# Patient Record
Sex: MALE | Race: ASIAN | NOT HISPANIC OR LATINO | URBAN - METROPOLITAN AREA
[De-identification: names, ages, dates, MRNs, and addresses within clinical notes are randomized per-mention and may not be internally consistent; named-entity substitution may affect disease eponyms.]

---

## 2019-04-01 PROBLEM — Z00.00 ENCOUNTER FOR PREVENTIVE HEALTH EXAMINATION: Status: ACTIVE | Noted: 2019-04-01

## 2019-04-11 ENCOUNTER — OUTPATIENT (OUTPATIENT)
Dept: OUTPATIENT SERVICES | Facility: HOSPITAL | Age: 64
LOS: 1 days | End: 2019-04-11
Payer: COMMERCIAL

## 2019-04-11 ENCOUNTER — APPOINTMENT (OUTPATIENT)
Dept: MRI IMAGING | Facility: CLINIC | Age: 64
End: 2019-04-11
Payer: COMMERCIAL

## 2019-04-11 DIAGNOSIS — Z00.8 ENCOUNTER FOR OTHER GENERAL EXAMINATION: ICD-10-CM

## 2019-04-11 PROCEDURE — A9585: CPT

## 2019-04-11 PROCEDURE — 74183 MRI ABD W/O CNTR FLWD CNTR: CPT | Mod: 26

## 2019-04-11 PROCEDURE — 74183 MRI ABD W/O CNTR FLWD CNTR: CPT

## 2019-04-19 ENCOUNTER — RESULT REVIEW (OUTPATIENT)
Age: 64
End: 2019-04-19

## 2019-04-19 ENCOUNTER — INPATIENT (INPATIENT)
Facility: HOSPITAL | Age: 64
LOS: 0 days | Discharge: HOME | DRG: 445 | End: 2019-04-20
Attending: INTERNAL MEDICINE | Admitting: INTERNAL MEDICINE
Payer: COMMERCIAL

## 2019-04-19 VITALS
HEART RATE: 63 BPM | WEIGHT: 200.4 LBS | SYSTOLIC BLOOD PRESSURE: 131 MMHG | RESPIRATION RATE: 16 BRPM | DIASTOLIC BLOOD PRESSURE: 74 MMHG | TEMPERATURE: 99 F | OXYGEN SATURATION: 98 %

## 2019-04-19 DIAGNOSIS — E80.6 OTHER DISORDERS OF BILIRUBIN METABOLISM: ICD-10-CM

## 2019-04-19 DIAGNOSIS — N17.9 ACUTE KIDNEY FAILURE, UNSPECIFIED: ICD-10-CM

## 2019-04-19 DIAGNOSIS — I25.10 ATHEROSCLEROTIC HEART DISEASE OF NATIVE CORONARY ARTERY WITHOUT ANGINA PECTORIS: ICD-10-CM

## 2019-04-19 DIAGNOSIS — R63.8 OTHER SYMPTOMS AND SIGNS CONCERNING FOOD AND FLUID INTAKE: ICD-10-CM

## 2019-04-19 DIAGNOSIS — E11.9 TYPE 2 DIABETES MELLITUS WITHOUT COMPLICATIONS: ICD-10-CM

## 2019-04-19 DIAGNOSIS — I10 ESSENTIAL (PRIMARY) HYPERTENSION: ICD-10-CM

## 2019-04-19 DIAGNOSIS — Z91.89 OTHER SPECIFIED PERSONAL RISK FACTORS, NOT ELSEWHERE CLASSIFIED: ICD-10-CM

## 2019-04-19 DIAGNOSIS — K21.9 GASTRO-ESOPHAGEAL REFLUX DISEASE WITHOUT ESOPHAGITIS: ICD-10-CM

## 2019-04-19 DIAGNOSIS — Z90.49 ACQUIRED ABSENCE OF OTHER SPECIFIED PARTS OF DIGESTIVE TRACT: Chronic | ICD-10-CM

## 2019-04-19 LAB
ALBUMIN SERPL ELPH-MCNC: 3.1 G/DL — LOW (ref 3.3–5)
ALBUMIN SERPL ELPH-MCNC: 3.2 G/DL — LOW (ref 3.3–5)
ALBUMIN SERPL ELPH-MCNC: 3.6 G/DL — SIGNIFICANT CHANGE UP (ref 3.3–5)
ALP SERPL-CCNC: 690 U/L — HIGH (ref 40–120)
ALP SERPL-CCNC: 713 U/L — HIGH (ref 40–120)
ALP SERPL-CCNC: 841 U/L — HIGH (ref 40–120)
ALT FLD-CCNC: 247 U/L — HIGH (ref 10–45)
ALT FLD-CCNC: 251 U/L — HIGH (ref 10–45)
ALT FLD-CCNC: 301 U/L — HIGH (ref 10–45)
ANION GAP SERPL CALC-SCNC: 12 MMOL/L — SIGNIFICANT CHANGE UP (ref 5–17)
ANION GAP SERPL CALC-SCNC: 14 MMOL/L — SIGNIFICANT CHANGE UP (ref 5–17)
ANION GAP SERPL CALC-SCNC: 15 MMOL/L — SIGNIFICANT CHANGE UP (ref 5–17)
APPEARANCE UR: CLEAR — SIGNIFICANT CHANGE UP
AST SERPL-CCNC: 215 U/L — HIGH (ref 10–40)
AST SERPL-CCNC: 222 U/L — HIGH (ref 10–40)
AST SERPL-CCNC: 272 U/L — HIGH (ref 10–40)
BACTERIA # UR AUTO: ABNORMAL /HPF
BASOPHILS # BLD AUTO: 0.03 K/UL — SIGNIFICANT CHANGE UP (ref 0–0.2)
BASOPHILS NFR BLD AUTO: 0.4 % — SIGNIFICANT CHANGE UP (ref 0–2)
BILIRUB DIRECT SERPL-MCNC: 6.4 MG/DL — HIGH (ref 0–0.2)
BILIRUB INDIRECT FLD-MCNC: 2.3 MG/DL — HIGH (ref 0.2–1)
BILIRUB SERPL-MCNC: 7.6 MG/DL — HIGH (ref 0.2–1.2)
BILIRUB SERPL-MCNC: 7.8 MG/DL — HIGH (ref 0.2–1.2)
BILIRUB SERPL-MCNC: 8.7 MG/DL — HIGH (ref 0.2–1.2)
BILIRUB SERPL-MCNC: 8.7 MG/DL — HIGH (ref 0.2–1.2)
BILIRUB UR-MCNC: ABNORMAL
BUN SERPL-MCNC: 43 MG/DL — HIGH (ref 7–23)
BUN SERPL-MCNC: 44 MG/DL — HIGH (ref 7–23)
BUN SERPL-MCNC: 51 MG/DL — HIGH (ref 7–23)
CALCIUM SERPL-MCNC: 10 MG/DL — SIGNIFICANT CHANGE UP (ref 8.4–10.5)
CALCIUM SERPL-MCNC: 8.9 MG/DL — SIGNIFICANT CHANGE UP (ref 8.4–10.5)
CALCIUM SERPL-MCNC: 8.9 MG/DL — SIGNIFICANT CHANGE UP (ref 8.4–10.5)
CHLORIDE SERPL-SCNC: 102 MMOL/L — SIGNIFICANT CHANGE UP (ref 96–108)
CHLORIDE SERPL-SCNC: 107 MMOL/L — SIGNIFICANT CHANGE UP (ref 96–108)
CHLORIDE SERPL-SCNC: 108 MMOL/L — SIGNIFICANT CHANGE UP (ref 96–108)
CK SERPL-CCNC: 85 U/L — SIGNIFICANT CHANGE UP (ref 30–200)
CO2 SERPL-SCNC: 15 MMOL/L — LOW (ref 22–31)
CO2 SERPL-SCNC: 17 MMOL/L — LOW (ref 22–31)
CO2 SERPL-SCNC: 19 MMOL/L — LOW (ref 22–31)
COLOR SPEC: YELLOW — SIGNIFICANT CHANGE UP
CREAT SERPL-MCNC: 3.78 MG/DL — HIGH (ref 0.5–1.3)
CREAT SERPL-MCNC: 3.85 MG/DL — HIGH (ref 0.5–1.3)
CREAT SERPL-MCNC: 4.2 MG/DL — HIGH (ref 0.5–1.3)
DIFF PNL FLD: ABNORMAL
EOSINOPHIL # BLD AUTO: 0.13 K/UL — SIGNIFICANT CHANGE UP (ref 0–0.5)
EOSINOPHIL NFR BLD AUTO: 1.5 % — SIGNIFICANT CHANGE UP (ref 0–6)
EPI CELLS # UR: ABNORMAL /HPF (ref 0–5)
EXTRA BLUE TOP TUBE: SIGNIFICANT CHANGE UP
EXTRA SST TUBE: SIGNIFICANT CHANGE UP
GLUCOSE BLDC GLUCOMTR-MCNC: 117 MG/DL — HIGH (ref 70–99)
GLUCOSE BLDC GLUCOMTR-MCNC: 66 MG/DL — LOW (ref 70–99)
GLUCOSE BLDC GLUCOMTR-MCNC: 92 MG/DL — SIGNIFICANT CHANGE UP (ref 70–99)
GLUCOSE SERPL-MCNC: 118 MG/DL — HIGH (ref 70–99)
GLUCOSE SERPL-MCNC: 133 MG/DL — HIGH (ref 70–99)
GLUCOSE SERPL-MCNC: 135 MG/DL — HIGH (ref 70–99)
GLUCOSE UR QL: NEGATIVE — SIGNIFICANT CHANGE UP
GRAN CASTS # UR COMP ASSIST: ABNORMAL /LPF
HCT VFR BLD CALC: 34.2 % — LOW (ref 39–50)
HCT VFR BLD CALC: 39.7 % — SIGNIFICANT CHANGE UP (ref 39–50)
HGB BLD-MCNC: 11.1 G/DL — LOW (ref 13–17)
HGB BLD-MCNC: 13 G/DL — SIGNIFICANT CHANGE UP (ref 13–17)
HYALINE CASTS # UR AUTO: ABNORMAL /LPF (ref 0–2)
IMM GRANULOCYTES NFR BLD AUTO: 0.6 % — SIGNIFICANT CHANGE UP (ref 0–1.5)
KETONES UR-MCNC: NEGATIVE — SIGNIFICANT CHANGE UP
LEUKOCYTE ESTERASE UR-ACNC: ABNORMAL
LIDOCAIN IGE QN: 197 U/L — HIGH (ref 7–60)
LYMPHOCYTES # BLD AUTO: 0.87 K/UL — LOW (ref 1–3.3)
LYMPHOCYTES # BLD AUTO: 10.2 % — LOW (ref 13–44)
MCHC RBC-ENTMCNC: 29.4 PG — SIGNIFICANT CHANGE UP (ref 27–34)
MCHC RBC-ENTMCNC: 30.2 PG — SIGNIFICANT CHANGE UP (ref 27–34)
MCHC RBC-ENTMCNC: 32.5 GM/DL — SIGNIFICANT CHANGE UP (ref 32–36)
MCHC RBC-ENTMCNC: 32.7 GM/DL — SIGNIFICANT CHANGE UP (ref 32–36)
MCV RBC AUTO: 90.7 FL — SIGNIFICANT CHANGE UP (ref 80–100)
MCV RBC AUTO: 92.1 FL — SIGNIFICANT CHANGE UP (ref 80–100)
MONOCYTES # BLD AUTO: 1.13 K/UL — HIGH (ref 0–0.9)
MONOCYTES NFR BLD AUTO: 13.2 % — SIGNIFICANT CHANGE UP (ref 2–14)
NEUTROPHILS # BLD AUTO: 6.35 K/UL — SIGNIFICANT CHANGE UP (ref 1.8–7.4)
NEUTROPHILS NFR BLD AUTO: 74.1 % — SIGNIFICANT CHANGE UP (ref 43–77)
NITRITE UR-MCNC: NEGATIVE — SIGNIFICANT CHANGE UP
NRBC # BLD: 0 /100 WBCS — SIGNIFICANT CHANGE UP (ref 0–0)
NRBC # BLD: 0 /100 WBCS — SIGNIFICANT CHANGE UP (ref 0–0)
OSMOLALITY UR: 260 MOSMOL/KG — SIGNIFICANT CHANGE UP (ref 100–650)
PH UR: 6 — SIGNIFICANT CHANGE UP (ref 5–8)
PLATELET # BLD AUTO: 206 K/UL — SIGNIFICANT CHANGE UP (ref 150–400)
PLATELET # BLD AUTO: 232 K/UL — SIGNIFICANT CHANGE UP (ref 150–400)
POTASSIUM SERPL-MCNC: 4.8 MMOL/L — SIGNIFICANT CHANGE UP (ref 3.5–5.3)
POTASSIUM SERPL-MCNC: 5 MMOL/L — SIGNIFICANT CHANGE UP (ref 3.5–5.3)
POTASSIUM SERPL-MCNC: 5 MMOL/L — SIGNIFICANT CHANGE UP (ref 3.5–5.3)
POTASSIUM SERPL-SCNC: 4.8 MMOL/L — SIGNIFICANT CHANGE UP (ref 3.5–5.3)
POTASSIUM SERPL-SCNC: 5 MMOL/L — SIGNIFICANT CHANGE UP (ref 3.5–5.3)
POTASSIUM SERPL-SCNC: 5 MMOL/L — SIGNIFICANT CHANGE UP (ref 3.5–5.3)
PROT SERPL-MCNC: 6.9 G/DL — SIGNIFICANT CHANGE UP (ref 6–8.3)
PROT SERPL-MCNC: 7.1 G/DL — SIGNIFICANT CHANGE UP (ref 6–8.3)
PROT SERPL-MCNC: 8.4 G/DL — HIGH (ref 6–8.3)
PROT UR-MCNC: 100 MG/DL
RBC # BLD: 3.77 M/UL — LOW (ref 4.2–5.8)
RBC # BLD: 4.31 M/UL — SIGNIFICANT CHANGE UP (ref 4.2–5.8)
RBC # FLD: 14.9 % — HIGH (ref 10.3–14.5)
RBC # FLD: 15.8 % — HIGH (ref 10.3–14.5)
RBC CASTS # UR COMP ASSIST: < 5 /HPF — SIGNIFICANT CHANGE UP
SODIUM SERPL-SCNC: 136 MMOL/L — SIGNIFICANT CHANGE UP (ref 135–145)
SODIUM SERPL-SCNC: 136 MMOL/L — SIGNIFICANT CHANGE UP (ref 135–145)
SODIUM SERPL-SCNC: 137 MMOL/L — SIGNIFICANT CHANGE UP (ref 135–145)
SODIUM UR-SCNC: 45 MMOL/L — SIGNIFICANT CHANGE UP
SP GR SPEC: 1.02 — SIGNIFICANT CHANGE UP (ref 1–1.03)
UROBILINOGEN FLD QL: 1 E.U./DL — SIGNIFICANT CHANGE UP
UUN UR-MCNC: 380 MG/DL — SIGNIFICANT CHANGE UP
WBC # BLD: 7.32 K/UL — SIGNIFICANT CHANGE UP (ref 3.8–10.5)
WBC # BLD: 8.56 K/UL — SIGNIFICANT CHANGE UP (ref 3.8–10.5)
WBC # FLD AUTO: 7.32 K/UL — SIGNIFICANT CHANGE UP (ref 3.8–10.5)
WBC # FLD AUTO: 8.56 K/UL — SIGNIFICANT CHANGE UP (ref 3.8–10.5)
WBC UR QL: > 10 /HPF

## 2019-04-19 PROCEDURE — 99285 EMERGENCY DEPT VISIT HI MDM: CPT | Mod: 25

## 2019-04-19 PROCEDURE — 99223 1ST HOSP IP/OBS HIGH 75: CPT

## 2019-04-19 PROCEDURE — 93010 ELECTROCARDIOGRAM REPORT: CPT | Mod: NC

## 2019-04-19 PROCEDURE — 43259 EGD US EXAM DUODENUM/JEJUNUM: CPT

## 2019-04-19 PROCEDURE — 71046 X-RAY EXAM CHEST 2 VIEWS: CPT | Mod: 26

## 2019-04-19 RX ORDER — INSULIN GLARGINE 100 [IU]/ML
22 INJECTION, SOLUTION SUBCUTANEOUS AT BEDTIME
Qty: 0 | Refills: 0 | Status: DISCONTINUED | OUTPATIENT
Start: 2019-04-19 | End: 2019-04-19

## 2019-04-19 RX ORDER — CARVEDILOL PHOSPHATE 80 MG/1
25 CAPSULE, EXTENDED RELEASE ORAL EVERY 12 HOURS
Qty: 0 | Refills: 0 | Status: DISCONTINUED | OUTPATIENT
Start: 2019-04-19 | End: 2019-04-20

## 2019-04-19 RX ORDER — (INSULIN DEGLUDEC AND LIRAGLUTIDE) 100; 3.6 [IU]/ML; MG/ML
0 INJECTION, SOLUTION SUBCUTANEOUS
Qty: 0 | Refills: 0 | COMMUNITY

## 2019-04-19 RX ORDER — ONDANSETRON 8 MG/1
4 TABLET, FILM COATED ORAL ONCE
Qty: 0 | Refills: 0 | Status: DISCONTINUED | OUTPATIENT
Start: 2019-04-19 | End: 2019-04-20

## 2019-04-19 RX ORDER — HEPARIN SODIUM 5000 [USP'U]/ML
5000 INJECTION INTRAVENOUS; SUBCUTANEOUS EVERY 8 HOURS
Qty: 0 | Refills: 0 | Status: DISCONTINUED | OUTPATIENT
Start: 2019-04-19 | End: 2019-04-20

## 2019-04-19 RX ORDER — CALAMINE AND ZINC OXIDE AND PHENOL 160; 10 MG/ML; MG/ML
1 LOTION TOPICAL
Qty: 0 | Refills: 0 | Status: DISCONTINUED | OUTPATIENT
Start: 2019-04-19 | End: 2019-04-20

## 2019-04-19 RX ORDER — AMLODIPINE BESYLATE 2.5 MG/1
10 TABLET ORAL DAILY
Qty: 0 | Refills: 0 | Status: DISCONTINUED | OUTPATIENT
Start: 2019-04-19 | End: 2019-04-20

## 2019-04-19 RX ORDER — ATORVASTATIN CALCIUM 80 MG/1
40 TABLET, FILM COATED ORAL AT BEDTIME
Qty: 0 | Refills: 0 | Status: DISCONTINUED | OUTPATIENT
Start: 2019-04-19 | End: 2019-04-19

## 2019-04-19 RX ORDER — DEXTROSE 50 % IN WATER 50 %
15 SYRINGE (ML) INTRAVENOUS ONCE
Qty: 0 | Refills: 0 | Status: DISCONTINUED | OUTPATIENT
Start: 2019-04-19 | End: 2019-04-20

## 2019-04-19 RX ORDER — SODIUM CHLORIDE 9 MG/ML
1000 INJECTION, SOLUTION INTRAVENOUS
Qty: 0 | Refills: 0 | Status: DISCONTINUED | OUTPATIENT
Start: 2019-04-19 | End: 2019-04-20

## 2019-04-19 RX ORDER — DEXTROSE 50 % IN WATER 50 %
12.5 SYRINGE (ML) INTRAVENOUS ONCE
Qty: 0 | Refills: 0 | Status: DISCONTINUED | OUTPATIENT
Start: 2019-04-19 | End: 2019-04-20

## 2019-04-19 RX ORDER — INSULIN LISPRO 100/ML
VIAL (ML) SUBCUTANEOUS
Qty: 0 | Refills: 0 | Status: DISCONTINUED | OUTPATIENT
Start: 2019-04-19 | End: 2019-04-20

## 2019-04-19 RX ORDER — PANTOPRAZOLE SODIUM 20 MG/1
40 TABLET, DELAYED RELEASE ORAL
Qty: 0 | Refills: 0 | Status: DISCONTINUED | OUTPATIENT
Start: 2019-04-19 | End: 2019-04-20

## 2019-04-19 RX ORDER — HYDRALAZINE HCL 50 MG
75 TABLET ORAL THREE TIMES A DAY
Qty: 0 | Refills: 0 | Status: DISCONTINUED | OUTPATIENT
Start: 2019-04-19 | End: 2019-04-20

## 2019-04-19 RX ORDER — DEXTROSE 50 % IN WATER 50 %
25 SYRINGE (ML) INTRAVENOUS ONCE
Qty: 0 | Refills: 0 | Status: DISCONTINUED | OUTPATIENT
Start: 2019-04-19 | End: 2019-04-20

## 2019-04-19 RX ORDER — ASPIRIN/CALCIUM CARB/MAGNESIUM 324 MG
81 TABLET ORAL DAILY
Qty: 0 | Refills: 0 | Status: DISCONTINUED | OUTPATIENT
Start: 2019-04-19 | End: 2019-04-20

## 2019-04-19 RX ORDER — SODIUM CHLORIDE 9 MG/ML
1000 INJECTION INTRAMUSCULAR; INTRAVENOUS; SUBCUTANEOUS
Qty: 0 | Refills: 0 | Status: DISCONTINUED | OUTPATIENT
Start: 2019-04-19 | End: 2019-04-20

## 2019-04-19 RX ORDER — DEXTROSE 50 % IN WATER 50 %
25 SYRINGE (ML) INTRAVENOUS ONCE
Qty: 0 | Refills: 0 | Status: COMPLETED | OUTPATIENT
Start: 2019-04-19 | End: 2019-04-19

## 2019-04-19 RX ORDER — VALSARTAN 80 MG/1
1 TABLET ORAL
Qty: 0 | Refills: 0 | COMMUNITY

## 2019-04-19 RX ORDER — AMLODIPINE BESYLATE 2.5 MG/1
1 TABLET ORAL
Qty: 0 | Refills: 0 | COMMUNITY

## 2019-04-19 RX ORDER — GLUCAGON INJECTION, SOLUTION 0.5 MG/.1ML
1 INJECTION, SOLUTION SUBCUTANEOUS ONCE
Qty: 0 | Refills: 0 | Status: DISCONTINUED | OUTPATIENT
Start: 2019-04-19 | End: 2019-04-20

## 2019-04-19 RX ORDER — SODIUM CHLORIDE 9 MG/ML
1000 INJECTION INTRAMUSCULAR; INTRAVENOUS; SUBCUTANEOUS ONCE
Qty: 0 | Refills: 0 | Status: COMPLETED | OUTPATIENT
Start: 2019-04-19 | End: 2019-04-19

## 2019-04-19 RX ORDER — DIPHENHYDRAMINE HCL 50 MG
25 CAPSULE ORAL ONCE
Qty: 0 | Refills: 0 | Status: COMPLETED | OUTPATIENT
Start: 2019-04-19 | End: 2019-04-19

## 2019-04-19 RX ORDER — LIRAGLUTIDE 6 MG/ML
1.8 INJECTION SUBCUTANEOUS
Qty: 0 | Refills: 0 | COMMUNITY

## 2019-04-19 RX ADMIN — HEPARIN SODIUM 5000 UNIT(S): 5000 INJECTION INTRAVENOUS; SUBCUTANEOUS at 21:37

## 2019-04-19 RX ADMIN — CARVEDILOL PHOSPHATE 25 MILLIGRAM(S): 80 CAPSULE, EXTENDED RELEASE ORAL at 21:37

## 2019-04-19 RX ADMIN — Medication 25 MILLIGRAM(S): at 15:40

## 2019-04-19 RX ADMIN — Medication 25 MILLILITER(S): at 14:29

## 2019-04-19 RX ADMIN — HEPARIN SODIUM 5000 UNIT(S): 5000 INJECTION INTRAVENOUS; SUBCUTANEOUS at 14:12

## 2019-04-19 RX ADMIN — SODIUM CHLORIDE 2000 MILLILITER(S): 9 INJECTION INTRAMUSCULAR; INTRAVENOUS; SUBCUTANEOUS at 14:30

## 2019-04-19 NOTE — H&P ADULT - ASSESSMENT
THe patient is a 63 year old man with a PMH of CAD (9 stents), D2m on insulin, HTN, GERD, prior cholecystectomy who present with a month of increasing bilirubin, abdominal discomfort and jaundice.  Given MRCP likely DDX is surgical stricture vs ampullary carcinoma THe patient is a 63 year old man with a PMH of CAD (9 stents), D2m on insulin, HTN, GERD, prior cholecystectomy who present with a month of increasing bilirubin, abdominal discomfort and jaundice.  Given MRCP likely DDX is surgical stricture vs ampullary carcinoma.

## 2019-04-19 NOTE — PATIENT PROFILE ADULT - HARM RISK FACTORS
Anesthetic History No history of anesthetic complications Review of Systems / Medical History Pertinent labs reviewed Pulmonary Within defined limits Neuro/Psych Within defined limits Cardiovascular Within defined limits Exercise tolerance: >4 METS 
  
GI/Hepatic/Renal 
  
 
 
 
 
 
Comments: PEG Endo/Other Hypothyroidism Cancer (throat) Other Findings Physical Exam 
 
Airway Mallampati: III 
TM Distance: < 4 cm Neck ROM: normal range of motion Mouth opening: Diminished (comment) Cardiovascular Regular rate and rhythm,  S1 and S2 normal,  no murmur, click, rub, or gallop Dental 
No notable dental hx Pulmonary Breath sounds clear to auscultation Abdominal 
GI exam deferred Other Findings Anesthetic Plan ASA: 2 Anesthesia type: total IV anesthesia Induction: Intravenous Anesthetic plan and risks discussed with: Patient
no

## 2019-04-19 NOTE — H&P ADULT - NSHPPHYSICALEXAM_GEN_ALL_CORE
.  VITAL SIGNS:  T(C): 36.8 (04-19-19 @ 12:18), Max: 37 (04-19-19 @ 11:26)  T(F): 98.2 (04-19-19 @ 12:18), Max: 98.6 (04-19-19 @ 11:26)  HR: 61 (04-19-19 @ 12:18) (61 - 63)  BP: 146/73 (04-19-19 @ 12:18) (131/74 - 146/73)  BP(mean): --  RR: 18 (04-19-19 @ 12:18) (16 - 18)  SpO2: 97% (04-19-19 @ 12:18) (97% - 98%)  Wt(kg): --    PHYSICAL EXAM:    Constitutional: WDWN resting comfortably in bed; NAD Jaundiced   Head: NC/AT  Eyes: PERRL, EOMI, +ictuerus  ENT: no nasal discharge; uvula midline, no oropharyngeal erythema or exudates; MMM  Neck: supple; no JVD or thyromegaly  Respiratory: CTA B/L; no W/R/R, no retractions  Cardiac: +S1/S2; RRR; no M/R/G; PMI non-displaced  Gastrointestinal: abdomen soft, NT/ND; no rebound or guarding; +BSx4  Back: spine midline, no bony tenderness or step-offs; no CVAT B/L  Extremities: WWP, no clubbing or cyanosis; no peripheral edema  Musculoskeletal: NROM x4; no joint swelling, tenderness or erythema  Vascular: 2+ radial, femoral, DP/PT pulses B/L  Lymphatic: no submandibular or cervical LAD  Neurologic: AAOx3; CNII-XII grossly intact; no focal deficits

## 2019-04-19 NOTE — ED ADULT NURSE NOTE - OBJECTIVE STATEMENT
Patient is a 64yo M, arrived to ED via walk-in, AAOx3, in NAD, S, stating, "My bilirubin is high".  Patient also complaining of nausea.  Patient denies any CP, SOB, dizziness, vomiting, diarrhea or any other complaints.  PIV placed, labs drawn and sent, EKG done.

## 2019-04-19 NOTE — H&P ADULT - NSHPSOCIALHISTORY_GEN_ALL_CORE
Patient has a 30 pack year smoking history stopped 10 years ago Patient has a 30 pack year smoking history stopped 10 years ago. Patient has a 30 pack year smoking history stopped 10 years ago.  Denies etoh and recreational drug use. .  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

## 2019-04-19 NOTE — H&P ADULT - PROBLEM SELECTOR PLAN 5
MSSI  and premeal MSSI, lantus and premal    26 MSSI, lantus and premal  .6 x 90 = 27 Lantus  and 9 of premeal.    -Lantus 22 and no premeal as patient is NPO MSSI, lantus and premeal  0.6 x 90 = 27 Lantus  and 9 of premeal.    - start Lantus 22 qHS and no premeal as patient is NPO  - increase Lantus and add pre-meal insulin once pt no longer NPO

## 2019-04-19 NOTE — H&P ADULT - PROBLEM SELECTOR PLAN 4
-c/w home aspirn -c/w home asprin -c/w home aspirin with history of 9 stents placed.  -c/w home aspirin

## 2019-04-19 NOTE — ED ADULT NURSE NOTE - NSIMPLEMENTINTERV_GEN_ALL_ED
Implemented All Universal Safety Interventions:  [place] to call system. Call bell, personal items and telephone within reach. Instruct patient to call for assistance. Room bathroom lighting operational. Non-slip footwear when patient is off stretcher. Physically safe environment: no spills, clutter or unnecessary equipment. Stretcher in lowest position, wheels locked, appropriate side rails in place.

## 2019-04-19 NOTE — ED PROVIDER NOTE - ATTENDING CONTRIBUTION TO CARE
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 I discussed the plan of care of the patient directly with the PA while the patient was in the Emergency Department. Pt is well appearing but jaundiced, actively nauseous. . I have reviewed the ACP note and agree with the history, exam and plan of care. Admitted for likely ERCP today.

## 2019-04-19 NOTE — ED PROVIDER NOTE - OBJECTIVE STATEMENT
64 y/o male with hx of of CAD, DM, cirrhosis c/o jaundice x 1 day. pt states decrease appetite and itching for past 2 wks. Pt notes nausea past 1 wk. pt seen as outpt and bili elevated yesterday and pt noted to be jaundiced. pt reports dark urine and ifeanyi colored stools. pt reports wt loss of 15 lbs over past 2 wks. no fever or chills. no vomiting or diarrhea. no abd pain. Also pt notes worsening renal function on labs from yesterday. no further complaints.

## 2019-04-19 NOTE — ED PROVIDER NOTE - CHIEF COMPLAINT
The patient is a 63y Male complaining of abdominal pain. The patient is a 63y Male complaining of jaundice

## 2019-04-19 NOTE — H&P ADULT - NSICDXPASTMEDICALHX_GEN_ALL_CORE_FT
PAST MEDICAL HISTORY:  CAD (coronary artery disease)     HTN (hypertension)     Type II diabetes mellitus

## 2019-04-19 NOTE — ED PROVIDER NOTE - CLINICAL SUMMARY MEDICAL DECISION MAKING FREE TEXT BOX
jaundice with wt loss and nausea. abd non tender. concern for billary obstructive pathology and worsening renal function on outpt labs.  VSS. pt sent in for ERCP by outpt md. labs, ecg , cxr and plan for admission.

## 2019-04-19 NOTE — H&P ADULT - HISTORY OF PRESENT ILLNESS
THe patient is a 63 year old man with a PMH of CAD (9 stents), D2m on insulin, HTN, GERD, prior cholecystectomy,  diverticulosis and former 30  pack year smoker  who presents with painless jaundice and transaminitis (Mid 100s). Over two months ago the patient was noted to have elevated  transaminitis on routine labs. The patient on follow up RUQ ultrasound he was found to have dilated intrahepatic and extrahepatic questions.  Subsequent MRCP confirmed diffuse intra and extra hepatic ductal dilation  with a CBD of 2.3.  The patient reports decreased PO intake/appetite  starting in mid march, and reports that he has a 15-20 lb weight loss over the past two months with vague abdominal pain and nausea. He also reports  increased pruritis. On 3/29 the patient underwent EGD which showed irregular GE junction margins which was biopsied. On 4/1, The patient noted  icterus on 4/18 with increasing jaundice THe patient was scheduled for ERCP / EUS on 4/19 but concern of worsening symptoms (patient's family   noticed that he was becoming progressively jaundiced and icteric), patient arrived to [facility] today with plan (discussed with GI team prior to arrival) to undergo ERCP and EUS earlier than previously planned after his bilirubin on yesterdays lab work showed and increase 7.2 from .6 on intital evaluation. Patient denies sick contacts, vomiting fever, night sweats, CP and SOB. The patient is a 63 year old man with a PMH of CAD (9 stents), D2m on insulin, HTN, GERD, prior cholecystectomy,  diverticulosis and former 30  pack year smoker  who presents with painless jaundice and transaminitis (Mid 100s). Over two months ago the patient was noted to have elevated  transaminitis on routine labs. The patient on follow up RUQ ultrasound he was found to have dilated intrahepatic and extrahepatic questions.  Subsequent MRCP confirmed diffuse intra and extra hepatic ductal dilation  with a CBD of 2.3.  The patient reports decreased PO intake/appetite  starting in mid march, and reports that he has a 15-20 lb weight loss over the past two months with vague abdominal pain and nausea. He also reports  increased pruritis. On 3/29 the patient underwent EGD which showed irregular GE junction margins which was biopsied. On 4/1, The patient noted  icterus on 4/18 with increasing jaundice THe patient was scheduled for ERCP / EUS on 4/19 but concern of worsening symptoms (patient's family   noticed that he was becoming progressively jaundiced and icteric), patient arrived to [facility] today with plan (discussed with GI team prior to arrival) to undergo ERCP and EUS earlier than previously planned after his bilirubin on yesterdays lab work showed and increase 7.2 from .6 on intital evaluation. Patient denies sick contacts, vomiting fever, night sweats, CP and SOB.

## 2019-04-19 NOTE — H&P ADULT - PROBLEM SELECTOR PLAN 1
Patient with increasing abdominal fullness, jaundice and bilirubinemia. MRCP showed pancreatic mass however confirmed intra and extra hepatic bilary duct dilation.  Top two DDX are  a billary stricture VS an ampullary mass  -ERCP with EUS  -Trend Liver enzymes and bilirubrin Patient with increasing abdominal fullness, jaundice and bilirubinemia. MRCP showed pancreatic mass however confirmed intra and extra hepatic bilary duct dilation.  Top two DDX are  a billary stricture VS an ampullary mass  -ERCP with EUS  -Trend Liver enzymes and bilirubin Patient with increasing abdominal fullness, jaundice and bilirubinemia. MRCP showed no pancreatic mass however confirmed intra and extra hepatic bilary duct dilation.  Top two DDX are  a billary stricture VS an ampullary mass  -ERCP with EUS today  -Trend Liver enzymes and bilirubin  -GI recs appreciated

## 2019-04-19 NOTE — H&P ADULT - NSHPLABSRESULTS_GEN_ALL_CORE
136  |  102  |  51<H>  ----------------------------<  135<H>  5.0   |  19<L>  |  4.20<H>    Ca    10.0      2019 11:48    TPro  8.4<H>  /  Alb  3.6  /  TBili  8.7<H>  /  DBili  6.4<H>  /  AST  272<H>  /  ALT  301<H>  /  AlkPhos  841<H>                Urinalysis Basic - ( 2019 11:58 )    Color: Yellow / Appearance: Clear / S.020 / pH: x  Gluc: x / Ketone: NEGATIVE  / Bili: Moderate / Urobili: 1.0 E.U./dL   Blood: x / Protein: 100 mg/dL / Nitrite: NEGATIVE   Leuk Esterase: Trace / RBC: < 5 /HPF / WBC > 10 /HPF   Sq Epi: x / Non Sq Epi: 5-10 /HPF / Bacteria: Many /HPF            Lactate Trend            CAPILLARY BLOOD GLUCOSE Labs: reviewed.    CAPILLARY BLOOD GLUCOSE      POCT Blood Glucose.: 92 mg/dL (2019 15:27)  POCT Blood Glucose.: 66 mg/dL (2019 14:20)                          13.0   8.56  )-----------( 232      ( 2019 14:15 )             39.7         136  |  102  |  51<H>  ----------------------------<  135<H>  5.0   |  19<L>  |  4.20<H>    Ca    10.0      2019 11:48    TPro  8.4<H>  /  Alb  3.6  /  TBili  8.7<H>  /  DBili  6.4<H>  /  AST  272<H>  /  ALT  301<H>  /  AlkPhos  841<H>        LIVER FUNCTIONS - ( 2019 11:48 )  Alb: 3.6 g/dL / Pro: 8.4 g/dL / ALK PHOS: 841 U/L / ALT: 301 U/L / AST: 272 U/L / GGT: x             Urinalysis Basic - ( 2019 11:58 )    Color: Yellow / Appearance: Clear / S.020 / pH: x  Gluc: x / Ketone: NEGATIVE  / Bili: Moderate / Urobili: 1.0 E.U./dL   Blood: x / Protein: 100 mg/dL / Nitrite: NEGATIVE   Leuk Esterase: Trace / RBC: < 5 /HPF / WBC > 10 /HPF   Sq Epi: x / Non Sq Epi: 5-10 /HPF / Bacteria: Many /HPF    Radiology and additional tests: reviewed.    MR MRCP w/wo IV Cont (19 @ 09:37) >  Extensive intravenous as well as extra hepatic bile duct dilatation.  No marshall choledocholithiasis, pancreatic mass nor marshall mass of bile duct   origin.  The findings may be due to severe stricturing however endoscopic   evaluation is suggested to exclude a subtle mass of bile duct or   ampullary origin.

## 2019-04-19 NOTE — H&P ADULT - PROBLEM SELECTOR PLAN 2
Patient reports last know baseline around 2.3-2.5. Patient now with Cr of 4.6 Increased form his CR yesterday 4.05. Unclear accuracy of result given hyperbilurbinemia but patient with likely SLOAN. Could be prenal in the setting of poor PO intatke  -Holding valsartan, and torsemide  -Urine Lytes Patient reports last know baseline around 2.3-2.5. Patient now with Cr of 4.6 Increased form his CR yesterday 4.05. Unclear accuracy of result given hyperbilurbinemia but patient with likely SLOAN. Could be prenal in the setting of poor PO intatke. Patient on with Blood but no RBC on UA.    -follow up CK to rule out rhabdo SLOAN  -Holding valsartan, and torsemide  -Urine Lytes  to calculate FeUrea as patient is on toresemide  -1 L fluid bolus  -NS 100cc/hr Patient reports last known baseline around 2.3-2.5. Patient now with Cr of 4.6 Increased form his CR yesterday 4.05. Unclear accuracy of result given hyperbilirubinemia but patient with likely SLOAN. Could be pre-renal in the setting of poor PO intake. Also possible that hepatic injury is causing a hepatorenal syndrome. Patient on with Blood but no RBC on UA.    -follow up CK to rule out rhabdo SLOAN  -Holding valsartan, and torsemide  -Urine Lytes  to calculate FeUrea as patient is on toresemide  -1 L fluid bolus  -NS 100cc/hr  -anticipate that SLOAN should improve after ERCP with EUS

## 2019-04-19 NOTE — H&P ADULT - ATTENDING COMMENTS
Patient was seen and examined by me at bedside. I agree with resident's note, subjective, objective physical exam, assessment and plan with following modifications/additions.     1) Painless jaundice  2) Intrahepatic and extrahepatic duct dilation.   3) SLOAN on CKD  4) Hyperbilirubinemia  5) Transaminitis.     A/P: HD stable, euvolemic. Painless jaundice. Suggestive of biliary obstruction.   GI planning for ERCP today. NPO  Etiology for SLOAN is unclear. Likely pre-renal, since hyaline casts. Will try bolus NS IVF and repeat BMP. Also in differential ATN/AIN. Hold ACE/ARB. Hold diuretics for now.

## 2019-04-19 NOTE — H&P ADULT - PROBLEM SELECTOR PLAN 3
On home valsartan-amlodipine combination, coreg and torsemide ( confirmed with patient for BP)  -Holding Valsartan, and toresimde On home valsartan-amlodipine combination, coreg and torsemide ( confirmed with patient for BP)  -Holding Valsartan, and torsemide

## 2019-04-20 ENCOUNTER — TRANSCRIPTION ENCOUNTER (OUTPATIENT)
Age: 64
End: 2019-04-20

## 2019-04-20 VITALS — DIASTOLIC BLOOD PRESSURE: 64 MMHG | HEART RATE: 70 BPM | SYSTOLIC BLOOD PRESSURE: 106 MMHG

## 2019-04-20 LAB
ALBUMIN SERPL ELPH-MCNC: 3 G/DL — LOW (ref 3.3–5)
ALBUMIN SERPL ELPH-MCNC: 3.3 G/DL — SIGNIFICANT CHANGE UP (ref 3.3–5)
ALP SERPL-CCNC: 585 U/L — HIGH (ref 40–120)
ALP SERPL-CCNC: 687 U/L — HIGH (ref 40–120)
ALT FLD-CCNC: 192 U/L — HIGH (ref 10–45)
ALT FLD-CCNC: 243 U/L — HIGH (ref 10–45)
ANION GAP SERPL CALC-SCNC: 10 MMOL/L — SIGNIFICANT CHANGE UP (ref 5–17)
ANION GAP SERPL CALC-SCNC: 13 MMOL/L — SIGNIFICANT CHANGE UP (ref 5–17)
AST SERPL-CCNC: 116 U/L — HIGH (ref 10–40)
AST SERPL-CCNC: 179 U/L — HIGH (ref 10–40)
BILIRUB SERPL-MCNC: 3.6 MG/DL — HIGH (ref 0.2–1.2)
BILIRUB SERPL-MCNC: 5.5 MG/DL — HIGH (ref 0.2–1.2)
BUN SERPL-MCNC: 36 MG/DL — HIGH (ref 7–23)
BUN SERPL-MCNC: 37 MG/DL — HIGH (ref 7–23)
CALCIUM SERPL-MCNC: 8.6 MG/DL — SIGNIFICANT CHANGE UP (ref 8.4–10.5)
CALCIUM SERPL-MCNC: 9.2 MG/DL — SIGNIFICANT CHANGE UP (ref 8.4–10.5)
CHLORIDE SERPL-SCNC: 108 MMOL/L — SIGNIFICANT CHANGE UP (ref 96–108)
CHLORIDE SERPL-SCNC: 110 MMOL/L — HIGH (ref 96–108)
CO2 SERPL-SCNC: 15 MMOL/L — LOW (ref 22–31)
CO2 SERPL-SCNC: 16 MMOL/L — LOW (ref 22–31)
CREAT SERPL-MCNC: 3.56 MG/DL — HIGH (ref 0.5–1.3)
CREAT SERPL-MCNC: 3.71 MG/DL — HIGH (ref 0.5–1.3)
GLUCOSE BLDC GLUCOMTR-MCNC: 136 MG/DL — HIGH (ref 70–99)
GLUCOSE BLDC GLUCOMTR-MCNC: 276 MG/DL — HIGH (ref 70–99)
GLUCOSE BLDC GLUCOMTR-MCNC: 276 MG/DL — HIGH (ref 70–99)
GLUCOSE SERPL-MCNC: 144 MG/DL — HIGH (ref 70–99)
GLUCOSE SERPL-MCNC: 286 MG/DL — HIGH (ref 70–99)
HBA1C BLD-MCNC: 6.5 % — HIGH (ref 4–5.6)
HCV AB S/CO SERPL IA: 0.12 S/CO — SIGNIFICANT CHANGE UP
HCV AB SERPL-IMP: SIGNIFICANT CHANGE UP
MAGNESIUM SERPL-MCNC: 2.2 MG/DL — SIGNIFICANT CHANGE UP (ref 1.6–2.6)
PHOSPHATE SERPL-MCNC: 3.8 MG/DL — SIGNIFICANT CHANGE UP (ref 2.5–4.5)
POTASSIUM SERPL-MCNC: 4.8 MMOL/L — SIGNIFICANT CHANGE UP (ref 3.5–5.3)
POTASSIUM SERPL-MCNC: 5.4 MMOL/L — HIGH (ref 3.5–5.3)
POTASSIUM SERPL-SCNC: 4.8 MMOL/L — SIGNIFICANT CHANGE UP (ref 3.5–5.3)
POTASSIUM SERPL-SCNC: 5.4 MMOL/L — HIGH (ref 3.5–5.3)
PROT SERPL-MCNC: 6.5 G/DL — SIGNIFICANT CHANGE UP (ref 6–8.3)
PROT SERPL-MCNC: 7.4 G/DL — SIGNIFICANT CHANGE UP (ref 6–8.3)
SODIUM SERPL-SCNC: 134 MMOL/L — LOW (ref 135–145)
SODIUM SERPL-SCNC: 138 MMOL/L — SIGNIFICANT CHANGE UP (ref 135–145)

## 2019-04-20 PROCEDURE — 86301 IMMUNOASSAY TUMOR CA 19-9: CPT

## 2019-04-20 PROCEDURE — 99285 EMERGENCY DEPT VISIT HI MDM: CPT | Mod: 25

## 2019-04-20 PROCEDURE — 82248 BILIRUBIN DIRECT: CPT

## 2019-04-20 PROCEDURE — 82378 CARCINOEMBRYONIC ANTIGEN: CPT

## 2019-04-20 PROCEDURE — 82247 BILIRUBIN TOTAL: CPT

## 2019-04-20 PROCEDURE — 93005 ELECTROCARDIOGRAM TRACING: CPT

## 2019-04-20 PROCEDURE — 86803 HEPATITIS C AB TEST: CPT

## 2019-04-20 PROCEDURE — 81001 URINALYSIS AUTO W/SCOPE: CPT

## 2019-04-20 PROCEDURE — 74330 X-RAY BILE/PANC ENDOSCOPY: CPT

## 2019-04-20 PROCEDURE — 83735 ASSAY OF MAGNESIUM: CPT

## 2019-04-20 PROCEDURE — 83036 HEMOGLOBIN GLYCOSYLATED A1C: CPT

## 2019-04-20 PROCEDURE — 84100 ASSAY OF PHOSPHORUS: CPT

## 2019-04-20 PROCEDURE — 85027 COMPLETE CBC AUTOMATED: CPT

## 2019-04-20 PROCEDURE — 84540 ASSAY OF URINE/UREA-N: CPT

## 2019-04-20 PROCEDURE — 83690 ASSAY OF LIPASE: CPT

## 2019-04-20 PROCEDURE — C1887: CPT

## 2019-04-20 PROCEDURE — 88305 TISSUE EXAM BY PATHOLOGIST: CPT

## 2019-04-20 PROCEDURE — 88104 CYTOPATH FL NONGYN SMEARS: CPT

## 2019-04-20 PROCEDURE — 36415 COLL VENOUS BLD VENIPUNCTURE: CPT

## 2019-04-20 PROCEDURE — C1726: CPT

## 2019-04-20 PROCEDURE — C1769: CPT

## 2019-04-20 PROCEDURE — 82550 ASSAY OF CK (CPK): CPT

## 2019-04-20 PROCEDURE — 83935 ASSAY OF URINE OSMOLALITY: CPT

## 2019-04-20 PROCEDURE — 82962 GLUCOSE BLOOD TEST: CPT

## 2019-04-20 PROCEDURE — 71046 X-RAY EXAM CHEST 2 VIEWS: CPT

## 2019-04-20 PROCEDURE — 84300 ASSAY OF URINE SODIUM: CPT

## 2019-04-20 PROCEDURE — 93010 ELECTROCARDIOGRAM REPORT: CPT

## 2019-04-20 PROCEDURE — C2617: CPT

## 2019-04-20 PROCEDURE — 99239 HOSP IP/OBS DSCHRG MGMT >30: CPT

## 2019-04-20 PROCEDURE — 85025 COMPLETE CBC W/AUTO DIFF WBC: CPT

## 2019-04-20 PROCEDURE — 80053 COMPREHEN METABOLIC PANEL: CPT

## 2019-04-20 RX ORDER — INSULIN LISPRO 100/ML
20 VIAL (ML) SUBCUTANEOUS
Qty: 0 | Refills: 0 | Status: DISCONTINUED | OUTPATIENT
Start: 2019-04-20 | End: 2019-04-20

## 2019-04-20 RX ORDER — INSULIN GLARGINE 100 [IU]/ML
30 INJECTION, SOLUTION SUBCUTANEOUS EVERY MORNING
Qty: 0 | Refills: 0 | Status: DISCONTINUED | OUTPATIENT
Start: 2019-04-20 | End: 2019-04-20

## 2019-04-20 RX ADMIN — Medication 75 MILLIGRAM(S): at 16:48

## 2019-04-20 RX ADMIN — CARVEDILOL PHOSPHATE 25 MILLIGRAM(S): 80 CAPSULE, EXTENDED RELEASE ORAL at 17:27

## 2019-04-20 RX ADMIN — CALAMINE AND ZINC OXIDE AND PHENOL 1 APPLICATION(S): 160; 10 LOTION TOPICAL at 17:27

## 2019-04-20 RX ADMIN — CARVEDILOL PHOSPHATE 25 MILLIGRAM(S): 80 CAPSULE, EXTENDED RELEASE ORAL at 05:32

## 2019-04-20 RX ADMIN — Medication 20 UNIT(S): at 13:00

## 2019-04-20 RX ADMIN — CALAMINE AND ZINC OXIDE AND PHENOL 1 APPLICATION(S): 160; 10 LOTION TOPICAL at 12:15

## 2019-04-20 RX ADMIN — INSULIN GLARGINE 30 UNIT(S): 100 INJECTION, SOLUTION SUBCUTANEOUS at 11:12

## 2019-04-20 RX ADMIN — AMLODIPINE BESYLATE 10 MILLIGRAM(S): 2.5 TABLET ORAL at 05:32

## 2019-04-20 RX ADMIN — PANTOPRAZOLE SODIUM 40 MILLIGRAM(S): 20 TABLET, DELAYED RELEASE ORAL at 06:28

## 2019-04-20 RX ADMIN — CALAMINE AND ZINC OXIDE AND PHENOL 1 APPLICATION(S): 160; 10 LOTION TOPICAL at 05:33

## 2019-04-20 RX ADMIN — Medication 81 MILLIGRAM(S): at 12:15

## 2019-04-20 RX ADMIN — HEPARIN SODIUM 5000 UNIT(S): 5000 INJECTION INTRAVENOUS; SUBCUTANEOUS at 05:33

## 2019-04-20 RX ADMIN — HEPARIN SODIUM 5000 UNIT(S): 5000 INJECTION INTRAVENOUS; SUBCUTANEOUS at 00:00

## 2019-04-20 RX ADMIN — Medication 20 UNIT(S): at 17:30

## 2019-04-20 RX ADMIN — Medication 75 MILLIGRAM(S): at 05:32

## 2019-04-20 NOTE — PROGRESS NOTE ADULT - PROBLEM SELECTOR PLAN 3
On home valsartan-amlodipine combination, coreg and torsemide ( confirmed with patient for BP)  -Holding Valsartan, and torsemide On home valsartan-amlodipine combination, coreg and torsemide ( confirmed with patient for BP)  -c/w coreg BID and amlodipine 10mgqd  -c/w hydralazine 75mg TID

## 2019-04-20 NOTE — CONSULT NOTE ADULT - ASSESSMENT
64 yo M with DM on insulin, CAD s/p 9 stents, HTN who presents with biliary obstruction  1. Biliary obstruction  -MRCP shows intrahepatic and extrahepatic ductal dilation  -LFTs elevated, including bilirubin and Alk Phos, suggesting biliary obstruction  -DDx: stricture vs stone vs mass  -trend LFTs  -plan for EUS and ERCP later this afternoon if schedule permits  -Keep NPO
63 M T2IDDM, CKD, HTN, GERD, diverticulosis, PSHx Cholecystectomy (2009) former 30pk/yr smoker presents with pruritis and painless jaundice. S/P ERCP with stenting of distal CBD    -F/U pathology  -Please send CEA and Ca19-9  -Seen with Chief Resident and discussed with Chief Resident and Dr Montero

## 2019-04-20 NOTE — PROGRESS NOTE ADULT - PROBLEM SELECTOR PLAN 2
Patient reports last known baseline around 2.3-2.5. Patient now with Cr of 4.6 Increased form his CR yesterday 4.05. Unclear accuracy of result given hyperbilirubinemia but patient with likely SLOAN. Could be pre-renal in the setting of poor PO intake. Also possible that hepatic injury is causing a hepatorenal syndrome. Patient on with Blood but no RBC on UA.    -CK WNL   -Holding valsartan, and torsemide  -Urine Lytes  to calculate FeUrea as patient is on toresemide  -need urine creatinine to calculate Feurea  -NS 100cc/hr

## 2019-04-20 NOTE — CONSULT NOTE ADULT - SUBJECTIVE AND OBJECTIVE BOX
HPI:  63 M T2IDDM, CKD, HTN, GERD, diverticulosis, PSHx Cholecystectomy () former 30pk/yr smoker presents with pruritis and painless jaundice.        PAST MEDICAL & SURGICAL HISTORY:  Type II diabetes mellitus  HTN (hypertension)  CAD (coronary artery disease)  S/P cholecystectomy      General: NAD, resting comfortably in bed  HEENT:  C/V: NSR, normocardic  Pulm: Nonlabored breathing, no respiratory distress.  Abd: soft, NT/ND  :  Rectal:  Extrem: WWP. No calf edema, no calf tenderness, no discoloration.  MSK:    MEDICATIONS  (STANDING):  amLODIPine   Tablet 10 milliGRAM(s) Oral daily  aspirin enteric coated 81 milliGRAM(s) Oral daily  calamine Lotion 1 Application(s) Topical four times a day  carvedilol 25 milliGRAM(s) Oral every 12 hours  dextrose 5%. 1000 milliLiter(s) (50 mL/Hr) IV Continuous <Continuous>  dextrose 50% Injectable 12.5 Gram(s) IV Push once  dextrose 50% Injectable 25 Gram(s) IV Push once  dextrose 50% Injectable 25 Gram(s) IV Push once  heparin  Injectable 5000 Unit(s) SubCutaneous every 8 hours  hydrALAZINE 75 milliGRAM(s) Oral three times a day  insulin glargine Injectable (LANTUS) 30 Unit(s) SubCutaneous every morning  insulin lispro (HumaLOG) corrective regimen sliding scale   SubCutaneous Before meals and at bedtime  pantoprazole    Tablet 40 milliGRAM(s) Oral before breakfast    MEDICATIONS  (PRN):  dextrose 40% Gel 15 Gram(s) Oral once PRN Blood Glucose LESS THAN 70 milliGRAM(s)/deciliter  glucagon  Injectable 1 milliGRAM(s) IntraMuscular once PRN Glucose LESS THAN 70 milligrams/deciliter  ondansetron Injectable 4 milliGRAM(s) IV Push once PRN Nausea and/or Vomiting      Allergies    Cipro (Rash)  Plavix (Rash)    Intolerances        SOCIAL HISTORY:    FAMILY HISTORY:      Vital Signs Last 24 Hrs  T(C): 36.8 (2019 05:31), Max: 37 (2019 11:26)  T(F): 98.2 (2019 05:31), Max: 98.6 (2019 11:26)  HR: 71 (2019 05:31) (61 - 71)  BP: 162/75 (2019 05:31) (121/72 - 162/75)  BP(mean): --  RR: 18 (2019 05:31) (16 - 18)  SpO2: 96% (2019 05:31) (95% - 98%)        LABS:                        11.1   7.32  )-----------( 206      ( 2019 20:48 )             34.2     04-20    138  |  110<H>  |  36<H>  ----------------------------<  144<H>  5.4<H>   |  15<L>  |  3.71<H>    Ca    9.2      2019 06:47  Phos  3.8       Mg     2.2         TPro  7.4  /  Alb  3.3  /  TBili  5.5<H>  /  DBili  x   /  AST  179<H>  /  ALT  243<H>  /  AlkPhos  687<H>        Urinalysis Basic - ( 2019 11:58 )    Color: Yellow / Appearance: Clear / S.020 / pH: x  Gluc: x / Ketone: NEGATIVE  / Bili: Moderate / Urobili: 1.0 E.U./dL   Blood: x / Protein: 100 mg/dL / Nitrite: NEGATIVE   Leuk Esterase: Trace / RBC: < 5 /HPF / WBC > 10 /HPF   Sq Epi: x / Non Sq Epi: 5-10 /HPF / Bacteria: Many /HPF        RADIOLOGY & ADDITIONAL STUDIES: HPI:  63 M T2IDDM, CKD, HTN, GERD, diverticulosis, PSHx Cholecystectomy () former 30pk/yr smoker presents with pruritis and painless jaundice.  Reports decreased Po intake since mid march with 15-20lbs wt loss. EGD on 3/29 showed abnormal GE junction with biopsy sent. Was planned for ERCP/EUS on  but admitted for new pruritis and increased tBili. US show intrahepatic ductile dilation confirmed on MRCP. Currently denies N/V, Pain,, CP, SOB, Dysuria       PAST MEDICAL & SURGICAL HISTORY:  Type II diabetes mellitus  HTN (hypertension)  CAD (coronary artery disease)  S/P cholecystectomy          MEDICATIONS  (STANDING):  amLODIPine   Tablet 10 milliGRAM(s) Oral daily  aspirin enteric coated 81 milliGRAM(s) Oral daily  calamine Lotion 1 Application(s) Topical four times a day  carvedilol 25 milliGRAM(s) Oral every 12 hours  dextrose 5%. 1000 milliLiter(s) (50 mL/Hr) IV Continuous <Continuous>  dextrose 50% Injectable 12.5 Gram(s) IV Push once  dextrose 50% Injectable 25 Gram(s) IV Push once  dextrose 50% Injectable 25 Gram(s) IV Push once  heparin  Injectable 5000 Unit(s) SubCutaneous every 8 hours  hydrALAZINE 75 milliGRAM(s) Oral three times a day  insulin glargine Injectable (LANTUS) 30 Unit(s) SubCutaneous every morning  insulin lispro (HumaLOG) corrective regimen sliding scale   SubCutaneous Before meals and at bedtime  pantoprazole    Tablet 40 milliGRAM(s) Oral before breakfast    MEDICATIONS  (PRN):  dextrose 40% Gel 15 Gram(s) Oral once PRN Blood Glucose LESS THAN 70 milliGRAM(s)/deciliter  glucagon  Injectable 1 milliGRAM(s) IntraMuscular once PRN Glucose LESS THAN 70 milligrams/deciliter  ondansetron Injectable 4 milliGRAM(s) IV Push once PRN Nausea and/or Vomiting      Allergies    Cipro (Rash)  Plavix (Rash)    Intolerances        SOCIAL HISTORY:    FAMILY HISTORY:      Vital Signs Last 24 Hrs  T(C): 36.8 (2019 05:31), Max: 37 (2019 11:26)  T(F): 98.2 (2019 05:31), Max: 98.6 (2019 11:26)  HR: 71 (2019 05:31) (61 - 71)  BP: 162/75 (2019 05:31) (121/72 - 162/75)  BP(mean): --  RR: 18 (2019 05:31) (16 - 18)  SpO2: 96% (2019 05:31) (95% - 98%)    General: NAD, resting comfortably in bed  Abd: soft, NT/ND        LABS:                        11.1   7.32  )-----------( 206      ( 2019 20:48 )             34.2     04-20    138  |  110<H>  |  36<H>  ----------------------------<  144<H>  5.4<H>   |  15<L>  |  3.71<H>    Ca    9.2      2019 06:47  Phos  3.8     04-20  Mg     2.2     -20    TPro  7.4  /  Alb  3.3  /  TBili  5.5<H>  /  DBili  x   /  AST  179<H>  /  ALT  243<H>  /  AlkPhos  687<H>  04-20      Urinalysis Basic - ( 2019 11:58 )    Color: Yellow / Appearance: Clear / S.020 / pH: x  Gluc: x / Ketone: NEGATIVE  / Bili: Moderate / Urobili: 1.0 E.U./dL   Blood: x / Protein: 100 mg/dL / Nitrite: NEGATIVE   Leuk Esterase: Trace / RBC: < 5 /HPF / WBC > 10 /HPF   Sq Epi: x / Non Sq Epi: 5-10 /HPF / Bacteria: Many /HPF        RADIOLOGY & ADDITIONAL STUDIES:

## 2019-04-20 NOTE — PROGRESS NOTE ADULT - SUBJECTIVE AND OBJECTIVE BOX
incomplete  INTERVAL HPI/OVERNIGHT EVENTS:  Patient was seen and examined at bedside. s/p ERCP yesterday      VITAL SIGNS:  T(F): 98.4 (19 @ 20:57)  HR: 62 (19 @ 20:57)  BP: 121/72 (19 @ 20:57)  RR: 16 (19 @ 20:57)  SpO2: 95% (19 @ 20:57)  Wt(kg): --    PHYSICAL EXAM:    Constitutional: WDWN resting comfortably in bed; NAD Jaundiced   HEENT:  NC/AT PERRL, EOMI, +sclera ictuerus, MMM  Respiratory: CTA B/L; no W/R/R,  Cardiac: +S1/S2; RRR; no M/R/G; PMI non-displaced  Gastrointestinal: abdomen soft, NT/ND; no rebound or guarding; +BSx4  Extremities: WWP, no clubbing or cyanosis; no peripheral edema  Musculoskeletal: NROM x4; no joint swelling, tenderness or erythema  Vascular: 2+ radial, femoral, DP/PT pulses B/L  Lymphatic: no submandibular or cervical LAD  Neurologic: AAOx3; CNII-XII grossly intact; no focal deficits    MEDICATIONS  (STANDING):  amLODIPine   Tablet 10 milliGRAM(s) Oral daily  aspirin enteric coated 81 milliGRAM(s) Oral daily  calamine Lotion 1 Application(s) Topical four times a day  carvedilol 25 milliGRAM(s) Oral every 12 hours  dextrose 5%. 1000 milliLiter(s) (50 mL/Hr) IV Continuous <Continuous>  dextrose 50% Injectable 12.5 Gram(s) IV Push once  dextrose 50% Injectable 25 Gram(s) IV Push once  dextrose 50% Injectable 25 Gram(s) IV Push once  heparin  Injectable 5000 Unit(s) SubCutaneous every 8 hours  hydrALAZINE 75 milliGRAM(s) Oral three times a day  insulin lispro (HumaLOG) corrective regimen sliding scale   SubCutaneous Before meals and at bedtime  pantoprazole    Tablet 40 milliGRAM(s) Oral before breakfast  sodium chloride 0.9%. 1000 milliLiter(s) (100 mL/Hr) IV Continuous <Continuous>    MEDICATIONS  (PRN):  dextrose 40% Gel 15 Gram(s) Oral once PRN Blood Glucose LESS THAN 70 milliGRAM(s)/deciliter  glucagon  Injectable 1 milliGRAM(s) IntraMuscular once PRN Glucose LESS THAN 70 milligrams/deciliter  ondansetron Injectable 4 milliGRAM(s) IV Push once PRN Nausea and/or Vomiting      Allergies    Cipro (Rash)  Plavix (Rash)    Intolerances        LABS:                        11.1   7.32  )-----------( 206      ( 2019 20:48 )             34.2         136  |  107  |  43<H>  ----------------------------<  133<H>  5.0   |  15<L>  |  3.78<H>    Ca    8.9      2019 22:28    TPro  7.1  /  Alb  3.1<L>  /  TBili  7.6<H>  /  DBili  x   /  AST  215<H>  /  ALT  251<H>  /  AlkPhos  713<H>        Urinalysis Basic - ( 2019 11:58 )    Color: Yellow / Appearance: Clear / S.020 / pH: x  Gluc: x / Ketone: NEGATIVE  / Bili: Moderate / Urobili: 1.0 E.U./dL   Blood: x / Protein: 100 mg/dL / Nitrite: NEGATIVE   Leuk Esterase: Trace / RBC: < 5 /HPF / WBC > 10 /HPF   Sq Epi: x / Non Sq Epi: 5-10 /HPF / Bacteria: Many /HPF      CAPILLARY BLOOD GLUCOSE      POCT Blood Glucose.: 117 mg/dL (2019 21:26)  POCT Blood Glucose.: 92 mg/dL (2019 15:27)  POCT Blood Glucose.: 66 mg/dL (2019 14:20) INTERVAL HPI/OVERNIGHT EVENTS:  Patient was seen and examined at bedside. s/p ERCP yesterday. Pt has a slight appetite. Denies any abdominal pain, no diarrhea, no vomiting, no fevers or chills.     VITAL SIGNS:  T(F): 98.4 (19 @ 20:57)  HR: 62 (19 @ 20:57)  BP: 121/72 (19 @ 20:57)  RR: 16 (19 @ 20:57)  SpO2: 95% (19 @ 20:57)  Wt(kg): --    PHYSICAL EXAM:    Constitutional: WDWN resting comfortably in bed; NAD slightly Jaundiced   HEENT:  NC/AT PERRL, EOMI, +sclera icterus MMM  Respiratory: CTA B/L; no W/R/R,  Cardiac: +S1/S2; RRR; no M/R/G; PMI non-displaced  Gastrointestinal: abdomen soft, NT/ND; no rebound or guarding; normoactive bowel sounds  Extremities: WWP, no clubbing or cyanosis; no peripheral edema  Vascular: 2+ radial, femoral, DP/PT pulses B/L  Lymphatic: no submandibular or cervical LAD  Neurologic: AAOx3    MEDICATIONS  (STANDING):  amLODIPine   Tablet 10 milliGRAM(s) Oral daily  aspirin enteric coated 81 milliGRAM(s) Oral daily  calamine Lotion 1 Application(s) Topical four times a day  carvedilol 25 milliGRAM(s) Oral every 12 hours  dextrose 5%. 1000 milliLiter(s) (50 mL/Hr) IV Continuous <Continuous>  dextrose 50% Injectable 12.5 Gram(s) IV Push once  dextrose 50% Injectable 25 Gram(s) IV Push once  dextrose 50% Injectable 25 Gram(s) IV Push once  heparin  Injectable 5000 Unit(s) SubCutaneous every 8 hours  hydrALAZINE 75 milliGRAM(s) Oral three times a day  insulin lispro (HumaLOG) corrective regimen sliding scale   SubCutaneous Before meals and at bedtime  pantoprazole    Tablet 40 milliGRAM(s) Oral before breakfast  sodium chloride 0.9%. 1000 milliLiter(s) (100 mL/Hr) IV Continuous <Continuous>    MEDICATIONS  (PRN):  dextrose 40% Gel 15 Gram(s) Oral once PRN Blood Glucose LESS THAN 70 milliGRAM(s)/deciliter  glucagon  Injectable 1 milliGRAM(s) IntraMuscular once PRN Glucose LESS THAN 70 milligrams/deciliter  ondansetron Injectable 4 milliGRAM(s) IV Push once PRN Nausea and/or Vomiting      Allergies    Cipro (Rash)  Plavix (Rash)    Intolerances        LABS:                        11.1   7.32  )-----------( 206      ( 2019 20:48 )             34.2         136  |  107  |  43<H>  ----------------------------<  133<H>  5.0   |  15<L>  |  3.78<H>    Ca    8.9      2019 22:28    TPro  7.1  /  Alb  3.1<L>  /  TBili  7.6<H>  /  DBili  x   /  AST  215<H>  /  ALT  251<H>  /  AlkPhos  713<H>        Urinalysis Basic - ( 2019 11:58 )    Color: Yellow / Appearance: Clear / S.020 / pH: x  Gluc: x / Ketone: NEGATIVE  / Bili: Moderate / Urobili: 1.0 E.U./dL   Blood: x / Protein: 100 mg/dL / Nitrite: NEGATIVE   Leuk Esterase: Trace / RBC: < 5 /HPF / WBC > 10 /HPF   Sq Epi: x / Non Sq Epi: 5-10 /HPF / Bacteria: Many /HPF      CAPILLARY BLOOD GLUCOSE      POCT Blood Glucose.: 117 mg/dL (2019 21:26)  POCT Blood Glucose.: 92 mg/dL (2019 15:27)  POCT Blood Glucose.: 66 mg/dL (2019 14:20)

## 2019-04-20 NOTE — DISCHARGE NOTE PROVIDER - HOSPITAL COURSE
63 year old man with a PMH of CAD (9 stents), D2m on insulin, HTN, GERD, prior cholecystectomy who present with a month of increasing bilirubin, abdominal discomfort and painless jaundice, admitted for evaluation of surgical stricture and biliary duct malignancy s/p ERCP. ERCP showed dilated CBD stricture s/p sphincterotomy and biliary stent placement suspicious for cholangiocarcinoma. 63 year old man with a PMH of CAD (9 stents), D2m on insulin, HTN, GERD, prior cholecystectomy who present with a month of increasing bilirubin, abdominal discomfort and painless jaundice, admitted for evaluation of surgical stricture and biliary duct malignancy s/p ERCP. ERCP showed dilated CBD stricture s/p sphincterotomy and biliary stent placement suspicious for cholangiocarcinoma. Pt seen by general surgery and will await pathology results. Patient was medically optimized, stable and ready for discharge. Plan of care and return precautions were discussed with the patient who verbally stated understanding.

## 2019-04-20 NOTE — DISCHARGE NOTE PROVIDER - NSDCCPCAREPLAN_GEN_ALL_CORE_FT
PRINCIPAL DISCHARGE DIAGNOSIS  Diagnosis: Jaundice  Assessment and Plan of Treatment: You were admitted into the hospital for workup your painless jaundice. An ERCP showed dilated CBD stricture s/o sphincterotomy and biliary stent placement suspcious for cholangiocarcinoma with CBD bushings and biopsies taken. Please follow up with Dr. Martinez (gastroenterology) in 4-6 weeks.

## 2019-04-20 NOTE — PROGRESS NOTE ADULT - ASSESSMENT
63 year old man with a PMH of CAD (9 stents), D2m on insulin, HTN, GERD, prior cholecystectomy who present with a month of increasing bilirubin, abdominal discomfort and jaundice, admitted for evaluation of surgical stricture and biliary duct malignancy s/p ERCP

## 2019-04-20 NOTE — PROGRESS NOTE ADULT - SUBJECTIVE AND OBJECTIVE BOX
Pt seen and examined at bedside.    PERTINENT REVIEW OF SYSTEMS:  CONSTITUTIONAL: No weakness, fevers or chills  HEENT: No visual changes; No vertigo or throat pain   GASTROINTESTINAL: No abdominal or epigastric pain. No nausea, vomiting, or hematemesis; No diarrhea or constipation. No melena or hematochezia.  NEUROLOGICAL: No numbness or weakness  SKIN: No itching, burning, rashes, or lesions     Allergies    Cipro (Rash)  Plavix (Rash)    Intolerances      MEDICATIONS:  MEDICATIONS  (STANDING):  amLODIPine   Tablet 10 milliGRAM(s) Oral daily  aspirin enteric coated 81 milliGRAM(s) Oral daily  calamine Lotion 1 Application(s) Topical four times a day  carvedilol 25 milliGRAM(s) Oral every 12 hours  dextrose 5%. 1000 milliLiter(s) (50 mL/Hr) IV Continuous <Continuous>  dextrose 50% Injectable 12.5 Gram(s) IV Push once  dextrose 50% Injectable 25 Gram(s) IV Push once  dextrose 50% Injectable 25 Gram(s) IV Push once  heparin  Injectable 5000 Unit(s) SubCutaneous every 8 hours  hydrALAZINE 75 milliGRAM(s) Oral three times a day  insulin glargine Injectable (LANTUS) 30 Unit(s) SubCutaneous every morning  insulin lispro (HumaLOG) corrective regimen sliding scale   SubCutaneous Before meals and at bedtime  pantoprazole    Tablet 40 milliGRAM(s) Oral before breakfast  sodium chloride 0.9%. 1000 milliLiter(s) (100 mL/Hr) IV Continuous <Continuous>    MEDICATIONS  (PRN):  dextrose 40% Gel 15 Gram(s) Oral once PRN Blood Glucose LESS THAN 70 milliGRAM(s)/deciliter  glucagon  Injectable 1 milliGRAM(s) IntraMuscular once PRN Glucose LESS THAN 70 milligrams/deciliter  ondansetron Injectable 4 milliGRAM(s) IV Push once PRN Nausea and/or Vomiting    Vital Signs Last 24 Hrs  T(C): 36.8 (2019 05:31), Max: 37 (2019 11:26)  T(F): 98.2 (2019 05:31), Max: 98.6 (2019 11:26)  HR: 71 (2019 05:31) (61 - 71)  BP: 162/75 (2019 05:31) (121/72 - 162/75)  BP(mean): --  RR: 18 (2019 05:31) (16 - 18)  SpO2: 96% (2019 05:31) (95% - 98%)    PHYSICAL EXAM:    General: Well developed; well nourished; in no acute distress  HEENT: scleral icterus  Gastrointestinal: Soft non-tender non-distended; Normal bowel sounds; No hepatosplenomegaly. No rebound or guarding  Skin: Warm and dry. No obvious rash    LABS:                        11.1   7.32  )-----------( 206      ( 2019 20:48 )             34.2     04-20    138  |  110<H>  |  36<H>  ----------------------------<  144<H>  5.4<H>   |  15<L>  |  3.71<H>    Ca    9.2      2019 06:47  Phos  3.8     -20  Mg     2.2     -20    TPro  7.4  /  Alb  3.3  /  TBili  5.5<H>  /  DBili  x   /  AST  179<H>  /  ALT  243<H>  /  AlkPhos  687<H>  04-20          Urinalysis Basic - ( 2019 11:58 )    Color: Yellow / Appearance: Clear / S.020 / pH: x  Gluc: x / Ketone: NEGATIVE  / Bili: Moderate / Urobili: 1.0 E.U./dL   Blood: x / Protein: 100 mg/dL / Nitrite: NEGATIVE   Leuk Esterase: Trace / RBC: < 5 /HPF / WBC > 10 /HPF   Sq Epi: x / Non Sq Epi: 5-10 /HPF / Bacteria: Many /HPF                RADIOLOGY & ADDITIONAL STUDIES:

## 2019-04-20 NOTE — DISCHARGE NOTE PROVIDER - NSDCFUSCHEDAPPT_GEN_ALL_CORE_FT
KAMRAN FIGUEROA ; 04/24/2019 ; [facility] PreAdmits KAMRAN FIGUEROA ; 04/24/2019 ; [facility] PreAdmits KAMRAN FIGUEROA ; 04/24/2019 ; [facility] PreAdmits

## 2019-04-20 NOTE — DISCHARGE NOTE PROVIDER - CARE PROVIDER_API CALL
Evertno Martinez)  Gastroenterology; Internal Medicine  [address] [place] [address]  Phone: [phone]  Fax: [phone]  Follow Up Time:

## 2019-04-20 NOTE — PROGRESS NOTE ADULT - ASSESSMENT
64 yo M with DM on insulin, CAD s/p 9 stents, HTN who presents with biliary obstruction  1. Biliary obstruction  -MRCP shows intrahepatic and extrahepatic ductal dilation  -s/p EUS 4/19- dilated CBD, high grade distal CBD stricture, no mass seen  -s/p ERCP 4/19- CBD dilated to 2 cm, a distal 2 cm CBD stricture seen, s/p sphincterotomy, stent placement, brushings and biopsies taken, concern for cholangiocarcinoma  -monitor LFTS - today they are downtrending  -recommend surgical evaluation  -follow up pathology  -will need outpatient follow up with Dr. Martinez in 4 to 6 weeks

## 2019-04-20 NOTE — DISCHARGE NOTE NURSING/CASE MANAGEMENT/SOCIAL WORK - NSDCDPATPORTLINK_GEN_ALL_CORE
You can access the [facility] Patient Portal, offered by [facility], by registering with the following website: [email or web address]

## 2019-04-20 NOTE — PROGRESS NOTE ADULT - PROBLEM SELECTOR PLAN 1
Patient with increasing abdominal fullness, jaundice and bilirubinemia. MRCP showed no pancreatic mass however confirmed intra and extra hepatic bilary duct dilation.  DDX include billary stricture, an ampullary mass, cholangicarcinoma  -f/u ERCP results  -Trend Liver enzymes and bilirubin  -GI recs appreciated Patient with increasing abdominal fullness, jaundice and bilirubinemia. MRCP showed no pancreatic mass however confirmed intra and extra hepatic bilary duct dilation.  DDX include billary stricture, an ampullary mass, cholangicarcinoma  -s/p ERCP 4/19: dilated CBD s/p sphincterotomy and biliary stent suspicious for cholangiocarcinoma   -f/u CEA and   -general surgery recs  -Trend Liver enzymes and bilirubin  -GI recs appreciated

## 2019-04-21 LAB
CANCER AG19-9 SERPL-ACNC: 2488 U/ML — HIGH
CEA SERPL-MCNC: 2.3 NG/ML — SIGNIFICANT CHANGE UP (ref 0–3.8)

## 2019-04-22 LAB — SURGICAL PATHOLOGY STUDY: SIGNIFICANT CHANGE UP

## 2019-04-23 ENCOUNTER — MOBILE ON CALL (OUTPATIENT)
Age: 64
End: 2019-04-23

## 2019-04-23 LAB — NON-GYNECOLOGICAL CYTOLOGY STUDY: SIGNIFICANT CHANGE UP

## 2019-04-24 ENCOUNTER — APPOINTMENT (OUTPATIENT)
Dept: GASTROENTEROLOGY | Facility: HOSPITAL | Age: 64
End: 2019-04-24

## 2019-04-29 DIAGNOSIS — Z88.1 ALLERGY STATUS TO OTHER ANTIBIOTIC AGENTS STATUS: ICD-10-CM

## 2019-04-29 DIAGNOSIS — R10.9 UNSPECIFIED ABDOMINAL PAIN: ICD-10-CM

## 2019-04-29 DIAGNOSIS — Z79.82 LONG TERM (CURRENT) USE OF ASPIRIN: ICD-10-CM

## 2019-04-29 DIAGNOSIS — I12.9 HYPERTENSIVE CHRONIC KIDNEY DISEASE WITH STAGE 1 THROUGH STAGE 4 CHRONIC KIDNEY DISEASE, OR UNSPECIFIED CHRONIC KIDNEY DISEASE: ICD-10-CM

## 2019-04-29 DIAGNOSIS — N17.9 ACUTE KIDNEY FAILURE, UNSPECIFIED: ICD-10-CM

## 2019-04-29 DIAGNOSIS — Z88.8 ALLERGY STATUS TO OTHER DRUGS, MEDICAMENTS AND BIOLOGICAL SUBSTANCES: ICD-10-CM

## 2019-04-29 DIAGNOSIS — Z87.891 PERSONAL HISTORY OF NICOTINE DEPENDENCE: ICD-10-CM

## 2019-04-29 DIAGNOSIS — Z79.4 LONG TERM (CURRENT) USE OF INSULIN: ICD-10-CM

## 2019-04-29 DIAGNOSIS — K83.1 OBSTRUCTION OF BILE DUCT: ICD-10-CM

## 2019-04-29 DIAGNOSIS — I25.10 ATHEROSCLEROTIC HEART DISEASE OF NATIVE CORONARY ARTERY WITHOUT ANGINA PECTORIS: ICD-10-CM

## 2019-04-29 DIAGNOSIS — N18.9 CHRONIC KIDNEY DISEASE, UNSPECIFIED: ICD-10-CM

## 2019-04-29 DIAGNOSIS — K74.60 UNSPECIFIED CIRRHOSIS OF LIVER: ICD-10-CM

## 2019-04-29 DIAGNOSIS — K21.9 GASTRO-ESOPHAGEAL REFLUX DISEASE WITHOUT ESOPHAGITIS: ICD-10-CM

## 2019-04-29 DIAGNOSIS — K57.90 DIVERTICULOSIS OF INTESTINE, PART UNSPECIFIED, WITHOUT PERFORATION OR ABSCESS WITHOUT BLEEDING: ICD-10-CM

## 2019-04-29 DIAGNOSIS — R74.0 NONSPECIFIC ELEVATION OF LEVELS OF TRANSAMINASE AND LACTIC ACID DEHYDROGENASE [LDH]: ICD-10-CM

## 2019-04-29 DIAGNOSIS — L29.9 PRURITUS, UNSPECIFIED: ICD-10-CM

## 2019-04-29 DIAGNOSIS — E11.22 TYPE 2 DIABETES MELLITUS WITH DIABETIC CHRONIC KIDNEY DISEASE: ICD-10-CM

## 2019-04-30 PROBLEM — I10 ESSENTIAL (PRIMARY) HYPERTENSION: Chronic | Status: ACTIVE | Noted: 2019-04-19

## 2019-04-30 PROBLEM — I25.10 ATHEROSCLEROTIC HEART DISEASE OF NATIVE CORONARY ARTERY WITHOUT ANGINA PECTORIS: Chronic | Status: ACTIVE | Noted: 2019-04-19

## 2019-04-30 PROBLEM — E11.9 TYPE 2 DIABETES MELLITUS WITHOUT COMPLICATIONS: Chronic | Status: ACTIVE | Noted: 2019-04-19

## 2019-05-17 ENCOUNTER — INPATIENT (INPATIENT)
Facility: HOSPITAL | Age: 64
LOS: 0 days | Discharge: ANOTHER HOSPITAL | End: 2019-05-18
Attending: INTERNAL MEDICINE | Admitting: INTERNAL MEDICINE
Payer: COMMERCIAL

## 2019-05-17 ENCOUNTER — APPOINTMENT (OUTPATIENT)
Dept: GASTROENTEROLOGY | Facility: HOSPITAL | Age: 64
End: 2019-05-17

## 2019-05-17 VITALS
WEIGHT: 201.94 LBS | DIASTOLIC BLOOD PRESSURE: 59 MMHG | TEMPERATURE: 103 F | HEIGHT: 67 IN | SYSTOLIC BLOOD PRESSURE: 114 MMHG | RESPIRATION RATE: 19 BRPM | HEART RATE: 68 BPM | OXYGEN SATURATION: 100 %

## 2019-05-17 DIAGNOSIS — Z90.49 ACQUIRED ABSENCE OF OTHER SPECIFIED PARTS OF DIGESTIVE TRACT: Chronic | ICD-10-CM

## 2019-05-17 LAB
ALBUMIN SERPL ELPH-MCNC: 2.3 G/DL — LOW (ref 3.3–5)
ALBUMIN SERPL ELPH-MCNC: 2.8 G/DL — LOW (ref 3.3–5)
ALP SERPL-CCNC: 356 U/L — HIGH (ref 40–120)
ALP SERPL-CCNC: 357 U/L — HIGH (ref 40–120)
ALT FLD-CCNC: 178 U/L — HIGH (ref 12–78)
ALT FLD-CCNC: 233 U/L — HIGH (ref 12–78)
ANION GAP SERPL CALC-SCNC: 11 MMOL/L — SIGNIFICANT CHANGE UP (ref 5–17)
ANION GAP SERPL CALC-SCNC: 12 MMOL/L — SIGNIFICANT CHANGE UP (ref 5–17)
ANISOCYTOSIS BLD QL: SLIGHT — SIGNIFICANT CHANGE UP
AST SERPL-CCNC: 188 U/L — HIGH (ref 15–37)
AST SERPL-CCNC: 250 U/L — HIGH (ref 15–37)
BASE EXCESS BLDA CALC-SCNC: -16.8 MMOL/L — LOW (ref -2–2)
BASOPHILS # BLD AUTO: 0 K/UL — SIGNIFICANT CHANGE UP (ref 0–0.2)
BASOPHILS NFR BLD AUTO: 0 % — SIGNIFICANT CHANGE UP (ref 0–2)
BILIRUB DIRECT SERPL-MCNC: 2.6 MG/DL — HIGH (ref 0.05–0.2)
BILIRUB INDIRECT FLD-MCNC: 0.9 MG/DL — SIGNIFICANT CHANGE UP (ref 0.2–1)
BILIRUB SERPL-MCNC: 3.1 MG/DL — HIGH (ref 0.2–1.2)
BILIRUB SERPL-MCNC: 3.5 MG/DL — HIGH (ref 0.2–1.2)
BLOOD GAS COMMENTS: SIGNIFICANT CHANGE UP
BLOOD GAS SOURCE: SIGNIFICANT CHANGE UP
BUN SERPL-MCNC: 30 MG/DL — HIGH (ref 7–23)
BUN SERPL-MCNC: 30 MG/DL — HIGH (ref 7–23)
CALCIUM SERPL-MCNC: 7.2 MG/DL — LOW (ref 8.5–10.1)
CALCIUM SERPL-MCNC: 8.7 MG/DL — SIGNIFICANT CHANGE UP (ref 8.5–10.1)
CHLORIDE SERPL-SCNC: 111 MMOL/L — HIGH (ref 96–108)
CHLORIDE SERPL-SCNC: 120 MMOL/L — HIGH (ref 96–108)
CK SERPL-CCNC: 147 U/L — SIGNIFICANT CHANGE UP (ref 26–308)
CO2 SERPL-SCNC: 13 MMOL/L — LOW (ref 22–31)
CO2 SERPL-SCNC: 20 MMOL/L — LOW (ref 22–31)
CREAT SERPL-MCNC: 2.29 MG/DL — HIGH (ref 0.5–1.3)
CREAT SERPL-MCNC: 2.52 MG/DL — HIGH (ref 0.5–1.3)
EOSINOPHIL # BLD AUTO: 0 K/UL — SIGNIFICANT CHANGE UP (ref 0–0.5)
EOSINOPHIL NFR BLD AUTO: 0 % — SIGNIFICANT CHANGE UP (ref 0–6)
GLUCOSE BLDC GLUCOMTR-MCNC: 139 MG/DL — HIGH (ref 70–99)
GLUCOSE BLDC GLUCOMTR-MCNC: 147 MG/DL — HIGH (ref 70–99)
GLUCOSE BLDC GLUCOMTR-MCNC: 73 MG/DL — SIGNIFICANT CHANGE UP (ref 70–99)
GLUCOSE SERPL-MCNC: 147 MG/DL — HIGH (ref 70–99)
GLUCOSE SERPL-MCNC: 52 MG/DL — LOW (ref 70–99)
HCO3 BLDA-SCNC: 11 MMOL/L — LOW (ref 21–29)
HCT VFR BLD CALC: 33.5 % — LOW (ref 39–50)
HCT VFR BLD CALC: 34.6 % — LOW (ref 39–50)
HGB BLD-MCNC: 10.6 G/DL — LOW (ref 13–17)
HGB BLD-MCNC: 11.1 G/DL — LOW (ref 13–17)
HOROWITZ INDEX BLDA+IHG-RTO: 100 — SIGNIFICANT CHANGE UP
LACTATE SERPL-SCNC: 5.1 MMOL/L — CRITICAL HIGH (ref 0.7–2)
LACTATE SERPL-SCNC: 6 MMOL/L — CRITICAL HIGH (ref 0.7–2)
LIDOCAIN IGE QN: 540 U/L — HIGH (ref 73–393)
LYMPHOCYTES # BLD AUTO: 0.33 K/UL — LOW (ref 1–3.3)
LYMPHOCYTES # BLD AUTO: 12 % — LOW (ref 13–44)
MACROCYTES BLD QL: SLIGHT — SIGNIFICANT CHANGE UP
MAGNESIUM SERPL-MCNC: 2.1 MG/DL — SIGNIFICANT CHANGE UP (ref 1.6–2.6)
MANUAL SMEAR VERIFICATION: SIGNIFICANT CHANGE UP
MCHC RBC-ENTMCNC: 29.7 PG — SIGNIFICANT CHANGE UP (ref 27–34)
MCHC RBC-ENTMCNC: 30.1 PG — SIGNIFICANT CHANGE UP (ref 27–34)
MCHC RBC-ENTMCNC: 30.6 GM/DL — LOW (ref 32–36)
MCHC RBC-ENTMCNC: 33.1 GM/DL — SIGNIFICANT CHANGE UP (ref 32–36)
MCV RBC AUTO: 90.8 FL — SIGNIFICANT CHANGE UP (ref 80–100)
MCV RBC AUTO: 96.9 FL — SIGNIFICANT CHANGE UP (ref 80–100)
MICROCYTES BLD QL: SLIGHT — SIGNIFICANT CHANGE UP
MONOCYTES # BLD AUTO: 0.03 K/UL — SIGNIFICANT CHANGE UP (ref 0–0.9)
MONOCYTES NFR BLD AUTO: 1 % — LOW (ref 2–14)
NEUTROPHILS # BLD AUTO: 2.36 K/UL — SIGNIFICANT CHANGE UP (ref 1.8–7.4)
NEUTROPHILS NFR BLD AUTO: 81 % — HIGH (ref 43–77)
NEUTS BAND # BLD: 6 % — SIGNIFICANT CHANGE UP (ref 0–8)
NRBC # BLD: 0 /100 — SIGNIFICANT CHANGE UP (ref 0–0)
NRBC # BLD: SIGNIFICANT CHANGE UP /100 WBCS (ref 0–0)
PCO2 BLDA: 33 MMHG — SIGNIFICANT CHANGE UP (ref 32–46)
PH BLD: 7.14 — CRITICAL LOW (ref 7.35–7.45)
PHOSPHATE SERPL-MCNC: 2.8 MG/DL — SIGNIFICANT CHANGE UP (ref 2.5–4.5)
PLAT MORPH BLD: NORMAL — SIGNIFICANT CHANGE UP
PLATELET # BLD AUTO: 152 K/UL — SIGNIFICANT CHANGE UP (ref 150–400)
PLATELET # BLD AUTO: 163 K/UL — SIGNIFICANT CHANGE UP (ref 150–400)
PO2 BLDA: 72 MMHG — LOW (ref 74–108)
POTASSIUM SERPL-MCNC: 3.7 MMOL/L — SIGNIFICANT CHANGE UP (ref 3.5–5.3)
POTASSIUM SERPL-MCNC: 4.3 MMOL/L — SIGNIFICANT CHANGE UP (ref 3.5–5.3)
POTASSIUM SERPL-SCNC: 3.7 MMOL/L — SIGNIFICANT CHANGE UP (ref 3.5–5.3)
POTASSIUM SERPL-SCNC: 4.3 MMOL/L — SIGNIFICANT CHANGE UP (ref 3.5–5.3)
PROT SERPL-MCNC: 5.9 GM/DL — LOW (ref 6–8.3)
PROT SERPL-MCNC: 7.2 GM/DL — SIGNIFICANT CHANGE UP (ref 6–8.3)
RBC # BLD: 3.57 M/UL — LOW (ref 4.2–5.8)
RBC # BLD: 3.69 M/UL — LOW (ref 4.2–5.8)
RBC # FLD: 14.7 % — HIGH (ref 10.3–14.5)
RBC # FLD: 15.1 % — HIGH (ref 10.3–14.5)
RBC BLD AUTO: SIGNIFICANT CHANGE UP
SAO2 % BLDA: 89 % — LOW (ref 92–96)
SODIUM SERPL-SCNC: 142 MMOL/L — SIGNIFICANT CHANGE UP (ref 135–145)
SODIUM SERPL-SCNC: 145 MMOL/L — SIGNIFICANT CHANGE UP (ref 135–145)
TROPONIN I SERPL-MCNC: 2.33 NG/ML — HIGH (ref 0.01–0.04)
WBC # BLD: 2.71 K/UL — LOW (ref 3.8–10.5)
WBC # BLD: 9.31 K/UL — SIGNIFICANT CHANGE UP (ref 3.8–10.5)
WBC # FLD AUTO: 2.71 K/UL — LOW (ref 3.8–10.5)
WBC # FLD AUTO: 9.31 K/UL — SIGNIFICANT CHANGE UP (ref 3.8–10.5)

## 2019-05-17 PROCEDURE — 36620 INSERTION CATHETER ARTERY: CPT

## 2019-05-17 PROCEDURE — 31500 INSERT EMERGENCY AIRWAY: CPT

## 2019-05-17 PROCEDURE — 71045 X-RAY EXAM CHEST 1 VIEW: CPT | Mod: 26,77

## 2019-05-17 PROCEDURE — 36569 INSJ PICC 5 YR+ W/O IMAGING: CPT

## 2019-05-17 PROCEDURE — 99291 CRITICAL CARE FIRST HOUR: CPT | Mod: 25

## 2019-05-17 PROCEDURE — 99285 EMERGENCY DEPT VISIT HI MDM: CPT | Mod: 25

## 2019-05-17 PROCEDURE — 71045 X-RAY EXAM CHEST 1 VIEW: CPT | Mod: 26

## 2019-05-17 RX ORDER — VANCOMYCIN HCL 1 G
1000 VIAL (EA) INTRAVENOUS ONCE
Refills: 0 | Status: COMPLETED | OUTPATIENT
Start: 2019-05-17 | End: 2019-05-17

## 2019-05-17 RX ORDER — ETOMIDATE 2 MG/ML
20 INJECTION INTRAVENOUS ONCE
Refills: 0 | Status: COMPLETED | OUTPATIENT
Start: 2019-05-17 | End: 2019-05-17

## 2019-05-17 RX ORDER — NOREPINEPHRINE BITARTRATE/D5W 8 MG/250ML
0.05 PLASTIC BAG, INJECTION (ML) INTRAVENOUS
Qty: 8 | Refills: 0 | Status: DISCONTINUED | OUTPATIENT
Start: 2019-05-17 | End: 2019-05-17

## 2019-05-17 RX ORDER — SODIUM CHLORIDE 9 MG/ML
10 INJECTION INTRAMUSCULAR; INTRAVENOUS; SUBCUTANEOUS
Refills: 0 | Status: DISCONTINUED | OUTPATIENT
Start: 2019-05-17 | End: 2019-05-18

## 2019-05-17 RX ORDER — SODIUM CHLORIDE 9 MG/ML
2000 INJECTION INTRAMUSCULAR; INTRAVENOUS; SUBCUTANEOUS ONCE
Refills: 0 | Status: COMPLETED | OUTPATIENT
Start: 2019-05-17 | End: 2019-05-17

## 2019-05-17 RX ORDER — HEPARIN SODIUM 5000 [USP'U]/ML
5000 INJECTION INTRAVENOUS; SUBCUTANEOUS EVERY 8 HOURS
Refills: 0 | Status: DISCONTINUED | OUTPATIENT
Start: 2019-05-17 | End: 2019-05-18

## 2019-05-17 RX ORDER — SODIUM CHLORIDE 9 MG/ML
1000 INJECTION INTRAMUSCULAR; INTRAVENOUS; SUBCUTANEOUS ONCE
Refills: 0 | Status: COMPLETED | OUTPATIENT
Start: 2019-05-17 | End: 2019-05-17

## 2019-05-17 RX ORDER — FENTANYL CITRATE 50 UG/ML
0.5 INJECTION INTRAVENOUS
Qty: 2500 | Refills: 0 | Status: DISCONTINUED | OUTPATIENT
Start: 2019-05-17 | End: 2019-05-18

## 2019-05-17 RX ORDER — PIPERACILLIN AND TAZOBACTAM 4; .5 G/20ML; G/20ML
3.38 INJECTION, POWDER, LYOPHILIZED, FOR SOLUTION INTRAVENOUS ONCE
Refills: 0 | Status: COMPLETED | OUTPATIENT
Start: 2019-05-17 | End: 2019-05-17

## 2019-05-17 RX ORDER — SODIUM BICARBONATE 1 MEQ/ML
50 SYRINGE (ML) INTRAVENOUS ONCE
Refills: 0 | Status: COMPLETED | OUTPATIENT
Start: 2019-05-17 | End: 2019-05-17

## 2019-05-17 RX ORDER — CHLORHEXIDINE GLUCONATE 213 G/1000ML
1 SOLUTION TOPICAL DAILY
Refills: 0 | Status: DISCONTINUED | OUTPATIENT
Start: 2019-05-17 | End: 2019-05-17

## 2019-05-17 RX ORDER — METOCLOPRAMIDE HCL 10 MG
10 TABLET ORAL ONCE
Refills: 0 | Status: COMPLETED | OUTPATIENT
Start: 2019-05-17 | End: 2019-05-17

## 2019-05-17 RX ORDER — PIPERACILLIN AND TAZOBACTAM 4; .5 G/20ML; G/20ML
3.38 INJECTION, POWDER, LYOPHILIZED, FOR SOLUTION INTRAVENOUS EVERY 12 HOURS
Refills: 0 | Status: DISCONTINUED | OUTPATIENT
Start: 2019-05-17 | End: 2019-05-17

## 2019-05-17 RX ORDER — NOREPINEPHRINE BITARTRATE/D5W 8 MG/250ML
0.05 PLASTIC BAG, INJECTION (ML) INTRAVENOUS
Qty: 32 | Refills: 0 | Status: DISCONTINUED | OUTPATIENT
Start: 2019-05-17 | End: 2019-05-18

## 2019-05-17 RX ORDER — CHLORHEXIDINE GLUCONATE 213 G/1000ML
1 SOLUTION TOPICAL
Refills: 0 | Status: DISCONTINUED | OUTPATIENT
Start: 2019-05-17 | End: 2019-05-18

## 2019-05-17 RX ORDER — MEROPENEM 1 G/30ML
1000 INJECTION INTRAVENOUS EVERY 12 HOURS
Refills: 0 | Status: DISCONTINUED | OUTPATIENT
Start: 2019-05-17 | End: 2019-05-18

## 2019-05-17 RX ORDER — CHLORHEXIDINE GLUCONATE 213 G/1000ML
15 SOLUTION TOPICAL
Refills: 0 | Status: DISCONTINUED | OUTPATIENT
Start: 2019-05-17 | End: 2019-05-18

## 2019-05-17 RX ORDER — MORPHINE SULFATE 50 MG/1
4 CAPSULE, EXTENDED RELEASE ORAL ONCE
Refills: 0 | Status: DISCONTINUED | OUTPATIENT
Start: 2019-05-17 | End: 2019-05-17

## 2019-05-17 RX ORDER — DOBUTAMINE HCL 250MG/20ML
5 VIAL (ML) INTRAVENOUS
Qty: 500 | Refills: 0 | Status: DISCONTINUED | OUTPATIENT
Start: 2019-05-17 | End: 2019-05-18

## 2019-05-17 RX ORDER — PANTOPRAZOLE SODIUM 20 MG/1
40 TABLET, DELAYED RELEASE ORAL DAILY
Refills: 0 | Status: DISCONTINUED | OUTPATIENT
Start: 2019-05-17 | End: 2019-05-18

## 2019-05-17 RX ORDER — MICAFUNGIN SODIUM 100 MG/1
100 INJECTION, POWDER, LYOPHILIZED, FOR SOLUTION INTRAVENOUS EVERY 24 HOURS
Refills: 0 | Status: DISCONTINUED | OUTPATIENT
Start: 2019-05-17 | End: 2019-05-18

## 2019-05-17 RX ORDER — ACETAMINOPHEN 500 MG
1000 TABLET ORAL ONCE
Refills: 0 | Status: COMPLETED | OUTPATIENT
Start: 2019-05-17 | End: 2019-05-17

## 2019-05-17 RX ORDER — ONDANSETRON 8 MG/1
4 TABLET, FILM COATED ORAL ONCE
Refills: 0 | Status: COMPLETED | OUTPATIENT
Start: 2019-05-17 | End: 2019-05-17

## 2019-05-17 RX ORDER — DEXTROSE 50 % IN WATER 50 %
50 SYRINGE (ML) INTRAVENOUS ONCE
Refills: 0 | Status: COMPLETED | OUTPATIENT
Start: 2019-05-17 | End: 2019-05-17

## 2019-05-17 RX ORDER — VASOPRESSIN 20 [USP'U]/ML
0.04 INJECTION INTRAVENOUS
Qty: 50 | Refills: 0 | Status: DISCONTINUED | OUTPATIENT
Start: 2019-05-17 | End: 2019-05-18

## 2019-05-17 RX ADMIN — SODIUM CHLORIDE 2000 MILLILITER(S): 9 INJECTION INTRAMUSCULAR; INTRAVENOUS; SUBCUTANEOUS at 19:33

## 2019-05-17 RX ADMIN — Medication 2 MILLIGRAM(S): at 20:04

## 2019-05-17 RX ADMIN — Medication 8.59 MICROGRAM(S)/KG/MIN: at 23:15

## 2019-05-17 RX ADMIN — Medication 10 MILLIGRAM(S): at 17:30

## 2019-05-17 RX ADMIN — MORPHINE SULFATE 4 MILLIGRAM(S): 50 CAPSULE, EXTENDED RELEASE ORAL at 19:25

## 2019-05-17 RX ADMIN — Medication 250 MILLIGRAM(S): at 21:00

## 2019-05-17 RX ADMIN — Medication 8.59 MICROGRAM(S)/KG/MIN: at 21:12

## 2019-05-17 RX ADMIN — SODIUM CHLORIDE 1000 MILLILITER(S): 9 INJECTION INTRAMUSCULAR; INTRAVENOUS; SUBCUTANEOUS at 20:30

## 2019-05-17 RX ADMIN — Medication 400 MILLIGRAM(S): at 17:43

## 2019-05-17 RX ADMIN — SODIUM CHLORIDE 2000 MILLILITER(S): 9 INJECTION INTRAMUSCULAR; INTRAVENOUS; SUBCUTANEOUS at 20:33

## 2019-05-17 RX ADMIN — PIPERACILLIN AND TAZOBACTAM 200 GRAM(S): 4; .5 INJECTION, POWDER, LYOPHILIZED, FOR SOLUTION INTRAVENOUS at 19:00

## 2019-05-17 RX ADMIN — Medication 50 MILLILITER(S): at 23:30

## 2019-05-17 RX ADMIN — Medication 13.74 MICROGRAM(S)/KG/MIN: at 22:45

## 2019-05-17 RX ADMIN — Medication 1000 MILLIGRAM(S): at 18:20

## 2019-05-17 RX ADMIN — SODIUM CHLORIDE 4000 MILLILITER(S): 9 INJECTION INTRAMUSCULAR; INTRAVENOUS; SUBCUTANEOUS at 20:15

## 2019-05-17 RX ADMIN — ONDANSETRON 4 MILLIGRAM(S): 8 TABLET, FILM COATED ORAL at 17:15

## 2019-05-17 RX ADMIN — VASOPRESSIN 2.4 UNIT(S)/MIN: 20 INJECTION INTRAVENOUS at 21:13

## 2019-05-17 RX ADMIN — ETOMIDATE 20 MILLIGRAM(S): 2 INJECTION INTRAVENOUS at 19:54

## 2019-05-17 RX ADMIN — MEROPENEM 100 MILLIGRAM(S): 1 INJECTION INTRAVENOUS at 23:15

## 2019-05-17 RX ADMIN — Medication 50 MILLIEQUIVALENT(S): at 23:15

## 2019-05-17 RX ADMIN — FENTANYL CITRATE 4.58 MICROGRAM(S)/KG/HR: 50 INJECTION INTRAVENOUS at 21:10

## 2019-05-17 RX ADMIN — Medication 1000 MILLIGRAM(S): at 18:21

## 2019-05-17 RX ADMIN — SODIUM CHLORIDE 2000 MILLILITER(S): 9 INJECTION INTRAMUSCULAR; INTRAVENOUS; SUBCUTANEOUS at 17:17

## 2019-05-17 NOTE — ED ADULT TRIAGE NOTE - CHIEF COMPLAINT QUOTE
patient BIBA c/o of nausea /vomiting started last night , patient denied chest pain denied back pain , denied diarrhea or constipation , denied blood in urine or stool , denied abdominal pain

## 2019-05-17 NOTE — ED PROVIDER NOTE - CLINICAL SUMMARY MEDICAL DECISION MAKING FREE TEXT BOX
Patient with obstructive juandice, will transfer to [facility] for further evaluation; possible ERCP, IR

## 2019-05-17 NOTE — PROCEDURE NOTE - ADDITIONAL PROCEDURE DETAILS
Patient was noted to be gurgling while on the ventilator. Glidescope was used to visualize ET tube to examine for balloon placement. Original ET tube was noted to be in the esophagus. ET tube was removed, bag mask ventilation was initiated and new ET tube was placed using glidescope.

## 2019-05-17 NOTE — ED PROVIDER NOTE - CARE PLAN
Principal Discharge DX:	Nausea  Secondary Diagnosis:	Jaundice Principal Discharge DX:	Nausea  Secondary Diagnosis:	Jaundice  Secondary Diagnosis:	Cholangitis

## 2019-05-17 NOTE — ED PROVIDER NOTE - OBJECTIVE STATEMENT
64 yo male presents with nausea, vomiting and diarrhea since earlier today. Patient with cbd stricture, with ERCP last month at [facility]. Patient denies chills, abdominal pain, cough.

## 2019-05-17 NOTE — PROCEDURE NOTE - NSICDXPROCEDURE_GEN_ALL_CORE_FT
PROCEDURES:  Tracheal intubation 17-May-2019 23:01:25  Fredy Parnell
PROCEDURES:  Percutaneous insertion of arterial line 17-May-2019 23:29:31  Fredy Parnell
PROCEDURES:  Tracheal intubation 17-May-2019 23:01:25  Fredy Parnell

## 2019-05-17 NOTE — ED PROVIDER NOTE - CONSTITUTIONAL, MLM
Denys Mckenzie is a 8 year old male presenting with 8 year physical    CONCERNS: None  Child accompanied by mother  APPETITE: fair  STOOLS: normal  CHOLESTEROL SCREENING:       Parent with cholesterol >240mg/dl - NO       Parent / grandparent with CAD or PVD - NO  SCHOOL HISTORY:       School - [employer]       Grade - 2       Attendance - normal       Academic performance - good       Extracurricular Activities - YES, Jiu[employer]Jijoss[employer]  VARICELLA STATUS: confirmed by vaccine administration  VISION SCREENING:       Right eye: without glasses, without contacts and 20/25       Left eye: without glasses, without contacts and 20/25       Both eye: without glasses, without contacts and 20/25       Color: normal    Patient's motherYesenia would like communication of their results via:    Home Phone: [phone] (home)  Okay to leave a message containing results? Yes       - - -

## 2019-05-17 NOTE — PROCEDURE NOTE - NSPOSTCAREGUIDE_GEN_A_CORE
Instructed patient/caregiver to follow-up with primary care physician/Instructed patient/caregiver regarding signs and symptoms of infection/Care for catheter as per unit/ICU protocols/Verbal/written post procedure instructions were given to patient/caregiver/Keep the cast/splint/dressing clean and dry
Verbal/written post procedure instructions were given to patient/caregiver/Instructed patient/caregiver regarding signs and symptoms of infection/Instructed patient/caregiver to follow-up with primary care physician/Care for catheter as per unit/ICU protocols/Keep the cast/splint/dressing clean and dry

## 2019-05-17 NOTE — ED PROVIDER NOTE - NSREASONFORTRANSFER_ED_A_ED
Higher Level of Care or Service Not Available/Pre-existing Relationship Higher Level of Care or Service Not Available

## 2019-05-17 NOTE — ED PROVIDER NOTE - NSBENEFITOFTRANSFER_ED_A_ED
Obtain Level of Care/Service Not Available at this Facility/Other:/Worsening of Condition, Death, or Disability if Patient Does Not Transfer/Continuity of Care at Other Facility

## 2019-05-17 NOTE — H&P ADULT - ATTENDING COMMENTS
I have seen and examined the patient. I agree with the above history, physical exam, and plan of care except for as detailed below.    62 y/o M w/CAD, DM, biliary stricture s/p recent stenting p/w nausea emesis and diarrhea. Upon arrival patient noted to be hypotensive. Patient was given 5-6 L of fluid then developed acute hypoxemic respiratory failure requiring intubation. Hypotension likely from combination of severe sepsis with septic shock likely secondary to gram negative GI source as well as component of cardiogenic shock (bedside echo with dilated LV w/poor contractility). Troponins elevated likely secondary to demand ischemia vs NSTEMI. Patient with significant oozing of blood currently so not a candidate for therapeutic heparin at this time. Lactic acidosis likely secondary to sepsis.     Neuro:  - Sedation as needed, goal RASS -1 to 0    Resp:  - Continue mechanical ventilation  - Daily SAT/SBT once stable    CV:  - Titrate pressors and dobutamine as tolerated, goal MAP >= 65  - Stress dose steroids  - Cardiology consult  - Statin  - TTE    GI:  - US abdomen to evaluate for possible gallbladder source for sepsis    ID:  - Vanc + Meropenem + Micafungin    Renal:  - SLOAN, likely secondary to sepsis/ATN  - Trend Cr, avoid nephrotoxins    FEN:  - Hold feeds  - Replete lytes PRN    PPx:  - PPI, Heparin SC     Guarded prognosis    Attending critical care time excluding procedures = 60 minutes

## 2019-05-17 NOTE — PROCEDURE NOTE - NSTRACHPOSTINTU_RESP_A_CORE
Positive end tidal Co2 noted/Breath sounds equal/Chest X-Ray/Breath sounds bilateral/Chest excursion noted

## 2019-05-17 NOTE — H&P ADULT - NSHPPHYSICALEXAM_GEN_ALL_CORE
PHYSICAL EXAM:  GENERAL: vented  HEAD:  NC/AC  EYES: EOMI, PERRLA, conjunctiva and sclera clear  NECK: Supple, No JVD  CHEST/LUNG: CTAB. No cough, wheeze, rales.   HEART: Regular rate and rhythm. No murmurs, rubs, or gallops.   ABDOMEN: Soft, Nontender, Nondistended. Bowel sounds present  EXTREMITIES:  2+ Peripheral Pulses, No clubbing, cyanosis, or edema  NEUROLOGY: non-focal
1) PCP Contacted on Admission: (Y/N) --> Dr. Flores, [facility], [phone]  2) Date of Contact with PCP: will contact in AM  3) PCP Contacted at Discharge: (Y/N)  4) Summary of Handoff Given to PCP:   5) Post-Discharge Appointment Date and Location:

## 2019-05-17 NOTE — H&P ADULT - HISTORY OF PRESENT ILLNESS
Patient is a 62 yo male MH of CAD (9 stents), D2m on insulin, HTN, GERD, prior cholecystectomy,  diverticulosis and former 30  pack year smoke  S/P ERCP with stenting of distal CBD in 04/2019 at [facility]. brought in to ED for nausea, vomiting and diarrhea since earlier today, patient intubated down in ED, and went into shock after 5-6L of fluids, started on levophed drip and vasopressin.  Patient is accepted for transfer to [facility] MICU, however due to hemodynamic instability, patient will be admitted to icu here first for further resuscitation prior transfer.

## 2019-05-17 NOTE — H&P ADULT - NSHPLABSRESULTS_GEN_ALL_CORE
.  LABS:                         10.6   9.31  )-----------( 152      ( 17 May 2019 22:04 )             34.6     05-17    145  |  120<H>  |  30<H>  ----------------------------<  52<L>  4.3   |  13<L>  |  2.52<H>    Ca    7.2<L>      17 May 2019 22:04  Phos  2.8     05-17  Mg     2.1     05-17    TPro  5.9<L>  /  Alb  2.3<L>  /  TBili  3.1<H>  /  DBili  x   /  AST  188<H>  /  ALT  178<H>  /  AlkPhos  357<H>  05-17          Lactate, Blood: 6.0 mmol/L (05-17 @ 19:48)  Lactate, Blood: 5.1 mmol/L (05-17 @ 17:01)      RADIOLOGY, EKG & ADDITIONAL TESTS: Reviewed.

## 2019-05-17 NOTE — ED ADULT NURSE NOTE - ED STAT RN HANDOFF DETAILS
Patient was awake alert and speaking, then became hypotensive. Patient was moved to bed 4 with Dr Negrete at bedside.

## 2019-05-17 NOTE — PROCEDURE NOTE - NSPROCDETAILS_GEN_ALL_CORE
sutured in place/hemostasis with direct pressure, dressing applied/Seldinger technique/all materials/supplies accounted for at end of procedure/positive blood return obtained via catheter/location identified, draped/prepped, sterile technique used, needle inserted/introduced/connected to a pressurized flush line
guidewire recovered/lumen(s) aspirated and flushed/sterile dressing applied/sterile technique, catheter placed/ultrasound guidance

## 2019-05-17 NOTE — ED ADULT NURSE NOTE - NS ED NURSE TRANSPORT WITH
Cardiac Monitor/Defib/ACLS/Rescue Kit/O2/BVM/IV pump/pulse ox/ventilator/oxygen ACLS Rescue Kit/Cardiac Monitor/Defib/ACLS/Rescue Kit/O2/BVM/ventilator

## 2019-05-17 NOTE — ED ADULT NURSE NOTE - ED STAT RN HANDOFF DETAILS 2
Pt was received lethargic, hypotensive, slightly pale and desating. MD made aware. Non breather applied, fluids given on pressure bag. Upon interventions pt was more awake, expressed feeling nauseous still appeared unstable in terms of his breathings. MD decided to intubate.

## 2019-05-17 NOTE — H&P ADULT - ASSESSMENT
Patient is a 62 yo male MH of CAD (9 stents), D2m on insulin, HTN, GERD, prior cholecystectomy,  diverticulosis and former 30  pack year smoke  S/P ERCP with stenting of distal CBD in 04/2019 at [facility], presents with nausea vomiting, now intubated for acute hypoxic respiratory failure, in shock, septic and ?cardiogenic shock, with ?Cholangitis (tot bili 3.5) in  SLOAN, admits to critical care for further care.

## 2019-05-18 ENCOUNTER — INPATIENT (INPATIENT)
Facility: HOSPITAL | Age: 64
LOS: 20 days | Discharge: HOME HEALTH | End: 2019-06-08
Attending: HOSPITALIST | Admitting: HOSPITALIST
Payer: COMMERCIAL

## 2019-05-18 VITALS
SYSTOLIC BLOOD PRESSURE: 110 MMHG | DIASTOLIC BLOOD PRESSURE: 65 MMHG | TEMPERATURE: 100 F | RESPIRATION RATE: 24 BRPM | HEART RATE: 104 BPM | OXYGEN SATURATION: 100 %

## 2019-05-18 VITALS — OXYGEN SATURATION: 99 % | RESPIRATION RATE: 21 BRPM | HEART RATE: 108 BPM

## 2019-05-18 DIAGNOSIS — Z90.49 ACQUIRED ABSENCE OF OTHER SPECIFIED PARTS OF DIGESTIVE TRACT: Chronic | ICD-10-CM

## 2019-05-18 DIAGNOSIS — A41.89 OTHER SPECIFIED SEPSIS: ICD-10-CM

## 2019-05-18 DIAGNOSIS — N17.9 ACUTE KIDNEY FAILURE, UNSPECIFIED: ICD-10-CM

## 2019-05-18 LAB
ALBUMIN SERPL ELPH-MCNC: 2.2 G/DL — LOW (ref 3.3–5)
ALBUMIN SERPL ELPH-MCNC: 2.3 G/DL — LOW (ref 3.3–5)
ALBUMIN SERPL ELPH-MCNC: 2.4 G/DL — LOW (ref 3.3–5)
ALP SERPL-CCNC: 258 U/L — HIGH (ref 40–120)
ALP SERPL-CCNC: 258 U/L — HIGH (ref 40–120)
ALP SERPL-CCNC: 312 U/L — HIGH (ref 40–120)
ALT FLD-CCNC: 243 U/L — HIGH (ref 4–41)
ALT FLD-CCNC: 273 U/L — HIGH (ref 12–78)
ALT FLD-CCNC: 281 U/L — HIGH (ref 12–78)
ANION GAP SERPL CALC-SCNC: 14 MMO/L — SIGNIFICANT CHANGE UP (ref 7–14)
ANION GAP SERPL CALC-SCNC: 14 MMOL/L — SIGNIFICANT CHANGE UP (ref 5–17)
ANION GAP SERPL CALC-SCNC: 15 MMOL/L — SIGNIFICANT CHANGE UP (ref 5–17)
ANISOCYTOSIS BLD QL: SLIGHT — SIGNIFICANT CHANGE UP
APPEARANCE UR: ABNORMAL
APTT BLD: 147.3 SEC — CRITICAL HIGH (ref 27.5–36.3)
APTT BLD: 83.6 SEC — HIGH (ref 27.5–36.3)
APTT BLD: 92.6 SEC — HIGH (ref 28.5–37)
AST SERPL-CCNC: 222 U/L — HIGH (ref 4–40)
AST SERPL-CCNC: 315 U/L — HIGH (ref 15–37)
AST SERPL-CCNC: 333 U/L — HIGH (ref 15–37)
BACTERIA # UR AUTO: ABNORMAL
BASE EXCESS BLDA CALC-SCNC: -11.3 MMOL/L — SIGNIFICANT CHANGE UP
BASE EXCESS BLDA CALC-SCNC: -12.5 MMOL/L — LOW (ref -2–2)
BASE EXCESS BLDA CALC-SCNC: -12.7 MMOL/L — SIGNIFICANT CHANGE UP
BASE EXCESS BLDA CALC-SCNC: -13 MMOL/L — SIGNIFICANT CHANGE UP
BASE EXCESS BLDA CALC-SCNC: -15.6 MMOL/L — LOW (ref -2–2)
BASOPHILS # BLD AUTO: 0.01 K/UL — SIGNIFICANT CHANGE UP (ref 0–0.2)
BASOPHILS # BLD AUTO: 0.04 K/UL — SIGNIFICANT CHANGE UP (ref 0–0.2)
BASOPHILS # BLD AUTO: 0.04 K/UL — SIGNIFICANT CHANGE UP (ref 0–0.2)
BASOPHILS NFR BLD AUTO: 0.1 % — SIGNIFICANT CHANGE UP (ref 0–2)
BASOPHILS NFR BLD AUTO: 0.2 % — SIGNIFICANT CHANGE UP (ref 0–2)
BASOPHILS NFR BLD AUTO: 0.2 % — SIGNIFICANT CHANGE UP (ref 0–2)
BASOPHILS NFR SPEC: 0 % — SIGNIFICANT CHANGE UP (ref 0–2)
BILIRUB SERPL-MCNC: 3.7 MG/DL — HIGH (ref 0.2–1.2)
BILIRUB SERPL-MCNC: 3.8 MG/DL — HIGH (ref 0.2–1.2)
BILIRUB SERPL-MCNC: 3.9 MG/DL — HIGH (ref 0.2–1.2)
BILIRUB UR-MCNC: ABNORMAL
BLASTS # FLD: 0 % — SIGNIFICANT CHANGE UP (ref 0–0)
BLD GP AB SCN SERPL QL: NEGATIVE — SIGNIFICANT CHANGE UP
BLOOD GAS COMMENTS: SIGNIFICANT CHANGE UP
BLOOD GAS COMMENTS: SIGNIFICANT CHANGE UP
BLOOD GAS SOURCE: SIGNIFICANT CHANGE UP
BLOOD GAS SOURCE: SIGNIFICANT CHANGE UP
BUN SERPL-MCNC: 30 MG/DL — HIGH (ref 7–23)
BUN SERPL-MCNC: 33 MG/DL — HIGH (ref 7–23)
BUN SERPL-MCNC: 35 MG/DL — HIGH (ref 7–23)
CA-I BLDA-SCNC: 0.96 MMOL/L — LOW (ref 1.15–1.29)
CALCIUM SERPL-MCNC: 6.7 MG/DL — LOW (ref 8.4–10.5)
CALCIUM SERPL-MCNC: 6.7 MG/DL — LOW (ref 8.5–10.1)
CALCIUM SERPL-MCNC: 6.9 MG/DL — LOW (ref 8.5–10.1)
CHLORIDE BLDA-SCNC: 118 MMOL/L — HIGH (ref 96–108)
CHLORIDE BLDA-SCNC: 120 MMOL/L — HIGH (ref 96–108)
CHLORIDE SERPL-SCNC: 116 MMOL/L — HIGH (ref 98–107)
CHLORIDE SERPL-SCNC: 117 MMOL/L — HIGH (ref 96–108)
CHLORIDE SERPL-SCNC: 119 MMOL/L — HIGH (ref 96–108)
CK SERPL-CCNC: 1020 U/L — HIGH (ref 26–308)
CK SERPL-CCNC: 1149 U/L — HIGH (ref 30–200)
CK SERPL-CCNC: 552 U/L — HIGH (ref 26–308)
CO2 SERPL-SCNC: 13 MMOL/L — LOW (ref 22–31)
CO2 SERPL-SCNC: 14 MMOL/L — LOW (ref 22–31)
CO2 SERPL-SCNC: 14 MMOL/L — LOW (ref 22–31)
COLOR SPEC: YELLOW — SIGNIFICANT CHANGE UP
CREAT SERPL-MCNC: 2.62 MG/DL — HIGH (ref 0.5–1.3)
CREAT SERPL-MCNC: 2.9 MG/DL — HIGH (ref 0.5–1.3)
CREAT SERPL-MCNC: 3.15 MG/DL — HIGH (ref 0.5–1.3)
DIFF PNL FLD: ABNORMAL
E COLI DNA BLD POS QL NAA+NON-PROBE: SIGNIFICANT CHANGE UP
EOSINOPHIL # BLD AUTO: 0.01 K/UL — SIGNIFICANT CHANGE UP (ref 0–0.5)
EOSINOPHIL # BLD AUTO: 0.01 K/UL — SIGNIFICANT CHANGE UP (ref 0–0.5)
EOSINOPHIL # BLD AUTO: 0.02 K/UL — SIGNIFICANT CHANGE UP (ref 0–0.5)
EOSINOPHIL NFR BLD AUTO: 0 % — SIGNIFICANT CHANGE UP (ref 0–6)
EOSINOPHIL NFR BLD AUTO: 0.1 % — SIGNIFICANT CHANGE UP (ref 0–6)
EOSINOPHIL NFR BLD AUTO: 0.1 % — SIGNIFICANT CHANGE UP (ref 0–6)
EOSINOPHIL NFR FLD: 0 % — SIGNIFICANT CHANGE UP (ref 0–6)
EPI CELLS # UR: ABNORMAL
GIANT PLATELETS BLD QL SMEAR: PRESENT — SIGNIFICANT CHANGE UP
GLUCOSE BLDA-MCNC: 120 MG/DL — HIGH (ref 70–99)
GLUCOSE BLDA-MCNC: 147 MG/DL — HIGH (ref 70–99)
GLUCOSE BLDA-MCNC: 148 MG/DL — HIGH (ref 70–99)
GLUCOSE BLDC GLUCOMTR-MCNC: 100 MG/DL — HIGH (ref 70–99)
GLUCOSE BLDC GLUCOMTR-MCNC: 114 MG/DL — HIGH (ref 70–99)
GLUCOSE BLDC GLUCOMTR-MCNC: 134 MG/DL — HIGH (ref 70–99)
GLUCOSE SERPL-MCNC: 104 MG/DL — HIGH (ref 70–99)
GLUCOSE SERPL-MCNC: 124 MG/DL — HIGH (ref 70–99)
GLUCOSE SERPL-MCNC: 138 MG/DL — HIGH (ref 70–99)
GLUCOSE UR QL: NEGATIVE MG/DL — SIGNIFICANT CHANGE UP
GRAM STN FLD: SIGNIFICANT CHANGE UP
GRAN CASTS # UR COMP ASSIST: ABNORMAL /LPF
HCO3 BLDA-SCNC: 12 MMOL/L — LOW (ref 21–29)
HCO3 BLDA-SCNC: 14 MMOL/L — LOW (ref 21–29)
HCO3 BLDA-SCNC: 14 MMOL/L — LOW (ref 22–26)
HCO3 BLDA-SCNC: 15 MMOL/L — LOW (ref 22–26)
HCO3 BLDA-SCNC: 15 MMOL/L — LOW (ref 22–26)
HCT VFR BLD CALC: 33 % — LOW (ref 39–50)
HCT VFR BLD CALC: 34.9 % — LOW (ref 39–50)
HCT VFR BLD CALC: 34.9 % — LOW (ref 39–50)
HCT VFR BLD CALC: 35.1 % — LOW (ref 39–50)
HCT VFR BLDA CALC: 33.5 % — LOW (ref 39–51)
HCT VFR BLDA CALC: 34 % — LOW (ref 39–51)
HCT VFR BLDA CALC: 34.4 % — LOW (ref 39–51)
HGB BLD-MCNC: 10.5 G/DL — LOW (ref 13–17)
HGB BLD-MCNC: 10.9 G/DL — LOW (ref 13–17)
HGB BLD-MCNC: 11 G/DL — LOW (ref 13–17)
HGB BLD-MCNC: 11 G/DL — LOW (ref 13–17)
HGB BLDA-MCNC: 10.8 G/DL — LOW (ref 13–17)
HGB BLDA-MCNC: 11 G/DL — LOW (ref 13–17)
HGB BLDA-MCNC: 11.1 G/DL — LOW (ref 13–17)
HOROWITZ INDEX BLDA+IHG-RTO: 100 — SIGNIFICANT CHANGE UP
HOROWITZ INDEX BLDA+IHG-RTO: 80 — SIGNIFICANT CHANGE UP
HYALINE CASTS # UR AUTO: ABNORMAL /LPF
IMM GRANULOCYTES NFR BLD AUTO: 1 % — SIGNIFICANT CHANGE UP (ref 0–1.5)
IMM GRANULOCYTES NFR BLD AUTO: 18.4 % — HIGH (ref 0–1.5)
IMM GRANULOCYTES NFR BLD AUTO: 6 % — HIGH (ref 0–1.5)
INR BLD: 1.93 RATIO — HIGH (ref 0.88–1.16)
INR BLD: 2.11 — HIGH (ref 0.88–1.17)
INR BLD: 2.11 — HIGH (ref 0.88–1.17)
KETONES UR-MCNC: ABNORMAL
LACTATE BLDA-SCNC: 3.7 MMOL/L — HIGH (ref 0.5–2)
LACTATE BLDA-SCNC: 4.2 MMOL/L — CRITICAL HIGH (ref 0.5–2)
LACTATE BLDA-SCNC: 4.5 MMOL/L — CRITICAL HIGH (ref 0.5–2)
LACTATE SERPL-SCNC: 4.8 MMOL/L — CRITICAL HIGH (ref 0.7–2)
LEUKOCYTE ESTERASE UR-ACNC: ABNORMAL
LYMPHOCYTES # BLD AUTO: 0.41 K/UL — LOW (ref 1–3.3)
LYMPHOCYTES # BLD AUTO: 0.89 K/UL — LOW (ref 1–3.3)
LYMPHOCYTES # BLD AUTO: 1.25 K/UL — SIGNIFICANT CHANGE UP (ref 1–3.3)
LYMPHOCYTES # BLD AUTO: 1.6 % — LOW (ref 13–44)
LYMPHOCYTES # BLD AUTO: 5.6 % — LOW (ref 13–44)
LYMPHOCYTES # BLD AUTO: 9.6 % — LOW (ref 13–44)
LYMPHOCYTES NFR SPEC AUTO: 4.4 % — LOW (ref 13–44)
MAGNESIUM SERPL-MCNC: 1.6 MG/DL — SIGNIFICANT CHANGE UP (ref 1.6–2.6)
MAGNESIUM SERPL-MCNC: 1.9 MG/DL — SIGNIFICANT CHANGE UP (ref 1.6–2.6)
MCHC RBC-ENTMCNC: 29.8 PG — SIGNIFICANT CHANGE UP (ref 27–34)
MCHC RBC-ENTMCNC: 29.8 PG — SIGNIFICANT CHANGE UP (ref 27–34)
MCHC RBC-ENTMCNC: 29.9 PG — SIGNIFICANT CHANGE UP (ref 27–34)
MCHC RBC-ENTMCNC: 30 PG — SIGNIFICANT CHANGE UP (ref 27–34)
MCHC RBC-ENTMCNC: 31.2 GM/DL — LOW (ref 32–36)
MCHC RBC-ENTMCNC: 31.3 % — LOW (ref 32–36)
MCHC RBC-ENTMCNC: 31.5 GM/DL — LOW (ref 32–36)
MCHC RBC-ENTMCNC: 31.8 % — LOW (ref 32–36)
MCV RBC AUTO: 93.8 FL — SIGNIFICANT CHANGE UP (ref 80–100)
MCV RBC AUTO: 94.6 FL — SIGNIFICANT CHANGE UP (ref 80–100)
MCV RBC AUTO: 95.6 FL — SIGNIFICANT CHANGE UP (ref 80–100)
MCV RBC AUTO: 95.6 FL — SIGNIFICANT CHANGE UP (ref 80–100)
METAMYELOCYTES # FLD: 8.8 % — HIGH (ref 0–1)
METHOD TYPE: SIGNIFICANT CHANGE UP
MICROCYTES BLD QL: SLIGHT — SIGNIFICANT CHANGE UP
MONOCYTES # BLD AUTO: 0.1 K/UL — SIGNIFICANT CHANGE UP (ref 0–0.9)
MONOCYTES # BLD AUTO: 0.88 K/UL — SIGNIFICANT CHANGE UP (ref 0–0.9)
MONOCYTES # BLD AUTO: 1.54 K/UL — HIGH (ref 0–0.9)
MONOCYTES NFR BLD AUTO: 1.1 % — LOW (ref 2–14)
MONOCYTES NFR BLD AUTO: 3.5 % — SIGNIFICANT CHANGE UP (ref 2–14)
MONOCYTES NFR BLD AUTO: 6.9 % — SIGNIFICANT CHANGE UP (ref 2–14)
MONOCYTES NFR BLD: 6.1 % — SIGNIFICANT CHANGE UP (ref 2–9)
MYELOCYTES NFR BLD: 2.6 % — HIGH (ref 0–0)
NEUTROPHIL AB SER-ACNC: 55.3 % — SIGNIFICANT CHANGE UP (ref 43–77)
NEUTROPHILS # BLD AUTO: 15.43 K/UL — HIGH (ref 1.8–7.4)
NEUTROPHILS # BLD AUTO: 22.22 K/UL — HIGH (ref 1.8–7.4)
NEUTROPHILS # BLD AUTO: 8.21 K/UL — HIGH (ref 1.8–7.4)
NEUTROPHILS NFR BLD AUTO: 68.8 % — SIGNIFICANT CHANGE UP (ref 43–77)
NEUTROPHILS NFR BLD AUTO: 88.1 % — HIGH (ref 43–77)
NEUTROPHILS NFR BLD AUTO: 88.7 % — HIGH (ref 43–77)
NEUTS BAND # BLD: 22.8 % — HIGH (ref 0–6)
NITRITE UR-MCNC: NEGATIVE — SIGNIFICANT CHANGE UP
NRBC # BLD: 0 /100 WBCS — SIGNIFICANT CHANGE UP (ref 0–0)
NRBC # FLD: 0.02 K/UL — SIGNIFICANT CHANGE UP (ref 0–0)
NRBC # FLD: 0.04 K/UL — SIGNIFICANT CHANGE UP (ref 0–0)
OTHER - HEMATOLOGY %: 0 — SIGNIFICANT CHANGE UP
OVALOCYTES BLD QL SMEAR: SLIGHT — SIGNIFICANT CHANGE UP
PCO2 BLDA: 31 MMHG — LOW (ref 35–48)
PCO2 BLDA: 34 MMHG — LOW (ref 35–48)
PCO2 BLDA: 36 MMHG — SIGNIFICANT CHANGE UP (ref 32–46)
PCO2 BLDA: 38 MMHG — SIGNIFICANT CHANGE UP (ref 32–46)
PCO2 BLDA: 40 MMHG — SIGNIFICANT CHANGE UP (ref 35–48)
PH BLD: 7.14 — CRITICAL LOW (ref 7.35–7.45)
PH BLD: 7.22 — LOW (ref 7.35–7.45)
PH BLDA: 7.21 PH — LOW (ref 7.35–7.45)
PH BLDA: 7.22 PH — LOW (ref 7.35–7.45)
PH BLDA: 7.25 PH — LOW (ref 7.35–7.45)
PH UR: 5 — SIGNIFICANT CHANGE UP (ref 5–8)
PHOSPHATE SERPL-MCNC: 3 MG/DL — SIGNIFICANT CHANGE UP (ref 2.5–4.5)
PHOSPHATE SERPL-MCNC: 4.2 MG/DL — SIGNIFICANT CHANGE UP (ref 2.5–4.5)
PLATELET # BLD AUTO: 108 K/UL — LOW (ref 150–400)
PLATELET # BLD AUTO: 110 K/UL — LOW (ref 150–400)
PLATELET # BLD AUTO: 119 K/UL — LOW (ref 150–400)
PLATELET # BLD AUTO: 141 K/UL — LOW (ref 150–400)
PLATELET COUNT - ESTIMATE: SIGNIFICANT CHANGE UP
PMV BLD: 10.8 FL — SIGNIFICANT CHANGE UP (ref 7–13)
PMV BLD: 11.3 FL — SIGNIFICANT CHANGE UP (ref 7–13)
PO2 BLDA: 152 MMHG — HIGH (ref 74–108)
PO2 BLDA: 201 MMHG — HIGH (ref 74–108)
PO2 BLDA: 79 MMHG — LOW (ref 83–108)
PO2 BLDA: 87 MMHG — SIGNIFICANT CHANGE UP (ref 83–108)
PO2 BLDA: 91 MMHG — SIGNIFICANT CHANGE UP (ref 83–108)
POIKILOCYTOSIS BLD QL AUTO: SLIGHT — SIGNIFICANT CHANGE UP
POTASSIUM BLDA-SCNC: 3.9 MMOL/L — SIGNIFICANT CHANGE UP (ref 3.4–4.5)
POTASSIUM BLDA-SCNC: 4 MMOL/L — SIGNIFICANT CHANGE UP (ref 3.4–4.5)
POTASSIUM BLDA-SCNC: 4.1 MMOL/L — SIGNIFICANT CHANGE UP (ref 3.4–4.5)
POTASSIUM SERPL-MCNC: 3.7 MMOL/L — SIGNIFICANT CHANGE UP (ref 3.5–5.3)
POTASSIUM SERPL-MCNC: 4.1 MMOL/L — SIGNIFICANT CHANGE UP (ref 3.5–5.3)
POTASSIUM SERPL-MCNC: 4.5 MMOL/L — SIGNIFICANT CHANGE UP (ref 3.5–5.3)
POTASSIUM SERPL-SCNC: 3.7 MMOL/L — SIGNIFICANT CHANGE UP (ref 3.5–5.3)
POTASSIUM SERPL-SCNC: 4.1 MMOL/L — SIGNIFICANT CHANGE UP (ref 3.5–5.3)
POTASSIUM SERPL-SCNC: 4.5 MMOL/L — SIGNIFICANT CHANGE UP (ref 3.5–5.3)
PROMYELOCYTES # FLD: 0 % — SIGNIFICANT CHANGE UP (ref 0–0)
PROT SERPL-MCNC: 5.6 G/DL — LOW (ref 6–8.3)
PROT SERPL-MCNC: 5.7 GM/DL — LOW (ref 6–8.3)
PROT SERPL-MCNC: 6.1 GM/DL — SIGNIFICANT CHANGE UP (ref 6–8.3)
PROT UR-MCNC: 100 MG/DL
PROTHROM AB SERPL-ACNC: 22.1 SEC — HIGH (ref 10–12.9)
PROTHROM AB SERPL-ACNC: 24 SEC — HIGH (ref 9.8–13.1)
PROTHROM AB SERPL-ACNC: 24.6 SEC — HIGH (ref 9.8–13.1)
RBC # BLD: 3.52 M/UL — LOW (ref 4.2–5.8)
RBC # BLD: 3.65 M/UL — LOW (ref 4.2–5.8)
RBC # BLD: 3.67 M/UL — LOW (ref 4.2–5.8)
RBC # BLD: 3.69 M/UL — LOW (ref 4.2–5.8)
RBC # FLD: 15.5 % — HIGH (ref 10.3–14.5)
RBC # FLD: 15.8 % — HIGH (ref 10.3–14.5)
RBC # FLD: 15.9 % — HIGH (ref 10.3–14.5)
RBC # FLD: 15.9 % — HIGH (ref 10.3–14.5)
RBC CASTS # UR COMP ASSIST: ABNORMAL /HPF (ref 0–4)
RH IG SCN BLD-IMP: POSITIVE — SIGNIFICANT CHANGE UP
SAO2 % BLDA: 93 % — LOW (ref 95–99)
SAO2 % BLDA: 94.2 % — LOW (ref 95–99)
SAO2 % BLDA: 94.9 % — LOW (ref 95–99)
SAO2 % BLDA: 99 % — HIGH (ref 92–96)
SAO2 % BLDA: 99 % — HIGH (ref 92–96)
SODIUM BLDA-SCNC: 135 MMOL/L — LOW (ref 136–146)
SODIUM BLDA-SCNC: 136 MMOL/L — SIGNIFICANT CHANGE UP (ref 136–146)
SODIUM BLDA-SCNC: 136 MMOL/L — SIGNIFICANT CHANGE UP (ref 136–146)
SODIUM SERPL-SCNC: 144 MMOL/L — SIGNIFICANT CHANGE UP (ref 135–145)
SODIUM SERPL-SCNC: 145 MMOL/L — SIGNIFICANT CHANGE UP (ref 135–145)
SODIUM SERPL-SCNC: 147 MMOL/L — HIGH (ref 135–145)
SP GR SPEC: 1.01 — SIGNIFICANT CHANGE UP (ref 1.01–1.02)
SPECIMEN SOURCE: SIGNIFICANT CHANGE UP
TROPONIN I SERPL-MCNC: 12.7 NG/ML — HIGH (ref 0.01–0.04)
TROPONIN I SERPL-MCNC: 7.25 NG/ML — HIGH (ref 0.01–0.04)
TROPONIN T, HIGH SENSITIVITY: 1623 NG/L — CRITICAL HIGH (ref ?–14)
UROBILINOGEN FLD QL: 4 MG/DL
VANCOMYCIN FLD-MCNC: 11.9 UG/ML — SIGNIFICANT CHANGE UP
VARIANT LYMPHS # BLD: 0 % — SIGNIFICANT CHANGE UP
WBC # BLD: 22.27 K/UL — HIGH (ref 3.8–10.5)
WBC # BLD: 22.39 K/UL — HIGH (ref 3.8–10.5)
WBC # BLD: 23.01 K/UL — HIGH (ref 3.8–10.5)
WBC # BLD: 25.06 K/UL — HIGH (ref 3.8–10.5)
WBC # FLD AUTO: 22.27 K/UL — HIGH (ref 3.8–10.5)
WBC # FLD AUTO: 22.39 K/UL — HIGH (ref 3.8–10.5)
WBC # FLD AUTO: 23.01 K/UL — HIGH (ref 3.8–10.5)
WBC # FLD AUTO: 25.06 K/UL — HIGH (ref 3.8–10.5)
WBC UR QL: ABNORMAL

## 2019-05-18 PROCEDURE — 76700 US EXAM ABDOM COMPLETE: CPT | Mod: 26

## 2019-05-18 PROCEDURE — 71045 X-RAY EXAM CHEST 1 VIEW: CPT | Mod: 26,77

## 2019-05-18 PROCEDURE — 93010 ELECTROCARDIOGRAM REPORT: CPT

## 2019-05-18 PROCEDURE — 99291 CRITICAL CARE FIRST HOUR: CPT

## 2019-05-18 PROCEDURE — 99223 1ST HOSP IP/OBS HIGH 75: CPT | Mod: GC

## 2019-05-18 PROCEDURE — 36800 INSERTION OF CANNULA: CPT | Mod: GC

## 2019-05-18 PROCEDURE — 93306 TTE W/DOPPLER COMPLETE: CPT | Mod: 26

## 2019-05-18 PROCEDURE — 71045 X-RAY EXAM CHEST 1 VIEW: CPT | Mod: 26

## 2019-05-18 RX ORDER — PHENYLEPHRINE HYDROCHLORIDE 10 MG/ML
2.6 INJECTION INTRAVENOUS
Qty: 40 | Refills: 0 | Status: DISCONTINUED | OUTPATIENT
Start: 2019-05-18 | End: 2019-05-18

## 2019-05-18 RX ORDER — ASPIRIN/CALCIUM CARB/MAGNESIUM 324 MG
300 TABLET ORAL DAILY
Refills: 0 | Status: DISCONTINUED | OUTPATIENT
Start: 2019-05-18 | End: 2019-05-18

## 2019-05-18 RX ORDER — HEPARIN SODIUM 5000 [USP'U]/ML
4000 INJECTION INTRAVENOUS; SUBCUTANEOUS EVERY 6 HOURS
Refills: 0 | Status: DISCONTINUED | OUTPATIENT
Start: 2019-05-18 | End: 2019-05-18

## 2019-05-18 RX ORDER — SODIUM BICARBONATE 1 MEQ/ML
50 SYRINGE (ML) INTRAVENOUS ONCE
Refills: 0 | Status: COMPLETED | OUTPATIENT
Start: 2019-05-18 | End: 2019-05-18

## 2019-05-18 RX ORDER — PANTOPRAZOLE SODIUM 20 MG/1
40 TABLET, DELAYED RELEASE ORAL
Refills: 0 | Status: DISCONTINUED | OUTPATIENT
Start: 2019-05-18 | End: 2019-05-19

## 2019-05-18 RX ORDER — HYDROCORTISONE 20 MG
50 TABLET ORAL EVERY 6 HOURS
Refills: 0 | Status: DISCONTINUED | OUTPATIENT
Start: 2019-05-18 | End: 2019-05-18

## 2019-05-18 RX ORDER — DOBUTAMINE HCL 250MG/20ML
7.5 VIAL (ML) INTRAVENOUS
Qty: 1000 | Refills: 0 | Status: DISCONTINUED | OUTPATIENT
Start: 2019-05-18 | End: 2019-05-18

## 2019-05-18 RX ORDER — ASCORBIC ACID 60 MG
1500 TABLET,CHEWABLE ORAL EVERY 6 HOURS
Refills: 0 | Status: DISCONTINUED | OUTPATIENT
Start: 2019-05-18 | End: 2019-05-18

## 2019-05-18 RX ORDER — HEPARIN SODIUM 5000 [USP'U]/ML
8000 INJECTION INTRAVENOUS; SUBCUTANEOUS ONCE
Refills: 0 | Status: DISCONTINUED | OUTPATIENT
Start: 2019-05-18 | End: 2019-05-18

## 2019-05-18 RX ORDER — FENTANYL CITRATE 50 UG/ML
4 INJECTION INTRAVENOUS
Qty: 2500 | Refills: 0 | Status: DISCONTINUED | OUTPATIENT
Start: 2019-05-18 | End: 2019-05-19

## 2019-05-18 RX ORDER — NOREPINEPHRINE BITARTRATE/D5W 8 MG/250ML
2.9 PLASTIC BAG, INJECTION (ML) INTRAVENOUS
Qty: 32 | Refills: 0 | Status: DISCONTINUED | OUTPATIENT
Start: 2019-05-18 | End: 2019-05-18

## 2019-05-18 RX ORDER — HEPARIN SODIUM 5000 [USP'U]/ML
600 INJECTION INTRAVENOUS; SUBCUTANEOUS
Qty: 25000 | Refills: 0 | Status: DISCONTINUED | OUTPATIENT
Start: 2019-05-18 | End: 2019-05-20

## 2019-05-18 RX ORDER — SODIUM BICARBONATE 1 MEQ/ML
100 SYRINGE (ML) INTRAVENOUS ONCE
Refills: 0 | Status: COMPLETED | OUTPATIENT
Start: 2019-05-18 | End: 2019-05-18

## 2019-05-18 RX ORDER — HEPARIN SODIUM 5000 [USP'U]/ML
6000 INJECTION INTRAVENOUS; SUBCUTANEOUS EVERY 6 HOURS
Refills: 0 | Status: DISCONTINUED | OUTPATIENT
Start: 2019-05-18 | End: 2019-05-18

## 2019-05-18 RX ORDER — ASPIRIN/CALCIUM CARB/MAGNESIUM 324 MG
81 TABLET ORAL DAILY
Refills: 0 | Status: DISCONTINUED | OUTPATIENT
Start: 2019-05-19 | End: 2019-05-20

## 2019-05-18 RX ORDER — FENTANYL CITRATE 50 UG/ML
50 INJECTION INTRAVENOUS ONCE
Refills: 0 | Status: DISCONTINUED | OUTPATIENT
Start: 2019-05-18 | End: 2019-05-18

## 2019-05-18 RX ORDER — CISATRACURIUM BESYLATE 2 MG/ML
10 INJECTION INTRAVENOUS ONCE
Refills: 0 | Status: COMPLETED | OUTPATIENT
Start: 2019-05-18 | End: 2019-05-18

## 2019-05-18 RX ORDER — SIMVASTATIN 20 MG/1
40 TABLET, FILM COATED ORAL AT BEDTIME
Refills: 0 | Status: DISCONTINUED | OUTPATIENT
Start: 2019-05-18 | End: 2019-05-18

## 2019-05-18 RX ORDER — CHLORHEXIDINE GLUCONATE 213 G/1000ML
1 SOLUTION TOPICAL
Refills: 0 | Status: DISCONTINUED | OUTPATIENT
Start: 2019-05-18 | End: 2019-05-27

## 2019-05-18 RX ORDER — DOBUTAMINE HCL 250MG/20ML
2.5 VIAL (ML) INTRAVENOUS
Qty: 500 | Refills: 0 | Status: DISCONTINUED | OUTPATIENT
Start: 2019-05-18 | End: 2019-05-19

## 2019-05-18 RX ORDER — HEPARIN SODIUM 5000 [USP'U]/ML
INJECTION INTRAVENOUS; SUBCUTANEOUS
Qty: 25000 | Refills: 0 | Status: DISCONTINUED | OUTPATIENT
Start: 2019-05-18 | End: 2019-05-18

## 2019-05-18 RX ORDER — MIDAZOLAM HYDROCHLORIDE 1 MG/ML
0.02 INJECTION, SOLUTION INTRAMUSCULAR; INTRAVENOUS
Qty: 100 | Refills: 0 | Status: DISCONTINUED | OUTPATIENT
Start: 2019-05-18 | End: 2019-05-19

## 2019-05-18 RX ORDER — THIAMINE MONONITRATE (VIT B1) 100 MG
200 TABLET ORAL ONCE
Refills: 0 | Status: COMPLETED | OUTPATIENT
Start: 2019-05-18 | End: 2019-05-18

## 2019-05-18 RX ORDER — INSULIN LISPRO 100/ML
VIAL (ML) SUBCUTANEOUS EVERY 6 HOURS
Refills: 0 | Status: DISCONTINUED | OUTPATIENT
Start: 2019-05-18 | End: 2019-05-29

## 2019-05-18 RX ORDER — VASOPRESSIN 20 [USP'U]/ML
0.04 INJECTION INTRAVENOUS
Qty: 50 | Refills: 0 | Status: DISCONTINUED | OUTPATIENT
Start: 2019-05-18 | End: 2019-05-22

## 2019-05-18 RX ORDER — MEROPENEM 1 G/30ML
500 INJECTION INTRAVENOUS EVERY 12 HOURS
Refills: 0 | Status: DISCONTINUED | OUTPATIENT
Start: 2019-05-18 | End: 2019-05-18

## 2019-05-18 RX ORDER — THIAMINE MONONITRATE (VIT B1) 100 MG
200 TABLET ORAL
Refills: 0 | Status: DISCONTINUED | OUTPATIENT
Start: 2019-05-18 | End: 2019-05-18

## 2019-05-18 RX ORDER — PHENYLEPHRINE HYDROCHLORIDE 10 MG/ML
1 INJECTION INTRAVENOUS
Qty: 160 | Refills: 0 | Status: DISCONTINUED | OUTPATIENT
Start: 2019-05-18 | End: 2019-05-18

## 2019-05-18 RX ORDER — HEPARIN SODIUM 5000 [USP'U]/ML
8000 INJECTION INTRAVENOUS; SUBCUTANEOUS EVERY 6 HOURS
Refills: 0 | Status: DISCONTINUED | OUTPATIENT
Start: 2019-05-18 | End: 2019-05-18

## 2019-05-18 RX ORDER — VANCOMYCIN HCL 1 G
1500 VIAL (EA) INTRAVENOUS ONCE
Refills: 0 | Status: COMPLETED | OUTPATIENT
Start: 2019-05-18 | End: 2019-05-19

## 2019-05-18 RX ORDER — MIDAZOLAM HYDROCHLORIDE 1 MG/ML
2 INJECTION, SOLUTION INTRAMUSCULAR; INTRAVENOUS ONCE
Refills: 0 | Status: DISCONTINUED | OUTPATIENT
Start: 2019-05-18 | End: 2019-05-18

## 2019-05-18 RX ORDER — PHENYLEPHRINE HYDROCHLORIDE 10 MG/ML
2.6 INJECTION INTRAVENOUS
Qty: 160 | Refills: 0 | Status: DISCONTINUED | OUTPATIENT
Start: 2019-05-18 | End: 2019-05-19

## 2019-05-18 RX ORDER — CHLORHEXIDINE GLUCONATE 213 G/1000ML
1 SOLUTION TOPICAL DAILY
Refills: 0 | Status: DISCONTINUED | OUTPATIENT
Start: 2019-05-18 | End: 2019-05-18

## 2019-05-18 RX ORDER — ASPIRIN/CALCIUM CARB/MAGNESIUM 324 MG
81 TABLET ORAL DAILY
Refills: 0 | Status: DISCONTINUED | OUTPATIENT
Start: 2019-05-18 | End: 2019-05-18

## 2019-05-18 RX ORDER — MEROPENEM 1 G/30ML
1000 INJECTION INTRAVENOUS EVERY 12 HOURS
Refills: 0 | Status: DISCONTINUED | OUTPATIENT
Start: 2019-05-18 | End: 2019-05-18

## 2019-05-18 RX ORDER — MEROPENEM 1 G/30ML
1000 INJECTION INTRAVENOUS EVERY 12 HOURS
Refills: 0 | Status: DISCONTINUED | OUTPATIENT
Start: 2019-05-18 | End: 2019-05-20

## 2019-05-18 RX ORDER — CALCIUM GLUCONATE 100 MG/ML
1 VIAL (ML) INTRAVENOUS ONCE
Refills: 0 | Status: COMPLETED | OUTPATIENT
Start: 2019-05-18 | End: 2019-05-18

## 2019-05-18 RX ORDER — GENTAMICIN SULFATE 40 MG/ML
490 VIAL (ML) INJECTION ONCE
Refills: 0 | Status: COMPLETED | OUTPATIENT
Start: 2019-05-18 | End: 2019-05-18

## 2019-05-18 RX ORDER — HEPARIN SODIUM 5000 [USP'U]/ML
800 INJECTION INTRAVENOUS; SUBCUTANEOUS
Qty: 25000 | Refills: 0 | Status: DISCONTINUED | OUTPATIENT
Start: 2019-05-18 | End: 2019-05-18

## 2019-05-18 RX ADMIN — HEPARIN SODIUM 0 UNIT(S)/HR: 5000 INJECTION INTRAVENOUS; SUBCUTANEOUS at 12:35

## 2019-05-18 RX ADMIN — VASOPRESSIN 2.4 UNIT(S)/MIN: 20 INJECTION INTRAVENOUS at 20:16

## 2019-05-18 RX ADMIN — CHLORHEXIDINE GLUCONATE 15 MILLILITER(S): 213 SOLUTION TOPICAL at 05:22

## 2019-05-18 RX ADMIN — MICAFUNGIN SODIUM 105 MILLIGRAM(S): 100 INJECTION, POWDER, LYOPHILIZED, FOR SOLUTION INTRAVENOUS at 00:19

## 2019-05-18 RX ADMIN — VASOPRESSIN 2.4 UNIT(S)/MIN: 20 INJECTION INTRAVENOUS at 07:00

## 2019-05-18 RX ADMIN — Medication 250 MILLIGRAM(S): at 11:30

## 2019-05-18 RX ADMIN — Medication 50 MILLIEQUIVALENT(S): at 22:50

## 2019-05-18 RX ADMIN — MIDAZOLAM HYDROCHLORIDE 2 MILLIGRAM(S): 1 INJECTION, SOLUTION INTRAMUSCULAR; INTRAVENOUS at 15:00

## 2019-05-18 RX ADMIN — PANTOPRAZOLE SODIUM 40 MILLIGRAM(S): 20 TABLET, DELAYED RELEASE ORAL at 11:00

## 2019-05-18 RX ADMIN — Medication 2 MILLIGRAM(S): at 13:32

## 2019-05-18 RX ADMIN — Medication 4.29 MICROGRAM(S)/KG/MIN: at 01:53

## 2019-05-18 RX ADMIN — Medication 4.29 MICROGRAM(S)/KG/MIN: at 09:39

## 2019-05-18 RX ADMIN — MEROPENEM 100 MILLIGRAM(S): 1 INJECTION INTRAVENOUS at 20:15

## 2019-05-18 RX ADMIN — Medication 4.29 MICROGRAM(S)/KG/MIN: at 00:07

## 2019-05-18 RX ADMIN — MIDAZOLAM HYDROCHLORIDE 1.96 MG/KG/HR: 1 INJECTION, SOLUTION INTRAMUSCULAR; INTRAVENOUS at 20:15

## 2019-05-18 RX ADMIN — Medication 4.29 MICROGRAM(S)/KG/MIN: at 04:31

## 2019-05-18 RX ADMIN — Medication 14.73 MICROGRAM(S)/KG/MIN: at 20:15

## 2019-05-18 RX ADMIN — Medication 13.74 MICROGRAM(S)/KG/MIN: at 07:00

## 2019-05-18 RX ADMIN — Medication 4.29 MICROGRAM(S)/KG/MIN: at 07:15

## 2019-05-18 RX ADMIN — CISATRACURIUM BESYLATE 10 MILLIGRAM(S): 2 INJECTION INTRAVENOUS at 13:32

## 2019-05-18 RX ADMIN — PHENYLEPHRINE HYDROCHLORIDE 18.41 MICROGRAM(S)/KG/MIN: 10 INJECTION INTRAVENOUS at 12:15

## 2019-05-18 RX ADMIN — HEPARIN SODIUM 5000 UNIT(S): 5000 INJECTION INTRAVENOUS; SUBCUTANEOUS at 05:22

## 2019-05-18 RX ADMIN — Medication 100 MILLIEQUIVALENT(S): at 12:27

## 2019-05-18 RX ADMIN — Medication 2 MILLIGRAM(S): at 11:17

## 2019-05-18 RX ADMIN — HEPARIN SODIUM 800 UNIT(S)/HR: 5000 INJECTION INTRAVENOUS; SUBCUTANEOUS at 13:04

## 2019-05-18 RX ADMIN — Medication 200 GRAM(S): at 06:51

## 2019-05-18 RX ADMIN — Medication 4.29 MICROGRAM(S)/KG/MIN: at 05:54

## 2019-05-18 RX ADMIN — CHLORHEXIDINE GLUCONATE 1 APPLICATION(S): 213 SOLUTION TOPICAL at 06:51

## 2019-05-18 RX ADMIN — Medication 50 MILLIGRAM(S): at 12:24

## 2019-05-18 RX ADMIN — Medication 102 MILLIGRAM(S): at 12:22

## 2019-05-18 RX ADMIN — Medication 103 MILLIGRAM(S): at 13:03

## 2019-05-18 RX ADMIN — FENTANYL CITRATE 4.58 MICROGRAM(S)/KG/HR: 50 INJECTION INTRAVENOUS at 13:12

## 2019-05-18 RX ADMIN — HEPARIN SODIUM 1000 UNIT(S)/HR: 5000 INJECTION INTRAVENOUS; SUBCUTANEOUS at 06:40

## 2019-05-18 RX ADMIN — Medication 4.29 MICROGRAM(S)/KG/MIN: at 13:03

## 2019-05-18 RX ADMIN — MEROPENEM 100 MILLIGRAM(S): 1 INJECTION INTRAVENOUS at 05:22

## 2019-05-18 RX ADMIN — Medication 300 MILLIGRAM(S): at 11:01

## 2019-05-18 RX ADMIN — FENTANYL CITRATE 50 MICROGRAM(S): 50 INJECTION INTRAVENOUS at 23:45

## 2019-05-18 NOTE — H&P ADULT - HISTORY OF PRESENT ILLNESS
62 yo M hx of CAD s/p 9 stents, DMT2 on insulin, HTN, GERD, prior cholecystectomy,  diverticulosis, former smoker(30PY),biliary stricture s/p ERCP with sphincteromy and stent 4/2019 who presents as transfer from [facility] for further evaluation, possible IR intervention or Advanced GI evaluation. 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  He presented to [facility] ED found to be in septic shock due to E Coli Bacteremia, with SLOAN, transaminitis and also elevated troponins. In the ED at OSH, he developed lethargy, altered mental status, and hypoxia to 82% and was intubated for acute hypoxic resp failure. He was started on levophed and vasopression and was admitted to [facility] MICU for further evaluation. He received initially vanc, zosyn, micafungin, merepenem and gentamicin. He was started on heparin gtt for suspected NSTEMI. Initial EKG with Sinus Tach.     Initial labs with WBC 2.7/hg 11.1/plt 163/6% bands. CMP notable for Cl 111/bicarb 20/ bun 30/cr 2.29/tBili 3.5, Dbili 2.6/ alk phos 356/ ast/alt 250/233, lactate 5, lipase 540.  Imaging notable for CXR bilateral infiltrates. US abd Heterogeneous liver with mild biliary ductal dilatation within the left hepatic lobe. Dilatation of the visualized proximal common bile duct.     Overnight, he developed leukocytosis, worsening metabolic acidosis, increased troponins. He was transferred to [facility] MICU for further evaluation.     HOME MEDS  aspirin 81 mg oral tablet: 1 tab(s) orally once a day, Last Dose Taken:    · 	HumaLOG 100 units/mL subcutaneous solution: Last Dose Taken:    · 	hydrALAZINE: 75 milligram(s) orally 3 times a day, Last Dose Taken:    · 	torsemide 10 mg oral tablet: 1 tab(s) orally once a day, Last Dose Taken:    · 	Coreg 25 mg oral tablet: 1 tab(s) orally 2 times a day, Last Dose Taken:    · 	lansoprazole 30 mg oral delayed release capsule: 1 cap(s) orally once a day, Last Dose Taken:    · 	Uloric 40 mg oral tablet: 1 tab(s) orally once a day, Last Dose Taken:    · 	amlodipine-valsartan 10 mg-320 mg oral tablet: 1 tab(s) orally once a day, Last Dose Taken:    · 	Xultophy 100 units-3.6 mg/mL subcutaneous solution: 50 unit(s) subcutaneous once a day, Last Dose Taken:    · 	Crestor 10 mg oral tablet: 1 tab(s) orally once a day (at bedtime), Last Dose Taken:    · 	Lantus 100 units/mL subcutaneous solution: 30 unit(s) subcutaneous once a day (in the morning), Last Dose Taken: 64 yo M hx of CAD s/p 9 stents, DMT2 on insulin, HTN, GERD, prior cholecystectomy,  diverticulosis, former smoker(30PY), biliary stricture s/p ERCP with sphincteromy and stent 4/2019 who presents as transfer from [facility] in septic shock for further evaluation of acute cholangitis, for possible IR intervention or Advanced GI intervention. 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  Found to be in septic shock due to E Coli Bacteremia, with SLOAN, transaminitis and elevated troponins. In the ED at OSH, he developed lethargy, altered mental status, and hypoxia to 82% and was intubated for acute hypoxic resp failure. He was started on levophed and vasopressin and was admitted to their MICU for further workup. He received vanc, zosyn, micafungin, meropenem and gentamicin. He was started on heparin gtt for suspected NSTEMI due to troponin elevated and increasing CK. Blood cultures +E.coli 2/2 bottles, pending sensitivies.   Labs notable for WBC 2.7-->23/hg 11.1/plt 163-->110/6% bands. CMP notable for Cl 111-->119/bicarb 20-->13/ bun 30/cr 2.29-->2.9/tBili 3.5, Dbili 2.6/ alk phos 356/ ast/alt 250/233, lactate 5, lipase 540. TropI 2.3-->12,  -->1020.  Overnight, he developed leukocytosis, thrombocytopenia, worsening metabolic acidosis, and troponin continued to increase. He was transferred to [facility] MICU for further evaluation and possible intervention.    EKG with Sinus Tachycardia. Imaging notable for CXR bilateral infiltrates. US abd Heterogeneous liver with mild biliary ductal dilatation within the left hepatic lobe. Dilatation of the visualized proximal common bile duct.

## 2019-05-18 NOTE — ACUTE INTERFACILITY TRANSFER NOTE - PLAN OF CARE
maintain stable blood pressure continue pressors pain free continue meropenem, vancomycin,  micafungin,  follow up cultures chest pain free continue heparin gtt

## 2019-05-18 NOTE — CONSULT NOTE ADULT - PROBLEM SELECTOR RECOMMENDATION 9
Pt. with oligo-anuric SLOAN on CKD in the setting of sepsis, hypotension, NSTEMI. On lab review of [facility] SCr elevated at 4.05 on 4/18/19 and decrease at 3.71 on 4/20/19. SCr elevated at 1.97 on 5/17/19. SCr elevated at 2.29 on 5/17/19 and further increase at 2.90 today (5/18/19). Pt. with likely ATN. Recommend start CRRT as per discussion with ICU team. Would target even balance. Monitor UOP and daily weights. Avoid volume depletion, NSAIDs, ARB/ACE-I. Dose medications as per eGFR.    Case discussed with nephrologist on call (Dr Yan) Pt. with oliguric SLOAN on CKD in the setting of sepsis, hypotension and NSTEMI. On lab review of [facility], SCr elevated at 4.05 on 4/18/19 and decreased at 3.71 on 4/20/19. SCr elevated at 1.97 on 5/17/19. SCr elevated at 2.29 on 5/17/19 and further increased to 2.90 today (5/18/19). Pt. with likely ATN. Plan is to start pt. on CRRT as per discussion with ICU team. Monitor UOP and daily weights. Avoid any potential nephrotoxins. Dose medications as per eGFR    Case discussed with nephrologist-on-call (Dr Yan)

## 2019-05-18 NOTE — H&P ADULT - NSHPPHYSICALEXAM_GEN_ALL_CORE
T(C): 37.8 (05-18-19 @ 16:00), Max: 39.2 (05-17-19 @ 22:20)  HR: 103 (05-18-19 @ 19:22) (11 - 113)  BP: 110/66 (05-18-19 @ 19:00) (54/31 - 145/69)  RR: 26 (05-18-19 @ 19:00) (11 - 26)  SpO2: 99% (05-18-19 @ 19:22) (88% - 100%)    PHYSICAL EXAM:  GENERAL: intubated and sedated  HEAD:  Atraumatic, Normocephalic  EYES: pupils unreactive, scleral icterus   ENMT: intubated  NECK: Supple, No JVD  CHEST/LUNG: decreased bs b/l; No rales, rhonchi, wheezing  HEART: tachycardic; No murmurs, rubs, or gallops  ABDOMEN: Soft, Nontender, Nondistended; hypoactive BS  EXTREMITIES:  2+ Peripheral Pulses, No clubbing, cyanosis, or edema  LYMPH: No lymphadenopathy noted  SKIN: No rashes or lesions  NERVOUS SYSTEM: sedated

## 2019-05-18 NOTE — CONSULT NOTE ADULT - SUBJECTIVE AND OBJECTIVE BOX
Date of Admission: 5/18/2019    CHIEF COMPLAINT: nausea/vomiting/ fever    HISTORY OF PRESENT ILLNESS:  64 yo M hx of CAD s/p 9 stents, reported recent normal ECHO,DMT2 on insulin, HTN, GERD, prior cholecystectomy,  diverticulosis, former smoker(30PY),biliary stricture s/p ERCP with sphincteromy and stent 4/2019 who presents as transfer from [facility] for further evaluation, possible IR intervention or Advanced GI evaluation. 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  He presented to [facility] ED found to be in septic shock due to E Coli Bacteremia, with SLOAN, transaminitis and also elevated troponins. In the ED at OSH, he developed lethargy, altered mental status, and hypoxia to 82% and was intubated for acute hypoxic resp failure. He was started on levophed, vaso, and adilia for hypotension and admitted to [facility] MICU for further evaluation. He received initially vanc, zosyn, micafungin, merepenem and gentamicin. Troponins were elevated with concern for demand ischemia given h/o CAD and he was started on heparin gtt. Dobutamine was added at 10 mcg/kg/min due to concern for new LV dysfunction.    Initial labs with WBC 2.7/hg 11.1/plt 163/6% bands. CMP notable for Cl 111/bicarb 20/ bun 30/cr 2.29/tBili 3.5, Dbili 2.6/ alk phos 356/ ast/alt 250/233, lactate 5, lipase 540.  Imaging notable for CXR bilateral infiltrates. US abd Heterogeneous liver with mild biliary ductal dilatation within the left hepatic lobe. Dilatation of the visualized proximal common bile duct.     Overnight at OSH he developed leukocytosis, worsening metabolic acidosis with  increased troponins. He was transferred to [facility] MICU for further evaluation and management.    Allergies    Cipro (Rash)  Plavix (Rash)    Intolerances      MEDICATIONS:  DOBUTamine Infusion 7.5 MICROgram(s)/kG/Min IV Continuous <Continuous>  heparin  Infusion. 800 Unit(s)/Hr IV Continuous <Continuous>  heparin  Injectable 8000 Unit(s) IV Push once  heparin  Injectable 8000 Unit(s) IV Push every 6 hours PRN  heparin  Injectable 4000 Unit(s) IV Push every 6 hours PRN  norepinephrine Infusion 2.9 MICROgram(s)/kG/Min IV Continuous <Continuous>  phenylephrine    Infusion 2.6 MICROgram(s)/kG/Min IV Continuous <Continuous>        midazolam Infusion 0.02 mG/kG/Hr IV Continuous <Continuous>      vasopressin Infusion 0.04 Unit(s)/Min IV Continuous <Continuous>    chlorhexidine 4% Liquid 1 Application(s) Topical <User Schedule>      PAST MEDICAL & SURGICAL HISTORY:  Type II diabetes mellitus  HTN (hypertension)  CAD (coronary artery disease)  S/P cholecystectomy      FAMILY HISTORY:      SOCIAL HISTORY:    Smoker  former  Alcohol unknown  Drugs unknown      REVIEW OF SYSTEMS:  See HPI. Otherwise, 10 point ROS done and otherwise negative.    PHYSICAL EXAM:  T(C): 37.7 (05-18-19 @ 14:30), Max: 39.2 (05-17-19 @ 22:20)  HR: 103 (05-18-19 @ 16:07) (11 - 113)  BP: 123/74 (05-18-19 @ 16:00) (54/31 - 145/69)  RR: 24 (05-18-19 @ 16:07) (11 - 25)  SpO2: 98% (05-18-19 @ 16:07) (88% - 100%)  Wt(kg): --  I&O's Summary      Appearance: NAD, skin W & D. Intubated and sedated  HEENT:   Normal oral mucosa, PERRL, EOMI	  Cardiovascular: nl S1S2  Respiratory: CTA ant  Psychiatry: sedated  Gastrointestinal:  Soft, Non-tender, + BS	  Skin: No rashes, No ecchymoses, No cyanosis	  Neurologic: Non-focal  Extremities: Normal range of motion, No clubbing, cyanosis or edema          LABS:	 	                        10.9   22.27 )-----------( 119      ( 18 May 2019 11:24 )             34.9       05-18    147<H>  |  119<H>  |  33<H>  ----------------------------<  138<H>  4.1   |  13<L>  |  2.90<H>    05-18    145  |  117<H>  |  30<H>  ----------------------------<  104<H>  3.7   |  14<L>  |  2.62<H>    Ca    6.7<L>      18 May 2019 11:24  Ca    6.9<L>      18 May 2019 03:25  Phos  3.0     05-18  Phos  2.8     05-17  Mg     1.9     05-18  Mg     2.1     05-17    TPro  5.7<L>  /  Alb  2.2<L>  /  TBili  3.9<H>  /  DBili  x   /  AST  333<H>  /  ALT  273<H>  /  AlkPhos  258<H>  05-18  TPro  6.1  /  Alb  2.4<L>  /  TBili  3.8<H>  /  DBili  x   /  AST  315<H>  /  ALT  281<H>  /  AlkPhos  312<H>  05-18      proBNP:   Lipid Profile:   HgA1c:   TSH:     CARDIAC MARKERS:    Creatine Kinase, Serum: 1020 U/L (05-18 @ 11:24)  Creatine Kinase, Serum: 552 U/L (05-18 @ 03:25)  Creatine Kinase, Serum: 147 U/L (05-17 @ 22:04)    TELEMETRY: 	sinus tachycardia

## 2019-05-18 NOTE — CONSULT NOTE ADULT - ASSESSMENT
Impression:    1. Concern for cholangitis: Meets criteria for severe acute cholangitis per [place] Criteria. S/P EUS/ERCP on 4/19/19 with 2 cm CBD stricture and with sphincterotomy and stent placement.   2. Septic shock with E. coli bacteremia on vasopressors: Presumed biliary source. On meropenem.  3. Hypoxic respiratory failure: Currently intubated and on mechanical ventilation  4. CAD s/p PCI x 9 with hypertroponinemia: Concern for NSTEMI in setting of CAD. Cardiology evaluation pending.  5. DM  6. GERD    Recommendations:  - Case discussed with on-call advanced GI attending, Dr. Cisco Garcia - will consider ERCP pending IR evaluation  - IR consult to evaluate for possible percutaneous drain  - Check abdominal x-ray  - Management of septic shock / hypoxic respiratory failure per MICU team  - Continue IV antibiotics  - Rest of care per primary team    Neal Diaz MD  Gastroenterology Fellow  Pager number: [phone] / [phone] Impression:    1. Concern for cholangitis: Meets criteria for severe acute cholangitis per [place] Criteria. S/P EUS/ERCP on 4/19/19 with 2 cm CBD stricture and with sphincterotomy and stent placement.   2. Septic shock with E. coli bacteremia on 3 vasopressors: Presumed biliary source. On meropenem.  3. Hypoxic respiratory failure: Currently intubated and on mechanical ventilation  4. CAD s/p PCI x 9 with hypertroponinemia: Concern for NSTEMI in setting of CAD. Cardiology evaluation pending.  Currently on dobutamine.  5.  CKD with anuria.  6. DM  7. GERD    Recommendations:  - Case discussed with on-call advanced GI attending, Dr. Cisco Garcia - will consider ERCP pending IR evaluation when medically optimized  - Currently on 3 pressors and dobutamine, with possible CVVHD pending  - IR consult to evaluate for possible percutaneous drain  - Check abdominal x-ray  - Management of septic shock / hypoxic respiratory failure per MICU team  - Continue IV antibiotics  - Rest of care per primary team    Neal Diaz MD  Gastroenterology Fellow  Pager number: [phone] / [phone]

## 2019-05-18 NOTE — ACUTE INTERFACILITY TRANSFER NOTE - HOSPITAL COURSE
Pt is a 62 yo FM ex pk yr smoker with h/o CAD (9 stents), HTN, GERD, diverticulosis and s/p cholecystectomy. In 4/19 pt admitted in  2 to increasing bilirubin, abdominal discomfort and painless jaundice.s/p ERCP. ERCP showed dilated CBD stricture s/p sphincterotomy and biliary stent placement. 5/17  brought  to [facility] 2 to nausea, vomiting and diarrhea Pt intubated in the ER and remained hypotensive despite 5-6L of fluids, started on Levophed and Vasopressin drips. Admitting dx septic shock +/- cardiogenic component (Dobutamine added). Today BldCx (+) GNR. Dx: 1) GNR spetic shock probably from biliary source 2) NSTEMI 3) MOF 2 to SS + NSTEMI (SLOAN, resp failure). Patient  started on marik protocol,  and  Meropenem + Micafungin and give 1 dose of Gentamicin (pt with GNR) and re-dose Vanco trough 11.9.  (if Cx remain neg for GPC then dc Vanco)  Currently pt on Levophed + Vasopressin  + Dobutamine and add Phenylephrine to decrease the dose of Levophed/ Repeat Lactate now 4.8. .  For NSTEMI cont Asa + Heparin but no BB since pt is in profound shock, Echo done. Continued on insulin sliding scale. US abdomin done.

## 2019-05-18 NOTE — CONSULT NOTE ADULT - ASSESSMENT
63M with hx of CAD s/p 9 stents, reported recent normal ECHO, DMT2 on insulin, HTN, GERD, prior cholecystectomy,  diverticulosis, former smoker(30PY),biliary stricture s/p ERCP with sphincteromy and stent 4/2019 presents to OSH with N/V and fever-- + sepsis/?distributive shock requiring mulitple pressors, Dobutamine, intubated and sedated. Also with elevated troponins and concern for NSTEMI in setting of likely demand ischemia.    D/W Dr. Rousseau, cardiology fellow and MICU fellow- continue Heparin gtt at present for ?NSTEMI. Need to source/ control sepsis. No indication for acute cardiac intervention at this time. Would check echo. Cardiology will follow with you.

## 2019-05-18 NOTE — PROGRESS NOTE ADULT - ASSESSMENT
Pt is a 62 yo FM ex pk yr smoker with h/o CAD (9 stents), HTN, GERD, diverticulosis and s/p cholecystectomy. In 4/19 pt admitted in  2 to increasing bilirubin, abdominal discomfort and painless jaundice.s/p ERCP. ERCP showed dilated CBD stricture s/p sphincterotomy and biliary stent placement. 5/17  brought  to [facility] 2 to nausea, vomiting and diarrhea Pt intubated in the ER and remained hypotensive despite 5-6L of fluids, started on Levophed and Vasopressin drips. Admitting dx septic shock +/- cardiogenic component (Dobutamine added). Today BldCx (+) GNR. Dx: 1) GNR spetic shock probably from biliary source 2) NSTEMI 3) MOF 2 to SS + NSTEMI (SLOAN, resp failure)    Resp: Re-check ABG  ID: Cont Meropenem + Micafungin and give 1 dose of Gentamicin and re-dose Vanco (if Cx remain neg for GPC then dc Vanco)  CVS: Currently pt on Levophed + Vasopressin  + Dobutamine and add Phenylephri Pt is a 64 yo FM ex pk yr smoker with h/o CAD (9 stents), HTN, GERD, diverticulosis and s/p cholecystectomy. In 4/19 pt admitted in H 2 to increasing bilirubin, abdominal discomfort and painless jaundice. s/p ERCP. ERCP showed dilated CBD stricture s/p sphincterotomy and biliary stent placement. Bx neg for Ca. 5/17  [facility] to [facility], vomiting and diarrhea Pt intubated in the ER and remained hypotensive despite 5-6L of fluids, started on Levophed and Vasopressin drips. Admitting dx septic shock +/- cardiogenic component (Dobutamine added). Today BldCx (+) GNR. Dx: 1) GNR septic shock probably from biliary source 2) NSTEMI 3) MOF 2 to SS + NSTEMI (SLOAN, resp failure)    Resp: Re-check ABG and adjust MV accordingly  ID: Cont Meropenem + Micafungin and give 1 dose of Gentamicin (pt with GNR) and re-dose Vanco (if Cx remain neg for GPC then dc Vanco)  CVS: Currently pt on Levophed + Vasopressin  + Dobutamine and add Phenylephrine to decrease the dose of Levophed/ Repeat Lactate/ For NSTEMI cont Asa + Heparin but no BB since pt is in profound shock/ Cardio evaluation/ Repeat trop/ F/u formal Echo  Heme: Adjust Heparin drip to PTT  FEN: NPO  Endo: Follow FS  GI: GI consult  Renal: SLOAN 2 to ATN; follow BUN/Cr and UO  Neuro: Cont sedation with Fentanyl drip  Pt is to be transferred to [facility] MICU as requested by the family and they understand pt is critically ill on MV in septic shock with 3 pressors. If pt appears unstable prior to the transfer, will cancel it    CCT: 60 min

## 2019-05-18 NOTE — H&P ADULT - NSHPLABSRESULTS_GEN_ALL_CORE
The labs were reviewed by me. Imaging was reviewed by me. EKG was reviewed by me.                        11.0   22.39 )-----------( 110      ( 18 May 2019 16:51 )             35.1       05-    147<H>  |  119<H>  |  33<H>  ----------------------------<  138<H>  4.1   |  13<L>  |  2.90<H>    Ca    6.7<L>      18 May 2019 11:24  Phos  3.0       Mg     1.9         TPro  5.7<L>  /  Alb  2.2<L>  /  TBili  3.9<H>  /  DBili  x   /  AST  333<H>  /  ALT  273<H>  /  AlkPhos  258<H>          PT/INR - ( 18 May 2019 16:51 )   PT: 24.6 SEC;   INR: 2.11          PTT - ( 18 May 2019 16:51 )  PTT:147.3 SEC  CAPILLARY BLOOD GLUCOSE      POCT Blood Glucose.: 130 mg/dL (18 May 2019 17:43)          Urinalysis Basic - ( 18 May 2019 00:33 )    Color: Yellow / Appearance: Slightly Turbid / S.015 / pH: x  Gluc: x / Ketone: Trace  / Bili: Moderate / Urobili: 4 mg/dL   Blood: x / Protein: 100 mg/dL / Nitrite: Negative   Leuk Esterase: Trace / RBC: 3-5 /HPF / WBC 11-25   Sq Epi: x / Non Sq Epi: Moderate / Bacteria: Many          EKG:    RADIOLOGY:

## 2019-05-18 NOTE — H&P ADULT - NSHPSOCIALHISTORY_GEN_ALL_CORE
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 No ETOH or drug use.

## 2019-05-18 NOTE — CONSULT NOTE ADULT - SUBJECTIVE AND OBJECTIVE BOX
Chief Complaint:  Patient is a 63y old  Male who presents with a chief complaint of     HPI:    Allergies:  Cipro (Rash)  Plavix (Rash)    Home Medications:    · 	aspirin 81 mg oral tablet: 1 tab(s) orally once a day, Last Dose Taken:    · 	HumaLOG 100 units/mL subcutaneous solution: Last Dose Taken:    · 	hydrALAZINE: 75 milligram(s) orally 3 times a day, Last Dose Taken:    · 	torsemide 10 mg oral tablet: 1 tab(s) orally once a day, Last Dose Taken:    · 	Coreg 25 mg oral tablet: 1 tab(s) orally 2 times a day, Last Dose Taken:    · 	lansoprazole 30 mg oral delayed release capsule: 1 cap(s) orally once a day, Last Dose Taken:    · 	Uloric 40 mg oral tablet: 1 tab(s) orally once a day, Last Dose Taken:      Hospital Medications:  chlorhexidine 2% Cloths 1 Application(s) Topical daily  chlorhexidine 4% Liquid 1 Application(s) Topical <User Schedule>  midazolam Infusion 0.02 mG/kG/Hr IV Continuous <Continuous>  midazolam Injectable 2 milliGRAM(s) IV Push once    PMHX/PSHX:  Gout  Type II diabetes mellitus  HTN (hypertension)  CAD (coronary artery disease)  Cirrhosis  S/P cholecystectomy    Family history:      Denies family history of colon cancer/polyps, stomach cancer/polyps, pancreatic cancer/masses, liver cancer/disease, ovarian cancer and endometrial cancer.    Social History:     Tob: Denies  EtOH: Denies  Illicit Drugs: Denies    ROS: Unable to obtain as patient is intubated and sedated    PHYSICAL EXAM:     GENERAL:  No acute distress  HEENT:  Normocephalic/atraumatic, no scleral icterus  CHEST:  Clear to auscultation bilaterally, no wheezes/rales/ronchi, no accessory muscle use  HEART:  Regular rate and rhythm, no murmurs/rubs/gallops  ABDOMEN:  Soft, non-tender, non-distended, normoactive bowel sounds,  no masses, no hepato-splenomegaly, no signs of chronic liver disease  EXTREMITIES: No cyanosis, clubbing, or edema  SKIN:  No rash/erythema/ecchymoses/petechiae/wounds/abscess/warm/dry  NEURO:  Alert and oriented x 3, no asterixis    Vital Signs:  Vital Signs Last 24 Hrs  T(C): 36.4 (18 May 2019 12:31), Max: 39.4 (17 May 2019 16:14)  T(F): 97.6 (18 May 2019 12:31), Max: 102.9 (17 May 2019 16:14)  HR: 108 (18 May 2019 14:40) (66 - 113)  BP: 112/84 (18 May 2019 13:00) (54/31 - 116/55)  BP(mean): 90 (18 May 2019 13:00) (36 - 90)  RR: 21 (18 May 2019 13:30) (11 - 25)  SpO2: 100% (18 May 2019 14:40) (88% - 100%)    LABS:                        10.9   22.27 )-----------( 119      ( 18 May 2019 11:24 )             34.9     Mean Cell Volume: 95.6 fl (-19 @ 11:24)        147<H>  |  119<H>  |  33<H>  ----------------------------<  138<H>  4.1   |  13<L>  |  2.90<H>    Ca    6.7<L>      18 May 2019 11:24  Phos  3.0       Mg     1.9         TPro  5.7<L>  /  Alb  2.2<L>  /  TBili  3.9<H>  /  DBili  x   /  AST  333<H>  /  ALT  273<H>  /  AlkPhos  258<H>  18    LIVER FUNCTIONS - ( 18 May 2019 11:24 )  Alb: 2.2 g/dL / Pro: 5.7 gm/dL / ALK PHOS: 258 U/L / ALT: 273 U/L / AST: 333 U/L / GGT: x           PT/INR - ( 18 May 2019 11:24 )   PT: 22.1 sec;   INR: 1.93 ratio      PTT - ( 18 May 2019 11:24 )  PTT:92.6 sec  Urinalysis Basic - ( 18 May 2019 00:33 )    Color: Yellow / Appearance: Slightly Turbid / S.015 / pH: x  Gluc: x / Ketone: Trace  / Bili: Moderate / Urobili: 4 mg/dL   Blood: x / Protein: 100 mg/dL / Nitrite: Negative   Leuk Esterase: Trace / RBC: 3-5 /HPF / WBC 11-25   Sq Epi: x / Non Sq Epi: Moderate / Bacteria: Many    Amylase Serum--      Lipase [id number]       Ammonia--               10.9   22.27 )-----------( 119      ( 18 May 2019 11:24 )             34.9                         11.0   25.06 )-----------( 141      ( 18 May 2019 03:25 )             34.9                         10.6   9.31  )-----------( 152      ( 17 May 2019 22:04 )             34.6                         11.1   2.71  )-----------( 163      ( 17 May 2019 17:01 )             33.5     Imaging:    < from: US Abdomen Complete (19 @ 12:29) >    EXAM:  US ABDOMINAL COMPLETE                          PROCEDURE DATE:  2019      INTERPRETATION:  CLINICAL INFORMATION: Increased LFTs    COMPARISON: None available.    TECHNIQUE: Sonography of the abdomen.     FINDINGS:    Liver: Slightly heterogeneous in echotexture.    Bile ducts: There is intrahepatic biliary ductal dilatation within the   left hepatic lobe. Proximal, bile duct measures 13 mm. Distal CBD is not   well-visualized    Gallbladder: Not visualized.        Pancreas: Not visualized due to overlying bowel gas.    Spleen: 8.2 cm. Within normal limits.    Right kidney: 9.9 cm. No hydronephrosis. 2.5 cm parapelvic cyst.    Left kidney: 10.2 cm.  No hydronephrosis.    Ascites: None.    Aorta and IVC: Visualized portionsare within normal limits.    Right pleural effusion partially visualized.    IMPRESSION:     Heterogeneous liver with mild biliary ductal dilatation within the left   hepatic lobe. Dilatation of the visualized proximal common bile duct.   Distal CBD is not well-visualized. MRI/MRCP may be obtained for further   evaluation.    XUAN WALKER M.D., ATTENDING RADIOLOGIST  This document has been electronically signed. 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 coli bacteremia, and with elevated troponins with concern for NSTEMI, transferred to [facility] for further management; GI consulted for ERCP.    Per chart review, the patient had been having worsening jaundice over the past week, for which he presented to [facility]. He also endorsed nausea/vomiting and abdominal pain. He was found to be febrile to 102.9 while in the [facility]-ED.     Allergies:  Cipro (Rash)  Plavix (Rash)    Home Medications:    · 	aspirin 81 mg oral tablet: 1 tab(s) orally once a day, Last Dose Taken:    · 	HumaLOG 100 units/mL subcutaneous solution: Last Dose Taken:    · 	hydrALAZINE: 75 milligram(s) orally 3 times a day, Last Dose Taken:    · 	torsemide 10 mg oral tablet: 1 tab(s) orally once a day, Last Dose Taken:    · 	Coreg 25 mg oral tablet: 1 tab(s) orally 2 times a day, Last Dose Taken:    · 	lansoprazole 30 mg oral delayed release capsule: 1 cap(s) orally once a day, Last Dose Taken:    · 	Uloric 40 mg oral tablet: 1 tab(s) orally once a day, Last Dose Taken:      Hospital Medications:  chlorhexidine 2% Cloths 1 Application(s) Topical daily  chlorhexidine 4% Liquid 1 Application(s) Topical <User Schedule>  midazolam Infusion 0.02 mG/kG/Hr IV Continuous <Continuous>  midazolam Injectable 2 milliGRAM(s) IV Push once    PMHX/PSHX:  Gout  Type II diabetes mellitus  HTN (hypertension)  CAD (coronary artery disease)  Cirrhosis  S/P cholecystectomy    Family history:      Denies family history of colon cancer/polyps, stomach cancer/polyps, pancreatic cancer/masses, liver cancer/disease, ovarian cancer and endometrial cancer.    Social History:     Tob: Denies  EtOH: Denies  Illicit Drugs: Denies    ROS: Unable to obtain as patient is intubated and sedated    PHYSICAL EXAM:     GENERAL: Intubated, sedated  HEENT:  ETT in place  CHEST:  Ventilator associated breath sounds  HEART:  Regular rate and rhythm  ABDOMEN:  Soft, non-tender, non-distended, normoactive bowel sounds  EXTREMITIES: No cyanosis, clubbing, or edema  SKIN:  No rash/erythema  NEURO: Itubated, sedated    Vital Signs:  Vital Signs Last 24 Hrs  T(C): 36.4 (18 May 2019 12:31), Max: 39.4 (17 May 2019 16:14)  T(F): 97.6 (18 May 2019 12:31), Max: 102.9 (17 May 2019 16:14)  HR: 108 (18 May 2019 14:40) (66 - 113)  BP: 112/84 (18 May 2019 13:00) (54/31 - 116/55)  BP(mean): 90 (18 May 2019 13:00) (36 - 90)  RR: 21 (18 May 2019 13:30) (11 - 25)  SpO2: 100% (18 May 2019 14:40) (88% - 100%)    LABS:                        10.9   22.27 )-----------( 119      ( 18 May 2019 11:24 )             34.9     Mean Cell Volume: 95.6 fl (- @ 11:24)        147<H>  |  119<H>  |  33<H>  ----------------------------<  138<H>  4.1   |  13<L>  |  2.90<H>    Ca    6.7<L>      18 May 2019 11:24  Phos  3.0       Mg     1.9         TPro  5.7<L>  /  Alb  2.2<L>  /  TBili  3.9<H>  /  DBili  x   /  AST  333<H>  /  ALT  273<H>  /  AlkPhos  258<H>      LIVER FUNCTIONS - ( 18 May 2019 11:24 )  Alb: 2.2 g/dL / Pro: 5.7 gm/dL / ALK PHOS: 258 U/L / ALT: 273 U/L / AST: 333 U/L / GGT: x           PT/INR - ( 18 May 2019 11:24 )   PT: 22.1 sec;   INR: 1.93 ratio      PTT - ( 18 May 2019 11:24 )  PTT:92.6 sec  Urinalysis Basic - ( 18 May 2019 00:33 )    Color: Yellow / Appearance: Slightly Turbid / S.015 / pH: x  Gluc: x / Ketone: Trace  / Bili: Moderate / Urobili: 4 mg/dL   Blood: x / Protein: 100 mg/dL / Nitrite: Negative   Leuk Esterase: Trace / RBC: 3-5 /HPF / WBC 11-25   Sq Epi: x / Non Sq Epi: Moderate / Bacteria: Many    Amylase Serum--      Lipase [id number]       Ammonia--               10.9   22.27 )-----------( 119      ( 18 May 2019 11:24 )             34.9                         11.0   25.06 )-----------( 141      ( 18 May 2019 03:25 )             34.9                         10.6   9.31  )-----------( 152      ( 17 May 2019 22:04 )             34.6                         11.1   2.71  )-----------( 163      ( 17 May 2019 17:01 )             33.5     Imaging:    < from: US Abdomen Complete (19 @ 12:29) >    EXAM:  US ABDOMINAL COMPLETE                          PROCEDURE DATE:  2019      INTERPRETATION:  CLINICAL INFORMATION: Increased LFTs    COMPARISON: None available.    TECHNIQUE: Sonography of the abdomen.     FINDINGS:    Liver: Slightly heterogeneous in echotexture.    Bile ducts: There is intrahepatic biliary ductal dilatation within the   left hepatic lobe. Proximal, bile duct measures 13 mm. Distal CBD is not   well-visualized    Gallbladder: Not visualized.        Pancreas: Not visualized due to overlying bowel gas.    Spleen: 8.2 cm. Within normal limits.    Right kidney: 9.9 cm. No hydronephrosis. 2.5 cm parapelvic cyst.    Left kidney: 10.2 cm.  No hydronephrosis.    Ascites: None.    Aorta and IVC: Visualized portionsare within normal limits.    Right pleural effusion partially visualized.    IMPRESSION:     Heterogeneous liver with mild biliary ductal dilatation within the left   hepatic lobe. Dilatation of the visualized proximal common bile duct.   Distal CBD is not well-visualized. MRI/MRCP may be obtained for further   evaluation.    UXAN WALKER M.D., ATTENDING RADIOLOGIST  This document has been electronically signed. 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 coli bacteremia, and with elevated troponins with concern for NSTEMI, transferred to [facility] for further management; GI consulted for ERCP.    Per chart review, the patient had been having worsening jaundice over the past week, for which he presented to [facility]. He also endorsed nausea/vomiting and abdominal pain. He was found to be febrile to 102.9 while in the [facility]-ED.     Allergies:  Cipro (Rash)  Plavix (Rash)    Home Medications:    · 	aspirin 81 mg oral tablet: 1 tab(s) orally once a day, Last Dose Taken:    · 	HumaLOG 100 units/mL subcutaneous solution: Last Dose Taken:    · 	hydrALAZINE: 75 milligram(s) orally 3 times a day, Last Dose Taken:    · 	torsemide 10 mg oral tablet: 1 tab(s) orally once a day, Last Dose Taken:    · 	Coreg 25 mg oral tablet: 1 tab(s) orally 2 times a day, Last Dose Taken:    · 	lansoprazole 30 mg oral delayed release capsule: 1 cap(s) orally once a day, Last Dose Taken:    · 	Uloric 40 mg oral tablet: 1 tab(s) orally once a day, Last Dose Taken:      Hospital Medications:  chlorhexidine 2% Cloths 1 Application(s) Topical daily  chlorhexidine 4% Liquid 1 Application(s) Topical <User Schedule>  midazolam Infusion 0.02 mG/kG/Hr IV Continuous <Continuous>  midazolam Injectable 2 milliGRAM(s) IV Push once    PMHX/PSHX:  Gout  Type II diabetes mellitus  HTN (hypertension)  CAD (coronary artery disease)  Cirrhosis  S/P cholecystectomy    Family history:      Denies family history of colon cancer/polyps, stomach cancer/polyps, pancreatic cancer/masses, liver cancer/disease, ovarian cancer and endometrial cancer.    Social History:     Tob: Denies  EtOH: Denies  Illicit Drugs: Denies    ROS: Unable to obtain as patient is intubated and sedated    PHYSICAL EXAM:     GENERAL: Intubated, sedated  HEENT:  ETT in place, + scleral icterus  CHEST:  Ventilator associated breath sounds  HEART:  Regular rate and rhythm  ABDOMEN:  Soft, non-tender, non-distended, normoactive bowel sounds  EXTREMITIES: No cyanosis, clubbing, or edema  SKIN:  No rash/erythema  NEURO: Intubated, sedated    Vital Signs:  Vital Signs Last 24 Hrs  T(C): 36.4 (18 May 2019 12:31), Max: 39.4 (17 May 2019 16:14)  T(F): 97.6 (18 May 2019 12:31), Max: 102.9 (17 May 2019 16:14)  HR: 108 (18 May 2019 14:40) (66 - 113)  BP: 112/84 (18 May 2019 13:00) (54/31 - 116/55)  BP(mean): 90 (18 May 2019 13:00) (36 - 90)  RR: 21 (18 May 2019 13:30) (11 - 25)  SpO2: 100% (18 May 2019 14:40) (88% - 100%)    LABS:                        10.9   22.27 )-----------( 119      ( 18 May 2019 11:24 )             34.9     Mean Cell Volume: 95.6 fl (19 @ 11:24)        147<H>  |  119<H>  |  33<H>  ----------------------------<  138<H>  4.1   |  13<L>  |  2.90<H>    Ca    6.7<L>      18 May 2019 11:24  Phos  3.0       Mg     1.9         TPro  5.7<L>  /  Alb  2.2<L>  /  TBili  3.9<H>  /  DBili  x   /  AST  333<H>  /  ALT  273<H>  /  AlkPhos  258<H>  18    LIVER FUNCTIONS - ( 18 May 2019 11:24 )  Alb: 2.2 g/dL / Pro: 5.7 gm/dL / ALK PHOS: 258 U/L / ALT: 273 U/L / AST: 333 U/L / GGT: x           PT/INR - ( 18 May 2019 11:24 )   PT: 22.1 sec;   INR: 1.93 ratio      PTT - ( 18 May 2019 11:24 )  PTT:92.6 sec  Urinalysis Basic - ( 18 May 2019 00:33 )    Color: Yellow / Appearance: Slightly Turbid / S.015 / pH: x  Gluc: x / Ketone: Trace  / Bili: Moderate / Urobili: 4 mg/dL   Blood: x / Protein: 100 mg/dL / Nitrite: Negative   Leuk Esterase: Trace / RBC: 3-5 /HPF / WBC 11-25   Sq Epi: x / Non Sq Epi: Moderate / Bacteria: Many    Amylase Serum--      Lipase [id number]       Ammonia--               10.9   22.27 )-----------( 119      ( 18 May 2019 11:24 )             34.9                         11.0   25.06 )-----------( 141      ( 18 May 2019 03:25 )             34.9                         10.6   9.31  )-----------( 152      ( 17 May 2019 22:04 )             34.6                         11.1   2.71  )-----------( 163      ( 17 May 2019 17:01 )             33.5     Imaging:    < from: US Abdomen Complete (19 @ 12:29) >    EXAM:  US ABDOMINAL COMPLETE                          PROCEDURE DATE:  2019      INTERPRETATION:  CLINICAL INFORMATION: Increased LFTs    COMPARISON: None available.    TECHNIQUE: Sonography of the abdomen.     FINDINGS:    Liver: Slightly heterogeneous in echotexture.    Bile ducts: There is intrahepatic biliary ductal dilatation within the   left hepatic lobe. Proximal, bile duct measures 13 mm. Distal CBD is not   well-visualized    Gallbladder: Not visualized.        Pancreas: Not visualized due to overlying bowel gas.    Spleen: 8.2 cm. Within normal limits.    Right kidney: 9.9 cm. No hydronephrosis. 2.5 cm parapelvic cyst.    Left kidney: 10.2 cm.  No hydronephrosis.    Ascites: None.    Aorta and IVC: Visualized portionsare within normal limits.    Right pleural effusion partially visualized.    IMPRESSION:     Heterogeneous liver with mild biliary ductal dilatation within the left   hepatic lobe. Dilatation of the visualized proximal common bile duct.   Distal CBD is not well-visualized. MRI/MRCP may be obtained for further   evaluation.    XUAN WALKER M.D., ATTENDING RADIOLOGIST  This document has been electronically signed. May 18 2019 12:48PM      < end of copied text > Chief Complaint: Yellowing of the skin, nausea, malaise    HPI:    62 y/o M w/ hx of DM, GERD, CAD s/p PCI, s/p cholecystectomy, with recent admission to [facility] for evaluation of abdominal pain with abdominal liver enzymes, s/p ERCP with sphincterotomy and stent placement on 19 with identification of CBD stricture with concern for cholangiocarcinoma, however, with negative brushings/biopsies, who initially presented to [facility] on 19 with complaint of worsening jaundice, noted to be febrile, worsening mental status, and development of hypoxic respiratory failure requiring intubation, septic shock on vasopressors with E. coli bacteremia, and with elevated troponins with concern for NSTEMI, transferred to [facility] for further management; GI consulted for ERCP.    Per chart review, the patient had been having worsening jaundice over the past week, for which he presented to [facility]-. He also endorsed nausea/vomiting and abdominal pain. He was found to be febrile to 102.9 while in the [facility]-ED.  Of note, he was scheduled to undergo a [facility] soon at [email or web address]    Allergies:  Cipro (Rash)  Plavix (Rash)    Home Medications:    · 	aspirin 81 mg oral tablet: 1 tab(s) orally once a day, Last Dose Taken:    · 	HumaLOG 100 units/mL subcutaneous solution: Last Dose Taken:    · 	hydrALAZINE: 75 milligram(s) orally 3 times a day, Last Dose Taken:    · 	torsemide 10 mg oral tablet: 1 tab(s) orally once a day, Last Dose Taken:    · 	Coreg 25 mg oral tablet: 1 tab(s) orally 2 times a day, Last Dose Taken:    · 	lansoprazole 30 mg oral delayed release capsule: 1 cap(s) orally once a day, Last Dose Taken:    · 	Uloric 40 mg oral tablet: 1 tab(s) orally once a day, Last Dose Taken:      Hospital Medications:  chlorhexidine 2% Cloths 1 Application(s) Topical daily  chlorhexidine 4% Liquid 1 Application(s) Topical <User Schedule>  midazolam Infusion 0.02 mG/kG/Hr IV Continuous <Continuous>  midazolam Injectable 2 milliGRAM(s) IV Push once    PMHX/PSHX:  Gout  Type II diabetes mellitus  HTN (hypertension)  CAD (coronary artery disease)  Cirrhosis  S/P cholecystectomy    Family history:    Denies family history of colon cancer/polyps, stomach cancer/polyps, pancreatic cancer/masses, liver cancer/disease, ovarian cancer and endometrial cancer.    Social History:   Tob: Denies  EtOH: Denies  Illicit Drugs: Denies    ROS: Unable to obtain as patient is intubated and sedated    PHYSICAL EXAM:     GENERAL: Intubated, sedated  HEENT:  ETT in place, + scleral icterus  NECK: No thyroid lesions  LYMPH: Normal cervical and supraclavicular nodes  CHEST:  Ventilator associated breath sounds  HEART:  Regular rate and rhythm  ABDOMEN:  Soft, non-tender, non-distended, normoactive bowel sounds  EXTREMITIES: No cyanosis, clubbing, trace edema  SKIN:  No rash/erythema  NEURO: Intubated, sedated  PSYCH: unable to assess    Vital Signs:  Vital Signs Last 24 Hrs  T(C): 36.4 (18 May 2019 12:31), Max: 39.4 (17 May 2019 16:14)  T(F): 97.6 (18 May 2019 12:31), Max: 102.9 (17 May 2019 16:14)  HR: 108 (18 May 2019 14:40) (66 - 113)  BP: 112/84 (18 May 2019 13:00) (54/31 - 116/55)  BP(mean): 90 (18 May 2019 13:00) (36 - 90)  RR: 21 (18 May 2019 13:30) (11 - 25)  SpO2: 100% (18 May 2019 14:40) (88% - 100%)    LABS:                        10.9   22.27 )-----------( 119      ( 18 May 2019 11:24 )             34.9     Mean Cell Volume: 95.6 fl (-19 @ 11:24)        147<H>  |  119<H>  |  33<H>  ----------------------------<  138<H>  4.1   |  13<L>  |  2.90<H>    Ca    6.7<L>      18 May 2019 11:24  Phos  3.0       Mg     1.9         TPro  5.7<L>  /  Alb  2.2<L>  /  TBili  3.9<H>  /  DBili  x   /  AST  333<H>  /  ALT  273<H>  /  AlkPhos  258<H>  18    LIVER FUNCTIONS - ( 18 May 2019 11:24 )  Alb: 2.2 g/dL / Pro: 5.7 gm/dL / ALK PHOS: 258 U/L / ALT: 273 U/L / AST: 333 U/L / GGT: x           PT/INR - ( 18 May 2019 11:24 )   PT: 22.1 sec;   INR: 1.93 ratio      PTT - ( 18 May 2019 11:24 )  PTT:92.6 sec  Urinalysis Basic - ( 18 May 2019 00:33 )    Color: Yellow / Appearance: Slightly Turbid / S.015 / pH: x  Gluc: x / Ketone: Trace  / Bili: Moderate / Urobili: 4 mg/dL   Blood: x / Protein: 100 mg/dL / Nitrite: Negative   Leuk Esterase: Trace / RBC: 3-5 /HPF / WBC 11-25   Sq Epi: x / Non Sq Epi: Moderate / Bacteria: Many    Amylase Serum--      Lipase [id number]       Ammonia--               10.9   22.27 )-----------( 119      ( 18 May 2019 11:24 )             34.9                         11.0   25.06 )-----------( 141      ( 18 May 2019 03:25 )             34.9                         10.6   9.31  )-----------( 152      ( 17 May 2019 22:04 )             34.6                         11.1   2.71  )-----------( 163      ( 17 May 2019 17:01 )             33.5     Imaging:    < from: US Abdomen Complete (19 @ 12:29) >    EXAM:  US ABDOMINAL COMPLETE                          PROCEDURE DATE:  2019      INTERPRETATION:  CLINICAL INFORMATION: Increased LFTs    COMPARISON: None available.    TECHNIQUE: Sonography of the abdomen.     FINDINGS:    Liver: Slightly heterogeneous in echotexture.    Bile ducts: There is intrahepatic biliary ductal dilatation within the   left hepatic lobe. Proximal, bile duct measures 13 mm. Distal CBD is not   well-visualized    Gallbladder: Not visualized.        Pancreas: Not visualized due to overlying bowel gas.    Spleen: 8.2 cm. Within normal limits.    Right kidney: 9.9 cm. No hydronephrosis. 2.5 cm parapelvic cyst.    Left kidney: 10.2 cm.  No hydronephrosis.    Ascites: None.    Aorta and IVC: Visualized portionsare within normal limits.    Right pleural effusion partially visualized.    IMPRESSION:     Heterogeneous liver with mild biliary ductal dilatation within the left   hepatic lobe. Dilatation of the visualized proximal common bile duct.   Distal CBD is not well-visualized. MRI/MRCP may be obtained for further   evaluation.    XUAN WALKER M.D., ATTENDING RADIOLOGIST  This document has been electronically signed. May 18 2019 12:48PM      < end of copied text >

## 2019-05-18 NOTE — CONSULT NOTE ADULT - SUBJECTIVE AND OBJECTIVE BOX
[facility] DIVISION OF KIDNEY DISEASES AND HYPERTENSION -- [phone]  -- INITIAL CONSULT NOTE  --------------------------------------------------------------------------------  HPI: 63-year-old male with medical history of CAD s/p 9 stents, DM, HTN, GERD, prior cholecystectomy,  diverticulosis, biliary stricture s/p ERCP with sphincteromy and stent 4/2019 who presents as transfer from [facility] where he was admitted with septic shock. Nephrology consulted for SLOAN on CKD and anuria. Pt. was seen with son at bedside. Son provided history. Pt initially presented with nausea, vomiting, diarrhea 1 day prior to admission. Found to be in septic shock due to E Coli Bacteremia was intubated for acute hypoxic respiratory failure and started on pressor support. He received initially vancomycin, zosyn, micafungin, merepenem and gentamicin. Also pt. developed NSTEMI and was started on heparin gtt with dobutamine due to concern for new LV dysfunction. Pt. with know history of CKD 3 secondary to DM and HTN, follows as outpatient with Dr Lawler. On lab review of [facility] SCr elevated at 4.05 on 4/18/19.           PAST HISTORY  --------------------------------------------------------------------------------  PAST MEDICAL & SURGICAL HISTORY:  Type II diabetes mellitus  HTN (hypertension)  CAD (coronary artery disease)  S/P cholecystectomy    FAMILY HISTORY:    PAST SOCIAL HISTORY:    ALLERGIES & MEDICATIONS  --------------------------------------------------------------------------------  Allergies    Cipro (Rash)  Plavix (Rash)    Intolerances      Standing Inpatient Medications  chlorhexidine 4% Liquid 1 Application(s) Topical <User Schedule>  CRRT Treatment    <Continuous>  DOBUTamine Infusion 5 MICROgram(s)/kG/Min IV Continuous <Continuous>  fentaNYL   Infusion 4 MICROgram(s)/kG/Hr IV Continuous <Continuous>  heparin  Infusion. 800 Unit(s)/Hr IV Continuous <Continuous>  meropenem  IVPB 1000 milliGRAM(s) IV Intermittent every 12 hours  midazolam Infusion 0.02 mG/kG/Hr IV Continuous <Continuous>  Norepinephrine 64 milliGRAM(s),Sodium Chloride 0.9% 500 milliLiter(s) 64 milliGRAM(s) IV Continuous <Continuous>  pantoprazole    Tablet 40 milliGRAM(s) Oral before breakfast  phenylephrine    Infusion 2.6 MICROgram(s)/kG/Min IV Continuous <Continuous>  PrismaSATE Dialysate BGK 4 / 2.5 5000 milliLiter(s) CRRT <Continuous>  PrismaSOL Filtration BGK 0 / 2.5 5000 milliLiter(s) CRRT <Continuous>  PrismaSOL Filtration BGK 0 / 2.5 5000 milliLiter(s) CRRT <Continuous>  vasopressin Infusion 0.04 Unit(s)/Min IV Continuous <Continuous>    PRN Inpatient Medications  heparin  Injectable 8000 Unit(s) IV Push every 6 hours PRN  heparin  Injectable 4000 Unit(s) IV Push every 6 hours PRN      REVIEW OF SYSTEMS  --------------------------------------------------------------------------------  Gen: No fevers/chills  Skin: No rashes  Head/Eyes/Ears: Normal hearing,   Respiratory: No dyspnea, cough  CV: No chest pain  GI: No abdominal pain, diarrhea  : No dysuria, hematuria  MSK: No  edema  Heme: No easy bruising or bleeding  Psych: No significant depression    All other systems were reviewed and are negative, except as noted.    VITALS/PHYSICAL EXAM  --------------------------------------------------------------------------------  T(C): 37.8 (05-18-19 @ 16:00), Max: 39.2 (05-17-19 @ 22:20)  HR: 103 (05-18-19 @ 19:22) (11 - 113)  BP: 110/66 (05-18-19 @ 19:00) (54/31 - 145/69)  RR: 26 (05-18-19 @ 19:00) (11 - 26)  SpO2: 99% (05-18-19 @ 19:22) (88% - 100%)  Wt(kg): --  Height (cm): 170.18 (05-17-19 @ 16:14)  Weight (kg): 98.2 (05-18-19 @ 15:27)  BMI (kg/m2): 33.9 (05-18-19 @ 15:27)  BSA (m2): 2.09 (05-18-19 @ 15:27)      05-18-19 @ 07:01  -  05-18-19 @ 20:03  --------------------------------------------------------  IN: 541.1 mL / OUT: 50 mL / NET: 491.1 mL      Physical Exam:  	Gen: NAD  	HEENT: MMM  	Pulm: CTA B/L  	CV: S1S2  	Abd: Soft, +BS   	Ext: No LE edema B/L  	Neuro: Awake  	Skin: Warm and dry  	Vascular access:    LABS/STUDIES  --------------------------------------------------------------------------------              11.0   22.39 >-----------<  110      [05-18-19 @ 16:51]              35.1     147  |  119  |  33  ----------------------------<  138      [05-18-19 @ 11:24]  4.1   |  13  |  2.90        Ca     6.7     [05-18-19 @ 11:24]      Mg     1.9     [05-18-19 @ 03:25]      Phos  3.0     [05-18-19 @ 03:25]    TPro  5.7  /  Alb  2.2  /  TBili  3.9  /  DBili  x   /  AST  333  /  ALT  273  /  AlkPhos  258  [05-18-19 @ 11:24]    PT/INR: PT 24.6 , INR 2.11       [05-18-19 @ 16:51]  PTT: 147.3      [05-18-19 @ 16:51]    Troponin 12.700      [05-18-19 @ 11:24]  CK 1020      [05-18-19 @ 11:24]    Creatinine Trend:  SCr 2.90 [05-18 @ 11:24]  SCr 2.62 [05-18 @ 03:25]  SCr 2.52 [05-17 @ 22:04]  SCr 2.29 [05-17 @ 17:01]  SCr 3.56 [04-20 @ 15:01]    Urinalysis - [05-18-19 @ 00:33]      Color Yellow / Appearance Slightly Turbid / SG 1.015 / pH 5.0      Gluc Negative / Ketone Trace  / Bili Moderate / Urobili 4       Blood Large / Protein 100 / Leuk Est Trace / Nitrite Negative      RBC 3-5 / WBC 11-25 / Hyaline 0-2 / Gran 0-2 / Sq Epi  / Non Sq Epi Moderate / Bacteria Many      HbA1c 6.5      [04-20-19 @ 06:47]    HCV 0.12, Nonreact      [04-20-19 @ 06:47] [facility] DIVISION OF KIDNEY DISEASES AND HYPERTENSION -- [phone]  -- INITIAL CONSULT NOTE  --------------------------------------------------------------------------------  HPI: 63-year-old male with medical history of CAD s/p 9 stents, DM, HTN, GERD, prior cholecystectomy,  diverticulosis, biliary stricture s/p ERCP with sphincteromy and stent 4/2019 who presents as transfer from [facility] where he was admitted with septic shock. Nephrology consulted for SLOAN on CKD and anuria. Pt. was seen with son at bedside. Son provided history. Pt initially presented with nausea, vomiting, diarrhea 1 day prior to admission. Found to be in septic shock due to E Coli Bacteremia was intubated for acute hypoxic respiratory failure and started on pressor support. He received initially vancomycin, zosyn, micafungin, merepenem and gentamicin. Also pt. developed NSTEMI and was started on heparin gtt with dobutamine due to concern for new LV dysfunction. Pt. with known history of CKD 3 secondary to DM and HTN, follows as outpatient with Dr Lawler. As per pt's son SCr baseline 2.5-2.6 on March/2019 however state decrease to 1.9 on April/2019. On lab review of [facility] SCr elevated at 4.05 on 4/18/19 and decrease at 3.71 on 4/20/19. SCr elevated at 1.97 on 5/17/19. SCr elevated at 2.29 on 5/17/19 and further increase at 2.90 today (5/18/19). Pt. intubated on pressor support, unable to provide ROS.    PAST HISTORY  --------------------------------------------------------------------------------  PAST MEDICAL & SURGICAL HISTORY:  Type II diabetes mellitus  HTN (hypertension)  CAD (coronary artery disease)  S/P cholecystectomy    FAMILY HISTORY:  HTN    PAST SOCIAL HISTORY:  Former smoker    ALLERGIES & MEDICATIONS  --------------------------------------------------------------------------------  Allergies    Cipro (Rash)  Plavix (Rash)    Intolerances      Standing Inpatient Medications  chlorhexidine 4% Liquid 1 Application(s) Topical <User Schedule>  CRRT Treatment    <Continuous>  DOBUTamine Infusion 5 MICROgram(s)/kG/Min IV Continuous <Continuous>  fentaNYL   Infusion 4 MICROgram(s)/kG/Hr IV Continuous <Continuous>  heparin  Infusion. 800 Unit(s)/Hr IV Continuous <Continuous>  meropenem  IVPB 1000 milliGRAM(s) IV Intermittent every 12 hours  midazolam Infusion 0.02 mG/kG/Hr IV Continuous <Continuous>  Norepinephrine 64 milliGRAM(s),Sodium Chloride 0.9% 500 milliLiter(s) 64 milliGRAM(s) IV Continuous <Continuous>  pantoprazole    Tablet 40 milliGRAM(s) Oral before breakfast  phenylephrine    Infusion 2.6 MICROgram(s)/kG/Min IV Continuous <Continuous>  PrismaSATE Dialysate BGK 4 / 2.5 5000 milliLiter(s) CRRT <Continuous>  PrismaSOL Filtration BGK 0 / 2.5 5000 milliLiter(s) CRRT <Continuous>  PrismaSOL Filtration BGK 0 / 2.5 5000 milliLiter(s) CRRT <Continuous>  vasopressin Infusion 0.04 Unit(s)/Min IV Continuous <Continuous>    PRN Inpatient Medications  heparin  Injectable 8000 Unit(s) IV Push every 6 hours PRN  heparin  Injectable 4000 Unit(s) IV Push every 6 hours PRN      REVIEW OF SYSTEMS  --------------------------------------------------------------------------------  Unable to obtain    VITALS/PHYSICAL EXAM  --------------------------------------------------------------------------------  T(C): 37.8 (05-18-19 @ 16:00), Max: 39.2 (05-17-19 @ 22:20)  HR: 103 (05-18-19 @ 19:22) (11 - 113)  BP: 110/66 (05-18-19 @ 19:00) (54/31 - 145/69)  RR: 26 (05-18-19 @ 19:00) (11 - 26)  SpO2: 99% (05-18-19 @ 19:22) (88% - 100%)  Wt(kg): --  Height (cm): 170.18 (05-17-19 @ 16:14)  Weight (kg): 98.2 (05-18-19 @ 15:27)  BMI (kg/m2): 33.9 (05-18-19 @ 15:27)  BSA (m2): 2.09 (05-18-19 @ 15:27)      05-18-19 @ 07:01  -  05-18-19 @ 20:03  --------------------------------------------------------  IN: 541.1 mL / OUT: 50 mL / NET: 491.1 mL      Physical Exam:  	Gen: Intubated, sedated  	HEENT: ETT  	Pulm: coarse breathe souns B/L  	CV: S1S2  	Abd: Soft, +BS   	Ext: 1 pitting LE edema B/L  	Neuro: Sedated  	Skin: Warm and dry    LABS/STUDIES  --------------------------------------------------------------------------------              11.0   22.39 >-----------<  110      [05-18-19 @ 16:51]              35.1     147  |  119  |  33  ----------------------------<  138      [05-18-19 @ 11:24]  4.1   |  13  |  2.90        Ca     6.7     [05-18-19 @ 11:24]      Mg     1.9     [05-18-19 @ 03:25]      Phos  3.0     [05-18-19 @ 03:25]    TPro  5.7  /  Alb  2.2  /  TBili  3.9  /  DBili  x   /  AST  333  /  ALT  273  /  AlkPhos  258  [05-18-19 @ 11:24]    PT/INR: PT 24.6 , INR 2.11       [05-18-19 @ 16:51]  PTT: 147.3      [05-18-19 @ 16:51]    Troponin 12.700      [05-18-19 @ 11:24]  CK 1020      [05-18-19 @ 11:24]    Creatinine Trend:  SCr 2.90 [05-18 @ 11:24]  SCr 2.62 [05-18 @ 03:25]  SCr 2.52 [05-17 @ 22:04]  SCr 2.29 [05-17 @ 17:01]  SCr 3.56 [04-20 @ 15:01]    Urinalysis - [05-18-19 @ 00:33]      Color Yellow / Appearance Slightly Turbid / SG 1.015 / pH 5.0      Gluc Negative / Ketone Trace  / Bili Moderate / Urobili 4       Blood Large / Protein 100 / Leuk Est Trace / Nitrite Negative      RBC 3-5 / WBC 11-25 / Hyaline 0-2 / Gran 0-2 / Sq Epi  / Non Sq Epi Moderate / Bacteria Many [facility] DIVISION OF KIDNEY DISEASES AND HYPERTENSION -- [phone]  -- INITIAL CONSULT NOTE  --------------------------------------------------------------------------------  HPI: 63-year-old male with medical history of CAD s/p 9 stents, DM, HTN, GERD, prior cholecystectomy,  diverticulosis, biliary stricture s/p ERCP with sphincteromy and stent 4/2019 who presents as transfer from [facility] where he was admitted with septic shock. Nephrology consulted for SLOAN on CKD and anuria. Pt. was seen with son at bedside. Son provided history. Pt initially presented with nausea, vomiting, diarrhea 1 day prior to admission. Found to be in septic shock due to E Coli Bacteremia was intubated for acute hypoxic respiratory failure and started on pressor support. He received initially vancomycin, zosyn, micafungin, merepenem and gentamicin. Also pt. developed NSTEMI and was started on heparin gtt with dobutamine due to concern for new LV dysfunction. Pt. with known history of CKD 3 secondary to DM and HTN, follows as outpatient with Dr Lawler. As per pt's son SCr baseline 2.5-2.6 on March/2019 however state decrease to 1.9 on April/2019. On lab review of [facility] SCr elevated at 4.05 on 4/18/19 and decrease at 3.71 on 4/20/19. SCr elevated at 1.97 on 5/17/19. SCr elevated at 2.29 on 5/17/19 and further increase at 2.90 today (5/18/19). Pt. intubated on pressor support, unable to provide ROS.    PAST HISTORY  --------------------------------------------------------------------------------  PAST MEDICAL & SURGICAL HISTORY:  Type II diabetes mellitus  HTN (hypertension)  CAD (coronary artery disease)  S/P cholecystectomy    FAMILY HISTORY:  HTN    PAST SOCIAL HISTORY:  Former smoker    ALLERGIES & MEDICATIONS  --------------------------------------------------------------------------------  Allergies    Cipro (Rash)  Plavix (Rash)    Intolerances      Standing Inpatient Medications  chlorhexidine 4% Liquid 1 Application(s) Topical <User Schedule>  CRRT Treatment    <Continuous>  DOBUTamine Infusion 5 MICROgram(s)/kG/Min IV Continuous <Continuous>  fentaNYL   Infusion 4 MICROgram(s)/kG/Hr IV Continuous <Continuous>  heparin  Infusion. 800 Unit(s)/Hr IV Continuous <Continuous>  meropenem  IVPB 1000 milliGRAM(s) IV Intermittent every 12 hours  midazolam Infusion 0.02 mG/kG/Hr IV Continuous <Continuous>  Norepinephrine 64 milliGRAM(s),Sodium Chloride 0.9% 500 milliLiter(s) 64 milliGRAM(s) IV Continuous <Continuous>  pantoprazole    Tablet 40 milliGRAM(s) Oral before breakfast  phenylephrine    Infusion 2.6 MICROgram(s)/kG/Min IV Continuous <Continuous>  PrismaSATE Dialysate BGK 4 / 2.5 5000 milliLiter(s) CRRT <Continuous>  PrismaSOL Filtration BGK 0 / 2.5 5000 milliLiter(s) CRRT <Continuous>  PrismaSOL Filtration BGK 0 / 2.5 5000 milliLiter(s) CRRT <Continuous>  vasopressin Infusion 0.04 Unit(s)/Min IV Continuous <Continuous>    PRN Inpatient Medications  heparin  Injectable 8000 Unit(s) IV Push every 6 hours PRN  heparin  Injectable 4000 Unit(s) IV Push every 6 hours PRN      REVIEW OF SYSTEMS  --------------------------------------------------------------------------------  Unable to obtain    VITALS/PHYSICAL EXAM  --------------------------------------------------------------------------------  T(C): 37.8 (05-18-19 @ 16:00), Max: 39.2 (05-17-19 @ 22:20)  HR: 103 (05-18-19 @ 19:22) (11 - 113)  BP: 110/66 (05-18-19 @ 19:00) (54/31 - 145/69)  RR: 26 (05-18-19 @ 19:00) (11 - 26)  SpO2: 99% (05-18-19 @ 19:22) (88% - 100%)  Wt(kg): --  Height (cm): 170.18 (05-17-19 @ 16:14)  Weight (kg): 98.2 (05-18-19 @ 15:27)  BMI (kg/m2): 33.9 (05-18-19 @ 15:27)  BSA (m2): 2.09 (05-18-19 @ 15:27)      05-18-19 @ 07:01  -  05-18-19 @ 20:03  --------------------------------------------------------  IN: 541.1 mL / OUT: 50 mL / NET: 491.1 mL      Physical Exam:  	Gen: Intubated, sedated  	HEENT: ETT  	Pulm: coarse breathe sounds B/L  	CV: S1S2  	Abd: Soft, +BS   	Ext: 1+ pitting LE edema B/L  	Neuro: Sedated  	Skin: Warm and dry    LABS/STUDIES  --------------------------------------------------------------------------------              11.0   22.39 >-----------<  110      [05-18-19 @ 16:51]              35.1     147  |  119  |  33  ----------------------------<  138      [05-18-19 @ 11:24]  4.1   |  13  |  2.90        Ca     6.7     [05-18-19 @ 11:24]      Mg     1.9     [05-18-19 @ 03:25]      Phos  3.0     [05-18-19 @ 03:25]    TPro  5.7  /  Alb  2.2  /  TBili  3.9  /  DBili  x   /  AST  333  /  ALT  273  /  AlkPhos  258  [05-18-19 @ 11:24]    PT/INR: PT 24.6 , INR 2.11       [05-18-19 @ 16:51]  PTT: 147.3      [05-18-19 @ 16:51]    Troponin 12.700      [05-18-19 @ 11:24]  CK 1020      [05-18-19 @ 11:24]    Creatinine Trend:  SCr 2.90 [05-18 @ 11:24]  SCr 2.62 [05-18 @ 03:25]  SCr 2.52 [05-17 @ 22:04]  SCr 2.29 [05-17 @ 17:01]  SCr 3.56 [04-20 @ 15:01]    Urinalysis - [05-18-19 @ 00:33]      Color Yellow / Appearance Slightly Turbid / SG 1.015 / pH 5.0      Gluc Negative / Ketone Trace  / Bili Moderate / Urobili 4       Blood Large / Protein 100 / Leuk Est Trace / Nitrite Negative      RBC 3-5 / WBC 11-25 / Hyaline 0-2 / Gran 0-2 / Sq Epi  / Non Sq Epi Moderate / Bacteria Many [facility] DIVISION OF KIDNEY DISEASES AND HYPERTENSION -- [phone]  -- INITIAL CONSULT NOTE  --------------------------------------------------------------------------------  HPI: 63-year-old male with medical history of CAD s/p 9 stents, DM, HTN, GERD, prior cholecystectomy,  diverticulosis, biliary stricture s/p ERCP with sphincteromy and stent 4/2019 who [facility]. Nephrology consulted for SLOAN on CKD and anuria. Pt. was seen with son at bedside. Son provided history. Pt initially presented with nausea, vomiting, diarrhea 1 day prior to admission. Found to be in septic shock due to E Coli Bacteremia was intubated for acute hypoxic respiratory failure and started on pressor support. He received initially vancomycin, zosyn, micafungin, merepenem and gentamicin. Also pt. developed NSTEMI and was started on heparin gtt with dobutamine due to concern for new LV dysfunction. Pt. with known history of CKD 3 secondary to DM and HTN, follows as outpatient with Dr Hodgson. As per pt's son SCr baseline 2.5-2.6 on March/2019 however state decrease to 1.9 on April/2019. On lab review of [facility] SCr elevated at 4.05 on 4/18/19 and decrease at 3.71 on 4/20/19. SCr elevated at 1.97 on 5/17/19. SCr elevated at 2.29 on 5/17/19 and further increase at 2.90 today (5/18/19). Pt. intubated on pressor support, unable to provide ROS.    PAST HISTORY  --------------------------------------------------------------------------------  PAST MEDICAL & SURGICAL HISTORY:  Type II diabetes mellitus  HTN (hypertension)  CAD (coronary artery disease)  S/P cholecystectomy    FAMILY HISTORY:  HTN    PAST SOCIAL HISTORY:  Former smoker    ALLERGIES & MEDICATIONS  --------------------------------------------------------------------------------  Allergies    Cipro (Rash)  Plavix (Rash)    Intolerances      Standing Inpatient Medications  chlorhexidine 4% Liquid 1 Application(s) Topical <User Schedule>  CRRT Treatment    <Continuous>  DOBUTamine Infusion 5 MICROgram(s)/kG/Min IV Continuous <Continuous>  fentaNYL   Infusion 4 MICROgram(s)/kG/Hr IV Continuous <Continuous>  heparin  Infusion. 800 Unit(s)/Hr IV Continuous <Continuous>  meropenem  IVPB 1000 milliGRAM(s) IV Intermittent every 12 hours  midazolam Infusion 0.02 mG/kG/Hr IV Continuous <Continuous>  Norepinephrine 64 milliGRAM(s),Sodium Chloride 0.9% 500 milliLiter(s) 64 milliGRAM(s) IV Continuous <Continuous>  pantoprazole    Tablet 40 milliGRAM(s) Oral before breakfast  phenylephrine    Infusion 2.6 MICROgram(s)/kG/Min IV Continuous <Continuous>  PrismaSATE Dialysate BGK 4 / 2.5 5000 milliLiter(s) CRRT <Continuous>  PrismaSOL Filtration BGK 0 / 2.5 5000 milliLiter(s) CRRT <Continuous>  PrismaSOL Filtration BGK 0 / 2.5 5000 milliLiter(s) CRRT <Continuous>  vasopressin Infusion 0.04 Unit(s)/Min IV Continuous <Continuous>    PRN Inpatient Medications  heparin  Injectable 8000 Unit(s) IV Push every 6 hours PRN  heparin  Injectable 4000 Unit(s) IV Push every 6 hours PRN      REVIEW OF SYSTEMS  --------------------------------------------------------------------------------  Unable to obtain    VITALS/PHYSICAL EXAM  --------------------------------------------------------------------------------  T(C): 37.8 (05-18-19 @ 16:00), Max: 39.2 (05-17-19 @ 22:20)  HR: 103 (05-18-19 @ 19:22) (11 - 113)  BP: 110/66 (05-18-19 @ 19:00) (54/31 - 145/69)  RR: 26 (05-18-19 @ 19:00) (11 - 26)  SpO2: 99% (05-18-19 @ 19:22) (88% - 100%)  Wt(kg): --  Height (cm): 170.18 (05-17-19 @ 16:14)  Weight (kg): 98.2 (05-18-19 @ 15:27)  BMI (kg/m2): 33.9 (05-18-19 @ 15:27)  BSA (m2): 2.09 (05-18-19 @ 15:27)      05-18-19 @ 07:01  -  05-18-19 @ 20:03  --------------------------------------------------------  IN: 541.1 mL / OUT: 50 mL / NET: 491.1 mL    Physical Exam:  	Gen: Intubated, sedated  	HEENT: ETT  	Pulm: coarse breathe sounds B/L  	CV: S1S2  	Abd: Soft, +BS   	Ext: 1+ pitting LE edema B/L  	Neuro: Sedated  	Skin: Warm and dry    LABS/STUDIES  --------------------------------------------------------------------------------              11.0   22.39 >-----------<  110      [05-18-19 @ 16:51]              35.1     147  |  119  |  33  ----------------------------<  138      [05-18-19 @ 11:24]  4.1   |  13  |  2.90        Ca     6.7     [05-18-19 @ 11:24]      Mg     1.9     [05-18-19 @ 03:25]      Phos  3.0     [05-18-19 @ 03:25]    TPro  5.7  /  Alb  2.2  /  TBili  3.9  /  DBili  x   /  AST  333  /  ALT  273  /  AlkPhos  258  [05-18-19 @ 11:24]    Troponin 12.700      [05-18-19 @ 11:24]  CK 1020      [05-18-19 @ 11:24]    Creatinine Trend:  SCr 2.90 [05-18 @ 11:24]  SCr 2.62 [05-18 @ 03:25]  SCr 2.52 [05-17 @ 22:04]  SCr 2.29 [05-17 @ 17:01]  SCr 3.56 [04-20 @ 15:01]    Urinalysis - [05-18-19 @ 00:33]      Color Yellow / Appearance Slightly Turbid / SG 1.015 / pH 5.0      Gluc Negative / Ketone Trace  / Bili Moderate / Urobili 4       Blood Large / Protein 100 / Leuk Est Trace / Nitrite Negative      RBC 3-5 / WBC 11-25 / Hyaline 0-2 / Gran 0-2 / Sq Epi  / Non Sq Epi Moderate / Bacteria Many

## 2019-05-18 NOTE — H&P ADULT - ATTENDING COMMENTS
Combination of septic shock secondary to cholangitis and cardiogenic shock in setting of cad.  MV, frequent abg with adjustment of vent, to help controlling pH, may need CVVH if acidemia if not controlled providing perfusion and treatment of infection.  Worsening of renal function with minimal urine output, and large volume of IVF, will d/w pharmacy urgent need for concentrating pressors. CVVH maybe needed.  GI evaluation for possible intervention versus IR, continue antibiotics for e.coli driven sepsis.  Continue anticoagulation for ACS, asa, pressors support, titrating dobutamine, serial ekg, and bedside TTE.

## 2019-05-18 NOTE — H&P ADULT - ASSESSMENT
Neuro - Intubated and sedated  - cont versed gtt and fentanyl gtt, hold off propofol to limit hypotensive effect  - Aox3 at baseline    CV - Septic Shock on multiple pressors due to cholangitis, c/b elevated troponins concerning for NSTEMI vs demand ischemia and heart failure  - transferred on adilia, dobutamine, levophed  - wean off adilia, cont  at 7.5 and concentrated levophed  - plan to wean off adilia,   - cont to monitor trops q6, EKG q6  - ASA 81, heparin gtt for ACS  - Cardiology consulted, f/u recs  - holding home antihypertensives while in shock  - official TTE    Pulm - hypoxic resp failure s/p intubation   - cont on AC/CMV, f/u ABG, titrate vent setting appropriately, allowing for increased RR to decrease acidosis  - b/l consolidations seen on POCUS, will try to limit extra IVF and and concentrating pressors    GI - Acute cholangitis in setting of CBD stricture and recent ERCP with stent placement. 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, path negative, pt was pending Whipple Procedure on   - GI consulted for ERCP  - continue to treat infection with Meropenem, +Ecoli bacteremia likely due to cholangitis  - monitor cmpq4  - repeat bl clx in AM      Renal - AG metabolic acidosis with NAGMA 2/2 septic shock  - cont treating infection and source control with ERCP  - Renal consulted for CVVHD    ID - acute cholangitis c/b e coli bacteremia  - cont meropenem, renally dosed  - f/u repeat bl clx in AM    Endo - DMT2  - monitor fs q6    Heme - leukocytosis, thrombocytosis, likely due to infection  - monitor labs q4  - Heparin gtt for ACS/ vte ppx      Full Code, Dr Gray,son, is primary Decision maker.

## 2019-05-18 NOTE — PROGRESS NOTE ADULT - SUBJECTIVE AND OBJECTIVE BOX
HPI:  Patient is a 62 yo male MH of CAD (9 stents), D2m on insulin, HTN, GERD, prior cholecystectomy,  diverticulosis and former 30  pack year smoke  S/P ERCP with stenting of distal CBD in 04/2019 at [facility]. brought in to ED for nausea, vomiting and diarrhea since earlier today, patient intubated down in ED, and went into shock after 5-6L of fluids, started on levophed drip and vasopressin.  Patient is accepted for transfer to [facility] MICU, however due to hemodynamic instability, patient will be admitted to icu here first for further resuscitation prior transfer. (17 May 2019 23:27)      24 hr events:      ## Labs:  CBC:                        11.0   25.06 )-----------( 141      ( 18 May 2019 03:25 )             34.9     Chem:  05-18    145  |  117<H>  |  30<H>  ----------------------------<  104<H>  3.7   |  14<L>  |  2.62<H>    Ca    6.9<L>      18 May 2019 03:25  Phos  3.0     05-18  Mg     1.9     05-18    TPro  6.1  /  Alb  2.4<L>  /  TBili  3.8<H>  /  DBili  x   /  AST  315<H>  /  ALT  281<H>  /  AlkPhos  312<H>  05-18    Coags:    culture blood:  --  05-18 @ 00:37            culture sputum:     Growth in aerobic and anaerobic bottles: Gram Negative Rods  "Due to technical problems, Proteus sp. will Not be reported as part of  the BCID panel until further notice"  ***Blood Panel PCR results on this specimen are available  approximately 3 hours after the Gram stain result.***  Gram stain, PCR, and/or culture results may not always  correspond due to difference in methodologies.  ************************************************************  This PCR assay was performed using [employer].  The following targets are tested for: Enterococcus,  vancomycin resistant enterococci, Listeria monocytogenes,  coagulase negative staphylococci, S. aureus,  methicillin resistant S. aureus, Streptococcus agalactiae  (Group B), S. pneumoniae, S. pyogenes (Group A),  Acinetobacter baumannii, Enterobacter cloacae, E. coli,  Klebsiella oxytoca, K. pneumoniae, Proteus sp.,  Serratia marcescens, Haemophilus influenzae,  Neisseria meningitidis, Pseudomonas aeruginosa, Candida  albicans, C. glabrata, C krusei, C parapsilosis,  C. tropicalis and the KPC resistance gene.           culture urine:  -- 05-18 @ 00:37        ## Imaging:    ## Medications:  gentamicin   IVPB 490 milliGRAM(s) IV Intermittent once  meropenem  IVPB 1000 milliGRAM(s) IV Intermittent every 12 hours  micafungin IVPB 100 milliGRAM(s) IV Intermittent every 24 hours    DOBUTamine Infusion 5 MICROgram(s)/kG/Min IV Continuous <Continuous>  norepinephrine Infusion 0.05 MICROgram(s)/kG/Min IV Continuous <Continuous>  phenylephrine    Infusion 1 MICROgram(s)/kG/Min IV Continuous <Continuous>      hydrocortisone sodium succinate Injectable 50 milliGRAM(s) IV Push every 6 hours  simvastatin 40 milliGRAM(s) Oral at bedtime  vasopressin Infusion 0.04 Unit(s)/Min IV Continuous <Continuous>    heparin  Infusion.  Unit(s)/Hr IV Continuous <Continuous>  heparin  Injectable 6000 Unit(s) IV Push every 6 hours PRN    pantoprazole  Injectable 40 milliGRAM(s) IV Push daily    aspirin Suppository 300 milliGRAM(s) Rectal daily  fentaNYL   Infusion. 0.5 MICROgram(s)/kG/Hr IV Continuous <Continuous>      ## Vitals:  T(C): 36.8 (05-18-19 @ 07:15), Max: 39.4 (05-17-19 @ 16:14)  HR: 105 (05-18-19 @ 10:30) (66 - 106)  BP: 54/31 (05-17-19 @ 23:00) (54/31 - 116/55)  BP(mean): 36 (05-17-19 @ 23:00) (36 - 56)  RR: 19 (05-18-19 @ 10:30) (12 - 25)  SpO2: 98% (05-18-19 @ 10:30) (88% - 100%)  Wt(kg): --  Vent: Mode: AC/ CMV (Assist Control/ Continuous Mandatory Ventilation), RR (machine): 16, RR (patient): 17, TV (machine): 460, FiO2: 50, PEEP: 10, PIP: 23  ABG: ABG - ( 18 May 2019 06:16 )  pH, Arterial: x     pH, Blood: 7.22  /  pCO2: 36    /  pO2: 152   / HCO3: 14    / Base Excess: -12.5 /  SaO2: 99                    05-17 @ 07:01  - 05-18 @ 07:00  --------------------------------------------------------  IN: 2928.8 mL / OUT: 550 mL / NET: 2378.8 mL    05-18 @ 07:01  - 05-18 @ 11:09  --------------------------------------------------------  IN: 1466.8 mL / OUT: 60 mL / NET: 1406.8 mL          ## P/E:  Gen: lying comfortably in bed in no apparent distress  Mouth:   Neck:  Lungs:   Heart:   Abd:  Ext:  Neuro:    CENTRAL LINE: [ ] YES [ ] NO  LOCATION:   DATE INSERTED:  REMOVE: [ ] YES [ ] NO      RESHMA: [ ] YES [ ] NO    DATE INSERTED:  REMOVE:  [ ] YES [ ] NO      A-LINE:  [ ] YES [ ] NO  LOCATION:   DATE INSERTED:  REMOVE:  [ ] YES [ ] NO  EXPLAIN:    GLOBAL ISSUE/BEST PRACTICE:  Analgesia:  Sedation:  HOB elevation: yes  Stress ulcer prophylaxis:  VTE prophylaxis:  Oral Care:  Glycemic control:  Nutrition:    CODE STATUS: [ ] full code  [ ] DNR  [ ] DNI  [ ] [facility]  Goals of care discussion: [ ] yes HPI:  Pt is a 64 yo FM ex pk yr smoker with h/o CAD (9 stents), HTN, GERD, diverticulosis and s/p cholecystectomy. In 4/19 pt admitted in  2 to increasing bilirubin, abdominal discomfort and painless jaundice.s/p ERCP. ERCP showed dilated CBD stricture s/p sphincterotomy and biliary stent placement. 5/17  brought  to [facility] 2 to nausea, vomiting and diarrhea Pt intubated in the ER and remained hypotensive despite 5-6L of fluids, started on Levophed and Vasopressin drips. Admitting dx septic shock +/- cardiogenic component (Dobutamine added). Today BldCx (+) GNR. Dx: 1) GNR spetic shock probably from biliary source 2) NSTEMI 3) MOF 2 to SS + NSTEMI (SLOAN, resp failure)      ## Labs:  CBC:                        11.0   25.06 )-----------( 141      ( 18 May 2019 03:25 )             34.9     Chem:  05-18    145  |  117<H>  |  30<H>  ----------------------------<  104<H>  3.7   |  14<L>  |  2.62<H>    Ca    6.9<L>      18 May 2019 03:25  Phos  3.0     05-18  Mg     1.9     05-18    TPro  6.1  /  Alb  2.4<L>  /  TBili  3.8<H>  /  DBili  x   /  AST  315<H>  /  ALT  281<H>  /  AlkPhos  312<H>  05-18    Coags:    culture blood:  --  05-18 @ 00:37            culture sputum:     Growth in aerobic and anaerobic bottles: Gram Negative Rods  "Due to technical problems, Proteus sp. will Not be reported as part of  the BCID panel until further notice"  ***Blood Panel PCR results on this specimen are available  approximately 3 hours after the Gram stain result.***  Gram stain, PCR, and/or culture results may not always  correspond due to difference in methodologies.  ************************************************************  This PCR assay was performed using [email or web address].  The following targets are tested for: Enterococcus,  vancomycin resistant enterococci, Listeria monocytogenes,  coagulase negative staphylococci, S. aureus,  methicillin resistant S. aureus, Streptococcus agalactiae  (Group B), S. pneumoniae, S. pyogenes (Group A),  Acinetobacter baumannii, Enterobacter cloacae, E. coli,  Klebsiella oxytoca, K. pneumoniae, Proteus sp.,  Serratia marcescens, Haemophilus influenzae,  Neisseria meningitidis, Pseudomonas aeruginosa, Candida  albicans, C. glabrata, C krusei, C parapsilosis,  C. tropicalis and the KPC resistance gene.           culture urine:  -- 05-18 @ 00:37        ## Imaging:    ## Medications:  gentamicin   IVPB 490 milliGRAM(s) IV Intermittent once  meropenem  IVPB 1000 milliGRAM(s) IV Intermittent every 12 hours  micafungin IVPB 100 milliGRAM(s) IV Intermittent every 24 hours    DOBUTamine Infusion 5 MICROgram(s)/kG/Min IV Continuous <Continuous>  norepinephrine Infusion 0.05 MICROgram(s)/kG/Min IV Continuous <Continuous>  phenylephrine    Infusion 1 MICROgram(s)/kG/Min IV Continuous <Continuous>      hydrocortisone sodium succinate Injectable 50 milliGRAM(s) IV Push every 6 hours  simvastatin 40 milliGRAM(s) Oral at bedtime  vasopressin Infusion 0.04 Unit(s)/Min IV Continuous <Continuous>    heparin  Infusion.  Unit(s)/Hr IV Continuous <Continuous>  heparin  Injectable 6000 Unit(s) IV Push every 6 hours PRN    pantoprazole  Injectable 40 milliGRAM(s) IV Push daily    aspirin Suppository 300 milliGRAM(s) Rectal daily  fentaNYL   Infusion. 0.5 MICROgram(s)/kG/Hr IV Continuous <Continuous>      ## Vitals:  T(C): 36.8 (05-18-19 @ 07:15), Max: 39.4 (05-17-19 @ 16:14)  HR: 105 (05-18-19 @ 10:30) (66 - 106)  BP: 54/31 (05-17-19 @ 23:00) (54/31 - 116/55)  BP(mean): 36 (05-17-19 @ 23:00) (36 - 56)  RR: 19 (05-18-19 @ 10:30) (12 - 25)  SpO2: 98% (05-18-19 @ 10:30) (88% - 100%)  Wt(kg): --  Vent: Mode: AC/ CMV (Assist Control/ Continuous Mandatory Ventilation), RR (machine): 16, RR (patient): 17, TV (machine): 460, FiO2: 50, PEEP: 10, PIP: 23  ABG: ABG - ( 18 May 2019 06:16 )  pH, Arterial: x     pH, Blood: 7.22  /  pCO2: 36    /  pO2: 152   / HCO3: 14    / Base Excess: -12.5 /  SaO2: 99                    05-17 @ 07:01  -  05-18 @ 07:00  --------------------------------------------------------  IN: 2928.8 mL / OUT: 550 mL / NET: 2378.8 mL    05-18 @ 07:01  - 05-18 @ 11:09  --------------------------------------------------------  IN: 1466.8 mL / OUT: 60 mL / NET: 1406.8 mL          ## P/E:  Gen: lying comfortably in bed in no apparent distress  Mouth: (+) ETT  Lungs: CTA  Heart: Tachy  Abd: Soft/+BS  Ext: No sign edema  Neuro: Lightly sedated    CENTRAL LINE: [x ] YES [ ] NO  LOCATION:  R IJ DATE INSERTED:  REMOVE: [ ] YES [ ] NO      JUSTICE: [ x] YES [ ] NO    DATE INSERTED:  REMOVE:  [ ] YES [ ] NO      A-LINE:  [x ] YES [ ] NO  LOCATION:   DATE INSERTED:  REMOVE:  [ ] YES [ ] NO  EXPLAIN:      CODE STATUS: [x ] full code  [ ] DNR  [ ] DNI  [ ] MOLST  Goals of care discussion: [ ] yes

## 2019-05-19 LAB
ALBUMIN SERPL ELPH-MCNC: 1.7 G/DL — LOW (ref 3.3–5)
ALBUMIN SERPL ELPH-MCNC: 1.9 G/DL — LOW (ref 3.3–5)
ALBUMIN SERPL ELPH-MCNC: 2.2 G/DL — LOW (ref 3.3–5)
ALBUMIN SERPL ELPH-MCNC: 2.3 G/DL — LOW (ref 3.3–5)
ALP SERPL-CCNC: 273 U/L — HIGH (ref 40–120)
ALP SERPL-CCNC: 291 U/L — HIGH (ref 40–120)
ALP SERPL-CCNC: 297 U/L — HIGH (ref 40–120)
ALP SERPL-CCNC: 327 U/L — HIGH (ref 40–120)
ALT FLD-CCNC: 245 U/L — HIGH (ref 4–41)
ALT FLD-CCNC: 252 U/L — HIGH (ref 4–41)
ALT FLD-CCNC: 295 U/L — HIGH (ref 4–41)
ALT FLD-CCNC: 319 U/L — HIGH (ref 4–41)
ANION GAP SERPL CALC-SCNC: 15 MMO/L — HIGH (ref 7–14)
ANION GAP SERPL CALC-SCNC: 16 MMO/L — HIGH (ref 7–14)
ANION GAP SERPL CALC-SCNC: 17 MMO/L — HIGH (ref 7–14)
ANION GAP SERPL CALC-SCNC: 17 MMO/L — HIGH (ref 7–14)
APTT BLD: 59.3 SEC — HIGH (ref 27.5–36.3)
APTT BLD: 75.9 SEC — HIGH (ref 27.5–36.3)
APTT BLD: 84.8 SEC — HIGH (ref 27.5–36.3)
APTT BLD: 91.1 SEC — HIGH (ref 27.5–36.3)
AST SERPL-CCNC: 223 U/L — HIGH (ref 4–40)
AST SERPL-CCNC: 239 U/L — HIGH (ref 4–40)
AST SERPL-CCNC: 325 U/L — HIGH (ref 4–40)
AST SERPL-CCNC: 505 U/L — HIGH (ref 4–40)
BASE EXCESS BLDA CALC-SCNC: -4.5 MMOL/L — SIGNIFICANT CHANGE UP
BASE EXCESS BLDA CALC-SCNC: -5.8 MMOL/L — SIGNIFICANT CHANGE UP
BASE EXCESS BLDA CALC-SCNC: -6.1 MMOL/L — SIGNIFICANT CHANGE UP
BASE EXCESS BLDA CALC-SCNC: -7.9 MMOL/L — SIGNIFICANT CHANGE UP
BASE EXCESS BLDA CALC-SCNC: -8.8 MMOL/L — SIGNIFICANT CHANGE UP
BASE EXCESS BLDA CALC-SCNC: -9 MMOL/L — SIGNIFICANT CHANGE UP
BASOPHILS # BLD AUTO: 0.13 K/UL — SIGNIFICANT CHANGE UP (ref 0–0.2)
BASOPHILS NFR BLD AUTO: 0.5 % — SIGNIFICANT CHANGE UP (ref 0–2)
BILIRUB SERPL-MCNC: 3.7 MG/DL — HIGH (ref 0.2–1.2)
BILIRUB SERPL-MCNC: 3.7 MG/DL — HIGH (ref 0.2–1.2)
BILIRUB SERPL-MCNC: 3.8 MG/DL — HIGH (ref 0.2–1.2)
BILIRUB SERPL-MCNC: 4 MG/DL — HIGH (ref 0.2–1.2)
BLOOD GAS ARTERIAL - FIO2: 40 — SIGNIFICANT CHANGE UP
BLOOD GAS ARTERIAL - FIO2: 40 — SIGNIFICANT CHANGE UP
BLOOD GAS ARTERIAL - FIO2: 55 — SIGNIFICANT CHANGE UP
BUN SERPL-MCNC: 23 MG/DL — SIGNIFICANT CHANGE UP (ref 7–23)
BUN SERPL-MCNC: 24 MG/DL — HIGH (ref 7–23)
BUN SERPL-MCNC: 29 MG/DL — HIGH (ref 7–23)
BUN SERPL-MCNC: 33 MG/DL — HIGH (ref 7–23)
CA-I BLDA-SCNC: 0.92 MMOL/L — LOW (ref 1.15–1.29)
CA-I BLDA-SCNC: 0.93 MMOL/L — LOW (ref 1.15–1.29)
CALCIUM SERPL-MCNC: 6.8 MG/DL — LOW (ref 8.4–10.5)
CALCIUM SERPL-MCNC: 6.9 MG/DL — LOW (ref 8.4–10.5)
CALCIUM SERPL-MCNC: 6.9 MG/DL — LOW (ref 8.4–10.5)
CALCIUM SERPL-MCNC: 7.2 MG/DL — LOW (ref 8.4–10.5)
CHLORIDE BLDA-SCNC: 110 MMOL/L — HIGH (ref 96–108)
CHLORIDE BLDA-SCNC: 113 MMOL/L — HIGH (ref 96–108)
CHLORIDE BLDA-SCNC: 114 MMOL/L — HIGH (ref 96–108)
CHLORIDE BLDA-SCNC: 116 MMOL/L — HIGH (ref 96–108)
CHLORIDE SERPL-SCNC: 103 MMOL/L — SIGNIFICANT CHANGE UP (ref 98–107)
CHLORIDE SERPL-SCNC: 105 MMOL/L — SIGNIFICANT CHANGE UP (ref 98–107)
CHLORIDE SERPL-SCNC: 108 MMOL/L — HIGH (ref 98–107)
CHLORIDE SERPL-SCNC: 112 MMOL/L — HIGH (ref 98–107)
CK MB BLD-MCNC: 126.5 NG/ML — HIGH (ref 1–6.6)
CK MB BLD-MCNC: 8.9 — HIGH (ref 0–2.5)
CK SERPL-CCNC: 1055 U/L — HIGH (ref 30–200)
CK SERPL-CCNC: 1150 U/L — HIGH (ref 30–200)
CK SERPL-CCNC: 1416 U/L — HIGH (ref 30–200)
CO2 SERPL-SCNC: 14 MMOL/L — LOW (ref 22–31)
CO2 SERPL-SCNC: 14 MMOL/L — LOW (ref 22–31)
CO2 SERPL-SCNC: 15 MMOL/L — LOW (ref 22–31)
CO2 SERPL-SCNC: 17 MMOL/L — LOW (ref 22–31)
CREAT SERPL-MCNC: 2.35 MG/DL — HIGH (ref 0.5–1.3)
CREAT SERPL-MCNC: 2.59 MG/DL — HIGH (ref 0.5–1.3)
CREAT SERPL-MCNC: 2.77 MG/DL — HIGH (ref 0.5–1.3)
CREAT SERPL-MCNC: 3.03 MG/DL — HIGH (ref 0.5–1.3)
CULTURE RESULTS: NO GROWTH — SIGNIFICANT CHANGE UP
D DIMER BLD IA.RAPID-MCNC: 3931 NG/ML — SIGNIFICANT CHANGE UP
EOSINOPHIL # BLD AUTO: 0 K/UL — SIGNIFICANT CHANGE UP (ref 0–0.5)
EOSINOPHIL NFR BLD AUTO: 0 % — SIGNIFICANT CHANGE UP (ref 0–6)
FIBRINOGEN PPP-MCNC: 776 MG/DL — HIGH (ref 350–510)
GLUCOSE BLDA-MCNC: 103 MG/DL — HIGH (ref 70–99)
GLUCOSE BLDA-MCNC: 105 MG/DL — HIGH (ref 70–99)
GLUCOSE BLDA-MCNC: 107 MG/DL — HIGH (ref 70–99)
GLUCOSE BLDA-MCNC: 143 MG/DL — HIGH (ref 70–99)
GLUCOSE BLDA-MCNC: 153 MG/DL — HIGH (ref 70–99)
GLUCOSE BLDA-MCNC: 96 MG/DL — SIGNIFICANT CHANGE UP (ref 70–99)
GLUCOSE SERPL-MCNC: 100 MG/DL — HIGH (ref 70–99)
GLUCOSE SERPL-MCNC: 156 MG/DL — HIGH (ref 70–99)
GLUCOSE SERPL-MCNC: 83 MG/DL — SIGNIFICANT CHANGE UP (ref 70–99)
GLUCOSE SERPL-MCNC: 94 MG/DL — SIGNIFICANT CHANGE UP (ref 70–99)
HCO3 BLDA-SCNC: 17 MMOL/L — LOW (ref 22–26)
HCO3 BLDA-SCNC: 18 MMOL/L — LOW (ref 22–26)
HCO3 BLDA-SCNC: 19 MMOL/L — LOW (ref 22–26)
HCO3 BLDA-SCNC: 20 MMOL/L — LOW (ref 22–26)
HCO3 BLDA-SCNC: 20 MMOL/L — LOW (ref 22–26)
HCO3 BLDA-SCNC: 21 MMOL/L — LOW (ref 22–26)
HCT VFR BLD CALC: 31.3 % — LOW (ref 39–50)
HCT VFR BLD CALC: 32.1 % — LOW (ref 39–50)
HCT VFR BLD CALC: 32.6 % — LOW (ref 39–50)
HCT VFR BLDA CALC: 30.9 % — LOW (ref 39–51)
HCT VFR BLDA CALC: 33.3 % — LOW (ref 39–51)
HCT VFR BLDA CALC: 33.4 % — LOW (ref 39–51)
HCT VFR BLDA CALC: 33.9 % — LOW (ref 39–51)
HCT VFR BLDA CALC: 34.6 % — LOW (ref 39–51)
HCT VFR BLDA CALC: 34.9 % — LOW (ref 39–51)
HGB BLD-MCNC: 10.4 G/DL — LOW (ref 13–17)
HGB BLD-MCNC: 10.8 G/DL — LOW (ref 13–17)
HGB BLD-MCNC: 11.2 G/DL — LOW (ref 13–17)
HGB BLDA-MCNC: 10 G/DL — LOW (ref 13–17)
HGB BLDA-MCNC: 10.8 G/DL — LOW (ref 13–17)
HGB BLDA-MCNC: 10.8 G/DL — LOW (ref 13–17)
HGB BLDA-MCNC: 11 G/DL — LOW (ref 13–17)
HGB BLDA-MCNC: 11.2 G/DL — LOW (ref 13–17)
HGB BLDA-MCNC: 11.3 G/DL — LOW (ref 13–17)
IMM GRANULOCYTES NFR BLD AUTO: 1.1 % — SIGNIFICANT CHANGE UP (ref 0–1.5)
INR BLD: 1.69 — HIGH (ref 0.88–1.17)
INR BLD: 1.94 — HIGH (ref 0.88–1.17)
INR BLD: 2 — HIGH (ref 0.88–1.17)
LACTATE BLDA-SCNC: 3.8 MMOL/L — HIGH (ref 0.5–2)
LACTATE BLDA-SCNC: 3.9 MMOL/L — HIGH (ref 0.5–2)
LACTATE BLDA-SCNC: 4.8 MMOL/L — CRITICAL HIGH (ref 0.5–2)
LACTATE BLDA-SCNC: 4.9 MMOL/L — CRITICAL HIGH (ref 0.5–2)
LACTATE BLDA-SCNC: 5.2 MMOL/L — CRITICAL HIGH (ref 0.5–2)
LACTATE BLDA-SCNC: 5.4 MMOL/L — CRITICAL HIGH (ref 0.5–2)
LEGIONELLA AG UR QL: NEGATIVE — SIGNIFICANT CHANGE UP
LYMPHOCYTES # BLD AUTO: 0.77 K/UL — LOW (ref 1–3.3)
LYMPHOCYTES # BLD AUTO: 2.9 % — LOW (ref 13–44)
MAGNESIUM SERPL-MCNC: 1.7 MG/DL — SIGNIFICANT CHANGE UP (ref 1.6–2.6)
MAGNESIUM SERPL-MCNC: 1.7 MG/DL — SIGNIFICANT CHANGE UP (ref 1.6–2.6)
MAGNESIUM SERPL-MCNC: 1.9 MG/DL — SIGNIFICANT CHANGE UP (ref 1.6–2.6)
MAGNESIUM SERPL-MCNC: 1.9 MG/DL — SIGNIFICANT CHANGE UP (ref 1.6–2.6)
MANUAL SMEAR VERIFICATION: SIGNIFICANT CHANGE UP
MCHC RBC-ENTMCNC: 30 PG — SIGNIFICANT CHANGE UP (ref 27–34)
MCHC RBC-ENTMCNC: 30.9 PG — SIGNIFICANT CHANGE UP (ref 27–34)
MCHC RBC-ENTMCNC: 31.3 PG — SIGNIFICANT CHANGE UP (ref 27–34)
MCHC RBC-ENTMCNC: 32.4 % — SIGNIFICANT CHANGE UP (ref 32–36)
MCHC RBC-ENTMCNC: 34.4 % — SIGNIFICANT CHANGE UP (ref 32–36)
MCHC RBC-ENTMCNC: 34.5 % — SIGNIFICANT CHANGE UP (ref 32–36)
MCV RBC AUTO: 89.8 FL — SIGNIFICANT CHANGE UP (ref 80–100)
MCV RBC AUTO: 90.7 FL — SIGNIFICANT CHANGE UP (ref 80–100)
MCV RBC AUTO: 92.5 FL — SIGNIFICANT CHANGE UP (ref 80–100)
MONOCYTES # BLD AUTO: 1.63 K/UL — HIGH (ref 0–0.9)
MONOCYTES NFR BLD AUTO: 6.2 % — SIGNIFICANT CHANGE UP (ref 2–14)
NEUTROPHILS # BLD AUTO: 23.35 K/UL — HIGH (ref 1.8–7.4)
NEUTROPHILS NFR BLD AUTO: 89.3 % — HIGH (ref 43–77)
NRBC # FLD: 0.02 K/UL — SIGNIFICANT CHANGE UP (ref 0–0)
NRBC # FLD: 0.02 K/UL — SIGNIFICANT CHANGE UP (ref 0–0)
NRBC # FLD: 0.03 K/UL — SIGNIFICANT CHANGE UP (ref 0–0)
PCO2 BLDA: 25 MMHG — LOW (ref 35–48)
PCO2 BLDA: 28 MMHG — LOW (ref 35–48)
PCO2 BLDA: 32 MMHG — LOW (ref 35–48)
PCO2 BLDA: 49 MMHG — HIGH (ref 35–48)
PH BLDA: 7.19 PH — CRITICAL LOW (ref 7.35–7.45)
PH BLDA: 7.37 PH — SIGNIFICANT CHANGE UP (ref 7.35–7.45)
PH BLDA: 7.4 PH — SIGNIFICANT CHANGE UP (ref 7.35–7.45)
PH BLDA: 7.41 PH — SIGNIFICANT CHANGE UP (ref 7.35–7.45)
PH BLDA: 7.42 PH — SIGNIFICANT CHANGE UP (ref 7.35–7.45)
PH BLDA: 7.43 PH — SIGNIFICANT CHANGE UP (ref 7.35–7.45)
PHOSPHATE SERPL-MCNC: 2.3 MG/DL — LOW (ref 2.5–4.5)
PHOSPHATE SERPL-MCNC: 2.7 MG/DL — SIGNIFICANT CHANGE UP (ref 2.5–4.5)
PHOSPHATE SERPL-MCNC: 3.5 MG/DL — SIGNIFICANT CHANGE UP (ref 2.5–4.5)
PHOSPHATE SERPL-MCNC: 4.7 MG/DL — HIGH (ref 2.5–4.5)
PLATELET # BLD AUTO: 100 K/UL — LOW (ref 150–400)
PLATELET # BLD AUTO: 89 K/UL — LOW (ref 150–400)
PLATELET # BLD AUTO: 95 K/UL — LOW (ref 150–400)
PMV BLD: 11.5 FL — SIGNIFICANT CHANGE UP (ref 7–13)
PMV BLD: 11.6 FL — SIGNIFICANT CHANGE UP (ref 7–13)
PMV BLD: 11.8 FL — SIGNIFICANT CHANGE UP (ref 7–13)
PO2 BLDA: 100 MMHG — SIGNIFICANT CHANGE UP (ref 83–108)
PO2 BLDA: 113 MMHG — HIGH (ref 83–108)
PO2 BLDA: 118 MMHG — HIGH (ref 83–108)
PO2 BLDA: 71 MMHG — LOW (ref 83–108)
PO2 BLDA: 71 MMHG — LOW (ref 83–108)
PO2 BLDA: 82 MMHG — LOW (ref 83–108)
POTASSIUM BLDA-SCNC: 3.2 MMOL/L — LOW (ref 3.4–4.5)
POTASSIUM BLDA-SCNC: 3.4 MMOL/L — SIGNIFICANT CHANGE UP (ref 3.4–4.5)
POTASSIUM BLDA-SCNC: 3.5 MMOL/L — SIGNIFICANT CHANGE UP (ref 3.4–4.5)
POTASSIUM BLDA-SCNC: 3.7 MMOL/L — SIGNIFICANT CHANGE UP (ref 3.4–4.5)
POTASSIUM BLDA-SCNC: 3.9 MMOL/L — SIGNIFICANT CHANGE UP (ref 3.4–4.5)
POTASSIUM BLDA-SCNC: 4.3 MMOL/L — SIGNIFICANT CHANGE UP (ref 3.4–4.5)
POTASSIUM SERPL-MCNC: 3.8 MMOL/L — SIGNIFICANT CHANGE UP (ref 3.5–5.3)
POTASSIUM SERPL-MCNC: 3.9 MMOL/L — SIGNIFICANT CHANGE UP (ref 3.5–5.3)
POTASSIUM SERPL-MCNC: 4.2 MMOL/L — SIGNIFICANT CHANGE UP (ref 3.5–5.3)
POTASSIUM SERPL-MCNC: 4.6 MMOL/L — SIGNIFICANT CHANGE UP (ref 3.5–5.3)
POTASSIUM SERPL-SCNC: 3.8 MMOL/L — SIGNIFICANT CHANGE UP (ref 3.5–5.3)
POTASSIUM SERPL-SCNC: 3.9 MMOL/L — SIGNIFICANT CHANGE UP (ref 3.5–5.3)
POTASSIUM SERPL-SCNC: 4.2 MMOL/L — SIGNIFICANT CHANGE UP (ref 3.5–5.3)
POTASSIUM SERPL-SCNC: 4.6 MMOL/L — SIGNIFICANT CHANGE UP (ref 3.5–5.3)
PROT SERPL-MCNC: 5.3 G/DL — LOW (ref 6–8.3)
PROT SERPL-MCNC: 5.3 G/DL — LOW (ref 6–8.3)
PROT SERPL-MCNC: 5.6 G/DL — LOW (ref 6–8.3)
PROT SERPL-MCNC: 5.6 G/DL — LOW (ref 6–8.3)
PROTHROM AB SERPL-ACNC: 19.6 SEC — HIGH (ref 9.8–13.1)
PROTHROM AB SERPL-ACNC: 22.6 SEC — HIGH (ref 9.8–13.1)
PROTHROM AB SERPL-ACNC: 22.7 SEC — HIGH (ref 9.8–13.1)
RBC # BLD: 3.45 M/UL — LOW (ref 4.2–5.8)
RBC # BLD: 3.47 M/UL — LOW (ref 4.2–5.8)
RBC # BLD: 3.63 M/UL — LOW (ref 4.2–5.8)
RBC # FLD: 15.7 % — HIGH (ref 10.3–14.5)
RBC # FLD: 15.9 % — HIGH (ref 10.3–14.5)
RBC # FLD: 15.9 % — HIGH (ref 10.3–14.5)
SAO2 % BLDA: 91.1 % — LOW (ref 95–99)
SAO2 % BLDA: 92 % — LOW (ref 95–99)
SAO2 % BLDA: 94 % — LOW (ref 95–99)
SAO2 % BLDA: 96 % — SIGNIFICANT CHANGE UP (ref 95–99)
SAO2 % BLDA: 97.7 % — SIGNIFICANT CHANGE UP (ref 95–99)
SAO2 % BLDA: 97.8 % — SIGNIFICANT CHANGE UP (ref 95–99)
SODIUM BLDA-SCNC: 131 MMOL/L — LOW (ref 136–146)
SODIUM BLDA-SCNC: 131 MMOL/L — LOW (ref 136–146)
SODIUM BLDA-SCNC: 133 MMOL/L — LOW (ref 136–146)
SODIUM BLDA-SCNC: 135 MMOL/L — LOW (ref 136–146)
SODIUM BLDA-SCNC: 135 MMOL/L — LOW (ref 136–146)
SODIUM BLDA-SCNC: 137 MMOL/L — SIGNIFICANT CHANGE UP (ref 136–146)
SODIUM SERPL-SCNC: 136 MMOL/L — SIGNIFICANT CHANGE UP (ref 135–145)
SODIUM SERPL-SCNC: 137 MMOL/L — SIGNIFICANT CHANGE UP (ref 135–145)
SODIUM SERPL-SCNC: 139 MMOL/L — SIGNIFICANT CHANGE UP (ref 135–145)
SODIUM SERPL-SCNC: 141 MMOL/L — SIGNIFICANT CHANGE UP (ref 135–145)
SPECIMEN SOURCE: SIGNIFICANT CHANGE UP
TROPONIN T, HIGH SENSITIVITY: 1527 NG/L — CRITICAL HIGH (ref ?–14)
TROPONIN T, HIGH SENSITIVITY: 1676 NG/L — CRITICAL HIGH (ref ?–14)
TROPONIN T, HIGH SENSITIVITY: 2607 NG/L — CRITICAL HIGH (ref ?–14)
WBC # BLD: 23.29 K/UL — HIGH (ref 3.8–10.5)
WBC # BLD: 24.14 K/UL — HIGH (ref 3.8–10.5)
WBC # BLD: 26.18 K/UL — HIGH (ref 3.8–10.5)
WBC # FLD AUTO: 23.29 K/UL — HIGH (ref 3.8–10.5)
WBC # FLD AUTO: 24.14 K/UL — HIGH (ref 3.8–10.5)
WBC # FLD AUTO: 26.18 K/UL — HIGH (ref 3.8–10.5)

## 2019-05-19 PROCEDURE — 99291 CRITICAL CARE FIRST HOUR: CPT

## 2019-05-19 PROCEDURE — 43274 ERCP DUCT STENT PLACEMENT: CPT

## 2019-05-19 PROCEDURE — 99255 IP/OBS CONSLTJ NEW/EST HI 80: CPT | Mod: GC

## 2019-05-19 PROCEDURE — 74176 CT ABD & PELVIS W/O CONTRAST: CPT | Mod: 26

## 2019-05-19 PROCEDURE — 99233 SBSQ HOSP IP/OBS HIGH 50: CPT

## 2019-05-19 PROCEDURE — 99233 SBSQ HOSP IP/OBS HIGH 50: CPT | Mod: GC

## 2019-05-19 RX ORDER — PROPOFOL 10 MG/ML
20 INJECTION, EMULSION INTRAVENOUS
Qty: 1000 | Refills: 0 | Status: DISCONTINUED | OUTPATIENT
Start: 2019-05-19 | End: 2019-05-19

## 2019-05-19 RX ORDER — CISATRACURIUM BESYLATE 2 MG/ML
10 INJECTION INTRAVENOUS ONCE
Refills: 0 | Status: COMPLETED | OUTPATIENT
Start: 2019-05-19 | End: 2019-05-19

## 2019-05-19 RX ORDER — NOREPINEPHRINE BITARTRATE/D5W 8 MG/250ML
0.4 PLASTIC BAG, INJECTION (ML) INTRAVENOUS
Qty: 32 | Refills: 0 | Status: DISCONTINUED | OUTPATIENT
Start: 2019-05-19 | End: 2019-05-20

## 2019-05-19 RX ORDER — ASCORBIC ACID 60 MG
1500 TABLET,CHEWABLE ORAL
Refills: 0 | Status: DISCONTINUED | OUTPATIENT
Start: 2019-05-19 | End: 2019-05-19

## 2019-05-19 RX ORDER — PROPOFOL 10 MG/ML
20 INJECTION, EMULSION INTRAVENOUS
Qty: 500 | Refills: 0 | Status: DISCONTINUED | OUTPATIENT
Start: 2019-05-19 | End: 2019-05-19

## 2019-05-19 RX ORDER — FENTANYL CITRATE 50 UG/ML
4 INJECTION INTRAVENOUS
Qty: 2500 | Refills: 0 | Status: DISCONTINUED | OUTPATIENT
Start: 2019-05-19 | End: 2019-05-23

## 2019-05-19 RX ORDER — SODIUM BICARBONATE 1 MEQ/ML
50 SYRINGE (ML) INTRAVENOUS ONCE
Refills: 0 | Status: COMPLETED | OUTPATIENT
Start: 2019-05-19 | End: 2019-05-19

## 2019-05-19 RX ORDER — THIAMINE MONONITRATE (VIT B1) 100 MG
200 TABLET ORAL
Refills: 0 | Status: DISCONTINUED | OUTPATIENT
Start: 2019-05-19 | End: 2019-05-19

## 2019-05-19 RX ORDER — FENTANYL CITRATE 50 UG/ML
4 INJECTION INTRAVENOUS
Qty: 2500 | Refills: 0 | Status: DISCONTINUED | OUTPATIENT
Start: 2019-05-19 | End: 2019-05-19

## 2019-05-19 RX ORDER — HYDROCORTISONE 20 MG
50 TABLET ORAL EVERY 6 HOURS
Refills: 0 | Status: COMPLETED | OUTPATIENT
Start: 2019-05-19 | End: 2019-05-25

## 2019-05-19 RX ORDER — HYDROCORTISONE 20 MG
50 TABLET ORAL EVERY 6 HOURS
Refills: 0 | Status: DISCONTINUED | OUTPATIENT
Start: 2019-05-19 | End: 2019-05-19

## 2019-05-19 RX ORDER — SODIUM BICARBONATE 1 MEQ/ML
0.15 SYRINGE (ML) INTRAVENOUS
Qty: 150 | Refills: 0 | Status: DISCONTINUED | OUTPATIENT
Start: 2019-05-19 | End: 2019-05-19

## 2019-05-19 RX ORDER — CISATRACURIUM BESYLATE 2 MG/ML
3 INJECTION INTRAVENOUS
Qty: 200 | Refills: 0 | Status: DISCONTINUED | OUTPATIENT
Start: 2019-05-19 | End: 2019-05-20

## 2019-05-19 RX ORDER — CISATRACURIUM BESYLATE 2 MG/ML
3 INJECTION INTRAVENOUS
Qty: 200 | Refills: 0 | Status: DISCONTINUED | OUTPATIENT
Start: 2019-05-19 | End: 2019-05-19

## 2019-05-19 RX ORDER — MIDAZOLAM HYDROCHLORIDE 1 MG/ML
0.02 INJECTION, SOLUTION INTRAMUSCULAR; INTRAVENOUS
Qty: 100 | Refills: 0 | Status: DISCONTINUED | OUTPATIENT
Start: 2019-05-19 | End: 2019-05-19

## 2019-05-19 RX ORDER — PROPOFOL 10 MG/ML
20 INJECTION, EMULSION INTRAVENOUS
Qty: 1000 | Refills: 0 | Status: DISCONTINUED | OUTPATIENT
Start: 2019-05-19 | End: 2019-05-22

## 2019-05-19 RX ORDER — THIAMINE MONONITRATE (VIT B1) 100 MG
200 TABLET ORAL
Refills: 0 | Status: COMPLETED | OUTPATIENT
Start: 2019-05-19 | End: 2019-05-22

## 2019-05-19 RX ORDER — PANTOPRAZOLE SODIUM 20 MG/1
40 TABLET, DELAYED RELEASE ORAL DAILY
Refills: 0 | Status: DISCONTINUED | OUTPATIENT
Start: 2019-05-19 | End: 2019-06-01

## 2019-05-19 RX ORDER — ASCORBIC ACID 60 MG
1500 TABLET,CHEWABLE ORAL
Refills: 0 | Status: COMPLETED | OUTPATIENT
Start: 2019-05-19 | End: 2019-05-22

## 2019-05-19 RX ADMIN — VASOPRESSIN 2.4 UNIT(S)/MIN: 20 INJECTION INTRAVENOUS at 20:50

## 2019-05-19 RX ADMIN — CISATRACURIUM BESYLATE 17.68 MICROGRAM(S)/KG/MIN: 2 INJECTION INTRAVENOUS at 06:36

## 2019-05-19 RX ADMIN — PROPOFOL 11.78 MICROGRAM(S)/KG/MIN: 10 INJECTION, EMULSION INTRAVENOUS at 06:35

## 2019-05-19 RX ADMIN — PROPOFOL 11.78 MICROGRAM(S)/KG/MIN: 10 INJECTION, EMULSION INTRAVENOUS at 20:50

## 2019-05-19 RX ADMIN — Medication 50 MILLIEQUIVALENT(S): at 15:35

## 2019-05-19 RX ADMIN — HEPARIN SODIUM 600 UNIT(S)/HR: 5000 INJECTION INTRAVENOUS; SUBCUTANEOUS at 06:36

## 2019-05-19 RX ADMIN — Medication 50 MILLIGRAM(S): at 18:23

## 2019-05-19 RX ADMIN — MEROPENEM 100 MILLIGRAM(S): 1 INJECTION INTRAVENOUS at 18:24

## 2019-05-19 RX ADMIN — Medication 36.83 MICROGRAM(S)/KG/MIN: at 20:49

## 2019-05-19 RX ADMIN — Medication 81 MILLIGRAM(S): at 11:48

## 2019-05-19 RX ADMIN — Medication 103 MILLIGRAM(S): at 16:18

## 2019-05-19 RX ADMIN — Medication 250 MILLIGRAM(S): at 00:34

## 2019-05-19 RX ADMIN — FENTANYL CITRATE 39.28 MICROGRAM(S)/KG/HR: 50 INJECTION INTRAVENOUS at 06:36

## 2019-05-19 RX ADMIN — VASOPRESSIN 2.4 UNIT(S)/MIN: 20 INJECTION INTRAVENOUS at 07:51

## 2019-05-19 RX ADMIN — Medication 100 MEQ/KG/HR: at 20:51

## 2019-05-19 RX ADMIN — CISATRACURIUM BESYLATE 10 MILLIGRAM(S): 2 INJECTION INTRAVENOUS at 01:07

## 2019-05-19 RX ADMIN — MIDAZOLAM HYDROCHLORIDE 1.96 MG/KG/HR: 1 INJECTION, SOLUTION INTRAMUSCULAR; INTRAVENOUS at 06:36

## 2019-05-19 RX ADMIN — CISATRACURIUM BESYLATE 17.68 MICROGRAM(S)/KG/MIN: 2 INJECTION INTRAVENOUS at 20:52

## 2019-05-19 RX ADMIN — CHLORHEXIDINE GLUCONATE 1 APPLICATION(S): 213 SOLUTION TOPICAL at 07:49

## 2019-05-19 RX ADMIN — PROPOFOL 11.78 MICROGRAM(S)/KG/MIN: 10 INJECTION, EMULSION INTRAVENOUS at 07:50

## 2019-05-19 RX ADMIN — Medication 103 MILLIGRAM(S): at 21:50

## 2019-05-19 RX ADMIN — HEPARIN SODIUM 600 UNIT(S)/HR: 5000 INJECTION INTRAVENOUS; SUBCUTANEOUS at 14:37

## 2019-05-19 RX ADMIN — VASOPRESSIN 2.4 UNIT(S)/MIN: 20 INJECTION INTRAVENOUS at 06:36

## 2019-05-19 RX ADMIN — CISATRACURIUM BESYLATE 17.68 MICROGRAM(S)/KG/MIN: 2 INJECTION INTRAVENOUS at 07:55

## 2019-05-19 RX ADMIN — MIDAZOLAM HYDROCHLORIDE 1.96 MG/KG/HR: 1 INJECTION, SOLUTION INTRAMUSCULAR; INTRAVENOUS at 07:50

## 2019-05-19 RX ADMIN — Medication 104 MILLIGRAM(S): at 22:30

## 2019-05-19 RX ADMIN — Medication 50 MILLIGRAM(S): at 11:48

## 2019-05-19 RX ADMIN — PANTOPRAZOLE SODIUM 40 MILLIGRAM(S): 20 TABLET, DELAYED RELEASE ORAL at 11:48

## 2019-05-19 RX ADMIN — MEROPENEM 100 MILLIGRAM(S): 1 INJECTION INTRAVENOUS at 08:31

## 2019-05-19 RX ADMIN — FENTANYL CITRATE 39.28 MICROGRAM(S)/KG/HR: 50 INJECTION INTRAVENOUS at 07:50

## 2019-05-19 RX ADMIN — FENTANYL CITRATE 39.28 MICROGRAM(S)/KG/HR: 50 INJECTION INTRAVENOUS at 20:50

## 2019-05-19 RX ADMIN — HEPARIN SODIUM 600 UNIT(S)/HR: 5000 INJECTION INTRAVENOUS; SUBCUTANEOUS at 01:29

## 2019-05-19 NOTE — CONSULT NOTE ADULT - SUBJECTIVE AND OBJECTIVE BOX
Interventional Radiology Consult Note:    This is a 62 yo M, with CAD s/p 9 stents, DM on insulin, HTN, GERD, biliary stricture s/p ERCP with sphincteromy and stent 4/2019 admitted for septic shock secondary to acute cholangitis. Pt initially presented with nausea, vomiting, diarrhea 1 day prior to admission to outside hospital. Found to be in septic shock due to E Coli Bacteremia was intubated for acute hypoxic respiratory failure and started on multiple antibiotics. He was transferred to [facility] MICU for further evaluation. Interventional radiology consulted for possible percutaneous biliary drainage.      PAST MEDICAL & SURGICAL HISTORY:  Type II diabetes mellitus  HTN (hypertension)  CAD (coronary artery disease)  S/P cholecystectomy      FAMILY HISTORY:      Allergies    Cipro (Rash)  Plavix (Rash)    Intolerances        MEDICATIONS  (STANDING):  aspirin  chewable 81 milliGRAM(s) Oral daily  chlorhexidine 4% Liquid 1 Application(s) Topical <User Schedule>  CRRT Treatment    <Continuous>  DOBUTamine Infusion 2.5 MICROgram(s)/kG/Min (7.365 mL/Hr) IV Continuous <Continuous>  fentaNYL   Infusion 4 MICROgram(s)/kG/Hr (39.28 mL/Hr) IV Continuous <Continuous>  heparin  Infusion. 600 Unit(s)/Hr (6 mL/Hr) IV Continuous <Continuous>  insulin lispro (HumaLOG) corrective regimen sliding scale   SubCutaneous every 6 hours  meropenem  IVPB 1000 milliGRAM(s) IV Intermittent every 12 hours  midazolam Infusion 0.02 mG/kG/Hr (1.964 mL/Hr) IV Continuous <Continuous>  Norepinephrine 64 milliGRAM(s),Sodium Chloride 0.9% 500 milliLiter(s) 64 milliGRAM(s) (138 mL/Hr) IV Continuous <Continuous>  pantoprazole    Tablet 40 milliGRAM(s) Oral before breakfast  PrismaSATE Dialysate BGK 4 / 2.5 5000 milliLiter(s) (1500 mL/Hr) CRRT <Continuous>  PrismaSOL Filtration BGK 0 / 2.5 5000 milliLiter(s) (1000 mL/Hr) CRRT <Continuous>  PrismaSOL Filtration BGK 0 / 2.5 5000 milliLiter(s) (200 mL/Hr) CRRT <Continuous>  propofol Infusion 20 MICROgram(s)/kG/Min (11.784 mL/Hr) IV Continuous <Continuous>  vasopressin Infusion 0.04 Unit(s)/Min (2.4 mL/Hr) IV Continuous <Continuous>    MEDICATIONS  (PRN):    Vital Signs Last 24 Hrs  T(C): 35.2 (19 May 2019 00:00), Max: 38.7 (18 May 2019 20:00)  T(F): 95.4 (19 May 2019 00:00), Max: 101.7 (18 May 2019 20:00)  HR: 93 (19 May 2019 01:00) (11 - 113)  BP: 110/66 (18 May 2019 19:00) (110/65 - 145/69)  BP(mean): 79 (18 May 2019 19:00) (78 - 96)  RR: 25 (19 May 2019 01:00) (11 - 26)  SpO2: 98% (19 May 2019 01:00) (97% - 100%)    CBC                        11.0   22.39 )-----------( 110      ( 18 May 2019 16:51 )             35.1       Chemistry  05-18    147<H>  |  119<H>  |  33<H>  ----------------------------<  138<H>  4.1   |  13<L>  |  2.90<H>    Ca    6.7<L>      18 May 2019 11:24  Phos  3.0     05-18  Mg     1.9     05-18    TPro  5.7<L>  /  Alb  2.2<L>  /  TBili  3.9<H>  /  DBili  x   /  AST  333<H>  /  ALT  273<H>  /  AlkPhos  258<H>  05-18      PT/INR - ( 18 May 2019 16:51 )   PT: 24.6 SEC;   INR: 2.11          PTT - ( 19 May 2019 00:20 )  PTT:91.1 SEC

## 2019-05-19 NOTE — PROGRESS NOTE ADULT - ATTENDING COMMENTS
Septic shock secondary to cholangitis.  Continue MV, CVVH in setting of acute on chronic renal failure with some fluid removal.  CT abd to evaluate for stent patency and position before possible GI intervention.  Continue antibiotics for e.coli driven sepsis.  Continue anticoagulation for ACS, asa, pressors support.

## 2019-05-19 NOTE — CONSULT NOTE ADULT - ASSESSMENT
62 yo M, with CAD s/p 9 stents, DM on insulin, HTN, GERD, biliary stricture s/p ERCP with sphincteromy and stent 4/2019 admitted for septic shock secondary to acute cholangitis. Abdominal ultrasound shows left-sided biliary ductal dilitation and proximal CBD dilitation. Distal CBD was not well visualized. MRI on 4/11/19 showed extensive intrahepatic and extrahepatic biliary ductal dilitation. Patient is currently intubated, sedated, and on 3 pressors. He is currently on heparin drip for presumed NSTEMI    Plan:  - Recommend cross-sectional imaging (CT or MRI) to evaluate extent of biliary ductal dilitation. No CT or MRI has been performed since biliary stent placement in April.  - Patient is at high risk of bleeding from percutaneous biliary drainage given that he is coagulopathic (INR is 2.1) and is on heparin infusion.  - Recommend ERCP by gastroenterology.    Case discussed with Dr. Peña. 62 yo M, with CAD s/p 9 stents, DM on insulin, HTN, GERD, biliary stricture s/p ERCP with sphincteromy and stent 4/2019 admitted for septic shock secondary to acute cholangitis. Abdominal ultrasound shows left-sided biliary ductal dilitation and proximal CBD dilitation. Distal CBD was not well visualized. MRI on 4/11/19 showed extensive intrahepatic and extrahepatic biliary ductal dilitation. Patient is currently intubated, sedated, and on 3 pressors. He is currently on heparin drip for presumed NSTEMI    Plan:  - Recommend cross-sectional imaging (CT or MRI) to evaluate extent of biliary ductal dilitation. No CT or MRI has been performed since biliary stent placement in April.  - Patient is at high risk of bleeding from percutaneous biliary drainage given that he is coagulopathic (INR is 2.1) and is on heparin infusion.  - Recommend GI consult for ERCP eval, patient may need advanced technique/skill set not readily available at [facility]. If ERCP is unsuccessful or contraindicated, will consider percutaneous drain when coagulopathy is corrected and patient is agreeable.    Case discussed with Dr. Peña.

## 2019-05-19 NOTE — PROGRESS NOTE ADULT - SUBJECTIVE AND OBJECTIVE BOX
Chief Complaint:  Patient is a 63y old  Male who presents with a chief complaint of Septic Shock (19 May 2019 07:54)    Interval Events:   - The patient was started on CVVH  - The patient was started on a heparin infusion per cardiology recommendations  - Dobutamine and phenylephrine were weaned off - the patient is currently on norepinephrine and vasopressin which are also being weaned  - Per IR, perc drain placement would be high risk while on heparin infusion and in setting of coagulopathy    Allergies:  Cipro (Rash)  Plavix (Rash)    Hospital Medications:  ascorbic acid IVPB 1500 milliGRAM(s) IV Intermittent <User Schedule>  aspirin  chewable 81 milliGRAM(s) Oral daily  chlorhexidine 4% Liquid 1 Application(s) Topical <User Schedule>  cisatracurium Infusion 3 MICROgram(s)/kG/Min IV Continuous <Continuous>  CRRT Treatment    <Continuous>  fentaNYL   Infusion. 4 MICROgram(s)/kG/Hr IV Continuous <Continuous>  heparin  Infusion. 600 Unit(s)/Hr IV Continuous <Continuous>  hydrocortisone sodium succinate Injectable 50 milliGRAM(s) IV Push every 6 hours  insulin lispro (HumaLOG) corrective regimen sliding scale   SubCutaneous every 6 hours  meropenem  IVPB 1000 milliGRAM(s) IV Intermittent every 12 hours  Norepinephrine 64 milliGRAM(s),Sodium Chloride 0.9% 500 milliLiter(s) 64 milliGRAM(s) IV Continuous <Continuous>  pantoprazole  Injectable 40 milliGRAM(s) IV Push daily  PrismaSATE Dialysate BGK 4 / 2.5 5000 milliLiter(s) CRRT <Continuous>  PrismaSOL Filtration BGK 0 / 2.5 5000 milliLiter(s) CRRT <Continuous>  PrismaSOL Filtration BGK 0 / 2.5 5000 milliLiter(s) CRRT <Continuous>  propofol Infusion 20 MICROgram(s)/kG/Min IV Continuous <Continuous>  thiamine IVPB 200 milliGRAM(s) IV Intermittent <User Schedule>  vasopressin Infusion 0.04 Unit(s)/Min IV Continuous <Continuous>    PMHX/PSHX:  Gout  Type II diabetes mellitus  HTN (hypertension)  CAD (coronary artery disease)  Cirrhosis  S/P cholecystectomy    ROS: Unable to obtain as patient is intubated and sedated    PHYSICAL EXAM:   Vital Signs:  Vital Signs Last 24 Hrs  T(C): 35.3 (19 May 2019 08:00), Max: 38.7 (18 May 2019 20:00)  T(F): 95.6 (19 May 2019 08:00), Max: 101.7 (18 May 2019 20:00)  HR: 65 (19 May 2019 10:00) (11 - 113)  BP: 110/66 (18 May 2019 19:00) (110/65 - 145/69)  BP(mean): 79 (18 May 2019 19:00) (78 - 96)  RR: 21 (19 May 2019 10:00) (11 - 26)  SpO2: 100% (19 May 2019 10:00) (97% - 100%)    GENERAL: Intubated, sedated  HEENT:  ETT in place, + scleral icterus  NECK: No thyroid lesions  LYMPH: Normal cervical and supraclavicular nodes  CHEST:  Ventilator associated breath sounds  HEART:  Regular rate and rhythm  ABDOMEN:  Soft, non-tender, non-distended, normoactive bowel sounds  EXTREMITIES: No cyanosis, clubbing, trace edema  SKIN:  No rash/erythema  NEURO: Intubated, sedated  PSYCH: Unable to assess    LABS:                        10.4   24.14 )-----------( 100      ( 19 May 2019 05:30 )             32.1     Mean Cell Volume: 92.5 fL (19 @ 05:30)        139  |  108<H>  |  29<H>  ----------------------------<  100<H>  3.9   |  14<L>  |  2.77<H>    Ca    6.9<L>      19 May 2019 05:30  Phos  2.7       Mg     1.7         TPro  5.3<L>  /  Alb  2.2<L>  /  TBili  3.7<H>  /  DBili  x   /  AST  239<H>  /  ALT  252<H>  /  AlkPhos  273<H>      LIVER FUNCTIONS - ( 19 May 2019 05:30 )  Alb: 2.2 g/dL / Pro: 5.3 g/dL / ALK PHOS: 273 u/L / ALT: 252 u/L / AST: 239 u/L / GGT: x           PT/INR - ( 19 May 2019 05:30 )   PT: 22.6 SEC;   INR: 1.94       PTT - ( 19 May 2019 05:30 )  PTT:84.8 SEC  Urinalysis Basic - ( 18 May 2019 00:33 )    Color: Yellow / Appearance: Slightly Turbid / S.015 / pH: x  Gluc: x / Ketone: Trace  / Bili: Moderate / Urobili: 4 mg/dL   Blood: x / Protein: 100 mg/dL / Nitrite: Negative   Leuk Esterase: Trace / RBC: 3-5 /HPF / WBC 11-25   Sq Epi: x / Non Sq Epi: Moderate / Bacteria: Many               10.4   24.14 )-----------( 100      ( 19 May 2019 05:30 )             32.1                         11.0   22.39 )-----------( 110      ( 18 May 2019 16:51 )             35.1                         10.9   22.27 )-----------( 119      ( 18 May 2019 11:24 )             34.9                         11.0   25.06 )-----------( 141      ( 18 May 2019 03:25 )             34.9                         10.5   23.01 )-----------( 108      ( 18 May 2019 00:50 )             33.0     Imaging:    No new imaging Chief Complaint:  Patient is a 63y old  Male who presents with a chief complaint of Septic Shock (19 May 2019 07:54)    Interval Events:   - The patient was started on CVVH  - The patient was started on a heparin infusion per cardiology recommendations  - Dobutamine and phenylephrine were weaned off - the patient is currently on norepinephrine and vasopressin which are also being weaned  - Per IR, perc drain placement would be high risk while on heparin infusion and in setting of coagulopathy (INR:2)    Allergies:  Cipro (Rash)  Plavix (Rash)    Hospital Medications:  ascorbic acid IVPB 1500 milliGRAM(s) IV Intermittent <User Schedule>  aspirin  chewable 81 milliGRAM(s) Oral daily  chlorhexidine 4% Liquid 1 Application(s) Topical <User Schedule>  cisatracurium Infusion 3 MICROgram(s)/kG/Min IV Continuous <Continuous>  CRRT Treatment    <Continuous>  fentaNYL   Infusion. 4 MICROgram(s)/kG/Hr IV Continuous <Continuous>  heparin  Infusion. 600 Unit(s)/Hr IV Continuous <Continuous>  hydrocortisone sodium succinate Injectable 50 milliGRAM(s) IV Push every 6 hours  insulin lispro (HumaLOG) corrective regimen sliding scale   SubCutaneous every 6 hours  meropenem  IVPB 1000 milliGRAM(s) IV Intermittent every 12 hours  Norepinephrine 64 milliGRAM(s),Sodium Chloride 0.9% 500 milliLiter(s) 64 milliGRAM(s) IV Continuous <Continuous>  pantoprazole  Injectable 40 milliGRAM(s) IV Push daily  PrismaSATE Dialysate BGK 4 / 2.5 5000 milliLiter(s) CRRT <Continuous>  PrismaSOL Filtration BGK 0 / 2.5 5000 milliLiter(s) CRRT <Continuous>  PrismaSOL Filtration BGK 0 / 2.5 5000 milliLiter(s) CRRT <Continuous>  propofol Infusion 20 MICROgram(s)/kG/Min IV Continuous <Continuous>  thiamine IVPB 200 milliGRAM(s) IV Intermittent <User Schedule>  vasopressin Infusion 0.04 Unit(s)/Min IV Continuous <Continuous>    PMHX/PSHX:  Gout  Type II diabetes mellitus  HTN (hypertension)  CAD (coronary artery disease)  Cirrhosis  S/P cholecystectomy    ROS: Unable to obtain as patient is intubated and sedated    PHYSICAL EXAM:   Vital Signs:  Vital Signs Last 24 Hrs  T(C): 35.3 (19 May 2019 08:00), Max: 38.7 (18 May 2019 20:00)  T(F): 95.6 (19 May 2019 08:00), Max: 101.7 (18 May 2019 20:00)  HR: 65 (19 May 2019 10:00) (11 - 113)  BP: 110/66 (18 May 2019 19:00) (110/65 - 145/69)  BP(mean): 79 (18 May 2019 19:00) (78 - 96)  RR: 21 (19 May 2019 10:00) (11 - 26)  SpO2: 100% (19 May 2019 10:00) (97% - 100%)    GENERAL: Intubated, sedated  HEENT:  ETT in place, + scleral icterus  NECK: No thyroid lesions  LYMPH: Normal cervical and supraclavicular nodes  CHEST:  Ventilator associated breath sounds  HEART:  Regular rate and rhythm  ABDOMEN:  Soft, non-tender, non-distended, normoactive bowel sounds  EXTREMITIES: No cyanosis, clubbing, trace edema  SKIN:  No rash/erythema  NEURO: Intubated, sedated  PSYCH: Unable to assess    LABS:                        10.4   24.14 )-----------( 100      ( 19 May 2019 05:30 )             32.1     Mean Cell Volume: 92.5 fL (19 @ 05:30)        139  |  108<H>  |  29<H>  ----------------------------<  100<H>  3.9   |  14<L>  |  2.77<H>    Ca    6.9<L>      19 May 2019 05:30  Phos  2.7       Mg     1.7         TPro  5.3<L>  /  Alb  2.2<L>  /  TBili  3.7<H>  /  DBili  x   /  AST  239<H>  /  ALT  252<H>  /  AlkPhos  273<H>      LIVER FUNCTIONS - ( 19 May 2019 05:30 )  Alb: 2.2 g/dL / Pro: 5.3 g/dL / ALK PHOS: 273 u/L / ALT: 252 u/L / AST: 239 u/L / GGT: x           PT/INR - ( 19 May 2019 05:30 )   PT: 22.6 SEC;   INR: 1.94       PTT - ( 19 May 2019 05:30 )  PTT:84.8 SEC  Urinalysis Basic - ( 18 May 2019 00:33 )    Color: Yellow / Appearance: Slightly Turbid / S.015 / pH: x  Gluc: x / Ketone: Trace  / Bili: Moderate / Urobili: 4 mg/dL   Blood: x / Protein: 100 mg/dL / Nitrite: Negative   Leuk Esterase: Trace / RBC: 3-5 /HPF / WBC 11-25   Sq Epi: x / Non Sq Epi: Moderate / Bacteria: Many               10.4   24.14 )-----------( 100      ( 19 May 2019 05:30 )             32.1                         11.0   22.39 )-----------( 110      ( 18 May 2019 16:51 )             35.1                         10.9   22.27 )-----------( 119      ( 18 May 2019 11:24 )             34.9                         11.0   25.06 )-----------( 141      ( 18 May 2019 03:25 )             34.9                         10.5   23.01 )-----------( 108      ( 18 May 2019 00:50 )             33.0     Imaging:    No new imaging

## 2019-05-19 NOTE — PROGRESS NOTE ADULT - SUBJECTIVE AND OBJECTIVE BOX
CHIEF COMPLAINT: Patient is a 63y old  Male who presents with a chief complaint of Septic Shock (19 May 2019 01:36)    Interval Events:  Pt s/p shiley placement and started on CVVH. Pt titrated off of dobutamine and phenylephrine.       REVIEW OF SYSTEMS:  Constitutional:     [ ] negative [ ] fevers [ ] chills [ ] weight loss [ ] weight gain  HEENT:                  [ ] negative [ ] dry eyes [ ] eye irritation [ ] postnasal drip [ ] nasal congestion  CV:                         [ ] negative  [ ] chest pain [ ] orthopnea [ ] palpitations [ ] murmur  Resp:                     [ ] negative [ ] cough [ ] shortness of breath [ ] dyspnea [ ] wheezing [ ] sputum [ ] hemoptysis  GI:                          [ ] negative [ ] nausea [ ] vomiting [ ] diarrhea [ ] constipation [ ] abd pain [ ] dysphagia   :                        [ ] negative [ ] dysuria [ ] nocturia [ ] hematuria [ ] increased urinary frequency  Musculoskeletal: [ ] negative [ ] back pain [ ] myalgias [ ] arthralgias [ ] fracture  Skin:                       [ ] negative [ ] rash [ ] itch  Neurological:        [ ] negative [ ] headache [ ] dizziness [ ] syncope [ ] weakness [ ] numbness  Psychiatric:           [ ] negative [ ] anxiety [ ] depression  Endocrine:            [ ] negative [ ] diabetes [ ] thyroid problem  Heme/Lymph:      [ ] negative [ ] anemia [ ] bleeding problem  Allergic/Immune: [ ] negative [ ] itchy eyes [ ] nasal discharge [ ] hives [ ] angioedema    [ ] All other systems negative  [ ] Unable to assess ROS because pt intubated and sedated.    OBJECTIVE:  ICU Vital Signs Last 24 Hrs  T(C): 36.7 (19 May 2019 04:00), Max: 38.7 (18 May 2019 20:00)  T(F): 98.1 (19 May 2019 04:00), Max: 101.7 (18 May 2019 20:00)  HR: 74 (19 May 2019 07:17) (11 - 113)  BP: 110/66 (18 May 2019 19:00) (110/65 - 145/69)  BP(mean): 79 (18 May 2019 19:00) (78 - 96)  ABP: 110/64 (19 May 2019 06:00) (53/33 - 144/61)  ABP(mean): 79 (19 May 2019 06:00) (40 - 87)  RR: 18 (19 May 2019 06:00) (11 - 26)  SpO2: 100% (19 May 2019 07:17) (97% - 100%)    Mode: AC/ CMV (Assist Control/ Continuous Mandatory Ventilation), RR (machine): 21, TV (machine): 500, FiO2: 40, PEEP: 5, MAP: 10, PIP: 22    05-18 @ 07:01  -  05-19 @ 07:00  --------------------------------------------------------  IN: 3045.3 mL / OUT: 2324 mL / NET: 721.3 mL      CAPILLARY BLOOD GLUCOSE      POCT Blood Glucose.: 99 mg/dL (19 May 2019 05:46)      PHYSICAL EXAM:  General: WN/WD NAD  HEENT: PERRLA, EOMI, moist mucous membranes  Neurology: A&Ox3, nonfocal, BESS x 4  Respiratory: CTA B/L, normal respiratory effort, no wheezes, crackles, rales  CV: RRR, S1S2, no murmurs, rubs or gallops  Abdominal: Soft, NT, ND +BS, Last BM  Extremities: No edema, + peripheral pulses  Incisions:   Tubes:    HOSPITAL MEDICATIONS:  MEDICATIONS  (STANDING):  ascorbic acid Injectable 1500 milliGRAM(s) IV Push <User Schedule>  aspirin  chewable 81 milliGRAM(s) Oral daily  chlorhexidine 4% Liquid 1 Application(s) Topical <User Schedule>  cisatracurium Infusion 3 MICROgram(s)/kG/Min (17.676 mL/Hr) IV Continuous <Continuous>  CRRT Treatment    <Continuous>  fentaNYL   Infusion. 4 MICROgram(s)/kG/Hr (39.28 mL/Hr) IV Continuous <Continuous>  heparin  Infusion. 600 Unit(s)/Hr (6 mL/Hr) IV Continuous <Continuous>  hydrocortisone sodium succinate Injectable 50 milliGRAM(s) IV Push every 6 hours  insulin lispro (HumaLOG) corrective regimen sliding scale   SubCutaneous every 6 hours  meropenem  IVPB 1000 milliGRAM(s) IV Intermittent every 12 hours  midazolam Infusion 0.02 mG/kG/Hr (1.964 mL/Hr) IV Continuous <Continuous>  Norepinephrine 64 milliGRAM(s),Sodium Chloride 0.9% 500 milliLiter(s) 64 milliGRAM(s) (138 mL/Hr) IV Continuous <Continuous>  pantoprazole    Tablet 40 milliGRAM(s) Oral before breakfast  PrismaSATE Dialysate BGK 4 / 2.5 5000 milliLiter(s) (1500 mL/Hr) CRRT <Continuous>  PrismaSOL Filtration BGK 0 / 2.5 5000 milliLiter(s) (1000 mL/Hr) CRRT <Continuous>  PrismaSOL Filtration BGK 0 / 2.5 5000 milliLiter(s) (200 mL/Hr) CRRT <Continuous>  propofol Infusion 20 MICROgram(s)/kG/Min (11.784 mL/Hr) IV Continuous <Continuous>  thiamine Injectable 200 milliGRAM(s) IV Push <User Schedule>  vasopressin Infusion 0.04 Unit(s)/Min (2.4 mL/Hr) IV Continuous <Continuous>    MEDICATIONS  (PRN):      LABS:  ( @ 05:30)                        10.4  24.14 )-----------( 100                 32.1    Neutrophils = -- (--%)  Lymphocytes = -- (--%)  Eosinophils = -- (--%)  Basophils = -- (--%)  Monocytes = -- (--%)  Bands = --%    WBC Trend: 24.14<--, 22.39<--, 22.27<--  Hb Trend: 10.4<--, 11.0<--, 10.9<--, 11.0<--, 10.5<--  Plt Trend: 100<--, 110<--, 119<--, 141<--, 108<--      139  |  108<H>  |  29<H>  ----------------------------<  100<H>  3.9   |  14<L>  |  2.77<H>    Ca    6.9<L>      19 May 2019 05:30  Phos  2.7       Mg     1.7         TPro  5.3<L>  /  Alb  2.2<L>  /  TBili  3.7<H>  /  DBili  x   /  AST  239<H>  /  ALT  252<H>  /  AlkPhos  273<H>      Creatinine Trend: 2.77<--, 3.03<--, 2.90<--, 2.62<--, 3.15<--, 2.52<--  PT/INR - ( 19 May 2019 05:30 )   PT: 22.6 SEC;   INR: 1.94          PTT - ( 19 May 2019 05:30 )  PTT:84.8 SEC  Urinalysis Basic - ( 18 May 2019 00:33 )    Color: Yellow / Appearance: Slightly Turbid / S.015 / pH: x  Gluc: x / Ketone: Trace  / Bili: Moderate / Urobili: 4 mg/dL   Blood: x / Protein: 100 mg/dL / Nitrite: Negative   Leuk Esterase: Trace / RBC: 3-5 /HPF / WBC 11-25   Sq Epi: x / Non Sq Epi: Moderate / Bacteria: Many      Arterial Blood Gas:   @ 05:30  7.42/25/113/19/97.7/-7.9  ABG lactate: 4.9  Arterial Blood Gas:   @ 02:25  7.37/28/100/18/97.8/-8.8  ABG lactate: 3.9  Arterial Blood Gas:   @ 00:20  7.19/49/71/17/91.1/-9.0  ABG lactate: 3.8  Arterial Blood Gas:   @ 16:51  7.25/31/91/15/94.9/-12.7  ABG lactate: 4.2  Arterial Blood Gas:   @ 15:49  7.22/34/79/14/93.0/-13.0  ABG lactate: 4.5  Arterial Blood Gas:   @ 11:52  --/38/201/12/99/-15.6  ABG lactate: --  Arterial Blood Gas:   @ 06:16  --/36/152/14/99/-12.5  ABG lactate: --  Arterial Blood Gas:   @ 00:50  7.21/40/87/15/94.2/-11.3  ABG lactate: 3.7  Arterial Blood Gas:   @ 21:28  --/33/72/11/89/-16.8  ABG lactate: --      CARDIAC MARKERS ( 19 May 2019 00:20 )  Trop x     / CK 1150 u/L<H> / CKMB x       CARDIAC MARKERS ( 18 May 2019 11:24 )  Trop x     / CK 1020 U/L<H> / CKMB x       CARDIAC MARKERS ( 18 May 2019 03:25 )  Trop x     /  U/L<H> / CKMB x       CARDIAC MARKERS ( 18 May 2019 00:50 )  Trop x     / CK 1149 u/L<H> / CKMB x       CARDIAC MARKERS ( 17 May 2019 22:04 )  Trop x     /  U/L / CKMB x             MICROBIOLOGY:   Blood Cx:  Urine Cx:  Sputum Cx:  Legionella:  RVP:    RADIOLOGY:  X Ray:  CT:  MRI:  Ultrasound:  [ ] Reviewed and interpreted by me    EKG: CHIEF COMPLAINT: Patient is a 63y old  Male who presents with a chief complaint of Septic Shock (19 May 2019 01:36)    Interval Events:  Pt s/p shiley placement and started on CVVH. Pt titrated off of dobutamine and phenylephrine. Pt noted to have ventilator dyssynchrony and is s/p paralysis      REVIEW OF SYSTEMS:  Constitutional:     [ ] negative [ ] fevers [ ] chills [ ] weight loss [ ] weight gain  HEENT:                  [ ] negative [ ] dry eyes [ ] eye irritation [ ] postnasal drip [ ] nasal congestion  CV:                         [ ] negative  [ ] chest pain [ ] orthopnea [ ] palpitations [ ] murmur  Resp:                     [ ] negative [ ] cough [ ] shortness of breath [ ] dyspnea [ ] wheezing [ ] sputum [ ] hemoptysis  GI:                          [ ] negative [ ] nausea [ ] vomiting [ ] diarrhea [ ] constipation [ ] abd pain [ ] dysphagia   :                        [ ] negative [ ] dysuria [ ] nocturia [ ] hematuria [ ] increased urinary frequency  Musculoskeletal: [ ] negative [ ] back pain [ ] myalgias [ ] arthralgias [ ] fracture  Skin:                       [ ] negative [ ] rash [ ] itch  Neurological:        [ ] negative [ ] headache [ ] dizziness [ ] syncope [ ] weakness [ ] numbness  Psychiatric:           [ ] negative [ ] anxiety [ ] depression  Endocrine:            [ ] negative [ ] diabetes [ ] thyroid problem  Heme/Lymph:      [ ] negative [ ] anemia [ ] bleeding problem  Allergic/Immune: [ ] negative [ ] itchy eyes [ ] nasal discharge [ ] hives [ ] angioedema    [ ] All other systems negative  [ ] Unable to assess ROS because pt intubated and sedated.    OBJECTIVE:  ICU Vital Signs Last 24 Hrs  T(C): 36.7 (19 May 2019 04:00), Max: 38.7 (18 May 2019 20:00)  T(F): 98.1 (19 May 2019 04:00), Max: 101.7 (18 May 2019 20:00)  HR: 74 (19 May 2019 07:17) (11 - 113)  BP: 110/66 (18 May 2019 19:00) (110/65 - 145/69)  BP(mean): 79 (18 May 2019 19:00) (78 - 96)  ABP: 110/64 (19 May 2019 06:00) (53/33 - 144/61)  ABP(mean): 79 (19 May 2019 06:00) (40 - 87)  RR: 18 (19 May 2019 06:00) (11 - 26)  SpO2: 100% (19 May 2019 07:17) (97% - 100%)    Mode: AC/ CMV (Assist Control/ Continuous Mandatory Ventilation), RR (machine): 21, TV (machine): 500, FiO2: 40, PEEP: 5, MAP: 10, PIP: 22    05-18 @ 07:01  -  05- @ 07:00  --------------------------------------------------------  IN: 3045.3 mL / OUT: 2324 mL / NET: 721.3 mL      CAPILLARY BLOOD GLUCOSE      POCT Blood Glucose.: 99 mg/dL (19 May 2019 05:46)      PHYSICAL EXAM:  General: intubated, sedated and paralyzed  HEENT: PERRLA, moist mucous membranes  Neurology: paralyzed and sedated  Respiratory: CTA B/L  CV: RRR, S1S2, no murmurs, rubs or gallops  Abdominal: Soft, NT, ND +BS, Last BM  Extremities: No edema, + peripheral pulses; extremities warm  Tubes: ETT, OGT, PORSCHE Gray, [facility] MEDICATIONS:  MEDICATIONS  (STANDING):  ascorbic acid Injectable 1500 milliGRAM(s) IV Push <User Schedule>  aspirin  chewable 81 milliGRAM(s) Oral daily  chlorhexidine 4% Liquid 1 Application(s) Topical <User Schedule>  cisatracurium Infusion 3 MICROgram(s)/kG/Min (17.676 mL/Hr) IV Continuous <Continuous>  CRRT Treatment    <Continuous>  fentaNYL   Infusion. 4 MICROgram(s)/kG/Hr (39.28 mL/Hr) IV Continuous <Continuous>  heparin  Infusion. 600 Unit(s)/Hr (6 mL/Hr) IV Continuous <Continuous>  hydrocortisone sodium succinate Injectable 50 milliGRAM(s) IV Push every 6 hours  insulin lispro (HumaLOG) corrective regimen sliding scale   SubCutaneous every 6 hours  meropenem  IVPB 1000 milliGRAM(s) IV Intermittent every 12 hours  midazolam Infusion 0.02 mG/kG/Hr (1.964 mL/Hr) IV Continuous <Continuous>  Norepinephrine 64 milliGRAM(s),Sodium Chloride 0.9% 500 milliLiter(s) 64 milliGRAM(s) (138 mL/Hr) IV Continuous <Continuous>  pantoprazole    Tablet 40 milliGRAM(s) Oral before breakfast  PrismaSATE Dialysate BGK 4 / 2.5 5000 milliLiter(s) (1500 mL/Hr) CRRT <Continuous>  PrismaSOL Filtration BGK 0 / 2.5 5000 milliLiter(s) (1000 mL/Hr) CRRT <Continuous>  PrismaSOL Filtration BGK 0 / 2.5 5000 milliLiter(s) (200 mL/Hr) CRRT <Continuous>  propofol Infusion 20 MICROgram(s)/kG/Min (11.784 mL/Hr) IV Continuous <Continuous>  thiamine Injectable 200 milliGRAM(s) IV Push <User Schedule>  vasopressin Infusion 0.04 Unit(s)/Min (2.4 mL/Hr) IV Continuous <Continuous>    MEDICATIONS  (PRN):      LABS:  ( @ 05:30)                        10.4  24.14 )-----------( 100                 32.1    Neutrophils = -- (--%)  Lymphocytes = -- (--%)  Eosinophils = -- (--%)  Basophils = -- (--%)  Monocytes = -- (--%)  Bands = --%    WBC Trend: 24.14<--, 22.39<--, 22.27<--  Hb Trend: 10.4<--, 11.0<--, 10.9<--, 11.0<--, 10.5<--  Plt Trend: 100<--, 110<--, 119<--, 141<--, 108<--      139  |  108<H>  |  29<H>  ----------------------------<  100<H>  3.9   |  14<L>  |  2.77<H>    Ca    6.9<L>      19 May 2019 05:30  Phos  2.7       Mg     1.7         TPro  5.3<L>  /  Alb  2.2<L>  /  TBili  3.7<H>  /  DBili  x   /  AST  239<H>  /  ALT  252<H>  /  AlkPhos  273<H>      Creatinine Trend: 2.77<--, 3.03<--, 2.90<--, 2.62<--, 3.15<--, 2.52<--  PT/INR - ( 19 May 2019 05:30 )   PT: 22.6 SEC;   INR: 1.94          PTT - ( 19 May 2019 05:30 )  PTT:84.8 SEC  Urinalysis Basic - ( 18 May 2019 00:33 )    Color: Yellow / Appearance: Slightly Turbid / S.015 / pH: x  Gluc: x / Ketone: Trace  / Bili: Moderate / Urobili: 4 mg/dL   Blood: x / Protein: 100 mg/dL / Nitrite: Negative   Leuk Esterase: Trace / RBC: 3-5 /HPF / WBC 11-25   Sq Epi: x / Non Sq Epi: Moderate / Bacteria: Many      Arterial Blood Gas:   @ 05:30  7.42/25/113/19/97.7/-7.9  ABG lactate: 4.9  Arterial Blood Gas:   @ 02:25  7.37/28/100/18/97.8/-8.8  ABG lactate: 3.9  Arterial Blood Gas:   @ 00:20  7.19/49/71/17/91.1/-9.0  ABG lactate: 3.8  Arterial Blood Gas:   @ 16:51  7.25/31/91/15/94.9/-12.7  ABG lactate: 4.2  Arterial Blood Gas:   @ 15:49  7.22/34/79/14/93.0/-13.0  ABG lactate: 4.5  Arterial Blood Gas:   @ 11:52  --/38/201/12/99/-15.6  ABG lactate: --  Arterial Blood Gas:   @ 06:16  --/36/152/14/99/-12.5  ABG lactate: --  Arterial Blood Gas:   @ 00:50  7.21/40/87/15/94.2/-11.3  ABG lactate: 3.7  Arterial Blood Gas:   @ 21:28  --/33/72/11/89/-16.8  ABG lactate: --      CARDIAC MARKERS ( 19 May 2019 00:20 )  Trop x     / CK 1150 u/L<H> / CKMB x       CARDIAC MARKERS ( 18 May 2019 11:24 )  Trop x     / CK 1020 U/L<H> / CKMB x       CARDIAC MARKERS ( 18 May 2019 03:25 )  Trop x     /  U/L<H> / CKMB x       CARDIAC MARKERS ( 18 May 2019 00:50 )  Trop x     / CK 1149 u/L<H> / CKMB x       CARDIAC MARKERS ( 17 May 2019 22:04 )  Trop x     /  U/L / CKMB x             MICROBIOLOGY:   Blood Cx: Culture - Blood (19 @ 00:37)    Gram Stain:   Growth in aerobic and anaerobic bottles: Gram Negative Rods    Specimen Source: .Blood    Culture Results:   Growth in aerobic and anaerobic bottles: Gram Negative Rods    Culture - Blood (19 @ 00:37)    -  Escherichia coli: Detec    Gram Stain:   Growth in aerobic and anaerobic bottles: Gram Negative Rods    Specimen Source: .Blood    Organism: Blood Culture PCR    Culture Results:   Growth in aerobic and anaerobic bottles: Gram Negative Rods  "Due to technical problems, Proteus sp. will Not be reported as part of  the BCID panel until further notice"  ***Blood Panel PCR results on this specimen are available  approximately 3 hours after the Gram stain result.***  Gram stain, PCR, and/or culture results may not always  correspond due to difference in methodologies.  ************************************************************  This PCR assay was performed using [email or web address].  The following targets are tested for: Enterococcus,  vancomycin resistant enterococci, Listeria monocytogenes,  coagulase negative staphylococci, S. aureus,  methicillin resistant S. aureus, Streptococcus agalactiae  (Group B), S. pneumoniae, S. pyogenes (Group A),  Acinetobacter baumannii, Enterobacter cloacae, E. coli,  Klebsiella oxytoca, K. pneumoniae, Proteus sp.,  Serratia marcescens, Haemophilus influenzae,  Neisseria meningitidis, Pseudomonas aeruginosa, Candida  albicans, C. glabrata, C krusei, C parapsilosis,  C. tropicalis and the KPC resistance gene.    Organism Identification: Blood Culture PCR    Method Type: PCR      Urine Cx: Culture - Urine (19 @ 11:57)    Specimen Source: .Urine    Culture Results:   No growth      Sputum Cx: Culture - Sputum . (19 @ 17:21)    Gram Stain:   Few polymorphonuclear leukocytes per low power field  Few Squamous epithelial cells per low power field  No organisms seen per oil power field    Specimen Source: .Sputum      RADIOLOGY:  X Ray: < from: Xray Chest 1 View- PORTABLE-Urgent (19 @ 20:18) >  EXAM:  XR CHEST PORTABLE URGENT 1V        PROCEDURE DATE:  May 18 2019         INTERPRETATION:  EXAMINATION: XR CHEST URGENT    CLINICAL INDICATION: S/P Lt IJ shiley    TECHNIQUE: Frontal radiograph of the chest was obtained.    COMPARISON: 2019.    FINDINGS:     Cardiac silhouette not well evaluated but may be enlarged. Enteric tube   tip below diaphragm. Right and left-sided IJ catheter with tips overlying   SVC. Question small left pleural effusion. No pneumothorax.    IMPRESSION:   Right and left-sided IJ catheter with tips overlying SVC. No pneumothorax.                  PATY THORNTON M.D., ATTENDING RADIOLOGIST  This document has been electronically signed. May 19 2019 12:18PM    < end of copied text >    [x] Reviewed and interpreted by me

## 2019-05-19 NOTE — PROGRESS NOTE ADULT - ASSESSMENT
Neuro - Intubated and sedated  - cont versed gtt, fentanyl gtt and propofol gtt  - s/p paralysis  - Aox3 at baseline    CV - Septic Shock on multiple pressors due to cholangitis, c/b elevated troponins concerning for NSTEMI vs demand ischemia and heart failure  - transferred on adilia, dobutamine, levophed  - c/w concentrated levophed and vaso  - trops decreasing  - ASA 81, heparin gtt for ACS  - Cardiology consulted, appreciate recs  - holding home antihypertensives while in shock  - official TTE    Pulm - hypoxic resp failure s/p intubation 5/17  - cont on AC/CMV, f/u ABG, titrate vent setting appropriately, allowing for increased RR to decrease acidosis  - b/l consolidations seen on POCUS, will try to limit extra IVF and and concentrating pressors    GI - Acute cholangitis in setting of CBD stricture and recent ERCP with stent placement. 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, path negative, pt was pending Whipple Procedure on 5/30  - GI consulted for ERCP  - continue to treat infection with Meropenem, +Ecoli bacteremia likely due to cholangitis  - monitor cmpq4  - repeat bl clx in AM      Renal - AG metabolic acidosis with NAGMA 2/2 septic shock  - cont treating infection and source control with ERCP  - renal following, CVVH per renal  - trend BMPs    ID - acute cholangitis c/b e coli bacteremia  - cont meropenem, renally dosed  - f/u repeat bl clx in AM    Endo - DMT2  - monitor fs q6    Heme - leukocytosis, thrombocytosis, likely due to infection  - monitor labs q4  - Heparin gtt for ACS/ vte ppx    Full Code, Dr Gray,son, is primary Decision maker. Neuro - Intubated, sedated and paralyzed  - cont versed gtt, fentanyl gtt and propofol gtt  - s/p paralysis to facilitate vent syncrhony  - Aox3 at baseline    CV - Septic Shock on multiple pressors due to cholangitis, c/b elevated troponins concerning for NSTEMI vs demand ischemia and heart failure  - cardiogenic shock now resolved on POCUS  - transferred on adilia, dobutamine, levophed  - c/w concentrated levophed and vaso  - trops decreasing  - ASA 81, heparin gtt for 48hrs for ACS  - Cardiology consulted, appreciate recs  - holding home antihypertensives while in shock  - official TTE pending    Pulm - hypoxic resp failure s/p intubation 5/17  - cont on AC/CMV, f/u ABG, titrate vent setting appropriately, allowing for increased RR to decrease acidosis  - b/l consolidations seen on POCUS, will try to limit extra IVF and concentrating pressors    GI - Acute cholangitis in setting of CBD stricture and recent ERCP with stent placement. 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, path negative, pt was pending Whipple Procedure on 5/30  - GI consulted for ERCP  - continue to treat infection with Meropenem, +Ecoli bacteremia likely due to cholangitis  - monitor cmpq6  - CT A/P today to eval CBD stent placement      Renal - AG metabolic acidosis with NAGMA 2/2 septic shock  - cont treating infection and source control with ERCP  - renal following, CVVH per renal  - trend BMPs    ID - acute cholangitis c/b e coli bacteremia  - cont meropenem, renally dosed  - f/u repeat bl clx in AM    Endo - DMT2  - monitor fs q6    Heme - leukocytosis, thrombocytopenia, likely due to infection  - monitor labs q6  - Heparin gtt for ACS/ vte ppx    Full Code, Dr Gray,son, is primary Decision maker.

## 2019-05-19 NOTE — PROGRESS NOTE ADULT - ASSESSMENT
Impression:    1. Concern for cholangitis: Meets criteria for severe acute cholangitis per [place] Criteria. S/P EUS/ERCP on 4/19/19 with 2 cm CBD stricture and with sphincterotomy and stent placement.   2. Septic shock with E. coli bacteremia on 3 vasopressors: Presumed biliary source. On meropenem.  3. Hypoxic respiratory failure: Currently intubated and on mechanical ventilation  4. CAD s/p PCI x 9 with hypertroponinemia: Concern for NSTEMI in setting of CAD. Cardiology evaluation pending.  Currently on dobutamine.  5. SLOAN on CKD on CVVH  6. DM  7. GERD    Recommendations:  - Case discussed with on-call advanced GI attending, Dr. Cisco Garcia - will consider ERCP pending results of CT A/P   - F/U CT A/P   - IR recommendations appreciated  - Management of septic shock / hypoxic respiratory failure per MICU team  - Continue IV antibiotics  - Rest of care per primary team    Neal Diaz MD  Gastroenterology Fellow  Pager number: [phone] / [phone] Impression:    1. Concern for cholangitis: Meets criteria for severe acute cholangitis per [place] Criteria. S/P EUS/ERCP on 4/19/19 with 2 cm CBD stricture and with sphincterotomy and stent placement.   2. Septic shock with E. coli bacteremia on 2 vasopressors: Presumed biliary source. On meropenem.  3. Hypoxic respiratory failure: Currently intubated and on mechanical ventilation  4. CAD s/p PCI x 9 with hypertroponinemia: Concern for NSTEMI in setting of CAD. Cardiology evaluation pending.  5. SLOAN on CKD on CVVH  6. DM  7. GERD    Recommendations:  - Case discussed with on-call advanced GI attending, Dr. Cisco Garcia - will consider ERCP pending results of CT A/P   - F/U CT A/P   - IR recommendations appreciated  - Management of septic shock / hypoxic respiratory failure per MICU team  - Continue IV antibiotics  - Rest of care per primary team    Neal Diaz MD  Gastroenterology Fellow  Pager number: [phone] / [phone]

## 2019-05-20 LAB
-  AMIKACIN: SIGNIFICANT CHANGE UP
-  AMPICILLIN/SULBACTAM: SIGNIFICANT CHANGE UP
-  AMPICILLIN: SIGNIFICANT CHANGE UP
-  AZTREONAM: SIGNIFICANT CHANGE UP
-  CEFAZOLIN: SIGNIFICANT CHANGE UP
-  CEFAZOLIN: SIGNIFICANT CHANGE UP
-  CEFEPIME: SIGNIFICANT CHANGE UP
-  CEFOXITIN: SIGNIFICANT CHANGE UP
-  CEFTRIAXONE: SIGNIFICANT CHANGE UP
-  CIPROFLOXACIN: SIGNIFICANT CHANGE UP
-  ERTAPENEM: SIGNIFICANT CHANGE UP
-  GENTAMICIN: SIGNIFICANT CHANGE UP
-  IMIPENEM: SIGNIFICANT CHANGE UP
-  LEVOFLOXACIN: SIGNIFICANT CHANGE UP
-  MEROPENEM: SIGNIFICANT CHANGE UP
-  PIPERACILLIN/TAZOBACTAM: SIGNIFICANT CHANGE UP
-  TOBRAMYCIN: SIGNIFICANT CHANGE UP
-  TRIMETHOPRIM/SULFAMETHOXAZOLE: SIGNIFICANT CHANGE UP
ALBUMIN SERPL ELPH-MCNC: 1.4 G/DL — LOW (ref 3.3–5)
ALBUMIN SERPL ELPH-MCNC: 1.5 G/DL — LOW (ref 3.3–5)
ALBUMIN SERPL ELPH-MCNC: 1.5 G/DL — LOW (ref 3.3–5)
ALBUMIN SERPL ELPH-MCNC: 1.9 G/DL — LOW (ref 3.3–5)
ALP SERPL-CCNC: 354 U/L — HIGH (ref 40–120)
ALP SERPL-CCNC: 361 U/L — HIGH (ref 40–120)
ALP SERPL-CCNC: 370 U/L — HIGH (ref 40–120)
ALP SERPL-CCNC: 410 U/L — HIGH (ref 40–120)
ALT FLD-CCNC: 357 U/L — HIGH (ref 4–41)
ALT FLD-CCNC: 392 U/L — HIGH (ref 4–41)
ALT FLD-CCNC: 436 U/L — HIGH (ref 4–41)
ALT FLD-CCNC: 511 U/L — HIGH (ref 4–41)
ANION GAP SERPL CALC-SCNC: 13 MMO/L — SIGNIFICANT CHANGE UP (ref 7–14)
ANION GAP SERPL CALC-SCNC: 15 MMO/L — HIGH (ref 7–14)
ANION GAP SERPL CALC-SCNC: 18 MMO/L — HIGH (ref 7–14)
ANION GAP SERPL CALC-SCNC: 18 MMO/L — HIGH (ref 7–14)
APTT BLD: 59 SEC — HIGH (ref 27.5–36.3)
APTT BLD: 61.8 SEC — HIGH (ref 27.5–36.3)
AST SERPL-CCNC: 413 U/L — HIGH (ref 4–40)
AST SERPL-CCNC: 469 U/L — HIGH (ref 4–40)
AST SERPL-CCNC: 523 U/L — HIGH (ref 4–40)
AST SERPL-CCNC: 528 U/L — HIGH (ref 4–40)
BASE EXCESS BLDA CALC-SCNC: -0.9 MMOL/L — SIGNIFICANT CHANGE UP
BASE EXCESS BLDA CALC-SCNC: -2.2 MMOL/L — SIGNIFICANT CHANGE UP
BASE EXCESS BLDA CALC-SCNC: -2.3 MMOL/L — SIGNIFICANT CHANGE UP
BASE EXCESS BLDA CALC-SCNC: -3.3 MMOL/L — SIGNIFICANT CHANGE UP
BASE EXCESS BLDA CALC-SCNC: -3.7 MMOL/L — SIGNIFICANT CHANGE UP
BASE EXCESS BLDA CALC-SCNC: -4.3 MMOL/L — SIGNIFICANT CHANGE UP
BILIRUB SERPL-MCNC: 3.4 MG/DL — HIGH (ref 0.2–1.2)
BILIRUB SERPL-MCNC: 3.7 MG/DL — HIGH (ref 0.2–1.2)
BILIRUB SERPL-MCNC: 3.8 MG/DL — HIGH (ref 0.2–1.2)
BILIRUB SERPL-MCNC: 4 MG/DL — HIGH (ref 0.2–1.2)
BLOOD GAS ARTERIAL - FIO2: 40 — SIGNIFICANT CHANGE UP
BLOOD GAS ARTERIAL - FIO2: 50 — SIGNIFICANT CHANGE UP
BUN SERPL-MCNC: 20 MG/DL — SIGNIFICANT CHANGE UP (ref 7–23)
BUN SERPL-MCNC: 20 MG/DL — SIGNIFICANT CHANGE UP (ref 7–23)
BUN SERPL-MCNC: 21 MG/DL — SIGNIFICANT CHANGE UP (ref 7–23)
BUN SERPL-MCNC: 22 MG/DL — SIGNIFICANT CHANGE UP (ref 7–23)
CA-I BLDA-SCNC: 1.06 MMOL/L — LOW (ref 1.15–1.29)
CALCIUM SERPL-MCNC: 7.2 MG/DL — LOW (ref 8.4–10.5)
CALCIUM SERPL-MCNC: 7.3 MG/DL — LOW (ref 8.4–10.5)
CALCIUM SERPL-MCNC: 7.5 MG/DL — LOW (ref 8.4–10.5)
CALCIUM SERPL-MCNC: 7.9 MG/DL — LOW (ref 8.4–10.5)
CHLORIDE BLDA-SCNC: 104 MMOL/L — SIGNIFICANT CHANGE UP (ref 96–108)
CHLORIDE BLDA-SCNC: 104 MMOL/L — SIGNIFICANT CHANGE UP (ref 96–108)
CHLORIDE BLDA-SCNC: 105 MMOL/L — SIGNIFICANT CHANGE UP (ref 96–108)
CHLORIDE SERPL-SCNC: 100 MMOL/L — SIGNIFICANT CHANGE UP (ref 98–107)
CHLORIDE SERPL-SCNC: 102 MMOL/L — SIGNIFICANT CHANGE UP (ref 98–107)
CHLORIDE SERPL-SCNC: 103 MMOL/L — SIGNIFICANT CHANGE UP (ref 98–107)
CHLORIDE SERPL-SCNC: 105 MMOL/L — SIGNIFICANT CHANGE UP (ref 98–107)
CK SERPL-CCNC: 1362 U/L — HIGH (ref 30–200)
CK SERPL-CCNC: 553 U/L — HIGH (ref 30–200)
CK SERPL-CCNC: 737 U/L — HIGH (ref 30–200)
CO2 SERPL-SCNC: 16 MMOL/L — LOW (ref 22–31)
CO2 SERPL-SCNC: 17 MMOL/L — LOW (ref 22–31)
CO2 SERPL-SCNC: 19 MMOL/L — LOW (ref 22–31)
CO2 SERPL-SCNC: 22 MMOL/L — SIGNIFICANT CHANGE UP (ref 22–31)
CREAT SERPL-MCNC: 1.81 MG/DL — HIGH (ref 0.5–1.3)
CREAT SERPL-MCNC: 1.92 MG/DL — HIGH (ref 0.5–1.3)
CREAT SERPL-MCNC: 2.14 MG/DL — HIGH (ref 0.5–1.3)
CREAT SERPL-MCNC: 2.32 MG/DL — HIGH (ref 0.5–1.3)
CULTURE RESULTS: SIGNIFICANT CHANGE UP
GLUCOSE BLDA-MCNC: 152 MG/DL — HIGH (ref 70–99)
GLUCOSE BLDA-MCNC: 154 MG/DL — HIGH (ref 70–99)
GLUCOSE BLDA-MCNC: 160 MG/DL — HIGH (ref 70–99)
GLUCOSE BLDA-MCNC: 164 MG/DL — HIGH (ref 70–99)
GLUCOSE BLDA-MCNC: 186 MG/DL — HIGH (ref 70–99)
GLUCOSE BLDA-MCNC: 197 MG/DL — HIGH (ref 70–99)
GLUCOSE SERPL-MCNC: 158 MG/DL — HIGH (ref 70–99)
GLUCOSE SERPL-MCNC: 165 MG/DL — HIGH (ref 70–99)
GLUCOSE SERPL-MCNC: 176 MG/DL — HIGH (ref 70–99)
GLUCOSE SERPL-MCNC: 192 MG/DL — HIGH (ref 70–99)
GRAM STN FLD: SIGNIFICANT CHANGE UP
HCO3 BLDA-SCNC: 22 MMOL/L — SIGNIFICANT CHANGE UP (ref 22–26)
HCO3 BLDA-SCNC: 23 MMOL/L — SIGNIFICANT CHANGE UP (ref 22–26)
HCO3 BLDA-SCNC: 23 MMOL/L — SIGNIFICANT CHANGE UP (ref 22–26)
HCO3 BLDA-SCNC: 24 MMOL/L — SIGNIFICANT CHANGE UP (ref 22–26)
HCT VFR BLD CALC: 33.7 % — LOW (ref 39–50)
HCT VFR BLD CALC: 33.7 % — LOW (ref 39–50)
HCT VFR BLD CALC: 34.1 % — LOW (ref 39–50)
HCT VFR BLD CALC: 34.3 % — LOW (ref 39–50)
HCT VFR BLDA CALC: 33 % — LOW (ref 39–51)
HCT VFR BLDA CALC: 34.7 % — LOW (ref 39–51)
HCT VFR BLDA CALC: 34.7 % — LOW (ref 39–51)
HCT VFR BLDA CALC: 34.8 % — LOW (ref 39–51)
HCT VFR BLDA CALC: 34.9 % — LOW (ref 39–51)
HCT VFR BLDA CALC: 37.3 % — LOW (ref 39–51)
HGB BLD-MCNC: 11.6 G/DL — LOW (ref 13–17)
HGB BLD-MCNC: 11.7 G/DL — LOW (ref 13–17)
HGB BLD-MCNC: 11.8 G/DL — LOW (ref 13–17)
HGB BLD-MCNC: 12.2 G/DL — LOW (ref 13–17)
HGB BLDA-MCNC: 10.7 G/DL — LOW (ref 13–17)
HGB BLDA-MCNC: 11.2 G/DL — LOW (ref 13–17)
HGB BLDA-MCNC: 11.2 G/DL — LOW (ref 13–17)
HGB BLDA-MCNC: 11.3 G/DL — LOW (ref 13–17)
HGB BLDA-MCNC: 11.3 G/DL — LOW (ref 13–17)
HGB BLDA-MCNC: 12.1 G/DL — LOW (ref 13–17)
INR BLD: 1.21 — HIGH (ref 0.88–1.17)
INR BLD: 1.31 — HIGH (ref 0.88–1.17)
INR BLD: 1.52 — HIGH (ref 0.88–1.17)
LACTATE BLDA-SCNC: 2.1 MMOL/L — HIGH (ref 0.5–2)
LACTATE BLDA-SCNC: 2.6 MMOL/L — HIGH (ref 0.5–2)
LACTATE BLDA-SCNC: 3 MMOL/L — HIGH (ref 0.5–2)
LACTATE BLDA-SCNC: 3.7 MMOL/L — HIGH (ref 0.5–2)
LACTATE BLDA-SCNC: 4.1 MMOL/L — CRITICAL HIGH (ref 0.5–2)
LG PLATELETS BLD QL AUTO: SLIGHT — SIGNIFICANT CHANGE UP
LYMPHOCYTES NFR SPEC AUTO: 2.6 % — LOW (ref 13–44)
MACROCYTES BLD QL: SLIGHT — SIGNIFICANT CHANGE UP
MAGNESIUM SERPL-MCNC: 2 MG/DL — SIGNIFICANT CHANGE UP (ref 1.6–2.6)
MAGNESIUM SERPL-MCNC: 2.1 MG/DL — SIGNIFICANT CHANGE UP (ref 1.6–2.6)
MAGNESIUM SERPL-MCNC: 2.1 MG/DL — SIGNIFICANT CHANGE UP (ref 1.6–2.6)
MAGNESIUM SERPL-MCNC: 2.3 MG/DL — SIGNIFICANT CHANGE UP (ref 1.6–2.6)
MCHC RBC-ENTMCNC: 30.5 PG — SIGNIFICANT CHANGE UP (ref 27–34)
MCHC RBC-ENTMCNC: 30.9 PG — SIGNIFICANT CHANGE UP (ref 27–34)
MCHC RBC-ENTMCNC: 31.1 PG — SIGNIFICANT CHANGE UP (ref 27–34)
MCHC RBC-ENTMCNC: 31.3 PG — SIGNIFICANT CHANGE UP (ref 27–34)
MCHC RBC-ENTMCNC: 34.4 % — SIGNIFICANT CHANGE UP (ref 32–36)
MCHC RBC-ENTMCNC: 34.6 % — SIGNIFICANT CHANGE UP (ref 32–36)
MCHC RBC-ENTMCNC: 34.7 % — SIGNIFICANT CHANGE UP (ref 32–36)
MCHC RBC-ENTMCNC: 35.6 % — SIGNIFICANT CHANGE UP (ref 32–36)
MCV RBC AUTO: 87.8 FL — SIGNIFICANT CHANGE UP (ref 80–100)
MCV RBC AUTO: 87.9 FL — SIGNIFICANT CHANGE UP (ref 80–100)
MCV RBC AUTO: 89.6 FL — SIGNIFICANT CHANGE UP (ref 80–100)
MCV RBC AUTO: 90 FL — SIGNIFICANT CHANGE UP (ref 80–100)
METHOD TYPE: SIGNIFICANT CHANGE UP
MICROCYTES BLD QL: SLIGHT — SIGNIFICANT CHANGE UP
MONOCYTES NFR BLD: 5.1 % — SIGNIFICANT CHANGE UP (ref 2–9)
NEUTROPHIL AB SER-ACNC: 71.8 % — SIGNIFICANT CHANGE UP (ref 43–77)
NEUTS BAND # BLD: 11.5 % — HIGH (ref 0–6)
NRBC # FLD: 0.03 K/UL — SIGNIFICANT CHANGE UP (ref 0–0)
NRBC # FLD: 0.04 K/UL — SIGNIFICANT CHANGE UP (ref 0–0)
NRBC # FLD: 0.05 K/UL — SIGNIFICANT CHANGE UP (ref 0–0)
NRBC # FLD: 0.08 K/UL — SIGNIFICANT CHANGE UP (ref 0–0)
ORGANISM # SPEC MICROSCOPIC CNT: SIGNIFICANT CHANGE UP
PCO2 BLDA: 28 MMHG — LOW (ref 35–48)
PCO2 BLDA: 29 MMHG — LOW (ref 35–48)
PCO2 BLDA: 35 MMHG — SIGNIFICANT CHANGE UP (ref 35–48)
PCO2 BLDA: 35 MMHG — SIGNIFICANT CHANGE UP (ref 35–48)
PH BLDA: 7.42 PH — SIGNIFICANT CHANGE UP (ref 7.35–7.45)
PH BLDA: 7.44 PH — SIGNIFICANT CHANGE UP (ref 7.35–7.45)
PH BLDA: 7.44 PH — SIGNIFICANT CHANGE UP (ref 7.35–7.45)
PH BLDA: 7.45 PH — SIGNIFICANT CHANGE UP (ref 7.35–7.45)
PH BLDA: 7.46 PH — HIGH (ref 7.35–7.45)
PH BLDA: 7.47 PH — HIGH (ref 7.35–7.45)
PHOSPHATE SERPL-MCNC: 2.5 MG/DL — SIGNIFICANT CHANGE UP (ref 2.5–4.5)
PHOSPHATE SERPL-MCNC: 2.8 MG/DL — SIGNIFICANT CHANGE UP (ref 2.5–4.5)
PHOSPHATE SERPL-MCNC: 3.1 MG/DL — SIGNIFICANT CHANGE UP (ref 2.5–4.5)
PHOSPHATE SERPL-MCNC: 3.1 MG/DL — SIGNIFICANT CHANGE UP (ref 2.5–4.5)
PLATELET # BLD AUTO: 79 K/UL — LOW (ref 150–400)
PLATELET # BLD AUTO: 84 K/UL — LOW (ref 150–400)
PLATELET # BLD AUTO: 90 K/UL — LOW (ref 150–400)
PLATELET # BLD AUTO: 93 K/UL — LOW (ref 150–400)
PLATELET COUNT - ESTIMATE: SIGNIFICANT CHANGE UP
PMV BLD: 11.1 FL — SIGNIFICANT CHANGE UP (ref 7–13)
PMV BLD: 11.5 FL — SIGNIFICANT CHANGE UP (ref 7–13)
PMV BLD: 12 FL — SIGNIFICANT CHANGE UP (ref 7–13)
PMV BLD: 12 FL — SIGNIFICANT CHANGE UP (ref 7–13)
PO2 BLDA: 100 MMHG — SIGNIFICANT CHANGE UP (ref 83–108)
PO2 BLDA: 116 MMHG — HIGH (ref 83–108)
PO2 BLDA: 119 MMHG — HIGH (ref 83–108)
PO2 BLDA: 62 MMHG — LOW (ref 83–108)
PO2 BLDA: 85 MMHG — SIGNIFICANT CHANGE UP (ref 83–108)
PO2 BLDA: 95 MMHG — SIGNIFICANT CHANGE UP (ref 83–108)
POTASSIUM BLDA-SCNC: 3.3 MMOL/L — LOW (ref 3.4–4.5)
POTASSIUM BLDA-SCNC: 3.5 MMOL/L — SIGNIFICANT CHANGE UP (ref 3.4–4.5)
POTASSIUM BLDA-SCNC: 3.5 MMOL/L — SIGNIFICANT CHANGE UP (ref 3.4–4.5)
POTASSIUM BLDA-SCNC: 3.6 MMOL/L — SIGNIFICANT CHANGE UP (ref 3.4–4.5)
POTASSIUM SERPL-MCNC: 3.7 MMOL/L — SIGNIFICANT CHANGE UP (ref 3.5–5.3)
POTASSIUM SERPL-MCNC: 3.9 MMOL/L — SIGNIFICANT CHANGE UP (ref 3.5–5.3)
POTASSIUM SERPL-MCNC: 4.2 MMOL/L — SIGNIFICANT CHANGE UP (ref 3.5–5.3)
POTASSIUM SERPL-MCNC: 4.3 MMOL/L — SIGNIFICANT CHANGE UP (ref 3.5–5.3)
POTASSIUM SERPL-SCNC: 3.7 MMOL/L — SIGNIFICANT CHANGE UP (ref 3.5–5.3)
POTASSIUM SERPL-SCNC: 3.9 MMOL/L — SIGNIFICANT CHANGE UP (ref 3.5–5.3)
POTASSIUM SERPL-SCNC: 4.2 MMOL/L — SIGNIFICANT CHANGE UP (ref 3.5–5.3)
POTASSIUM SERPL-SCNC: 4.3 MMOL/L — SIGNIFICANT CHANGE UP (ref 3.5–5.3)
PROT SERPL-MCNC: 5.9 G/DL — LOW (ref 6–8.3)
PROT SERPL-MCNC: 5.9 G/DL — LOW (ref 6–8.3)
PROT SERPL-MCNC: 6 G/DL — SIGNIFICANT CHANGE UP (ref 6–8.3)
PROT SERPL-MCNC: 6.2 G/DL — SIGNIFICANT CHANGE UP (ref 6–8.3)
PROTHROM AB SERPL-ACNC: 13.5 SEC — HIGH (ref 9.8–13.1)
PROTHROM AB SERPL-ACNC: 15 SEC — HIGH (ref 9.8–13.1)
PROTHROM AB SERPL-ACNC: 17.1 SEC — HIGH (ref 9.8–13.1)
RBC # BLD: 3.76 M/UL — LOW (ref 4.2–5.8)
RBC # BLD: 3.79 M/UL — LOW (ref 4.2–5.8)
RBC # BLD: 3.84 M/UL — LOW (ref 4.2–5.8)
RBC # BLD: 3.9 M/UL — LOW (ref 4.2–5.8)
RBC # FLD: 14.6 % — HIGH (ref 10.3–14.5)
RBC # FLD: 14.8 % — HIGH (ref 10.3–14.5)
RBC # FLD: 15.3 % — HIGH (ref 10.3–14.5)
RBC # FLD: 15.5 % — HIGH (ref 10.3–14.5)
SAO2 % BLDA: 92.7 % — LOW (ref 95–99)
SAO2 % BLDA: 93.6 % — LOW (ref 95–99)
SAO2 % BLDA: 95.2 % — SIGNIFICANT CHANGE UP (ref 95–99)
SAO2 % BLDA: 96.3 % — SIGNIFICANT CHANGE UP (ref 95–99)
SAO2 % BLDA: 97.6 % — SIGNIFICANT CHANGE UP (ref 95–99)
SAO2 % BLDA: 98.3 % — SIGNIFICANT CHANGE UP (ref 95–99)
SMUDGE CELLS # BLD: PRESENT — SIGNIFICANT CHANGE UP
SODIUM BLDA-SCNC: 133 MMOL/L — LOW (ref 136–146)
SODIUM BLDA-SCNC: 133 MMOL/L — LOW (ref 136–146)
SODIUM BLDA-SCNC: 134 MMOL/L — LOW (ref 136–146)
SODIUM BLDA-SCNC: 135 MMOL/L — LOW (ref 136–146)
SODIUM BLDA-SCNC: 135 MMOL/L — LOW (ref 136–146)
SODIUM BLDA-SCNC: 136 MMOL/L — SIGNIFICANT CHANGE UP (ref 136–146)
SODIUM SERPL-SCNC: 135 MMOL/L — SIGNIFICANT CHANGE UP (ref 135–145)
SODIUM SERPL-SCNC: 136 MMOL/L — SIGNIFICANT CHANGE UP (ref 135–145)
SODIUM SERPL-SCNC: 138 MMOL/L — SIGNIFICANT CHANGE UP (ref 135–145)
SODIUM SERPL-SCNC: 139 MMOL/L — SIGNIFICANT CHANGE UP (ref 135–145)
SPECIMEN SOURCE: SIGNIFICANT CHANGE UP
TROPONIN T, HIGH SENSITIVITY: 1459 NG/L — CRITICAL HIGH (ref ?–14)
TROPONIN T, HIGH SENSITIVITY: 1647 NG/L — CRITICAL HIGH (ref ?–14)
TROPONIN T, HIGH SENSITIVITY: 2011 NG/L — CRITICAL HIGH (ref ?–14)
TROPONIN T, HIGH SENSITIVITY: 2371 NG/L — CRITICAL HIGH (ref ?–14)
VARIANT LYMPHS # BLD: 6.4 % — SIGNIFICANT CHANGE UP
WBC # BLD: 26.29 K/UL — HIGH (ref 3.8–10.5)
WBC # BLD: 28 K/UL — HIGH (ref 3.8–10.5)
WBC # BLD: 28.63 K/UL — HIGH (ref 3.8–10.5)
WBC # BLD: 30.08 K/UL — HIGH (ref 3.8–10.5)
WBC # FLD AUTO: 26.29 K/UL — HIGH (ref 3.8–10.5)
WBC # FLD AUTO: 28 K/UL — HIGH (ref 3.8–10.5)
WBC # FLD AUTO: 28.63 K/UL — HIGH (ref 3.8–10.5)
WBC # FLD AUTO: 30.08 K/UL — HIGH (ref 3.8–10.5)

## 2019-05-20 PROCEDURE — 71045 X-RAY EXAM CHEST 1 VIEW: CPT | Mod: 26

## 2019-05-20 PROCEDURE — 99233 SBSQ HOSP IP/OBS HIGH 50: CPT | Mod: GC

## 2019-05-20 PROCEDURE — 99291 CRITICAL CARE FIRST HOUR: CPT

## 2019-05-20 PROCEDURE — 99232 SBSQ HOSP IP/OBS MODERATE 35: CPT

## 2019-05-20 RX ORDER — MEROPENEM 1 G/30ML
1000 INJECTION INTRAVENOUS EVERY 8 HOURS
Refills: 0 | Status: DISCONTINUED | OUTPATIENT
Start: 2019-05-20 | End: 2019-05-23

## 2019-05-20 RX ORDER — HEPARIN SODIUM 5000 [USP'U]/ML
10000 INJECTION INTRAVENOUS; SUBCUTANEOUS ONCE
Refills: 0 | Status: DISCONTINUED | OUTPATIENT
Start: 2019-05-20 | End: 2019-05-21

## 2019-05-20 RX ORDER — HEPARIN SODIUM 5000 [USP'U]/ML
6000 INJECTION INTRAVENOUS; SUBCUTANEOUS ONCE
Refills: 0 | Status: DISCONTINUED | OUTPATIENT
Start: 2019-05-20 | End: 2019-05-20

## 2019-05-20 RX ORDER — DOBUTAMINE HCL 250MG/20ML
5 VIAL (ML) INTRAVENOUS
Qty: 500 | Refills: 0 | Status: DISCONTINUED | OUTPATIENT
Start: 2019-05-20 | End: 2019-05-20

## 2019-05-20 RX ORDER — DOPAMINE HYDROCHLORIDE 40 MG/ML
3 INJECTION, SOLUTION, CONCENTRATE INTRAVENOUS
Qty: 400 | Refills: 0 | Status: DISCONTINUED | OUTPATIENT
Start: 2019-05-20 | End: 2019-05-26

## 2019-05-20 RX ORDER — ASCORBIC ACID 60 MG
1500 TABLET,CHEWABLE ORAL
Refills: 0 | Status: DISCONTINUED | OUTPATIENT
Start: 2019-05-20 | End: 2019-05-20

## 2019-05-20 RX ORDER — THIAMINE MONONITRATE (VIT B1) 100 MG
200 TABLET ORAL
Refills: 0 | Status: DISCONTINUED | OUTPATIENT
Start: 2019-05-20 | End: 2019-05-20

## 2019-05-20 RX ORDER — ASPIRIN/CALCIUM CARB/MAGNESIUM 324 MG
300 TABLET ORAL DAILY
Refills: 0 | Status: DISCONTINUED | OUTPATIENT
Start: 2019-05-20 | End: 2019-05-21

## 2019-05-20 RX ADMIN — Medication 50 MILLIGRAM(S): at 05:32

## 2019-05-20 RX ADMIN — Medication 103 MILLIGRAM(S): at 01:14

## 2019-05-20 RX ADMIN — HEPARIN SODIUM 10000 UNIT(S): 5000 INJECTION INTRAVENOUS; SUBCUTANEOUS at 21:57

## 2019-05-20 RX ADMIN — Medication 50 MILLIGRAM(S): at 11:34

## 2019-05-20 RX ADMIN — Medication 103 MILLIGRAM(S): at 07:56

## 2019-05-20 RX ADMIN — MEROPENEM 100 MILLIGRAM(S): 1 INJECTION INTRAVENOUS at 05:31

## 2019-05-20 RX ADMIN — FENTANYL CITRATE 39.28 MICROGRAM(S)/KG/HR: 50 INJECTION INTRAVENOUS at 07:23

## 2019-05-20 RX ADMIN — Medication 36.83 MICROGRAM(S)/KG/MIN: at 07:22

## 2019-05-20 RX ADMIN — PANTOPRAZOLE SODIUM 40 MILLIGRAM(S): 20 TABLET, DELAYED RELEASE ORAL at 11:34

## 2019-05-20 RX ADMIN — Medication 50 MILLIGRAM(S): at 23:14

## 2019-05-20 RX ADMIN — Medication 50 MILLIGRAM(S): at 17:09

## 2019-05-20 RX ADMIN — Medication 1: at 11:48

## 2019-05-20 RX ADMIN — CHLORHEXIDINE GLUCONATE 1 APPLICATION(S): 213 SOLUTION TOPICAL at 07:19

## 2019-05-20 RX ADMIN — VASOPRESSIN 2.4 UNIT(S)/MIN: 20 INJECTION INTRAVENOUS at 07:24

## 2019-05-20 RX ADMIN — Medication 1 APPLICATION(S): at 17:12

## 2019-05-20 RX ADMIN — MEROPENEM 100 MILLIGRAM(S): 1 INJECTION INTRAVENOUS at 22:24

## 2019-05-20 RX ADMIN — PROPOFOL 11.78 MICROGRAM(S)/KG/MIN: 10 INJECTION, EMULSION INTRAVENOUS at 07:23

## 2019-05-20 RX ADMIN — VASOPRESSIN 2.4 UNIT(S)/MIN: 20 INJECTION INTRAVENOUS at 19:22

## 2019-05-20 RX ADMIN — FENTANYL CITRATE 39.28 MICROGRAM(S)/KG/HR: 50 INJECTION INTRAVENOUS at 19:22

## 2019-05-20 RX ADMIN — DOPAMINE HYDROCHLORIDE 27.62 MICROGRAM(S)/KG/MIN: 40 INJECTION, SOLUTION, CONCENTRATE INTRAVENOUS at 19:22

## 2019-05-20 RX ADMIN — Medication 300 MILLIGRAM(S): at 13:46

## 2019-05-20 RX ADMIN — DOPAMINE HYDROCHLORIDE 27.62 MICROGRAM(S)/KG/MIN: 40 INJECTION, SOLUTION, CONCENTRATE INTRAVENOUS at 13:46

## 2019-05-20 RX ADMIN — Medication 2: at 17:10

## 2019-05-20 RX ADMIN — MEROPENEM 100 MILLIGRAM(S): 1 INJECTION INTRAVENOUS at 13:13

## 2019-05-20 RX ADMIN — Medication 1: at 00:36

## 2019-05-20 RX ADMIN — Medication 2: at 23:14

## 2019-05-20 RX ADMIN — Medication 50 MILLIGRAM(S): at 00:36

## 2019-05-20 RX ADMIN — PROPOFOL 11.78 MICROGRAM(S)/KG/MIN: 10 INJECTION, EMULSION INTRAVENOUS at 19:21

## 2019-05-20 RX ADMIN — Medication 1: at 05:32

## 2019-05-20 RX ADMIN — HEPARIN SODIUM 600 UNIT(S)/HR: 5000 INJECTION INTRAVENOUS; SUBCUTANEOUS at 06:50

## 2019-05-20 RX ADMIN — Medication 103 MILLIGRAM(S): at 13:46

## 2019-05-20 RX ADMIN — Medication 103 MILLIGRAM(S): at 19:21

## 2019-05-20 RX ADMIN — Medication 104 MILLIGRAM(S): at 19:21

## 2019-05-20 RX ADMIN — Medication 104 MILLIGRAM(S): at 07:19

## 2019-05-20 NOTE — PROGRESS NOTE ADULT - SUBJECTIVE AND OBJECTIVE BOX
Chief Complaint: Jaundice    Interval Events:  - The patient underwent ERCP with stent placement on 19 (Please see full report in Results section of [facility])     Allergies:  Cipro (Rash)  Plavix (Rash)    Hospital Medications:  ascorbic acid IVPB 1500 milliGRAM(s) IV Intermittent <User Schedule>  aspirin  chewable 81 milliGRAM(s) Oral daily  chlorhexidine 4% Liquid 1 Application(s) Topical <User Schedule>  cisatracurium Infusion 3 MICROgram(s)/kG/Min IV Continuous <Continuous>  CRRT Treatment    <Continuous>  fentaNYL   Infusion. 4 MICROgram(s)/kG/Hr IV Continuous <Continuous>  heparin  Infusion. 600 Unit(s)/Hr IV Continuous <Continuous>  hydrocortisone sodium succinate Injectable 50 milliGRAM(s) IV Push every 6 hours  insulin lispro (HumaLOG) corrective regimen sliding scale   SubCutaneous every 6 hours  meropenem  IVPB 1000 milliGRAM(s) IV Intermittent every 12 hours  norepinephrine Infusion 0.4 MICROgram(s)/kG/Min IV Continuous <Continuous>  pantoprazole  Injectable 40 milliGRAM(s) IV Push daily  PrismaSATE Dialysate BGK 4 / 2.5 5000 milliLiter(s) CRRT <Continuous>  PrismaSOL Filtration BGK 0 / 2.5 5000 milliLiter(s) CRRT <Continuous>  PrismaSOL Filtration BGK 0 / 2.5 5000 milliLiter(s) CRRT <Continuous>  propofol Infusion 20 MICROgram(s)/kG/Min IV Continuous <Continuous>  thiamine IVPB 200 milliGRAM(s) IV Intermittent <User Schedule>  vasopressin Infusion 0.04 Unit(s)/Min IV Continuous <Continuous>    PMHX/PSHX:  Gout  Type II diabetes mellitus  HTN (hypertension)  CAD (coronary artery disease)  Cirrhosis  S/P cholecystectomy    ROS: Unable to obtain as patient is intubated and sedated    PHYSICAL EXAM:   Vital Signs:  Vital Signs Last 24 Hrs  T(C): 35.4 (20 May 2019 04:00), Max: 35.6 (19 May 2019 16:00)  T(F): 95.7 (20 May 2019 04:00), Max: 96 (19 May 2019 16:00)  HR: 67 (20 May 2019 07:09) (50 - 85)  BP: 94/63 (19 May 2019 15:42) (72/59 - 94/63)  BP(mean): 73 (19 May 2019 15:42) (66 - 73)  RR: 21 (20 May 2019 06:15) (20 - 23)  SpO2: 98% (20 May 2019 07:09) (93% - 100%)  Daily     Daily Weight in k (20 May 2019 06:00)    GENERAL: Intubated, sedated  HEENT:  ETT in place, + scleral icterus  NECK: No thyroid lesions  LYMPH: Normal cervical and supraclavicular nodes  CHEST:  Ventilator associated breath sounds  HEART:  Regular rate and rhythm  ABDOMEN:  Soft, non-tender, non-distended, normoactive bowel sounds  EXTREMITIES: No cyanosis, clubbing, trace edema  SKIN:  No rash/erythema  NEURO: Intubated, sedated  PSYCH: Unable to assess      LABS:                        11.8   28.63 )-----------( 93       ( 20 May 2019 05:20 )             34.1     Mean Cell Volume: 90.0 fL (- @ 05:20)    20    138  |  103  |  20  ----------------------------<  158<H>  4.2   |  17<L>  |  2.14<H>    Ca    7.3<L>      20 May 2019 05:20  Phos  3.1     20  Mg     2.1     20    TPro  5.9<L>  /  Alb  1.4<L>  /  TBili  3.8<H>  /  DBili  x   /  AST  523<H>  /  ALT  392<H>  /  AlkPhos  361<H>  20    LIVER FUNCTIONS - ( 20 May 2019 05:20 )  Alb: 1.4 g/dL / Pro: 5.9 g/dL / ALK PHOS: 361 u/L / ALT: 392 u/L / AST: 523 u/L / GGT: x           PT/INR - ( 20 May 2019 05:20 )   PT: 17.1 SEC;   INR: 1.52          PTT - ( 20 May 2019 05:20 )  PTT:59.0 SEC               11.8   28.63 )-----------( 93       ( 20 May 2019 05:20 )             34.1                         11.6   26.29 )-----------( 90       ( 20 May 2019 00:15 )             33.7                         11.2   26.18 )-----------( 89       ( 19 May 2019 20:03 )             32.6                         10.8   23.29 )-----------( 95       ( 19 May 2019 11:40 )             31.3                         10.4   24.14 )-----------( 100      ( 19 May 2019 05:30 )             32.1     Imaging:    No new imaging    Procedures:    < from: ERCP (19 @ 18:31) >    [facility]  _______________________________________________________________________________  Patient Name: Siva Gray              Procedure Date: 2019 6:31 PM  MRN: [id number]                     Account Number: [id number]  YOB: 1955             Admit Type: Inpatient  Room: OR                              Gender: Male  Attending MD: CISCO ANDERSON MD          _______________________________________________________________________________     Procedure:           ERCP  Indications:         For therapy of ascending cholangitis  Providers:           CISCO ANDERSON MD, WOJCIECH ISBELL MD (Fellow)  Referring MD:        DANIELLE GRULLON MD  Medicines:           General Anesthesia, on meropenem  Complications:       No immediate complications.  Procedure:           Pre-Anesthesia Assessment:                       - Prior to the procedure, a History and Physical was                        performed, and patient medications, allergies and       sensitivities were reviewed. The patient's tolerance of                        previous anesthesia was reviewed.                       - The risks and benefits of the procedure and the                        sedation options and risks were discussed with the                        patient. All questions were answered and informed                        consent was obtained.                       - Patient identification and proposed procedure were                        verified prior to the procedure by the physician. The                        procedure was verified in the endoscopy suite.                       After obtaining informed consent, the scope was passed                        under direct vision. Throughout the procedure, the                        patient's blood pressure, pulse, and oxygen saturations                        were monitored continuously. The Doudenoscope was                        introduced through the mouth, and advanced to the                        duodenum and used to cannulate the bile duct. The ERCP                        was accomplished without difficulty. The patient                        tolerated the procedure well.           Findings:       A double pigtail biliary stent was visible on the  film. The        esophagus was successfully intubated under direct vision. The scope was        advanced to the major papilla in the descending duodenum without        detailed examination of the pharynx, larynx and associated structures,        and upper GI tract. The upper GI tract was grossly normal. A biliary        stent was seen in the ampulla. A large amount of bile was seen in the        duodenum. The bile duct was deeply cannulated along side the biliary        stent with a 44Rx Hydratome preloaded with a 0.035in x 260cm wire.        Contrast was injected. A 2cm distal CBD stricture was seen. The bile        duct was dilated proximal to this. A 10Fr x 7cm plastic biliary stent        was deployed into the bile duct along side the pre-existing biliary        stent under endoscopic and fluoroscopic guidance. Fluoroscopy confirmed        good position. There was good drainage of dark bile after stent       deployment.                                                                                   Impression:          - Distal CBD stricture s/p ERCP with placement of 10Fr x                        7cm plastic biliary stent along side pre-existing                        biliary stent.  Recommendation:      - Return patient to ICU for ongoing care.                       - Continue with antibiotics.                       - Obtain MRI/MRCP once clinically stabilized to assess   biliary tree.                       - Monitor LFTs.                                                                                   Attending Participation:       I was present and participated during the entire procedure, including        non-key portions.                                                                                     Cisco Anderson  _______________  CISCO ANDERSON MD  2019 8:07:36 PM  Number of Addenda: 0    Note Initiated On: 2019 6:31 PM    < end of copied text > Chief Complaint: Jaundice    Interval Events:  - The patient underwent ERCP with stent placement on 19 (Please see full report in Results section of [facility])   - No additional fevers overnight    Allergies:  Cipro (Rash)  Plavix (Rash)    Hospital Medications:  ascorbic acid IVPB 1500 milliGRAM(s) IV Intermittent <User Schedule>  aspirin  chewable 81 milliGRAM(s) Oral daily  chlorhexidine 4% Liquid 1 Application(s) Topical <User Schedule>  cisatracurium Infusion 3 MICROgram(s)/kG/Min IV Continuous <Continuous>  CRRT Treatment    <Continuous>  fentaNYL   Infusion. 4 MICROgram(s)/kG/Hr IV Continuous <Continuous>  heparin  Infusion. 600 Unit(s)/Hr IV Continuous <Continuous>  hydrocortisone sodium succinate Injectable 50 milliGRAM(s) IV Push every 6 hours  insulin lispro (HumaLOG) corrective regimen sliding scale   SubCutaneous every 6 hours  meropenem  IVPB 1000 milliGRAM(s) IV Intermittent every 12 hours  norepinephrine Infusion 0.4 MICROgram(s)/kG/Min IV Continuous <Continuous>  pantoprazole  Injectable 40 milliGRAM(s) IV Push daily  PrismaSATE Dialysate BGK 4 / 2.5 5000 milliLiter(s) CRRT <Continuous>  PrismaSOL Filtration BGK 0 / 2.5 5000 milliLiter(s) CRRT <Continuous>  PrismaSOL Filtration BGK 0 / 2.5 5000 milliLiter(s) CRRT <Continuous>  propofol Infusion 20 MICROgram(s)/kG/Min IV Continuous <Continuous>  thiamine IVPB 200 milliGRAM(s) IV Intermittent <User Schedule>  vasopressin Infusion 0.04 Unit(s)/Min IV Continuous <Continuous>    PMHX/PSHX:  Gout  Type II diabetes mellitus  HTN (hypertension)  CAD (coronary artery disease)  Cirrhosis  S/P cholecystectomy    ROS: Unable to obtain as patient is intubated and sedated    PHYSICAL EXAM:   Vital Signs:  Vital Signs Last 24 Hrs  T(C): 35.4 (20 May 2019 04:00), Max: 35.6 (19 May 2019 16:00)  T(F): 95.7 (20 May 2019 04:00), Max: 96 (19 May 2019 16:00)  HR: 67 (20 May 2019 07:09) (50 - 85)  BP: 94/63 (19 May 2019 15:42) (72/59 - 94/63)  BP(mean): 73 (19 May 2019 15:42) (66 - 73)  RR: 21 (20 May 2019 06:15) (20 - 23)  SpO2: 98% (20 May 2019 07:09) (93% - 100%)  Daily     Daily Weight in k (20 May 2019 06:00)    GENERAL: Intubated, sedated  HEENT:  ETT in place, + scleral icterus  NECK: No thyroid lesions  LYMPH: Normal cervical and supraclavicular nodes  CHEST:  Ventilator associated breath sounds  HEART:  Regular rate and rhythm  ABDOMEN:  Soft, non-tender, non-distended, normoactive bowel sounds  EXTREMITIES: No cyanosis, clubbing, trace edema  SKIN:  No rash/erythema  NEURO: Intubated, sedated  PSYCH: Unable to assess      LABS:                        11.8   28.63 )-----------( 93       ( 20 May 2019 05:20 )             34.1     Mean Cell Volume: 90.0 fL (19 @ 05:20)        138  |  103  |  20  ----------------------------<  158<H>  4.2   |  17<L>  |  2.14<H>    Ca    7.3<L>      20 May 2019 05:20  Phos  3.1       Mg     2.1     20    TPro  5.9<L>  /  Alb  1.4<L>  /  TBili  3.8<H>  /  DBili  x   /  AST  523<H>  /  ALT  392<H>  /  AlkPhos  361<H>  20    LIVER FUNCTIONS - ( 20 May 2019 05:20 )  Alb: 1.4 g/dL / Pro: 5.9 g/dL / ALK PHOS: 361 u/L / ALT: 392 u/L / AST: 523 u/L / GGT: x           PT/INR - ( 20 May 2019 05:20 )   PT: 17.1 SEC;   INR: 1.52          PTT - ( 20 May 2019 05:20 )  PTT:59.0 SEC               11.8   28.63 )-----------( 93       ( 20 May 2019 05:20 )             34.1                         11.6   26.29 )-----------( 90       ( 20 May 2019 00:15 )             33.7                         11.2   26.18 )-----------( 89       ( 19 May 2019 20:03 )             32.6                         10.8   23.29 )-----------( 95       ( 19 May 2019 11:40 )             31.3                         10.4   24.14 )-----------( 100      ( 19 May 2019 05:30 )             32.1     Imaging:    No new imaging    Procedures:    < from: ERCP (19 @ 18:31) >    [facility]  _______________________________________________________________________________  Patient Name: Siva Gray              Procedure Date: 2019 6:31 PM  MRN: [id number]                     Account Number: [id number]  YOB: 1955             Admit Type: Inpatient  Room: OR                              Gender: Male  Attending MD: CISCO ANDERSON MD          _______________________________________________________________________________     Procedure:           ERCP  Indications:         For therapy of ascending cholangitis  Providers:           CISCO ANDERSON MD, WOJCIECH ISBELL MD (Fellow)  Referring MD:        DANIELLE GRULLON MD  Medicines:           General Anesthesia, on meropenem  Complications:       No immediate complications.  Procedure:           Pre-Anesthesia Assessment:                       - Prior to the procedure, a History and Physical was                        performed, and patient medications, allergies and       sensitivities were reviewed. The patient's tolerance of                        previous anesthesia was reviewed.                       - The risks and benefits of the procedure and the                        sedation options and risks were discussed with the                        patient. All questions were answered and informed                        consent was obtained.                       - Patient identification and proposed procedure were                        verified prior to the procedure by the physician. The                        procedure was verified in the endoscopy suite.                       After obtaining informed consent, the scope was passed                        under direct vision. Throughout the procedure, the                        patient's blood pressure, pulse, and oxygen saturations                        were monitored continuously. The Doudenoscope was                        introduced through the mouth, and advanced to the                        duodenum and used to cannulate the bile duct. The ERCP                        was accomplished without difficulty. The patient                        tolerated the procedure well.           Findings:       A double pigtail biliary stent was visible on the  film. The        esophagus was successfully intubated under direct vision. The scope was        advanced to the major papilla in the descending duodenum without        detailed examination of the pharynx, larynx and associated structures,        and upper GI tract. The upper GI tract was grossly normal. A biliary        stent was seen in the ampulla. A large amount of bile was seen in the        duodenum. The bile duct was deeply cannulated along side the biliary        stent with a 44Rx Hydratome preloaded with a 0.035in x 260cm wire.        Contrast was injected. A 2cm distal CBD stricture was seen. The bile        duct was dilated proximal to this. A 10Fr x 7cm plastic biliary stent        was deployed into the bile duct along side the pre-existing biliary        stent under endoscopic and fluoroscopic guidance. Fluoroscopy confirmed        good position. There was good drainage of dark bile after stent       deployment.                                                                                   Impression:          - Distal CBD stricture s/p ERCP with placement of 10Fr x                        7cm plastic biliary stent along side pre-existing                        biliary stent.  Recommendation:      - Return patient to ICU for ongoing care.                       - Continue with antibiotics.                       - Obtain MRI/MRCP once clinically stabilized to assess   biliary tree.                       - Monitor LFTs.                                                                                   Attending Participation:       I was present and participated during the entire procedure, including        non-key portions.                                                                                     Cisco Anderson  _______________  CISCO ANDERSON MD  2019 8:07:36 PM  Number of Addenda: 0    Note Initiated On: 2019 6:31 PM    < end of copied text >

## 2019-05-20 NOTE — PROGRESS NOTE ADULT - SUBJECTIVE AND OBJECTIVE BOX
Date of Admission:    24H hour events:     MEDICATIONS:  DOPamine Infusion 7.5 MICROgram(s)/kG/Min IV Continuous <Continuous>  norepinephrine Infusion 0.4 MICROgram(s)/kG/Min IV Continuous <Continuous>    meropenem  IVPB 1000 milliGRAM(s) IV Intermittent every 8 hours      aspirin Suppository 300 milliGRAM(s) Rectal daily  cisatracurium Infusion 3 MICROgram(s)/kG/Min IV Continuous <Continuous>  fentaNYL   Infusion. 4 MICROgram(s)/kG/Hr IV Continuous <Continuous>  propofol Infusion 20 MICROgram(s)/kG/Min IV Continuous <Continuous>    pantoprazole  Injectable 40 milliGRAM(s) IV Push daily    hydrocortisone sodium succinate Injectable 50 milliGRAM(s) IV Push every 6 hours  insulin lispro (HumaLOG) corrective regimen sliding scale   SubCutaneous every 6 hours  vasopressin Infusion 0.04 Unit(s)/Min IV Continuous <Continuous>    ascorbic acid IVPB 1500 milliGRAM(s) IV Intermittent <User Schedule>  chlorhexidine 4% Liquid 1 Application(s) Topical <User Schedule>  petrolatum Ophthalmic Ointment 1 Application(s) Both EYES two times a day  thiamine IVPB 200 milliGRAM(s) IV Intermittent <User Schedule>      REVIEW OF SYSTEMS:  Complete 10point ROS negative.    PHYSICAL EXAM:  T(C): 35.4 (05-20-19 @ 12:00), Max: 35.8 (05-20-19 @ 08:00)  HR: 56 (05-20-19 @ 14:00) (48 - 85)  BP: 94/63 (05-19-19 @ 15:42) (72/59 - 94/63)  RR: 17 (05-20-19 @ 14:00) (17 - 23)  SpO2: 95% (05-20-19 @ 14:00) (88% - 100%)  Wt(kg): --  I&O's Summary    19 May 2019 07:01  -  20 May 2019 07:00  --------------------------------------------------------  IN: 3594.4 mL / OUT: 1846 mL / NET: 1748.4 mL    20 May 2019 07:01  -  20 May 2019 14:17  --------------------------------------------------------  IN: 1105 mL / OUT: 10 mL / NET: 1095 mL        Appearance: Normal	  HEENT:   Normal oral mucosa, PERRL, EOMI	  Lymphatic: No lymphadenopathy  Cardiovascular: Normal S1 S2, No JVD, No murmurs, No edema  Respiratory: Lungs clear to auscultation	  Psychiatry: A & O x 3, Mood & affect appropriate  Gastrointestinal:  Soft, Non-tender, + BS	  Skin: No rashes, No ecchymoses, No cyanosis	  Neurologic: Non-focal  Extremities: Normal range of motion, No clubbing, cyanosis or edema  Vascular: Peripheral pulses palpable 2+ bilaterally        LABS:	 	    CBC Full  -  ( 20 May 2019 12:00 )  WBC Count : 28.00 K/uL  Hemoglobin : 12.2 g/dL  Hematocrit : 34.3 %  Platelet Count - Automated : 84 K/uL  Mean Cell Volume : 87.9 fL  Mean Cell Hemoglobin : 31.3 pg  Mean Cell Hemoglobin Concentration : 35.6 %  Auto Neutrophil # : x  Auto Lymphocyte # : x  Auto Monocyte # : x  Auto Eosinophil # : x  Auto Basophil # : x  Auto Neutrophil % : x  Auto Lymphocyte % : x  Auto Monocyte % : x  Auto Eosinophil % : x  Auto Basophil % : x    05-20    136  |  102  |  20  ----------------------------<  176<H>  3.9   |  19<L>  |  1.92<H>  05-20    138  |  103  |  20  ----------------------------<  158<H>  4.2   |  17<L>  |  2.14<H>    Ca    7.5<L>      20 May 2019 12:00  Ca    7.3<L>      20 May 2019 05:20  Phos  2.5     05-20  Phos  3.1     05-20  Mg     2.1     05-20  Mg     2.1     05-20    TPro  6.0  /  Alb  1.5<L>  /  TBili  3.7<H>  /  DBili  x   /  AST  469<H>  /  ALT  511<H>  /  AlkPhos  410<H>  05-20  TPro  5.9<L>  /  Alb  1.4<L>  /  TBili  3.8<H>  /  DBili  x   /  AST  523<H>  /  ALT  392<H>  /  AlkPhos  361<H>  05-20      proBNP:   Lipid Profile:          	  	  ASSESSMENT/PLAN: 	    Significantly elevated biomarkers, even in the setting of SLOAN.  Likely large type II event, with probable underlying multi-vessel CAD.  Depending on clinical course, will need some sort of ischemic evaluation.        Viraj Minaya MD, FACC  [id number]

## 2019-05-20 NOTE — PROGRESS NOTE ADULT - SUBJECTIVE AND OBJECTIVE BOX
[facility] DIVISION OF KIDNEY DISEASES AND HYPERTENSION -- FOLLOW UP NOTE  --------------------------------------------------------------------------------  HPI: 63-year-old male with medical history of CAD s/p 9 stents, DM, HTN, GERD, prior cholecystectomy,  diverticulosis, biliary stricture s/p ERCP with sphincteromy and stent 4/2019 who presents as transfer from [facility] where he was admitted with septic shock. Nephrology team is following for SLOAN on CKD and anuria. Pt. found to be in septic shock due to E Coli bacteremia was intubated for acute hypoxic respiratory failure and started on pressor support. He received vancomycin, zosyn, micafungin, merepenem and gentamicin initially. Also pt. developed NSTEMI and was started on heparin gtt with dobutamine due to concern for new LV dysfunction. Pt. with known history of CKD 3 secondary to DM and HTN, follows as outpatient with Dr Hodgson. As per pt's son SCr baseline 2.5-2.6 in March 2019 however states decrease to 1.9 in April 2019. On lab review of [facility] SCr elevated to 4.05 on 4/18/19 and decreased to 3.71 on 4/20/19. SCr elevated at 1.97 on 5/17/19. SCr elevated at 2.29 on 5/17/19 and further increase at 2.90 on 5/18/19. Pt. starting on CRRT on 5/18/19.    Pt. seen and examined at bedside this AM. Pt. intubated on pressor support. Tolerating CRRT with -100cc/hr. Unable to provide ROS.      PAST HISTORY  --------------------------------------------------------------------------------  No significant changes to PMH, PSH, FHx, SHx, unless otherwise noted    ALLERGIES & MEDICATIONS  --------------------------------------------------------------------------------  Allergies    Cipro (Rash)  Plavix (Rash)    Intolerances    Standing Inpatient Medications  ascorbic acid IVPB 1500 milliGRAM(s) IV Intermittent <User Schedule>  aspirin  chewable 81 milliGRAM(s) Oral daily  chlorhexidine 4% Liquid 1 Application(s) Topical <User Schedule>  cisatracurium Infusion 3 MICROgram(s)/kG/Min IV Continuous <Continuous>  CRRT Treatment    <Continuous>  fentaNYL   Infusion. 4 MICROgram(s)/kG/Hr IV Continuous <Continuous>  heparin  Infusion. 600 Unit(s)/Hr IV Continuous <Continuous>  hydrocortisone sodium succinate Injectable 50 milliGRAM(s) IV Push every 6 hours  insulin lispro (HumaLOG) corrective regimen sliding scale   SubCutaneous every 6 hours  meropenem  IVPB 1000 milliGRAM(s) IV Intermittent every 12 hours  norepinephrine Infusion 0.4 MICROgram(s)/kG/Min IV Continuous <Continuous>  pantoprazole  Injectable 40 milliGRAM(s) IV Push daily  PrismaSATE Dialysate BGK 4 / 2.5 5000 milliLiter(s) CRRT <Continuous>  PrismaSOL Filtration BGK 0 / 2.5 5000 milliLiter(s) CRRT <Continuous>  PrismaSOL Filtration BGK 0 / 2.5 5000 milliLiter(s) CRRT <Continuous>  propofol Infusion 20 MICROgram(s)/kG/Min IV Continuous <Continuous>  thiamine IVPB 200 milliGRAM(s) IV Intermittent <User Schedule>  vasopressin Infusion 0.04 Unit(s)/Min IV Continuous <Continuous>    REVIEW OF SYSTEMS  --------------------------------------------------------------------------------  Unable to obtain.    VITALS/PHYSICAL EXAM  --------------------------------------------------------------------------------  T(C): 35.4 (05-20-19 @ 04:00), Max: 35.6 (05-19-19 @ 16:00)  HR: 67 (05-20-19 @ 07:09) (50 - 85)  BP: 94/63 (05-19-19 @ 15:42) (72/59 - 94/63)  RR: 21 (05-20-19 @ 07:00) (20 - 23)  SpO2: 98% (05-20-19 @ 07:09) (93% - 100%)  Wt(kg): --    Weight (kg): 98.2 (05-18-19 @ 15:27)    05-19-19 @ 07:01  -  05-20-19 @ 07:00  --------------------------------------------------------  IN: 3594.4 mL / OUT: 1846 mL / NET: 1748.4 mL    05-20-19 @ 07:01  -  05-20-19 @ 08:02  --------------------------------------------------------  IN: 270.4 mL / OUT: 5 mL / NET: 265.4 mL    Physical Exam:  	Gen: Intubated, sedated  	HEENT: + ETT  	Pulm: coarse breathe sounds B/L  	CV: S1S2  	Abd: Soft, +BS   	Ext: trace pitting LE edema B/L  	Neuro: Sedated  	Skin: Warm and dry  	Vascualr access: LIJ non-tunneled HD catheter site:  without bleeding.	    LABS/STUDIES  --------------------------------------------------------------------------------              11.8   28.63 >-----------<  93       [05-20-19 @ 05:20]              34.1     138  |  103  |  20  ----------------------------<  158      [05-20-19 @ 05:20]  4.2   |  17  |  2.14        Ca     7.3     [05-20-19 @ 05:20]      Mg     2.1     [05-20-19 @ 05:20]      Phos  3.1     [05-20-19 @ 05:20]    Creatinine Trend:  SCr 2.14 [05-20 @ 05:20]  SCr 2.32 [05-20 @ 00:15]  SCr 2.59 [05-19 @ 20:03]  SCr 2.35 [05-19 @ 11:40]  SCr 2.77 [05-19 @ 05:30]

## 2019-05-20 NOTE — PROGRESS NOTE ADULT - SUBJECTIVE AND OBJECTIVE BOX
POST ANESTHESIA EVALUATION    63y Male POSTOP DAY 1 S/P     MENTAL STATUS: Patient participation [  ] Awake     [  ] Arousable     [ x ] Sedated    AIRWAY PATENCY: [  ] Satisfactory  [ x ] Other: Intubated    Vital Signs Last 24 Hrs  T(C): 35.8 (20 May 2019 08:00), Max: 35.8 (20 May 2019 08:00)  T(F): 96.5 (20 May 2019 08:00), Max: 96.5 (20 May 2019 08:00)  HR: 73 (20 May 2019 08:21) (50 - 85)  BP: 94/63 (19 May 2019 15:42) (72/59 - 94/63)  BP(mean): 73 (19 May 2019 15:42) (66 - 73)  RR: 21 (20 May 2019 08:21) (20 - 23)  SpO2: 97% (20 May 2019 08:21) (93% - 100%)  I&O's Summary    19 May 2019 07:01  -  20 May 2019 07:00  --------------------------------------------------------  IN: 3594.4 mL / OUT: 1846 mL / NET: 1748.4 mL    20 May 2019 07:01  -  20 May 2019 08:53  --------------------------------------------------------  IN: 270.4 mL / OUT: 5 mL / NET: 265.4 mL          NAUSEA/ VOMITTING:  [ x ] NONE  [  ] CONTROLLED [  ] OTHER     PAIN: [ x ] CONTROLLED WITH CURRENT REGIMEN  [  ] OTHER    [ x ] NO APPARENT ANESTHESIA COMPLICATIONS      Comments:

## 2019-05-20 NOTE — PROGRESS NOTE ADULT - SUBJECTIVE AND OBJECTIVE BOX
CHIEF COMPLAINT: Patient is a 63y old  Male who presents with a chief complaint of Septic Shock (19 May 2019 01:36)    Interval Events:  s/p ERCP with second stent placed.       REVIEW OF SYSTEMS:  Unable to obtain       OBJECTIVE:  ICU Vital Signs Last 24 Hrs  T(C): 35.4 (20 May 2019 04:00), Max: 35.6 (19 May 2019 16:00)  T(F): 95.7 (20 May 2019 04:00), Max: 96 (19 May 2019 16:00)  HR: 65 (20 May 2019 06:15) (50 - 85)  BP: 94/63 (19 May 2019 15:42) (72/59 - 94/63)  BP(mean): 73 (19 May 2019 15:42) (66 - 73)  ABP: 135/74 (20 May 2019 06:15) (57/37 - 171/82)  ABP(mean): 98 (20 May 2019 06:15) (45 - 117)  RR: 21 (20 May 2019 06:15) (20 - 23)  SpO2: 98% (20 May 2019 06:15) (93% - 100%)    Mode: AC/ CMV (Assist Control/ Continuous Mandatory Ventilation), RR (machine): 21, TV (machine): 500, FiO2: 40, PEEP: 5, MAP: 10, PIP: 22       @ 07: @ 07:00  --------------------------------------------------------  IN: 3203.9 mL / OUT: 2753 mL / NET: 450.9 mL     @ 07:  -   @ 06:48  --------------------------------------------------------  IN: 3474 mL / OUT: 1846 mL / NET: 1628 mL      PHYSICAL EXAM:  General: intubated, sedated and paralyzed  HEENT: PERRLA, moist mucous membranes  Neurology: paralyzed and sedated  Respiratory: CTA B/L  CV: RRR, S1S2, no murmurs, rubs or gallops  Abdominal: Soft, NT, ND +BS, Last BM  Extremities: No edema, + peripheral pulses; extremities warm  Tubes: ETT, OGT, LEVIJ Shiley, RIJ TLC    MEDICATIONS  (STANDING):  ascorbic acid IVPB 1500 milliGRAM(s) IV Intermittent <User Schedule>  aspirin  chewable 81 milliGRAM(s) Oral daily  chlorhexidine 4% Liquid 1 Application(s) Topical <User Schedule>  cisatracurium Infusion 3 MICROgram(s)/kG/Min (17.676 mL/Hr) IV Continuous <Continuous>  CRRT Treatment    <Continuous>  fentaNYL   Infusion. 4 MICROgram(s)/kG/Hr (39.28 mL/Hr) IV Continuous <Continuous>  heparin  Infusion. 600 Unit(s)/Hr (6 mL/Hr) IV Continuous <Continuous>  hydrocortisone sodium succinate Injectable 50 milliGRAM(s) IV Push every 6 hours  insulin lispro (HumaLOG) corrective regimen sliding scale   SubCutaneous every 6 hours  meropenem  IVPB 1000 milliGRAM(s) IV Intermittent every 12 hours  norepinephrine Infusion 0.4 MICROgram(s)/kG/Min (36.825 mL/Hr) IV Continuous <Continuous>  pantoprazole  Injectable 40 milliGRAM(s) IV Push daily  PrismaSATE Dialysate BGK 4 / 2.5 5000 milliLiter(s) (1500 mL/Hr) CRRT <Continuous>  PrismaSOL Filtration BGK 0 / 2.5 5000 milliLiter(s) (1000 mL/Hr) CRRT <Continuous>  PrismaSOL Filtration BGK 0 / 2.5 5000 milliLiter(s) (200 mL/Hr) CRRT <Continuous>  propofol Infusion 20 MICROgram(s)/kG/Min (11.784 mL/Hr) IV Continuous <Continuous>  thiamine IVPB 200 milliGRAM(s) IV Intermittent <User Schedule>  vasopressin Infusion 0.04 Unit(s)/Min (2.4 mL/Hr) IV Continuous <Continuous>    MEDICATIONS  (PRN):        LABS:                        11.8   28.63 )-----------( 93       ( 20 May 2019 05:20 )             34.1     05-20    138  |  103  |  20  ----------------------------<  158<H>  4.2   |  17<L>  |  2.14<H>    Ca    7.3<L>      20 May 2019 05:20  Phos  3.1       Mg     2.1         TPro  5.9<L>  /  Alb  1.4<L>  /  TBili  3.8<H>  /  DBili  x   /  AST  523<H>  /  ALT  392<H>  /  AlkPhos  361<H>           PTT - ( 19 May 2019 05:30 )  PTT:84.8 SEC  Urinalysis Basic - ( 18 May 2019 00:33 )    Color: Yellow / Appearance: Slightly Turbid / S.015 / pH: x  Gluc: x / Ketone: Trace  / Bili: Moderate / Urobili: 4 mg/dL   Blood: x / Protein: 100 mg/dL / Nitrite: Negative   Leuk Esterase: Trace / RBC: 3-5 /HPF / WBC 11-25   Sq Epi: x / Non Sq Epi: Moderate / Bacteria: Many      Arterial Blood Gas:   @ 05:30  7.42/25/113/19/97.7/-7.9  ABG lactate: 4.9  Arterial Blood Gas:   @ 02:25  7.37/28/100/18/97.8/-8.8  ABG lactate: 3.9  Arterial Blood Gas:   @ 00:20  7.19/49/71/17/91.1/-9.0  ABG lactate: 3.8  Arterial Blood Gas:   @ 16:51  7.25/31/91/15/94.9/-12.7  ABG lactate: 4.2  Arterial Blood Gas:   @ 15:49  7.22/34/79/14/93.0/-13.0  ABG lactate: 4.5  Arterial Blood Gas:   @ 11:52  --/38/201/12/99/-15.6  ABG lactate: --  Arterial Blood Gas:   @ 06:16  --/36/152/14/99/-12.5  ABG lactate: --  Arterial Blood Gas:   @ 00:50  7.21/40/87/15/94.2/-11.3  ABG lactate: 3.7  Arterial Blood Gas:   21:28  --/33/72/11/89/-16.8  ABG lactate: --      MICROBIOLOGY:   Blood Cx: Culture - Blood (19 @ 00:37)    Gram Stain:   Growth in aerobic and anaerobic bottles: Gram Negative Rods    Specimen Source: .Blood    Culture Results:   Growth in aerobic and anaerobic bottles: Gram Negative Rods    Culture - Blood (19 @ 00:37)    -  Escherichia coli: Detec    Gram Stain:   Growth in aerobic and anaerobic bottles: Gram Negative Rods    Specimen Source: .Blood    Organism: Blood Culture PCR    Culture Results:   Growth in aerobic and anaerobic bottles: Gram Negative Rods  "Due to technical problems, Proteus sp. will Not be reported as part of  the BCID panel until further notice"  ***Blood Panel PCR results on this specimen are available  approximately 3 hours after the Gram stain result.***  Gram stain, PCR, and/or culture results may not always  correspond due to difference in methodologies.  ************************************************************  This PCR assay was performed using [employer].  The following targets are tested for: Enterococcus,  vancomycin resistant enterococci, Listeria monocytogenes,  coagulase negative staphylococci, S. aureus,  methicillin resistant S. aureus, Streptococcus agalactiae  (Group B), S. pneumoniae, S. pyogenes (Group A),  Acinetobacter baumannii, Enterobacter cloacae, E. coli,  Klebsiella oxytoca, K. pneumoniae, Proteus sp.,  Serratia marcescens, Haemophilus influenzae,  Neisseria meningitidis, Pseudomonas aeruginosa, Candida  albicans, C. glabrata, C krusei, C parapsilosis,  C. tropicalis and the KPC resistance gene.    Organism Identification: Blood Culture PCR    Method Type: PCR      Urine Cx: Culture - Urine (19 @ 11:57)    Specimen Source: .Urine    Culture Results:   No growth      Sputum Cx: Culture - Sputum . (19 @ 17:21)    Gram Stain:   Few polymorphonuclear leukocytes per low power field  Few Squamous epithelial cells per low power field  No organisms seen per oil power field    Specimen Source: .Sputum      RADIOLOGY:  X Ray: < from: Xray Chest 1 View- PORTABLE-Urgent (19 @ 20:18) >  EXAM:  XR CHEST PORTABLE URGENT 1V        PROCEDURE DATE:  May 18 2019     < from: CT Abdomen and Pelvis No Cont (19 @ 14:07) >  IMPRESSION: Extremely limited noncontrast examination.    CBD stent is in place, with the proximal portion in the proximal CBD and   distal tip in the second portion of the duodenum. There is dilatation of   the common hepatic duct to 1.6 cm and intrahepatic biliary ductal   dilatation, particularly involving the left lobe.    Small bilateral pleural effusions with bilateral lower lobe partial   compressive atelectasis.                ARNOLD STYLES M.D., RADIOLOGY RESIDENT  This document has been electronically signed.  CLIFFORD PANDEY M.D., ATTENDING RADIOLOGIST  This document has been electronically signed. May 19 2019  4:45PM    < end of copied text > CHIEF COMPLAINT: Patient is a 63y old  Male who presents with a chief complaint of Septic Shock (19 May 2019 01:36)    Interval Events:  s/p ERCP with second stent placed. Off of all pressors. On propofol       REVIEW OF SYSTEMS:  Unable to obtain       OBJECTIVE:  ICU Vital Signs Last 24 Hrs  T(C): 35.4 (20 May 2019 04:00), Max: 35.6 (19 May 2019 16:00)  T(F): 95.7 (20 May 2019 04:00), Max: 96 (19 May 2019 16:00)  HR: 65 (20 May 2019 06:15) (50 - 85)  BP: 94/63 (19 May 2019 15:42) (72/59 - 94/63)  BP(mean): 73 (19 May 2019 15:42) (66 - 73)  ABP: 135/74 (20 May 2019 06:15) (57/37 - 171/82)  ABP(mean): 98 (20 May 2019 06:15) (45 - 117)  RR: 21 (20 May 2019 06:15) (20 - 23)  SpO2: 98% (20 May 2019 06:15) (93% - 100%)    Mode: AC/ CMV (Assist Control/ Continuous Mandatory Ventilation), RR (machine): 21, TV (machine): 500, FiO2: 40, PEEP: 5, MAP: 10, PIP: 22       @ 07:  -   @ 07:00  --------------------------------------------------------  IN: 3203.9 mL / OUT: 2753 mL / NET: 450.9 mL     @ 07:01  -  20 @ 06:48  --------------------------------------------------------  IN: 3474 mL / OUT: 1846 mL / NET: 1628 mL      PHYSICAL EXAM:  General: intubated, sedated and paralyzed  HEENT: ADELINA, moist mucous membranes  Neurology: paralyzed and sedated  Respiratory: CTA B/L  CV: RRR, S1S2, no murmurs, rubs or gallops  Abdominal: Soft, NT, ND +BS, Last BM  Extremities: No edema, + peripheral pulses; extremities warm  Tubes: ETT, OGT, LIJ Shiley, RIJ TLC    MEDICATIONS  (STANDING):  ascorbic acid IVPB 1500 milliGRAM(s) IV Intermittent <User Schedule>  aspirin  chewable 81 milliGRAM(s) Oral daily  chlorhexidine 4% Liquid 1 Application(s) Topical <User Schedule>  cisatracurium Infusion 3 MICROgram(s)/kG/Min (17.676 mL/Hr) IV Continuous <Continuous>  CRRT Treatment    <Continuous>  fentaNYL   Infusion. 4 MICROgram(s)/kG/Hr (39.28 mL/Hr) IV Continuous <Continuous>  heparin  Infusion. 600 Unit(s)/Hr (6 mL/Hr) IV Continuous <Continuous>  hydrocortisone sodium succinate Injectable 50 milliGRAM(s) IV Push every 6 hours  insulin lispro (HumaLOG) corrective regimen sliding scale   SubCutaneous every 6 hours  meropenem  IVPB 1000 milliGRAM(s) IV Intermittent every 12 hours  norepinephrine Infusion 0.4 MICROgram(s)/kG/Min (36.825 mL/Hr) IV Continuous <Continuous>  pantoprazole  Injectable 40 milliGRAM(s) IV Push daily  PrismaSATE Dialysate BGK 4 / 2.5 5000 milliLiter(s) (1500 mL/Hr) CRRT <Continuous>  PrismaSOL Filtration BGK 0 / 2.5 5000 milliLiter(s) (1000 mL/Hr) CRRT <Continuous>  PrismaSOL Filtration BGK 0 / 2.5 5000 milliLiter(s) (200 mL/Hr) CRRT <Continuous>  propofol Infusion 20 MICROgram(s)/kG/Min (11.784 mL/Hr) IV Continuous <Continuous>  thiamine IVPB 200 milliGRAM(s) IV Intermittent <User Schedule>  vasopressin Infusion 0.04 Unit(s)/Min (2.4 mL/Hr) IV Continuous <Continuous>    MEDICATIONS  (PRN):        LABS:                        11.8   28.63 )-----------( 93       ( 20 May 2019 05:20 )             34.1     05-20    138  |  103  |  20  ----------------------------<  158<H>  4.2   |  17<L>  |  2.14<H>    Ca    7.3<L>      20 May 2019 05:20  Phos  3.1     05  Mg     2.1         TPro  5.9<L>  /  Alb  1.4<L>  /  TBili  3.8<H>  /  DBili  x   /  AST  523<H>  /  ALT  392<H>  /  AlkPhos  361<H>           PTT - ( 19 May 2019 05:30 )  PTT:84.8 SEC  Urinalysis Basic - ( 18 May 2019 00:33 )    Color: Yellow / Appearance: Slightly Turbid / S.015 / pH: x  Gluc: x / Ketone: Trace  / Bili: Moderate / Urobili: 4 mg/dL   Blood: x / Protein: 100 mg/dL / Nitrite: Negative   Leuk Esterase: Trace / RBC: 3-5 /HPF / WBC 11-25   Sq Epi: x / Non Sq Epi: Moderate / Bacteria: Many      Arterial Blood Gas:   @ 05:30  7.42/25/113/19/97.7/-7.9  ABG lactate: 4.9  Arterial Blood Gas:   @ 02:25  7.37/28/100/18/97.8/-8.8  ABG lactate: 3.9  Arterial Blood Gas:   @ 00:20  7.19/49/71/17/91.1/-9.0  ABG lactate: 3.8  Arterial Blood Gas:   @ 16:51  7.25/31/91/15/94.9/-12.7  ABG lactate: 4.2  Arterial Blood Gas:   @ 15:49  7.22/34/79/14/93.0/-13.0  ABG lactate: 4.5  Arterial Blood Gas:   @ 11:52  --/38/201/12/99/-15.6  ABG lactate: --  Arterial Blood Gas:   @ 06:16  --/36/152/14/99/-12.5  ABG lactate: --  Arterial Blood Gas:   @ 00:50  7.21/40/87/15/94.2/-11.3  ABG lactate: 3.7  Arterial Blood Gas:   @ 21:28  --/33/72/11//-16.8  ABG lactate: --      MICROBIOLOGY:   Blood Cx: Culture - Blood (19 @ 00:37)    Gram Stain:   Growth in aerobic and anaerobic bottles: Gram Negative Rods    Specimen Source: .Blood    Culture Results:   Growth in aerobic and anaerobic bottles: Gram Negative Rods    Culture - Blood (19 @ 00:37)    -  Escherichia coli: Detec    Gram Stain:   Growth in aerobic and anaerobic bottles: Gram Negative Rods    Specimen Source: .Blood    Organism: Blood Culture PCR    Culture Results:   Growth in aerobic and anaerobic bottles: Gram Negative Rods  "Due to technical problems, Proteus sp. will Not be reported as part of  the BCID panel until further notice"  ***Blood Panel PCR results on this specimen are available  approximately 3 hours after the Gram stain result.***  Gram stain, PCR, and/or culture results may not always  correspond due to difference in methodologies.  ************************************************************  This PCR assay was performed using [employer].  The following targets are tested for: Enterococcus,  vancomycin resistant enterococci, Listeria monocytogenes,  coagulase negative staphylococci, S. aureus,  methicillin resistant S. aureus, Streptococcus agalactiae  (Group B), S. pneumoniae, S. pyogenes (Group A),  Acinetobacter baumannii, Enterobacter cloacae, E. coli,  Klebsiella oxytoca, K. pneumoniae, Proteus sp.,  Serratia marcescens, Haemophilus influenzae,  Neisseria meningitidis, Pseudomonas aeruginosa, Candida  albicans, C. glabrata, C krusei, C parapsilosis,  C. tropicalis and the KPC resistance gene.    Organism Identification: Blood Culture PCR    Method Type: PCR      Urine Cx: Culture - Urine (19 @ 11:57)    Specimen Source: .Urine    Culture Results:   No growth      Sputum Cx: Culture - Sputum . (19 @ 17:21)    Gram Stain:   Few polymorphonuclear leukocytes per low power field  Few Squamous epithelial cells per low power field  No organisms seen per oil power field    Specimen Source: .Sputum      RADIOLOGY:  X Ray: < from: Xray Chest 1 View- PORTABLE-Urgent (19 @ 20:18) >  EXAM:  XR CHEST PORTABLE URGENT 1V        PROCEDURE DATE:  May 18 2019     < from: CT Abdomen and Pelvis No Cont (19 @ 14:07) >  IMPRESSION: Extremely limited noncontrast examination.    CBD stent is in place, with the proximal portion in the proximal CBD and   distal tip in the second portion of the duodenum. There is dilatation of   the common hepatic duct to 1.6 cm and intrahepatic biliary ductal   dilatation, particularly involving the left lobe.    Small bilateral pleural effusions with bilateral lower lobe partial   compressive atelectasis.                ARNOLD STYLES M.D., RADIOLOGY RESIDENT  This document has been electronically signed.  CLIFFORD PANDEY M.D., ATTENDING RADIOLOGIST  This document has been electronically signed. May 19 2019  4:45PM    < end of copied text > CHIEF COMPLAINT: Patient is a 63y old  Male who presents with a chief complaint of Septic Shock (19 May 2019 01:36)    Interval Events:  s/p ERCP with second stent placed. Off of all pressors. On propofol     REVIEW OF SYSTEMS:  Unable to obtain     OBJECTIVE:  ICU Vital Signs Last 24 Hrs  T(C): 35.4 (20 May 2019 04:00), Max: 35.6 (19 May 2019 16:00)  T(F): 95.7 (20 May 2019 04:00), Max: 96 (19 May 2019 16:00)  HR: 65 (20 May 2019 06:15) (50 - 85)  BP: 94/63 (19 May 2019 15:42) (72/59 - 94/63)  BP(mean): 73 (19 May 2019 15:42) (66 - 73)  ABP: 135/74 (20 May 2019 06:15) (57/37 - 171/82)  ABP(mean): 98 (20 May 2019 06:15) (45 - 117)  RR: 21 (20 May 2019 06:15) (20 - 23)  SpO2: 98% (20 May 2019 06:15) (93% - 100%)    Mode: AC/ CMV (Assist Control/ Continuous Mandatory Ventilation), RR (machine): 21, TV (machine): 500, FiO2: 40, PEEP: 5, MAP: 10, PIP: 22       @ 07:  -   @ 07:00  --------------------------------------------------------  IN: 3203.9 mL / OUT: 2753 mL / NET: 450.9 mL     @ 07:01  -  20 @ 06:48  --------------------------------------------------------  IN: 3474 mL / OUT: 1846 mL / NET: 1628 mL      PHYSICAL EXAM:  General: intubated, sedated and paralyzed  HEENT: ADELINA, moist mucous membranes  Neurology: paralyzed and sedated  Respiratory: CTA B/L  CV: RRR, S1S2, no murmurs, rubs or gallops  Abdominal: Soft, NT, ND +BS  Extremities: No edema, + peripheral pulses; extremities warm  Tubes: ETT, OGT, LEVIJ Shiley, RIJ TLC    MEDICATIONS  (STANDING):  ascorbic acid IVPB 1500 milliGRAM(s) IV Intermittent <User Schedule>  aspirin  chewable 81 milliGRAM(s) Oral daily  chlorhexidine 4% Liquid 1 Application(s) Topical <User Schedule>  cisatracurium Infusion 3 MICROgram(s)/kG/Min (17.676 mL/Hr) IV Continuous <Continuous>  CRRT Treatment    <Continuous>  fentaNYL   Infusion. 4 MICROgram(s)/kG/Hr (39.28 mL/Hr) IV Continuous <Continuous>  heparin  Infusion. 600 Unit(s)/Hr (6 mL/Hr) IV Continuous <Continuous>  hydrocortisone sodium succinate Injectable 50 milliGRAM(s) IV Push every 6 hours  insulin lispro (HumaLOG) corrective regimen sliding scale   SubCutaneous every 6 hours  meropenem  IVPB 1000 milliGRAM(s) IV Intermittent every 12 hours  norepinephrine Infusion 0.4 MICROgram(s)/kG/Min (36.825 mL/Hr) IV Continuous <Continuous>  pantoprazole  Injectable 40 milliGRAM(s) IV Push daily  PrismaSATE Dialysate BGK 4 / 2.5 5000 milliLiter(s) (1500 mL/Hr) CRRT <Continuous>  PrismaSOL Filtration BGK 0 / 2.5 5000 milliLiter(s) (1000 mL/Hr) CRRT <Continuous>  PrismaSOL Filtration BGK 0 / 2.5 5000 milliLiter(s) (200 mL/Hr) CRRT <Continuous>  propofol Infusion 20 MICROgram(s)/kG/Min (11.784 mL/Hr) IV Continuous <Continuous>  thiamine IVPB 200 milliGRAM(s) IV Intermittent <User Schedule>  vasopressin Infusion 0.04 Unit(s)/Min (2.4 mL/Hr) IV Continuous <Continuous>    MEDICATIONS  (PRN):        LABS:                        11.8   28.63 )-----------( 93       ( 20 May 2019 05:20 )             34.1     05-20    138  |  103  |  20  ----------------------------<  158<H>  4.2   |  17<L>  |  2.14<H>    Ca    7.3<L>      20 May 2019 05:20  Phos  3.1       Mg     2.1         TPro  5.9<L>  /  Alb  1.4<L>  /  TBili  3.8<H>  /  DBili  x   /  AST  523<H>  /  ALT  392<H>  /  AlkPhos  361<H>           PTT - ( 19 May 2019 05:30 )  PTT:84.8 SEC  Urinalysis Basic - ( 18 May 2019 00:33 )    Color: Yellow / Appearance: Slightly Turbid / S.015 / pH: x  Gluc: x / Ketone: Trace  / Bili: Moderate / Urobili: 4 mg/dL   Blood: x / Protein: 100 mg/dL / Nitrite: Negative   Leuk Esterase: Trace / RBC: 3-5 /HPF / WBC 11-25   Sq Epi: x / Non Sq Epi: Moderate / Bacteria: Many    MICROBIOLOGY:   Blood Cx: Culture - Blood (19 @ 00:37)    Gram Stain:   Growth in aerobic and anaerobic bottles: Gram Negative Rods    Specimen Source: .Blood    Culture Results:   Growth in aerobic and anaerobic bottles: Gram Negative Rods    Culture - Blood (19 @ 00:37)    -  Escherichia coli: Detec    Gram Stain:   Growth in aerobic and anaerobic bottles: Gram Negative Rods    Specimen Source: .Blood    Organism: Blood Culture PCR    Culture Results:   Growth in aerobic and anaerobic bottles: Gram Negative Rods  "Due to technical problems, Proteus sp. will Not be reported as part of  the BCID panel until further notice"  ***Blood Panel PCR results on this specimen are available  approximately 3 hours after the Gram stain result.***  Gram stain, PCR, and/or culture results may not always  correspond due to difference in methodologies.  ************************************************************  This PCR assay was performed using [employer].  The following targets are tested for: Enterococcus,  vancomycin resistant enterococci, Listeria monocytogenes,  coagulase negative staphylococci, S. aureus,  methicillin resistant S. aureus, Streptococcus agalactiae  (Group B), S. pneumoniae, S. pyogenes (Group A),  Acinetobacter baumannii, Enterobacter cloacae, E. coli,  Klebsiella oxytoca, K. pneumoniae, Proteus sp.,  Serratia marcescens, Haemophilus influenzae,  Neisseria meningitidis, Pseudomonas aeruginosa, Candida  albicans, C. glabrata, C krusei, C parapsilosis,  C. tropicalis and the KPC resistance gene.    Organism Identification: Blood Culture PCR    Method Type: PCR      Urine Cx: Culture - Urine (19 @ 11:57)    Specimen Source: .Urine    Culture Results:   No growth      Sputum Cx: Culture - Sputum . (19 @ 17:21)    Gram Stain:   Few polymorphonuclear leukocytes per low power field  Few Squamous epithelial cells per low power field  No organisms seen per oil power field    Specimen Source: .Sputum      RADIOLOGY:  X Ray: < from: Xray Chest 1 View- PORTABLE-Urgent (19 @ 20:18) >  EXAM:  XR CHEST PORTABLE URGENT 1V        PROCEDURE DATE:  May 18 2019     < from: CT Abdomen and Pelvis No Cont (19 @ 14:07) >  IMPRESSION: Extremely limited noncontrast examination.    CBD stent is in place, with the proximal portion in the proximal CBD and   distal tip in the second portion of the duodenum. There is dilatation of   the common hepatic duct to 1.6 cm and intrahepatic biliary ductal   dilatation, particularly involving the left lobe.    Small bilateral pleural effusions with bilateral lower lobe partial   compressive atelectasis.                ARNOLD STYLES M.D., RADIOLOGY RESIDENT  This document has been electronically signed.  CLIFFORD PANDEY M.D., ATTENDING RADIOLOGIST  This document has been electronically signed. May 19 2019  4:45PM    < end of copied text >

## 2019-05-20 NOTE — PROGRESS NOTE ADULT - ASSESSMENT
Neuro - Intubated, sedated and paralyzed  - cont versed gtt, fentanyl gtt and propofol gtt  - s/p paralysis to facilitate vent syncrhony  - Aox3 at baseline    CV - Septic Shock on multiple pressors due to cholangitis, c/b elevated troponins concerning for NSTEMI vs demand ischemia and heart failure  - cardiogenic shock now resolved on POCUS  - transferred on adilia, dobutamine, levophed  - c/w concentrated levophed and vaso  - trops decreasing  - ASA 81, heparin gtt for 48hrs for ACS  - Cardiology consulted, appreciate recs  - holding home antihypertensives while in shock  - official TTE pending    Pulm - hypoxic resp failure s/p intubation 5/17  - cont on AC/CMV, f/u ABG, titrate vent setting appropriately, allowing for increased RR to decrease acidosis  - b/l consolidations seen on POCUS, will try to limit extra IVF and concentrating pressors    GI - Acute cholangitis in setting of CBD stricture and recent ERCP with stent placement. 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, path negative, pt was pending Whipple Procedure on 5/30  - GI consulted for ERCP  - continue to treat infection with Meropenem, +Ecoli bacteremia likely due to cholangitis  - monitor cmpq6  - CT A/P today to eval CBD stent placement      Renal - AG metabolic acidosis with NAGMA 2/2 septic shock  - cont treating infection and source control with ERCP  - renal following, CVVH per renal  - trend BMPs    ID - acute cholangitis c/b e coli bacteremia  - cont meropenem, renally dosed  - f/u repeat bl clx in AM    Endo - DMT2  - monitor fs q6    Heme - leukocytosis, thrombocytopenia, likely due to infection  - monitor labs q6  - Heparin gtt for ACS/ vte ppx    Full Code, Dr Gray,son, is primary Decision maker. 64 yo M hx of CAD s/p 9 stents, DMT2 on insulin, HTN, GERD, prior cholecystectomy,  diverticulosis, former smoker(30PY), biliary stricture s/p ERCP with sphincterotomy and stent 4/2019 who presented as transfer from [facility] for acute cholangitis s/p ERCP and stent CBD stent placement by GI on 5/19. On pressors in setting of E coli bacteremia     Neuro - Intubated, sedated and was given paralytics   - on propofol gtt  - s/p paralysis to facilitate vent syncrhony  - Aox3 at baseline    CV - Septic Shock- initially on multiple pressors due to cholangitis, c/b elevated troponins concerning for NSTEMI vs demand ischemia and heart failure  - cardiogenic shock now resolved on POCUS  - transferred on adilia, dobutamine, levophed  - off of levophed and vaso  - trops decreasing  - ASA 81, heparin gtt for 48hrs for ACS  - Cardiology consulted, appreciate recs  - holding home antihypertensives while in shock  - official TTE pending    Pulm - hypoxic resp failure s/p intubation 5/17  - cont on AC/CMV, f/u ABG, titrate vent setting appropriately, allowing for increased RR to decrease acidosis  - b/l consolidations seen on POCUS, will try to limit extra IVF and concentrating pressors    GI - Acute cholangitis in setting of CBD stricture and recent ERCP with stent placement. 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, path negative, pt was pending Whipple Procedure on 5/30  - GI performed second ERCp yesterday evening with another stent placed in CBD  - continue to treat infection with Meropenem, +Ecoli bacteremia likely due to cholangitis  - monitor cmp q6  -repeat MRCP when stable       Renal - AG metabolic acidosis with NAGMA 2/2 septic shock  - cont treating infection and source control with ERCP  - renal following, CVVH per renal  - trend BMPs    ID - acute cholangitis c/b e coli bacteremia  - cont meropenem, renally dosed  - f/u repeat bl clx sent yesterday   -Legionella neg     Endo - DMT2  - monitor fs q6    Heme - leukocytosis, thrombocytopenia, likely due to infection  - monitor labs q6  - Heparin gtt for ACS/ vte ppx    Full Code, Dr Gray,son, is primary Decision maker. 62 yo M hx of CAD s/p 9 stents, DMT2 on insulin, HTN, GERD, prior cholecystectomy,  diverticulosis, former smoker(30PY), biliary stricture s/p ERCP with sphincterotomy and stent 4/2019 who presented as transfer from [facility] for acute cholangitis s/p ERCP and stent CBD stent placement by GI on 5/19. On pressors in setting of E coli bacteremia. Hospital course further complicated by Type II NSTEMI and bradycardia. HR improved with Dopamine infusion     Neuro - Intubated, sedated and was given paralytics   - on propofol gtt  - s/p paralysis to facilitate vent syncrhony  - Aox3 at baseline and fully functional    CV - Mostly Distributive shock in setting of E coli bacteremia   -Likely having type II NSTEMI  -Will d/c heparin gtt today. Dopamine infusion for bradycardia    - c/w Vasopressin. Off of Levo   -c/w UF at 100 cc/hr net neg     Pulm - hypoxic resp failure s/p intubation 5/17  - Hypoxic this morning in the setting fo bilateral alveolar consolidations   - Hypoxia improved after increased PEEP/alveolar recruitment    GI - Acute cholangitis in setting of CBD stricture and recent ERCP with stent placement. 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, path negative, pt was pending Whipple Procedure on 5/30  - GI performed second ERCp yesterday evening with another stent placed in CBD  - continue to treat infection with Meropenem, +Ecoli bacteremia likely due to cholangitis  - monitor cmp q6  -repeat MRCP when stable     Renal - AG metabolic acidosis in setting fo distributive shock   - cont treating infection and source control with ERCP  - renal following, CVVH per renal  - trend BMPs    ID - acute cholangitis c/b e coli bacteremia  - cont meropenem, renally dosed  - f/u repeat bl clx sent yesterday   -Legionella neg     Endo - DMT2  - monitor fs q6    Heme - leukocytosis, thrombocytopenia, likely due to infection  - monitor labs q6  - Heparin gtt for ACS/ vte ppx    Full Code, Dr Gray,son, is primary Decision maker.

## 2019-05-20 NOTE — PROGRESS NOTE ADULT - PROBLEM SELECTOR PLAN 1
Pt. with oliguric SLOAN on CKD in the setting of sepsis, hypotension and NSTEMI. On lab review of [facility], SCr elevated at 4.05 on 4/18/19 and decreased at 3.71 on 4/20/19. SCr elevated at 1.97 on 5/17/19. SCr elevated at 2.29 on 5/17/19 and further increased to 2.90 on 5/18/19. Pt. with likely ATN. Pt. initiated on CRRT on 5/18/19. Pt. tolerating CRRT well with -100cc/hr of UF. Pt. remains intubated and on IV pressor support. Plan to continue CRRT today. Monitor UOP and daily weights. Avoid any potential nephrotoxins. Dose medications as per eGFR

## 2019-05-20 NOTE — PROGRESS NOTE ADULT - ASSESSMENT
Impression:    1. Ascending cholangitis: Meets criteria for severe acute cholangitis per [place] Criteria s/p ERCP with visualization of distal CBD stricture and with plastic stent placement on 5/19/19. S/P EUS/ERCP on 4/19/19 with 2 cm CBD stricture and with sphincterotomy and stent placement.   2. Septic shock with E. coli bacteremia on 2 vasopressors: Presumed biliary source.  3. Hypoxic respiratory failure: Currently intubated and on mechanical ventilation  4. CAD s/p PCI x 9 with hypertroponinemia: Concern for NSTEMI in setting of CAD. On heparin infusion.  5. SLOAN on CKD on CVVH  6. DM  7. GERD    Recommendations:  - F/U MRI / MRCP  - F/U IR recommendations  - Management of septic shock / hypoxic respiratory failure per MICU team  - Continue IV antibiotics  - Rest of care per primary team    Neal Diaz MD  Gastroenterology Fellow  Pager number: [phone] / [phone]

## 2019-05-21 LAB
ALBUMIN SERPL ELPH-MCNC: 1.7 G/DL — LOW (ref 3.3–5)
ALBUMIN SERPL ELPH-MCNC: 1.9 G/DL — LOW (ref 3.3–5)
ALBUMIN SERPL ELPH-MCNC: 2.3 G/DL — LOW (ref 3.3–5)
ALP SERPL-CCNC: 297 U/L — HIGH (ref 40–120)
ALP SERPL-CCNC: 307 U/L — HIGH (ref 40–120)
ALP SERPL-CCNC: 315 U/L — HIGH (ref 40–120)
ALT FLD-CCNC: 413 U/L — HIGH (ref 4–41)
ALT FLD-CCNC: 447 U/L — HIGH (ref 4–41)
ALT FLD-CCNC: 448 U/L — HIGH (ref 4–41)
ANION GAP SERPL CALC-SCNC: 13 MMO/L — SIGNIFICANT CHANGE UP (ref 7–14)
ANION GAP SERPL CALC-SCNC: 13 MMO/L — SIGNIFICANT CHANGE UP (ref 7–14)
ANION GAP SERPL CALC-SCNC: 14 MMO/L — SIGNIFICANT CHANGE UP (ref 7–14)
APTT BLD: 37.9 SEC — HIGH (ref 27.5–36.3)
APTT BLD: 38.1 SEC — HIGH (ref 27.5–36.3)
APTT BLD: 39.6 SEC — HIGH (ref 27.5–36.3)
AST SERPL-CCNC: 238 U/L — HIGH (ref 4–40)
AST SERPL-CCNC: 294 U/L — HIGH (ref 4–40)
AST SERPL-CCNC: 321 U/L — HIGH (ref 4–40)
BASE EXCESS BLDA CALC-SCNC: -1.8 MMOL/L — SIGNIFICANT CHANGE UP
BASE EXCESS BLDA CALC-SCNC: 0.3 MMOL/L — SIGNIFICANT CHANGE UP
BASE EXCESS BLDA CALC-SCNC: 1.4 MMOL/L — SIGNIFICANT CHANGE UP
BASE EXCESS BLDA CALC-SCNC: 1.9 MMOL/L — SIGNIFICANT CHANGE UP
BILIRUB SERPL-MCNC: 2.5 MG/DL — HIGH (ref 0.2–1.2)
BILIRUB SERPL-MCNC: 2.7 MG/DL — HIGH (ref 0.2–1.2)
BILIRUB SERPL-MCNC: 2.9 MG/DL — HIGH (ref 0.2–1.2)
BUN SERPL-MCNC: 22 MG/DL — SIGNIFICANT CHANGE UP (ref 7–23)
BUN SERPL-MCNC: 23 MG/DL — SIGNIFICANT CHANGE UP (ref 7–23)
BUN SERPL-MCNC: 23 MG/DL — SIGNIFICANT CHANGE UP (ref 7–23)
CALCIUM SERPL-MCNC: 7.5 MG/DL — LOW (ref 8.4–10.5)
CALCIUM SERPL-MCNC: 7.7 MG/DL — LOW (ref 8.4–10.5)
CALCIUM SERPL-MCNC: 7.9 MG/DL — LOW (ref 8.4–10.5)
CHLORIDE BLDA-SCNC: 103 MMOL/L — SIGNIFICANT CHANGE UP (ref 96–108)
CHLORIDE BLDA-SCNC: 104 MMOL/L — SIGNIFICANT CHANGE UP (ref 96–108)
CHLORIDE BLDA-SCNC: 105 MMOL/L — SIGNIFICANT CHANGE UP (ref 96–108)
CHLORIDE SERPL-SCNC: 100 MMOL/L — SIGNIFICANT CHANGE UP (ref 98–107)
CHLORIDE SERPL-SCNC: 99 MMOL/L — SIGNIFICANT CHANGE UP (ref 98–107)
CHLORIDE SERPL-SCNC: 99 MMOL/L — SIGNIFICANT CHANGE UP (ref 98–107)
CK SERPL-CCNC: 204 U/L — HIGH (ref 30–200)
CK SERPL-CCNC: 262 U/L — HIGH (ref 30–200)
CK SERPL-CCNC: 339 U/L — HIGH (ref 30–200)
CO2 SERPL-SCNC: 20 MMOL/L — LOW (ref 22–31)
CO2 SERPL-SCNC: 21 MMOL/L — LOW (ref 22–31)
CO2 SERPL-SCNC: 24 MMOL/L — SIGNIFICANT CHANGE UP (ref 22–31)
CREAT SERPL-MCNC: 1.66 MG/DL — HIGH (ref 0.5–1.3)
CREAT SERPL-MCNC: 1.72 MG/DL — HIGH (ref 0.5–1.3)
CREAT SERPL-MCNC: 1.8 MG/DL — HIGH (ref 0.5–1.3)
GLUCOSE BLDA-MCNC: 191 MG/DL — HIGH (ref 70–99)
GLUCOSE BLDA-MCNC: 204 MG/DL — HIGH (ref 70–99)
GLUCOSE BLDA-MCNC: 206 MG/DL — HIGH (ref 70–99)
GLUCOSE BLDA-MCNC: 214 MG/DL — HIGH (ref 70–99)
GLUCOSE SERPL-MCNC: 207 MG/DL — HIGH (ref 70–99)
GLUCOSE SERPL-MCNC: 207 MG/DL — HIGH (ref 70–99)
GLUCOSE SERPL-MCNC: 218 MG/DL — HIGH (ref 70–99)
HCO3 BLDA-SCNC: 23 MMOL/L — SIGNIFICANT CHANGE UP (ref 22–26)
HCO3 BLDA-SCNC: 25 MMOL/L — SIGNIFICANT CHANGE UP (ref 22–26)
HCO3 BLDA-SCNC: 26 MMOL/L — SIGNIFICANT CHANGE UP (ref 22–26)
HCO3 BLDA-SCNC: 26 MMOL/L — SIGNIFICANT CHANGE UP (ref 22–26)
HCT VFR BLD CALC: 32.2 % — LOW (ref 39–50)
HCT VFR BLD CALC: 32.4 % — LOW (ref 39–50)
HCT VFR BLD CALC: 33.7 % — LOW (ref 39–50)
HCT VFR BLDA CALC: 32.6 % — LOW (ref 39–51)
HCT VFR BLDA CALC: 33.3 % — LOW (ref 39–51)
HCT VFR BLDA CALC: 34.5 % — LOW (ref 39–51)
HCT VFR BLDA CALC: 35.5 % — LOW (ref 39–51)
HGB BLD-MCNC: 10.9 G/DL — LOW (ref 13–17)
HGB BLD-MCNC: 10.9 G/DL — LOW (ref 13–17)
HGB BLD-MCNC: 11.4 G/DL — LOW (ref 13–17)
HGB BLDA-MCNC: 10.6 G/DL — LOW (ref 13–17)
HGB BLDA-MCNC: 10.8 G/DL — LOW (ref 13–17)
HGB BLDA-MCNC: 11.2 G/DL — LOW (ref 13–17)
HGB BLDA-MCNC: 11.5 G/DL — LOW (ref 13–17)
INR BLD: 1 — SIGNIFICANT CHANGE UP (ref 0.88–1.17)
INR BLD: 1.01 — SIGNIFICANT CHANGE UP (ref 0.88–1.17)
INR BLD: 1.1 — SIGNIFICANT CHANGE UP (ref 0.88–1.17)
LACTATE BLDA-SCNC: 1.8 MMOL/L — SIGNIFICANT CHANGE UP (ref 0.5–2)
LACTATE BLDA-SCNC: 1.9 MMOL/L — SIGNIFICANT CHANGE UP (ref 0.5–2)
LACTATE BLDA-SCNC: 2.4 MMOL/L — HIGH (ref 0.5–2)
MAGNESIUM SERPL-MCNC: 2.4 MG/DL — SIGNIFICANT CHANGE UP (ref 1.6–2.6)
MAGNESIUM SERPL-MCNC: 2.5 MG/DL — SIGNIFICANT CHANGE UP (ref 1.6–2.6)
MAGNESIUM SERPL-MCNC: 2.5 MG/DL — SIGNIFICANT CHANGE UP (ref 1.6–2.6)
MCHC RBC-ENTMCNC: 29.1 PG — SIGNIFICANT CHANGE UP (ref 27–34)
MCHC RBC-ENTMCNC: 29.6 PG — SIGNIFICANT CHANGE UP (ref 27–34)
MCHC RBC-ENTMCNC: 29.7 PG — SIGNIFICANT CHANGE UP (ref 27–34)
MCHC RBC-ENTMCNC: 33.6 % — SIGNIFICANT CHANGE UP (ref 32–36)
MCHC RBC-ENTMCNC: 33.8 % — SIGNIFICANT CHANGE UP (ref 32–36)
MCHC RBC-ENTMCNC: 33.9 % — SIGNIFICANT CHANGE UP (ref 32–36)
MCV RBC AUTO: 86.1 FL — SIGNIFICANT CHANGE UP (ref 80–100)
MCV RBC AUTO: 87.8 FL — SIGNIFICANT CHANGE UP (ref 80–100)
MCV RBC AUTO: 88 FL — SIGNIFICANT CHANGE UP (ref 80–100)
NRBC # FLD: 0.06 K/UL — SIGNIFICANT CHANGE UP (ref 0–0)
NRBC # FLD: 0.07 K/UL — SIGNIFICANT CHANGE UP (ref 0–0)
NRBC # FLD: 0.09 K/UL — SIGNIFICANT CHANGE UP (ref 0–0)
PCO2 BLDA: 34 MMHG — LOW (ref 35–48)
PCO2 BLDA: 36 MMHG — SIGNIFICANT CHANGE UP (ref 35–48)
PCO2 BLDA: 36 MMHG — SIGNIFICANT CHANGE UP (ref 35–48)
PCO2 BLDA: 37 MMHG — SIGNIFICANT CHANGE UP (ref 35–48)
PH BLDA: 7.4 PH — SIGNIFICANT CHANGE UP (ref 7.35–7.45)
PH BLDA: 7.44 PH — SIGNIFICANT CHANGE UP (ref 7.35–7.45)
PH BLDA: 7.46 PH — HIGH (ref 7.35–7.45)
PH BLDA: 7.47 PH — HIGH (ref 7.35–7.45)
PHOSPHATE SERPL-MCNC: 2.8 MG/DL — SIGNIFICANT CHANGE UP (ref 2.5–4.5)
PHOSPHATE SERPL-MCNC: 2.9 MG/DL — SIGNIFICANT CHANGE UP (ref 2.5–4.5)
PHOSPHATE SERPL-MCNC: 3 MG/DL — SIGNIFICANT CHANGE UP (ref 2.5–4.5)
PLATELET # BLD AUTO: 61 K/UL — LOW (ref 150–400)
PLATELET # BLD AUTO: 62 K/UL — LOW (ref 150–400)
PLATELET # BLD AUTO: 65 K/UL — LOW (ref 150–400)
PMV BLD: 11.7 FL — SIGNIFICANT CHANGE UP (ref 7–13)
PMV BLD: 11.8 FL — SIGNIFICANT CHANGE UP (ref 7–13)
PMV BLD: 12.3 FL — SIGNIFICANT CHANGE UP (ref 7–13)
PO2 BLDA: 118 MMHG — HIGH (ref 83–108)
PO2 BLDA: 80 MMHG — LOW (ref 83–108)
PO2 BLDA: 82 MMHG — LOW (ref 83–108)
PO2 BLDA: 99 MMHG — SIGNIFICANT CHANGE UP (ref 83–108)
POTASSIUM BLDA-SCNC: 3.2 MMOL/L — LOW (ref 3.4–4.5)
POTASSIUM BLDA-SCNC: 3.3 MMOL/L — LOW (ref 3.4–4.5)
POTASSIUM SERPL-MCNC: 3.5 MMOL/L — SIGNIFICANT CHANGE UP (ref 3.5–5.3)
POTASSIUM SERPL-MCNC: 3.8 MMOL/L — SIGNIFICANT CHANGE UP (ref 3.5–5.3)
POTASSIUM SERPL-MCNC: 3.8 MMOL/L — SIGNIFICANT CHANGE UP (ref 3.5–5.3)
POTASSIUM SERPL-SCNC: 3.5 MMOL/L — SIGNIFICANT CHANGE UP (ref 3.5–5.3)
POTASSIUM SERPL-SCNC: 3.8 MMOL/L — SIGNIFICANT CHANGE UP (ref 3.5–5.3)
POTASSIUM SERPL-SCNC: 3.8 MMOL/L — SIGNIFICANT CHANGE UP (ref 3.5–5.3)
PROT SERPL-MCNC: 5.8 G/DL — LOW (ref 6–8.3)
PROT SERPL-MCNC: 6 G/DL — SIGNIFICANT CHANGE UP (ref 6–8.3)
PROT SERPL-MCNC: 6.1 G/DL — SIGNIFICANT CHANGE UP (ref 6–8.3)
PROTHROM AB SERPL-ACNC: 11.2 SEC — SIGNIFICANT CHANGE UP (ref 9.8–13.1)
PROTHROM AB SERPL-ACNC: 11.4 SEC — SIGNIFICANT CHANGE UP (ref 9.8–13.1)
PROTHROM AB SERPL-ACNC: 12.2 SEC — SIGNIFICANT CHANGE UP (ref 9.8–13.1)
RBC # BLD: 3.68 M/UL — LOW (ref 4.2–5.8)
RBC # BLD: 3.74 M/UL — LOW (ref 4.2–5.8)
RBC # BLD: 3.84 M/UL — LOW (ref 4.2–5.8)
RBC # FLD: 14.5 % — SIGNIFICANT CHANGE UP (ref 10.3–14.5)
RBC # FLD: 14.6 % — HIGH (ref 10.3–14.5)
RBC # FLD: 14.6 % — HIGH (ref 10.3–14.5)
SAO2 % BLDA: 93.2 % — LOW (ref 95–99)
SAO2 % BLDA: 96 % — SIGNIFICANT CHANGE UP (ref 95–99)
SAO2 % BLDA: 97.1 % — SIGNIFICANT CHANGE UP (ref 95–99)
SAO2 % BLDA: 98.2 % — SIGNIFICANT CHANGE UP (ref 95–99)
SODIUM BLDA-SCNC: 131 MMOL/L — LOW (ref 136–146)
SODIUM BLDA-SCNC: 132 MMOL/L — LOW (ref 136–146)
SODIUM BLDA-SCNC: 134 MMOL/L — LOW (ref 136–146)
SODIUM BLDA-SCNC: 134 MMOL/L — LOW (ref 136–146)
SODIUM SERPL-SCNC: 133 MMOL/L — LOW (ref 135–145)
SODIUM SERPL-SCNC: 134 MMOL/L — LOW (ref 135–145)
SODIUM SERPL-SCNC: 136 MMOL/L — SIGNIFICANT CHANGE UP (ref 135–145)
TROPONIN T, HIGH SENSITIVITY: 1005 NG/L — CRITICAL HIGH (ref ?–14)
TROPONIN T, HIGH SENSITIVITY: 1177 NG/L — CRITICAL HIGH (ref ?–14)
TROPONIN T, HIGH SENSITIVITY: 1236 NG/L — CRITICAL HIGH (ref ?–14)
WBC # BLD: 30.48 K/UL — HIGH (ref 3.8–10.5)
WBC # BLD: 30.8 K/UL — HIGH (ref 3.8–10.5)
WBC # BLD: 31.58 K/UL — HIGH (ref 3.8–10.5)
WBC # FLD AUTO: 30.48 K/UL — HIGH (ref 3.8–10.5)
WBC # FLD AUTO: 30.8 K/UL — HIGH (ref 3.8–10.5)
WBC # FLD AUTO: 31.58 K/UL — HIGH (ref 3.8–10.5)

## 2019-05-21 PROCEDURE — 76604 US EXAM CHEST: CPT | Mod: 26

## 2019-05-21 PROCEDURE — 93306 TTE W/DOPPLER COMPLETE: CPT | Mod: 26

## 2019-05-21 PROCEDURE — 99232 SBSQ HOSP IP/OBS MODERATE 35: CPT | Mod: GC

## 2019-05-21 PROCEDURE — 99291 CRITICAL CARE FIRST HOUR: CPT | Mod: 25

## 2019-05-21 PROCEDURE — 99233 SBSQ HOSP IP/OBS HIGH 50: CPT | Mod: GC

## 2019-05-21 RX ORDER — HEPARIN SODIUM 5000 [USP'U]/ML
300 INJECTION INTRAVENOUS; SUBCUTANEOUS
Qty: 10000 | Refills: 0 | Status: DISCONTINUED | OUTPATIENT
Start: 2019-05-21 | End: 2019-05-21

## 2019-05-21 RX ORDER — ASPIRIN/CALCIUM CARB/MAGNESIUM 324 MG
81 TABLET ORAL DAILY
Refills: 0 | Status: DISCONTINUED | OUTPATIENT
Start: 2019-05-21 | End: 2019-05-28

## 2019-05-21 RX ORDER — ASPIRIN/CALCIUM CARB/MAGNESIUM 324 MG
81 TABLET ORAL DAILY
Refills: 0 | Status: DISCONTINUED | OUTPATIENT
Start: 2019-05-21 | End: 2019-05-21

## 2019-05-21 RX ORDER — HEPARIN SODIUM 5000 [USP'U]/ML
500 INJECTION INTRAVENOUS; SUBCUTANEOUS
Qty: 10000 | Refills: 0 | Status: DISCONTINUED | OUTPATIENT
Start: 2019-05-21 | End: 2019-05-22

## 2019-05-21 RX ORDER — HEPARIN SODIUM 5000 [USP'U]/ML
500 INJECTION INTRAVENOUS; SUBCUTANEOUS
Qty: 10000 | Refills: 0 | Status: DISCONTINUED | OUTPATIENT
Start: 2019-05-21 | End: 2019-05-21

## 2019-05-21 RX ADMIN — Medication 2: at 05:30

## 2019-05-21 RX ADMIN — VASOPRESSIN 2.4 UNIT(S)/MIN: 20 INJECTION INTRAVENOUS at 08:02

## 2019-05-21 RX ADMIN — Medication 103 MILLIGRAM(S): at 09:20

## 2019-05-21 RX ADMIN — DOPAMINE HYDROCHLORIDE 27.62 MICROGRAM(S)/KG/MIN: 40 INJECTION, SOLUTION, CONCENTRATE INTRAVENOUS at 08:02

## 2019-05-21 RX ADMIN — Medication 50 MILLIGRAM(S): at 17:29

## 2019-05-21 RX ADMIN — Medication 2: at 11:31

## 2019-05-21 RX ADMIN — DOPAMINE HYDROCHLORIDE 27.62 MICROGRAM(S)/KG/MIN: 40 INJECTION, SOLUTION, CONCENTRATE INTRAVENOUS at 21:02

## 2019-05-21 RX ADMIN — Medication 50 MILLIGRAM(S): at 23:45

## 2019-05-21 RX ADMIN — Medication 104 MILLIGRAM(S): at 21:03

## 2019-05-21 RX ADMIN — Medication 103 MILLIGRAM(S): at 21:03

## 2019-05-21 RX ADMIN — Medication 103 MILLIGRAM(S): at 02:00

## 2019-05-21 RX ADMIN — Medication 50 MILLIGRAM(S): at 05:30

## 2019-05-21 RX ADMIN — Medication 2: at 17:30

## 2019-05-21 RX ADMIN — DOPAMINE HYDROCHLORIDE 27.62 MICROGRAM(S)/KG/MIN: 40 INJECTION, SOLUTION, CONCENTRATE INTRAVENOUS at 02:07

## 2019-05-21 RX ADMIN — Medication 1 APPLICATION(S): at 05:30

## 2019-05-21 RX ADMIN — MEROPENEM 100 MILLIGRAM(S): 1 INJECTION INTRAVENOUS at 13:17

## 2019-05-21 RX ADMIN — Medication 104 MILLIGRAM(S): at 08:14

## 2019-05-21 RX ADMIN — MEROPENEM 100 MILLIGRAM(S): 1 INJECTION INTRAVENOUS at 05:30

## 2019-05-21 RX ADMIN — Medication 81 MILLIGRAM(S): at 11:31

## 2019-05-21 RX ADMIN — PANTOPRAZOLE SODIUM 40 MILLIGRAM(S): 20 TABLET, DELAYED RELEASE ORAL at 11:30

## 2019-05-21 RX ADMIN — HEPARIN SODIUM 1 UNIT(S)/HR: 5000 INJECTION INTRAVENOUS; SUBCUTANEOUS at 08:27

## 2019-05-21 RX ADMIN — Medication 50 MILLIGRAM(S): at 11:31

## 2019-05-21 RX ADMIN — Medication 103 MILLIGRAM(S): at 13:51

## 2019-05-21 RX ADMIN — FENTANYL CITRATE 39.28 MICROGRAM(S)/KG/HR: 50 INJECTION INTRAVENOUS at 08:02

## 2019-05-21 RX ADMIN — MEROPENEM 100 MILLIGRAM(S): 1 INJECTION INTRAVENOUS at 22:45

## 2019-05-21 RX ADMIN — PROPOFOL 11.78 MICROGRAM(S)/KG/MIN: 10 INJECTION, EMULSION INTRAVENOUS at 08:03

## 2019-05-21 RX ADMIN — CHLORHEXIDINE GLUCONATE 1 APPLICATION(S): 213 SOLUTION TOPICAL at 08:15

## 2019-05-21 RX ADMIN — Medication 2: at 23:56

## 2019-05-21 NOTE — PROGRESS NOTE ADULT - SUBJECTIVE AND OBJECTIVE BOX
Chief Complaint:  Patient is a 63y old  Male who presents with a chief complaint of Septic Shock (21 May 2019 07:38)      Interval Events: Patient remains intubated and sedated. Pressor requirements are stable per RN staff.     Allergies:  Cipro (Rash)  Plavix (Rash)      Hospital Medications:  ascorbic acid IVPB 1500 milliGRAM(s) IV Intermittent <User Schedule>  aspirin Suppository 300 milliGRAM(s) Rectal daily  chlorhexidine 4% Liquid 1 Application(s) Topical <User Schedule>  CRRT Treatment    <Continuous>  DOPamine Infusion 7.5 MICROgram(s)/kG/Min IV Continuous <Continuous>  fentaNYL   Infusion. 4 MICROgram(s)/kG/Hr IV Continuous <Continuous>  heparin  Infusion Syringe 500 Unit(s)/Hr CRRT <Continuous>  hydrocortisone sodium succinate Injectable 50 milliGRAM(s) IV Push every 6 hours  insulin lispro (HumaLOG) corrective regimen sliding scale   SubCutaneous every 6 hours  meropenem  IVPB 1000 milliGRAM(s) IV Intermittent every 8 hours  pantoprazole  Injectable 40 milliGRAM(s) IV Push daily  petrolatum Ophthalmic Ointment 1 Application(s) Both EYES two times a day  PrismaSATE Dialysate BGK 4 / 2.5 5000 milliLiter(s) CRRT <Continuous>  PrismaSOL Filtration BGK 0 / 2.5 5000 milliLiter(s) CRRT <Continuous>  PrismaSOL Filtration BGK 0 / 2.5 5000 milliLiter(s) CRRT <Continuous>  propofol Infusion 20 MICROgram(s)/kG/Min IV Continuous <Continuous>  thiamine IVPB 200 milliGRAM(s) IV Intermittent <User Schedule>  vasopressin Infusion 0.04 Unit(s)/Min IV Continuous <Continuous>      PMHX/PSHX:  Gout  Type II diabetes mellitus  HTN (hypertension)  CAD (coronary artery disease)  Cirrhosis  S/P cholecystectomy    ROS: Negative, except as otherwise noted above    PHYSICAL EXAM:   Vital Signs:  Vital Signs Last 24 Hrs  T(C): 35.4 (21 May 2019 08:00), Max: 36.1 (21 May 2019 00:00)  T(F): 95.8 (21 May 2019 08:00), Max: 96.9 (21 May 2019 00:00)  HR: 52 (21 May 2019 09:33) (48 - 73)  BP: --  BP(mean): --  RR: 17 (21 May 2019 09:33) (17 - 21)  SpO2: 97% (21 May 2019 09:33) (81% - 98%)  Daily     Daily     GENERAL: Intubated and sedated  HEENT:  +ETT  HEART:  +s1, s2 heart sounds  ABDOMEN:  Soft, nontender, no rebound/guarding  SKIN:  No jaundice  NEURO:   Sedated    LABS:  CBC Full  -  ( 21 May 2019 05:30 )  WBC Count : 30.80 K/uL  Hemoglobin : 11.4 g/dL  Hematocrit : 33.7 %  Platelet Count - Automated : 62 K/uL  Mean Cell Volume : 87.8 fL  Auto Neutrophil # :   Auto Neutrophil % :     05-21 @ 05:30  Na 133 mmol/L  K 3.8 mmol/L  Cl 99 mmol/L  CO2 21 mmol/L  BUN 23 mg/dL  Creat 1.72 mg/dL  Glucose 207 mg/dL  Ca 7.7 mg/dL    Total protein 6.0 g/dL  Albumin 1.9 g/dL  T bili 2.7 mg/dL  Alk phos 307 u/L   u/L   u/L    PT/INR - ( 21 May 2019 05:30 )   PT: 11.4 SEC;   INR: 1.00          PTT - ( 21 May 2019 05:30 )  PTT:37.9 SEC

## 2019-05-21 NOTE — CHART NOTE - NSCHARTNOTEFT_GEN_A_CORE
: Favian    INDICATION: resp failure    PROCEDURE:  [ ] LIMITED ECHO  [X] LIMITED CHEST  [ ] LIMITED RETROPERITONEAL  [ ] LIMITED ABDOMINAL  [ ] LIMITED DVT  [ ] NEEDLE GUIDANCE VASCULAR  [ ] NEEDLE GUIDANCE THORACENTESIS  [ ] NEEDLE GUIDANCE PARACENTESIS  [ ] NEEDLE GUIDANCE PERICARDIOCENTESIS  [ ] OTHER    FINDINGS:     Lung: A lines anterior chest Few postero-lateral B lines      INTERPRETATION:    Normal aeration pattern

## 2019-05-21 NOTE — PROGRESS NOTE ADULT - SUBJECTIVE AND OBJECTIVE BOX
CHIEF COMPLAINT: Patient is a 63y old  Male who presents with a chief complaint of Septic Shock (19 May 2019 01:36)    Interval Events:  On vasopressin. On CVVHD. Intermittent hypoxia improved with recruitment of additional alveoli     REVIEW OF SYSTEMS:  Unable to obtain     OBJECTIVE:  ICU Vital Signs Last 24 Hrs  T(C): 36.1 (21 May 2019 04:00), Max: 36.1 (21 May 2019 00:00)  T(F): 96.9 (21 May 2019 04:00), Max: 96.9 (21 May 2019 00:00)  HR: 54 (21 May 2019 06:26) (48 - 73)  BP: --  BP(mean): --  ABP: 143/73 (21 May 2019 06:00) (84/48 - 185/73)  ABP(mean): 98 (21 May 2019 06:00) (60 - 111)  RR: 17 (21 May 2019 06:00) (17 - 21)  SpO2: 98% (21 May 2019 06:26) (81% - 98%)      Mode: AC/ CMV (Assist Control/ Continuous Mandatory Ventilation), RR (machine): 21, TV (machine): 500, FiO2: 40, PEEP: 5, MAP: 10, PIP: 22    05-19 @ : @ 07:00  --------------------------------------------------------  IN: 3594.4 mL / OUT: 4615 mL / NET: -1020.6 mL     @ 07: @ 06:49  --------------------------------------------------------  IN: 2950.8 mL / OUT: 3659 mL / NET: -708.2 mL      PHYSICAL EXAM:  GENERAL: Intubated, sedated   HEAD:  Atraumatic, Normocephalic  EYES: EOMI, PERRLA, conjunctiva and sclera clear  NECK: Supple, No JVD  CHEST/LUNG: Clear to auscultation bilaterally; No wheeze  HEART: Regular rate and rhythm; No murmurs, rubs, or gallops  ABDOMEN: Soft, Nontender, Nondistended; Bowel sounds present  EXTREMITIES:  2+ Peripheral Pulses, No clubbing, cyanosis, or edema  PSYCH: Unable to assess  NEUROLOGY: Unable to assess   SKIN: No rashes or lesions    MEDICATIONS  (STANDING):  ascorbic acid IVPB 1500 milliGRAM(s) IV Intermittent <User Schedule>  aspirin Suppository 300 milliGRAM(s) Rectal daily  chlorhexidine 4% Liquid 1 Application(s) Topical <User Schedule>  CRRT Treatment    <Continuous>  DOPamine Infusion 7.5 MICROgram(s)/kG/Min (27.619 mL/Hr) IV Continuous <Continuous>  fentaNYL   Infusion. 4 MICROgram(s)/kG/Hr (39.28 mL/Hr) IV Continuous <Continuous>  heparin  Infusion Syringe 500 Unit(s)/Hr (1 mL/Hr) CRRT <Continuous>  heparin  Injectable 69110 Unit(s) Dialysis. once  hydrocortisone sodium succinate Injectable 50 milliGRAM(s) IV Push every 6 hours  insulin lispro (HumaLOG) corrective regimen sliding scale   SubCutaneous every 6 hours  meropenem  IVPB 1000 milliGRAM(s) IV Intermittent every 8 hours  pantoprazole  Injectable 40 milliGRAM(s) IV Push daily  petrolatum Ophthalmic Ointment 1 Application(s) Both EYES two times a day  PrismaSATE Dialysate BGK 4 / 2.5 5000 milliLiter(s) (1500 mL/Hr) CRRT <Continuous>  PrismaSOL Filtration BGK 0 / 2.5 5000 milliLiter(s) (1000 mL/Hr) CRRT <Continuous>  PrismaSOL Filtration BGK 0 / 2.5 5000 milliLiter(s) (200 mL/Hr) CRRT <Continuous>  propofol Infusion 20 MICROgram(s)/kG/Min (11.784 mL/Hr) IV Continuous <Continuous>  thiamine IVPB 200 milliGRAM(s) IV Intermittent <User Schedule>  vasopressin Infusion 0.04 Unit(s)/Min (2.4 mL/Hr) IV Continuous <Continuous>    MEDICATIONS  (PRN):      LABS:                                   11.4   30.80 )-----------( 62       ( 21 May 2019 05:30 )             33.7     05-21    133<L>  |  99  |  23  ----------------------------<  207<H>  3.8   |  21<L>  |  1.72<H>    Ca    7.7<L>      21 May 2019 05:30  Phos  2.9       Mg     2.5         TPro  6.0  /  Alb  1.9<L>  /  TBili  2.7<H>  /  DBili  x   /  AST  294<H>  /  ALT  447<H>  /  AlkPhos  307<H>             PTT - ( 19 May 2019 05:30 )  PTT:84.8 SEC  Urinalysis Basic - ( 18 May 2019 00:33 )    Color: Yellow / Appearance: Slightly Turbid / S.015 / pH: x  Gluc: x / Ketone: Trace  / Bili: Moderate / Urobili: 4 mg/dL   Blood: x / Protein: 100 mg/dL / Nitrite: Negative   Leuk Esterase: Trace / RBC: 3-5 /HPF / WBC 11-25   Sq Epi: x / Non Sq Epi: Moderate / Bacteria: Many    Blood cx: Citrobacter and E coli bacteremia       RADIOLOGY:  X Ray: < from: Xray Chest 1 View- PORTABLE-Urgent (19 @ 20:18) >  EXAM:  XR CHEST PORTABLE URGENT 1V        PROCEDURE DATE:  May 18 2019     < from: CT Abdomen and Pelvis No Cont (19 @ 14:07) >  IMPRESSION: Extremely limited noncontrast examination.    CBD stent is in place, with the proximal portion in the proximal CBD and   distal tip in the second portion of the duodenum. There is dilatation of   the common hepatic duct to 1.6 cm and intrahepatic biliary ductal   dilatation, particularly involving the left lobe.    Small bilateral pleural effusions with bilateral lower lobe partial   compressive atelectasis.    ARNOLD STYLES M.D., RADIOLOGY RESIDENT  This document has been electronically signed.  CLIFFORD PANDEY M.D., ATTENDING RADIOLOGIST  This document has been electronically signed. May 19 2019  4:45PM

## 2019-05-21 NOTE — PROGRESS NOTE ADULT - ASSESSMENT
Impression:  1. Septic shock on pressors with E coli and Citrobacter bacteremia presumed secondary to biliary source, s/p ERCP 5/19/19 notable for bile in the duodenum and additional placement of plastic biliary stent alongside prior stent  2. Prior EUS/ERCP on 4/19/19 at OSH with 2cm CBD stricture and with sphincterotomy and stent placement, biopsies negative and brushings with atypical cells (favor reactive atypia)  3. Hypoxic respiratory failure s/p intubation  4. CAD s/p PCI x 9 with elevated troponin, NSTEMI on heparin infusion.  5. SLOAN on CKD on CVVH  6. DM  7. GERD    Recommendations:  - Continue IV antimicrobials  - Consider repeat MRI/MRCP once clinically stabilized to re-evaluate entire biliary tree  - Check IgG4, total IgG  - Monitor CBC, CMP  - Supportive care per ICU  - Page GI with questions or changes in clinical status

## 2019-05-21 NOTE — PROGRESS NOTE ADULT - ASSESSMENT
62 yo M hx of CAD s/p 9 stents, DMT2 on insulin, HTN, GERD, prior cholecystectomy,  diverticulosis, former smoker(30PY), biliary stricture s/p ERCP with sphincterotomy and stent 4/2019 who presented as transfer from [facility] for acute cholangitis s/p ERCP and stent CBD stent placement by GI on 5/19. On pressors in setting of E coli bacteremia. Hospital course further complicated by Type II NSTEMI and bradycardia. HR improved with Dopamine infusion     Neuro - Intubated, sedated and was given paralytics   - on propofol gtt + fentanyl   - s/p paralysis to facilitate vent synchrony  - Aox3 at baseline and fully functional    CV - Mostly Distributive shock in setting of E coli bacteremia   -Likely having type II NSTEMI-may need ischemic evaluation during this admission   -d/c heparin. Dopamine infusion for bradycardia    - c/w Vasopressin. Off of Levo   -c/w UF at 100 cc/hr net neg     Pulm - hypoxic resp failure s/p intubation 5/17  - Intermittent hypoxia in the setting of bilateral alveolar consolidations   - Hypoxia improved after increased PEEP/alveolar recruitment    GI - Acute cholangitis in setting of CBD stricture and recent ERCP with stent placement. 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, path negative, pt was pending Whipple Procedure on 5/30  - GI performed second ERCP on 5/19 with another stent placed in CBD  - continue to treat infection with Meropenem, +Ecoli bacteremia + citrobacter bacteremia likely due to cholangitis  - monitor cmp q12  -repeat MRCP when stable     Renal - AG metabolic acidosis in setting fo distributive shock   - cont treating infection and source control with ERCP  - renal following, CVVH per renal  - trend BMPs    ID - acute cholangitis c/b e coli/citarobacter bacteremia  - cont meropenem, renally dosed  - f/u repeat bl clx    -Legionella neg     Endo - DMT2  - monitor fs q6    Heme - leukocytosis, thrombocytopenia, likely due to infection  - monitor labs q6  - Heparin gtt for ACS/ vte ppx    Full Code, Dr Gray,son, is primary Decision maker. 64 yo M hx of CAD s/p 9 stents, DMT2 on insulin, HTN, GERD, prior cholecystectomy,  diverticulosis, former smoker(30PY), biliary stricture s/p ERCP with sphincterotomy and stent 4/2019 who presented as transfer from [facility] for acute cholangitis s/p ERCP and stent CBD stent placement by GI on 5/19. On pressors in setting of E coli bacteremia. Hospital course further complicated by Type II NSTEMI and bradycardia. HR improved with Dopamine infusion     Neuro - Intubated, sedated   - on propofol gtt + fentanyl--sedation vacation today    -will assess mental status as sedation wears off     CV - Distributive shock in setting of E coli/Citerobacter bacteremia   -Likely having type II NSTEMI-may need ischemic evaluation during this admission   -d/c heparin. Dopamine infusion for bradycardia    -off of all pressors   -c/w UF at 100 cc/hr net neg and increase as tolerated     Pulm - hypoxic resp failure s/p intubation 5/17  - Intermittent hypoxia in the setting of bilateral alveolar consolidations   - Hypoxia improved after increased PEEP/alveolar recruitment    GI - Acute cholangitis in setting of CBD stricture and recent ERCP with stent placement. 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, path negative, pt was pending Whipple Procedure on 5/30  - GI performed second ERCP on 5/19 with another stent placed in CBD  - continue to treat infection with Meropenem, +Ecoli bacteremia + citrobacter bacteremia likely due to cholangitis  -repeat MRCP when stable to assess biliary tree     Renal -Renal failure in setting of distributive shock   -CVVHD  - trend BMPs    ID - acute cholangitis c/b e coli/citarobacter bacteremia  - cont meropenem, renally dosed-day 4 today   - f/u repeat bl clx    -Legionella neg     Endo - DMT2  - monitor fs q6    Heme - leukocytosis, thrombocytopenia, likely due to infection  - SCDs for ppx

## 2019-05-21 NOTE — PROGRESS NOTE ADULT - ATTENDING COMMENTS
Critically ill on vent/pressors/CVVHD with sepsis  Frequent bedside visits with therapy change today.   I have personally provided  minutes of critical care time concurrently with the resident/fellow; this excludes time spent on separate procedures. Critically ill on vent/pressors/CVVHD with sepsis  Frequent bedside visits with therapy change today.   I have personally provided 35+ minutes of critical care time concurrently with the resident/fellow; this excludes time spent on separate procedures.

## 2019-05-21 NOTE — PROGRESS NOTE ADULT - PROBLEM SELECTOR PLAN 1
Pt. with oliguric SLOAN on CKD in the setting of sepsis, hypotension and NSTEMI. On lab review of [facility], SCr elevated at 4.05 on 4/18/19 and decreased at 3.71 on 4/20/19. SCr elevated at 1.97 on 5/17/19. SCr elevated at 2.29 on 5/17/19 and further increased to 2.90 on 5/18/19. Pt. with likely ATN. Pt. initiated on CRRT on 5/18/19. Pt. tolerating CRRT with -50cc/hr. Pt. remains intubated and on IV pressor support. Pt. with worsening LE edema. Plan to continue CRRT today, recommend increasing UF goal as hemodynamically tolerated.. Monitor UOP and daily weights. Avoid any potential nephrotoxins. Dose medications as per eGFR

## 2019-05-21 NOTE — PROGRESS NOTE ADULT - ATTENDING COMMENTS
Critically ill on vent/pressors/CVVHD with sepsis  Frequent bedside visits with therapy change today.   I have personally provided  minutes of critical care time concurrently with the resident/fellow; this excludes time spent on separate procedures.

## 2019-05-21 NOTE — PROGRESS NOTE ADULT - SUBJECTIVE AND OBJECTIVE BOX
[facility], [place] DIVISION OF KIDNEY DISEASES AND HYPERTENSION -- FOLLOW UP NOTE  --------------------------------------------------------------------------------  HPI: 63-year-old male with medical history of CAD s/p 9 stents, DM, HTN, GERD, prior cholecystectomy,  diverticulosis, biliary stricture s/p ERCP with sphincteromy and stent 4/2019 who presents as transfer from [facility] where he was admitted with septic shock. Nephrology team is following for SLOAN on CKD and anuria. Pt. found to be in septic shock due to E Coli bacteremia was intubated for acute hypoxic respiratory failure and started on pressor support. He received vancomycin, zosyn, micafungin, merepenem and gentamicin initially. Also pt. developed NSTEMI and was started on heparin gtt with dobutamine due to concern for new LV dysfunction. Pt. with known history of CKD 3 secondary to DM and HTN, follows as outpatient with Dr Hodgson. As per pt's son SCr baseline 2.5-2.6 in March 2019 however states decrease to 1.9 in April 2019. On lab review of [facility] SCr elevated to 4.05 on 4/18/19 and decreased to 3.71 on 4/20/19. SCr elevated at 1.97 on 5/17/19. SCr elevated at 2.29 on 5/17/19 and further increase at 2.90 on 5/18/19. Pt. starting on CRRT on 5/18/19.    Pt. seen and examined at bedside this AM. Pt. intubated on pressor support. Tolerating CRRT with decreased UF since yesterday due to bradycardia/hypotensive episodes. CRRT clotted once yesterday, improved with heparin. Pt. tolerating -50cc/hr. Unable to provide ROS.    PAST HISTORY  --------------------------------------------------------------------------------  No significant changes to PMH, PSH, FHx, SHx, unless otherwise noted    ALLERGIES & MEDICATIONS  --------------------------------------------------------------------------------  Allergies    Cipro (Rash)  Plavix (Rash)    Intolerances    Standing Inpatient Medications  ascorbic acid IVPB 1500 milliGRAM(s) IV Intermittent <User Schedule>  aspirin Suppository 300 milliGRAM(s) Rectal daily  chlorhexidine 4% Liquid 1 Application(s) Topical <User Schedule>  CRRT Treatment    <Continuous>  DOPamine Infusion 7.5 MICROgram(s)/kG/Min IV Continuous <Continuous>  fentaNYL   Infusion. 4 MICROgram(s)/kG/Hr IV Continuous <Continuous>  heparin  Infusion Syringe 500 Unit(s)/Hr CRRT <Continuous>  heparin  Injectable 78637 Unit(s) Dialysis. once  hydrocortisone sodium succinate Injectable 50 milliGRAM(s) IV Push every 6 hours  insulin lispro (HumaLOG) corrective regimen sliding scale   SubCutaneous every 6 hours  meropenem  IVPB 1000 milliGRAM(s) IV Intermittent every 8 hours  pantoprazole  Injectable 40 milliGRAM(s) IV Push daily  petrolatum Ophthalmic Ointment 1 Application(s) Both EYES two times a day  PrismaSATE Dialysate BGK 4 / 2.5 5000 milliLiter(s) CRRT <Continuous>  PrismaSOL Filtration BGK 0 / 2.5 5000 milliLiter(s) CRRT <Continuous>  PrismaSOL Filtration BGK 0 / 2.5 5000 milliLiter(s) CRRT <Continuous>  propofol Infusion 20 MICROgram(s)/kG/Min IV Continuous <Continuous>  thiamine IVPB 200 milliGRAM(s) IV Intermittent <User Schedule>  vasopressin Infusion 0.04 Unit(s)/Min IV Continuous <Continuous>    REVIEW OF SYSTEMS  --------------------------------------------------------------------------------  Unable to obtain.    VITALS/PHYSICAL EXAM  --------------------------------------------------------------------------------  T(C): 36.1 (05-21-19 @ 04:00), Max: 36.1 (05-21-19 @ 00:00)  HR: 54 (05-21-19 @ 07:00) (48 - 73)  BP: --  RR: 17 (05-21-19 @ 07:00) (17 - 21)  SpO2: 98% (05-21-19 @ 07:00) (81% - 98%)  Wt(kg): --  Height (cm): 170.18 (05-20-19 @ 08:21)    05-20-19 @ 07:01  -  05-21-19 @ 07:00  --------------------------------------------------------  IN: 2950.8 mL / OUT: 3659 mL / NET: -708.2 mL    Physical Exam:  	Gen: Intubated, sedated  	HEENT: + ETT  	Pulm: coarse breathe sounds B/L  	CV: S1S2  	Abd: Soft, +BS   	Ext: 1+ pitting LE edema B/L  	Neuro: Sedated  	Skin: Warm and dry  	Vascualr access: LIJ non-tunneled HD catheter site:  without bleeding.	    LABS/STUDIES  --------------------------------------------------------------------------------              11.4   30.80 >-----------<  62       [05-21-19 @ 05:30]              33.7     133  |  99  |  23  ----------------------------<  207      [05-21-19 @ 05:30]  3.8   |  21  |  1.72        Ca     7.7     [05-21-19 @ 05:30]      Mg     2.5     [05-21-19 @ 05:30]      Phos  2.9     [05-21-19 @ 05:30]    Creatinine Trend:  SCr 1.72 [05-21 @ 05:30]  SCr 1.80 [05-21 @ 00:25]  SCr 1.81 [05-20 @ 17:30]  SCr 1.92 [05-20 @ 12:00]  SCr 2.14 [05-20 @ 05:20]

## 2019-05-21 NOTE — PROGRESS NOTE ADULT - ATTENDING COMMENTS
oliguric ATN with cardiogenic shock.  Levo is off.  continue with CRRT. filter clotted; heparin added to the circuit.  increase UF to 100-150cc.hr. oliguric ATN with  shock.  Levo is off.  continue with CRRT. filter clotted; heparin added to the circuit.  increase UF to 100-150cc.hr.

## 2019-05-22 DIAGNOSIS — E11.9 TYPE 2 DIABETES MELLITUS WITHOUT COMPLICATIONS: ICD-10-CM

## 2019-05-22 DIAGNOSIS — Z95.5 PRESENCE OF CORONARY ANGIOPLASTY IMPLANT AND GRAFT: ICD-10-CM

## 2019-05-22 DIAGNOSIS — Z88.1 ALLERGY STATUS TO OTHER ANTIBIOTIC AGENTS STATUS: ICD-10-CM

## 2019-05-22 DIAGNOSIS — R65.21 SEVERE SEPSIS WITH SEPTIC SHOCK: ICD-10-CM

## 2019-05-22 DIAGNOSIS — A41.50 GRAM-NEGATIVE SEPSIS, UNSPECIFIED: ICD-10-CM

## 2019-05-22 DIAGNOSIS — Z88.8 ALLERGY STATUS TO OTHER DRUGS, MEDICAMENTS AND BIOLOGICAL SUBSTANCES: ICD-10-CM

## 2019-05-22 DIAGNOSIS — K83.09 OTHER CHOLANGITIS: ICD-10-CM

## 2019-05-22 DIAGNOSIS — E87.2 ACIDOSIS: ICD-10-CM

## 2019-05-22 DIAGNOSIS — R57.0 CARDIOGENIC SHOCK: ICD-10-CM

## 2019-05-22 DIAGNOSIS — Z79.82 LONG TERM (CURRENT) USE OF ASPIRIN: ICD-10-CM

## 2019-05-22 DIAGNOSIS — Z90.49 ACQUIRED ABSENCE OF OTHER SPECIFIED PARTS OF DIGESTIVE TRACT: ICD-10-CM

## 2019-05-22 DIAGNOSIS — J96.01 ACUTE RESPIRATORY FAILURE WITH HYPOXIA: ICD-10-CM

## 2019-05-22 DIAGNOSIS — I21.4 NON-ST ELEVATION (NSTEMI) MYOCARDIAL INFARCTION: ICD-10-CM

## 2019-05-22 DIAGNOSIS — Z87.891 PERSONAL HISTORY OF NICOTINE DEPENDENCE: ICD-10-CM

## 2019-05-22 DIAGNOSIS — N17.0 ACUTE KIDNEY FAILURE WITH TUBULAR NECROSIS: ICD-10-CM

## 2019-05-22 DIAGNOSIS — Z79.4 LONG TERM (CURRENT) USE OF INSULIN: ICD-10-CM

## 2019-05-22 DIAGNOSIS — K57.90 DIVERTICULOSIS OF INTESTINE, PART UNSPECIFIED, WITHOUT PERFORATION OR ABSCESS WITHOUT BLEEDING: ICD-10-CM

## 2019-05-22 DIAGNOSIS — I25.10 ATHEROSCLEROTIC HEART DISEASE OF NATIVE CORONARY ARTERY WITHOUT ANGINA PECTORIS: ICD-10-CM

## 2019-05-22 DIAGNOSIS — I10 ESSENTIAL (PRIMARY) HYPERTENSION: ICD-10-CM

## 2019-05-22 LAB
ALBUMIN SERPL ELPH-MCNC: 2.2 G/DL — LOW (ref 3.3–5)
ALBUMIN SERPL ELPH-MCNC: 2.6 G/DL — LOW (ref 3.3–5)
ALP SERPL-CCNC: 288 U/L — HIGH (ref 40–120)
ALP SERPL-CCNC: 315 U/L — HIGH (ref 40–120)
ALT FLD-CCNC: 319 U/L — HIGH (ref 4–41)
ALT FLD-CCNC: 359 U/L — HIGH (ref 4–41)
AMMONIA BLD-MCNC: 41 UMOL/L — SIGNIFICANT CHANGE UP (ref 11–55)
ANION GAP SERPL CALC-SCNC: 12 MMO/L — SIGNIFICANT CHANGE UP (ref 7–14)
ANION GAP SERPL CALC-SCNC: 12 MMO/L — SIGNIFICANT CHANGE UP (ref 7–14)
AST SERPL-CCNC: 109 U/L — HIGH (ref 4–40)
AST SERPL-CCNC: 157 U/L — HIGH (ref 4–40)
BASE EXCESS BLDA CALC-SCNC: 0.4 MMOL/L — SIGNIFICANT CHANGE UP
BASE EXCESS BLDA CALC-SCNC: 1.6 MMOL/L — SIGNIFICANT CHANGE UP
BASOPHILS # BLD AUTO: 0.15 K/UL — SIGNIFICANT CHANGE UP (ref 0–0.2)
BASOPHILS # BLD AUTO: 0.15 K/UL — SIGNIFICANT CHANGE UP (ref 0–0.2)
BASOPHILS NFR BLD AUTO: 0.4 % — SIGNIFICANT CHANGE UP (ref 0–2)
BASOPHILS NFR BLD AUTO: 0.5 % — SIGNIFICANT CHANGE UP (ref 0–2)
BILIRUB SERPL-MCNC: 1.8 MG/DL — HIGH (ref 0.2–1.2)
BILIRUB SERPL-MCNC: 2 MG/DL — HIGH (ref 0.2–1.2)
BLOOD GAS ARTERIAL - FIO2: 25 — SIGNIFICANT CHANGE UP
BUN SERPL-MCNC: 26 MG/DL — HIGH (ref 7–23)
BUN SERPL-MCNC: 31 MG/DL — HIGH (ref 7–23)
CALCIUM SERPL-MCNC: 8 MG/DL — LOW (ref 8.4–10.5)
CALCIUM SERPL-MCNC: 8.4 MG/DL — SIGNIFICANT CHANGE UP (ref 8.4–10.5)
CHLORIDE SERPL-SCNC: 100 MMOL/L — SIGNIFICANT CHANGE UP (ref 98–107)
CHLORIDE SERPL-SCNC: 98 MMOL/L — SIGNIFICANT CHANGE UP (ref 98–107)
CK MB BLD-MCNC: 6.75 NG/ML — HIGH (ref 1–6.6)
CK SERPL-CCNC: 135 U/L — SIGNIFICANT CHANGE UP (ref 30–200)
CO2 SERPL-SCNC: 23 MMOL/L — SIGNIFICANT CHANGE UP (ref 22–31)
CO2 SERPL-SCNC: 24 MMOL/L — SIGNIFICANT CHANGE UP (ref 22–31)
CREAT SERPL-MCNC: 1.64 MG/DL — HIGH (ref 0.5–1.3)
CREAT SERPL-MCNC: 1.69 MG/DL — HIGH (ref 0.5–1.3)
EOSINOPHIL # BLD AUTO: 0.01 K/UL — SIGNIFICANT CHANGE UP (ref 0–0.5)
EOSINOPHIL # BLD AUTO: 0.01 K/UL — SIGNIFICANT CHANGE UP (ref 0–0.5)
EOSINOPHIL NFR BLD AUTO: 0 % — SIGNIFICANT CHANGE UP (ref 0–6)
EOSINOPHIL NFR BLD AUTO: 0 % — SIGNIFICANT CHANGE UP (ref 0–6)
GLUCOSE BLDA-MCNC: 225 MG/DL — HIGH (ref 70–99)
GLUCOSE BLDA-MCNC: 257 MG/DL — HIGH (ref 70–99)
GLUCOSE SERPL-MCNC: 227 MG/DL — HIGH (ref 70–99)
GLUCOSE SERPL-MCNC: 268 MG/DL — HIGH (ref 70–99)
HBV SURFACE AG SER-ACNC: NEGATIVE — SIGNIFICANT CHANGE UP
HCO3 BLDA-SCNC: 24 MMOL/L — SIGNIFICANT CHANGE UP (ref 22–26)
HCO3 BLDA-SCNC: 26 MMOL/L — SIGNIFICANT CHANGE UP (ref 22–26)
HCT VFR BLD CALC: 32.7 % — LOW (ref 39–50)
HCT VFR BLD CALC: 34.6 % — LOW (ref 39–50)
HCT VFR BLDA CALC: 33.9 % — LOW (ref 39–51)
HCT VFR BLDA CALC: 37.9 % — LOW (ref 39–51)
HGB BLD-MCNC: 11 G/DL — LOW (ref 13–17)
HGB BLD-MCNC: 11.8 G/DL — LOW (ref 13–17)
HGB BLDA-MCNC: 11 G/DL — LOW (ref 13–17)
HGB BLDA-MCNC: 12.3 G/DL — LOW (ref 13–17)
IMM GRANULOCYTES NFR BLD AUTO: 3.9 % — HIGH (ref 0–1.5)
IMM GRANULOCYTES NFR BLD AUTO: 4.5 % — HIGH (ref 0–1.5)
INR BLD: 0.94 — SIGNIFICANT CHANGE UP (ref 0.88–1.17)
LYMPHOCYTES # BLD AUTO: 1.64 K/UL — SIGNIFICANT CHANGE UP (ref 1–3.3)
LYMPHOCYTES # BLD AUTO: 1.9 K/UL — SIGNIFICANT CHANGE UP (ref 1–3.3)
LYMPHOCYTES # BLD AUTO: 5.1 % — LOW (ref 13–44)
LYMPHOCYTES # BLD AUTO: 5.3 % — LOW (ref 13–44)
MAGNESIUM SERPL-MCNC: 2.7 MG/DL — HIGH (ref 1.6–2.6)
MCHC RBC-ENTMCNC: 29 PG — SIGNIFICANT CHANGE UP (ref 27–34)
MCHC RBC-ENTMCNC: 29.9 PG — SIGNIFICANT CHANGE UP (ref 27–34)
MCHC RBC-ENTMCNC: 33.6 % — SIGNIFICANT CHANGE UP (ref 32–36)
MCHC RBC-ENTMCNC: 34.1 % — SIGNIFICANT CHANGE UP (ref 32–36)
MCV RBC AUTO: 86.3 FL — SIGNIFICANT CHANGE UP (ref 80–100)
MCV RBC AUTO: 87.8 FL — SIGNIFICANT CHANGE UP (ref 80–100)
MONOCYTES # BLD AUTO: 3.4 K/UL — HIGH (ref 0–0.9)
MONOCYTES # BLD AUTO: 3.79 K/UL — HIGH (ref 0–0.9)
MONOCYTES NFR BLD AUTO: 10.5 % — SIGNIFICANT CHANGE UP (ref 2–14)
MONOCYTES NFR BLD AUTO: 10.6 % — SIGNIFICANT CHANGE UP (ref 2–14)
NEUTROPHILS # BLD AUTO: 25.91 K/UL — HIGH (ref 1.8–7.4)
NEUTROPHILS # BLD AUTO: 28.39 K/UL — HIGH (ref 1.8–7.4)
NEUTROPHILS NFR BLD AUTO: 79.2 % — HIGH (ref 43–77)
NEUTROPHILS NFR BLD AUTO: 80 % — HIGH (ref 43–77)
NRBC # FLD: 0.07 K/UL — SIGNIFICANT CHANGE UP (ref 0–0)
NRBC # FLD: 0.08 K/UL — SIGNIFICANT CHANGE UP (ref 0–0)
PCO2 BLDA: 38 MMHG — SIGNIFICANT CHANGE UP (ref 35–48)
PCO2 BLDA: 47 MMHG — SIGNIFICANT CHANGE UP (ref 35–48)
PH BLDA: 7.35 PH — SIGNIFICANT CHANGE UP (ref 7.35–7.45)
PH BLDA: 7.44 PH — SIGNIFICANT CHANGE UP (ref 7.35–7.45)
PHOSPHATE SERPL-MCNC: 2.6 MG/DL — SIGNIFICANT CHANGE UP (ref 2.5–4.5)
PLATELET # BLD AUTO: 59 K/UL — LOW (ref 150–400)
PLATELET # BLD AUTO: 70 K/UL — LOW (ref 150–400)
PMV BLD: 12.1 FL — SIGNIFICANT CHANGE UP (ref 7–13)
PMV BLD: 12.5 FL — SIGNIFICANT CHANGE UP (ref 7–13)
PO2 BLDA: 80 MMHG — LOW (ref 83–108)
PO2 BLDA: 81 MMHG — LOW (ref 83–108)
POTASSIUM BLDA-SCNC: 3.3 MMOL/L — LOW (ref 3.4–4.5)
POTASSIUM BLDA-SCNC: 4.2 MMOL/L — SIGNIFICANT CHANGE UP (ref 3.4–4.5)
POTASSIUM SERPL-MCNC: 3.4 MMOL/L — LOW (ref 3.5–5.3)
POTASSIUM SERPL-MCNC: 4.4 MMOL/L — SIGNIFICANT CHANGE UP (ref 3.5–5.3)
POTASSIUM SERPL-SCNC: 3.4 MMOL/L — LOW (ref 3.5–5.3)
POTASSIUM SERPL-SCNC: 4.4 MMOL/L — SIGNIFICANT CHANGE UP (ref 3.5–5.3)
PROT SERPL-MCNC: 6 G/DL — SIGNIFICANT CHANGE UP (ref 6–8.3)
PROT SERPL-MCNC: 6.5 G/DL — SIGNIFICANT CHANGE UP (ref 6–8.3)
PROTHROM AB SERPL-ACNC: 10.7 SEC — SIGNIFICANT CHANGE UP (ref 9.8–13.1)
RBC # BLD: 3.79 M/UL — LOW (ref 4.2–5.8)
RBC # BLD: 3.94 M/UL — LOW (ref 4.2–5.8)
RBC # FLD: 14.4 % — SIGNIFICANT CHANGE UP (ref 10.3–14.5)
RBC # FLD: 14.6 % — HIGH (ref 10.3–14.5)
SAO2 % BLDA: 92.9 % — LOW (ref 95–99)
SAO2 % BLDA: 95.6 % — SIGNIFICANT CHANGE UP (ref 95–99)
SODIUM BLDA-SCNC: 132 MMOL/L — LOW (ref 136–146)
SODIUM BLDA-SCNC: 135 MMOL/L — LOW (ref 136–146)
SODIUM SERPL-SCNC: 134 MMOL/L — LOW (ref 135–145)
SODIUM SERPL-SCNC: 135 MMOL/L — SIGNIFICANT CHANGE UP (ref 135–145)
TROPONIN T, HIGH SENSITIVITY: 1013 NG/L — CRITICAL HIGH (ref ?–14)
WBC # BLD: 32.38 K/UL — HIGH (ref 3.8–10.5)
WBC # BLD: 35.85 K/UL — HIGH (ref 3.8–10.5)
WBC # FLD AUTO: 32.38 K/UL — HIGH (ref 3.8–10.5)
WBC # FLD AUTO: 35.85 K/UL — HIGH (ref 3.8–10.5)

## 2019-05-22 PROCEDURE — 99291 CRITICAL CARE FIRST HOUR: CPT | Mod: 25

## 2019-05-22 PROCEDURE — 99232 SBSQ HOSP IP/OBS MODERATE 35: CPT | Mod: GC

## 2019-05-22 PROCEDURE — 99233 SBSQ HOSP IP/OBS HIGH 50: CPT | Mod: GC

## 2019-05-22 PROCEDURE — 71045 X-RAY EXAM CHEST 1 VIEW: CPT | Mod: 26

## 2019-05-22 PROCEDURE — 36800 INSERTION OF CANNULA: CPT | Mod: GC

## 2019-05-22 RX ORDER — FENTANYL CITRATE 50 UG/ML
50 INJECTION INTRAVENOUS ONCE
Refills: 0 | Status: DISCONTINUED | OUTPATIENT
Start: 2019-05-22 | End: 2019-05-22

## 2019-05-22 RX ORDER — HEPARIN SODIUM 5000 [USP'U]/ML
500 INJECTION INTRAVENOUS; SUBCUTANEOUS
Refills: 0 | Status: DISCONTINUED | OUTPATIENT
Start: 2019-05-22 | End: 2019-05-22

## 2019-05-22 RX ORDER — FENTANYL CITRATE 50 UG/ML
100 INJECTION INTRAVENOUS ONCE
Refills: 0 | Status: DISCONTINUED | OUTPATIENT
Start: 2019-05-22 | End: 2019-05-22

## 2019-05-22 RX ORDER — ALTEPLASE 100 MG
2 KIT INTRAVENOUS ONCE
Refills: 0 | Status: DISCONTINUED | OUTPATIENT
Start: 2019-05-22 | End: 2019-05-22

## 2019-05-22 RX ORDER — POTASSIUM CHLORIDE 20 MEQ
20 PACKET (EA) ORAL ONCE
Refills: 0 | Status: DISCONTINUED | OUTPATIENT
Start: 2019-05-22 | End: 2019-05-22

## 2019-05-22 RX ORDER — HEPARIN SODIUM 5000 [USP'U]/ML
1000 INJECTION INTRAVENOUS; SUBCUTANEOUS ONCE
Refills: 0 | Status: DISCONTINUED | OUTPATIENT
Start: 2019-05-22 | End: 2019-05-22

## 2019-05-22 RX ORDER — SENNA PLUS 8.6 MG/1
5 TABLET ORAL DAILY
Refills: 0 | Status: DISCONTINUED | OUTPATIENT
Start: 2019-05-22 | End: 2019-05-23

## 2019-05-22 RX ORDER — HEPARIN SODIUM 5000 [USP'U]/ML
500 INJECTION INTRAVENOUS; SUBCUTANEOUS
Qty: 10000 | Refills: 0 | Status: DISCONTINUED | OUTPATIENT
Start: 2019-05-22 | End: 2019-05-22

## 2019-05-22 RX ORDER — CHLORHEXIDINE GLUCONATE 213 G/1000ML
1 SOLUTION TOPICAL ONCE
Refills: 0 | Status: DISCONTINUED | OUTPATIENT
Start: 2019-05-22 | End: 2019-05-22

## 2019-05-22 RX ORDER — POTASSIUM CHLORIDE 20 MEQ
20 PACKET (EA) ORAL ONCE
Refills: 0 | Status: COMPLETED | OUTPATIENT
Start: 2019-05-22 | End: 2019-05-22

## 2019-05-22 RX ORDER — DOCUSATE SODIUM 100 MG
100 CAPSULE ORAL
Refills: 0 | Status: DISCONTINUED | OUTPATIENT
Start: 2019-05-22 | End: 2019-05-23

## 2019-05-22 RX ADMIN — Medication 104 MILLIGRAM(S): at 08:02

## 2019-05-22 RX ADMIN — MEROPENEM 100 MILLIGRAM(S): 1 INJECTION INTRAVENOUS at 05:25

## 2019-05-22 RX ADMIN — PANTOPRAZOLE SODIUM 40 MILLIGRAM(S): 20 TABLET, DELAYED RELEASE ORAL at 11:19

## 2019-05-22 RX ADMIN — CHLORHEXIDINE GLUCONATE 1 APPLICATION(S): 213 SOLUTION TOPICAL at 07:59

## 2019-05-22 RX ADMIN — Medication 2: at 05:25

## 2019-05-22 RX ADMIN — Medication 2: at 23:47

## 2019-05-22 RX ADMIN — Medication 1 APPLICATION(S): at 05:26

## 2019-05-22 RX ADMIN — HEPARIN SODIUM 1 UNIT(S)/HR: 5000 INJECTION INTRAVENOUS; SUBCUTANEOUS at 07:59

## 2019-05-22 RX ADMIN — Medication 20 MILLIEQUIVALENT(S): at 09:21

## 2019-05-22 RX ADMIN — Medication 100 MILLIGRAM(S): at 11:18

## 2019-05-22 RX ADMIN — SENNA PLUS 5 MILLILITER(S): 8.6 TABLET ORAL at 11:19

## 2019-05-22 RX ADMIN — FENTANYL CITRATE 100 MICROGRAM(S): 50 INJECTION INTRAVENOUS at 05:26

## 2019-05-22 RX ADMIN — Medication 103 MILLIGRAM(S): at 09:21

## 2019-05-22 RX ADMIN — Medication 2 MILLIGRAM(S): at 22:00

## 2019-05-22 RX ADMIN — FENTANYL CITRATE 100 MICROGRAM(S): 50 INJECTION INTRAVENOUS at 21:26

## 2019-05-22 RX ADMIN — DOPAMINE HYDROCHLORIDE 27.62 MICROGRAM(S)/KG/MIN: 40 INJECTION, SOLUTION, CONCENTRATE INTRAVENOUS at 07:59

## 2019-05-22 RX ADMIN — Medication 100 MILLIGRAM(S): at 17:09

## 2019-05-22 RX ADMIN — Medication 50 MILLIGRAM(S): at 23:48

## 2019-05-22 RX ADMIN — Medication 50 MILLIGRAM(S): at 05:25

## 2019-05-22 RX ADMIN — FENTANYL CITRATE 50 MICROGRAM(S): 50 INJECTION INTRAVENOUS at 21:26

## 2019-05-22 RX ADMIN — Medication 2: at 11:23

## 2019-05-22 RX ADMIN — Medication 50 MILLIGRAM(S): at 17:09

## 2019-05-22 RX ADMIN — Medication 4: at 17:09

## 2019-05-22 RX ADMIN — FENTANYL CITRATE 50 MICROGRAM(S): 50 INJECTION INTRAVENOUS at 03:42

## 2019-05-22 RX ADMIN — Medication 81 MILLIGRAM(S): at 11:19

## 2019-05-22 RX ADMIN — Medication 2 MILLIGRAM(S): at 05:54

## 2019-05-22 RX ADMIN — Medication 50 MILLIGRAM(S): at 11:19

## 2019-05-22 RX ADMIN — Medication 103 MILLIGRAM(S): at 03:24

## 2019-05-22 RX ADMIN — MEROPENEM 100 MILLIGRAM(S): 1 INJECTION INTRAVENOUS at 13:18

## 2019-05-22 NOTE — DIETITIAN INITIAL EVALUATION ADULT. - OTHER INFO
Nutrition assessment initiated for critical care admission . Pt. lethargic, intubated , initiated on EN , pt. w/ 2+ generalized edema .

## 2019-05-22 NOTE — PROGRESS NOTE ADULT - PROBLEM SELECTOR PLAN 1
Pt. with oliguric SLOAN on CKD in the setting of sepsis, hypotension and NSTEMI. On lab review of [facility], SCr elevated at 4.05 on 4/18/19 and decreased at 3.71 on 4/20/19. SCr elevated at 1.97 on 5/17/19. SCr elevated at 2.29 on 5/17/19 and further increased to 2.90 on 5/18/19. Pt. with likely ATN. Pt. initiated on CRRT on 5/18/19. Pt. tolerating CRRT with -150cc/hr in past 24 hours. Pt. remains intubated, however on 25% FiO2. Pt. is off IV vasopressors. Plan to continue CRRT today with continued UF of 100-150cc/hr. Monitor UOP and daily weights. Avoid any potential nephrotoxins. Dose medications as per eGFR Pt. with oliguric SLOAN on CKD in the setting of sepsis, hypotension and NSTEMI. On lab review of [facility], SCr elevated at 4.05 on 4/18/19 and decreased at 3.71 on 4/20/19. SCr elevated at 1.97 on 5/17/19. SCr elevated at 2.29 on 5/17/19 and further increased to 2.90 on 5/18/19. Pt. with likely ATN. Pt. initiated on CRRT on 5/18/19. Pt. tolerating CRRT with -150cc/hr in past 24 hours. Pt. remains intubated, however on 25% FiO2. Pt. is off IV vasopressors. Plan to continue CRRT today with continued UF of 100-150cc/hr. Will consider transition to intermittent HD in next few days. Monitor UOP and daily weights. Avoid any potential nephrotoxins. Dose medications as per eGFR

## 2019-05-22 NOTE — PROGRESS NOTE ADULT - ATTENDING COMMENTS
Agree with above. Critically ill requiring frequent bedside visits. Continue antibiotics and supportive care. On CVVHD and antibiotics. Supportive care. Weaning trials as tolerated.  Total CC time35 min

## 2019-05-22 NOTE — PROGRESS NOTE ADULT - SUBJECTIVE AND OBJECTIVE BOX
[facility] DIVISION OF KIDNEY DISEASES AND HYPERTENSION -- FOLLOW UP NOTE  --------------------------------------------------------------------------------  HPI: 63-year-old male with medical history of CAD s/p 9 stents, DM, HTN, GERD, prior cholecystectomy,  diverticulosis, biliary stricture s/p ERCP with sphincteromy and stent 4/2019 who presents as transfer from [facility] where he was admitted with septic shock. Nephrology team is following for SLOAN on CKD and anuria. Pt. found to be in septic shock due to E Coli bacteremia was intubated for acute hypoxic respiratory failure and started on pressor support. He received vancomycin, zosyn, micafungin, merepenem and gentamicin initially. Also pt. developed NSTEMI and was started on heparin gtt with dobutamine due to concern for new LV dysfunction. Pt. with known history of CKD 3 secondary to DM and HTN, follows as outpatient with Dr Hodgson. As per pt's son SCr baseline 2.5-2.6 in March 2019 however states decrease to 1.9 in April 2019. On lab review of [facility] SCr elevated to 4.05 on 4/18/19 and decreased to 3.71 on 4/20/19. SCr elevated at 1.97 on 5/17/19. SCr elevated at 2.29 on 5/17/19 and further increase at 2.90 on 5/18/19. Pt. starting on CRRT on 5/18/19.    Pt. seen and examined at bedside this AM. Pt. intubated however no longer on IV pressors. Pt. tolerating CRRT with -150cc/hr. Unable to provide ROS.    PAST HISTORY  --------------------------------------------------------------------------------  No significant changes to PMH, PSH, FHx, SHx, unless otherwise noted    ALLERGIES & MEDICATIONS  --------------------------------------------------------------------------------  Allergies    Cipro (Rash)  Plavix (Rash)    Intolerances    Standing Inpatient Medications  ascorbic acid IVPB 1500 milliGRAM(s) IV Intermittent <User Schedule>  aspirin  chewable 81 milliGRAM(s) Oral daily  chlorhexidine 4% Liquid 1 Application(s) Topical <User Schedule>  CRRT Treatment    <Continuous>  DOPamine Infusion 7.5 MICROgram(s)/kG/Min IV Continuous <Continuous>  fentaNYL   Infusion. 4 MICROgram(s)/kG/Hr IV Continuous <Continuous>  heparin  Infusion Syringe 500 Unit(s)/Hr CRRT <Continuous>  hydrocortisone sodium succinate Injectable 50 milliGRAM(s) IV Push every 6 hours  insulin lispro (HumaLOG) corrective regimen sliding scale   SubCutaneous every 6 hours  meropenem  IVPB 1000 milliGRAM(s) IV Intermittent every 8 hours  pantoprazole  Injectable 40 milliGRAM(s) IV Push daily  petrolatum Ophthalmic Ointment 1 Application(s) Both EYES two times a day  PrismaSATE Dialysate BGK 4 / 2.5 5000 milliLiter(s) CRRT <Continuous>  PrismaSOL Filtration BGK 4 / 2.5 5000 milliLiter(s) CRRT <Continuous>  PrismaSOL Filtration BGK 4 / 2.5 5000 milliLiter(s) CRRT <Continuous>  thiamine IVPB 200 milliGRAM(s) IV Intermittent <User Schedule>    REVIEW OF SYSTEMS  --------------------------------------------------------------------------------  Unable to obtain.     VITALS/PHYSICAL EXAM  --------------------------------------------------------------------------------  T(C): 36 (05-22-19 @ 04:00), Max: 36.3 (05-22-19 @ 00:00)  HR: 54 (05-22-19 @ 07:00) (52 - 63)  BP: --  RR: 16 (05-22-19 @ 07:00) (16 - 18)  SpO2: 97% (05-22-19 @ 07:00) (95% - 99%)  Wt(kg): --  Height (cm): 170.18 (05-20-19 @ 08:21)    05-21-19 @ 07:01  -  05-22-19 @ 07:00  --------------------------------------------------------  IN: 1948 mL / OUT: 4717 mL / NET: -2769 mL    Physical Exam:  	Gen: Intubated  	HEENT: + ETT  	Pulm: CTA b/l  	CV: S1S2  	Abd: Soft, +BS   	Ext: trace LE edema B/L  	Skin: Warm and dry  	Vascualr access: LIJ non-tunneled HD catheter site:  without bleeding    LABS/STUDIES  --------------------------------------------------------------------------------              11.0   32.38 >-----------<  59       [05-22-19 @ 00:00]              32.7     135  |  100  |  26  ----------------------------<  227      [05-22-19 @ 00:00]  3.4   |  23  |  1.69        Ca     8.0     [05-22-19 @ 00:00]      Mg     2.7     [05-22-19 @ 00:00]      Phos  2.6     [05-22-19 @ 00:00]    Creatinine Trend:  SCr 1.69 [05-22 @ 00:00]  SCr 1.66 [05-21 @ 11:50]  SCr 1.72 [05-21 @ 05:30]  SCr 1.80 [05-21 @ 00:25]  SCr 1.81 [05-20 @ 17:30]

## 2019-05-22 NOTE — PROGRESS NOTE ADULT - ASSESSMENT
64 yo M hx of CAD s/p 9 stents, DMT2 on insulin, HTN, GERD, prior cholecystectomy,  diverticulosis, former smoker(30PY), biliary stricture s/p ERCP with sphincterotomy and stent 4/2019 who presented as transfer from [facility] for acute cholangitis s/p ERCP and stent CBD stent placement by GI on 5/19.Hospital course further complicated by Type II NSTEMI and bradycardia. Hemodynamics improved with Dopamine infusion     Neuro - Intubated, sedated   - off of all sedation   -will assess mental status as sedation wears off     CV - Distributive shock in setting of E coli/Citerobacter bacteremia   -Likely having type II NSTEMI-may need ischemic evaluation during this admission   -d/c heparin. Dopamine infusion for bradycardia @ rate of 10  -c/w UF at 150 cc/hr net neg and increase as tolerated   - TTE showed severe LV dysfunction    Pulm - hypoxic resp failure s/p intubation 5/17  - Intermittent hypoxia in the setting of bilateral alveolar consolidations   - Hypoxia improved after increased PEEP/alveolar recruitment. Minimal Vent requirements   -A line predominant on POCUS with B/L alveolar consolidations     GI - Acute cholangitis in setting of CBD stricture and recent ERCP with stent placement. 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, path negative, pt was pending Whipple Procedure on 5/30  - GI performed second ERCP on 5/19 with another stent placed in CBD  - continue to treat infection with Meropenem, +Ecoli bacteremia + citrobacter bacteremia 2.2 cholangitis?  -repeat MRCP when stable to assess biliary tree     Renal -Renal failure in setting of distributive shock   -CVVHD  - trend BMPs    ID - acute cholangitis c/b e coli/citarobacter bacteremia  - cont meropenem, renally dosed-day 5 today   - f/u repeat bl clx    -Legionella neg     Endo - DMT2  - monitor fs q6    Heme - leukocytosis, thrombocytopenia, likely due to infection  - SCDs for ppx 62 yo M hx of CAD s/p 9 stents, DMT2 on insulin, HTN, GERD, prior cholecystectomy,  diverticulosis, former smoker(30PY), biliary stricture s/p ERCP with sphincterotomy and stent 4/2019 who presented as transfer from [facility] for acute cholangitis s/p ERCP and stent CBD stent placement by GI on 5/19.Hospital course further complicated by Type II NSTEMI and bradycardia. Hemodynamics improved with Dopamine infusion.     Neuro - Intubated, sedated   - off of all sedation   -will assess mental status as sedation wears off     CV - Distributive shock in setting of E coli/Citerobacter bacteremia   -Likely having type II NSTEMI-may need ischemic evaluation during this admission   -d/c heparin. Dopamine infusion for bradycardia @ rate of 10  -c/w UF at 150 cc/hr net neg and increase as tolerated   - TTE showed severe LV dysfunction    Pulm - hypoxic resp failure s/p intubation 5/17  - Intermittent hypoxia in the setting of bilateral alveolar consolidations   - Hypoxia improved after increased PEEP/alveolar recruitment. Minimal Vent requirements   -A line predominant on POCUS with B/L alveolar consolidations     GI - Acute cholangitis in setting of CBD stricture and recent ERCP with stent placement. 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, path negative, pt was pending Whipple Procedure on 5/30  - GI performed second ERCP on 5/19 with another stent placed in CBD  - continue to treat infection with Meropenem, +Ecoli bacteremia + citrobacter bacteremia 2.2 cholangitis?  -repeat MRCP when stable to assess biliary tree     Renal -Renal failure in setting of distributive shock   -CVVHD  - trend BMPs    ID - acute cholangitis c/b e coli/citarobacter bacteremia  - cont meropenem, renally dosed-day 5 today   - f/u repeat bl clx    -Legionella neg     Endo - DMT2  - monitor fs q6    Heme - leukocytosis, thrombocytopenia, likely due to infection  - SCDs for ppx

## 2019-05-22 NOTE — PROCEDURE NOTE - NSPROCDETAILS_GEN_ALL_CORE
sterile dressing applied/sterile technique, catheter placed/ultrasound guidance/guidewire recovered/lumen(s) aspirated and flushed
lumen(s) aspirated and flushed/guidewire recovered/sterile technique, catheter placed/ultrasound guidance/sterile dressing applied

## 2019-05-22 NOTE — DIETITIAN INITIAL EVALUATION ADULT. - ENERGY NEEDS
Estimated nutrition need  1080 - 1375 kcal/d ( 11- 14 kcal/kg ) protein 67 - 80 gm/d ( 1.0 - 1.2 gm /kg IBW )

## 2019-05-22 NOTE — DIETITIAN INITIAL EVALUATION ADULT. - DIET TYPE
Nepro Carb sTEADY VIA oROGASTRIC TUBE @ 10 ML/HR INCREASE 10 ML EVERY 2 HRS TO THE GOAL OF 38 ML/HR 24HRS/NPO with tube feedings

## 2019-05-22 NOTE — PROGRESS NOTE ADULT - SUBJECTIVE AND OBJECTIVE BOX
Chief Complaint:  Patient is a 63y old  Male who presents with a chief complaint of Septic Shock (22 May 2019 07:35)      Interval Events: Patient remains intubated and sedated in ICU. Pressor requirements decreasing. Leukocytosis increasing, bilirubin decreasing.     Allergies:  Cipro (Rash)  Plavix (Rash)      Hospital Medications:  aspirin  chewable 81 milliGRAM(s) Oral daily  chlorhexidine 4% Liquid 1 Application(s) Topical <User Schedule>  docusate sodium Liquid 100 milliGRAM(s) Oral two times a day  DOPamine Infusion 7.5 MICROgram(s)/kG/Min IV Continuous <Continuous>  fentaNYL   Infusion. 4 MICROgram(s)/kG/Hr IV Continuous <Continuous>  hydrocortisone sodium succinate Injectable 50 milliGRAM(s) IV Push every 6 hours  insulin lispro (HumaLOG) corrective regimen sliding scale   SubCutaneous every 6 hours  meropenem  IVPB 1000 milliGRAM(s) IV Intermittent every 8 hours  pantoprazole  Injectable 40 milliGRAM(s) IV Push daily  petrolatum Ophthalmic Ointment 1 Application(s) Both EYES two times a day  senna Syrup 5 milliLiter(s) Oral daily      PMHX/PSHX:  Gout  Type II diabetes mellitus  HTN (hypertension)  CAD (coronary artery disease)  Cirrhosis  S/P cholecystectomy        ROS: Negative, except as otherwise noted above    PHYSICAL EXAM:   Vital Signs:  Vital Signs Last 24 Hrs  T(C): 36.4 (22 May 2019 17:00), Max: 36.4 (22 May 2019 17:00)  T(F): 97.5 (22 May 2019 17:00), Max: 97.5 (22 May 2019 17:00)  HR: 60 (22 May 2019 17:00) (53 - 62)  BP: --  BP(mean): --  RR: 16 (22 May 2019 17:00) (15 - 18)  SpO2: 96% (22 May 2019 17:00) (94% - 100%)  Daily     Daily Weight in k.6 (22 May 2019 05:00)    GENERAL: Intubated and sedated  HEENT:  +ETT  HEART:  +s1, s2 heart sounds  ABDOMEN:  Soft, nontender, no rebound/guarding  SKIN:  No jaundice  NEURO:   Sedated    LABS:  CBC Full  -  ( 22 May 2019 12:00 )  WBC Count : 35.85 K/uL  Hemoglobin : 11.8 g/dL  Hematocrit : 34.6 %  Platelet Count - Automated : 70 K/uL  Mean Cell Volume : 87.8 fL  Auto Neutrophil # : 28.39 K/uL  Auto Neutrophil % : 79.2 %     @ 12:00  Na 134 mmol/L  K 4.4 mmol/L  Cl 98 mmol/L  CO2 24 mmol/L  BUN 31 mg/dL  Creat 1.64 mg/dL  Glucose 268 mg/dL  Ca 8.4 mg/dL    Total protein 6.5 g/dL  Albumin 2.6 g/dL  T bili 1.8 mg/dL  Alk phos 315 u/L   u/L   u/L    PT/INR - ( 22 May 2019 12:00 )   PT: 10.7 SEC;   INR: 0.94          PTT - ( 21 May 2019 11:50 )  PTT:38.1 SEC

## 2019-05-22 NOTE — PROGRESS NOTE ADULT - SUBJECTIVE AND OBJECTIVE BOX
CHIEF COMPLAINT: Patient is a 63y old  Male who presents with a chief complaint of Septic Shock (19 May 2019 01:36)    Interval Events:  Off of vasopresin. On CVVHD 150/hr. Muscle fasciculations noted on thighs     REVIEW OF SYSTEMS:  Unable to obtain     OBJECTIVE:  ICU Vital Signs Last 24 Hrs  T(C): 36 (22 May 2019 04:00), Max: 36.3 (22 May 2019 00:00)  T(F): 96.8 (22 May 2019 04:00), Max: 97.3 (22 May 2019 00:00)  HR: 54 (22 May 2019 07:30) (52 - 63)  BP: --  BP(mean): --  ABP: 126/62 (22 May 2019 07:30) (85/44 - 163/78)  ABP(mean): 82 (22 May 2019 07:30) (57 - 108)  RR: 18 (22 May 2019 07:30) (15 - 18)  SpO2: 97% (22 May 2019 07:30) (95% - 99%)    Mode: AC/ CMV (Assist Control/ Continuous Mandatory Ventilation)  RR (machine): 16  TV (machine): 500  FiO2: 25  PEEP: 8  MAP: 12  PIP: 25      05-21 @ 07:01  -  05-22 @ 07:00  --------------------------------------------------------  IN: 1948 mL / OUT: 4717 mL / NET: -2769 mL      PHYSICAL EXAM:  GENERAL: Intubated, sedated   HEAD:  Atraumatic, Normocephalic  EYES: EOMI, PERRLA, conjunctiva and sclera clear  NECK: Supple, No JVD. Right sided TLC. Left sided shiley   CHEST/LUNG: Clear to auscultation bilaterally; No wheeze  HEART: Regular rhythm, HR 50s; No murmurs, rubs, or gallops  ABDOMEN: Soft, Nontender, Nondistended; Bowel sounds present  EXTREMITIES:  2+ Peripheral Pulses, + edema in LE. Fasciculations in Thighs b/l  PSYCH: Unable to assess  NEUROLOGY: Unable to assess   SKIN: No rashes or lesions    MEDICATIONS  (STANDING):  ascorbic acid IVPB 1500 milliGRAM(s) IV Intermittent <User Schedule>  aspirin  chewable 81 milliGRAM(s) Oral daily  chlorhexidine 4% Liquid 1 Application(s) Topical <User Schedule>  CRRT Treatment    <Continuous>  DOPamine Infusion 7.5 MICROgram(s)/kG/Min (27.619 mL/Hr) IV Continuous <Continuous>  fentaNYL   Infusion. 4 MICROgram(s)/kG/Hr (39.28 mL/Hr) IV Continuous <Continuous>  heparin  Infusion Syringe 500 Unit(s)/Hr (1 mL/Hr) CRRT <Continuous>  hydrocortisone sodium succinate Injectable 50 milliGRAM(s) IV Push every 6 hours  insulin lispro (HumaLOG) corrective regimen sliding scale   SubCutaneous every 6 hours  meropenem  IVPB 1000 milliGRAM(s) IV Intermittent every 8 hours  pantoprazole  Injectable 40 milliGRAM(s) IV Push daily  petrolatum Ophthalmic Ointment 1 Application(s) Both EYES two times a day  PrismaSATE Dialysate BGK 4 / 2.5 5000 milliLiter(s) (1500 mL/Hr) CRRT <Continuous>  PrismaSOL Filtration BGK 4 / 2.5 5000 milliLiter(s) (1000 mL/Hr) CRRT <Continuous>  PrismaSOL Filtration BGK 4 / 2.5 5000 milliLiter(s) (200 mL/Hr) CRRT <Continuous>  thiamine IVPB 200 milliGRAM(s) IV Intermittent <User Schedule>    MEDICATIONS  (PRN):    LABS:                                              11.0   32.38 )-----------( 59       ( 22 May 2019 00:00 )             32.7     05-22    135  |  100  |  26<H>  ----------------------------<  227<H>  3.4<L>   |  23  |  1.69<H>    Ca    8.0<L>      22 May 2019 00:00  Phos  2.6     05-22  Mg     2.7     05-22    TPro  6.0  /  Alb  2.2<L>  /  TBili  2.0<H>  /  DBili  x   /  AST  157<H>  /  ALT  359<H>  /  AlkPhos  288<H>  05-22             PTT - ( 19 May 2019 05:30 )  PTT:84.8 SEC  Urinalysis Basic - ( 18 May 2019 00:33 )    Blood cx: Citrobacter and E coli bacteremia

## 2019-05-22 NOTE — PROCEDURE NOTE - NSSITEPREP_SKIN_A_CORE
chlorhexidine/povidone iodine (if allergic to chlorhexidine)
chlorhexidine/Adherence to aseptic technique: hand hygiene prior to donning barriers (gown, gloves), don cap and mask, sterile drape over patient

## 2019-05-22 NOTE — DIETITIAN INITIAL EVALUATION ADULT. - PERTINENT MEDS FT
Dopamine, Fentanyl, PrismaSATE Dialyser, ProsmaSOL Filtration , Meropenem, Humalog, Protonix Colace, Senna, Solu-CORTEF

## 2019-05-22 NOTE — PROCEDURE NOTE - NSPOSTCAREGUIDE_GEN_A_CORE
Care for catheter as per unit/ICU protocols
Keep the cast/splint/dressing clean and dry/Care for catheter as per unit/ICU protocols

## 2019-05-22 NOTE — PROGRESS NOTE ADULT - ATTENDING COMMENTS
intermittent cessation of CRRT due to poor flows despite heparin. the nurse at bedside reversed the lines but with not much improvement in flow. since BP is better we will try HD later today . if unsuccessful then catheter needs exchange.

## 2019-05-23 LAB
ALBUMIN SERPL ELPH-MCNC: 2.1 G/DL — LOW (ref 3.3–5)
ALBUMIN SERPL ELPH-MCNC: 2.7 G/DL — LOW (ref 3.3–5)
ALP SERPL-CCNC: 251 U/L — HIGH (ref 40–120)
ALP SERPL-CCNC: 334 U/L — HIGH (ref 40–120)
ALT FLD-CCNC: 185 U/L — HIGH (ref 4–41)
ALT FLD-CCNC: 280 U/L — HIGH (ref 4–41)
ANION GAP SERPL CALC-SCNC: 12 MMO/L — SIGNIFICANT CHANGE UP (ref 7–14)
ANION GAP SERPL CALC-SCNC: 13 MMO/L — SIGNIFICANT CHANGE UP (ref 7–14)
ANISOCYTOSIS BLD QL: SIGNIFICANT CHANGE UP
AST SERPL-CCNC: 46 U/L — HIGH (ref 4–40)
AST SERPL-CCNC: 78 U/L — HIGH (ref 4–40)
BASOPHILS # BLD AUTO: 0.11 K/UL — SIGNIFICANT CHANGE UP (ref 0–0.2)
BASOPHILS # BLD AUTO: 0.17 K/UL — SIGNIFICANT CHANGE UP (ref 0–0.2)
BASOPHILS NFR BLD AUTO: 0.2 % — SIGNIFICANT CHANGE UP (ref 0–2)
BASOPHILS NFR BLD AUTO: 0.4 % — SIGNIFICANT CHANGE UP (ref 0–2)
BASOPHILS NFR SPEC: 0 % — SIGNIFICANT CHANGE UP (ref 0–2)
BILIRUB SERPL-MCNC: 1.1 MG/DL — SIGNIFICANT CHANGE UP (ref 0.2–1.2)
BILIRUB SERPL-MCNC: 1.5 MG/DL — HIGH (ref 0.2–1.2)
BLASTS # FLD: 0 % — SIGNIFICANT CHANGE UP (ref 0–0)
BUN SERPL-MCNC: 27 MG/DL — HIGH (ref 7–23)
BUN SERPL-MCNC: 47 MG/DL — HIGH (ref 7–23)
CALCIUM SERPL-MCNC: 8 MG/DL — LOW (ref 8.4–10.5)
CALCIUM SERPL-MCNC: 8.4 MG/DL — SIGNIFICANT CHANGE UP (ref 8.4–10.5)
CHLORIDE SERPL-SCNC: 98 MMOL/L — SIGNIFICANT CHANGE UP (ref 98–107)
CHLORIDE SERPL-SCNC: 98 MMOL/L — SIGNIFICANT CHANGE UP (ref 98–107)
CO2 SERPL-SCNC: 22 MMOL/L — SIGNIFICANT CHANGE UP (ref 22–31)
CO2 SERPL-SCNC: 25 MMOL/L — SIGNIFICANT CHANGE UP (ref 22–31)
CREAT SERPL-MCNC: 1.61 MG/DL — HIGH (ref 0.5–1.3)
CREAT SERPL-MCNC: 2.74 MG/DL — HIGH (ref 0.5–1.3)
EOSINOPHIL # BLD AUTO: 0.01 K/UL — SIGNIFICANT CHANGE UP (ref 0–0.5)
EOSINOPHIL # BLD AUTO: 0.02 K/UL — SIGNIFICANT CHANGE UP (ref 0–0.5)
EOSINOPHIL NFR BLD AUTO: 0 % — SIGNIFICANT CHANGE UP (ref 0–6)
EOSINOPHIL NFR BLD AUTO: 0 % — SIGNIFICANT CHANGE UP (ref 0–6)
EOSINOPHIL NFR FLD: 0 % — SIGNIFICANT CHANGE UP (ref 0–6)
GIANT PLATELETS BLD QL SMEAR: PRESENT — SIGNIFICANT CHANGE UP
GLUCOSE SERPL-MCNC: 232 MG/DL — HIGH (ref 70–99)
GLUCOSE SERPL-MCNC: 390 MG/DL — HIGH (ref 70–99)
HBV CORE AB SER-ACNC: NONREACTIVE — SIGNIFICANT CHANGE UP
HBV SURFACE AB SER-ACNC: 3 MLU/ML — LOW
HCT VFR BLD CALC: 31.7 % — LOW (ref 39–50)
HCT VFR BLD CALC: 37.3 % — LOW (ref 39–50)
HCV AB S/CO SERPL IA: 0.2 S/CO — SIGNIFICANT CHANGE UP (ref 0–0.99)
HCV AB SERPL-IMP: SIGNIFICANT CHANGE UP
HGB BLD-MCNC: 10.6 G/DL — LOW (ref 13–17)
HGB BLD-MCNC: 12.4 G/DL — LOW (ref 13–17)
IMM GRANULOCYTES NFR BLD AUTO: 7.6 % — HIGH (ref 0–1.5)
IMM GRANULOCYTES NFR BLD AUTO: 9.6 % — HIGH (ref 0–1.5)
INR BLD: 0.94 — SIGNIFICANT CHANGE UP (ref 0.88–1.17)
INR BLD: 1 — SIGNIFICANT CHANGE UP (ref 0.88–1.17)
LACTATE SERPL-SCNC: 1.7 MMOL/L — SIGNIFICANT CHANGE UP (ref 0.5–2)
LYMPHOCYTES # BLD AUTO: 2.09 K/UL — SIGNIFICANT CHANGE UP (ref 1–3.3)
LYMPHOCYTES # BLD AUTO: 2.13 K/UL — SIGNIFICANT CHANGE UP (ref 1–3.3)
LYMPHOCYTES # BLD AUTO: 4.8 % — LOW (ref 13–44)
LYMPHOCYTES # BLD AUTO: 4.9 % — LOW (ref 13–44)
LYMPHOCYTES NFR SPEC AUTO: 4.4 % — LOW (ref 13–44)
MACROCYTES BLD QL: SIGNIFICANT CHANGE UP
MANUAL SMEAR VERIFICATION: SIGNIFICANT CHANGE UP
MCHC RBC-ENTMCNC: 29.4 PG — SIGNIFICANT CHANGE UP (ref 27–34)
MCHC RBC-ENTMCNC: 29.4 PG — SIGNIFICANT CHANGE UP (ref 27–34)
MCHC RBC-ENTMCNC: 33.2 % — SIGNIFICANT CHANGE UP (ref 32–36)
MCHC RBC-ENTMCNC: 33.4 % — SIGNIFICANT CHANGE UP (ref 32–36)
MCV RBC AUTO: 88.1 FL — SIGNIFICANT CHANGE UP (ref 80–100)
MCV RBC AUTO: 88.4 FL — SIGNIFICANT CHANGE UP (ref 80–100)
METAMYELOCYTES # FLD: 1.8 % — HIGH (ref 0–1)
MONOCYTES # BLD AUTO: 3.79 K/UL — HIGH (ref 0–0.9)
MONOCYTES # BLD AUTO: 4.44 K/UL — HIGH (ref 0–0.9)
MONOCYTES NFR BLD AUTO: 10 % — SIGNIFICANT CHANGE UP (ref 2–14)
MONOCYTES NFR BLD AUTO: 9 % — SIGNIFICANT CHANGE UP (ref 2–14)
MONOCYTES NFR BLD: 3.5 % — SIGNIFICANT CHANGE UP (ref 2–9)
MYELOCYTES NFR BLD: 1.8 % — HIGH (ref 0–0)
NEUTROPHIL AB SER-ACNC: 80.7 % — HIGH (ref 43–77)
NEUTROPHILS # BLD AUTO: 32.15 K/UL — HIGH (ref 1.8–7.4)
NEUTROPHILS # BLD AUTO: 34.19 K/UL — HIGH (ref 1.8–7.4)
NEUTROPHILS NFR BLD AUTO: 76.1 % — SIGNIFICANT CHANGE UP (ref 43–77)
NEUTROPHILS NFR BLD AUTO: 77.4 % — HIGH (ref 43–77)
NEUTS BAND # BLD: 6.1 % — HIGH (ref 0–6)
NRBC # FLD: 0.13 K/UL — SIGNIFICANT CHANGE UP (ref 0–0)
NRBC # FLD: 0.15 K/UL — SIGNIFICANT CHANGE UP (ref 0–0)
OTHER - HEMATOLOGY %: 0 — SIGNIFICANT CHANGE UP
PLATELET # BLD AUTO: 86 K/UL — LOW (ref 150–400)
PLATELET # BLD AUTO: 89 K/UL — LOW (ref 150–400)
PLATELET COUNT - ESTIMATE: SIGNIFICANT CHANGE UP
PMV BLD: 12.7 FL — SIGNIFICANT CHANGE UP (ref 7–13)
PMV BLD: 12.9 FL — SIGNIFICANT CHANGE UP (ref 7–13)
POIKILOCYTOSIS BLD QL AUTO: SLIGHT — SIGNIFICANT CHANGE UP
POLYCHROMASIA BLD QL SMEAR: SLIGHT — SIGNIFICANT CHANGE UP
POTASSIUM SERPL-MCNC: 3.8 MMOL/L — SIGNIFICANT CHANGE UP (ref 3.5–5.3)
POTASSIUM SERPL-MCNC: 3.9 MMOL/L — SIGNIFICANT CHANGE UP (ref 3.5–5.3)
POTASSIUM SERPL-SCNC: 3.8 MMOL/L — SIGNIFICANT CHANGE UP (ref 3.5–5.3)
POTASSIUM SERPL-SCNC: 3.9 MMOL/L — SIGNIFICANT CHANGE UP (ref 3.5–5.3)
PROMYELOCYTES # FLD: 0 % — SIGNIFICANT CHANGE UP (ref 0–0)
PROT SERPL-MCNC: 5.5 G/DL — LOW (ref 6–8.3)
PROT SERPL-MCNC: 6.5 G/DL — SIGNIFICANT CHANGE UP (ref 6–8.3)
PROTHROM AB SERPL-ACNC: 10.4 SEC — SIGNIFICANT CHANGE UP (ref 9.8–13.1)
PROTHROM AB SERPL-ACNC: 11.1 SEC — SIGNIFICANT CHANGE UP (ref 9.8–13.1)
RBC # BLD: 3.6 M/UL — LOW (ref 4.2–5.8)
RBC # BLD: 4.22 M/UL — SIGNIFICANT CHANGE UP (ref 4.2–5.8)
RBC # FLD: 14.6 % — HIGH (ref 10.3–14.5)
RBC # FLD: 14.7 % — HIGH (ref 10.3–14.5)
SMUDGE CELLS # BLD: PRESENT — SIGNIFICANT CHANGE UP
SODIUM SERPL-SCNC: 133 MMOL/L — LOW (ref 135–145)
SODIUM SERPL-SCNC: 135 MMOL/L — SIGNIFICANT CHANGE UP (ref 135–145)
VARIANT LYMPHS # BLD: 1.7 % — SIGNIFICANT CHANGE UP
WBC # BLD: 42.28 K/UL — CRITICAL HIGH (ref 3.8–10.5)
WBC # BLD: 44.27 K/UL — CRITICAL HIGH (ref 3.8–10.5)
WBC # FLD AUTO: 42.28 K/UL — CRITICAL HIGH (ref 3.8–10.5)
WBC # FLD AUTO: 44.27 K/UL — CRITICAL HIGH (ref 3.8–10.5)

## 2019-05-23 PROCEDURE — 99291 CRITICAL CARE FIRST HOUR: CPT

## 2019-05-23 PROCEDURE — 99233 SBSQ HOSP IP/OBS HIGH 50: CPT | Mod: GC

## 2019-05-23 PROCEDURE — 99232 SBSQ HOSP IP/OBS MODERATE 35: CPT | Mod: GC

## 2019-05-23 RX ORDER — FENTANYL CITRATE 50 UG/ML
100 INJECTION INTRAVENOUS ONCE
Refills: 0 | Status: DISCONTINUED | OUTPATIENT
Start: 2019-05-23 | End: 2019-05-23

## 2019-05-23 RX ORDER — VANCOMYCIN HCL 1 G
1250 VIAL (EA) INTRAVENOUS ONCE
Refills: 0 | Status: COMPLETED | OUTPATIENT
Start: 2019-05-23 | End: 2019-05-23

## 2019-05-23 RX ORDER — HUMAN INSULIN 100 [IU]/ML
4 INJECTION, SUSPENSION SUBCUTANEOUS EVERY 6 HOURS
Refills: 0 | Status: DISCONTINUED | OUTPATIENT
Start: 2019-05-23 | End: 2019-05-25

## 2019-05-23 RX ORDER — MIDAZOLAM HYDROCHLORIDE 1 MG/ML
2 INJECTION, SOLUTION INTRAMUSCULAR; INTRAVENOUS ONCE
Refills: 0 | Status: DISCONTINUED | OUTPATIENT
Start: 2019-05-23 | End: 2019-05-23

## 2019-05-23 RX ORDER — DOCUSATE SODIUM 100 MG
100 CAPSULE ORAL THREE TIMES A DAY
Refills: 0 | Status: DISCONTINUED | OUTPATIENT
Start: 2019-05-23 | End: 2019-05-25

## 2019-05-23 RX ORDER — PROPOFOL 10 MG/ML
5 INJECTION, EMULSION INTRAVENOUS
Qty: 500 | Refills: 0 | Status: DISCONTINUED | OUTPATIENT
Start: 2019-05-23 | End: 2019-05-24

## 2019-05-23 RX ORDER — DEXMEDETOMIDINE HYDROCHLORIDE IN 0.9% SODIUM CHLORIDE 4 UG/ML
0.5 INJECTION INTRAVENOUS
Qty: 200 | Refills: 0 | Status: DISCONTINUED | OUTPATIENT
Start: 2019-05-23 | End: 2019-05-24

## 2019-05-23 RX ORDER — LACTULOSE 10 G/15ML
10 SOLUTION ORAL DAILY
Refills: 0 | Status: DISCONTINUED | OUTPATIENT
Start: 2019-05-23 | End: 2019-05-24

## 2019-05-23 RX ORDER — MEROPENEM 1 G/30ML
500 INJECTION INTRAVENOUS EVERY 24 HOURS
Refills: 0 | Status: DISCONTINUED | OUTPATIENT
Start: 2019-05-23 | End: 2019-05-24

## 2019-05-23 RX ORDER — SENNA PLUS 8.6 MG/1
10 TABLET ORAL AT BEDTIME
Refills: 0 | Status: DISCONTINUED | OUTPATIENT
Start: 2019-05-23 | End: 2019-05-25

## 2019-05-23 RX ADMIN — MEROPENEM 100 MILLIGRAM(S): 1 INJECTION INTRAVENOUS at 06:07

## 2019-05-23 RX ADMIN — SENNA PLUS 5 MILLILITER(S): 8.6 TABLET ORAL at 11:09

## 2019-05-23 RX ADMIN — PANTOPRAZOLE SODIUM 40 MILLIGRAM(S): 20 TABLET, DELAYED RELEASE ORAL at 11:09

## 2019-05-23 RX ADMIN — Medication 4: at 06:07

## 2019-05-23 RX ADMIN — MIDAZOLAM HYDROCHLORIDE 2 MILLIGRAM(S): 1 INJECTION, SOLUTION INTRAMUSCULAR; INTRAVENOUS at 18:30

## 2019-05-23 RX ADMIN — MEROPENEM 100 MILLIGRAM(S): 1 INJECTION INTRAVENOUS at 00:22

## 2019-05-23 RX ADMIN — Medication 81 MILLIGRAM(S): at 11:09

## 2019-05-23 RX ADMIN — Medication 5: at 11:37

## 2019-05-23 RX ADMIN — MIDAZOLAM HYDROCHLORIDE 2 MILLIGRAM(S): 1 INJECTION, SOLUTION INTRAMUSCULAR; INTRAVENOUS at 17:30

## 2019-05-23 RX ADMIN — DOPAMINE HYDROCHLORIDE 27.62 MICROGRAM(S)/KG/MIN: 40 INJECTION, SOLUTION, CONCENTRATE INTRAVENOUS at 06:15

## 2019-05-23 RX ADMIN — FENTANYL CITRATE 100 MICROGRAM(S): 50 INJECTION INTRAVENOUS at 06:07

## 2019-05-23 RX ADMIN — Medication 2 MILLIGRAM(S): at 21:59

## 2019-05-23 RX ADMIN — Medication 2: at 17:40

## 2019-05-23 RX ADMIN — Medication 50 MILLIGRAM(S): at 11:07

## 2019-05-23 RX ADMIN — FENTANYL CITRATE 100 MICROGRAM(S): 50 INJECTION INTRAVENOUS at 17:37

## 2019-05-23 RX ADMIN — FENTANYL CITRATE 100 MICROGRAM(S): 50 INJECTION INTRAVENOUS at 16:19

## 2019-05-23 RX ADMIN — Medication 250 MILLIGRAM(S): at 11:10

## 2019-05-23 RX ADMIN — CHLORHEXIDINE GLUCONATE 1 APPLICATION(S): 213 SOLUTION TOPICAL at 11:10

## 2019-05-23 RX ADMIN — SENNA PLUS 10 MILLILITER(S): 8.6 TABLET ORAL at 23:11

## 2019-05-23 RX ADMIN — Medication 50 MILLIGRAM(S): at 23:53

## 2019-05-23 RX ADMIN — FENTANYL CITRATE 100 MICROGRAM(S): 50 INJECTION INTRAVENOUS at 08:46

## 2019-05-23 RX ADMIN — FENTANYL CITRATE 100 MICROGRAM(S): 50 INJECTION INTRAVENOUS at 13:45

## 2019-05-23 RX ADMIN — DOPAMINE HYDROCHLORIDE 27.62 MICROGRAM(S)/KG/MIN: 40 INJECTION, SOLUTION, CONCENTRATE INTRAVENOUS at 08:46

## 2019-05-23 RX ADMIN — Medication 1 APPLICATION(S): at 17:40

## 2019-05-23 RX ADMIN — Medication 3: at 23:53

## 2019-05-23 RX ADMIN — Medication 100 MILLIGRAM(S): at 23:11

## 2019-05-23 RX ADMIN — FENTANYL CITRATE 100 MICROGRAM(S): 50 INJECTION INTRAVENOUS at 17:29

## 2019-05-23 RX ADMIN — Medication 50 MILLIGRAM(S): at 06:08

## 2019-05-23 RX ADMIN — Medication 1 APPLICATION(S): at 06:15

## 2019-05-23 RX ADMIN — Medication 100 MILLIGRAM(S): at 06:07

## 2019-05-23 RX ADMIN — Medication 2 MILLIGRAM(S): at 20:22

## 2019-05-23 RX ADMIN — Medication 100 MILLIGRAM(S): at 16:20

## 2019-05-23 RX ADMIN — MEROPENEM 100 MILLIGRAM(S): 1 INJECTION INTRAVENOUS at 23:11

## 2019-05-23 RX ADMIN — Medication 50 MILLIGRAM(S): at 17:39

## 2019-05-23 NOTE — PROGRESS NOTE ADULT - ATTENDING COMMENTS
Patient seen and examined with GI fellow. Patient admitted with cholangitis c/b NSTEMI. ERCP performed over this past weekend an ERCP with stent placement was performed.     Pt in ICU still intubated and on dopamine.     When pt is stable plan for MRI.

## 2019-05-23 NOTE — PROGRESS NOTE ADULT - SUBJECTIVE AND OBJECTIVE BOX
[facility] DIVISION OF KIDNEY DISEASES AND HYPERTENSION -- FOLLOW UP NOTE  --------------------------------------------------------------------------------  HPI: 63-year-old male with medical history of CAD s/p 9 stents, DM, HTN, GERD, prior cholecystectomy,  diverticulosis, biliary stricture s/p ERCP with sphincteromy and stent 4/2019 who presents as transfer from [facility] where he was admitted with septic shock. Nephrology team is following for SLOAN on CKD and anuria. Pt. found to be in septic shock due to E Coli bacteremia was intubated for acute hypoxic respiratory failure and started on pressor support. He received vancomycin, zosyn, micafungin, merepenem and gentamicin initially. Also pt. developed NSTEMI and was started on heparin gtt with dobutamine due to concern for new LV dysfunction. Pt. with known history of CKD 3 secondary to DM and HTN, follows as outpatient with Dr Hodgson. As per pt's son SCr baseline 2.5-2.6 in March 2019 however states decrease to 1.9 in April 2019. On lab review of [facility] SCr elevated to 4.05 on 4/18/19 and decreased to 3.71 on 4/20/19. SCr elevated at 1.97 on 5/17/19. SCr elevated at 2.29 on 5/17/19 and further increased at 2.90 on 5/18/19. Pt. starting on CRRT on 5/18/19. CRRT was discontinued on 5/22/19 due to HD catheter malfunction. [facility] non-tunneled HD catheter was removed and replaced with left femoral non-tunneled HD catheter overnight. Pt. transitioned to intermittent HD, tolerating 2.6L of UF overnight.    Pt. seen and examined at bedside this AM. Pt. remains intubated. Remains anuric.    PAST HISTORY  --------------------------------------------------------------------------------  No significant changes to PMH, PSH, FHx, SHx, unless otherwise noted    ALLERGIES & MEDICATIONS  --------------------------------------------------------------------------------  Allergies    Cipro (Rash)  Plavix (Rash)    Intolerances    Standing Inpatient Medications  aspirin  chewable 81 milliGRAM(s) Oral daily  chlorhexidine 4% Liquid 1 Application(s) Topical <User Schedule>  docusate sodium Liquid 100 milliGRAM(s) Oral two times a day  DOPamine Infusion 7.5 MICROgram(s)/kG/Min IV Continuous <Continuous>  hydrocortisone sodium succinate Injectable 50 milliGRAM(s) IV Push every 6 hours  insulin lispro (HumaLOG) corrective regimen sliding scale   SubCutaneous every 6 hours  meropenem  IVPB 1000 milliGRAM(s) IV Intermittent every 8 hours  pantoprazole  Injectable 40 milliGRAM(s) IV Push daily  petrolatum Ophthalmic Ointment 1 Application(s) Both EYES two times a day  senna Syrup 5 milliLiter(s) Oral daily    REVIEW OF SYSTEMS  --------------------------------------------------------------------------------  Unable to obtain.    VITALS/PHYSICAL EXAM  --------------------------------------------------------------------------------  T(C): 36.6 (05-23-19 @ 04:00), Max: 36.6 (05-23-19 @ 04:00)  HR: 61 (05-23-19 @ 06:00) (53 - 76)  BP: --  RR: 16 (05-23-19 @ 06:00) (15 - 18)  SpO2: 94% (05-23-19 @ 06:00) (94% - 100%)  Wt(kg): --    05-21-19 @ 07:01  -  05-22-19 @ 07:00  --------------------------------------------------------  IN: 1948 mL / OUT: 4895 mL / NET: -2947 mL    05-22-19 @ 07:01  -  05-23-19 @ 06:53  --------------------------------------------------------  IN: 2348.9 mL / OUT: 4479 mL / NET: -2130.1 mL    Physical Exam:  	Gen: Intubated  	HEENT: + ETT  	Pulm: CTA b/l  	CV: S1S2  	Abd: Soft, +BS   	Ext: 1+ LE edema B/L  	Skin: Warm and dry  	Vascular access: Left femoral non-tunneled HD catheter site:  without bleeding    LABS/STUDIES  --------------------------------------------------------------------------------              12.4   44.27 >-----------<  86       [05-23-19 @ 00:00]              37.3     135  |  98  |  27  ----------------------------<  232      [05-23-19 @ 00:00]  3.9   |  25  |  1.61        Ca     8.4     [05-23-19 @ 00:00]      Mg     2.7     [05-22-19 @ 00:00]      Phos  2.6     [05-22-19 @ 00:00]    Creatinine Trend:  SCr 1.61 [05-23 @ 00:00]  SCr 1.64 [05-22 @ 12:00]  SCr 1.69 [05-22 @ 00:00]  SCr 1.66 [05-21 @ 11:50]  SCr 1.72 [05-21 @ 05:30]

## 2019-05-23 NOTE — PROGRESS NOTE ADULT - ASSESSMENT
62 yo M hx of CAD s/p 9 stents, DMT2 on insulin, HTN, GERD, prior cholecystectomy,  diverticulosis, former smoker(30PY), biliary stricture s/p ERCP with sphincterotomy and stent 4/2019 who presented as transfer from [facility] for acute cholangitis s/p ERCP and stent CBD stent placement by GI on 5/19.Hospital course further complicated by Type II NSTEMI and bradycardia. Hemodynamics improved with Dopamine infusion.     Neuro - Intubated, sedated   - off of all sedation   -will assess mental status as sedation wears off     CV - Distributive shock in setting of E coli/Citerobacter bacteremia   -Likely having type II NSTEMI-may need ischemic evaluation during this admission   -d/c heparin. Dopamine infusion for bradycardia @ rate of 10  -c/w UF at 150 cc/hr net neg and increase as tolerated   - TTE showed severe LV dysfunction    Pulm - hypoxic resp failure s/p intubation 5/17  - Intermittent hypoxia in the setting of bilateral alveolar consolidations   - Hypoxia improved after increased PEEP/alveolar recruitment. Minimal Vent requirements   -A line predominant on POCUS with B/L alveolar consolidations     GI - Acute cholangitis in setting of CBD stricture and recent ERCP with stent placement. 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, path negative, pt was pending Whipple Procedure on 5/30  - GI performed second ERCP on 5/19 with another stent placed in CBD  - continue to treat infection with Meropenem, +Ecoli bacteremia + citrobacter bacteremia 2.2 cholangitis?  -repeat MRCP when stable to assess biliary tree     Renal -Renal failure in setting of distributive shock   -CVVHD  - trend BMPs    ID - acute cholangitis c/b e coli/citarobacter bacteremia  - cont meropenem, renally dosed-day 5 today   - f/u repeat bl clx    -Legionella neg     Endo - DMT2  - monitor fs q6    Heme - leukocytosis, thrombocytopenia, likely due to infection  - SCDs for ppx 62 yo M hx of CAD s/p 9 stents, DMT2 on insulin, HTN, GERD, prior cholecystectomy,  diverticulosis, former smoker(30PY), biliary stricture s/p ERCP with sphincterotomy and stent 4/2019 who presented as transfer from [facility] for acute cholangitis s/p ERCP and stent CBD stent placement by GI on 5/19.Hospital course further complicated by Type II NSTEMI and bradycardia. Hemodynamics improved with Dopamine infusion.     Neuro - Mental status much improved  Nodding appropriately--moving extremities     CV - Distributive shock in setting of E coli/Citerobacter bacteremia   -Likely having type II NSTEMI  -Dopamine infusion for bradycardia @ rate of 8  -on HD now-2.6 L removed yesterday   - VTI 18 on POCUS    Pulm - hypoxic resp failure s/p intubation 5/17  - Intermittent hypoxia in the setting of bilateral alveolar consolidations   - Hypoxia improved after increased PEEP/alveolar recruitment. Minimal Vent requirements   -A line predominant on POCUS     GI - Acute cholangitis in setting of CBD stricture and recent ERCP with stent placement. 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  -repeat MRCP when stable to assess biliary tree     Renal -Renal failure in setting of distributive shock   -HD today again--2.6 L removed yesterday   - trend BMPs    ID - acute cholangitis c/b e coli/citarobacter bacteremia  - cont meropenem, renally dosed-day 6 today--will treat for 2 weeks total after neg blood cx   - Will add Vancomycin given worsening leukocytosis and bandemia   -Legionella neg     Endo - DMT2  - monitor fs q6    Heme - leukocytosis, thrombocytopenia, likely due to infection  - SCDs for ppx

## 2019-05-23 NOTE — PROGRESS NOTE ADULT - SUBJECTIVE AND OBJECTIVE BOX
CHIEF COMPLAINT: Patient is a 63y old  Male who presents with a chief complaint of Septic Shock (19 May 2019 01:36)    Interval Events:  Off of vasopresin. On CVVHD 150/hr. Muscle fasciculations noted on thighs     REVIEW OF SYSTEMS:  Unable to obtain     OBJECTIVE:  ICU Vital Signs Last 24 Hrs  T(C): 36.6 (23 May 2019 04:00), Max: 36.6 (23 May 2019 04:00)  T(F): 97.8 (23 May 2019 04:00), Max: 97.8 (23 May 2019 04:00)  HR: 61 (23 May 2019 06:00) (53 - 76)  BP: --  BP(mean): --  ABP: 130/52 (23 May 2019 06:00) (85/44 - 162/81)  ABP(mean): 77 (23 May 2019 06:00) (57 - 110)  RR: 16 (23 May 2019 06:00) (15 - 18)  SpO2: 94% (23 May 2019 06:00) (94% - 100%)      Mode: AC/ CMV (Assist Control/ Continuous Mandatory Ventilation)  RR (machine): 16  TV (machine): 500  FiO2: 25  PEEP: 8  MAP: 12  PIP: 25    05-21 @ 07:01 - 05-22 @ 07:00  --------------------------------------------------------  IN: 1948 mL / OUT: 4895 mL / NET: -2947 mL    05-22 @ 07:01 - 05-23 @ 06:59  --------------------------------------------------------  IN: 2348.9 mL / OUT: 4479 mL / NET: -2130.1 mL        PHYSICAL EXAM:  GENERAL: Intubated, sedated   HEAD:  Atraumatic, Normocephalic  EYES: EOMI, PERRLA, conjunctiva and sclera clear  MEDICATIONS  (STANDING):  aspirin  chewable 81 milliGRAM(s) Oral daily  chlorhexidine 4% Liquid 1 Application(s) Topical <User Schedule>  docusate sodium Liquid 100 milliGRAM(s) Oral two times a day  DOPamine Infusion 7.5 MICROgram(s)/kG/Min (27.619 mL/Hr) IV Continuous <Continuous>  hydrocortisone sodium succinate Injectable 50 milliGRAM(s) IV Push every 6 hours  insulin lispro (HumaLOG) corrective regimen sliding scale   SubCutaneous every 6 hours  meropenem  IVPB 1000 milliGRAM(s) IV Intermittent every 8 hours  pantoprazole  Injectable 40 milliGRAM(s) IV Push daily  petrolatum Ophthalmic Ointment 1 Application(s) Both EYES two times a day  senna Syrup 5 milliLiter(s) Oral daily    MEDICATIONS  (PRN):  NECK: Supple, No JVD. Right sided TLC. Left sided shiley   CHEST/LUNG: Clear to auscultation bilaterally; No wheeze  HEART: Regular rhythm, HR 50s; No murmurs, rubs, or gallops  ABDOMEN: Soft, Nontender, Nondistended; Bowel sounds present  EXTREMITIES:  2+ Peripheral Pulses, + edema in LE. Fasciculations in Thighs b/l  PSYCH: Unable to assess  NEUROLOGY: Unable to assess   SKIN: No rashes or lesions        LABS:                                              12.4   44.27 )-----------( 86       ( 23 May 2019 00:00 )             37.3     05-23    135  |  98  |  27<H>  ----------------------------<  232<H>  3.9   |  25  |  1.61<H>    Ca    8.4      23 May 2019 00:00  Phos  2.6     05-22  Mg     2.7     05-22    TPro  6.5  /  Alb  2.7<L>  /  TBili  1.5<H>  /  DBili  x   /  AST  78<H>  /  ALT  280<H>  /  AlkPhos  334<H>  05-23             PTT - ( 19 May 2019 05:30 )  PTT:84.8 SEC  Urinalysis Basic - ( 18 May 2019 00:33 )    Blood cx: Citrobacter and E coli bacteremia CHIEF COMPLAINT: Patient is a 63y old  Male who presents with a chief complaint of Septic Shock (19 May 2019 01:36)    Interval Events:  Shiley now in left femoral region. HD yesterday--2.6 L removed.     REVIEW OF SYSTEMS:  Unable to obtain     MEDICATIONS  (STANDING):  aspirin  chewable 81 milliGRAM(s) Oral daily  chlorhexidine 4% Liquid 1 Application(s) Topical <User Schedule>  docusate sodium Liquid 100 milliGRAM(s) Oral two times a day  DOPamine Infusion 7.5 MICROgram(s)/kG/Min (27.619 mL/Hr) IV Continuous <Continuous>  hydrocortisone sodium succinate Injectable 50 milliGRAM(s) IV Push every 6 hours  insulin lispro (HumaLOG) corrective regimen sliding scale   SubCutaneous every 6 hours  meropenem  IVPB 500 milliGRAM(s) IV Intermittent every 24 hours  pantoprazole  Injectable 40 milliGRAM(s) IV Push daily  petrolatum Ophthalmic Ointment 1 Application(s) Both EYES two times a day  senna Syrup 5 milliLiter(s) Oral daily    MEDICATIONS  (PRN):      OBJECTIVE:  ICU Vital Signs Last 24 Hrs  T(C): 36.6 (23 May 2019 04:00), Max: 36.6 (23 May 2019 04:00)  T(F): 97.8 (23 May 2019 04:00), Max: 97.8 (23 May 2019 04:00)  HR: 61 (23 May 2019 06:00) (53 - 76)  BP: --  BP(mean): --  ABP: 130/52 (23 May 2019 06:00) (85/44 - 162/81)  ABP(mean): 77 (23 May 2019 06:00) (57 - 110)  RR: 16 (23 May 2019 06:00) (15 - 18)  SpO2: 94% (23 May 2019 06:00) (94% - 100%)      Mode: AC/ CMV (Assist Control/ Continuous Mandatory Ventilation)  RR (machine): 16  TV (machine): 500  FiO2: 25  PEEP: 8  MAP: 12  PIP: 25    05-22 @ 07:01  -  05-23 @ 06:59  --------------------------------------------------------  IN: 2348.9 mL / OUT: 4479 mL / NET: -2130.1 mL    PHYSICAL EXAM:  GENERAL: Intubated, awake and nodding appropriately   HEAD:  Atraumatic, Normocephalic  EYES: EOMI, PERRLA, conjunctiva and sclera clear  NECK: Supple, No JVD. Right sided TLC  CHEST/LUNG: Clear to auscultation bilaterally; No wheeze  HEART: HR 50s, Regular rhythm; No murmurs, rubs, or gallops  ABDOMEN: Soft, Nontender, Nondistended; Bowel sounds present. Shiley in left groin   EXTREMITIES:  2+ Peripheral Pulses, 1-2+ pitting edema in all extremities   PSYCH: Unable to assess   NEUROLOGY: Unable to assess   SKIN: No rashes or lesions      LABS:                                              12.4   44.27 )-----------( 86       ( 23 May 2019 00:00 )             37.3     05-23    135  |  98  |  27<H>  ----------------------------<  232<H>  3.9   |  25  |  1.61<H>    Ca    8.4      23 May 2019 00:00  Phos  2.6     05-22  Mg     2.7     05-22    TPro  6.5  /  Alb  2.7<L>  /  TBili  1.5<H>  /  DBili  x   /  AST  78<H>  /  ALT  280<H>  /  AlkPhos  334<H>  05-23             PTT - ( 19 May 2019 05:30 )  PTT:84.8 SEC  Urinalysis Basic - ( 18 May 2019 00:33 )    Blood cx: Citrobacter and E coli bacteremia

## 2019-05-23 NOTE — PROGRESS NOTE ADULT - PROBLEM SELECTOR PLAN 1
Pt. with oliguric SLOAN on CKD in the setting of sepsis, hypotension and NSTEMI. On lab review of [facility], SCr elevated at 4.05 on 4/18/19 and decreased at 3.71 on 4/20/19. SCr elevated at 1.97 on 5/17/19. SCr elevated at 2.29 on 5/17/19 and further increased to 2.90 on 5/18/19. Pt. with likely ATN. Pt. initiated on CRRT on 5/18/19, discontinued on 5/22/19 due to HD catheter malfunction. Pt. transitioned to intermittent HD overnight, tolerating 2.6L of UF. Pt. remains intubated, however on 25% FiO2. Pt. is off IV vasopressors. Pt. with pitting edema on exam. Plan for HD today. Will monitor for HD needs daily. Monitor UOP and daily weights. Avoid any potential nephrotoxins. Dose medications as per eGFR

## 2019-05-23 NOTE — PROGRESS NOTE ADULT - ATTENDING COMMENTS
1. Acute hypoxemic respiratory failure due to bacteremia stemming from cholangitis. Pt s/p ERCP with stent placed . Off pressors. Take off sedation . Begin  weaning trials as tolerated.  2.Id. Continue meropenem for e coli and citrobacter bacteremia. Pt with increasing wbc. unclear source. Add vancomycin to regimen.  3. Renal . Acute renal failure from ATN. On HD For HD? ultrafiltration today.  4. GI/Liver  Cholangitis with stent placed in cbd. Draining well. TB decreasing . For MRCP when stable.    I have examined pt at bedside with fellow and residents. I have spent 35 min cc time not including  procedures.

## 2019-05-23 NOTE — PROGRESS NOTE ADULT - SUBJECTIVE AND OBJECTIVE BOX
Chief Complaint:  Patient is a 63y old  Male who presents with a chief complaint of Septic Shock (23 May 2019 06:59)      Interval Events:     Allergies:  Cipro (Rash)  Plavix (Rash)      Hospital Medications:  aspirin  chewable 81 milliGRAM(s) Oral daily  chlorhexidine 4% Liquid 1 Application(s) Topical <User Schedule>  docusate sodium Liquid 100 milliGRAM(s) Oral two times a day  DOPamine Infusion 7.5 MICROgram(s)/kG/Min IV Continuous <Continuous>  hydrocortisone sodium succinate Injectable 50 milliGRAM(s) IV Push every 6 hours  insulin lispro (HumaLOG) corrective regimen sliding scale   SubCutaneous every 6 hours  meropenem  IVPB 1000 milliGRAM(s) IV Intermittent every 8 hours  pantoprazole  Injectable 40 milliGRAM(s) IV Push daily  petrolatum Ophthalmic Ointment 1 Application(s) Both EYES two times a day  senna Syrup 5 milliLiter(s) Oral daily      PMHX/PSHX:  Gout  Type II diabetes mellitus  HTN (hypertension)  CAD (coronary artery disease)  Cirrhosis  S/P cholecystectomy      Family history:      ROS: Negative, except as otherwise noted above    PHYSICAL EXAM:   Vital Signs:  Vital Signs Last 24 Hrs  T(C): 36.6 (23 May 2019 04:00), Max: 36.6 (23 May 2019 04:00)  T(F): 97.8 (23 May 2019 04:00), Max: 97.8 (23 May 2019 04:00)  HR: 60 (23 May 2019 07:00) (57 - 76)  BP: --  BP(mean): --  RR: 16 (23 May 2019 07:00) (16 - 18)  SpO2: 97% (23 May 2019 07:00) (94% - 100%)  Daily     Daily Weight in k.6 (23 May 2019 00:43)    GENERAL: No acute distress    HEENT:  Anicteric, no thrush  CHEST:  Non-labored breathing, lungs clear b/l  HEART:  +s1, s2 heart sounds, no murmurs  ABDOMEN:    EXTREMITIES:  Warm and well perfused, no edema  SKIN:    NEURO:       LABS:  CBC Full  -  ( 23 May 2019 00:00 )  WBC Count : 44.27 K/uL  Hemoglobin : 12.4 g/dL  Hematocrit : 37.3 %  Platelet Count - Automated : 86 K/uL  Mean Cell Volume : 88.4 fL  Auto Neutrophil # : 34.19 K/uL  Auto Neutrophil % : 77.4 %    - @ 00:00  Na 135 mmol/L  K 3.9 mmol/L  Cl 98 mmol/L  CO2 25 mmol/L  BUN 27 mg/dL  Creat 1.61 mg/dL  Glucose 232 mg/dL  Ca 8.4 mg/dL    Total protein 6.5 g/dL  Albumin 2.7 g/dL  T bili 1.5 mg/dL  Alk phos 334 u/L  AST 78 u/L   u/L    PT/INR - ( 23 May 2019 00:00 )   PT: 10.4 SEC;   INR: 0.94          PTT - ( 21 May 2019 11:50 )  PTT:38.1 SEC Chief Complaint:  Patient is a 63y old  Male who presents with a chief complaint of Septic Shock (23 May 2019 06:59)      Interval Events: Patient remains intubated. Bilirubin downtrending and leukocytosis rising.     Allergies:  Cipro (Rash)  Plavix (Rash)      Hospital Medications:  aspirin  chewable 81 milliGRAM(s) Oral daily  chlorhexidine 4% Liquid 1 Application(s) Topical <User Schedule>  docusate sodium Liquid 100 milliGRAM(s) Oral two times a day  DOPamine Infusion 7.5 MICROgram(s)/kG/Min IV Continuous <Continuous>  hydrocortisone sodium succinate Injectable 50 milliGRAM(s) IV Push every 6 hours  insulin lispro (HumaLOG) corrective regimen sliding scale   SubCutaneous every 6 hours  meropenem  IVPB 1000 milliGRAM(s) IV Intermittent every 8 hours  pantoprazole  Injectable 40 milliGRAM(s) IV Push daily  petrolatum Ophthalmic Ointment 1 Application(s) Both EYES two times a day  senna Syrup 5 milliLiter(s) Oral daily      PMHX/PSHX:  Gout  Type II diabetes mellitus  HTN (hypertension)  CAD (coronary artery disease)  Cirrhosis  S/P cholecystectomy      ROS: Negative, except as otherwise noted above    PHYSICAL EXAM:   Vital Signs:  Vital Signs Last 24 Hrs  T(C): 36.6 (23 May 2019 04:00), Max: 36.6 (23 May 2019 04:00)  T(F): 97.8 (23 May 2019 04:00), Max: 97.8 (23 May 2019 04:00)  HR: 60 (23 May 2019 07:00) (57 - 76)  BP: --  BP(mean): --  RR: 16 (23 May 2019 07:00) (16 - 18)  SpO2: 97% (23 May 2019 07:00) (94% - 100%)  Daily     Daily Weight in k.6 (23 May 2019 00:43)    GENERAL: Intubated  HEENT:  Anicteric, +ETT, +OGT  HEART:  +s1, s2 heart sounds  ABDOMEN:  Soft, nontender, no rebound/guarding      LABS:  CBC Full  -  ( 23 May 2019 00:00 )  WBC Count : 44.27 K/uL  Hemoglobin : 12.4 g/dL  Hematocrit : 37.3 %  Platelet Count - Automated : 86 K/uL  Mean Cell Volume : 88.4 fL  Auto Neutrophil # : 34.19 K/uL  Auto Neutrophil % : 77.4 %    05-23 @ 00:00  Na 135 mmol/L  K 3.9 mmol/L  Cl 98 mmol/L  CO2 25 mmol/L  BUN 27 mg/dL  Creat 1.61 mg/dL  Glucose 232 mg/dL  Ca 8.4 mg/dL    Total protein 6.5 g/dL  Albumin 2.7 g/dL  T bili 1.5 mg/dL  Alk phos 334 u/L  AST 78 u/L   u/L    PT/INR - ( 23 May 2019 00:00 )   PT: 10.4 SEC;   INR: 0.94          PTT - ( 21 May 2019 11:50 )  PTT:38.1 SEC

## 2019-05-24 LAB
ALBUMIN SERPL ELPH-MCNC: 2.3 G/DL — LOW (ref 3.3–5)
ALBUMIN SERPL ELPH-MCNC: 2.4 G/DL — LOW (ref 3.3–5)
ALP SERPL-CCNC: 241 U/L — HIGH (ref 40–120)
ALP SERPL-CCNC: 260 U/L — HIGH (ref 40–120)
ALT FLD-CCNC: 156 U/L — HIGH (ref 4–41)
ALT FLD-CCNC: 164 U/L — HIGH (ref 4–41)
ANION GAP SERPL CALC-SCNC: 13 MMO/L — SIGNIFICANT CHANGE UP (ref 7–14)
ANION GAP SERPL CALC-SCNC: 16 MMO/L — HIGH (ref 7–14)
AST SERPL-CCNC: 37 U/L — SIGNIFICANT CHANGE UP (ref 4–40)
AST SERPL-CCNC: 39 U/L — SIGNIFICANT CHANGE UP (ref 4–40)
BACTERIA BLD CULT: SIGNIFICANT CHANGE UP
BACTERIA BLD CULT: SIGNIFICANT CHANGE UP
BASOPHILS # BLD AUTO: 0.04 K/UL — SIGNIFICANT CHANGE UP (ref 0–0.2)
BASOPHILS NFR BLD AUTO: 0.1 % — SIGNIFICANT CHANGE UP (ref 0–2)
BASOPHILS NFR BLD AUTO: 0.2 % — SIGNIFICANT CHANGE UP (ref 0–2)
BILIRUB SERPL-MCNC: 1.2 MG/DL — SIGNIFICANT CHANGE UP (ref 0.2–1.2)
BILIRUB SERPL-MCNC: 1.3 MG/DL — HIGH (ref 0.2–1.2)
BUN SERPL-MCNC: 42 MG/DL — HIGH (ref 7–23)
BUN SERPL-MCNC: 45 MG/DL — HIGH (ref 7–23)
CALCIUM SERPL-MCNC: 8.1 MG/DL — LOW (ref 8.4–10.5)
CALCIUM SERPL-MCNC: 8.2 MG/DL — LOW (ref 8.4–10.5)
CHLORIDE SERPL-SCNC: 97 MMOL/L — LOW (ref 98–107)
CHLORIDE SERPL-SCNC: 98 MMOL/L — SIGNIFICANT CHANGE UP (ref 98–107)
CO2 SERPL-SCNC: 21 MMOL/L — LOW (ref 22–31)
CO2 SERPL-SCNC: 22 MMOL/L — SIGNIFICANT CHANGE UP (ref 22–31)
CREAT SERPL-MCNC: 2.72 MG/DL — HIGH (ref 0.5–1.3)
CREAT SERPL-MCNC: 2.97 MG/DL — HIGH (ref 0.5–1.3)
EOSINOPHIL # BLD AUTO: 0.02 K/UL — SIGNIFICANT CHANGE UP (ref 0–0.5)
EOSINOPHIL NFR BLD AUTO: 0 % — SIGNIFICANT CHANGE UP (ref 0–6)
GLUCOSE SERPL-MCNC: 301 MG/DL — HIGH (ref 70–99)
GLUCOSE SERPL-MCNC: 313 MG/DL — HIGH (ref 70–99)
HCT VFR BLD CALC: 31 % — LOW (ref 39–50)
HCT VFR BLD CALC: 33.1 % — LOW (ref 39–50)
HGB BLD-MCNC: 10.6 G/DL — LOW (ref 13–17)
HGB BLD-MCNC: 11.1 G/DL — LOW (ref 13–17)
IMM GRANULOCYTES NFR BLD AUTO: 9.7 % — HIGH (ref 0–1.5)
INR BLD: 0.96 — SIGNIFICANT CHANGE UP (ref 0.88–1.17)
LYMPHOCYTES # BLD AUTO: 12.8 % — LOW (ref 13–44)
LYMPHOCYTES # BLD AUTO: 6.23 K/UL — HIGH (ref 1–3.3)
LYMPHOCYTES # BLD AUTO: 6.3 % — LOW (ref 13–44)
MAGNESIUM SERPL-MCNC: 2.4 MG/DL — SIGNIFICANT CHANGE UP (ref 1.6–2.6)
MCHC RBC-ENTMCNC: 29.1 PG — SIGNIFICANT CHANGE UP (ref 27–34)
MCHC RBC-ENTMCNC: 29.1 PG — SIGNIFICANT CHANGE UP (ref 27–34)
MCHC RBC-ENTMCNC: 33.5 % — SIGNIFICANT CHANGE UP (ref 32–36)
MCHC RBC-ENTMCNC: 34.2 % — SIGNIFICANT CHANGE UP (ref 32–36)
MCV RBC AUTO: 85.2 FL — SIGNIFICANT CHANGE UP (ref 80–100)
MCV RBC AUTO: 86.9 FL — SIGNIFICANT CHANGE UP (ref 80–100)
MONOCYTES # BLD AUTO: 0.87 K/UL — SIGNIFICANT CHANGE UP (ref 0–0.9)
MONOCYTES NFR BLD AUTO: 1.8 % — LOW (ref 2–14)
MONOCYTES NFR BLD AUTO: 7.4 % — SIGNIFICANT CHANGE UP (ref 2–14)
NEUTROPHILS # BLD AUTO: 36.67 K/UL — HIGH (ref 1.8–7.4)
NEUTROPHILS NFR BLD AUTO: 75.6 % — SIGNIFICANT CHANGE UP (ref 43–77)
NEUTROPHILS NFR BLD AUTO: 76.1 % — SIGNIFICANT CHANGE UP (ref 43–77)
NRBC # FLD: 0.17 K/UL — SIGNIFICANT CHANGE UP (ref 0–0)
NRBC # FLD: 0.19 K/UL — SIGNIFICANT CHANGE UP (ref 0–0)
PHOSPHATE SERPL-MCNC: 2.5 MG/DL — SIGNIFICANT CHANGE UP (ref 2.5–4.5)
PLATELET # BLD AUTO: 110 K/UL — LOW (ref 150–400)
PLATELET # BLD AUTO: 114 K/UL — LOW (ref 150–400)
PMV BLD: 12 FL — SIGNIFICANT CHANGE UP (ref 7–13)
PMV BLD: 12.2 FL — SIGNIFICANT CHANGE UP (ref 7–13)
POTASSIUM SERPL-MCNC: 3.4 MMOL/L — LOW (ref 3.5–5.3)
POTASSIUM SERPL-MCNC: 3.5 MMOL/L — SIGNIFICANT CHANGE UP (ref 3.5–5.3)
POTASSIUM SERPL-SCNC: 3.4 MMOL/L — LOW (ref 3.5–5.3)
POTASSIUM SERPL-SCNC: 3.5 MMOL/L — SIGNIFICANT CHANGE UP (ref 3.5–5.3)
PROT SERPL-MCNC: 5.9 G/DL — LOW (ref 6–8.3)
PROT SERPL-MCNC: 6 G/DL — SIGNIFICANT CHANGE UP (ref 6–8.3)
PROTHROM AB SERPL-ACNC: 10.9 SEC — SIGNIFICANT CHANGE UP (ref 9.8–13.1)
RBC # BLD: 3.64 M/UL — LOW (ref 4.2–5.8)
RBC # BLD: 3.81 M/UL — LOW (ref 4.2–5.8)
RBC # FLD: 14.1 % — SIGNIFICANT CHANGE UP (ref 10.3–14.5)
RBC # FLD: 14.6 % — HIGH (ref 10.3–14.5)
SODIUM SERPL-SCNC: 133 MMOL/L — LOW (ref 135–145)
SODIUM SERPL-SCNC: 134 MMOL/L — LOW (ref 135–145)
VANCOMYCIN FLD-MCNC: 15.2 UG/ML — SIGNIFICANT CHANGE UP
WBC # BLD: 48.52 K/UL — CRITICAL HIGH (ref 3.8–10.5)
WBC # BLD: 49.4 K/UL — CRITICAL HIGH (ref 3.8–10.5)
WBC # FLD AUTO: 48.52 K/UL — CRITICAL HIGH (ref 3.8–10.5)
WBC # FLD AUTO: 49.4 K/UL — CRITICAL HIGH (ref 3.8–10.5)

## 2019-05-24 PROCEDURE — 99291 CRITICAL CARE FIRST HOUR: CPT

## 2019-05-24 PROCEDURE — 99233 SBSQ HOSP IP/OBS HIGH 50: CPT | Mod: GC

## 2019-05-24 RX ORDER — FENTANYL CITRATE 50 UG/ML
100 INJECTION INTRAVENOUS ONCE
Refills: 0 | Status: DISCONTINUED | OUTPATIENT
Start: 2019-05-24 | End: 2019-05-24

## 2019-05-24 RX ORDER — HYDRALAZINE HCL 50 MG
5 TABLET ORAL ONCE
Refills: 0 | Status: COMPLETED | OUTPATIENT
Start: 2019-05-24 | End: 2019-05-24

## 2019-05-24 RX ORDER — MEROPENEM 1 G/30ML
500 INJECTION INTRAVENOUS EVERY 12 HOURS
Refills: 0 | Status: COMPLETED | OUTPATIENT
Start: 2019-05-25 | End: 2019-05-30

## 2019-05-24 RX ORDER — MEROPENEM 1 G/30ML
500 INJECTION INTRAVENOUS ONCE
Refills: 0 | Status: COMPLETED | OUTPATIENT
Start: 2019-05-24 | End: 2019-05-24

## 2019-05-24 RX ORDER — MEROPENEM 1 G/30ML
INJECTION INTRAVENOUS
Refills: 0 | Status: COMPLETED | OUTPATIENT
Start: 2019-05-24 | End: 2019-05-30

## 2019-05-24 RX ORDER — FENTANYL CITRATE 50 UG/ML
50 INJECTION INTRAVENOUS ONCE
Refills: 0 | Status: DISCONTINUED | OUTPATIENT
Start: 2019-05-24 | End: 2019-05-24

## 2019-05-24 RX ORDER — HEPARIN SODIUM 5000 [USP'U]/ML
5000 INJECTION INTRAVENOUS; SUBCUTANEOUS EVERY 8 HOURS
Refills: 0 | Status: DISCONTINUED | OUTPATIENT
Start: 2019-05-24 | End: 2019-05-28

## 2019-05-24 RX ADMIN — Medication 1 APPLICATION(S): at 05:07

## 2019-05-24 RX ADMIN — Medication 50 MILLIGRAM(S): at 23:37

## 2019-05-24 RX ADMIN — FENTANYL CITRATE 100 MICROGRAM(S): 50 INJECTION INTRAVENOUS at 09:55

## 2019-05-24 RX ADMIN — Medication 1: at 18:26

## 2019-05-24 RX ADMIN — HEPARIN SODIUM 5000 UNIT(S): 5000 INJECTION INTRAVENOUS; SUBCUTANEOUS at 13:58

## 2019-05-24 RX ADMIN — HUMAN INSULIN 4 UNIT(S): 100 INJECTION, SUSPENSION SUBCUTANEOUS at 05:07

## 2019-05-24 RX ADMIN — Medication 5 MILLIGRAM(S): at 04:48

## 2019-05-24 RX ADMIN — Medication 100 MILLIGRAM(S): at 15:00

## 2019-05-24 RX ADMIN — CHLORHEXIDINE GLUCONATE 1 APPLICATION(S): 213 SOLUTION TOPICAL at 12:31

## 2019-05-24 RX ADMIN — Medication 50 MILLIGRAM(S): at 05:06

## 2019-05-24 RX ADMIN — HUMAN INSULIN 4 UNIT(S): 100 INJECTION, SUSPENSION SUBCUTANEOUS at 12:35

## 2019-05-24 RX ADMIN — HUMAN INSULIN 4 UNIT(S): 100 INJECTION, SUSPENSION SUBCUTANEOUS at 00:39

## 2019-05-24 RX ADMIN — Medication 5 MILLIGRAM(S): at 04:32

## 2019-05-24 RX ADMIN — DOPAMINE HYDROCHLORIDE 27.62 MICROGRAM(S)/KG/MIN: 40 INJECTION, SOLUTION, CONCENTRATE INTRAVENOUS at 07:59

## 2019-05-24 RX ADMIN — Medication 81 MILLIGRAM(S): at 12:31

## 2019-05-24 RX ADMIN — Medication 50 MILLIGRAM(S): at 12:31

## 2019-05-24 RX ADMIN — MEROPENEM 100 MILLIGRAM(S): 1 INJECTION INTRAVENOUS at 20:44

## 2019-05-24 RX ADMIN — Medication 50 MILLIGRAM(S): at 18:25

## 2019-05-24 RX ADMIN — HEPARIN SODIUM 5000 UNIT(S): 5000 INJECTION INTRAVENOUS; SUBCUTANEOUS at 22:10

## 2019-05-24 RX ADMIN — FENTANYL CITRATE 100 MICROGRAM(S): 50 INJECTION INTRAVENOUS at 10:10

## 2019-05-24 RX ADMIN — Medication 2: at 23:39

## 2019-05-24 RX ADMIN — Medication 3: at 12:31

## 2019-05-24 RX ADMIN — HUMAN INSULIN 4 UNIT(S): 100 INJECTION, SUSPENSION SUBCUTANEOUS at 23:37

## 2019-05-24 RX ADMIN — PANTOPRAZOLE SODIUM 40 MILLIGRAM(S): 20 TABLET, DELAYED RELEASE ORAL at 12:00

## 2019-05-24 RX ADMIN — Medication 3: at 05:07

## 2019-05-24 NOTE — PROGRESS NOTE ADULT - SUBJECTIVE AND OBJECTIVE BOX
Chief Complaint:  Patient is a 63y old  Male who presents with a chief complaint of Septic Shock (24 May 2019 07:02)      Interval Events: Patient remains intubated. He appears to nod appropriately in response to basic questions. He had multiple soft/semiformed stools overnight per RN staff.     Allergies:  Cipro (Rash)  Plavix (Rash)      Hospital Medications:  aspirin  chewable 81 milliGRAM(s) Oral daily  chlorhexidine 4% Liquid 1 Application(s) Topical <User Schedule>  dexmedetomidine Infusion 0.5 MICROgram(s)/kG/Hr IV Continuous <Continuous>  docusate sodium Liquid 100 milliGRAM(s) Oral three times a day  DOPamine Infusion 7.5 MICROgram(s)/kG/Min IV Continuous <Continuous>  heparin  Injectable 5000 Unit(s) SubCutaneous every 8 hours  hydrocortisone sodium succinate Injectable 50 milliGRAM(s) IV Push every 6 hours  insulin lispro (HumaLOG) corrective regimen sliding scale   SubCutaneous every 6 hours  insulin NPH human recombinant 4 Unit(s) SubCutaneous every 6 hours  meropenem  IVPB 500 milliGRAM(s) IV Intermittent every 24 hours  pantoprazole  Injectable 40 milliGRAM(s) IV Push daily  petrolatum Ophthalmic Ointment 1 Application(s) Both EYES two times a day  senna Syrup 10 milliLiter(s) Oral at bedtime      PMHX/PSHX:  Gout  Type II diabetes mellitus  HTN (hypertension)  CAD (coronary artery disease)  Cirrhosis  S/P cholecystectomy    ROS: Negative, except as otherwise noted above    PHYSICAL EXAM:   Vital Signs:  Vital Signs Last 24 Hrs  T(C): 35.9 (24 May 2019 08:00), Max: 36.6 (23 May 2019 16:10)  T(F): 96.6 (24 May 2019 08:00), Max: 97.8 (23 May 2019 16:10)  HR: 55 (24 May 2019 12:11) (49 - 65)  BP: 144/68 (23 May 2019 23:00) (133/66 - 144/68)  BP(mean): 91 (23 May 2019 23:00) (83 - 103)  RR: 16 (24 May 2019 10:00) (16 - 18)  SpO2: 96% (24 May 2019 12:11) (91% - 100%)  Daily     Daily Weight in k.4 (23 May 2019 19:46)    GENERAL: Intubated  HEENT:  Anicteric, +ETT, +OGT  HEART:  +s1, s2 heart sounds  ABDOMEN:  Soft, nontender, no rebound/guarding  Neuro: nods head, squeezes/releases hand on commands    LABS:  CBC Full  -  ( 24 May 2019 03:00 )  WBC Count : 48.52 K/uL  Hemoglobin : 10.6 g/dL  Hematocrit : 31.0 %  Platelet Count - Automated : 110 K/uL  Mean Cell Volume : 85.2 fL  Auto Neutrophil # : 36.67 K/uL  Auto Neutrophil % : 75.6 %    -24 @ 03:00  Na 133 mmol/L  K 3.5 mmol/L  Cl 98 mmol/L  CO2 22 mmol/L  BUN 45 mg/dL  Creat 2.97 mg/dL  Glucose 313 mg/dL  Ca 8.2 mg/dL    Total protein 5.9 g/dL  Albumin 2.3 g/dL  T bili 1.2 mg/dL  Alk phos 241 u/L  AST 37 u/L   u/L    PT/INR - ( 24 May 2019 00:00 )   PT: 10.9 SEC;   INR: 0.96

## 2019-05-24 NOTE — PROGRESS NOTE ADULT - PROBLEM SELECTOR PLAN 1
Pt. with oliguric SLOAN on CKD in the setting of sepsis, hypotension and NSTEMI. [facility] at 4.05 on 4/18/19 and decreased at 3.71 on 4/20/19. SCr elevated at 1.97 on 5/17/19. SCr elevated at 2.29 on 5/17/19 and further increased to 2.90 on 5/18/19. Pt. with likely ATN. Pt. initiated on CRRT on 5/18/19, discontinued on 5/22/19 due to HD catheter malfunction. Pt. transitioned to intermittent HD, last treatment on 5/23/19 tolerating 2.3L of UF. Pt. remains intubated, however on 25% FiO2. Pt. is off IV vasopressors. Pt. with pitting edema on exam. Plan for HD with UF today. Will monitor for HD needs daily. Monitor UOP and daily weights. Avoid any potential nephrotoxins. Dose medications as per eGFR Pt. with oliguric SLOAN on CKD in the setting of sepsis, hypotension and NSTEMI. [facility] at 4.05 on 4/18/19 and decreased at 3.71 on 4/20/19. SCr elevated at 1.97 on 5/17/19. SCr elevated at 2.29 on 5/17/19 and further increased to 2.90 on 5/18/19. Pt. with likely ATN. Pt. initiated on CRRT on 5/18/19, discontinued on 5/22/19 due to HD catheter malfunction. Pt. transitioned to intermittent HD, last treatment on 5/23/19 tolerating 2.3L of UF. Pt. remains intubated, however on 25% FiO2. Pt. is off IV vasopressors. Pt. with pitting edema on exam. Will monitor for HD needs daily. Monitor UOP and daily weights. Avoid any potential nephrotoxins. Dose medications as per eGFR

## 2019-05-24 NOTE — PROGRESS NOTE ADULT - SUBJECTIVE AND OBJECTIVE BOX
[facility] DIVISION OF KIDNEY DISEASES AND HYPERTENSION -- FOLLOW UP NOTE  --------------------------------------------------------------------------------  HPI: 63-year-old male with medical history of CAD s/p 9 stents, DM, HTN, GERD, prior cholecystectomy,  diverticulosis, biliary stricture s/p ERCP with sphincteromy and stent 4/2019 who presents as transfer from [facility] where he was admitted with septic shock. Nephrology team is following for SLOAN on CKD and anuria. Pt. found to be in septic shock due to E Coli bacteremia was intubated for acute hypoxic respiratory failure and started on pressor support. He received vancomycin, zosyn, micafungin, merepenem and gentamicin initially. Also pt. developed NSTEMI and was started on heparin gtt with dobutamine due to concern for new LV dysfunction. Pt. with known history of CKD 3 secondary to DM and HTN, follows as outpatient with Dr Hodgson. As per pt's son SCr baseline 2.5-2.6 in March 2019 however states decrease to 1.9 in April 2019. On lab review of [facility] SCr elevated to 4.05 on 4/18/19 and decreased to 3.71 on 4/20/19. SCr elevated at 1.97 on 5/17/19. SCr elevated at 2.29 on 5/17/19 and further increased at 2.90 on 5/18/19. Pt. starting on CRRT on 5/18/19. CRRT was discontinued on 5/22/19 due to HD catheter malfunction. [facility] non-tunneled HD catheter was removed and replaced with left femoral non-tunneled HD catheter on 5/22/19. Pt. transitioned to intermittent HD, tolerated HD treatment on 5/23/19 with 2.3L of UF.    Pt. seen and examined at bedside this AM. Pt. remains intubated. Remains anuric.    PAST HISTORY  --------------------------------------------------------------------------------  No significant changes to PMH, PSH, FHx, SHx, unless otherwise noted    ALLERGIES & MEDICATIONS  --------------------------------------------------------------------------------  Allergies    Cipro (Rash)  Plavix (Rash)    Intolerances    Standing Inpatient Medications  aspirin  chewable 81 milliGRAM(s) Oral daily  chlorhexidine 4% Liquid 1 Application(s) Topical <User Schedule>  dexmedetomidine Infusion 0.5 MICROgram(s)/kG/Hr IV Continuous <Continuous>  docusate sodium Liquid 100 milliGRAM(s) Oral three times a day  DOPamine Infusion 7.5 MICROgram(s)/kG/Min IV Continuous <Continuous>  hydrocortisone sodium succinate Injectable 50 milliGRAM(s) IV Push every 6 hours  insulin lispro (HumaLOG) corrective regimen sliding scale   SubCutaneous every 6 hours  insulin NPH human recombinant 4 Unit(s) SubCutaneous every 6 hours  meropenem  IVPB 500 milliGRAM(s) IV Intermittent every 24 hours  pantoprazole  Injectable 40 milliGRAM(s) IV Push daily  petrolatum Ophthalmic Ointment 1 Application(s) Both EYES two times a day  senna Syrup 10 milliLiter(s) Oral at bedtime    REVIEW OF SYSTEMS  --------------------------------------------------------------------------------  Unable to obtain.    VITALS/PHYSICAL EXAM  --------------------------------------------------------------------------------  T(C): 36.3 (05-24-19 @ 04:00), Max: 36.6 (05-23-19 @ 12:00)  HR: 57 (05-24-19 @ 06:00) (49 - 68)  BP: 144/68 (05-23-19 @ 23:00) (133/66 - 144/68)  RR: 16 (05-24-19 @ 06:00) (16 - 18)  SpO2: 93% (05-24-19 @ 06:00) (93% - 100%)  Wt(kg): --    05-22-19 @ 07:01  -  05-23-19 @ 07:00  --------------------------------------------------------  IN: 2523.8 mL / OUT: 4479 mL / NET: -1955.2 mL    05-23-19 @ 07:01  -  05-24-19 @ 06:57  --------------------------------------------------------  IN: 1317.3 mL / OUT: 3000 mL / NET: -1682.7 mL    Physical Exam:  	Gen: Intubated  	HEENT: + ETT  	Pulm: CTA b/l  	CV: S1S2  	Abd: Soft, +BS   	Ext: 1+ LE edema B/L  	Skin: Warm and dry  	Vascular access: Left femoral non-tunneled HD catheter site:  without bleeding    LABS/STUDIES  --------------------------------------------------------------------------------              10.6   48.52 >-----------<  110      [05-24-19 @ 03:00]              31.0     133  |  98  |  45  ----------------------------<  313      [05-24-19 @ 03:00]  3.5   |  22  |  2.97        Ca     8.2     [05-24-19 @ 03:00]      Mg     2.4     [05-24-19 @ 03:00]      Phos  2.5     [05-24-19 @ 03:00]    Creatinine Trend:  SCr 2.97 [05-24 @ 03:00]  SCr 2.72 [05-24 @ 00:00]  SCr 2.74 [05-23 @ 11:30]  SCr 1.61 [05-23 @ 00:00]  SCr 1.64 [05-22 @ 12:00]

## 2019-05-24 NOTE — PROGRESS NOTE ADULT - SUBJECTIVE AND OBJECTIVE BOX
CHIEF COMPLAINT: Patient is a 63y old  Male who presents with a chief complaint of Septic Shock (19 May 2019 01:36)    Interval Events:  Shiley now in left femoral region. HD yesterday--2.6 L removed.     REVIEW OF SYSTEMS:  Unable to obtain     MEDICATIONS  (STANDING):  aspirin  chewable 81 milliGRAM(s) Oral daily  chlorhexidine 4% Liquid 1 Application(s) Topical <User Schedule>  docusate sodium Liquid 100 milliGRAM(s) Oral two times a day  DOPamine Infusion 7.5 MICROgram(s)/kG/Min (27.619 mL/Hr) IV Continuous <Continuous>  hydrocortisone sodium succinate Injectable 50 milliGRAM(s) IV Push every 6 hours  insulin lispro (HumaLOG) corrective regimen sliding scale   SubCutaneous every 6 hours  meropenem  IVPB 500 milliGRAM(s) IV Intermittent every 24 hours  pantoprazole  Injectable 40 milliGRAM(s) IV Push daily  petrolatum Ophthalmic Ointment 1 Application(s) Both EYES two times a day  senna Syrup 5 milliLiter(s) Oral daily    MEDICATIONS  (PRN):      OBJECTIVE:  ICU Vital Signs Last 24 Hrs  T(C): 36.6 (23 May 2019 04:00), Max: 36.6 (23 May 2019 04:00)  T(F): 97.8 (23 May 2019 04:00), Max: 97.8 (23 May 2019 04:00)  HR: 61 (23 May 2019 06:00) (53 - 76)  BP: --  BP(mean): --  ABP: 130/52 (23 May 2019 06:00) (85/44 - 162/81)  ABP(mean): 77 (23 May 2019 06:00) (57 - 110)  RR: 16 (23 May 2019 06:00) (15 - 18)  SpO2: 94% (23 May 2019 06:00) (94% - 100%)      Mode: AC/ CMV (Assist Control/ Continuous Mandatory Ventilation)  RR (machine): 16  TV (machine): 500  FiO2: 25  PEEP: 8  MAP: 12  PIP: 25    05-22 @ 07:01  -  05-23 @ 06:59  --------------------------------------------------------  IN: 2348.9 mL / OUT: 4479 mL / NET: -2130.1 mL    PHYSICAL EXAM:  GENERAL: Intubated, awake and nodding appropriately   HEAD:  Atraumatic, Normocephalic  EYES: EOMI, PERRLA, conjunctiva and sclera clear  NECK: Supple, No JVD. Right sided TLC  CHEST/LUNG: Clear to auscultation bilaterally; No wheeze  HEART: HR 50s, Regular rhythm; No murmurs, rubs, or gallops  ABDOMEN: Soft, Nontender, Nondistended; Bowel sounds present. Shiley in left groin   EXTREMITIES:  2+ Peripheral Pulses, 1-2+ pitting edema in all extremities   PSYCH: Unable to assess   NEUROLOGY: Unable to assess   SKIN: No rashes or lesions      LABS:                                              12.4   44.27 )-----------( 86       ( 23 May 2019 00:00 )             37.3     05-23    135  |  98  |  27<H>  ----------------------------<  232<H>  3.9   |  25  |  1.61<H>    Ca    8.4      23 May 2019 00:00  Phos  2.6     05-22  Mg     2.7     05-22    TPro  6.5  /  Alb  2.7<L>  /  TBili  1.5<H>  /  DBili  x   /  AST  78<H>  /  ALT  280<H>  /  AlkPhos  334<H>  05-23             PTT - ( 19 May 2019 05:30 )  PTT:84.8 SEC  Urinalysis Basic - ( 18 May 2019 00:33 )    Blood cx: Citrobacter and E coli bacteremia

## 2019-05-24 NOTE — PROGRESS NOTE ADULT - ASSESSMENT
Impression:  1. Septic shock on pressors with E coli and Citrobacter bacteremia presumed secondary to biliary source, s/p ERCP 5/19/19 notable for bile in the duodenum and additional placement of plastic biliary stent alongside prior stent  2. Prior EUS/ERCP on 4/19/19 at OSH with 2cm CBD stricture and with sphincterotomy and stent placement, biopsies negative and brushings with atypical cells (favor reactive atypia)  3. Hypoxic respiratory failure s/p intubation  4. CAD s/p PCI x 9 with elevated troponin, NSTEMI on heparin infusion.  5. SLOAN on CKD on CVVH  6. DM  7. GERD    Recommendations:  - Repeat infectious workup; check C diff and GI stool PCR if stools become loose  - Continue IV antimicrobials  - Consider repeat MRI/MRCP once clinically stabilized to re-evaluate entire biliary tree  - Monitor CBC, CMP  - Supportive care per ICU  - Page GI with questions or changes in clinical status

## 2019-05-24 NOTE — PROGRESS NOTE ADULT - ATTENDING COMMENTS
Patient examined and care reviewed in detail on bedside rounds  Critically ill on vent/HD with bacteremia Adequate performance on SBT For extubation when less lethargic  Frequent bedside visits with therapy change today.   I have personally provided 35+ minutes of critical care time concurrently with the resident/fellow; this excludes time spent on separate procedures.

## 2019-05-24 NOTE — PROGRESS NOTE ADULT - ASSESSMENT
62 yo M hx of CAD s/p 9 stents, DMT2 on insulin, HTN, GERD, prior cholecystectomy,  diverticulosis, former smoker(30PY), biliary stricture s/p ERCP with sphincterotomy and stent 4/2019 who presented as transfer from [facility] for acute cholangitis s/p ERCP and stent CBD stent placement by GI on 5/19.Hospital course further complicated by Type II NSTEMI and bradycardia. Hemodynamics improved with Dopamine infusion.     Neuro - Mental status much improved  Nodding appropriately--moving extremities     CV - Distributive shock in setting of E coli/Citerobacter bacteremia   -Likely having type II NSTEMI  -Dopamine infusion for bradycardia @ rate of 8  -on HD now-2.6 L removed yesterday   - VTI 18 on POCUS    Pulm - hypoxic resp failure s/p intubation 5/17  - Intermittent hypoxia in the setting of bilateral alveolar consolidations   - Hypoxia improved after increased PEEP/alveolar recruitment. Minimal Vent requirements   -A line predominant on POCUS     GI - Acute cholangitis in setting of CBD stricture and recent ERCP with stent placement. 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  -repeat MRCP when stable to assess biliary tree     Renal -Renal failure in setting of distributive shock   -HD today again--2.6 L removed yesterday   - trend BMPs    ID - acute cholangitis c/b e coli/citarobacter bacteremia  - cont meropenem, renally dosed-day 6 today--will treat for 2 weeks total after neg blood cx   - Will add Vancomycin given worsening leukocytosis and bandemia   -Legionella neg     Endo - DMT2  - monitor fs q6    Heme - leukocytosis, thrombocytopenia, likely due to infection  - SCDs for ppx

## 2019-05-25 LAB
ALBUMIN SERPL ELPH-MCNC: 2.3 G/DL — LOW (ref 3.3–5)
ALBUMIN SERPL ELPH-MCNC: 2.5 G/DL — LOW (ref 3.3–5)
ALP SERPL-CCNC: 197 U/L — HIGH (ref 40–120)
ALP SERPL-CCNC: 227 U/L — HIGH (ref 40–120)
ALT FLD-CCNC: 115 U/L — HIGH (ref 4–41)
ALT FLD-CCNC: 96 U/L — HIGH (ref 4–41)
ANION GAP SERPL CALC-SCNC: 16 MMO/L — HIGH (ref 7–14)
ANION GAP SERPL CALC-SCNC: 16 MMO/L — HIGH (ref 7–14)
AST SERPL-CCNC: 31 U/L — SIGNIFICANT CHANGE UP (ref 4–40)
AST SERPL-CCNC: 36 U/L — SIGNIFICANT CHANGE UP (ref 4–40)
BASOPHILS # BLD AUTO: 0.1 K/UL — SIGNIFICANT CHANGE UP (ref 0–0.2)
BASOPHILS # BLD AUTO: 0.11 K/UL — SIGNIFICANT CHANGE UP (ref 0–0.2)
BASOPHILS NFR BLD AUTO: 0.3 % — SIGNIFICANT CHANGE UP (ref 0–2)
BASOPHILS NFR BLD AUTO: 0.3 % — SIGNIFICANT CHANGE UP (ref 0–2)
BILIRUB SERPL-MCNC: 1.2 MG/DL — SIGNIFICANT CHANGE UP (ref 0.2–1.2)
BILIRUB SERPL-MCNC: 1.3 MG/DL — HIGH (ref 0.2–1.2)
BLD GP AB SCN SERPL QL: NEGATIVE — SIGNIFICANT CHANGE UP
BUN SERPL-MCNC: 38 MG/DL — HIGH (ref 7–23)
BUN SERPL-MCNC: 53 MG/DL — HIGH (ref 7–23)
CALCIUM SERPL-MCNC: 8.1 MG/DL — LOW (ref 8.4–10.5)
CALCIUM SERPL-MCNC: 8.1 MG/DL — LOW (ref 8.4–10.5)
CHLORIDE SERPL-SCNC: 95 MMOL/L — LOW (ref 98–107)
CHLORIDE SERPL-SCNC: 96 MMOL/L — LOW (ref 98–107)
CO2 SERPL-SCNC: 22 MMOL/L — SIGNIFICANT CHANGE UP (ref 22–31)
CO2 SERPL-SCNC: 23 MMOL/L — SIGNIFICANT CHANGE UP (ref 22–31)
CREAT SERPL-MCNC: 2.94 MG/DL — HIGH (ref 0.5–1.3)
CREAT SERPL-MCNC: 3.95 MG/DL — HIGH (ref 0.5–1.3)
EOSINOPHIL # BLD AUTO: 0.01 K/UL — SIGNIFICANT CHANGE UP (ref 0–0.5)
EOSINOPHIL # BLD AUTO: 0.01 K/UL — SIGNIFICANT CHANGE UP (ref 0–0.5)
EOSINOPHIL NFR BLD AUTO: 0 % — SIGNIFICANT CHANGE UP (ref 0–6)
EOSINOPHIL NFR BLD AUTO: 0 % — SIGNIFICANT CHANGE UP (ref 0–6)
GLUCOSE SERPL-MCNC: 226 MG/DL — HIGH (ref 70–99)
GLUCOSE SERPL-MCNC: 259 MG/DL — HIGH (ref 70–99)
HCT VFR BLD CALC: 30.6 % — LOW (ref 39–50)
HCT VFR BLD CALC: 31.7 % — LOW (ref 39–50)
HGB BLD-MCNC: 10.4 G/DL — LOW (ref 13–17)
HGB BLD-MCNC: 10.7 G/DL — LOW (ref 13–17)
IMM GRANULOCYTES NFR BLD AUTO: 7.8 % — HIGH (ref 0–1.5)
IMM GRANULOCYTES NFR BLD AUTO: 7.8 % — HIGH (ref 0–1.5)
INR BLD: 1.05 — SIGNIFICANT CHANGE UP (ref 0.88–1.17)
INR BLD: 1.08 — SIGNIFICANT CHANGE UP (ref 0.88–1.17)
LYMPHOCYTES # BLD AUTO: 2.25 K/UL — SIGNIFICANT CHANGE UP (ref 1–3.3)
LYMPHOCYTES # BLD AUTO: 2.33 K/UL — SIGNIFICANT CHANGE UP (ref 1–3.3)
LYMPHOCYTES # BLD AUTO: 5.7 % — LOW (ref 13–44)
LYMPHOCYTES # BLD AUTO: 6.9 % — LOW (ref 13–44)
MANUAL SMEAR VERIFICATION: SIGNIFICANT CHANGE UP
MCHC RBC-ENTMCNC: 29.1 PG — SIGNIFICANT CHANGE UP (ref 27–34)
MCHC RBC-ENTMCNC: 29.7 PG — SIGNIFICANT CHANGE UP (ref 27–34)
MCHC RBC-ENTMCNC: 33.8 % — SIGNIFICANT CHANGE UP (ref 32–36)
MCHC RBC-ENTMCNC: 34 % — SIGNIFICANT CHANGE UP (ref 32–36)
MCV RBC AUTO: 86.1 FL — SIGNIFICANT CHANGE UP (ref 80–100)
MCV RBC AUTO: 87.4 FL — SIGNIFICANT CHANGE UP (ref 80–100)
MONOCYTES # BLD AUTO: 1.8 K/UL — HIGH (ref 0–0.9)
MONOCYTES # BLD AUTO: 1.84 K/UL — HIGH (ref 0–0.9)
MONOCYTES NFR BLD AUTO: 4.6 % — SIGNIFICANT CHANGE UP (ref 2–14)
MONOCYTES NFR BLD AUTO: 5.3 % — SIGNIFICANT CHANGE UP (ref 2–14)
NEUTROPHILS # BLD AUTO: 27.04 K/UL — HIGH (ref 1.8–7.4)
NEUTROPHILS # BLD AUTO: 32.31 K/UL — HIGH (ref 1.8–7.4)
NEUTROPHILS NFR BLD AUTO: 79.7 % — HIGH (ref 43–77)
NEUTROPHILS NFR BLD AUTO: 81.6 % — HIGH (ref 43–77)
NRBC # FLD: 0.08 K/UL — SIGNIFICANT CHANGE UP (ref 0–0)
NRBC # FLD: 0.13 K/UL — SIGNIFICANT CHANGE UP (ref 0–0)
PLATELET # BLD AUTO: 138 K/UL — LOW (ref 150–400)
PLATELET # BLD AUTO: 156 K/UL — SIGNIFICANT CHANGE UP (ref 150–400)
PMV BLD: 11.3 FL — SIGNIFICANT CHANGE UP (ref 7–13)
PMV BLD: 11.5 FL — SIGNIFICANT CHANGE UP (ref 7–13)
POTASSIUM SERPL-MCNC: 3.6 MMOL/L — SIGNIFICANT CHANGE UP (ref 3.5–5.3)
POTASSIUM SERPL-MCNC: 3.8 MMOL/L — SIGNIFICANT CHANGE UP (ref 3.5–5.3)
POTASSIUM SERPL-SCNC: 3.6 MMOL/L — SIGNIFICANT CHANGE UP (ref 3.5–5.3)
POTASSIUM SERPL-SCNC: 3.8 MMOL/L — SIGNIFICANT CHANGE UP (ref 3.5–5.3)
PROT SERPL-MCNC: 5.7 G/DL — LOW (ref 6–8.3)
PROT SERPL-MCNC: 6 G/DL — SIGNIFICANT CHANGE UP (ref 6–8.3)
PROTHROM AB SERPL-ACNC: 12 SEC — SIGNIFICANT CHANGE UP (ref 9.8–13.1)
PROTHROM AB SERPL-ACNC: 12 SEC — SIGNIFICANT CHANGE UP (ref 9.8–13.1)
RBC # BLD: 3.5 M/UL — LOW (ref 4.2–5.8)
RBC # BLD: 3.68 M/UL — LOW (ref 4.2–5.8)
RBC # FLD: 14.4 % — SIGNIFICANT CHANGE UP (ref 10.3–14.5)
RBC # FLD: 14.4 % — SIGNIFICANT CHANGE UP (ref 10.3–14.5)
RH IG SCN BLD-IMP: POSITIVE — SIGNIFICANT CHANGE UP
SODIUM SERPL-SCNC: 134 MMOL/L — LOW (ref 135–145)
SODIUM SERPL-SCNC: 134 MMOL/L — LOW (ref 135–145)
SPECIMEN SOURCE: SIGNIFICANT CHANGE UP
SPECIMEN SOURCE: SIGNIFICANT CHANGE UP
VANCOMYCIN FLD-MCNC: 11 UG/ML — SIGNIFICANT CHANGE UP
WBC # BLD: 33.92 K/UL — HIGH (ref 3.8–10.5)
WBC # BLD: 39.61 K/UL — HIGH (ref 3.8–10.5)
WBC # FLD AUTO: 33.92 K/UL — HIGH (ref 3.8–10.5)
WBC # FLD AUTO: 39.61 K/UL — HIGH (ref 3.8–10.5)

## 2019-05-25 PROCEDURE — 99222 1ST HOSP IP/OBS MODERATE 55: CPT

## 2019-05-25 PROCEDURE — 99233 SBSQ HOSP IP/OBS HIGH 50: CPT | Mod: GC

## 2019-05-25 PROCEDURE — 99232 SBSQ HOSP IP/OBS MODERATE 35: CPT | Mod: GC

## 2019-05-25 PROCEDURE — 92610 EVALUATE SWALLOWING FUNCTION: CPT

## 2019-05-25 PROCEDURE — 99291 CRITICAL CARE FIRST HOUR: CPT

## 2019-05-25 RX ORDER — INSULIN LISPRO 100/ML
4 VIAL (ML) SUBCUTANEOUS
Refills: 0 | Status: DISCONTINUED | OUTPATIENT
Start: 2019-05-25 | End: 2019-05-31

## 2019-05-25 RX ORDER — INSULIN GLARGINE 100 [IU]/ML
12 INJECTION, SOLUTION SUBCUTANEOUS AT BEDTIME
Refills: 0 | Status: DISCONTINUED | OUTPATIENT
Start: 2019-05-25 | End: 2019-05-28

## 2019-05-25 RX ORDER — DOCUSATE SODIUM 100 MG
100 CAPSULE ORAL THREE TIMES A DAY
Refills: 0 | Status: DISCONTINUED | OUTPATIENT
Start: 2019-05-25 | End: 2019-05-25

## 2019-05-25 RX ORDER — VANCOMYCIN HCL 1 G
750 VIAL (EA) INTRAVENOUS ONCE
Refills: 0 | Status: DISCONTINUED | OUTPATIENT
Start: 2019-05-25 | End: 2019-05-25

## 2019-05-25 RX ORDER — SENNA PLUS 8.6 MG/1
1 TABLET ORAL AT BEDTIME
Refills: 0 | Status: DISCONTINUED | OUTPATIENT
Start: 2019-05-25 | End: 2019-06-08

## 2019-05-25 RX ORDER — SODIUM CHLORIDE 9 MG/ML
10 INJECTION INTRAMUSCULAR; INTRAVENOUS; SUBCUTANEOUS
Refills: 0 | Status: DISCONTINUED | OUTPATIENT
Start: 2019-05-25 | End: 2019-05-25

## 2019-05-25 RX ORDER — DOCUSATE SODIUM 100 MG
100 CAPSULE ORAL THREE TIMES A DAY
Refills: 0 | Status: DISCONTINUED | OUTPATIENT
Start: 2019-05-25 | End: 2019-06-08

## 2019-05-25 RX ORDER — SENNA PLUS 8.6 MG/1
10 TABLET ORAL AT BEDTIME
Refills: 0 | Status: DISCONTINUED | OUTPATIENT
Start: 2019-05-25 | End: 2019-05-25

## 2019-05-25 RX ORDER — VANCOMYCIN HCL 1 G
1250 VIAL (EA) INTRAVENOUS ONCE
Refills: 0 | Status: COMPLETED | OUTPATIENT
Start: 2019-05-25 | End: 2019-05-25

## 2019-05-25 RX ADMIN — Medication 1 APPLICATION(S): at 05:50

## 2019-05-25 RX ADMIN — DOPAMINE HYDROCHLORIDE 27.62 MICROGRAM(S)/KG/MIN: 40 INJECTION, SOLUTION, CONCENTRATE INTRAVENOUS at 07:32

## 2019-05-25 RX ADMIN — Medication 50 MILLIGRAM(S): at 05:49

## 2019-05-25 RX ADMIN — HEPARIN SODIUM 5000 UNIT(S): 5000 INJECTION INTRAVENOUS; SUBCUTANEOUS at 05:49

## 2019-05-25 RX ADMIN — HUMAN INSULIN 4 UNIT(S): 100 INJECTION, SUSPENSION SUBCUTANEOUS at 11:14

## 2019-05-25 RX ADMIN — PANTOPRAZOLE SODIUM 40 MILLIGRAM(S): 20 TABLET, DELAYED RELEASE ORAL at 11:16

## 2019-05-25 RX ADMIN — MEROPENEM 100 MILLIGRAM(S): 1 INJECTION INTRAVENOUS at 17:16

## 2019-05-25 RX ADMIN — CHLORHEXIDINE GLUCONATE 1 APPLICATION(S): 213 SOLUTION TOPICAL at 11:18

## 2019-05-25 RX ADMIN — MEROPENEM 100 MILLIGRAM(S): 1 INJECTION INTRAVENOUS at 07:33

## 2019-05-25 RX ADMIN — Medication 3: at 17:21

## 2019-05-25 RX ADMIN — HUMAN INSULIN 4 UNIT(S): 100 INJECTION, SUSPENSION SUBCUTANEOUS at 17:21

## 2019-05-25 RX ADMIN — Medication 166.67 MILLIGRAM(S): at 15:19

## 2019-05-25 RX ADMIN — INSULIN GLARGINE 12 UNIT(S): 100 INJECTION, SOLUTION SUBCUTANEOUS at 22:57

## 2019-05-25 RX ADMIN — DOPAMINE HYDROCHLORIDE 27.62 MICROGRAM(S)/KG/MIN: 40 INJECTION, SOLUTION, CONCENTRATE INTRAVENOUS at 05:49

## 2019-05-25 RX ADMIN — Medication 3: at 05:50

## 2019-05-25 RX ADMIN — HEPARIN SODIUM 5000 UNIT(S): 5000 INJECTION INTRAVENOUS; SUBCUTANEOUS at 22:57

## 2019-05-25 RX ADMIN — HEPARIN SODIUM 5000 UNIT(S): 5000 INJECTION INTRAVENOUS; SUBCUTANEOUS at 15:14

## 2019-05-25 RX ADMIN — Medication 2: at 11:13

## 2019-05-25 RX ADMIN — DOPAMINE HYDROCHLORIDE 11.05 MICROGRAM(S)/KG/MIN: 40 INJECTION, SOLUTION, CONCENTRATE INTRAVENOUS at 22:55

## 2019-05-25 RX ADMIN — Medication 2: at 23:01

## 2019-05-25 NOTE — CONSULT NOTE ADULT - ASSESSMENT
64 yo M hx of CAD s/p 9 stents, reported recent normal ECHO, DMT2 on insulin, HTN, GERD, prior cholecystectomy,  diverticulosis, former smoker(30PY),biliary stricture s/p ERCP with sphincterotomy and stent 4/2019 who presents as transfer from [facility] for further evaluation, possible IR intervention or Advanced GI evaluation. 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  He presented to OSH ED found to be in septic shock due to E Coli Bacteremia, with SLOAN, transaminitis and also elevated troponin In the ED at OSH, he developed lethargy, altered mental status, and hypoxia to 82% and was intubated for acute hypoxic resp failure. He was started on levophed, vaso, and adilia for hypotension and admitted to OSH MICU for further evaluation. He received initially vanc, zosyn, micafungin, merepenem and gentamicin. Troponins were elevated with concern for demand ischemia given h/o CAD and he was started on heparin gtt. Dobutamine was added at 10 mcg/kg/min due to concern for new LV dysfunction. EPS now being called because of significant bradycardia supported with + chronotropic agents.  Recommend attempting to wean of positive chronotropic agents when able.  Patient has no history of symptomatic bradycardia and has only become bradycardic with GI process which leads us to suspect that bradycardia is due to hypervagotonia.  Unlikely patient will require permanent pacemaker. 64 yo M hx of CAD s/p 9 stents, reported recent normal ECHO, DMT2 on insulin, HTN, GERD, prior cholecystectomy,  diverticulosis, former smoker(30PY),biliary stricture s/p ERCP with sphincterotomy and stent 4/2019 who presents as transfer from [facility] for further evaluation, possible IR intervention or Advanced GI evaluation. 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  He presented to OSH ED found to be in septic shock due to E Coli Bacteremia, with SLOAN, transaminitis and also elevated troponin In the ED at OSH, he developed lethargy, altered mental status, and hypoxia to 82% and was intubated for acute hypoxic resp failure. He was started on levophed, vaso, and adilia for hypotension and admitted to OSH MICU for further evaluation. He received initially vanc, zosyn, micafungin, merepenem and gentamicin. Troponins were elevated with concern for demand ischemia given h/o CAD and he was started on heparin gtt. Dobutamine was added at 10 mcg/kg/min due to concern for new LV dysfunction. EPS now being called because of significant bradycardia supported with + chronotropic agents.  Recommend attempting to wean of positive chronotropic agents when able.  Patient has no history of symptomatic bradycardia and has only become bradycardic with GI process which leads us to suspect that bradycardia is due to hypervagotonia.  Unlikely patient will require permanent pacemaker. Patient has severe LV dysfunction which will need to be addressed when clinical status improved.

## 2019-05-25 NOTE — PROGRESS NOTE ADULT - PROBLEM SELECTOR PLAN 1
Pt. with oliguric SLOAN on CKD in the setting of sepsis, hypotension and NSTEMI. On lab review of [facility], SCr elevated at 4.05 on 4/18/19 and decreased at 3.71 on 4/20/19. SCr elevated at 1.97 on 5/17/19. SCr elevated at 2.29 on 5/17/19 and further increased to 2.90 on 5/18/19. Pt. with likely ATN. Pt. initiated on CRRT on 5/18/19, discontinued on 5/22/19 due to HD catheter malfunction. Pt. transitioned to intermittent HD, last treatment on 5/24/19 tolerating 2.5L of UF. Patient currently on nasal cannula. No plan for HD today. Will monitor for HD needs daily. Monitor UOP and daily weights. Avoid any potential nephrotoxins. Dose medications as per eGFR

## 2019-05-25 NOTE — PROGRESS NOTE ADULT - SUBJECTIVE AND OBJECTIVE BOX
CHIEF COMPLAINT: Patient is a 63y old  Male who presents with a chief complaint of Septic Shock (19 May 2019 01:36)    Interval Events:  Shiley now in left femoral region. HD yesterday--2.6 L removed.     REVIEW OF SYSTEMS:  Unable to obtain     MEDICATIONS  (STANDING):  aspirin  chewable 81 milliGRAM(s) Oral daily  chlorhexidine 4% Liquid 1 Application(s) Topical <User Schedule>  docusate sodium Liquid 100 milliGRAM(s) Oral three times a day  DOPamine Infusion 7.5 MICROgram(s)/kG/Min (27.619 mL/Hr) IV Continuous <Continuous>  heparin  Injectable 5000 Unit(s) SubCutaneous every 8 hours  insulin lispro (HumaLOG) corrective regimen sliding scale   SubCutaneous every 6 hours  insulin NPH human recombinant 4 Unit(s) SubCutaneous every 6 hours  meropenem  IVPB      meropenem  IVPB 500 milliGRAM(s) IV Intermittent every 12 hours  pantoprazole  Injectable 40 milliGRAM(s) IV Push daily  petrolatum Ophthalmic Ointment 1 Application(s) Both EYES two times a day  senna Syrup 10 milliLiter(s) Oral at bedtime    MEDICATIONS  (PRN):        OBJECTIVE:  ICU Vital Signs Last 24 Hrs  T(C): 36.7 (25 May 2019 04:00), Max: 36.7 (25 May 2019 00:00)  T(F): 98 (25 May 2019 04:00), Max: 98 (25 May 2019 00:00)  HR: 58 (25 May 2019 07:00) (49 - 61)  BP: 144/63 (25 May 2019 07:00) (125/57 - 144/63)  BP(mean): 85 (25 May 2019 07:00) (76 - 86)  ABP: 145/58 (25 May 2019 04:00) (106/65 - 168/66)  ABP(mean): 81 (25 May 2019 04:00) (71 - 104)  RR: 13 (25 May 2019 07:00) (11 - 20)  SpO2: 97% (25 May 2019 07:00) (91% - 100%)        Mode: AC/ CMV (Assist Control/ Continuous Mandatory Ventilation)  RR (machine): 16  TV (machine): 500  FiO2: 25  PEEP: 8  MAP: 12  PIP: 25      05-24 @ 07:01  -  05-25 @ 07:00  --------------------------------------------------------  IN: 3238.5 mL / OUT: 400 mL / NET: 2838.5 mL        PHYSICAL EXAM:  GENERAL: Intubated, awake and nodding appropriately   HEAD:  Atraumatic, Normocephalic  EYES: EOMI, PERRLA, conjunctiva and sclera clear  NECK: Supple, No JVD. Right sided TLC  CHEST/LUNG: Clear to auscultation bilaterally; No wheeze  HEART: HR 50s, Regular rhythm; No murmurs, rubs, or gallops  ABDOMEN: Soft, Nontender, Nondistended; Bowel sounds present. Shiley in left groin   EXTREMITIES:  2+ Peripheral Pulses, 1-2+ pitting edema in all extremities   PSYCH: Unable to assess   NEUROLOGY: Unable to assess   SKIN: No rashes or lesions      LABS:                                   10.7   39.61 )-----------( 138      ( 25 May 2019 01:00 )             31.7     05-25    134<L>  |  96<L>  |  38<H>  ----------------------------<  226<H>  3.6   |  22  |  2.94<H>    Ca    8.1<L>      25 May 2019 01:00  Phos  2.5     05-24  Mg     2.4     05-24    TPro  6.0  /  Alb  2.5<L>  /  TBili  1.3<H>  /  DBili  x   /  AST  36  /  ALT  115<H>  /  AlkPhos  227<H>  05-25    Blood cx: Citrobacter and E coli bacteremia CHIEF COMPLAINT: Patient is a 63y old  Male who presents with a chief complaint of Septic Shock (19 May 2019 01:36)    Interval Events:  HD session yesterday as well. Extubated yesterday, Central and arterial lines removed.     Review of Systems: GEN: no fevers, chills, no night sweats no weight loss , no swollen lymph nodes    EYES: No blurry vision , no changes in vision  ENT: no sores, no runny nose, no sore throat   PULM: no cough, no shortness of breath   CARD: no chest pain, no palpitations    GI : no abdominal pain , no nausea, no vomiting  : no burning urination, no change in color of urine, no flank pain, no blood in urine   MSK: no swelling   SKIN: no bruising or bleeding, no sores in mouth  , no yellowing of the skin  Neuro: no lightheadedness, no headaches, no weakness, no fainting, no confusion , no seizures      MEDICATIONS  (STANDING):  aspirin  chewable 81 milliGRAM(s) Oral daily  chlorhexidine 4% Liquid 1 Application(s) Topical <User Schedule>  docusate sodium Liquid 100 milliGRAM(s) Oral three times a day  DOPamine Infusion 7.5 MICROgram(s)/kG/Min (27.619 mL/Hr) IV Continuous <Continuous>  heparin  Injectable 5000 Unit(s) SubCutaneous every 8 hours  insulin lispro (HumaLOG) corrective regimen sliding scale   SubCutaneous every 6 hours  insulin NPH human recombinant 4 Unit(s) SubCutaneous every 6 hours  meropenem  IVPB      meropenem  IVPB 500 milliGRAM(s) IV Intermittent every 12 hours  pantoprazole  Injectable 40 milliGRAM(s) IV Push daily  petrolatum Ophthalmic Ointment 1 Application(s) Both EYES two times a day  senna Syrup 10 milliLiter(s) Oral at bedtime    MEDICATIONS  (PRN):    OBJECTIVE:  ICU Vital Signs Last 24 Hrs  T(C): 36.7 (25 May 2019 04:00), Max: 36.7 (25 May 2019 00:00)  T(F): 98 (25 May 2019 04:00), Max: 98 (25 May 2019 00:00)  HR: 58 (25 May 2019 07:00) (49 - 65)  BP: 144/63 (25 May 2019 07:00) (125/57 - 166/96)  BP(mean): 85 (25 May 2019 07:00) (76 - 110)  ABP: 145/58 (25 May 2019 04:00) (106/65 - 168/66)  ABP(mean): 81 (25 May 2019 04:00) (71 - 104)  RR: 13 (25 May 2019 07:00) (11 - 20)  SpO2: 97% (25 May 2019 07:00) (91% - 100%)      05-24 @ 07:01  -  05-25 @ 07:00  --------------------------------------------------------  IN: 3238.5 mL / OUT: 400?? mL / NET: 2838.5 mL?        PHYSICAL EXAM:  GENERAL: awake and answering questions appropriately   HEAD:  Atraumatic, Normocephalic  EYES: EOMI, PERRLA, conjunctiva and sclera clear  NECK: Supple, No JVD. Right sided TLC removed yesterday   CHEST/LUNG: Clear to auscultation bilaterally; No wheeze  HEART: HR 50s, Regular rhythm; No murmurs, rubs, or gallops  ABDOMEN: Soft, Nontender, Nondistended; Bowel sounds present. Shiley in left groin   EXTREMITIES:  2+ Peripheral Pulses, 1-2+ pitting edema in all extremities   PSYCH: AAO x 1  NEUROLOGY: Non focal   SKIN: No rashes or lesions      LABS:                                   10.7   39.61 )-----------( 138      ( 25 May 2019 01:00 )             31.7     05-25    134<L>  |  96<L>  |  38<H>  ----------------------------<  226<H>  3.6   |  22  |  2.94<H>    Ca    8.1<L>      25 May 2019 01:00  Phos  2.5     05-24  Mg     2.4     05-24    TPro  6.0  /  Alb  2.5<L>  /  TBili  1.3<H>  /  DBili  x   /  AST  36  /  ALT  115<H>  /  AlkPhos  227<H>  05-25    Blood cx: Citrobacter and E coli bacteremia --repeat blood cx negative CHIEF COMPLAINT: Patient is a 63y old  Male who presents with a chief complaint of Septic Shock (19 May 2019 01:36)    Interval Events:  HD session yesterday as well. Extubated yesterday, Central and arterial lines removed.     Review of Systems: GEN: Unable to assess     MEDICATIONS  (STANDING):  aspirin  chewable 81 milliGRAM(s) Oral daily  chlorhexidine 4% Liquid 1 Application(s) Topical <User Schedule>  docusate sodium Liquid 100 milliGRAM(s) Oral three times a day  DOPamine Infusion 3 MICROgram(s)/kG/Min (11.047 mL/Hr) IV Continuous <Continuous>  heparin  Injectable 5000 Unit(s) SubCutaneous every 8 hours  insulin lispro (HumaLOG) corrective regimen sliding scale   SubCutaneous every 6 hours  insulin NPH human recombinant 4 Unit(s) SubCutaneous every 6 hours  meropenem  IVPB      meropenem  IVPB 500 milliGRAM(s) IV Intermittent every 12 hours  pantoprazole  Injectable 40 milliGRAM(s) IV Push daily  petrolatum Ophthalmic Ointment 1 Application(s) Both EYES two times a day  senna Syrup 10 milliLiter(s) Oral at bedtime    MEDICATIONS  (PRN):      OBJECTIVE:  ICU Vital Signs Last 24 Hrs  T(C): 36.7 (25 May 2019 04:00), Max: 36.7 (25 May 2019 00:00)  T(F): 98 (25 May 2019 04:00), Max: 98 (25 May 2019 00:00)  HR: 58 (25 May 2019 07:00) (49 - 65)  BP: 144/63 (25 May 2019 07:00) (125/57 - 166/96)  BP(mean): 85 (25 May 2019 07:00) (76 - 110)  ABP: 145/58 (25 May 2019 04:00) (106/65 - 168/66)  ABP(mean): 81 (25 May 2019 04:00) (71 - 104)  RR: 13 (25 May 2019 07:00) (11 - 20)  SpO2: 97% (25 May 2019 07:00) (91% - 100%)      05-24 @ 07:01  -  05-25 @ 07:00  --------------------------------------------------------  IN: 3238.5 mL / OUT: 400?? mL / NET: 2838.5 mL?        PHYSICAL EXAM:  GENERAL: awake and answering questions appropriately   HEAD:  Atraumatic, Normocephalic  EYES: EOMI, PERRLA, conjunctiva and sclera clear  NECK: Supple, No JVD. Right sided TLC removed yesterday   CHEST/LUNG: Clear to auscultation bilaterally; No wheeze  HEART: HR 50s, Regular rhythm; No murmurs, rubs, or gallops  ABDOMEN: Soft, Nontender, Nondistended; Bowel sounds present. Shiley in left groin   EXTREMITIES:  2+ Peripheral Pulses, 1-2+ pitting edema in all extremities   PSYCH: AAO x 1  NEUROLOGY: Non focal   SKIN: No rashes or lesions      LABS:                                   10.7   39.61 )-----------( 138      ( 25 May 2019 01:00 )             31.7     05-25    134<L>  |  96<L>  |  38<H>  ----------------------------<  226<H>  3.6   |  22  |  2.94<H>    Ca    8.1<L>      25 May 2019 01:00  Phos  2.5     05-24  Mg     2.4     05-24    TPro  6.0  /  Alb  2.5<L>  /  TBili  1.3<H>  /  DBili  x   /  AST  36  /  ALT  115<H>  /  AlkPhos  227<H>  05-25    Blood cx: Citrobacter and E coli bacteremia --repeat blood cx negative

## 2019-05-25 NOTE — SWALLOW BEDSIDE ASSESSMENT ADULT - PHARYNGEAL PHASE
Delayed pharyngeal swallow/Decreased laryngeal elevation Wet vocal quality post oral intake/Multiple swallows/Delayed pharyngeal swallow/Decreased laryngeal elevation/Throat clear post oral intake

## 2019-05-25 NOTE — SWALLOW BEDSIDE ASSESSMENT ADULT - ASR SWALLOW ASPIRATION MONITOR
change of breathing pattern/position upright (90Y)/fever/throat clearing/oral hygiene/gurgly voice/cough/pneumonia/upper respiratory infection

## 2019-05-25 NOTE — PROGRESS NOTE ADULT - ASSESSMENT
62 yo M hx of CAD s/p 9 stents, DMT2 on insulin, HTN, GERD, prior cholecystectomy,  diverticulosis, former smoker(30PY), biliary stricture s/p ERCP with sphincterotomy and stent 4/2019 who presented as transfer from [facility] for acute cholangitis s/p ERCP and stent CBD stent placement by GI on 5/19.Hospital course further complicated by Type II NSTEMI and bradycardia. Hemodynamics improved with Dopamine infusion.     Neuro - Mental status much improved  Nodding appropriately--moving extremities     CV - Distributive shock in setting of E coli/Citerobacter bacteremia   -Likely having type II NSTEMI  -Dopamine infusion for bradycardia @ rate of 8  -on HD now-2.6 L removed yesterday   - VTI 18 on POCUS    Pulm - hypoxic resp failure s/p intubation 5/17  - Intermittent hypoxia in the setting of bilateral alveolar consolidations   - Hypoxia improved after increased PEEP/alveolar recruitment. Minimal Vent requirements   -A line predominant on POCUS     GI - Acute cholangitis in setting of CBD stricture and recent ERCP with stent placement. 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  -repeat MRCP when stable to assess biliary tree     Renal -Renal failure in setting of distributive shock   -HD today again--2.6 L removed yesterday   - trend BMPs    ID - acute cholangitis c/b e coli/citarobacter bacteremia  - cont meropenem, renally dosed-day 6 today--will treat for 2 weeks total after neg blood cx   - Will add Vancomycin given worsening leukocytosis and bandemia   -Legionella neg     Endo - DMT2  - monitor fs q6    Heme - leukocytosis, thrombocytopenia, likely due to infection  - SCDs for ppx 62 yo M hx of CAD s/p 9 stents, DMT2 on insulin, HTN, GERD, prior cholecystectomy,  diverticulosis, former smoker(30PY), biliary stricture s/p ERCP with sphincterotomy and stent 4/2019 who presented as transfer from [facility] for acute cholangitis s/p ERCP and stent CBD stent placement by GI on 5/19.Hospital course further complicated by Type II NSTEMI and bradycardia. Hemodynamics improved with Dopamine infusion.     Neuro - Mental status much improved  Nodding appropriately--moving extremities     CV - Distributive shock in setting of E coli/Citerobacter bacteremia   -Likely having type II NSTEMI  -Dopamine infusion for bradycardia @ rate of 3  -HD yesterday as well   - VTI 16 on POCUS yesterday     Pulm - hypoxic resp failure s/p intubation 5/17  - Extubated successfully yesterday   -A line predominant on POCUS     GI - Acute cholangitis in setting of CBD stricture and recent ERCP with stent placement. 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  -repeat MRCP when stable to assess biliary tree     Renal -Renal failure in setting of distributive shock   -HD likely again today   - trend BMPs    ID - acute cholangitis c/b e coli/citarobacter bacteremia  - cont meropenem, renally dosed-day 6/14 today--will treat for 2 weeks total after neg blood cx   - WBC count improved after addition of vanc yesterday   -Legionella neg     Endo - DMT2  - monitor fs q6    Heme - leukocytosis, thrombocytopenia, likely due to infection  - SCDs for ppx 62 yo M hx of CAD s/p 9 stents, DMT2 on insulin, HTN, GERD, prior cholecystectomy,  diverticulosis, former smoker(30PY), biliary stricture s/p ERCP with sphincterotomy and stent 4/2019 who presented as transfer from [facility] for acute cholangitis s/p ERCP and stent CBD stent placement by GI on 5/19.Hospital course further complicated by Type II NSTEMI and bradycardia. Hemodynamics improved with Dopamine infusion.     Neuro - Mental status much improved  Nodding appropriately--moving extremities     CV - Distributive shock in setting of E coli/Citerobacter bacteremia   -Likely having type II NSTEMI  -Dopamine infusion for bradycardia @ rate of 3  -HD yesterday as well   - VTI ~18. Called cardiology for PPM vs ischemic eval     Pulm - hypoxic resp failure s/p intubation 5/17  - Extubated successfully yesterday   -A line predominant on POCUS     GI - Acute cholangitis in setting of CBD stricture and recent ERCP with stent placement. 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, path negative  - GI performed second ERCP on 5/19 with another stent placed in CBD  - continue to treat infection with Meropenem (Day 6/14), +Ecoli bacteremia + citrobacter bacteremia 2.2 cholangitis?  -repeat MRCP when stable to assess biliary tree  - Dysphagia diet     Renal -Renal failure in setting of distributive shock   -No HD today   - trend BMPs    ID - acute cholangitis c/b e coli/citarobacter bacteremia  - cont meropenem, renally dosed-day 6/14 today--will treat for 2 weeks total after neg blood cx   - WBC count improved after addition of vanc yesterday   -Legionella neg     Endo - DMT2  - monitor fs q6-NPH q6    Heme - leukocytosis, thrombocytopenia, likely due to infection  - SCDs for ppx

## 2019-05-25 NOTE — PROGRESS NOTE ADULT - SUBJECTIVE AND OBJECTIVE BOX
[facility] Division of Kidney Diseases & Hypertension  FOLLOW UP NOTE  [phone]--------------------------------------------------------------------------------    HPI: 63-year-old male with medical history of CAD s/p 9 stents, DM, HTN, GERD, prior cholecystectomy,  diverticulosis, biliary stricture s/p ERCP with sphincteromy and stent 4/2019 who presents as transfer from [facility] where he was admitted with septic shock. Nephrology team is following for SLOAN on CKD and anuria. Pt. found to be in septic shock due to E Coli bacteremia was intubated for acute hypoxic respiratory failure and started on pressor support. He received vancomycin, zosyn, micafungin, merepenem and gentamicin initially. Also pt. developed NSTEMI and was started on heparin gtt with dobutamine due to concern for new LV dysfunction. Pt. with known history of CKD 3 secondary to DM and HTN, follows as outpatient with Dr Hodgson. As per pt's son SCr baseline 2.5-2.6 in March 2019 however states decrease to 1.9 in April 2019. On lab review of [facility] SCr elevated to 4.05 on 4/18/19 and decreased to 3.71 on 4/20/19. SCr elevated at 1.97 on 5/17/19. SCr elevated at 2.29 on 5/17/19 and further increased at 2.90 on 5/18/19. Pt. starting on CRRT on 5/18/19. CRRT was discontinued on 5/22/19 due to HD catheter malfunction. [facility] non-tunneled HD catheter was removed and replaced with left femoral non-tunneled HD catheter on 5/22/19. Pt. transitioned to intermittent HD, tolerated HD treatment on 5/24/19 with 2.5 L of UF.    Pt. seen and examined at bedside this AM. Patient self extubated overnight and is currently on nasal cannula. Patient is drowsy but able to follow commands. Denies SOB, CP, or headache.       PAST HISTORY  --------------------------------------------------------------------------------  No significant changes to PMH, PSH, FHx, SHx, unless otherwise noted    ALLERGIES & MEDICATIONS  --------------------------------------------------------------------------------  Allergies    Cipro (Rash)  Plavix (Rash)    Intolerances      Standing Inpatient Medications  aspirin  chewable 81 milliGRAM(s) Oral daily  chlorhexidine 4% Liquid 1 Application(s) Topical <User Schedule>  docusate sodium Liquid 100 milliGRAM(s) Oral three times a day  DOPamine Infusion 3 MICROgram(s)/kG/Min IV Continuous <Continuous>  heparin  Injectable 5000 Unit(s) SubCutaneous every 8 hours  insulin lispro (HumaLOG) corrective regimen sliding scale   SubCutaneous every 6 hours  insulin NPH human recombinant 4 Unit(s) SubCutaneous every 6 hours  meropenem  IVPB      meropenem  IVPB 500 milliGRAM(s) IV Intermittent every 12 hours  pantoprazole  Injectable 40 milliGRAM(s) IV Push daily  petrolatum Ophthalmic Ointment 1 Application(s) Both EYES two times a day  senna Syrup 10 milliLiter(s) Oral at bedtime    PRN Inpatient Medications  sodium chloride 0.9% lock flush 10 milliLiter(s) IV Push every 1 hour PRN      REVIEW OF SYSTEMS  --------------------------------------------------------------------------------  Resp: no SOB  CV: no CP  GI: no abdominal pain  MSK: edema present.     VITALS/PHYSICAL EXAM  --------------------------------------------------------------------------------  T(C): 36.8 (05-25-19 @ 08:00), Max: 36.8 (05-25-19 @ 08:00)  HR: 55 (05-25-19 @ 08:00) (49 - 65)  BP: 140/62 (05-25-19 @ 08:00) (125/57 - 166/96)  RR: 16 (05-25-19 @ 08:00) (11 - 20)  SpO2: 97% (05-25-19 @ 08:00) (93% - 100%)  Wt(kg): --        05-24-19 @ 07:01  -  05-25-19 @ 07:00  --------------------------------------------------------  IN: 3322.5 mL / OUT: 400 mL / NET: 2922.5 mL    05-25-19 @ 07:01  -  05-25-19 @ 10:14  --------------------------------------------------------  IN: 11 mL / OUT: 0 mL / NET: 11 mL      Physical Exam:  	  	Gen: NAD, on nasal cannula   	HEENT: sclera anicteric   	Pulm: CTA b/l  	CV: S1S2  	Abd: Soft, +BS   	Ext: 1+ LE edema B/L  	Skin: Warm and dry  	Vascular access: Left femoral non-tunneled HD catheter site:  without bleeding    LABS/STUDIES  --------------------------------------------------------------------------------              10.7   39.61 >-----------<  138      [05-25-19 @ 01:00]              31.7     134  |  96  |  38  ----------------------------<  226      [05-25-19 @ 01:00]  3.6   |  22  |  2.94        Ca     8.1     [05-25-19 @ 01:00]      Mg     2.4     [05-24-19 @ 03:00]      Phos  2.5     [05-24-19 @ 03:00]    TPro  6.0  /  Alb  2.5  /  TBili  1.3  /  DBili  x   /  AST  36  /  ALT  115  /  AlkPhos  227  [05-25-19 @ 01:00]    PT/INR: PT 12.0 , INR 1.08       [05-25-19 @ 01:00]      Creatinine Trend:  SCr 2.94 [05-25 @ 01:00]  SCr 2.97 [05-24 @ 03:00]  SCr 2.72 [05-24 @ 00:00]  SCr 2.74 [05-23 @ 11:30]  SCr 1.61 [05-23 @ 00:00]          HBsAb 3.0      [05-22-19 @ 20:00]  HBsAg NEGATIVE      [05-22-19 @ 20:00]  HBcAb Nonreactive      [05-22-19 @ 20:00]  HCV 0.20, Nonreactive Hepatitis C AB  S/CO Ratio                        Interpretation  < 1.00                                   Non-Reactive  1.00 - 4.99                         Weakly-Reactive  >= 5.00                                Reactive  Non-Reactive: Aperson with a non-reactive HCV antibody  result is considered uninfected.  No further action is  needed unless recent infection is suspected.  In these  cases, consider repeat testing later to detect  seroconversion..  Weakly-Reactive: HCV antibody test is abnormal, HCV RNA  Qualitative test will follow.  Reactive: HCV antibody test is abnormal, HCV RNA  Qualitative test will follow.  Note: HCV antibody testing is performed on the [employer] system.      [05-22-19 @ 20:00]

## 2019-05-25 NOTE — CONSULT NOTE ADULT - SUBJECTIVE AND OBJECTIVE BOX
Patient is a 63y old  Male who presents with Septic Shock (26 May 2019 11:39)    HISTORY OF PRESENT ILLNESS:    64 yo M hx of CAD s/p 9 stents, reported recent normal ECHO, DMT2 on insulin, HTN, GERD, prior cholecystectomy,  diverticulosis, former smoker(30PY),biliary stricture s/p ERCP with sphincterotomy and stent 4/2019 who presents as transfer from [facility] for further evaluation, possible IR intervention or Advanced GI evaluation. 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  He presented to OSH ED found to be in septic shock due to E Coli Bacteremia, with SLOAN, transaminitis and also elevated troponin In the ED at OSH, he developed lethargy, altered mental status, and hypoxia to 82% and was intubated for acute hypoxic resp failure. He was started on levophed, vaso, and adilia for hypotension and admitted to OSH MICU for further evaluation. He received initially vanc, zosyn, micafungin, merepenem and gentamicin. Troponins were elevated with concern for demand ischemia given h/o CAD and he was started on heparin gtt. Dobutamine was added at 10 mcg/kg/min due to concern for new LV dysfunction. EPS now being called because of significant bradycardia supported with + chronotropic agents.             PAST MEDICAL & SURGICAL HISTORY:  Type II diabetes mellitus  HTN (hypertension)  CAD (coronary artery disease)  S/P cholecystectomy    	    MEDICATIONS:  aspirin  chewable 81 milliGRAM(s) Oral daily  heparin  Injectable 5000 Unit(s) SubCutaneous every 8 hours    meropenem  IVPB      meropenem  IVPB 500 milliGRAM(s) IV Intermittent every 12 hours      metoclopramide Injectable 10 milliGRAM(s) IV Push once    docusate sodium 100 milliGRAM(s) Oral three times a day  pantoprazole  Injectable 40 milliGRAM(s) IV Push daily  senna 1 Tablet(s) Oral at bedtime    insulin glargine Injectable (LANTUS) 12 Unit(s) SubCutaneous at bedtime  insulin lispro (HumaLOG) corrective regimen sliding scale   SubCutaneous every 6 hours  insulin lispro Injectable (HumaLOG) 4 Unit(s) SubCutaneous three times a day before meals    chlorhexidine 4% Liquid 1 Application(s) Topical <User Schedule>  petrolatum Ophthalmic Ointment 1 Application(s) Both EYES two times a day        Allergies    Cipro (Rash)  Plavix (Rash)    Intolerances        FAMILY HISTORY:      SOCIAL HISTORY    Marital Status:   Occupation:   Lives with:     SUBSTANCE USE  Tobacco Usage:  ( ) never smoked   ( ) former smoker  ( ) current smoker; Packs per day:   Alcohol Usage: ( ) none  ( ) occasional ( ) 2-3 times a week ( ) daily; Last drink:   Recreational drugs ( ) None    REVIEW OF SYSTEMS:    CONSTITUTIONAL: No fevers, No chills, No fatigue, No weight gain  EYES: No vision changes   ENT: No congestion, No ear pain, No sore throat.  NECK: No pain, No stiffness  RESPIRATORY: No shortness of breath, No cough, No wheezing, No hemoptysis  CARDIOVASCULAR: No chest pain. No palpitations, No GIBBS, No orthopnea, No paroxysmal nocturnal dyspnea, No pleuritic pain  GASTROINTESTINAL: No abdominal pain, No nausea, No vomiting, No hematemesis, No diarrhea No constipation. No melena  GENITOURINARY: No dysuria, No frequency, No incontinence, No hematuria  NEUROLOGICAL: No dizziness, No lightheadedness, No syncope, No LOC, No headache, No numbness or weakness  EXTREMITIES: No Edema, No joint pain, No joint swelling.  PSYCHIATRIC: No anxiety, No depression  SKIN: No diaphoresis. No itching, No rashes, No pressure ulcers    All other review of systems is negative unless indicated above.    VITAL SIGNS        PHYSICAL EXAM:    Appearance: NAD, no distress  HEENT:   Normal oral mucosa, PERRL, EOMI  Cardiovascular: Regular rate and rhythm, Normal S1 S2, No JVD, No murmurs, No edema  Respiratory: Lungs clear to auscultation. No rales, No rhonchi, No wheezing. No tenderness to palpation  Gastrointestinal:  Soft, Non-tender, + BS  Neurologic: Non-focal, A&Ox3  Skin: Warm and dry, No rashes, No ecchymoses, No cyanosis  Extremities: No clubbing, cyanosis or edema  Vascular: Peripheral pulses palpable 2+ bilaterally  Psychiatry: Mood & affect appropriate      	    		        I&O's Summary    25 May 2019 07:01  -  26 May 2019 07:00  --------------------------------------------------------  IN: 642 mL / OUT: 0 mL / NET: 642 mL            LABORATORY VALUES	 	    Complete Blood Count + Automated Diff (05.25.19 @ 13:00)    Nucleated RBC #: 0.08 K/uL    WBC Count: 33.92 K/uL    RBC Count: 3.50 M/uL    Hemoglobin: 10.4 g/dL    Hematocrit: 30.6 %    Mean Cell Volume: 87.4 fL    Mean Cell Hemoglobin: 29.7 pg    Mean Cell Hemoglobin Conc: 34.0 %    Red Cell Distrib Width: 14.4 %    Platelet Count - Automated: 156 K/uL    MPV: 11.5 fl    Auto Neutrophil #: 27.04 K/uL    Auto Lymphocyte #: 2.33 K/uL    Auto Monocyte #: 1.80 K/uL    Auto Eosinophil #: 0.01 K/uL    Auto Basophil #: 0.10 K/uL    Auto Neutrophil %: 79.7 %    Auto Lymphocyte %: 6.9 %    Auto Monocyte %: 5.3 %    Auto Eosinophil %: 0.0 %    Auto Basophil %: 0.3 %    Auto Immature Granulocyte %: 7.8: (Includes meta, myelo and promyelocytes) %      05-25    134<L>  |  95<L>  |  53<H>  ----------------------------<  259<H>  3.8   |  23  |  3.95<H>      Ca    8.1<L>      25 May 2019 13:00      TPro  5.7<L>  /  Alb  2.3<L>  /  TBili  1.2  /  DBili  x   /  AST  31  /  ALT  96<H>  /  AlkPhos  197<H>  05-25    LIVER FUNCTIONS - ( 26 May 2019 00:55 )  Alb: 2.3 g/dL / Pro: 5.7 g/dL / ALK PHOS: 183 u/L / ALT: 80 u/L / AST: 29 u/L / GGT: x               CARDIAC MARKERS:                Blood Gas Venous - Lactate: 2.1 mmol/L (05-26 @ 01:20)      Hemoglobin A1C, Whole Blood: 6.5 % (04-20 @ 06:47)          CAPILLARY BLOOD GLUCOSE      POCT Blood Glucose.: 144 mg/dL (26 May 2019 12:17)    05-24 @ 15:58  Culture - Blood:   NO ORGANISMS ISOLATED  NO ORGANISMS ISOLATED AT 24 HOURS  Gram Stain Blood: --  Method Type: --  Organism: --  Organism Identification: --    05-24 @ 13:06  Culture - Blood:   NO ORGANISMS ISOLATED  NO ORGANISMS ISOLATED AT 48 HRS.  Gram Stain Blood: --  Method Type: --  Organism: --  Organism Identification: --    05-19 @ 09:50  Culture - Blood:   NO ORGANISMS ISOLATED  Gram Stain Blood: --  Method Type: --  Organism: --  Organism Identification: --    05-19 @ 08:28  Culture - Blood:   NO ORGANISMS ISOLATED  Gram Stain Blood: --  Method Type: --  Organism: --  Organism Identification: --    05-18 @ 00:37  Culture - Blood: --  Gram Stain Blood: --  Method Type: PCR  Organism: Blood Culture PCR  Organism Identification: Blood Culture PCR  Citrobacter koseri  Escherichia coli  Citrobacter freundii          TELEMETRY: 	    ECG:  	  RADIOLOGY:  OTHER: 	    PREVIOUS DIAGNOSTIC TESTING:    [ ] Echocardiogram:< from: TTE with Doppler (w/Cont) (05.21.19 @ 16:39) >  Patient name: KAMRAN FIGUEROA  YOB: 1955   Age: 63 (M)   MR#: [id number]  Study Date: 5/21/2019  [facility]  Study quality: Technically Difficult  Referring Physician: Melina Whitlock MD  Blood Pressure: 127/62 mmHg  Height: 170 cm  Weight: 98 kg  BSA: 2.1 m2  ------------------------------------------------------------------------  PROCEDURE: Transthoracic echocardiogram with 2-D, M-Mode  and complete spectral and color flow Doppler.  Verbal consent was obtained for injection of echo contrast.  Following  intravenous injection of contrast, harmonic  imaging was performed. [id number]  INDICATION: Abnormal electrocardiogram (ECG) (EKG) (R94.31)  ------------------------------------------------------------------------  OBSERVATIONS:  Mitral Valve: Mitral annular calcification, otherwise  normal mitral valve. Minimal mitral regurgitation.  Aortic Root: Aortic root not well visualized.  Aortic Valve: Aortic valve leaflet morphology not well  visualized. Mild aortic regurgitation.  Left Atrium: Normal left atrium.  Left Ventricle: Endocardium not well visualized; grossly  severe global left ventricular systolic dysfunction.  In  some views the apex appears particularly hypokinetic.  Endocardial visualization enhanced with intravenous  injection of echo contrast (Definity).  No obvious LV  thrombus seen.  Right Heart: Normal right atrium. Unable to accurately  evaluate right ventricular size or systolic function.  Tricuspid valve not well visualized. Pulmonic valve not  well visualized.  Pericardium/PleuraNormal pericardium with no pericardial  effusion.  ------------------------------------------------------------------------  CONCLUSIONS:  1. Mitral annular calcification, otherwise normal mitral  valve. Minimal mitral regurgitation.  2. Aortic valve leaflet morphology not well visualized.  Mild aortic regurgitation.  3. Endocardium not well visualized; grossly severe global  left ventricular systolic dysfunction.  In some views the  apex appears particularly hypokinetic.  Endocardial  visualization enhanced with intravenous injection of echo  contrast (Definity).  No obvious LV thrombus seen.  4. Unable to accurately evaluate right ventricular size or  systolic function.  ------------------------------------------------------------------------  Confirmed on  5/21/2019 - 17:33:49 by Gordy Castaneda M.D.    < end of copied text >    [ ] Catheterization:  [ ] Stress Test: Patient is a 63y old  Male who presents with Septic Shock (26 May 2019 11:39)    HISTORY OF PRESENT ILLNESS:    62 yo M hx of CAD s/p 9 stents, reported recent normal ECHO, DMT2 on insulin, HTN, GERD, prior cholecystectomy,  diverticulosis, former smoker(30PY),biliary stricture s/p ERCP with sphincterotomy and stent 4/2019 who presents as transfer from [facility] for further evaluation, possible IR intervention or Advanced GI evaluation. 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  He presented to OSH ED found to be in septic shock due to E Coli Bacteremia, with SLOAN, transaminitis and also elevated troponin In the ED at OSH, he developed lethargy, altered mental status, and hypoxia to 82% and was intubated for acute hypoxic resp failure. He was started on levophed, vaso, and adilia for hypotension and admitted to OSH MICU for further evaluation. He received initially vanc, zosyn, micafungin, merepenem and gentamicin. Troponins were elevated with concern for demand ischemia given h/o CAD and he was started on heparin gtt. Dobutamine was added at 10 mcg/kg/min due to concern for new LV dysfunction. EPS now being called because of significant bradycardia supported with + chronotropic agents.             PAST MEDICAL & SURGICAL HISTORY:  Type II diabetes mellitus  HTN (hypertension)  CAD (coronary artery disease)  S/P cholecystectomy    	    MEDICATIONS:  aspirin  chewable 81 milliGRAM(s) Oral daily  heparin  Injectable 5000 Unit(s) SubCutaneous every 8 hours    meropenem  IVPB      meropenem  IVPB 500 milliGRAM(s) IV Intermittent every 12 hours      metoclopramide Injectable 10 milliGRAM(s) IV Push once    docusate sodium 100 milliGRAM(s) Oral three times a day  pantoprazole  Injectable 40 milliGRAM(s) IV Push daily  senna 1 Tablet(s) Oral at bedtime    insulin glargine Injectable (LANTUS) 12 Unit(s) SubCutaneous at bedtime  insulin lispro (HumaLOG) corrective regimen sliding scale   SubCutaneous every 6 hours  insulin lispro Injectable (HumaLOG) 4 Unit(s) SubCutaneous three times a day before meals    chlorhexidine 4% Liquid 1 Application(s) Topical <User Schedule>  petrolatum Ophthalmic Ointment 1 Application(s) Both EYES two times a day        Allergies    Cipro (Rash)  Plavix (Rash)    Intolerances        FAMILY HISTORY:  HTN    SOCIAL HISTORY  prior tobacco for 30 years.    Marital Status:   Occupation:   Lives with:     SUBSTANCE USE  Tobacco Usage:  ( ) never smoked    x former smoker  ( ) current smoker; Packs per day:   Alcohol Usage: (x) none  ( ) occasional ( ) 2-3 times a week ( ) daily; Last drink:   Recreational drugs (x) None    REVIEW OF SYSTEMS:    CONSTITUTIONAL: Fevers  EYES: No vision changes   ENT: No congestion, No ear pain, No sore throat.  NECK: No pain, No stiffness  RESPIRATORY: No shortness of breath, No cough, No wheezing, No hemoptysis  CARDIOVASCULAR: No chest pain. No palpitations, No GIBBS, No orthopnea, No paroxysmal nocturnal dyspnea, No pleuritic pain  GASTROINTESTINAL: nausea, vomiting, diarrhea  GENITOURINARY: No dysuria, No frequency, No incontinence, No hematuria  NEUROLOGICAL: No dizziness, No lightheadedness, No syncope, No LOC, No headache, No numbness or weakness  EXTREMITIES: No Edema, No joint pain, No joint swelling.  PSYCHIATRIC: No anxiety, No depression  SKIN: No diaphoresis. No itching, No rashes, No pressure ulcers    All other review of systems is negative unless indicated above.    VITAL SIGNS        PHYSICAL EXAM:    Appearance: NAD, no distress  HEENT:   Normal oral mucosa, PERRL, EOMI  Cardiovascular: Regular rate and rhythm, Normal S1 S2, No JVD, No murmurs, No edema  Respiratory: Lungs clear to auscultation. No rales, No rhonchi, No wheezing. No tenderness to palpation  Gastrointestinal:  Soft, Non-tender, + BS  Neurologic: Non-focal, A&Ox3  Skin: Warm and dry, No rashes, No ecchymoses, No cyanosis  Extremities: No clubbing, cyanosis or edema  Vascular: Peripheral pulses palpable 2+ bilaterally  Psychiatry: Mood & affect appropriate      	    		        I&O's Summary    25 May 2019 07:01  -  26 May 2019 07:00  --------------------------------------------------------  IN: 642 mL / OUT: 0 mL / NET: 642 mL            LABORATORY VALUES	 	    Complete Blood Count + Automated Diff (05.25.19 @ 13:00)    Nucleated RBC #: 0.08 K/uL    WBC Count: 33.92 K/uL    RBC Count: 3.50 M/uL    Hemoglobin: 10.4 g/dL    Hematocrit: 30.6 %    Mean Cell Volume: 87.4 fL    Mean Cell Hemoglobin: 29.7 pg    Mean Cell Hemoglobin Conc: 34.0 %    Red Cell Distrib Width: 14.4 %    Platelet Count - Automated: 156 K/uL    MPV: 11.5 fl    Auto Neutrophil #: 27.04 K/uL    Auto Lymphocyte #: 2.33 K/uL    Auto Monocyte #: 1.80 K/uL    Auto Eosinophil #: 0.01 K/uL    Auto Basophil #: 0.10 K/uL    Auto Neutrophil %: 79.7 %    Auto Lymphocyte %: 6.9 %    Auto Monocyte %: 5.3 %    Auto Eosinophil %: 0.0 %    Auto Basophil %: 0.3 %    Auto Immature Granulocyte %: 7.8: (Includes meta, myelo and promyelocytes) %      05-25    134<L>  |  95<L>  |  53<H>  ----------------------------<  259<H>  3.8   |  23  |  3.95<H>      Ca    8.1<L>      25 May 2019 13:00      TPro  5.7<L>  /  Alb  2.3<L>  /  TBili  1.2  /  DBili  x   /  AST  31  /  ALT  96<H>  /  AlkPhos  197<H>  05-25    LIVER FUNCTIONS - ( 26 May 2019 00:55 )  Alb: 2.3 g/dL / Pro: 5.7 g/dL / ALK PHOS: 183 u/L / ALT: 80 u/L / AST: 29 u/L / GGT: x               CARDIAC MARKERS:                Blood Gas Venous - Lactate: 2.1 mmol/L (05-26 @ 01:20)      Hemoglobin A1C, Whole Blood: 6.5 % (04-20 @ 06:47)          CAPILLARY BLOOD GLUCOSE      POCT Blood Glucose.: 144 mg/dL (26 May 2019 12:17)    05-24 @ 15:58  Culture - Blood:   NO ORGANISMS ISOLATED  NO ORGANISMS ISOLATED AT 24 HOURS  Gram Stain Blood: --  Method Type: --  Organism: --  Organism Identification: --    05-24 @ 13:06  Culture - Blood:   NO ORGANISMS ISOLATED  NO ORGANISMS ISOLATED AT 48 HRS.  Gram Stain Blood: --  Method Type: --  Organism: --  Organism Identification: --    05-19 @ 09:50  Culture - Blood:   NO ORGANISMS ISOLATED  Gram Stain Blood: --  Method Type: --  Organism: --  Organism Identification: --    05-19 @ 08:28  Culture - Blood:   NO ORGANISMS ISOLATED  Gram Stain Blood: --  Method Type: --  Organism: --  Organism Identification: --    05-18 @ 00:37  Culture - Blood: --  Gram Stain Blood: --  Method Type: PCR  Organism: Blood Culture PCR  Organism Identification: Blood Culture PCR  Citrobacter koseri  Escherichia coli  Citrobacter freundii          TELEMETRY: 	    ECG:  	  RADIOLOGY:  OTHER: 	    PREVIOUS DIAGNOSTIC TESTING:    [ ] Echocardiogram:< from: TTE with Doppler (w/Cont) (05.21.19 @ 16:39) >  Patient name: KAMRAN FIGUEROA  YOB: 1955   Age: 63 (M)   MR#: [id number]  Study Date: 5/21/2019  [facility]  Study quality: Technically Difficult  Referring Physician: Melina Whitlock MD  Blood Pressure: 127/62 mmHg  Height: 170 cm  Weight: 98 kg  BSA: 2.1 m2  ------------------------------------------------------------------------  PROCEDURE: Transthoracic echocardiogram with 2-D, M-Mode  and complete spectral and color flow Doppler.  Verbal consent was obtained for injection of echo contrast.  Following  intravenous injection of contrast, harmonic  imaging was performed. [id number]  INDICATION: Abnormal electrocardiogram (ECG) (EKG) (R94.31)  ------------------------------------------------------------------------  OBSERVATIONS:  Mitral Valve: Mitral annular calcification, otherwise  normal mitral valve. Minimal mitral regurgitation.  Aortic Root: Aortic root not well visualized.  Aortic Valve: Aortic valve leaflet morphology not well  visualized. Mild aortic regurgitation.  Left Atrium: Normal left atrium.  Left Ventricle: Endocardium not well visualized; grossly  severe global left ventricular systolic dysfunction.  In  some views the apex appears particularly hypokinetic.  Endocardial visualization enhanced with intravenous  injection of echo contrast (Definity).  No obvious LV  thrombus seen.  Right Heart: Normal right atrium. Unable to accurately  evaluate right ventricular size or systolic function.  Tricuspid valve not well visualized. Pulmonic valve not  well visualized.  Pericardium/PleuraNormal pericardium with no pericardial  effusion.  ------------------------------------------------------------------------  CONCLUSIONS:  1. Mitral annular calcification, otherwise normal mitral  valve. Minimal mitral regurgitation.  2. Aortic valve leaflet morphology not well visualized.  Mild aortic regurgitation.  3. Endocardium not well visualized; grossly severe global  left ventricular systolic dysfunction.  In some views the  apex appears particularly hypokinetic.  Endocardial  visualization enhanced with intravenous injection of echo  contrast (Definity).  No obvious LV thrombus seen.  4. Unable to accurately evaluate right ventricular size or  systolic function.  ------------------------------------------------------------------------  Confirmed on  5/21/2019 - 17:33:49 by Gordy Castaneda M.D.    < end of copied text >    [ ] Catheterization:  [ ] Stress Test: Patient is a 63y old  Male who presents with Septic Shock (26 May 2019 11:39)    HISTORY OF PRESENT ILLNESS:    62 yo M hx of CAD s/p 9 stents, reported recent normal ECHO, DMT2 on insulin, HTN, GERD, prior cholecystectomy,  diverticulosis, former smoker(30PY),biliary stricture s/p ERCP with sphincterotomy and stent 4/2019 who presents as transfer from [facility] for further evaluation, possible IR intervention or Advanced GI evaluation. 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  He presented to OSH ED found to be in septic shock due to E Coli Bacteremia, with SLOAN, transaminitis and also elevated troponin In the ED at OSH, he developed lethargy, altered mental status, and hypoxia to 82% and was intubated for acute hypoxic resp failure. He was started on levophed, vaso, and adilia for hypotension and admitted to OSH MICU for further evaluation. He received initially vanc, zosyn, micafungin, merepenem and gentamicin. Troponins were elevated with concern for demand ischemia given h/o CAD and he was started on heparin gtt. Dobutamine was added at 10 mcg/kg/min due to concern for new LV dysfunction. EPS now being called because of significant bradycardia supported with + chronotropic agents.             PAST MEDICAL & SURGICAL HISTORY:  Type II diabetes mellitus  HTN (hypertension)  CAD (coronary artery disease)  S/P cholecystectomy    	    MEDICATIONS:  aspirin  chewable 81 milliGRAM(s) Oral daily  heparin  Injectable 5000 Unit(s) SubCutaneous every 8 hours    meropenem  IVPB      meropenem  IVPB 500 milliGRAM(s) IV Intermittent every 12 hours      metoclopramide Injectable 10 milliGRAM(s) IV Push once    docusate sodium 100 milliGRAM(s) Oral three times a day  pantoprazole  Injectable 40 milliGRAM(s) IV Push daily  senna 1 Tablet(s) Oral at bedtime    insulin glargine Injectable (LANTUS) 12 Unit(s) SubCutaneous at bedtime  insulin lispro (HumaLOG) corrective regimen sliding scale   SubCutaneous every 6 hours  insulin lispro Injectable (HumaLOG) 4 Unit(s) SubCutaneous three times a day before meals    chlorhexidine 4% Liquid 1 Application(s) Topical <User Schedule>  petrolatum Ophthalmic Ointment 1 Application(s) Both EYES two times a day        Allergies    Cipro (Rash)  Plavix (Rash)    Intolerances        FAMILY HISTORY:  HTN    SOCIAL HISTORY  prior tobacco for 30 years.    Marital Status:   Occupation:   Lives with:     SUBSTANCE USE  Tobacco Usage:  ( ) never smoked    x former smoker  ( ) current smoker; Packs per day:   Alcohol Usage: (x) none  ( ) occasional ( ) 2-3 times a week ( ) daily; Last drink:   Recreational drugs (x) None    REVIEW OF SYSTEMS:    CONSTITUTIONAL: Fevers  EYES: No vision changes   ENT: No congestion, No ear pain, No sore throat.  NECK: No pain, No stiffness  RESPIRATORY: No shortness of breath, No cough, No wheezing, No hemoptysis  CARDIOVASCULAR: No chest pain. No palpitations, No GIBBS, No orthopnea, No paroxysmal nocturnal dyspnea, No pleuritic pain  GASTROINTESTINAL: nausea, vomiting, diarrhea  GENITOURINARY: No dysuria, No frequency, No incontinence, No hematuria  NEUROLOGICAL: No dizziness, No lightheadedness, No syncope, No LOC, No headache, No numbness or weakness  EXTREMITIES: No Edema, No joint pain, No joint swelling.  PSYCHIATRIC: No anxiety, No depression  SKIN: No diaphoresis. No itching, No rashes, No pressure ulcers    All other review of systems is negative unless indicated above.    VITAL SIGNS  BP: 140/62  HR: 55  RR: 16  T: 36.7  02 saturation: 97% on supplemental 02      PHYSICAL EXAM:    Appearance: NAD, no distress  HEENT:   Normal oral mucosa, PERRL, EOMI  Cardiovascular: Regular rate and rhythm, Normal S1 S2, No JVD, No murmurs, No edema  Respiratory: Lungs clear to auscultation. No rales, No rhonchi, No wheezing. No tenderness to palpation  Gastrointestinal:  Soft, Non-tender, + BS  Neurologic: Non-focal, A&Ox3  Skin: Warm and dry, No rashes, No ecchymoses, No cyanosis  Extremities: No clubbing, cyanosis or edema  Vascular: Peripheral pulses palpable 2+ bilaterally  Psychiatry: Mood & affect appropriate      	    		        I&O's Summary    25 May 2019 07:01  -  26 May 2019 07:00  --------------------------------------------------------  IN: 642 mL / OUT: 0 mL / NET: 642 mL            LABORATORY VALUES	 	    Complete Blood Count + Automated Diff (05.25.19 @ 13:00)    Nucleated RBC #: 0.08 K/uL    WBC Count: 33.92 K/uL    RBC Count: 3.50 M/uL    Hemoglobin: 10.4 g/dL    Hematocrit: 30.6 %    Mean Cell Volume: 87.4 fL    Mean Cell Hemoglobin: 29.7 pg    Mean Cell Hemoglobin Conc: 34.0 %    Red Cell Distrib Width: 14.4 %    Platelet Count - Automated: 156 K/uL    MPV: 11.5 fl    Auto Neutrophil #: 27.04 K/uL    Auto Lymphocyte #: 2.33 K/uL    Auto Monocyte #: 1.80 K/uL    Auto Eosinophil #: 0.01 K/uL    Auto Basophil #: 0.10 K/uL    Auto Neutrophil %: 79.7 %    Auto Lymphocyte %: 6.9 %    Auto Monocyte %: 5.3 %    Auto Eosinophil %: 0.0 %    Auto Basophil %: 0.3 %    Auto Immature Granulocyte %: 7.8: (Includes meta, myelo and promyelocytes) %      05-25    134<L>  |  95<L>  |  53<H>  ----------------------------<  259<H>  3.8   |  23  |  3.95<H>      Ca    8.1<L>      25 May 2019 13:00      TPro  5.7<L>  /  Alb  2.3<L>  /  TBili  1.2  /  DBili  x   /  AST  31  /  ALT  96<H>  /  AlkPhos  197<H>  05-25    LIVER FUNCTIONS - ( 26 May 2019 00:55 )  Alb: 2.3 g/dL / Pro: 5.7 g/dL / ALK PHOS: 183 u/L / ALT: 80 u/L / AST: 29 u/L / GGT: x               CARDIAC MARKERS:                Blood Gas Venous - Lactate: 2.1 mmol/L (05-26 @ 01:20)      Hemoglobin A1C, Whole Blood: 6.5 % (04-20 @ 06:47)          CAPILLARY BLOOD GLUCOSE      POCT Blood Glucose.: 144 mg/dL (26 May 2019 12:17)    05-24 @ 15:58  Culture - Blood:   NO ORGANISMS ISOLATED  NO ORGANISMS ISOLATED AT 24 HOURS  Gram Stain Blood: --  Method Type: --  Organism: --  Organism Identification: --    05-24 @ 13:06  Culture - Blood:   NO ORGANISMS ISOLATED  NO ORGANISMS ISOLATED AT 48 HRS.  Gram Stain Blood: --  Method Type: --  Organism: --  Organism Identification: --    05-19 @ 09:50  Culture - Blood:   NO ORGANISMS ISOLATED  Gram Stain Blood: --  Method Type: --  Organism: --  Organism Identification: --    05-19 @ 08:28  Culture - Blood:   NO ORGANISMS ISOLATED  Gram Stain Blood: --  Method Type: --  Organism: --  Organism Identification: --    05-18 @ 00:37  Culture - Blood: --  Gram Stain Blood: --  Method Type: PCR  Organism: Blood Culture PCR  Organism Identification: Blood Culture PCR  Citrobacter koseri  Escherichia coli  Citrobacter freundii          TELEMETRY: 	    ECG:  	  RADIOLOGY:  OTHER: 	    PREVIOUS DIAGNOSTIC TESTING:    [ ] Echocardiogram:< from: TTE with Doppler (w/Cont) (05.21.19 @ 16:39) >  Patient name: KAMRAN FIGUEROA  YOB: 1955   Age: 63 (M)   MR#: [id number]  Study Date: 5/21/2019  [facility]  Study quality: Technically Difficult  Referring Physician: Melina Whitlock MD  Blood Pressure: 127/62 mmHg  Height: 170 cm  Weight: 98 kg  BSA: 2.1 m2  ------------------------------------------------------------------------  PROCEDURE: Transthoracic echocardiogram with 2-D, M-Mode  and complete spectral and color flow Doppler.  Verbal consent was obtained for injection of echo contrast.  Following  intravenous injection of contrast, harmonic  imaging was performed. [id number]  INDICATION: Abnormal electrocardiogram (ECG) (EKG) (R94.31)  ------------------------------------------------------------------------  OBSERVATIONS:  Mitral Valve: Mitral annular calcification, otherwise  normal mitral valve. Minimal mitral regurgitation.  Aortic Root: Aortic root not well visualized.  Aortic Valve: Aortic valve leaflet morphology not well  visualized. Mild aortic regurgitation.  Left Atrium: Normal left atrium.  Left Ventricle: Endocardium not well visualized; grossly  severe global left ventricular systolic dysfunction.  In  some views the apex appears particularly hypokinetic.  Endocardial visualization enhanced with intravenous  injection of echo contrast (Definity).  No obvious LV  thrombus seen.  Right Heart: Normal right atrium. Unable to accurately  evaluate right ventricular size or systolic function.  Tricuspid valve not well visualized. Pulmonic valve not  well visualized.  Pericardium/PleuraNormal pericardium with no pericardial  effusion.  ------------------------------------------------------------------------  CONCLUSIONS:  1. Mitral annular calcification, otherwise normal mitral  valve. Minimal mitral regurgitation.  2. Aortic valve leaflet morphology not well visualized.  Mild aortic regurgitation.  3. Endocardium not well visualized; grossly severe global  left ventricular systolic dysfunction.  In some views the  apex appears particularly hypokinetic.  Endocardial  visualization enhanced with intravenous injection of echo  contrast (Definity).  No obvious LV thrombus seen.  4. Unable to accurately evaluate right ventricular size or  systolic function.  ------------------------------------------------------------------------  Confirmed on  5/21/2019 - 17:33:49 by Gordy Castaneda M.D.    < end of copied text >    [ ] Catheterization:  [ ] Stress Test:

## 2019-05-25 NOTE — PROGRESS NOTE ADULT - ASSESSMENT
Impression:  1. Septic shock on pressors with E coli and Citrobacter bacteremia presumed secondary to biliary source, s/p ERCP 5/19/19 notable for bile in the duodenum and additional placement of plastic biliary stent alongside prior stent. Leukocytosis improving and pressor requirements decreasing  2. Prior EUS/ERCP on 4/19/19 at OSH with 2cm CBD stricture and with sphincterotomy and stent placement, biopsies negative and brushings with atypical cells (favor reactive atypia). Liver test continuing to improve  3. Hypoxic respiratory failure s/p intubation  4. CAD s/p PCI x 9 with elevated troponin, NSTEMI on heparin infusion.  5. SLOAN on CKD on CVVH  6. DM  7. GERD    Recommendations:  - Follow up repeat blood cultures  - Continue Abx  - Consider repeat MRI/MRCP once clinically stabilized to re-evaluate entire biliary tree  - Trend CBC and CMP  - Supportive care per ICU  - Page GI with questions or changes in clinical status     Le Aragon MD  Gastroenterology Fellow  [phone]  Please page on call fellow on weekends and after 5pm on weekdays Impression:  # Septic shock requiring pressors with E coli and Citrobacter bacteremia thought due to biliary source. s/p ERCP (5/19) with placement of additional biliary stent. Leukocytosis improving and pressor requirements decreasing Liver test continuing to improve.  # Prior EUS/ERCP on 4/19/19 at OSH with 2cm CBD stricture and with sphincterotomy and stent placement, biopsies negative and brushings with atypical cells (favor reactive atypia).   # Hypoxic respiratory failure s/p intubation - now extubated  # CAD s/p PCI x 9 complicated by NSTEMI  # SLOAN on CKD: Previously on CVVHD  # DM  # GERD    Recommendations:  - Follow up repeat blood cultures  - Continue Abx  - Consider repeat MRI/MRCP once clinically stabilized to re-evaluate entire biliary tree  - Trend CBC and CMP  - Supportive care per ICU  - Page GI with questions or changes in clinical status     Le Aragon MD  Gastroenterology Fellow  [phone]  Please page on call fellow on weekends and after 5pm on weekdays

## 2019-05-25 NOTE — SWALLOW BEDSIDE ASSESSMENT ADULT - COMMENTS
The patient was seen at bedside this afternoon for an initial assessment of swallow function, at which time he was sleeping though arouseable. Patient currently receiving supplemental O2 via NC in place and SpO2 remained between 93-96% throughout today's evaluation. Patient's son who is an MD at [facility] [employer] was present at bedside throughout this evaluation. Per charting, the patient is a 64 yo M hx of "CAD s/p 9 stents, DMT2 on insulin, HTN, GERD, prior cholecystectomy,  diverticulosis, former smoker(30PY), biliary stricture s/p ERCP with sphincteromy and stent 4/2019 who presents as transfer from [facility] in septic shock for further evaluation of acute cholangitis, for possible IR intervention or Advanced GI intervention. 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 Found to be in septic shock due to E Coli Bacteremia, with SLOAN, transaminitis and elevated troponins. In the ED at OSH, he developed lethargy, altered mental status, and hypoxia to 82% and was intubated for acute hypoxic resp failure." Patient self-extubated on 5/24/19. Most recent CXR revealed, "Endotracheal tube seen with its tip in satisfactory position. Enteric   tube and internal jugular catheters also present. Hazy lower right hemithorax likely layering effusion with atelectasis. Left hemidiaphragm obscured may be due to similar, small effusion with atelectasis. Upper lung fields are clear. No pneumothorax." Discussed results and recommendations from this evaluation with the patient, patient's son, RN, and MD on unit.

## 2019-05-25 NOTE — SWALLOW BEDSIDE ASSESSMENT ADULT - SWALLOW EVAL: DIAGNOSIS
1. The patient demonstrated a mild oral dysphagia for puree, mechanical soft solids, and nectar thick liquids marked by delayed bolus collection, transfer, and posterior transport. 2. The patient demonstrated a mild-moderate oral dysphagia for thin liquids marked by delayed bolus collection and transfer with suspected posterior loss over the base of tongue. 3. The patient demonstrated a mild pharyngeal dysphagia for puree, mech. soft solids, and nectar thick liquids marked by a delayed pharyngeal swallow trigger with reduced hyolaryngeal elevation upon digital palpation w/o evidence of airway penetration. 4. The patient demonstrated a moderate-severe pharyngeal dysphagia for thin liquids marked by a delayed pharyngeal swallow trigger with reduced hyolaryngeal elevation upon digital palpation resulting in multiple swallows suggestive of pharyngeal stasis and/or airway penetration and change in vocal quality to a 'wet' tone and throat clearing suggestive of airway penetration.

## 2019-05-25 NOTE — PROGRESS NOTE ADULT - ATTENDING COMMENTS
Clinically improving, now s/p extubation and with decreasing pressor requirement and improvement in leukocytosis. Afebrile. Abdomen non-tender. Continue excellent supportive care as per MICU team. Plan will be to re-image biliary tree once clinically stable.

## 2019-05-25 NOTE — SWALLOW BEDSIDE ASSESSMENT ADULT - ORAL PHASE
Decreased anterior-posterior movement of the bolus/Delayed oral transit time Delayed oral transit time/suspected posterior loss over the base of tongue/Decreased anterior-posterior movement of the bolus

## 2019-05-25 NOTE — PROGRESS NOTE ADULT - ATTENDING COMMENTS
62 yo M hx of CAD s/p 9 stents, DMT2 on insulin, HTN, GERD, prior cholecystectomy,  diverticulosis, former smoker(30PY), biliary stricture s/p ERCP with sphincterotomy and stent 4/2019 who presented as transfer from [facility] for acute cholangitis s/p ERCP and stent CBD stent placement by GI on 5/19.Hospital course further complicated by Type II NSTEMI and bradycardia. Distributive and cardiogenic shock in setting of demand ischemic and his underlying CAD. Hemodynamics improved with Dopamine infusion which is weaning down.  self-extubated yesterday, dysphagia screen. continue meropenem and vanco dosing. HD prn.

## 2019-05-25 NOTE — PROGRESS NOTE ADULT - SUBJECTIVE AND OBJECTIVE BOX
Chief Complaint:  Patient is a 63y old  Male who presents with a chief complaint of Septic Shock (25 May 2019 07:02)    Interval Events:   No acute overnight events. Patient denies any specific complaints.   Now extubated    Allergies:  Cipro (Rash)  Plavix (Rash)    Hospital Medications:  aspirin  chewable 81 milliGRAM(s) Oral daily  chlorhexidine 4% Liquid 1 Application(s) Topical <User Schedule>  docusate sodium Liquid 100 milliGRAM(s) Oral three times a day  DOPamine Infusion 3 MICROgram(s)/kG/Min IV Continuous <Continuous>  heparin  Injectable 5000 Unit(s) SubCutaneous every 8 hours  insulin lispro (HumaLOG) corrective regimen sliding scale   SubCutaneous every 6 hours  insulin NPH human recombinant 4 Unit(s) SubCutaneous every 6 hours  meropenem  IVPB      meropenem  IVPB 500 milliGRAM(s) IV Intermittent every 12 hours  pantoprazole  Injectable 40 milliGRAM(s) IV Push daily  petrolatum Ophthalmic Ointment 1 Application(s) Both EYES two times a day  senna Syrup 10 milliLiter(s) Oral at bedtime    PMHX/PSHX:  Gout  Type II diabetes mellitus  HTN (hypertension)  CAD (coronary artery disease)  Cirrhosis  S/P cholecystectomy    ROS:   General:  No fevers, chills  ENT:  No sore throat or dysphagia  CV:  No pain or palpitations  Resp:  No dyspnea, cough or  wheezing  GI:  No pain, No nausea, No vomiting,  No rectal bleeding, No tarry stools,  Skin:  No rash or edema    PHYSICAL EXAM:   Vital Signs:  Vital Signs Last 24 Hrs  T(C): 36.7 (25 May 2019 04:00), Max: 36.7 (25 May 2019 00:00)  T(F): 98 (25 May 2019 04:00), Max: 98 (25 May 2019 00:00)  HR: 58 (25 May 2019 07:00) (49 - 65)  BP: 144/63 (25 May 2019 07:00) (125/57 - 166/96)  BP(mean): 85 (25 May 2019 07:00) (76 - 110)  RR: 13 (25 May 2019 07:00) (11 - 20)  SpO2: 97% (25 May 2019 07:00) (91% - 100%)  Daily     Daily Weight in k.8 (24 May 2019 19:03)    GENERAL:  NAD, Appears stated age  HEENT:  NC/AT,  conjunctivae clear and pink, sclera -anicteric  CHEST:  Normal Effort, Breath sounds clear  HEART:  RRR, S1 + S2, no murmurs  ABDOMEN:  Soft, non-tender, non-distended, BS+  EXTEREMITIES:  no cyanosis  SKIN:  Warm & Dry  NEURO:  Alert, oriented    LABS:                        10.7   39.61 )-----------( 138      ( 25 May 2019 01:00 )             31.7     Mean Cell Volume: 86.1 fL (- @ 01:00)        134<L>  |  96<L>  |  38<H>  ----------------------------<  226<H>  3.6   |  22  |  2.94<H>    Ca    8.1<L>      25 May 2019 01:00  Phos  2.5       Mg     2.4         TPro  6.0  /  Alb  2.5<L>  /  TBili  1.3<H>  /  DBili  x   /  AST  36  /  ALT  115<H>  /  AlkPhos  227<H>      LIVER FUNCTIONS - ( 25 May 2019 01:00 )  Alb: 2.5 g/dL / Pro: 6.0 g/dL / ALK PHOS: 227 u/L / ALT: 115 u/L / AST: 36 u/L / GGT: x           PT/INR - ( 25 May 2019 01:00 )   PT: 12.0 SEC;   INR: 1.08                           10.7   39.61 )-----------( 138      ( 25 May 2019 01:00 )             31.7                         10.6   48.52 )-----------( 110      ( 24 May 2019 03:00 )             31.0                         11.1   49.40 )-----------( 114      ( 24 May 2019 00:00 )             33.1                         10.6   42.28 )-----------( 89       ( 23 May 2019 11:30 )             31.7                         12.4   44.27 )-----------( 86       ( 23 May 2019 00:00 )             37.3     Imaging: Chief Complaint:  Patient is a 63y old  Male who presents with a chief complaint of Septic Shock (25 May 2019 07:02)    Interval Events:   No acute overnight events. Patient denies any specific complaints.   Now extubated  Dopamine requirements decreasing    Allergies:  Cipro (Rash)  Plavix (Rash)    Hospital Medications:  aspirin  chewable 81 milliGRAM(s) Oral daily  chlorhexidine 4% Liquid 1 Application(s) Topical <User Schedule>  docusate sodium Liquid 100 milliGRAM(s) Oral three times a day  DOPamine Infusion 3 MICROgram(s)/kG/Min IV Continuous <Continuous>  heparin  Injectable 5000 Unit(s) SubCutaneous every 8 hours  insulin lispro (HumaLOG) corrective regimen sliding scale   SubCutaneous every 6 hours  insulin NPH human recombinant 4 Unit(s) SubCutaneous every 6 hours  meropenem  IVPB      meropenem  IVPB 500 milliGRAM(s) IV Intermittent every 12 hours  pantoprazole  Injectable 40 milliGRAM(s) IV Push daily  petrolatum Ophthalmic Ointment 1 Application(s) Both EYES two times a day  senna Syrup 10 milliLiter(s) Oral at bedtime    PMHX/PSHX:  Gout  Type II diabetes mellitus  HTN (hypertension)  CAD (coronary artery disease)  Cirrhosis  S/P cholecystectomy    ROS:   General:  No fevers, chills  ENT:  No sore throat or dysphagia  CV:  No pain or palpitations  Resp:  No dyspnea, cough or  wheezing  GI:  No pain, No nausea, No vomiting,  No rectal bleeding, No tarry stools,  Skin:  No rash or edema    PHYSICAL EXAM:   Vital Signs:  Vital Signs Last 24 Hrs  T(C): 36.7 (25 May 2019 04:00), Max: 36.7 (25 May 2019 00:00)  T(F): 98 (25 May 2019 04:00), Max: 98 (25 May 2019 00:00)  HR: 58 (25 May 2019 07:00) (49 - 65)  BP: 144/63 (25 May 2019 07:00) (125/57 - 166/96)  BP(mean): 85 (25 May 2019 07:00) (76 - 110)  RR: 13 (25 May 2019 07:00) (11 - 20)  SpO2: 97% (25 May 2019 07:00) (91% - 100%)  Daily     Daily Weight in k.8 (24 May 2019 19:03)    GENERAL:  NAD, Appears stated age  HEENT:  NC/AT,  conjunctivae clear and pink, sclera -anicteric  CHEST:  Normal Effort, Breath sounds clear  HEART:  RRR, S1 + S2, no murmurs  ABDOMEN:  Soft, non-tender, non-distended, BS+  EXTEREMITIES:  no cyanosis  SKIN:  Warm & Dry  NEURO:  Alert, oriented    LABS:                        10.7   39.61 )-----------( 138      ( 25 May 2019 01:00 )             31.7     Mean Cell Volume: 86.1 fL (- @ 01:00)        134<L>  |  96<L>  |  38<H>  ----------------------------<  226<H>  3.6   |  22  |  2.94<H>    Ca    8.1<L>      25 May 2019 01:00  Phos  2.5       Mg     2.4         TPro  6.0  /  Alb  2.5<L>  /  TBili  1.3<H>  /  DBili  x   /  AST  36  /  ALT  115<H>  /  AlkPhos  227<H>      LIVER FUNCTIONS - ( 25 May 2019 01:00 )  Alb: 2.5 g/dL / Pro: 6.0 g/dL / ALK PHOS: 227 u/L / ALT: 115 u/L / AST: 36 u/L / GGT: x           PT/INR - ( 25 May 2019 01:00 )   PT: 12.0 SEC;   INR: 1.08                           10.7   39.61 )-----------( 138      ( 25 May 2019 01:00 )             31.7                         10.6   48.52 )-----------( 110      ( 24 May 2019 03:00 )             31.0                         11.1   49.40 )-----------( 114      ( 24 May 2019 00:00 )             33.1                         10.6   42.28 )-----------( 89       ( 23 May 2019 11:30 )             31.7                         12.4   44.27 )-----------( 86       ( 23 May 2019 00:00 )             37.3     Imaging:

## 2019-05-26 LAB
ALBUMIN SERPL ELPH-MCNC: 2.3 G/DL — LOW (ref 3.3–5)
ALP SERPL-CCNC: 183 U/L — HIGH (ref 40–120)
ALT FLD-CCNC: 80 U/L — HIGH (ref 4–41)
ANION GAP SERPL CALC-SCNC: 18 MMO/L — HIGH (ref 7–14)
AST SERPL-CCNC: 29 U/L — SIGNIFICANT CHANGE UP (ref 4–40)
BASE EXCESS BLDV CALC-SCNC: 0.4 MMOL/L — SIGNIFICANT CHANGE UP
BASOPHILS # BLD AUTO: 0.14 K/UL — SIGNIFICANT CHANGE UP (ref 0–0.2)
BASOPHILS NFR BLD AUTO: 0.5 % — SIGNIFICANT CHANGE UP (ref 0–2)
BILIRUB SERPL-MCNC: 1 MG/DL — SIGNIFICANT CHANGE UP (ref 0.2–1.2)
BLOOD GAS VENOUS - CREATININE: 5.07 MG/DL — HIGH (ref 0.5–1.3)
BUN SERPL-MCNC: 66 MG/DL — HIGH (ref 7–23)
CALCIUM SERPL-MCNC: 7.9 MG/DL — LOW (ref 8.4–10.5)
CHLORIDE BLDV-SCNC: 98 MMOL/L — SIGNIFICANT CHANGE UP (ref 96–108)
CHLORIDE SERPL-SCNC: 95 MMOL/L — LOW (ref 98–107)
CO2 SERPL-SCNC: 21 MMOL/L — LOW (ref 22–31)
CREAT SERPL-MCNC: 4.98 MG/DL — HIGH (ref 0.5–1.3)
EOSINOPHIL # BLD AUTO: 0.33 K/UL — SIGNIFICANT CHANGE UP (ref 0–0.5)
EOSINOPHIL NFR BLD AUTO: 1.2 % — SIGNIFICANT CHANGE UP (ref 0–6)
GAS PNL BLDV: 131 MMOL/L — LOW (ref 136–146)
GLUCOSE BLDV-MCNC: 176 MG/DL — HIGH (ref 70–99)
GLUCOSE SERPL-MCNC: 192 MG/DL — HIGH (ref 70–99)
HCO3 BLDV-SCNC: 25 MMOL/L — SIGNIFICANT CHANGE UP (ref 20–27)
HCT VFR BLD CALC: 31.5 % — LOW (ref 39–50)
HCT VFR BLDV CALC: 32.1 % — LOW (ref 39–51)
HGB BLD-MCNC: 10.3 G/DL — LOW (ref 13–17)
HGB BLDV-MCNC: 10.4 G/DL — LOW (ref 13–17)
IMM GRANULOCYTES NFR BLD AUTO: 6.5 % — HIGH (ref 0–1.5)
INR BLD: 1.08 — SIGNIFICANT CHANGE UP (ref 0.88–1.17)
LACTATE BLDV-MCNC: 2.1 MMOL/L — HIGH (ref 0.5–2)
LYMPHOCYTES # BLD AUTO: 12.3 % — LOW (ref 13–44)
LYMPHOCYTES # BLD AUTO: 3.33 K/UL — HIGH (ref 1–3.3)
MANUAL SMEAR VERIFICATION: SIGNIFICANT CHANGE UP
MCHC RBC-ENTMCNC: 28.9 PG — SIGNIFICANT CHANGE UP (ref 27–34)
MCHC RBC-ENTMCNC: 32.7 % — SIGNIFICANT CHANGE UP (ref 32–36)
MCV RBC AUTO: 88.5 FL — SIGNIFICANT CHANGE UP (ref 80–100)
MONOCYTES # BLD AUTO: 2.11 K/UL — HIGH (ref 0–0.9)
MONOCYTES NFR BLD AUTO: 7.8 % — SIGNIFICANT CHANGE UP (ref 2–14)
NEUTROPHILS # BLD AUTO: 19.45 K/UL — HIGH (ref 1.8–7.4)
NEUTROPHILS NFR BLD AUTO: 71.7 % — SIGNIFICANT CHANGE UP (ref 43–77)
NRBC # FLD: 0.07 K/UL — SIGNIFICANT CHANGE UP (ref 0–0)
PCO2 BLDV: 38 MMHG — LOW (ref 41–51)
PH BLDV: 7.43 PH — SIGNIFICANT CHANGE UP (ref 7.32–7.43)
PLATELET # BLD AUTO: 162 K/UL — SIGNIFICANT CHANGE UP (ref 150–400)
PMV BLD: 11 FL — SIGNIFICANT CHANGE UP (ref 7–13)
PO2 BLDV: 49 MMHG — HIGH (ref 35–40)
POTASSIUM BLDV-SCNC: 3 MMOL/L — LOW (ref 3.4–4.5)
POTASSIUM SERPL-MCNC: 3.4 MMOL/L — LOW (ref 3.5–5.3)
POTASSIUM SERPL-SCNC: 3.4 MMOL/L — LOW (ref 3.5–5.3)
PROT SERPL-MCNC: 5.7 G/DL — LOW (ref 6–8.3)
PROTHROM AB SERPL-ACNC: 12 SEC — SIGNIFICANT CHANGE UP (ref 9.8–13.1)
RBC # BLD: 3.56 M/UL — LOW (ref 4.2–5.8)
RBC # FLD: 15.1 % — HIGH (ref 10.3–14.5)
SAO2 % BLDV: 80.9 % — SIGNIFICANT CHANGE UP (ref 60–85)
SODIUM SERPL-SCNC: 134 MMOL/L — LOW (ref 135–145)
WBC # BLD: 27.11 K/UL — HIGH (ref 3.8–10.5)
WBC # FLD AUTO: 27.11 K/UL — HIGH (ref 3.8–10.5)

## 2019-05-26 PROCEDURE — 99291 CRITICAL CARE FIRST HOUR: CPT

## 2019-05-26 PROCEDURE — 99233 SBSQ HOSP IP/OBS HIGH 50: CPT | Mod: GC

## 2019-05-26 RX ORDER — MORPHINE SULFATE 50 MG/1
4 CAPSULE, EXTENDED RELEASE ORAL ONCE
Refills: 0 | Status: DISCONTINUED | OUTPATIENT
Start: 2019-05-26 | End: 2019-05-26

## 2019-05-26 RX ORDER — ONDANSETRON 8 MG/1
4 TABLET, FILM COATED ORAL ONCE
Refills: 0 | Status: COMPLETED | OUTPATIENT
Start: 2019-05-26 | End: 2019-05-26

## 2019-05-26 RX ORDER — METOCLOPRAMIDE HCL 10 MG
10 TABLET ORAL ONCE
Refills: 0 | Status: DISCONTINUED | OUTPATIENT
Start: 2019-05-26 | End: 2019-06-08

## 2019-05-26 RX ORDER — METOCLOPRAMIDE HCL 10 MG
10 TABLET ORAL ONCE
Refills: 0 | Status: COMPLETED | OUTPATIENT
Start: 2019-05-26 | End: 2019-05-26

## 2019-05-26 RX ADMIN — ONDANSETRON 4 MILLIGRAM(S): 8 TABLET, FILM COATED ORAL at 06:45

## 2019-05-26 RX ADMIN — Medication 81 MILLIGRAM(S): at 12:22

## 2019-05-26 RX ADMIN — INSULIN GLARGINE 12 UNIT(S): 100 INJECTION, SOLUTION SUBCUTANEOUS at 22:29

## 2019-05-26 RX ADMIN — HEPARIN SODIUM 5000 UNIT(S): 5000 INJECTION INTRAVENOUS; SUBCUTANEOUS at 22:29

## 2019-05-26 RX ADMIN — HEPARIN SODIUM 5000 UNIT(S): 5000 INJECTION INTRAVENOUS; SUBCUTANEOUS at 05:12

## 2019-05-26 RX ADMIN — MEROPENEM 100 MILLIGRAM(S): 1 INJECTION INTRAVENOUS at 17:39

## 2019-05-26 RX ADMIN — Medication 100 MILLIGRAM(S): at 22:29

## 2019-05-26 RX ADMIN — MEROPENEM 100 MILLIGRAM(S): 1 INJECTION INTRAVENOUS at 05:11

## 2019-05-26 RX ADMIN — CHLORHEXIDINE GLUCONATE 1 APPLICATION(S): 213 SOLUTION TOPICAL at 12:22

## 2019-05-26 RX ADMIN — HEPARIN SODIUM 5000 UNIT(S): 5000 INJECTION INTRAVENOUS; SUBCUTANEOUS at 13:52

## 2019-05-26 RX ADMIN — PANTOPRAZOLE SODIUM 40 MILLIGRAM(S): 20 TABLET, DELAYED RELEASE ORAL at 12:22

## 2019-05-26 RX ADMIN — MORPHINE SULFATE 4 MILLIGRAM(S): 50 CAPSULE, EXTENDED RELEASE ORAL at 01:20

## 2019-05-26 RX ADMIN — MORPHINE SULFATE 4 MILLIGRAM(S): 50 CAPSULE, EXTENDED RELEASE ORAL at 01:35

## 2019-05-26 RX ADMIN — SENNA PLUS 1 TABLET(S): 8.6 TABLET ORAL at 22:29

## 2019-05-26 RX ADMIN — Medication 10 MILLIGRAM(S): at 10:44

## 2019-05-26 NOTE — PHYSICAL THERAPY INITIAL EVALUATION ADULT - PERTINENT HX OF CURRENT PROBLEM, REHAB EVAL
Patient is a 63 year old male admitted to [facility] on 5/18 as transfer from [facility] in septic shock for further evaluation of acute cholangitis, for possible IR intervention or Advanced GI intervention. 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 PMH: CAD s/p 9 stents, DMT2 on insulin, HTN, GERD, prior cholecystectomy, diverticulosis, former smoker(30PY), biliary stricture s/p ERCP with sphincteromy and stent 4/2019.

## 2019-05-26 NOTE — PROGRESS NOTE ADULT - SUBJECTIVE AND OBJECTIVE BOX
[facility] Division of Kidney Diseases & Hypertension  FOLLOW UP NOTE  [phone]--------------------------------------------------------------------------------    HPI: 63-year-old male with medical history of CAD s/p 9 stents, DM, HTN, GERD, prior cholecystectomy,  diverticulosis, biliary stricture s/p ERCP with sphincteromy and stent 4/2019 who presents as transfer from [facility] where he was admitted with septic shock. Nephrology team is following for SLOAN on CKD and anuria. Pt. found to be in septic shock due to E Coli bacteremia was intubated for acute hypoxic respiratory failure and started on pressor support. He received vancomycin, zosyn, micafungin, merepenem and gentamicin initially. Also pt. developed NSTEMI and was started on heparin gtt with dobutamine due to concern for new LV dysfunction. Pt. with known history of CKD 3 secondary to DM and HTN, follows as outpatient with Dr Hodgson. As per pt's son SCr baseline 2.5-2.6 in March 2019 however states decrease to 1.9 in April 2019. On lab review of [facility] SCr elevated to 4.05 on 4/18/19 and decreased to 3.71 on 4/20/19. SCr elevated at 1.97 on 5/17/19. SCr elevated at 2.29 on 5/17/19 and further increased at 2.90 on 5/18/19. Pt. starting on CRRT on 5/18/19. CRRT was discontinued on 5/22/19 due to HD catheter malfunction. [facility] non-tunneled HD catheter was removed and replaced with left femoral non-tunneled HD catheter on 5/22/19. Pt. transitioned to intermittent HD, tolerated HD treatment on 5/24/19 with 2.5 L of UF.    Patient seen and examined at bedside. Patient is currently on nasal cannula and resting comfortably in bed. Patient has complains of nausea and decreased appetite today. However denies c/o SOB, CP, HA, dizziness.         PAST HISTORY  --------------------------------------------------------------------------------  No significant changes to PMH, PSH, FHx, SHx, unless otherwise noted    ALLERGIES & MEDICATIONS  --------------------------------------------------------------------------------  Allergies    Cipro (Rash)  Plavix (Rash)    Intolerances      Standing Inpatient Medications  aspirin  chewable 81 milliGRAM(s) Oral daily  chlorhexidine 4% Liquid 1 Application(s) Topical <User Schedule>  docusate sodium 100 milliGRAM(s) Oral three times a day  DOPamine Infusion 3 MICROgram(s)/kG/Min IV Continuous <Continuous>  heparin  Injectable 5000 Unit(s) SubCutaneous every 8 hours  insulin glargine Injectable (LANTUS) 12 Unit(s) SubCutaneous at bedtime  insulin lispro (HumaLOG) corrective regimen sliding scale   SubCutaneous every 6 hours  insulin lispro Injectable (HumaLOG) 4 Unit(s) SubCutaneous three times a day before meals  meropenem  IVPB      meropenem  IVPB 500 milliGRAM(s) IV Intermittent every 12 hours  pantoprazole  Injectable 40 milliGRAM(s) IV Push daily  petrolatum Ophthalmic Ointment 1 Application(s) Both EYES two times a day  senna 1 Tablet(s) Oral at bedtime    PRN Inpatient Medications      REVIEW OF SYSTEMS  --------------------------------------------------------------------------------    Resp: no SOB  CV: no CP  GI: no abdominal pain  MSK: edema present.     VITALS/PHYSICAL EXAM  --------------------------------------------------------------------------------  T(C): 36.9 (05-26-19 @ 08:00), Max: 37.1 (05-25-19 @ 12:00)  HR: 46 (05-26-19 @ 10:00) (43 - 62)  BP: 134/68 (05-26-19 @ 10:00) (125/58 - 160/66)  RR: 11 (05-26-19 @ 10:00) (9 - 17)  SpO2: 95% (05-26-19 @ 10:00) (93% - 97%)  Wt(kg): --        05-25-19 @ 07:01  -  05-26-19 @ 07:00  --------------------------------------------------------  IN: 642 mL / OUT: 0 mL / NET: 642 mL      Physical Exam:  	  Gen: NAD, on nasal cannula   	HEENT: sclera anicteric   	Pulm: CTA b/l  	CV: S1S2  	Abd: Soft, +BS   	Ext: 1+ LE edema B/L  	Skin: Warm and dry  	Vascular access: Left femoral non-tunneled HD catheter site:  without bleeding    LABS/STUDIES  --------------------------------------------------------------------------------              10.3   27.11 >-----------<  162      [05-26-19 @ 00:55]              31.5     134  |  95  |  66  ----------------------------<  192      [05-26-19 @ 00:55]  3.4   |  21  |  4.98        Ca     7.9     [05-26-19 @ 00:55]    TPro  5.7  /  Alb  2.3  /  TBili  1.0  /  DBili  x   /  AST  29  /  ALT  80  /  AlkPhos  183  [05-26-19 @ 00:55]    PT/INR: PT 12.0 , INR 1.08       [05-26-19 @ 00:55]      Creatinine Trend:  SCr 4.98 [05-26 @ 00:55]  SCr 3.95 [05-25 @ 13:00]  SCr 2.94 [05-25 @ 01:00]  SCr 2.97 [05-24 @ 03:00]  SCr 2.72 [05-24 @ 00:00]          HBsAb 3.0      [05-22-19 @ 20:00]  HBsAg NEGATIVE      [05-22-19 @ 20:00]  HBcAb Nonreactive      [05-22-19 @ 20:00]  HCV 0.20, Nonreactive Hepatitis C AB  S/CO Ratio                        Interpretation  < 1.00                                   Non-Reactive  1.00 - 4.99                         Weakly-Reactive  >= 5.00                                Reactive  Non-Reactive: Aperson with a non-reactive HCV antibody  result is considered uninfected.  No further action is  needed unless recent infection is suspected.  In these  cases, consider repeat testing later to detect  seroconversion..  Weakly-Reactive: HCV antibody test is abnormal, HCV RNA  Qualitative test will follow.  Reactive: HCV antibody test is abnormal, HCV RNA  Qualitative test will follow.  Note: HCV antibody testing is performed on the Scan system.      [05-22-19 @ 20:00]

## 2019-05-26 NOTE — PROGRESS NOTE ADULT - ASSESSMENT
64 yo M hx of CAD s/p 9 stents, DMT2 on insulin, HTN, GERD, prior cholecystectomy,  diverticulosis, former smoker(30PY), biliary stricture s/p ERCP with sphincterotomy and stent 4/2019 who presented as transfer from [facility] for acute cholangitis s/p ERCP and stent CBD stent placement by GI on 5/19.Hospital course further complicated by Type II NSTEMI and bradycardia. Hemodynamics improved with Dopamine infusion.     Neuro - Mental status much improved  Nodding appropriately--moving extremities     CV - Distributive shock in setting of E coli/Citerobacter bacteremia   -Likely having type II NSTEMI  -Dopamine infusion for bradycardia @ rate of 3  -Called cardiology for PPM vs ischemic eval     Pulm - hypoxic resp failure s/p intubation 5/17  - Extubated successfully. Now sating well on RA without resp distress.  - A line predominant on POCUS yesterday    GI - Acute cholangitis in setting of CBD stricture and recent ERCP with stent placement. 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, path negative  - GI performed second ERCP on 5/19 with another stent placed in CBD  - continue to treat infection with Meropenem (Day 6/14), +Ecoli bacteremia + citrobacter bacteremia 2.2 cholangitis?  - repeat MRCP when stable to assess biliary tree  - Dysphagia diet     Renal -Renal failure in setting of distributive shock   - Pending decision for HD today  - trend BMPs    ID - acute cholangitis c/b e coli/citarobacter bacteremia  - cont meropenem, renally dosed-day 7/14 today--will treat for 2 weeks total after neg blood cx   - WBC count improved after addition of vanc  - Legionella neg     Endo - DMT2  - monitor fs q6-NPH q6    Heme - leukocytosis, thrombocytopenia, likely due to infection  - SCDs for ppx 64 yo M hx of CAD s/p 9 stents, DMT2 on insulin, HTN, GERD, prior cholecystectomy,  diverticulosis, former smoker(30PY), biliary stricture s/p ERCP with sphincterotomy and stent 4/2019 who presented as transfer from [facility] for acute cholangitis s/p ERCP and stent CBD stent placement by GI on 5/19.Hospital course further complicated by Type II NSTEMI and bradycardia. Hemodynamics improved with Dopamine infusion.     Neuro - Mental status much improved  Nodding appropriately--moving extremities     CV - Distributive shock in setting of E coli/Citerobacter bacteremia   -Likely having type II NSTEMI  -Dopamine infusion for bradycardia @ rate of 3  -Called cardiology for PPM vs ischemic eval     Pulm - hypoxic resp failure s/p intubation 5/17  - Extubated successfully. Now sating well on RA without resp distress.  - A line predominant on POCUS yesterday    GI - Acute cholangitis in setting of CBD stricture and recent ERCP with stent placement. 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, path negative  - GI performed second ERCP on 5/19 with another stent placed in CBD  - continue to treat infection with Meropenem (Day 6/14), +Ecoli bacteremia + citrobacter bacteremia 2.2 cholangitis?  - repeat MRCP when stable to assess biliary tree  - Dysphagia diet     Renal -Renal failure in setting of distributive shock   - Pending decision for HD today  - trend BMPs    ID - acute cholangitis c/b e coli/citarobacter bacteremia  - cont meropenem, renally dosed-day 7/14 today--will treat for 2 weeks total after neg blood cx   - WBC count improved after addition of vanc  - Legionella neg     Endo - DMT2  - monitor fs; currently well controlled.  - lantus 12u and 3u premeals with SSI.    Heme - leukocytosis, thrombocytopenia, likely due to infection  - SCDs for ppx 64 yo M hx of CAD s/p 9 stents, DMT2 on insulin, HTN, GERD, prior cholecystectomy,  diverticulosis, former smoker(30PY), biliary stricture s/p ERCP with sphincterotomy and stent 4/2019 who presented as transfer from [facility] for acute cholangitis s/p ERCP and stent CBD stent placement by GI on 5/19.Hospital course further complicated by Type II NSTEMI and bradycardia. Hemodynamics improved with Dopamine infusion.     Neuro - Mental status much improved  Nodding appropriately--moving extremities     CV - Distributive shock in setting of E coli/Citerobacter bacteremia   -Likely having type II NSTEMI  -Dopamine infusion for bradycardia @ rate of 3  -Called cardiology for PPM vs ischemic eval     Pulm - hypoxic resp failure s/p intubation 5/17  - Extubated successfully. Now sating well on RA without resp distress.  - A line predominant on POCUS yesterday    GI - Acute cholangitis in setting of CBD stricture and recent ERCP with stent placement. 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  - repeat MRCP when stable to assess biliary tree  - Dysphagia diet     Renal -Renal failure in setting of distributive shock   - Pending decision for HD today  - trend BMPs    ID - acute cholangitis c/b e coli/citarobacter bacteremia  - cont meropenem, renally dosed-day 7/14 today--will treat for 2 weeks total after neg blood cx   - WBC count improved after addition of vanc  - Legionella neg     Endo - DMT2  - monitor fs; currently well controlled.  - lantus 12u and 3u premeals with SSI.    Heme - leukocytosis, thrombocytopenia, likely due to infection  - SCDs for ppx

## 2019-05-26 NOTE — PHYSICAL THERAPY INITIAL EVALUATION ADULT - LEVEL OF INDEPENDENCE: SUPINE/SIT, REHAB EVAL
minimum assist (75% patients effort)/partial supine to sit transfer performed; pt with c/o dizziness. Patient returned back to supine position. CM=602/65 mmHg. RN Katerina Vazquez present & aware

## 2019-05-26 NOTE — PROGRESS NOTE ADULT - SUBJECTIVE AND OBJECTIVE BOX
Date of Admission:  5/26/19  24 hour events:  Titrated off the dopamine gtt  Vital Signs Last 24 Hrs  T(C): 36.7 (26 May 2019 12:00), Max: 36.9 (26 May 2019 08:00)  T(F): 98.1 (26 May 2019 12:00), Max: 98.5 (26 May 2019 08:00)  HR: 50 (26 May 2019 15:00) (43 - 56)  BP: 134/63 (26 May 2019 15:00) (125/58 - 160/66)  BP(mean): 82 (26 May 2019 15:00) (73 - 93)  RR: 10 (26 May 2019 15:00) (9 - 17)  SpO2: 95% (26 May 2019 15:00) (91% - 97%)  I&O's Summary    25 May 2019 07:01  -  26 May 2019 07:00  --------------------------------------------------------  IN: 642 mL / OUT: 0 mL / NET: 642 mL    26 May 2019 07:01  -  26 May 2019 16:03  --------------------------------------------------------  IN: 0 mL / OUT: 0 mL / NET: 0 mL        MEDICATIONS:  aspirin  chewable 81 milliGRAM(s) Oral daily  heparin  Injectable 5000 Unit(s) SubCutaneous every 8 hours    meropenem  IVPB      meropenem  IVPB 500 milliGRAM(s) IV Intermittent every 12 hours      metoclopramide Injectable 10 milliGRAM(s) IV Push once    docusate sodium 100 milliGRAM(s) Oral three times a day  pantoprazole  Injectable 40 milliGRAM(s) IV Push daily  senna 1 Tablet(s) Oral at bedtime    insulin glargine Injectable (LANTUS) 12 Unit(s) SubCutaneous at bedtime  insulin lispro (HumaLOG) corrective regimen sliding scale   SubCutaneous every 6 hours  insulin lispro Injectable (HumaLOG) 4 Unit(s) SubCutaneous three times a day before meals    chlorhexidine 4% Liquid 1 Application(s) Topical <User Schedule>  petrolatum Ophthalmic Ointment 1 Application(s) Both EYES two times a day      REVIEW OF SYSTEM  Resp: no SOB  CV: no CP  GI: no abdominal pain  MSK: edema present.     PHYSICAL EXAM:    	Gen: NAD, on nasal cannula   	HEENT: sclera anicteric   	Pulm: CTA b/l  	CV: S1S2  	Abd: Soft, +BS   	Ext: 1+ LE edema B/L  	Skin: Warm and dry  	Vascular access: Left femoral non-tunneled HD catheter site:  without bleeding  LABS:	 	    CBC Full  -  ( 26 May 2019 00:55 )  WBC Count : 27.11 K/uL  Hemoglobin : 10.3 g/dL  Hematocrit : 31.5 %  Platelet Count - Automated : 162 K/uL  Mean Cell Volume : 88.5 fL  Mean Cell Hemoglobin : 28.9 pg  Mean Cell Hemoglobin Concentration : 32.7 %  Auto Neutrophil # : 19.45 K/uL  Auto Lymphocyte # : 3.33 K/uL  Auto Monocyte # : 2.11 K/uL  Auto Eosinophil # : 0.33 K/uL  Auto Basophil # : 0.14 K/uL  Auto Neutrophil % : 71.7 %  Auto Lymphocyte % : 12.3 %  Auto Monocyte % : 7.8 %  Auto Eosinophil % : 1.2 %  Auto Basophil % : 0.5 %    05-26    134<L>  |  95<L>  |  66<H>  ----------------------------<  192<H>  3.4<L>   |  21<L>  |  4.98<H>  05-25    134<L>  |  95<L>  |  53<H>  ----------------------------<  259<H>  3.8   |  23  |  3.95<H>    Ca    7.9<L>      26 May 2019 00:55  Ca    8.1<L>      25 May 2019 13:00    TPro  5.7<L>  /  Alb  2.3<L>  /  TBili  1.0  /  DBili  x   /  AST  29  /  ALT  80<H>  /  AlkPhos  183<H>  05-26  TPro  5.7<L>  /  Alb  2.3<L>  /  TBili  1.2  /  DBili  x   /  AST  31  /  ALT  96<H>  /  AlkPhos  197<H>  05-25      proBNP:   Lipid Profile:   HgA1c:   TSH:

## 2019-05-26 NOTE — PROGRESS NOTE ADULT - PROBLEM SELECTOR PLAN 1
Pt. with oliguric SLOAN on CKD in the setting of sepsis, hypotension and NSTEMI. On lab review of [facility], SCr elevated at 4.05 on 4/18/19 and decreased at 3.71 on 4/20/19. SCr elevated at 1.97 on 5/17/19. SCr elevated at 2.29 on 5/17/19 and further increased to 2.90 on 5/18/19. Pt. with likely ATN. Pt. initiated on CRRT on 5/18/19, discontinued on 5/22/19 due to HD catheter malfunction. Pt. transitioned to intermittent HD, last treatment on 5/24/19 tolerating 2.5L of UF. Labs reviewed from today; Scr. noted to be elevated at 4.98 today. Patient currently on nasal cannula. No plan for HD today. Will arrange for HD tomorrow. Monitor UOP and daily weights. Avoid any potential nephrotoxins. Dose medications as per eGFR

## 2019-05-26 NOTE — PROGRESS NOTE ADULT - ATTENDING COMMENTS
Pt clinically improving. Plan to wean dopamine, BP normotensive. Sinus bradycardia. MRCP ordered to further workup possible cholangiocarcinoma. Modified diet ordered per swallow. and PT consult.

## 2019-05-26 NOTE — PROGRESS NOTE ADULT - SUBJECTIVE AND OBJECTIVE BOX
Patient is a 63y old  Male who presents with a chief complaint of Septic Shock (25 May 2019 10:14)    SUBJECTIVE / OVERNIGHT EVENTS: No acute events overnight. Patient remains alert and oriented in the am. Reported feeling nausea. Abd pain controlled after overnight dose of morphine.    MEDICATIONS  (STANDING):  aspirin  chewable 81 milliGRAM(s) Oral daily  chlorhexidine 4% Liquid 1 Application(s) Topical <User Schedule>  docusate sodium 100 milliGRAM(s) Oral three times a day  DOPamine Infusion 3 MICROgram(s)/kG/Min (11.047 mL/Hr) IV Continuous <Continuous>  heparin  Injectable 5000 Unit(s) SubCutaneous every 8 hours  insulin glargine Injectable (LANTUS) 12 Unit(s) SubCutaneous at bedtime  insulin lispro (HumaLOG) corrective regimen sliding scale   SubCutaneous every 6 hours  insulin lispro Injectable (HumaLOG) 4 Unit(s) SubCutaneous three times a day before meals  meropenem  IVPB      meropenem  IVPB 500 milliGRAM(s) IV Intermittent every 12 hours  pantoprazole  Injectable 40 milliGRAM(s) IV Push daily  petrolatum Ophthalmic Ointment 1 Application(s) Both EYES two times a day  senna 1 Tablet(s) Oral at bedtime    CAPILLARY BLOOD GLUCOSE  POCT Blood Glucose.: 143 mg/dL (26 May 2019 05:10)  POCT Blood Glucose.: 214 mg/dL (25 May 2019 22:54)  POCT Blood Glucose.: 298 mg/dL (25 May 2019 17:20)  POCT Blood Glucose.: 233 mg/dL (25 May 2019 11:12)    I&O's Summary  25 May 2019 07:01  -  26 May 2019 07:00  --------------------------------------------------------  IN: 642 mL / OUT: 0 mL / NET: 642 mL    T(C): 36.6 (05-25-19 @ 20:00), Max: 37.1 (05-25-19 @ 12:00)  HR: 47 (05-26-19 @ 07:00) (44 - 62)  BP: 139/62 (05-26-19 @ 07:00) (125/58 - 160/66)  RR: 11 (05-26-19 @ 07:00) (9 - 17)  SpO2: 96% (05-26-19 @ 07:00) (93% - 97%)  PHYSICAL EXAM:  GENERAL: NAD, well-developed  HEAD:  Atraumatic, Normocephalic  EYES: EOMI, PERRLA, conjunctiva and sclera clear  NECK: Supple, No JVD  CHEST/LUNG: Clear to auscultation bilaterally; No wheeze  HEART: Regular rate and rhythm; No murmurs, rubs, or gallops  ABDOMEN: Soft, Nontender, Nondistended; Bowel sounds present  EXTREMITIES:  2+ Peripheral Pulses, No clubbing, cyanosis, or edema  PSYCH: AAOx3  NEUROLOGY: non-focal  SKIN: No rashes or lesions    LABS:                 10.3   27.11 )-----------( 162      ( 26 May 2019 00:55 )             31.5     WBC Trend: 27.11<--, 33.92<--, 39.61<--  05-26    134<L>  |  95<L>  |  66<H>  ----------------------------<  192<H>  3.4<L>   |  21<L>  |  4.98<H>    Ca    7.9<L>      26 May 2019 00:55    TPro  5.7<L>  /  Alb  2.3<L>  /  TBili  1.0  /  DBili  x   /  AST  29  /  ALT  80<H>  /  AlkPhos  183<H>  05-26    Creatinine Trend: 4.98<--, 3.95<--, 2.94<--, 2.97<--, 2.72<--, 2.74<--  PT/INR - ( 26 May 2019 00:55 )   PT: 12.0 SEC;   INR: 1.08

## 2019-05-26 NOTE — PHYSICAL THERAPY INITIAL EVALUATION ADULT - ADDITIONAL COMMENTS
Patient lives with family in a private house, 5 steps to enter. Patient reports he was previously independent in all ADLs and did not use an assistive device for ambulation. Pt currently working as a Physician. Patient lives with family in a private house, 5 steps to enter. Patient reports he was previously independent in all ADLs and did not use an assistive device for ambulation. Pt currently working as a Physician.    Patient was left semi-supine in bed as found, all lines/tubes intact and call brown within reach, RN Katerina Vazquez aware

## 2019-05-26 NOTE — PHYSICAL THERAPY INITIAL EVALUATION ADULT - PATIENT PROFILE REVIEW, REHAB EVAL
PT orders received: increase as tolerated. Consult with RN Katerina GUPTA, pt may participate in PT evaluation./yes

## 2019-05-26 NOTE — PHYSICAL THERAPY INITIAL EVALUATION ADULT - DISCHARGE DISPOSITION, PT EVAL
anticipate d/c to rehab, however formal recommendation to be determined after gait assessment-->please follow PT notes

## 2019-05-26 NOTE — PROGRESS NOTE ADULT - ASSESSMENT
62 yo M hx of CAD s/p 9 stents, reported recent normal ECHO, DMT2 on insulin, HTN, GERD, prior cholecystectomy,  diverticulosis, former smoker(30PY),biliary stricture s/p ERCP with sphincterotomy and stent 4/2019 who presents as transfer from [facility] for further evaluation, possible IR intervention or Advanced GI evaluation. 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 EP called for possible chronotropic incompetence and inability to titrate off the dopamine gtt due to profound sinus bradycardia in the 20s  PLAN: Patient successfully titrated off the dopamine gtt and heart rate remains 50-70. Will continue to monitor off the dopamine gtt for now. No indication for PPM at this time. Likely, patient GI process may have caused a hypervagotonia. EP will continue to follow. Plan discussed with Dr. Andersen.

## 2019-05-27 DIAGNOSIS — I10 ESSENTIAL (PRIMARY) HYPERTENSION: ICD-10-CM

## 2019-05-27 DIAGNOSIS — I21.4 NON-ST ELEVATION (NSTEMI) MYOCARDIAL INFARCTION: ICD-10-CM

## 2019-05-27 DIAGNOSIS — A41.9 SEPSIS, UNSPECIFIED ORGANISM: ICD-10-CM

## 2019-05-27 DIAGNOSIS — R00.1 BRADYCARDIA, UNSPECIFIED: ICD-10-CM

## 2019-05-27 DIAGNOSIS — E11.9 TYPE 2 DIABETES MELLITUS WITHOUT COMPLICATIONS: ICD-10-CM

## 2019-05-27 DIAGNOSIS — K83.09 OTHER CHOLANGITIS: ICD-10-CM

## 2019-05-27 LAB
ALBUMIN SERPL ELPH-MCNC: 2.2 G/DL — LOW (ref 3.3–5)
ALBUMIN SERPL ELPH-MCNC: 2.3 G/DL — LOW (ref 3.3–5)
ALP SERPL-CCNC: 135 U/L — HIGH (ref 40–120)
ALP SERPL-CCNC: 142 U/L — HIGH (ref 40–120)
ALT FLD-CCNC: 39 U/L — SIGNIFICANT CHANGE UP (ref 4–41)
ALT FLD-CCNC: 51 U/L — HIGH (ref 4–41)
ANION GAP SERPL CALC-SCNC: 19 MMO/L — HIGH (ref 7–14)
ANION GAP SERPL CALC-SCNC: 21 MMO/L — HIGH (ref 7–14)
ANISOCYTOSIS BLD QL: SLIGHT — SIGNIFICANT CHANGE UP
AST SERPL-CCNC: 22 U/L — SIGNIFICANT CHANGE UP (ref 4–40)
AST SERPL-CCNC: 26 U/L — SIGNIFICANT CHANGE UP (ref 4–40)
BASOPHILS # BLD AUTO: 0.02 K/UL — SIGNIFICANT CHANGE UP (ref 0–0.2)
BASOPHILS # BLD AUTO: 0.06 K/UL — SIGNIFICANT CHANGE UP (ref 0–0.2)
BASOPHILS NFR BLD AUTO: 0.1 % — SIGNIFICANT CHANGE UP (ref 0–2)
BASOPHILS NFR BLD AUTO: 0.4 % — SIGNIFICANT CHANGE UP (ref 0–2)
BASOPHILS NFR SPEC: 0 % — SIGNIFICANT CHANGE UP (ref 0–2)
BILIRUB SERPL-MCNC: 1 MG/DL — SIGNIFICANT CHANGE UP (ref 0.2–1.2)
BILIRUB SERPL-MCNC: 1 MG/DL — SIGNIFICANT CHANGE UP (ref 0.2–1.2)
BLASTS # FLD: 0 % — SIGNIFICANT CHANGE UP (ref 0–0)
BUN SERPL-MCNC: 84 MG/DL — HIGH (ref 7–23)
BUN SERPL-MCNC: 93 MG/DL — HIGH (ref 7–23)
CALCIUM SERPL-MCNC: 7.7 MG/DL — LOW (ref 8.4–10.5)
CALCIUM SERPL-MCNC: 8.1 MG/DL — LOW (ref 8.4–10.5)
CHLORIDE SERPL-SCNC: 96 MMOL/L — LOW (ref 98–107)
CHLORIDE SERPL-SCNC: 96 MMOL/L — LOW (ref 98–107)
CO2 SERPL-SCNC: 20 MMOL/L — LOW (ref 22–31)
CO2 SERPL-SCNC: 20 MMOL/L — LOW (ref 22–31)
CREAT SERPL-MCNC: 7.15 MG/DL — HIGH (ref 0.5–1.3)
CREAT SERPL-MCNC: 8.23 MG/DL — HIGH (ref 0.5–1.3)
EOSINOPHIL # BLD AUTO: 0.58 K/UL — HIGH (ref 0–0.5)
EOSINOPHIL # BLD AUTO: 0.62 K/UL — HIGH (ref 0–0.5)
EOSINOPHIL NFR BLD AUTO: 3.6 % — SIGNIFICANT CHANGE UP (ref 0–6)
EOSINOPHIL NFR BLD AUTO: 4.5 % — SIGNIFICANT CHANGE UP (ref 0–6)
EOSINOPHIL NFR FLD: 0.9 % — SIGNIFICANT CHANGE UP (ref 0–6)
GIANT PLATELETS BLD QL SMEAR: PRESENT — SIGNIFICANT CHANGE UP
GLUCOSE SERPL-MCNC: 136 MG/DL — HIGH (ref 70–99)
GLUCOSE SERPL-MCNC: 92 MG/DL — SIGNIFICANT CHANGE UP (ref 70–99)
HCT VFR BLD CALC: 26.4 % — LOW (ref 39–50)
HCT VFR BLD CALC: 28.3 % — LOW (ref 39–50)
HGB BLD-MCNC: 8.8 G/DL — LOW (ref 13–17)
HGB BLD-MCNC: 9.6 G/DL — LOW (ref 13–17)
IMM GRANULOCYTES NFR BLD AUTO: 2.8 % — HIGH (ref 0–1.5)
IMM GRANULOCYTES NFR BLD AUTO: 4.4 % — HIGH (ref 0–1.5)
INR BLD: 1.06 — SIGNIFICANT CHANGE UP (ref 0.88–1.17)
INR BLD: 1.07 — SIGNIFICANT CHANGE UP (ref 0.88–1.17)
LYMPHOCYTES # BLD AUTO: 1.69 K/UL — SIGNIFICANT CHANGE UP (ref 1–3.3)
LYMPHOCYTES # BLD AUTO: 1.9 K/UL — SIGNIFICANT CHANGE UP (ref 1–3.3)
LYMPHOCYTES # BLD AUTO: 11.7 % — LOW (ref 13–44)
LYMPHOCYTES # BLD AUTO: 12.2 % — LOW (ref 13–44)
LYMPHOCYTES NFR SPEC AUTO: 1.8 % — LOW (ref 13–44)
MACROCYTES BLD QL: SLIGHT — SIGNIFICANT CHANGE UP
MCHC RBC-ENTMCNC: 29.3 PG — SIGNIFICANT CHANGE UP (ref 27–34)
MCHC RBC-ENTMCNC: 30 PG — SIGNIFICANT CHANGE UP (ref 27–34)
MCHC RBC-ENTMCNC: 33.3 % — SIGNIFICANT CHANGE UP (ref 32–36)
MCHC RBC-ENTMCNC: 33.9 % — SIGNIFICANT CHANGE UP (ref 32–36)
MCV RBC AUTO: 88 FL — SIGNIFICANT CHANGE UP (ref 80–100)
MCV RBC AUTO: 88.4 FL — SIGNIFICANT CHANGE UP (ref 80–100)
METAMYELOCYTES # FLD: 0 % — SIGNIFICANT CHANGE UP (ref 0–1)
MONOCYTES # BLD AUTO: 1.19 K/UL — HIGH (ref 0–0.9)
MONOCYTES # BLD AUTO: 1.27 K/UL — HIGH (ref 0–0.9)
MONOCYTES NFR BLD AUTO: 7.8 % — SIGNIFICANT CHANGE UP (ref 2–14)
MONOCYTES NFR BLD AUTO: 8.6 % — SIGNIFICANT CHANGE UP (ref 2–14)
MONOCYTES NFR BLD: 3.5 % — SIGNIFICANT CHANGE UP (ref 2–9)
MYELOCYTES NFR BLD: 0 % — SIGNIFICANT CHANGE UP (ref 0–0)
NEUTROPHIL AB SER-ACNC: 92 % — HIGH (ref 43–77)
NEUTROPHILS # BLD AUTO: 11.77 K/UL — HIGH (ref 1.8–7.4)
NEUTROPHILS # BLD AUTO: 9.97 K/UL — HIGH (ref 1.8–7.4)
NEUTROPHILS NFR BLD AUTO: 71.8 % — SIGNIFICANT CHANGE UP (ref 43–77)
NEUTROPHILS NFR BLD AUTO: 72.1 % — SIGNIFICANT CHANGE UP (ref 43–77)
NEUTS BAND # BLD: 0.9 % — SIGNIFICANT CHANGE UP (ref 0–6)
NRBC # FLD: 0 K/UL — SIGNIFICANT CHANGE UP (ref 0–0)
NRBC # FLD: 0.03 K/UL — SIGNIFICANT CHANGE UP (ref 0–0)
OTHER - HEMATOLOGY %: 0 — SIGNIFICANT CHANGE UP
PLATELET # BLD AUTO: 173 K/UL — SIGNIFICANT CHANGE UP (ref 150–400)
PLATELET # BLD AUTO: 187 K/UL — SIGNIFICANT CHANGE UP (ref 150–400)
PLATELET COUNT - ESTIMATE: NORMAL — SIGNIFICANT CHANGE UP
PMV BLD: 10.6 FL — SIGNIFICANT CHANGE UP (ref 7–13)
PMV BLD: 10.8 FL — SIGNIFICANT CHANGE UP (ref 7–13)
POIKILOCYTOSIS BLD QL AUTO: SLIGHT — SIGNIFICANT CHANGE UP
POLYCHROMASIA BLD QL SMEAR: SLIGHT — SIGNIFICANT CHANGE UP
POTASSIUM SERPL-MCNC: 3.4 MMOL/L — LOW (ref 3.5–5.3)
POTASSIUM SERPL-MCNC: 3.6 MMOL/L — SIGNIFICANT CHANGE UP (ref 3.5–5.3)
POTASSIUM SERPL-SCNC: 3.4 MMOL/L — LOW (ref 3.5–5.3)
POTASSIUM SERPL-SCNC: 3.6 MMOL/L — SIGNIFICANT CHANGE UP (ref 3.5–5.3)
PROMYELOCYTES # FLD: 0 % — SIGNIFICANT CHANGE UP (ref 0–0)
PROT SERPL-MCNC: 5.2 G/DL — LOW (ref 6–8.3)
PROT SERPL-MCNC: 5.3 G/DL — LOW (ref 6–8.3)
PROTHROM AB SERPL-ACNC: 11.9 SEC — SIGNIFICANT CHANGE UP (ref 9.8–13.1)
PROTHROM AB SERPL-ACNC: 12.1 SEC — SIGNIFICANT CHANGE UP (ref 9.8–13.1)
RBC # BLD: 3 M/UL — LOW (ref 4.2–5.8)
RBC # BLD: 3.2 M/UL — LOW (ref 4.2–5.8)
RBC # FLD: 15.4 % — HIGH (ref 10.3–14.5)
RBC # FLD: 15.6 % — HIGH (ref 10.3–14.5)
SMUDGE CELLS # BLD: PRESENT — SIGNIFICANT CHANGE UP
SODIUM SERPL-SCNC: 135 MMOL/L — SIGNIFICANT CHANGE UP (ref 135–145)
SODIUM SERPL-SCNC: 137 MMOL/L — SIGNIFICANT CHANGE UP (ref 135–145)
VARIANT LYMPHS # BLD: 0.9 % — SIGNIFICANT CHANGE UP
WBC # BLD: 13.88 K/UL — HIGH (ref 3.8–10.5)
WBC # BLD: 16.29 K/UL — HIGH (ref 3.8–10.5)
WBC # FLD AUTO: 13.88 K/UL — HIGH (ref 3.8–10.5)
WBC # FLD AUTO: 16.29 K/UL — HIGH (ref 3.8–10.5)

## 2019-05-27 PROCEDURE — 99233 SBSQ HOSP IP/OBS HIGH 50: CPT | Mod: GC

## 2019-05-27 PROCEDURE — 99232 SBSQ HOSP IP/OBS MODERATE 35: CPT | Mod: GC

## 2019-05-27 RX ORDER — ACETAMINOPHEN 500 MG
1000 TABLET ORAL ONCE
Refills: 0 | Status: COMPLETED | OUTPATIENT
Start: 2019-05-27 | End: 2019-05-27

## 2019-05-27 RX ADMIN — INSULIN GLARGINE 12 UNIT(S): 100 INJECTION, SOLUTION SUBCUTANEOUS at 22:55

## 2019-05-27 RX ADMIN — PANTOPRAZOLE SODIUM 40 MILLIGRAM(S): 20 TABLET, DELAYED RELEASE ORAL at 13:06

## 2019-05-27 RX ADMIN — MEROPENEM 100 MILLIGRAM(S): 1 INJECTION INTRAVENOUS at 05:08

## 2019-05-27 RX ADMIN — HEPARIN SODIUM 5000 UNIT(S): 5000 INJECTION INTRAVENOUS; SUBCUTANEOUS at 13:06

## 2019-05-27 RX ADMIN — HEPARIN SODIUM 5000 UNIT(S): 5000 INJECTION INTRAVENOUS; SUBCUTANEOUS at 22:22

## 2019-05-27 RX ADMIN — HEPARIN SODIUM 5000 UNIT(S): 5000 INJECTION INTRAVENOUS; SUBCUTANEOUS at 05:09

## 2019-05-27 RX ADMIN — MEROPENEM 100 MILLIGRAM(S): 1 INJECTION INTRAVENOUS at 22:21

## 2019-05-27 RX ADMIN — Medication 100 MILLIGRAM(S): at 05:09

## 2019-05-27 RX ADMIN — Medication 1000 MILLIGRAM(S): at 23:00

## 2019-05-27 RX ADMIN — Medication 81 MILLIGRAM(S): at 13:06

## 2019-05-27 RX ADMIN — Medication 100 MILLIGRAM(S): at 13:06

## 2019-05-27 RX ADMIN — Medication 400 MILLIGRAM(S): at 22:22

## 2019-05-27 RX ADMIN — SENNA PLUS 1 TABLET(S): 8.6 TABLET ORAL at 22:22

## 2019-05-27 RX ADMIN — Medication 100 MILLIGRAM(S): at 22:22

## 2019-05-27 NOTE — PROGRESS NOTE ADULT - PROBLEM SELECTOR PLAN 6
Pt w hx of HTN, holding all HTN meds   - BP labile, 140 SBP and now 90s SBP, will continue to trend BP. Likely from bradycardia.

## 2019-05-27 NOTE — PROGRESS NOTE ADULT - PROBLEM SELECTOR PLAN 5
- Distributive shock in setting of E coli/Citerobacter bacteremia   - Likely had type II NSTEMI, trops peaked ~2000s, downtrended  -Dopamine infusion now off   - s/p hep drip  -EP and cards following -no interventions warranted at this time   - echo with severe global LV dysfunction. needs to be addressed at a later time once clinically improved

## 2019-05-27 NOTE — PROGRESS NOTE ADULT - ASSESSMENT
62 yo M hx of CAD s/p 9 stents, DMT2 on insulin, HTN, GERD, prior cholecystectomy,  diverticulosis, former smoker(30PY), biliary stricture s/p ERCP with sphincterotomy and stent 4/2019 who presented as transfer from [facility] for acute cholangitis s/p ERCP and stent CBD stent placement by GI on 5/19.Hospital course further complicated by Type II NSTEMI and bradycardia. Hemodynamics improved with Dopamine infusion.     Neuro - Mental status much improved  Nodding appropriately--moving extremities     CV - Distributive shock in setting of E coli/Citerobacter bacteremia   -Likely having type II NSTEMI  -Dopamine infusion for bradycardia @ rate of 3  -Called cardiology for PPM vs ischemic eval     Pulm - hypoxic resp failure s/p intubation 5/17  - Extubated successfully. Now sating well on RA without resp distress.  - A line predominant on POCUS yesterday    GI - Acute cholangitis in setting of CBD stricture and recent ERCP with stent placement. 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  - repeat MRCP when stable to assess biliary tree  - Dysphagia diet     Renal -Renal failure in setting of distributive shock   - Pending decision for HD today  - trend BMPs    ID - acute cholangitis c/b e coli/citarobacter bacteremia  - cont meropenem, renally dosed-day 7/14 today--will treat for 2 weeks total after neg blood cx   - WBC count improved after addition of vanc  - Legionella neg     Endo - DMT2  - monitor fs; currently well controlled.  - lantus 12u and 3u premeals with SSI.    Heme - leukocytosis, thrombocytopenia, likely due to infection  - SCDs for ppx 64 yo M hx of CAD s/p 9 stents, DMT2 on insulin, HTN, GERD, prior cholecystectomy,  diverticulosis, former smoker(30PY), biliary stricture s/p ERCP with sphincterotomy and stent 4/2019 who presented as transfer from [facility] for acute cholangitis s/p ERCP and stent CBD stent placement by GI on 5/19.Hospital course further complicated by Type II NSTEMI and bradycardia. Hemodynamics improved with Dopamine infusion.     Neuro - Mental status much improved  answering questions appropriately--moving extremities     CV - Distributive shock in setting of E coli/Citerobacter bacteremia   -Likely had type II NSTEMI  -Dopamine infusion now off   -EP following-no interventions warranted at this time     Pulm - hypoxic resp failure s/p intubation 5/17  - Extubated successfully. Now sating well on RA without resp distress.  - A line predominant on POCUS     GI - Acute cholangitis in setting of CBD stricture and recent ERCP with stent placement. 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  - repeat MRCP pending to assess biliary tree  - Dysphagia diet     Renal -Renal failure in setting of distributive shock   - HD as per renal--last session 5/24  - trend BMPs    ID - acute cholangitis c/b e coli/citarobacter bacteremia  - cont meropenem, renally dosed-day 9/14 today--will treat for 2 weeks total after neg blood cx   - WBC count improved      Endo - DMT2  - monitor fs; currently well controlled.  - lantus 12u and 3u premeals with SSI.    Heme - leukocytosis, thrombocytopenia, likely due to infection  - SCDs for ppx     Jose Alberto Catherine MD  [facility]

## 2019-05-27 NOTE — PROGRESS NOTE ADULT - SUBJECTIVE AND OBJECTIVE BOX
Ella Suyeon Yu PGY-1   [facility] Pager # [phone]  NS Pager # [phone]      Patient is a 63y old  Male who presents with a chief complaint of Septic Shock (27 May 2019 07:03)      SUBJECTIVE: No acute events overnight. Patient seen and examined at bedside.    REVIEW OF SYSTEMS:  CONSTITUTIONAL: No fever, weight loss, or fatigue  RESPIRATORY: No cough or shortness of breath  CARDIOVASCULAR: No chest pain, palpitations, dizziness, or leg swelling  GASTROINTESTINAL: No abdominal or epigastric pain. No nausea, vomiting, or hematemesis; No diarrhea or constipation. No melena or hematochezia.  GENITOURINARY: No dysuria  NEUROLOGICAL: No headaches  SKIN: No itching or lesions       MEDICATIONS  (STANDING):  aspirin  chewable 81 milliGRAM(s) Oral daily  docusate sodium 100 milliGRAM(s) Oral three times a day  heparin  Injectable 5000 Unit(s) SubCutaneous every 8 hours  insulin glargine Injectable (LANTUS) 12 Unit(s) SubCutaneous at bedtime  insulin lispro (HumaLOG) corrective regimen sliding scale   SubCutaneous every 6 hours  insulin lispro Injectable (HumaLOG) 4 Unit(s) SubCutaneous three times a day before meals  meropenem  IVPB      meropenem  IVPB 500 milliGRAM(s) IV Intermittent every 12 hours  metoclopramide Injectable 10 milliGRAM(s) IV Push once  pantoprazole  Injectable 40 milliGRAM(s) IV Push daily  petrolatum Ophthalmic Ointment 1 Application(s) Both EYES two times a day  senna 1 Tablet(s) Oral at bedtime    MEDICATIONS  (PRN):        Objective:    Vitals: Vital Signs Last 24 Hrs  T(C): 36.8 (05-27-19 @ 09:42), Max: 37 (05-27-19 @ 04:00)  T(F): 98.2 (05-27-19 @ 09:42), Max: 98.6 (05-27-19 @ 04:00)  HR: 53 (05-27-19 @ 09:42) (49 - 61)  BP: 151/56 (05-27-19 @ 09:42) (100/56 - 156/66)  BP(mean): 83 (05-27-19 @ 09:00) (67 - 90)  RR: 18 (05-27-19 @ 09:42) (8 - 18)  SpO2: 98% (05-27-19 @ 09:42) (91% - 99%)            I&O's Summary    26 May 2019 07:01  -  27 May 2019 07:00  --------------------------------------------------------  IN: 50 mL / OUT: 0 mL / NET: 50 mL        PHYSICAL EXAM:  GENERAL: NAD  HEAD:  Atraumatic, Normocephalic  CHEST/LUNG: Clear to auscultation bilaterally; No rales or wheezing  HEART: Regular rate and rhythm; No murmurs, rubs, or gallops  ABDOMEN: Soft, nontender, nondistended  EXTREMITIES:  No clubbing, cyanosis, or edema  SKIN: No rashes or lesions  NERVOUS SYSTEM:  Alert & Oriented X3    LABS:  05-27    135  |  96<L>  |  84<H>  ----------------------------<  92  3.4<L>   |  20<L>  |  7.15<H>  05-26    134<L>  |  95<L>  |  66<H>  ----------------------------<  192<H>  3.4<L>   |  21<L>  |  4.98<H>  05-25    134<L>  |  95<L>  |  53<H>  ----------------------------<  259<H>  3.8   |  23  |  3.95<H>    Ca    7.7<L>      27 May 2019 02:46  Ca    7.9<L>      26 May 2019 00:55  Ca    8.1<L>      25 May 2019 13:00    TPro  5.3<L>  /  Alb  2.2<L>  /  TBili  1.0  /  DBili  x   /  AST  26  /  ALT  51<H>  /  AlkPhos  142<H>  05-27  TPro  5.7<L>  /  Alb  2.3<L>  /  TBili  1.0  /  DBili  x   /  AST  29  /  ALT  80<H>  /  AlkPhos  183<H>  05-26  TPro  5.7<L>  /  Alb  2.3<L>  /  TBili  1.2  /  DBili  x   /  AST  31  /  ALT  96<H>  /  AlkPhos  197<H>  05-25    PT/INR - ( 27 May 2019 02:46 )   PT: 11.9 SEC;   INR: 1.07                                                  9.6    16.29 )-----------( 173      ( 27 May 2019 02:46 )             28.3                         10.3   27.11 )-----------( 162      ( 26 May 2019 00:55 )             31.5                         10.4   33.92 )-----------( 156      ( 25 May 2019 13:00 )             30.6     CAPILLARY BLOOD GLUCOSE      POCT Blood Glucose.: 106 mg/dL (27 May 2019 08:50)  POCT Blood Glucose.: 109 mg/dL (27 May 2019 06:34)  POCT Blood Glucose.: 124 mg/dL (26 May 2019 22:27)  POCT Blood Glucose.: 132 mg/dL (26 May 2019 17:45)  POCT Blood Glucose.: 144 mg/dL (26 May 2019 12:17)    RADIOLOGY:  Imaging Personally Reviewed: YES      Consultants involved in case: YES  Consultant(s) Notes Reviewed:  YES  Care Discussed with Consultants/Other Providers     Ella Suyeon Yu PGY-1 Ella Suyeon Yu PGY-1   [facility] Pager # [phone]  NS Pager # [phone]      Patient is a 63y old  Male who presents with a chief complaint of Septic Shock (27 May 2019 07:03)      SUBJECTIVE: No acute events overnight. Patient seen and examined at bedside. Patient looking tired, but appropriate affect and answers questions. Denies constipation, states that he's not making urine. No CP, no SOB, nothing else bothering him    REVIEW OF SYSTEMS:  CONSTITUTIONAL: No fever, weight loss, or fatigue  RESPIRATORY: No cough or shortness of breath  CARDIOVASCULAR: No chest pain, palpitations, dizziness, or leg swelling  GASTROINTESTINAL: No abdominal or epigastric pain. No nausea, vomiting, or hematemesis; No diarrhea or constipation. No melena or hematochezia.  GENITOURINARY: No dysuria  NEUROLOGICAL: No headaches  SKIN: No itching or lesions       MEDICATIONS  (STANDING):  aspirin  chewable 81 milliGRAM(s) Oral daily  docusate sodium 100 milliGRAM(s) Oral three times a day  heparin  Injectable 5000 Unit(s) SubCutaneous every 8 hours  insulin glargine Injectable (LANTUS) 12 Unit(s) SubCutaneous at bedtime  insulin lispro (HumaLOG) corrective regimen sliding scale   SubCutaneous every 6 hours  insulin lispro Injectable (HumaLOG) 4 Unit(s) SubCutaneous three times a day before meals  meropenem  IVPB      meropenem  IVPB 500 milliGRAM(s) IV Intermittent every 12 hours  metoclopramide Injectable 10 milliGRAM(s) IV Push once  pantoprazole  Injectable 40 milliGRAM(s) IV Push daily  petrolatum Ophthalmic Ointment 1 Application(s) Both EYES two times a day  senna 1 Tablet(s) Oral at bedtime    MEDICATIONS  (PRN):        Objective:    Vitals: Vital Signs Last 24 Hrs  T(C): 36.8 (05-27-19 @ 09:42), Max: 37 (05-27-19 @ 04:00)  T(F): 98.2 (05-27-19 @ 09:42), Max: 98.6 (05-27-19 @ 04:00)  HR: 53 (05-27-19 @ 09:42) (49 - 61)  BP: 151/56 (05-27-19 @ 09:42) (100/56 - 156/66)  BP(mean): 83 (05-27-19 @ 09:00) (67 - 90)  RR: 18 (05-27-19 @ 09:42) (8 - 18)  SpO2: 98% (05-27-19 @ 09:42) (91% - 99%)            I&O's Summary    26 May 2019 07:01  -  27 May 2019 07:00  --------------------------------------------------------  IN: 50 mL / OUT: 0 mL / NET: 50 mL        PHYSICAL EXAM:  GENERAL: NAD  HEAD:  Atraumatic, Normocephalic  CHEST/LUNG: Clear to auscultation bilaterally; No rales or wheezing  HEART: bradycardic, No murmurs, rubs, or gallops  ABDOMEN: Soft, nontender, nondistended  EXTREMITIES:  No clubbing, cyanosis, or edema  SKIN: No rashes or lesions  NERVOUS SYSTEM:  Alert & Oriented X3    LABS:  05-27    135  |  96<L>  |  84<H>  ----------------------------<  92  3.4<L>   |  20<L>  |  7.15<H>  05-26    134<L>  |  95<L>  |  66<H>  ----------------------------<  192<H>  3.4<L>   |  21<L>  |  4.98<H>  05-25    134<L>  |  95<L>  |  53<H>  ----------------------------<  259<H>  3.8   |  23  |  3.95<H>    Ca    7.7<L>      27 May 2019 02:46  Ca    7.9<L>      26 May 2019 00:55  Ca    8.1<L>      25 May 2019 13:00    TPro  5.3<L>  /  Alb  2.2<L>  /  TBili  1.0  /  DBili  x   /  AST  26  /  ALT  51<H>  /  AlkPhos  142<H>  05-27  TPro  5.7<L>  /  Alb  2.3<L>  /  TBili  1.0  /  DBili  x   /  AST  29  /  ALT  80<H>  /  AlkPhos  183<H>  05-26  TPro  5.7<L>  /  Alb  2.3<L>  /  TBili  1.2  /  DBili  x   /  AST  31  /  ALT  96<H>  /  AlkPhos  197<H>  05-25    PT/INR - ( 27 May 2019 02:46 )   PT: 11.9 SEC;   INR: 1.07                                                  9.6    16.29 )-----------( 173      ( 27 May 2019 02:46 )             28.3                         10.3   27.11 )-----------( 162      ( 26 May 2019 00:55 )             31.5                         10.4   33.92 )-----------( 156      ( 25 May 2019 13:00 )             30.6     CAPILLARY BLOOD GLUCOSE      POCT Blood Glucose.: 106 mg/dL (27 May 2019 08:50)  POCT Blood Glucose.: 109 mg/dL (27 May 2019 06:34)  POCT Blood Glucose.: 124 mg/dL (26 May 2019 22:27)  POCT Blood Glucose.: 132 mg/dL (26 May 2019 17:45)  POCT Blood Glucose.: 144 mg/dL (26 May 2019 12:17)    RADIOLOGY:  Imaging Personally Reviewed: YES      Consultants involved in case: YES  Consultant(s) Notes Reviewed:  YES  Care Discussed with Consultants/Other Providers     Ella Suyeon Yu PGY-1 Ella Suyeon Yu PGY-1   [facility] Pager # [phone]  NS Pager # [phone]      Patient is a 63y old  Male who presents with a chief complaint of Septic Shock (27 May 2019 07:03)      SUBJECTIVE: No acute events overnight. Patient seen and examined at bedside. Patient looking tired, but appropriate affect and answers questions. Denies constipation, states that he's not making urine. No CP, no SOB, nothing else bothering him    REVIEW OF SYSTEMS:  CONSTITUTIONAL: No fever, weight loss, or fatigue  RESPIRATORY: No cough or shortness of breath  CARDIOVASCULAR: No chest pain, palpitations, dizziness, or leg swelling  GASTROINTESTINAL: No abdominal or epigastric pain. No nausea, vomiting, or hematemesis; No diarrhea or constipation. No melena or hematochezia.  GENITOURINARY: No dysuria  NEUROLOGICAL: No headaches  SKIN: No itching or lesions       MEDICATIONS  (STANDING):  aspirin  chewable 81 milliGRAM(s) Oral daily  docusate sodium 100 milliGRAM(s) Oral three times a day  heparin  Injectable 5000 Unit(s) SubCutaneous every 8 hours  insulin glargine Injectable (LANTUS) 12 Unit(s) SubCutaneous at bedtime  insulin lispro (HumaLOG) corrective regimen sliding scale   SubCutaneous every 6 hours  insulin lispro Injectable (HumaLOG) 4 Unit(s) SubCutaneous three times a day before meals  meropenem  IVPB      meropenem  IVPB 500 milliGRAM(s) IV Intermittent every 12 hours  metoclopramide Injectable 10 milliGRAM(s) IV Push once  pantoprazole  Injectable 40 milliGRAM(s) IV Push daily  petrolatum Ophthalmic Ointment 1 Application(s) Both EYES two times a day  senna 1 Tablet(s) Oral at bedtime    MEDICATIONS  (PRN):        Objective:    Vitals: Vital Signs Last 24 Hrs  T(C): 36.8 (05-27-19 @ 09:42), Max: 37 (05-27-19 @ 04:00)  T(F): 98.2 (05-27-19 @ 09:42), Max: 98.6 (05-27-19 @ 04:00)  HR: 53 (05-27-19 @ 09:42) (49 - 61)  BP: 151/56 (05-27-19 @ 09:42) (100/56 - 156/66)  BP(mean): 83 (05-27-19 @ 09:00) (67 - 90)  RR: 18 (05-27-19 @ 09:42) (8 - 18)  SpO2: 98% (05-27-19 @ 09:42) (91% - 99%)            I&O's Summary    26 May 2019 07:01  -  27 May 2019 07:00  --------------------------------------------------------  IN: 50 mL / OUT: 0 mL / NET: 50 mL        PHYSICAL EXAM:  GENERAL: NAD  HEAD:  Atraumatic, Normocephalic  CHEST/LUNG: Clear to auscultation bilaterally; No rales or wheezing  HEART: bradycardic, No murmurs, rubs, or gallops  ABDOMEN: Soft, nontender, nondistended  EXTREMITIES:  No clubbing, cyanosis, or edema, fem shiley site c/d/i  SKIN: No rashes or lesions  NERVOUS SYSTEM:  Alert & Oriented X3    LABS:  05-27    135  |  96<L>  |  84<H>  ----------------------------<  92  3.4<L>   |  20<L>  |  7.15<H>  05-26    134<L>  |  95<L>  |  66<H>  ----------------------------<  192<H>  3.4<L>   |  21<L>  |  4.98<H>  05-25    134<L>  |  95<L>  |  53<H>  ----------------------------<  259<H>  3.8   |  23  |  3.95<H>    Ca    7.7<L>      27 May 2019 02:46  Ca    7.9<L>      26 May 2019 00:55  Ca    8.1<L>      25 May 2019 13:00    TPro  5.3<L>  /  Alb  2.2<L>  /  TBili  1.0  /  DBili  x   /  AST  26  /  ALT  51<H>  /  AlkPhos  142<H>  05-27  TPro  5.7<L>  /  Alb  2.3<L>  /  TBili  1.0  /  DBili  x   /  AST  29  /  ALT  80<H>  /  AlkPhos  183<H>  05-26  TPro  5.7<L>  /  Alb  2.3<L>  /  TBili  1.2  /  DBili  x   /  AST  31  /  ALT  96<H>  /  AlkPhos  197<H>  05-25    PT/INR - ( 27 May 2019 02:46 )   PT: 11.9 SEC;   INR: 1.07                                                  9.6    16.29 )-----------( 173      ( 27 May 2019 02:46 )             28.3                         10.3   27.11 )-----------( 162      ( 26 May 2019 00:55 )             31.5                         10.4   33.92 )-----------( 156      ( 25 May 2019 13:00 )             30.6     CAPILLARY BLOOD GLUCOSE      POCT Blood Glucose.: 106 mg/dL (27 May 2019 08:50)  POCT Blood Glucose.: 109 mg/dL (27 May 2019 06:34)  POCT Blood Glucose.: 124 mg/dL (26 May 2019 22:27)  POCT Blood Glucose.: 132 mg/dL (26 May 2019 17:45)  POCT Blood Glucose.: 144 mg/dL (26 May 2019 12:17)    RADIOLOGY:  Imaging Personally Reviewed: YES      Consultants involved in case: YES  Consultant(s) Notes Reviewed:  YES  Care Discussed with Consultants/Other Providers     Ella Suyeon Yu PGY-1

## 2019-05-27 NOTE — PROGRESS NOTE ADULT - SUBJECTIVE AND OBJECTIVE BOX
Patient is a 63y old  Male who presents with a chief complaint of Septic Shock (25 May 2019 10:14)    SUBJECTIVE / OVERNIGHT EVENTS: No acute events overnight.     MEDICATIONS  (STANDING):  aspirin  chewable 81 milliGRAM(s) Oral daily  docusate sodium 100 milliGRAM(s) Oral three times a day  heparin  Injectable 5000 Unit(s) SubCutaneous every 8 hours  insulin glargine Injectable (LANTUS) 12 Unit(s) SubCutaneous at bedtime  insulin lispro (HumaLOG) corrective regimen sliding scale   SubCutaneous every 6 hours  insulin lispro Injectable (HumaLOG) 4 Unit(s) SubCutaneous three times a day before meals  meropenem  IVPB      meropenem  IVPB 500 milliGRAM(s) IV Intermittent every 12 hours  metoclopramide Injectable 10 milliGRAM(s) IV Push once  pantoprazole  Injectable 40 milliGRAM(s) IV Push daily  petrolatum Ophthalmic Ointment 1 Application(s) Both EYES two times a day  senna 1 Tablet(s) Oral at bedtime    MEDICATIONS  (PRN):    05-26 @ 07:01  -  05-27 @ 07:00  --------------------------------------------------------  IN: 50 mL / OUT: 0 mL / NET: 50 mL    ICU Vital Signs Last 24 Hrs  T(C): 37 (27 May 2019 04:00), Max: 37 (27 May 2019 04:00)  T(F): 98.6 (27 May 2019 04:00), Max: 98.6 (27 May 2019 04:00)  HR: 54 (27 May 2019 06:00) (43 - 61)  BP: 123/51 (27 May 2019 06:00) (100/56 - 156/66)  BP(mean): 69 (27 May 2019 06:00) (67 - 90)  ABP: --  ABP(mean): --  RR: 15 (27 May 2019 06:00) (8 - 16)  SpO2: 99% (27 May 2019 06:00) (91% - 99%)    PHYSICAL EXAM:  GENERAL: NAD, well-developed  HEAD:  Atraumatic, Normocephalic  EYES: EOMI, PERRLA, conjunctiva and sclera clear  NECK: Supple, No JVD  CHEST/LUNG: Clear to auscultation bilaterally; No wheeze  HEART: Regular rate and rhythm; No murmurs, rubs, or gallops  ABDOMEN: Soft, Nontender, Nondistended; Bowel sounds present  EXTREMITIES:  2+ Peripheral Pulses, No clubbing, cyanosis, or edema  PSYCH: AAOx3  NEUROLOGY: non-focal  SKIN: No rashes or lesions    LABS:                              9.6    16.29 )-----------( 173      ( 27 May 2019 02:46 )             28.3     05-27    135  |  96<L>  |  84<H>  ----------------------------<  92  3.4<L>   |  20<L>  |  7.15<H>    Ca    7.7<L>      27 May 2019 02:46    TPro  5.3<L>  /  Alb  2.2<L>  /  TBili  1.0  /  DBili  x   /  AST  26  /  ALT  51<H>  /  AlkPhos  142<H>  05-27      Creatinine Trend: 4.98<--, 3.95<--, 2.94<--, 2.97<--, 2.72<--, 2.74<--  PT/INR - ( 26 May 2019 00:55 )   PT: 12.0 SEC;   INR: 1.08 Patient is a 63y old  Male who presents with a chief complaint of Septic Shock (25 May 2019 10:14)    SUBJECTIVE / OVERNIGHT EVENTS: No acute events overnight. Off of Dopamine drip. No HD throughout the weekend. Tolerating po diet but complaining of mild nausea this morning     MEDICATIONS  (STANDING):  aspirin  chewable 81 milliGRAM(s) Oral daily  docusate sodium 100 milliGRAM(s) Oral three times a day  heparin  Injectable 5000 Unit(s) SubCutaneous every 8 hours  insulin glargine Injectable (LANTUS) 12 Unit(s) SubCutaneous at bedtime  insulin lispro (HumaLOG) corrective regimen sliding scale   SubCutaneous every 6 hours  insulin lispro Injectable (HumaLOG) 4 Unit(s) SubCutaneous three times a day before meals  meropenem  IVPB      meropenem  IVPB 500 milliGRAM(s) IV Intermittent every 12 hours  metoclopramide Injectable 10 milliGRAM(s) IV Push once  pantoprazole  Injectable 40 milliGRAM(s) IV Push daily  petrolatum Ophthalmic Ointment 1 Application(s) Both EYES two times a day  senna 1 Tablet(s) Oral at bedtime    MEDICATIONS  (PRN):    05-26 @ 07:01  -  05-27 @ 07:00  --------------------------------------------------------  IN: 50 mL / OUT: 0 mL / NET: 50 mL    ICU Vital Signs Last 24 Hrs  T(C): 37 (27 May 2019 04:00), Max: 37 (27 May 2019 04:00)  T(F): 98.6 (27 May 2019 04:00), Max: 98.6 (27 May 2019 04:00)  HR: 54 (27 May 2019 06:00) (43 - 61)  BP: 123/51 (27 May 2019 06:00) (100/56 - 156/66)  BP(mean): 69 (27 May 2019 06:00) (67 - 90)  ABP: --  ABP(mean): --  RR: 15 (27 May 2019 06:00) (8 - 16)  SpO2: 99% (27 May 2019 06:00) (91% - 99%)    PHYSICAL EXAM:  GENERAL: NAD  HEAD:  Atraumatic, Normocephalic  EYES: EOMI, PERRLA, conjunctiva and sclera clear  NECK: Supple, No JVD  CHEST/LUNG: Clear to auscultation bilaterally; No wheeze  HEART: Regular rate and rhythm; II/VI mid systolic murmur LUSB, non radiating   ABDOMEN: Soft, Nontender, Nondistended; Bowel sounds present  EXTREMITIES:  2+ Peripheral Pulses, No clubbing, cyanosis, or edema  PSYCH: AAOx3  NEUROLOGY: non-focal  SKIN: No rashes or lesions    LABS:                              9.6    16.29 )-----------( 173      ( 27 May 2019 02:46 )             28.3     05-27    135  |  96<L>  |  84<H>  ----------------------------<  92  3.4<L>   |  20<L>  |  7.15<H>    Ca    7.7<L>      27 May 2019 02:46    TPro  5.3<L>  /  Alb  2.2<L>  /  TBili  1.0  /  DBili  x   /  AST  26  /  ALT  51<H>  /  AlkPhos  142<H>  05-27      Creatinine Trend: 4.98<--, 3.95<--, 2.94<--, 2.97<--, 2.72<--, 2.74<--  PT/INR - ( 26 May 2019 00:55 )   PT: 12.0 SEC;   INR: 1.08

## 2019-05-27 NOTE — CHART NOTE - NSCHARTNOTEFT_GEN_A_CORE
64 yo M hx of CAD s/p 9 stents, DMT2 on insulin, HTN, GERD, prior cholecystectomy,  diverticulosis, former smoker (30PY), biliary stricture s/p ERCP with sphincterotomy and stent 2019 who presented as transfer from [facility] in septic shock 2/2 e. coli and citrobacter bacteremia and acute cholangitis. Patient was transferred for GI intervation. His hospital course was complicated by acute hypoxic respiratory failure s/p intubation, ARF with severe metabolic acidosis; he was started on CVVHD. Additionally patient had elevated troponins and was treated with heparin gtt for 48 hrs. Patient was transferred to [facility] eo,  and levophed.  HE was also continued on meropenem for E. Coli bacteremia and went for repeat ERCP with GI and had biliary stent replaced. Pt developed bradycardia and was started on dopamine gtt but was able to be weaned off all other pressors. On  pt developed worsening leukocytosis and bandemia and was started on vancomycin. Pt was transitioned to intermittent HD, last treatment on 19 tolerating 2.5L of UF. Plan for next HD on . On  pt self-extubated with improving mental status and passed S&S evaluation. Dopamine gtt was eventually d/c'd and pt is now hemodynamically stable. Now stabilized for transfer to floors for further evaluation and management.      Plan:     # Acute cholangitis in setting of CBD stricture and recent ERCP with stent placement.   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  - repeat MRCP when stable to assess biliary tree  - Dysphagia diet     # Acute Renal failure in setting of distributive shock   - Plan for HD today - pt w fem shiley   - trend BMPs  - fu nephro recs    #T2DM   - monitor fs; currently well controlled.  - lantus 12u and 3u premeals with SSI.    #DVT ppx   - SCDs in the setting of thrombocytopenia       Kiersten Davis MD   PGY 3  [id number]

## 2019-05-27 NOTE — PROGRESS NOTE ADULT - PROBLEM SELECTOR PLAN 2
- Acute cholangitis in setting of CBD stricture and recent ERCP with stent placement. 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, path negative  - GI performed second ERCP on 5/19 with another stent placed in CBD  - continue to treat infection with Meropenem, +Ecoli bacteremia + citrobacter bacteremia 2.2 cholangitis day 8/14 today  - repeat MRCP pending to assess biliary tree

## 2019-05-27 NOTE — PROGRESS NOTE ADULT - PROBLEM SELECTOR PLAN 7
Pt w profound bradycardia requiring dopamine gtt  - now off  - bradycardia likely from hypervagotonia from GI process as per EP  - no indication for PPM.

## 2019-05-27 NOTE — PROGRESS NOTE ADULT - ATTENDING COMMENTS
Patient examined and ROS reviewed. A case of SLOAN  in the setting NSTEMI, septic shock being managed with CVVH but had recurrent clotting. Advised HD today.

## 2019-05-27 NOTE — PROGRESS NOTE ADULT - SUBJECTIVE AND OBJECTIVE BOX
[facility] Division of Kidney Diseases & Hypertension  FOLLOW UP NOTE  [phone]--------------------------------------------------------------------------------    HPI: 63-year-old male with medical history of CAD s/p 9 stents, DM, HTN, GERD, prior cholecystectomy,  diverticulosis, biliary stricture s/p ERCP with sphincteromy and stent 4/2019 who presents as transfer from [facility] where he was admitted with septic shock. Nephrology team is following for SLOAN on CKD and anuria. Pt. found to be in septic shock due to E Coli bacteremia was intubated for acute hypoxic respiratory failure and started on pressor support. He received vancomycin, zosyn, micafungin, merepenem and gentamicin initially. Also pt. developed NSTEMI and was started on heparin gtt with dobutamine due to concern for new LV dysfunction. Pt. with known history of CKD 3 secondary to DM and HTN, follows as outpatient with Dr Hodgson. As per pt's son SCr baseline 2.5-2.6 in March 2019 however states decrease to 1.9 in April 2019. On lab review of [facility] SCr elevated to 4.05 on 4/18/19 and decreased to 3.71 on 4/20/19. SCr elevated at 1.97 on 5/17/19. SCr elevated at 2.29 on 5/17/19 and further increased at 2.90 on 5/18/19. Pt. starting on CRRT on 5/18/19. CRRT was discontinued on 5/22/19 due to HD catheter malfunction. [facility] non-tunneled HD catheter was removed and replaced with left femoral non-tunneled HD catheter on 5/22/19. Pt. transitioned to intermittent HD, tolerated HD treatment on 5/24/19 with 2.5 L of UF.    Patient seen and examined at bedside. Patient is lying flat in bed comfortably and is on nasal cannula. Still has complains of nausea. However denies SOB, CP, abdominal pain, or headache.     PAST HISTORY  --------------------------------------------------------------------------------  No significant changes to PMH, PSH, FHx, SHx, unless otherwise noted    ALLERGIES & MEDICATIONS  --------------------------------------------------------------------------------  Allergies    Cipro (Rash)  Plavix (Rash)    Intolerances      Standing Inpatient Medications  aspirin  chewable 81 milliGRAM(s) Oral daily  docusate sodium 100 milliGRAM(s) Oral three times a day  heparin  Injectable 5000 Unit(s) SubCutaneous every 8 hours  insulin glargine Injectable (LANTUS) 12 Unit(s) SubCutaneous at bedtime  insulin lispro (HumaLOG) corrective regimen sliding scale   SubCutaneous every 6 hours  insulin lispro Injectable (HumaLOG) 4 Unit(s) SubCutaneous three times a day before meals  meropenem  IVPB      meropenem  IVPB 500 milliGRAM(s) IV Intermittent every 12 hours  metoclopramide Injectable 10 milliGRAM(s) IV Push once  pantoprazole  Injectable 40 milliGRAM(s) IV Push daily  petrolatum Ophthalmic Ointment 1 Application(s) Both EYES two times a day  senna 1 Tablet(s) Oral at bedtime    PRN Inpatient Medications      REVIEW OF SYSTEMS  --------------------------------------------------------------------------------    Resp: no SOB  CV: no CP  GI: no abdominal pain  MSK: edema present.     All other systems were reviewed and are negative, except as noted.    VITALS/PHYSICAL EXAM  --------------------------------------------------------------------------------  T(C): 36.7 (05-27-19 @ 08:00), Max: 37 (05-27-19 @ 04:00)  HR: 57 (05-27-19 @ 08:14) (43 - 61)  BP: 139/53 (05-27-19 @ 08:00) (100/56 - 156/66)  RR: 18 (05-27-19 @ 08:14) (8 - 18)  SpO2: 95% (05-27-19 @ 08:14) (91% - 99%)  Wt(kg): --        05-26-19 @ 07:01  -  05-27-19 @ 07:00  --------------------------------------------------------  IN: 50 mL / OUT: 0 mL / NET: 50 mL      Physical Exam:  	  Gen: NAD, on nasal cannula   	HEENT: sclera anicteric   	Pulm: CTA b/l  	CV: S1S2  	Abd: Soft, +BS   	Ext: 1+ LE edema B/L  	Skin: Warm and dry  	Vascular access: Left femoral non-tunneled HD catheter site:  without bleeding    LABS/STUDIES  --------------------------------------------------------------------------------              9.6    16.29 >-----------<  173      [05-27-19 @ 02:46]              28.3     135  |  96  |  84  ----------------------------<  92      [05-27-19 @ 02:46]  3.4   |  20  |  7.15        Ca     7.7     [05-27-19 @ 02:46]    TPro  5.3  /  Alb  2.2  /  TBili  1.0  /  DBili  x   /  AST  26  /  ALT  51  /  AlkPhos  142  [05-27-19 @ 02:46]    PT/INR: PT 11.9 , INR 1.07       [05-27-19 @ 02:46]      Creatinine Trend:  SCr 7.15 [05-27 @ 02:46]  SCr 4.98 [05-26 @ 00:55]  SCr 3.95 [05-25 @ 13:00]  SCr 2.94 [05-25 @ 01:00]  SCr 2.97 [05-24 @ 03:00]          HBsAb 3.0      [05-22-19 @ 20:00]  HBsAg NEGATIVE      [05-22-19 @ 20:00]  HBcAb Nonreactive      [05-22-19 @ 20:00]  HCV 0.20, Nonreactive Hepatitis C AB  S/CO Ratio                        Interpretation  < 1.00                                   Non-Reactive  1.00 - 4.99                         Weakly-Reactive  >= 5.00                                Reactive  Non-Reactive: Aperson with a non-reactive HCV antibody  result is considered uninfected.  No further action is  needed unless recent infection is suspected.  In these  cases, consider repeat testing later to detect  seroconversion..  Weakly-Reactive: HCV antibody test is abnormal, HCV RNA  Qualitative test will follow.  Reactive: HCV antibody test is abnormal, HCV RNA  Qualitative test will follow.  Note: HCV antibody testing is performed on the [employer] system.      [05-22-19 @ 20:00]

## 2019-05-27 NOTE — PROGRESS NOTE ADULT - ASSESSMENT
64 yo M hx of CAD s/p 9 stents, DMT2 on insulin, HTN, GERD, prior cholecystectomy,  diverticulosis, former smoker(30PY), biliary stricture s/p ERCP with sphincterotomy and stent 4/2019 who presented as transfer from [facility] for acute cholangitis s/p ERCP and stent CBD stent placement by GI on 5/19.Hospital course further complicated by Type II NSTEMI and bradycardia. Hemodynamics improved with Dopamine infusion.       Neuro - Mental status much improved  answering questions appropriately--moving extremities     CV - Distributive shock in setting of E coli/Citerobacter bacteremia   -Likely had type II NSTEMI  -Dopamine infusion now off   -EP following-no interventions warranted at this time     Pulm - hypoxic resp failure s/p intubation 5/17  - Extubated successfully. Now sating well on RA without resp distress.  - A line predominant on POCUS     GI - Acute cholangitis in setting of CBD stricture and recent ERCP with stent placement. 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  - repeat MRCP pending to assess biliary tree  - Dysphagia diet     Renal -Renal failure in setting of distributive shock   - HD as per renal--last session 5/24  - trend BMPs    ID - acute cholangitis c/b e coli/citarobacter bacteremia  - cont meropenem, renally dosed-day 9/14 today--will treat for 2 weeks total after neg blood cx   - WBC count improved      Endo - DMT2  - monitor fs; currently well controlled.  - lantus 12u and 3u premeals with SSI.    Heme - leukocytosis, thrombocytopenia, likely due to infection  - SCDs for ppx 62 yo M hx of CAD s/p 9 stents, DMT2 on insulin, HTN, GERD, prior cholecystectomy,  diverticulosis, former smoker(30PY), biliary stricture s/p ERCP with sphincterotomy and stent 4/2019 who presented as transfer from [facility] for acute cholangitis s/p ERCP and stent CBD stent placement by GI on 5/19.Hospital course further complicated by Type II NSTEMI and bradycardia. Hemodynamics improved with Dopamine infusion.

## 2019-05-27 NOTE — CHART NOTE - NSCHARTNOTEFT_GEN_A_CORE
MICU Transfer Note    Transfer from: MICU    Transfer to: (  ) Medicine    ( x ) Telemetry     (   ) RCU        (    ) Palliative         (   ) Stroke Unit          (   ) __________________    Accepting Physician:  Signout given to:     MICU COURSE:    62 yo M hx of CAD s/p 9 stents, DMT2 on insulin, HTN, GERD, prior cholecystectomy,  diverticulosis, former smoker (30PY), biliary stricture s/p ERCP with sphincterotomy and stent 2019 who presents as transfer from [facility] in septic shock 2/2 E. Coli and Citrobacter bacteremia due to acute cholangitis, for possible IR intervention or Advanced GI intervention. Course c/b acute hypoxic respiratory failure s/p intubation, ARF with severe metabolic acidosis started on CVVHD, and Type II MI started on heparin gtt. Was transferred on adilia,  and levophed. Pt was weaned off dobutamine and was continued on hep gtt for 48 hours. Continued on meropenem for E. Coli bacteremia and went for repeat ERCP with GI and had biliary stent replaced. Pt developed bradycardia and was started on dopamine gtt but was able to be weaned off all other pressors. On  pt developed worsening leukocytosis and bandemia and was started on vancomycin. Pt was transitioned to intermittent HD, last treatment on 19 tolerating 2.5L of UF. Plan for next HD on . On  pt self-extubated with improving mental status and passed S&S evaluation. Dopamine gtt was eventually d/c'd and pt is now hemodynamically stable. Now stabilized for transfer to floors for further evaluation and management.      ASSESSMENT & PLAN:     62 yo M hx of CAD s/p 9 stents, DMT2 on insulin, HTN, GERD, prior cholecystectomy,  diverticulosis, former smoker(30PY), biliary stricture s/p ERCP with sphincterotomy and stent 2019 who presented as transfer from [facility] for acute cholangitis s/p ERCP and stent CBD stent placement by GI on .Hospital course further complicated by Type II NSTEMI and bradycardia. Hemodynamics improved with Dopamine infusion, now titrated off all pressors.    Neuro - Mental status much improved  Nodding appropriately--moving extremities     CV - Distributive shock in setting of E coli/Citerobacter bacteremia   -Likely having type II NSTEMI  -Weaned off pressors  -f/u cards recs     Pulm - hypoxic resp failure s/p intubation   - Extubated successfully. Now sating well on RA without resp distress.  - A line predominant on POCUS yesterday    GI - Acute cholangitis in setting of CBD stricture and recent ERCP with stent placement. 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, path negative  - GI performed second ERCP on  with another stent placed in CBD  - continue to treat infection with Meropenem, +Ecoli bacteremia + citrobacter bacteremia 2.2 cholangitis?  - repeat MRCP when stable to assess biliary tree  - Dysphagia diet     Renal -Renal failure in setting of distributive shock   - Plan for HD today  - trend BMPs    ID - acute cholangitis c/b e coli/citarobacter bacteremia  - cont meropenem, renally dosed-day  today--will treat for 2 weeks total after neg blood cx   - WBC count improved after addition of vanc  - Legionella neg     Endo - DMT2  - monitor fs; currently well controlled.  - lantus 12u and 3u premeals with SSI.    Heme - leukocytosis, thrombocytopenia, likely due to infection  - SCDs for ppx         FOR FOLLOW UP:  [ ] meropenem x2 weeks (today )  [ ] MRCP MICU Transfer Note    Transfer from: MICU    Transfer to: (  ) Medicine    ( x ) Telemetry     (   ) RCU        (    ) Palliative         (   ) Stroke Unit          (   ) __________________    Accepting Physician:  Signout given to:     MICU COURSE:    64 yo M hx of CAD s/p 9 stents, DMT2 on insulin, HTN, GERD, prior cholecystectomy,  diverticulosis, former smoker (30PY), biliary stricture s/p ERCP with sphincterotomy and stent 2019 who presents as transfer from [facility] in septic shock 2/2 E. Coli and Citrobacter bacteremia due to acute cholangitis, for possible IR intervention or Advanced GI intervention. Course c/b acute hypoxic respiratory failure s/p intubation, ARF with severe metabolic acidosis started on CVVHD, and Type II MI started on heparin gtt. Was transferred on adilia,  and levophed. Pt was weaned off dobutamine and was continued on hep gtt for 48 hours. Continued on meropenem for E. Coli bacteremia and went for repeat ERCP with GI and had biliary stent replaced. Pt developed bradycardia and was started on dopamine gtt but was able to be weaned off all other pressors. On  pt developed worsening leukocytosis and bandemia and was started on vancomycin. Pt was transitioned to intermittent HD, last treatment on 19 tolerating 2.5L of UF. Plan for next HD on . On  pt self-extubated with improving mental status and passed S&S evaluation. Dopamine gtt was eventually d/c'd and pt is now hemodynamically stable. Now stabilized for transfer to floors for further evaluation and management.      ASSESSMENT & PLAN:     64 yo M hx of CAD s/p 9 stents, DMT2 on insulin, HTN, GERD, prior cholecystectomy,  diverticulosis, former smoker(30PY), biliary stricture s/p ERCP with sphincterotomy and stent 2019 who presented as transfer from [facility] for acute cholangitis s/p ERCP and stent CBD stent placement by GI on .Hospital course further complicated by Type II NSTEMI and bradycardia. Hemodynamics improved with Dopamine infusion, now titrated off all pressors.    Neuro - Mental status much improved  Nodding appropriately--moving extremities     CV - Distributive shock in setting of E coli/Citerobacter bacteremia   -Likely having type II NSTEMI  -Weaned off pressors  -f/u cards recs     Pulm - hypoxic resp failure s/p intubation   - Extubated successfully. Now sating well on RA without resp distress.  - A line predominant on POCUS yesterday    GI - Acute cholangitis in setting of CBD stricture and recent ERCP with stent placement. 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, path negative  - GI performed second ERCP on  with another stent placed in CBD  - continue to treat infection with Meropenem, +Ecoli bacteremia + citrobacter bacteremia 2.2 cholangitis?  - repeat MRCP when stable to assess biliary tree--MRCP MUST be coordinated with HD  - Dysphagia diet     Renal -Renal failure in setting of distributive shock   - Plan for HD today  - trend BMPs    ID - acute cholangitis c/b e coli/citarobacter bacteremia  - cont meropenem, renally dosed-day  today--will treat for 2 weeks total after neg blood cx   - WBC count improved after addition of vanc  - Legionella neg     Endo - DMT2  - monitor fs; currently well controlled.  - lantus 12u and 3u premeals with SSI.    Heme - leukocytosis, thrombocytopenia, likely due to infection  - SCDs for ppx         FOR FOLLOW UP:  [ ] meropenem x2 weeks (today )  [ ] MRCP

## 2019-05-27 NOTE — PROGRESS NOTE ADULT - PROBLEM SELECTOR PLAN 1
Pt. with oliguric SLOAN on CKD in the setting of sepsis, hypotension and NSTEMI. [facility] elevated at 4.05 on 4/18/19 and decreased at 3.71 on 4/20/19. SCr elevated at 1.97 on 5/17/19. SCr elevated at 2.29 on 5/17/19 and further increased to 2.90 on 5/18/19. Pt. with likely ATN. Pt. initiated on CRRT on 5/18/19, discontinued on 5/22/19 due to HD catheter malfunction. Pt. transitioned to intermittent HD, last treatment on 5/24/19 tolerating 2.5L of UF. Labs reviewed from today; Scr. noted to be elevated at 7.15 today. Patient currently on nasal cannula. Plan for HD today.  Monitor UOP and daily weights. Avoid any potential nephrotoxins. Dose medications as per eGFR

## 2019-05-27 NOTE — PROGRESS NOTE ADULT - PROBLEM SELECTOR PLAN 3
Renal -Renal failure in setting of distributive shock   Pt w CKD hx, with SLOAN on CKD likely from sepsis   - HD as per renal through fem tra-last session 5/24  - trend BMPs  - pending HD today, Cr elevated ~7  - continue to monitor for renal recovery, patient stating he's not making urine  - Likely ATN as per renal. Renal -Renal failure in setting of distributive shock   Pt w CKD hx, with SLOAN on CKD likely from sepsis   - HD as per renal through fem shiley-last session 5/24  - trend BMPs  - pending HD today, Cr elevated ~7  - continue to monitor for renal recovery, patient stating he's not making urine, likely will need long term dialysis.   -will call IR for permacath tomorrow as femoral shiley is high risk of infection. Once he gets permacath, will take out shiley  - Likely ATN as per renal.

## 2019-05-27 NOTE — PROGRESS NOTE ADULT - PROBLEM SELECTOR PLAN 1
from acute cholangitis in setting of CBD stricture w ERCP/stent placement, w ecoli and citrobacter bacteremia. s/p repeat ERCP with stent in CBD.  - MICU downgraded, was extubated on 5/24, was on levo, dopamine drip. now all off. Dopamine drip d/roel 5/26 AM.   - on meropenem, repeat cx (-) so far, day 8/14 meropenem  - repeat MRCP to assess biliary tree  - wbc trending down

## 2019-05-28 LAB
ALBUMIN SERPL ELPH-MCNC: 2.5 G/DL — LOW (ref 3.3–5)
ALP SERPL-CCNC: 133 U/L — HIGH (ref 40–120)
ALT FLD-CCNC: 38 U/L — SIGNIFICANT CHANGE UP (ref 4–41)
ANION GAP SERPL CALC-SCNC: 15 MMO/L — HIGH (ref 7–14)
AST SERPL-CCNC: 25 U/L — SIGNIFICANT CHANGE UP (ref 4–40)
BASOPHILS # BLD AUTO: 0.03 K/UL — SIGNIFICANT CHANGE UP (ref 0–0.2)
BASOPHILS NFR BLD AUTO: 0.2 % — SIGNIFICANT CHANGE UP (ref 0–2)
BILIRUB SERPL-MCNC: 1.2 MG/DL — SIGNIFICANT CHANGE UP (ref 0.2–1.2)
BUN SERPL-MCNC: 44 MG/DL — HIGH (ref 7–23)
CALCIUM SERPL-MCNC: 7.8 MG/DL — LOW (ref 8.4–10.5)
CHLORIDE SERPL-SCNC: 97 MMOL/L — LOW (ref 98–107)
CO2 SERPL-SCNC: 23 MMOL/L — SIGNIFICANT CHANGE UP (ref 22–31)
CREAT SERPL-MCNC: 5.34 MG/DL — HIGH (ref 0.5–1.3)
EOSINOPHIL # BLD AUTO: 0.56 K/UL — HIGH (ref 0–0.5)
EOSINOPHIL NFR BLD AUTO: 3.7 % — SIGNIFICANT CHANGE UP (ref 0–6)
GLUCOSE BLDC GLUCOMTR-MCNC: 107 MG/DL — HIGH (ref 70–99)
GLUCOSE BLDC GLUCOMTR-MCNC: 134 MG/DL — HIGH (ref 70–99)
GLUCOSE BLDC GLUCOMTR-MCNC: 83 MG/DL — SIGNIFICANT CHANGE UP (ref 70–99)
GLUCOSE SERPL-MCNC: 110 MG/DL — HIGH (ref 70–99)
HCT VFR BLD CALC: 27.5 % — LOW (ref 39–50)
HGB BLD-MCNC: 9.2 G/DL — LOW (ref 13–17)
IMM GRANULOCYTES NFR BLD AUTO: 2.3 % — HIGH (ref 0–1.5)
LYMPHOCYTES # BLD AUTO: 15 % — SIGNIFICANT CHANGE UP (ref 13–44)
LYMPHOCYTES # BLD AUTO: 2.26 K/UL — SIGNIFICANT CHANGE UP (ref 1–3.3)
MAGNESIUM SERPL-MCNC: 2.2 MG/DL — SIGNIFICANT CHANGE UP (ref 1.6–2.6)
MANUAL SMEAR VERIFICATION: SIGNIFICANT CHANGE UP
MCHC RBC-ENTMCNC: 29.7 PG — SIGNIFICANT CHANGE UP (ref 27–34)
MCHC RBC-ENTMCNC: 33.5 % — SIGNIFICANT CHANGE UP (ref 32–36)
MCV RBC AUTO: 88.7 FL — SIGNIFICANT CHANGE UP (ref 80–100)
MONOCYTES # BLD AUTO: 1.47 K/UL — HIGH (ref 0–0.9)
MONOCYTES NFR BLD AUTO: 9.8 % — SIGNIFICANT CHANGE UP (ref 2–14)
NEUTROPHILS # BLD AUTO: 10.41 K/UL — HIGH (ref 1.8–7.4)
NEUTROPHILS NFR BLD AUTO: 69 % — SIGNIFICANT CHANGE UP (ref 43–77)
NRBC # FLD: 0 K/UL — SIGNIFICANT CHANGE UP (ref 0–0)
PHOSPHATE SERPL-MCNC: 4.8 MG/DL — HIGH (ref 2.5–4.5)
PLATELET # BLD AUTO: 200 K/UL — SIGNIFICANT CHANGE UP (ref 150–400)
PMV BLD: 10.9 FL — SIGNIFICANT CHANGE UP (ref 7–13)
POTASSIUM SERPL-MCNC: 3.8 MMOL/L — SIGNIFICANT CHANGE UP (ref 3.5–5.3)
POTASSIUM SERPL-SCNC: 3.8 MMOL/L — SIGNIFICANT CHANGE UP (ref 3.5–5.3)
PROT SERPL-MCNC: 5.7 G/DL — LOW (ref 6–8.3)
RBC # BLD: 3.1 M/UL — LOW (ref 4.2–5.8)
RBC # FLD: 15.9 % — HIGH (ref 10.3–14.5)
SODIUM SERPL-SCNC: 135 MMOL/L — SIGNIFICANT CHANGE UP (ref 135–145)
WBC # BLD: 15.07 K/UL — HIGH (ref 3.8–10.5)
WBC # FLD AUTO: 15.07 K/UL — HIGH (ref 3.8–10.5)

## 2019-05-28 PROCEDURE — 74183 MRI ABD W/O CNTR FLWD CNTR: CPT | Mod: 26

## 2019-05-28 PROCEDURE — 90935 HEMODIALYSIS ONE EVALUATION: CPT | Mod: GC

## 2019-05-28 PROCEDURE — 99232 SBSQ HOSP IP/OBS MODERATE 35: CPT | Mod: GC

## 2019-05-28 PROCEDURE — 99233 SBSQ HOSP IP/OBS HIGH 50: CPT | Mod: GC

## 2019-05-28 PROCEDURE — 99221 1ST HOSP IP/OBS SF/LOW 40: CPT

## 2019-05-28 PROCEDURE — 99232 SBSQ HOSP IP/OBS MODERATE 35: CPT

## 2019-05-28 PROCEDURE — 99254 IP/OBS CNSLTJ NEW/EST MOD 60: CPT | Mod: GC

## 2019-05-28 RX ORDER — INSULIN GLARGINE 100 [IU]/ML
6 INJECTION, SOLUTION SUBCUTANEOUS AT BEDTIME
Refills: 0 | Status: DISCONTINUED | OUTPATIENT
Start: 2019-05-28 | End: 2019-05-28

## 2019-05-28 RX ORDER — INSULIN GLARGINE 100 [IU]/ML
8 INJECTION, SOLUTION SUBCUTANEOUS AT BEDTIME
Refills: 0 | Status: DISCONTINUED | OUTPATIENT
Start: 2019-05-28 | End: 2019-05-28

## 2019-05-28 RX ORDER — HEPARIN SODIUM 5000 [USP'U]/ML
5000 INJECTION INTRAVENOUS; SUBCUTANEOUS EVERY 8 HOURS
Refills: 0 | Status: DISCONTINUED | OUTPATIENT
Start: 2019-05-29 | End: 2019-06-04

## 2019-05-28 RX ORDER — INSULIN GLARGINE 100 [IU]/ML
6 INJECTION, SOLUTION SUBCUTANEOUS AT BEDTIME
Refills: 0 | Status: DISCONTINUED | OUTPATIENT
Start: 2019-05-29 | End: 2019-05-31

## 2019-05-28 RX ADMIN — Medication 100 MILLIGRAM(S): at 14:06

## 2019-05-28 RX ADMIN — PANTOPRAZOLE SODIUM 40 MILLIGRAM(S): 20 TABLET, DELAYED RELEASE ORAL at 13:58

## 2019-05-28 RX ADMIN — HEPARIN SODIUM 5000 UNIT(S): 5000 INJECTION INTRAVENOUS; SUBCUTANEOUS at 06:07

## 2019-05-28 RX ADMIN — Medication 4 UNIT(S): at 14:05

## 2019-05-28 RX ADMIN — Medication 100 MILLIGRAM(S): at 06:07

## 2019-05-28 RX ADMIN — Medication 81 MILLIGRAM(S): at 14:03

## 2019-05-28 RX ADMIN — HEPARIN SODIUM 5000 UNIT(S): 5000 INJECTION INTRAVENOUS; SUBCUTANEOUS at 14:06

## 2019-05-28 RX ADMIN — MEROPENEM 100 MILLIGRAM(S): 1 INJECTION INTRAVENOUS at 06:07

## 2019-05-28 RX ADMIN — MEROPENEM 100 MILLIGRAM(S): 1 INJECTION INTRAVENOUS at 21:21

## 2019-05-28 NOTE — PROGRESS NOTE ADULT - ATTENDING COMMENTS
Pt seen and examined with Gi fellow.  Plan for EUS for further evaluation of the bile duct stricture and possible cholangioscopy in 2-4 weeks. This will allow the patient to fully recover prior to general anesthesia given the severity  of his septic shock.

## 2019-05-28 NOTE — PROGRESS NOTE ADULT - ASSESSMENT
62 yo M with a PMH of ASHD s/p nine cardiac stents, reported recent normal 2D TTE, T2DM, HTN, GERD, cholecystectomy, diverticulosis, former smoker 30 py) and biliary stricture s/p ERCP with sphincterotomy and stent 4/2019 who presented as a transfer [facility] for further GI work up; found to have severe bradycardia in the 20s despite IV dopamine due to high vagal tone.  Currently off dopamine and maintaining NSR:    - no pacing indication at this time  - continue telemetry   - continue to hold AV kadi blockers for now  - d/w EP attending, Dr. Andersen

## 2019-05-28 NOTE — PROGRESS NOTE ADULT - PROBLEM SELECTOR PLAN 4
Monitor FS   - lantus 12u and premeal 3u and SS  - given 6 u lantus last night, will monitor sugars today  A1c 6.5 in april 2019 Monitor FS   -  premeal 3u and SS  - given 6 u lantus last night, AM Fs 106, will continue 6u lantus  A1c 6.5 in april 2019 Monitor FS   -  premeal 3u and SS  - given 6 u lantus last night, AM Fs 106  - will hold lantus tonight for IR procedure  A1c 6.5 in april 2019

## 2019-05-28 NOTE — PROGRESS NOTE ADULT - SUBJECTIVE AND OBJECTIVE BOX
[facility] DIVISION OF KIDNEY DISEASES AND HYPERTENSION -- FOLLOW UP NOTE  --------------------------------------------------------------------------------  HPI: 63-year-old male with medical history of CAD s/p 9 stents, DM, HTN, GERD, prior cholecystectomy,  diverticulosis, biliary stricture s/p ERCP with sphincteromy and stent 4/2019 who presents as transfer from [facility] where he was admitted with septic shock. Nephrology team is following for SLOAN on CKD and anuria. Pt. found to be in septic shock due to E Coli bacteremia was intubated for acute hypoxic respiratory failure and started on pressor support. He received vancomycin, zosyn, micafungin, merepenem and gentamicin initially. Also pt. developed NSTEMI and was started on heparin gtt with dobutamine due to concern for new LV dysfunction. Pt. with known history of CKD 3 secondary to DM and HTN, follows as outpatient with Dr Hodgson. As per pt's son SCr baseline 2.5-2.6 in March 2019 however states decrease to 1.9 in April 2019. On lab review of [facility] SCr elevated to 4.05 on 4/18/19 and decreased to 3.71 on 4/20/19. SCr elevated at 1.97 on 5/17/19. SCr elevated at 2.29 on 5/17/19 and further increased at 2.90 on 5/18/19. Pt. starting on CRRT on 5/18/19. CRRT was discontinued on 5/22/19 due to HD catheter malfunction. [facility] non-tunneled HD catheter was removed and replaced with left femoral non-tunneled HD catheter on 5/22/19. Pt. transitioned to intermittent HD on 5/24/19. Last HD treatment on 5/27/19 tolerating 2.5L of UF    Patient seen and examined at bedside with wife present. Patient is lying flat in bed comfortably without NC O2. States he has not urinated, but has the urge to. Denies SOB, CP, abdominal pain, or headache.     PAST HISTORY  --------------------------------------------------------------------------------  No significant changes to PMH, PSH, FHx, SHx, unless otherwise noted    ALLERGIES & MEDICATIONS  --------------------------------------------------------------------------------  Allergies    Cipro (Rash)  Plavix (Rash)    Intolerances    Standing Inpatient Medications  aspirin  chewable 81 milliGRAM(s) Oral daily  docusate sodium 100 milliGRAM(s) Oral three times a day  heparin  Injectable 5000 Unit(s) SubCutaneous every 8 hours  insulin glargine Injectable (LANTUS) 12 Unit(s) SubCutaneous at bedtime  insulin lispro (HumaLOG) corrective regimen sliding scale   SubCutaneous every 6 hours  insulin lispro Injectable (HumaLOG) 4 Unit(s) SubCutaneous three times a day before meals  meropenem  IVPB      meropenem  IVPB 500 milliGRAM(s) IV Intermittent every 12 hours  metoclopramide Injectable 10 milliGRAM(s) IV Push once  pantoprazole  Injectable 40 milliGRAM(s) IV Push daily  petrolatum Ophthalmic Ointment 1 Application(s) Both EYES two times a day  senna 1 Tablet(s) Oral at bedtime    REVIEW OF SYSTEMS  --------------------------------------------------------------------------------  Resp: no SOB  CV: no CP  GI: no abdominal pain  MSK: edema present.   : anuria    All other systems were reviewed and are negative, except as noted.    VITALS/PHYSICAL EXAM  --------------------------------------------------------------------------------  T(C): 36.7 (05-28-19 @ 05:25), Max: 37.5 (05-27-19 @ 19:56)  HR: 63 (05-28-19 @ 05:25) (53 - 66)  BP: 131/58 (05-28-19 @ 05:25) (106/50 - 151/56)  RR: 18 (05-28-19 @ 05:25) (13 - 18)  SpO2: 98% (05-28-19 @ 05:25) (95% - 99%)  Wt(kg): --    05-27-19 @ 07:01  -  05-28-19 @ 07:00  --------------------------------------------------------  IN: 400 mL / OUT: 2900 mL / NET: -2500 mL    Physical Exam:  	Gen: NAD, on nasal cannula   	HEENT: sclera anicteric   	Pulm: CTA b/l  	CV: S1S2  	Abd: Soft, +BS   	Ext: 1+ LE edema B/L  	Skin: Warm and dry  	Vascular access: Left femoral non-tunneled HD catheter site:  without bleeding    LABS/STUDIES  --------------------------------------------------------------------------------              9.2    15.07 >-----------<  200      [05-28-19 @ 06:17]              27.5     135  |  97  |  44  ----------------------------<  110      [05-28-19 @ 06:17]  3.8   |  23  |  5.34        Ca     7.8     [05-28-19 @ 06:17]      Mg     2.2     [05-28-19 @ 06:17]      Phos  4.8     [05-28-19 @ 06:17]    Creatinine Trend:  SCr 5.34 [05-28 @ 06:17]  SCr 8.23 [05-27 @ 15:40]  SCr 7.15 [05-27 @ 02:46]  SCr 4.98 [05-26 @ 00:55]  SCr 3.95 [05-25 @ 13:00]

## 2019-05-28 NOTE — PROGRESS NOTE ADULT - PROBLEM SELECTOR PLAN 5
- Distributive shock in setting of E coli/Citerobacter bacteremia   - Likely had type II NSTEMI, trops peaked ~2000s, downtrended  -Dopamine infusion now off   - s/p hep drip  -EP and cards following -no interventions warranted at this time   - echo with severe global LV dysfunction. needs to be addressed at a later time once clinically improved - Distributive shock in setting of E coli/Citerobacter bacteremia   - Likely had type II NSTEMI, trops peaked ~2000s, downtrended  -Dopamine infusion now off   - s/p hep drip  -EP and cards following -no interventions warranted at this time   - echo with severe global LV dysfunction, cards following, will do nuclear stress test once more stable and MRCP is done.

## 2019-05-28 NOTE — PROGRESS NOTE ADULT - PROBLEM SELECTOR PLAN 6
Pt w hx of HTN, holding all HTN meds   -will continue to trend BP. Pt w hx of HTN, holding all HTN meds   -will continue to trend BP. today he's hypertensive, will get dialysis today  - if still hypertensive, will restart his amlodipine-valsartan and hydralazine  - will restart losartan if hypertensive

## 2019-05-28 NOTE — CONSULT NOTE ADULT - ASSESSMENT
62 yo M hx of CAD s/p 9 stents, DMT2 on insulin, HTN, GERD, prior cholecystectomy,  diverticulosis, former smoker(30PY), biliary stricture s/p ERCP with sphincterotomy and stent 4/2019 who presented as transfer from [facility] for acute cholangitis s/p ERCP and stent CBD stent placement by GI on 5/19.Hospital course further complicated by Type II NSTEMI and bradycardia.  Admitted for septic shock 2/2 cholangitis  in setting of CBD stricture w ERCP/stent placement, w ecoli and citrobacter bacteremia. s/p repeat ERCP with stent in CBD.  Presented with high lactate, bandemia, leukocytosis which have either resolved or improved  Has received 9 days of antibiotics 62 yo M hx of CAD s/p 9 stents, DMT2 on insulin, HTN, GERD, prior cholecystectomy,  diverticulosis, former smoker(30PY), biliary stricture s/p ERCP with sphincterotomy and stent 4/2019 who presented as transfer from [facility] for acute cholangitis s/p ERCP and stent CBD stent placement by GI on 5/19.Hospital course further complicated by Type II NSTEMI and bradycardia.  Admitted for septic shock 2/2 cholangitis  in setting of CBD stricture w ERCP/stent placement, w ecoli and citrobacter bacteremia. s/p repeat ERCP with stent in CBD.  Presented with high lactate, bandemia, leukocytosis which have either resolved or improved  Has received 9 days of antibiotics   Likely had transient bacteremia secondary obstruction/stricture which resolved with antibiotics and stent placement   Cleared bacteremia quickly     Recommendations:  -continue Meropenem for another 48hrs and can likely stop  -no infectious disease contraindications to permcath placement   -trend WBC

## 2019-05-28 NOTE — PROGRESS NOTE ADULT - PROBLEM SELECTOR PLAN 1
from acute cholangitis in setting of CBD stricture w ERCP/stent placement, w ecoli and citrobacter bacteremia. s/p repeat ERCP with stent in CBD.  - MICU downgraded, was extubated on 5/24, was on levo, dopamine drip. now all off. Dopamine drip d/roel 5/26 AM.   - on meropenem, repeat cx (-) so far, day 9/14 meropenem  - repeat MRCP to assess biliary tree  - wbc trending down from acute cholangitis in setting of CBD stricture w ERCP/stent placement, w ecoli and citrobacter bacteremia. s/p repeat ERCP with stent in CBD.  - MICU downgraded, was extubated on 5/24, was on levo and dopamine drip. now all off. Dopamine drip d/roel 5/26 AM.   - on meropenem, repeat cx (-) so far, day 9/14 meropenem  - repeat MRCP to assess biliary tree today  - wbc trending down  - ID consulted

## 2019-05-28 NOTE — CONSULT NOTE ADULT - SUBJECTIVE AND OBJECTIVE BOX
Patient is a 63y old  Male who presents with a chief complaint of Septic Shock (28 May 2019 13:24)      HPI:  62 yo M hx of CAD s/p 9 stents, DMT2 on insulin, HTN, GERD, prior cholecystectomy,  diverticulosis, former smoker(30PY), biliary stricture s/p ERCP with sphincteromy and stent 4/2019 who presents as transfer from [facility] in septic shock for further evaluation of acute cholangitis, for possible IR intervention or Advanced GI intervention. 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  Found to be in septic shock due to E Coli Bacteremia, with SLOAN, transaminitis and elevated troponins. In the ED at OSH, he developed lethargy, altered mental status, and hypoxia to 82% and was intubated for acute hypoxic resp failure. He was started on levophed and vasopressin and was admitted to their MICU for further workup. He received vanc, zosyn, micafungin, meropenem and gentamicin. He was started on heparin gtt for suspected NSTEMI due to troponin elevated and increasing CK. Blood cultures +E.coli 2/2 bottles, pending sensitivies.   Labs notable for WBC 2.7-->23/hg 11.1/plt 163-->110/6% bands. CMP notable for Cl 111-->119/bicarb 20-->13/ bun 30/cr 2.29-->2.9/tBili 3.5, Dbili 2.6/ alk phos 356/ ast/alt 250/233, lactate 5, lipase 540. TropI 2.3-->12,  -->1020.  Overnight, he developed leukocytosis, thrombocytopenia, worsening metabolic acidosis, and troponin continued to increase. He was transferred to [facility] MICU for further evaluation and possible intervention.    EKG with Sinus Tachycardia. Imaging notable for CXR bilateral infiltrates. US abd Heterogeneous liver with mild biliary ductal dilatation within the left hepatic lobe. Dilatation of the visualized proximal common bile duct. (18 May 2019 15:24)    Above reviewed. Pt admitted for cholangitis with bacteremia. He had ercp with stent placement on 5/19.. Has since cleared bacteremia with 2 types of citrobacter and one ecoli. He has responded to meropenem.     ID consulted for antibiotics duration.       PAST MEDICAL & SURGICAL HISTORY:  Type II diabetes mellitus  HTN (hypertension)  CAD (coronary artery disease)  S/P cholecystectomy      Allergies  Cipro (Rash)  Plavix (Rash)        ANTIMICROBIALS:  meropenem  IVPB    meropenem  IVPB 500 every 12 hours      MEDICATIONS  (STANDING):  meropenem  IVPB   100 mL/Hr IV Intermittent (05-23-19 @ 06:07)   100 mL/Hr IV Intermittent (05-23-19 @ 00:22)   100 mL/Hr IV Intermittent (05-22-19 @ 13:18)   100 mL/Hr IV Intermittent (05-22-19 @ 05:25)   100 mL/Hr IV Intermittent (05-21-19 @ 22:45)   100 mL/Hr IV Intermittent (05-21-19 @ 13:17)   100 mL/Hr IV Intermittent (05-21-19 @ 05:30)   100 mL/Hr IV Intermittent (05-20-19 @ 22:24)   100 mL/Hr IV Intermittent (05-20-19 @ 13:13)    meropenem  IVPB   100 mL/Hr IV Intermittent (05-20-19 @ 05:31)   100 mL/Hr IV Intermittent (05-19-19 @ 18:24)   100 mL/Hr IV Intermittent (05-19-19 @ 08:31)   100 mL/Hr IV Intermittent (05-18-19 @ 20:15)    meropenem  IVPB   100 mL/Hr IV Intermittent (05-23-19 @ 23:11)    meropenem  IVPB   100 mL/Hr IV Intermittent (05-24-19 @ 20:44)    meropenem  IVPB   100 mL/Hr IV Intermittent (05-28-19 @ 06:07)   100 mL/Hr IV Intermittent (05-27-19 @ 22:21)   100 mL/Hr IV Intermittent (05-27-19 @ 05:08)   100 mL/Hr IV Intermittent (05-26-19 @ 17:39)   100 mL/Hr IV Intermittent (05-26-19 @ 05:11)   100 mL/Hr IV Intermittent (05-25-19 @ 17:16)   100 mL/Hr IV Intermittent (05-25-19 @ 07:33)    vancomycin  IVPB   250 mL/Hr IV Intermittent (05-19-19 @ 00:34)    vancomycin  IVPB   250 mL/Hr IV Intermittent (05-23-19 @ 11:10)    vancomycin  IVPB   166.67 mL/Hr IV Intermittent (05-25-19 @ 15:19)        OTHER MEDS: MEDICATIONS  (STANDING):  aspirin  chewable 81 daily  docusate sodium 100 three times a day  heparin  Injectable 5000 every 8 hours  insulin lispro (HumaLOG) corrective regimen sliding scale  every 6 hours  insulin lispro Injectable (HumaLOG) 4 three times a day before meals  metoclopramide Injectable 10 once  pantoprazole  Injectable 40 daily  senna 1 at bedtime      SOCIAL HISTORY:     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 No ETOH or drug use    FAMILY HISTORY:  no hx of cholangitis     REVIEW OF SYSTEMS  [  ] ROS unobtainable because:    [  ] All other systems negative except as noted below:	    Constitutional:  [ ] fever [ ] chills  [ ] weight loss  [ ] weakness  Skin:  [ ] rash [ ] phlebitis	  Eyes: [ ] icterus [ ] pain  [ ] discharge	  ENMT: [ ] sore throat  [ ] thrush [ ] ulcers [ ] exudates  Respiratory: [ ] dyspnea [ ] hemoptysis [ ] cough [ ] sputum	  Cardiovascular:  [ ] chest pain [ ] palpitations [ ] edema	  Gastrointestinal:  [ ] nausea [ ] vomiting [ ] diarrhea [ ] constipation [ ] pain	  Genitourinary:  [ ] dysuria [ ] frequency [ ] hematuria [ ] discharge [ ] flank pain  [ ] incontinence  Musculoskeletal:  [ ] myalgias [ ] arthralgias [ ] arthritis  [ ] back pain  Neurological:  [ ] headache [ ] seizures  [ ] confusion/altered mental status  Psychiatric:  [ ] anxiety [ ] depression	  Hematology/Lymphatics:  [ ] lymphadenopathy  Endocrine:  [ ] adrenal [ ] thyroid  Allergic/Immunologic:	 [ ] transplant [ ] seasonal    Vital Signs Last 24 Hrs  T(F): 98.5 (05-28-19 @ 11:36), Max: 99.5 (05-27-19 @ 19:56)    Vital Signs Last 24 Hrs  HR: 63 (05-28-19 @ 11:36) (55 - 66)  BP: 170/57 (05-28-19 @ 11:36) (106/50 - 170/57)  RR: 18 (05-28-19 @ 11:36)  SpO2: 92% (05-28-19 @ 11:36) (92% - 100%)  Wt(kg): --    PHYSICAL EXAM:  General: non-toxic  HEAD/EYES: anicteric, PERRL  ENT:  supple  Cardiovascular:   S1, S2  Respiratory:  clear bilaterally  GI:  soft, non-tender, normal bowel sounds  :  no CVA tenderness   Musculoskeletal:  no synovitis  Neurologic:  grossly non-focal  Skin:  no rash  Lymph: no lymphadenopathy  Psychiatric:  appropriate affect  Vascular:  no phlebitis          WBC Count: 15.07 K/uL (05-28 @ 06:17)  WBC Count: 13.88 K/uL (05-27 @ 15:40)  WBC Count: 16.29 K/uL (05-27 @ 02:46)  WBC Count: 27.11 K/uL (05-26 @ 00:55)  WBC Count: 33.92 K/uL (05-25 @ 13:00)  WBC Count: 39.61 K/uL (05-25 @ 01:00)  WBC Count: 48.52 K/uL (05-24 @ 03:00)                            9.2    15.07 )-----------( 200      ( 28 May 2019 06:17 )             27.5       05-28    135  |  97<L>  |  44<H>  ----------------------------<  110<H>  3.8   |  23  |  5.34<H>    Ca    7.8<L>      28 May 2019 06:17  Phos  4.8     05-28  Mg     2.2     05-28    TPro  5.7<L>  /  Alb  2.5<L>  /  TBili  1.2  /  DBili  x   /  AST  25  /  ALT  38  /  AlkPhos  133<H>  05-28      Creatinine Trend: 5.34<--, 8.23<--, 7.15<--, 4.98<--, 3.95<--, 2.94<--        MICROBIOLOGY:  Culture - Blood (05.18.19 @ 00:37)    -  Escherichia coli: Detec    -  Amikacin: S <=16    -  Amikacin: S <=16    -  Amikacin: S <=16    -  Ampicillin: R >16 These ampicillin results predict results for amoxicillin    -  Ampicillin: R 16 These ampicillin results predict results for amoxicillin    -  Ampicillin: R >16 These ampicillin results predict results for amoxicillin    -  Ampicillin/Sulbactam: S <=8/4    -  Ampicillin/Sulbactam: R <=8/4    -  Ampicillin/Sulbactam: I 16/8    -  Aztreonam: S <=4    -  Aztreonam: S <=4    -  Aztreonam: S <=4    -  Cefazolin: S <=8    -  Cefazolin: R >16    -  Cefepime: S <=4    -  Cefepime: S <=4    -  Cefepime: S <=4    -  Cefoxitin: S <=8    -  Cefoxitin: R >16    -  Cefoxitin: S <=8    -  Ceftriaxone: S <=1 Enterobacter, Citrobacter, and Serratia may develop resistance during prolonged therapy    -  Ceftriaxone: S <=1 Enterobacter, Citrobacter, and Serratia may develop resistance during prolonged therapy    -  Ceftriaxone: S <=1 Enterobacter, Citrobacter, and Serratia may develop resistance during prolonged therapy    -  Ciprofloxacin: S <=1    -  Ciprofloxacin: S <=1    -  Ciprofloxacin: R >2    -  Ertapenem: S <=1    -  Ertapenem: S <=1    -  Ertapenem: S <=1    -  Gentamicin: S <=4    -  Gentamicin: S <=4    -  Gentamicin: S <=4    -  Imipenem: S <=1    -  Imipenem: S <=1    -  Imipenem: S <=1    -  Levofloxacin: S <=2    -  Levofloxacin: S <=2    -  Levofloxacin: R >4    -  Meropenem: S <=1    -  Meropenem: S <=1    -  Meropenem: S <=1    -  Piperacillin/Tazobactam: S <=16    -  Piperacillin/Tazobactam: S <=16    -  Piperacillin/Tazobactam: S <=16    -  Tobramycin: S <=4    -  Tobramycin: S <=4    -  Tobramycin: S <=4    -  Trimethoprim/Sulfamethoxazole: S <=2/38    -  Trimethoprim/Sulfamethoxazole: S <=2/38    -  Trimethoprim/Sulfamethoxazole: R >2/38    Gram Stain:   Growth in aerobic and anaerobic bottles: Gram Negative Rods    Specimen Source: .Blood    Organism: Blood Culture PCR    Organism: Citrobacter koseri    Organism: Escherichia coli    Organism: Citrobacter freundii    Culture Results:    Organism Identification: Blood Culture PCR  Citrobacter koseri  Escherichia coli  Citrobacter freundii              RADIOLOGY:  < from: CT Abdomen and Pelvis No Cont (05.19.19 @ 14:07) >  IMPRESSION: Extremely limited noncontrast examination.    CBD stent is in place, with the proximal portion in the proximal CBD and   distal tip in the second portion of the duodenum. There is dilatation of   the common hepatic duct to 1.6 cm and intrahepatic biliary ductal   dilatation, particularly involving the left lobe.    Small bilateral pleural effusions with bilateral lower lobe partial   compressive atelectasis.    < end of copied text > Patient is a 63y old  Male who presents with a chief complaint of Septic Shock (28 May 2019 13:24)      HPI:  62 yo M hx of CAD s/p 9 stents, DMT2 on insulin, HTN, GERD, prior cholecystectomy,  diverticulosis, former smoker(30PY), biliary stricture s/p ERCP with sphincteromy and stent 4/2019 who presents as transfer from [facility] in septic shock for further evaluation of acute cholangitis, for possible IR intervention or Advanced GI intervention. 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  Found to be in septic shock due to E Coli Bacteremia, with SLOAN, transaminitis and elevated troponins. In the ED at OSH, he developed lethargy, altered mental status, and hypoxia to 82% and was intubated for acute hypoxic resp failure. He was started on levophed and vasopressin and was admitted to their MICU for further workup. He received vanc, zosyn, micafungin, meropenem and gentamicin. He was started on heparin gtt for suspected NSTEMI due to troponin elevated and increasing CK. Blood cultures +E.coli 2/2 bottles, pending sensitivies.   Labs notable for WBC 2.7-->23/hg 11.1/plt 163-->110/6% bands. CMP notable for Cl 111-->119/bicarb 20-->13/ bun 30/cr 2.29-->2.9/tBili 3.5, Dbili 2.6/ alk phos 356/ ast/alt 250/233, lactate 5, lipase 540. TropI 2.3-->12,  -->1020.  Overnight, he developed leukocytosis, thrombocytopenia, worsening metabolic acidosis, and troponin continued to increase. He was transferred to [facility] MICU for further evaluation and possible intervention.    EKG with Sinus Tachycardia. Imaging notable for CXR bilateral infiltrates. US abd Heterogeneous liver with mild biliary ductal dilatation within the left hepatic lobe. Dilatation of the visualized proximal common bile duct. (18 May 2019 15:24)    Above reviewed. Pt admitted for cholangitis with bacteremia. He had ercp with stent placement on 5/19.. Has since cleared bacteremia with 2 types of citrobacter and one ecoli. He has responded to meropenem.     ID consulted for antibiotics duration.       PAST MEDICAL & SURGICAL HISTORY:  Type II diabetes mellitus  HTN (hypertension)  CAD (coronary artery disease)  S/P cholecystectomy      Allergies  Cipro (Rash)  Plavix (Rash)        ANTIMICROBIALS:  meropenem  IVPB    meropenem  IVPB 500 every 12 hours      MEDICATIONS  (STANDING):  meropenem  IVPB   100 mL/Hr IV Intermittent (05-23-19 @ 06:07)   100 mL/Hr IV Intermittent (05-23-19 @ 00:22)   100 mL/Hr IV Intermittent (05-22-19 @ 13:18)   100 mL/Hr IV Intermittent (05-22-19 @ 05:25)   100 mL/Hr IV Intermittent (05-21-19 @ 22:45)   100 mL/Hr IV Intermittent (05-21-19 @ 13:17)   100 mL/Hr IV Intermittent (05-21-19 @ 05:30)   100 mL/Hr IV Intermittent (05-20-19 @ 22:24)   100 mL/Hr IV Intermittent (05-20-19 @ 13:13)    meropenem  IVPB   100 mL/Hr IV Intermittent (05-20-19 @ 05:31)   100 mL/Hr IV Intermittent (05-19-19 @ 18:24)   100 mL/Hr IV Intermittent (05-19-19 @ 08:31)   100 mL/Hr IV Intermittent (05-18-19 @ 20:15)    meropenem  IVPB   100 mL/Hr IV Intermittent (05-23-19 @ 23:11)    meropenem  IVPB   100 mL/Hr IV Intermittent (05-24-19 @ 20:44)    meropenem  IVPB   100 mL/Hr IV Intermittent (05-28-19 @ 06:07)   100 mL/Hr IV Intermittent (05-27-19 @ 22:21)   100 mL/Hr IV Intermittent (05-27-19 @ 05:08)   100 mL/Hr IV Intermittent (05-26-19 @ 17:39)   100 mL/Hr IV Intermittent (05-26-19 @ 05:11)   100 mL/Hr IV Intermittent (05-25-19 @ 17:16)   100 mL/Hr IV Intermittent (05-25-19 @ 07:33)    vancomycin  IVPB   250 mL/Hr IV Intermittent (05-19-19 @ 00:34)    vancomycin  IVPB   250 mL/Hr IV Intermittent (05-23-19 @ 11:10)    vancomycin  IVPB   166.67 mL/Hr IV Intermittent (05-25-19 @ 15:19)        OTHER MEDS: MEDICATIONS  (STANDING):  aspirin  chewable 81 daily  docusate sodium 100 three times a day  heparin  Injectable 5000 every 8 hours  insulin lispro (HumaLOG) corrective regimen sliding scale  every 6 hours  insulin lispro Injectable (HumaLOG) 4 three times a day before meals  metoclopramide Injectable 10 once  pantoprazole  Injectable 40 daily  senna 1 at bedtime      SOCIAL HISTORY:     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 No ETOH or drug use    FAMILY HISTORY:  no hx of cholangitis     REVIEW OF SYSTEMS  [  ] ROS unobtainable because:    [  X] All other systems negative except as noted below:	    Constitutional:  [ ] fever [ ] chills  [ ] weight loss  [X ] weakness  Skin:  [ ] rash [ ] phlebitis	  Eyes: [ ] icterus [ ] pain  [ ] discharge	  ENMT: [ ] sore throat  [ ] thrush [ ] ulcers [ ] exudates  Respiratory: [ ] dyspnea [ ] hemoptysis [ ] cough [ ] sputum	  Cardiovascular:  [ ] chest pain [ ] palpitations [ ] edema	  Gastrointestinal:  [ ] nausea [ ] vomiting [ ] diarrhea [ ] constipation [ ] pain	  Genitourinary:  [ ] dysuria [ ] frequency [ ] hematuria [ ] discharge [ ] flank pain  [ ] incontinence  Musculoskeletal:  [ ] myalgias [ ] arthralgias [ ] arthritis  [ ] back pain  Neurological:  [ ] headache [ ] seizures  [ ] confusion/altered mental status  Psychiatric:  [ ] anxiety [ ] depression	  Hematology/Lymphatics:  [ ] lymphadenopathy  Endocrine:  [ ] adrenal [ ] thyroid  Allergic/Immunologic:	 [ ] transplant [ ] seasonal    Vital Signs Last 24 Hrs  T(F): 98.5 (05-28-19 @ 11:36), Max: 99.5 (05-27-19 @ 19:56)    Vital Signs Last 24 Hrs  HR: 63 (05-28-19 @ 11:36) (55 - 66)  BP: 170/57 (05-28-19 @ 11:36) (106/50 - 170/57)  RR: 18 (05-28-19 @ 11:36)  SpO2: 92% (05-28-19 @ 11:36) (92% - 100%)  Wt(kg): --    PHYSICAL EXAM:  General: non-toxic, appears tired   HEAD/EYES: anicteric   ENT:  supple  Cardiovascular:   S1, S2  Respiratory:  clear bilaterally  GI:  soft, mildly tender in RLQ, normal bowel sounds  :  no CVA tenderness   Musculoskeletal:  no synovitis  Neurologic:  grossly non-focal  Skin:  no rash  Lymph: no lymphadenopathy  Psychiatric:  appropriate affect  Vascular:  no phlebitis, left groin shiley c/d/i          WBC Count: 15.07 K/uL (05-28 @ 06:17)  WBC Count: 13.88 K/uL (05-27 @ 15:40)  WBC Count: 16.29 K/uL (05-27 @ 02:46)  WBC Count: 27.11 K/uL (05-26 @ 00:55)  WBC Count: 33.92 K/uL (05-25 @ 13:00)  WBC Count: 39.61 K/uL (05-25 @ 01:00)  WBC Count: 48.52 K/uL (05-24 @ 03:00)                            9.2    15.07 )-----------( 200      ( 28 May 2019 06:17 )             27.5       05-28    135  |  97<L>  |  44<H>  ----------------------------<  110<H>  3.8   |  23  |  5.34<H>    Ca    7.8<L>      28 May 2019 06:17  Phos  4.8     05-28  Mg     2.2     05-28    TPro  5.7<L>  /  Alb  2.5<L>  /  TBili  1.2  /  DBili  x   /  AST  25  /  ALT  38  /  AlkPhos  133<H>  05-28      Creatinine Trend: 5.34<--, 8.23<--, 7.15<--, 4.98<--, 3.95<--, 2.94<--        MICROBIOLOGY:  Culture - Blood (05.18.19 @ 00:37)    -  Escherichia coli: Detec    -  Amikacin: S <=16    -  Amikacin: S <=16    -  Amikacin: S <=16    -  Ampicillin: R >16 These ampicillin results predict results for amoxicillin    -  Ampicillin: R 16 These ampicillin results predict results for amoxicillin    -  Ampicillin: R >16 These ampicillin results predict results for amoxicillin    -  Ampicillin/Sulbactam: S <=8/4    -  Ampicillin/Sulbactam: R <=8/4    -  Ampicillin/Sulbactam: I 16/8    -  Aztreonam: S <=4    -  Aztreonam: S <=4    -  Aztreonam: S <=4    -  Cefazolin: S <=8    -  Cefazolin: R >16    -  Cefepime: S <=4    -  Cefepime: S <=4    -  Cefepime: S <=4    -  Cefoxitin: S <=8    -  Cefoxitin: R >16    -  Cefoxitin: S <=8    -  Ceftriaxone: S <=1 Enterobacter, Citrobacter, and Serratia may develop resistance during prolonged therapy    -  Ceftriaxone: S <=1 Enterobacter, Citrobacter, and Serratia may develop resistance during prolonged therapy    -  Ceftriaxone: S <=1 Enterobacter, Citrobacter, and Serratia may develop resistance during prolonged therapy    -  Ciprofloxacin: S <=1    -  Ciprofloxacin: S <=1    -  Ciprofloxacin: R >2    -  Ertapenem: S <=1    -  Ertapenem: S <=1    -  Ertapenem: S <=1    -  Gentamicin: S <=4    -  Gentamicin: S <=4    -  Gentamicin: S <=4    -  Imipenem: S <=1    -  Imipenem: S <=1    -  Imipenem: S <=1    -  Levofloxacin: S <=2    -  Levofloxacin: S <=2    -  Levofloxacin: R >4    -  Meropenem: S <=1    -  Meropenem: S <=1    -  Meropenem: S <=1    -  Piperacillin/Tazobactam: S <=16    -  Piperacillin/Tazobactam: S <=16    -  Piperacillin/Tazobactam: S <=16    -  Tobramycin: S <=4    -  Tobramycin: S <=4    -  Tobramycin: S <=4    -  Trimethoprim/Sulfamethoxazole: S <=2/38    -  Trimethoprim/Sulfamethoxazole: S <=2/38    -  Trimethoprim/Sulfamethoxazole: R >2/38    Gram Stain:   Growth in aerobic and anaerobic bottles: Gram Negative Rods    Specimen Source: .Blood    Organism: Blood Culture PCR    Organism: Citrobacter koseri    Organism: Escherichia coli    Organism: Citrobacter freundii    Culture Results:    Organism Identification: Blood Culture PCR  Citrobacter koseri  Escherichia coli  Citrobacter freundii          RADIOLOGY:  < from: CT Abdomen and Pelvis No Cont (05.19.19 @ 14:07) >  IMPRESSION: Extremely limited noncontrast examination.    CBD stent is in place, with the proximal portion in the proximal CBD and   distal tip in the second portion of the duodenum. There is dilatation of   the common hepatic duct to 1.6 cm and intrahepatic biliary ductal   dilatation, particularly involving the left lobe.    Small bilateral pleural effusions with bilateral lower lobe partial   compressive atelectasis.    < end of copied text >

## 2019-05-28 NOTE — CONSULT NOTE ADULT - ATTENDING COMMENTS
63 year old with cad and DM with biliary stricture who presented to an outside hospital with septic shock.    Found to have cholangitis with polymicrobial bacteremia.    S/p repeat ERCP.    1) Polymicrobial bacteremia  On Day 9 of meropenem    Last hypothermia on 5/22    Reasonable to stop abx on 5/30    2) ESRD:  Dose ajust abx for ESRD  Getting HD    No ID contraindication to permcath.     3) Leukocytosis  Improving with abx therapy
Patient seen and examined  Agree with above assessment and plan  Events reviewed  Continue with heparin for NSTEMI  FU TTE
I have seen and evaluated the patient with the GI Fellow and GI Team.  I agree with the findings, formulation and plan of care as documented in the resident / fellows note and edited where appropriate.
Pt. with oliguric SLOAN on CKD, initiated on CRRT/CVVHDF yesterday. Pt. seen and examined in MICU in presence of pt.'s wife. Pt. intubated and is on IV vasopressor support. Pt. tolerating CVVHDF. HD catheter functioning well. Plan is to continue with CVVHDF. Monitor BP, labs and urine output.

## 2019-05-28 NOTE — PROGRESS NOTE ADULT - ATTENDING COMMENTS
Patient seen and examined.  Case discussed with house staff.  Agree with above as edited.   Septic Shock 2/2 Citrobacter and E. Coli bacteremia 2/2 cholangitis - ID c/s requested. Plan for Neptali through 5/30. Apprec GI f/u. Repeat MRCP today.  SLOAN on CKD IV - now depending on HD - plan for repeat HD today. Plan to d/c Shiley and replace with tunneled catheter tomorrow.  NSTEMI, h/o CAD s/p PCI - Will need eventual pharm nuclear stress - c/w ASA. Holding Coreg 2/2 bradycardia  Bradycardia - apprec. EP f/u. Likely 2/2 increased vagal tone 2/2 GI process - on tele. HR improving. Holding AV Caroline blockers.  HTN - continue to hold ARB. Hold Coreg as above. If still hypertensive post HD, can resume hydralazine  Pancreatic stricture - d/w GI - likely future EUS for better characterization. Patient has seen Surg Onc at OSH - requested second opinion - Surg onc consult requested. Will f/u their recs  d/w Nephrology, ID, GI, Cards and surg onc.  Plan discussed with patient and family.

## 2019-05-28 NOTE — PROGRESS NOTE ADULT - ATTENDING COMMENTS
severe ATN with oliguria, known CKD with high risk of dialysis dependency  would change femoral with a tunneled catheter

## 2019-05-28 NOTE — PROGRESS NOTE ADULT - SUBJECTIVE AND OBJECTIVE BOX
Patient seen and evaluated today; appears fatigued.  No significant events overnight, otherwise feels generally well.  Telemetry review demonstrates NSR HR: 62 (05-28-19 @ 08:32) (55 - 66); no further bradyarrhythmias while on medical floor.    MEDICATIONS:  aspirin  chewable 81 milliGRAM(s) Oral daily  docusate sodium 100 milliGRAM(s) Oral three times a day  heparin  Injectable 5000 Unit(s) SubCutaneous every 8 hours  insulin glargine Injectable (LANTUS) 12 Unit(s) SubCutaneous at bedtime  insulin lispro (HumaLOG) corrective regimen sliding scale   SubCutaneous every 6 hours  insulin lispro Injectable (HumaLOG) 4 Unit(s) SubCutaneous three times a day before meals  meropenem  IVPB      meropenem  IVPB 500 milliGRAM(s) IV Intermittent every 12 hours  metoclopramide Injectable 10 milliGRAM(s) IV Push once  pantoprazole  Injectable 40 milliGRAM(s) IV Push daily  petrolatum Ophthalmic Ointment 1 Application(s) Both EYES two times a day  senna 1 Tablet(s) Oral at bedtime    REVIEW OF SYSTEMS:  CONSTITUTIONAL: No fever, weight loss, or fatigue  NECK: No pain or stiffness  RESPIRATORY: No cough, wheezing, chills or hemoptysis; No Shortness of Breath  CARDIOVASCULAR: No chest pain, palpitations, passing out, dizziness, or leg swelling  GASTROINTESTINAL: No abdominal or epigastric pain. No nausea, vomiting, or hematemesis; No diarrhea or constipation. No melena or hematochezia.  NEUROLOGICAL: No headaches, memory loss, loss of strength, numbness, or tremors  SKIN: No itching, burning, rashes, or lesions   PSYCHIATRIC: No depression, anxiety, mood swings, or difficulty sleeping  HEME/LYMPH: No easy bruising, or bleeding gums  ALLERGY AND IMMUNOLOGIC: No hives or eczema	  All others negative  	  PHYSICAL EXAM:  T(C): 36.8 (05-28-19 @ 08:32), Max: 37.5 (05-27-19 @ 19:56)  HR: 62 (05-28-19 @ 08:32) (55 - 66)  BP: 164/63 (05-28-19 @ 08:35) (106/50 - 170/54)  RR: 18 (05-28-19 @ 08:32) (16 - 18)  SpO2: 100% (05-28-19 @ 08:32) (98% - 100%)  Wt(kg): --  I&O's Summary    27 May 2019 07:01  -  28 May 2019 07:00  --------------------------------------------------------  IN: 400 mL / OUT: 2900 mL / NET: -2500 mL       Appearance: Normal	  HEENT:   Normal oral mucosa, PERRL, EOMI	  Lymphatic: No lymphadenopathy  Cardiovascular: Normal S1 S2, No JVD, No murmurs, No edema  Respiratory: Lungs clear to auscultation	  Psychiatry: A & O x 3, Mood & affect appropriate  Gastrointestinal:  Soft, Non-tender, + BS	  Skin: No rashes, No ecchymoses, No cyanosis	  Neurologic: Non-focal  Extremities: Normal range of motion, No clubbing, cyanosis or edema  Vascular: Peripheral pulses palpable 2+ bilaterally    DIAGNOSTICS:  TELEMETRY: 	  as above  LABS:	 	                          9.2    15.07 )-----------( 200      ( 28 May 2019 06:17 )             27.5     05-28    135  |  97<L>  |  44<H>  ----------------------------<  110<H>  3.8   |  23  |  5.34<H>    Ca    7.8<L>      28 May 2019 06:17  Phos  4.8     05-28  Mg     2.2     05-28    TPro  5.7<L>  /  Alb  2.5<L>  /  TBili  1.2  /  DBili  x   /  AST  25  /  ALT  38  /  AlkPhos  133<H>  05-28    proBNP:

## 2019-05-28 NOTE — CONSULT NOTE ADULT - ASSESSMENT
62 yo M hx of CAD s/p 9 stents, DMT2 on insulin, HTN, GERD, prior cholecystectomy,  diverticulosis, former smoker(30PY), biliary stricture s/p ERCP with sphincterotomy and stent 4/2019 who presented as transfer from [facility] for acute cholangitis s/p ERCP and stent CBD stent placement by GI on 5/19.Hospital course further complicated by Type II NSTEMI and bradycardia. Now on floors, resolved bradycardia, undergoing further imaging for biliary tree evaluation  - Plan for tunneled dialysis catheter placement in IR tomorrow with anesthesia  - Awaiting cardiac clearance prior to procedure  - NPO after midnight  - Repeat labs in am  - Have procedure consent    [id number]

## 2019-05-28 NOTE — PROGRESS NOTE ADULT - PROBLEM SELECTOR PLAN 3
Renal -Renal failure in setting of distributive shock   Pt w CKD hx, with SLOAN on CKD likely from sepsis   - HD as per renal through fem tra-last session 5/24, and 5/27  - trend BMPs  - continue to monitor for renal recovery, patient stating he's not making urine, likely will need long term dialysis as per renal   -will call IR for permacath today as femoral shiley is high risk of infection. Once he gets permacath, will take out tra Renal -Renal failure in setting of distributive shock   Pt w CKD hx, with SLOAN on CKD likely from sepsis   - HD as per renal through fem shiley for now until permacath   - continue to monitor for renal recovery, patient stating he's not making urine, likely will need long term dialysis as per renal   - IR for permacath likely tomorrow as femoral shiley is high risk of infection. Once he gets permacath, will take out shiley  - Pt got MRI w contrast today, will need dialysis x2, 5/28 and 5/29 Renal -Renal failure in setting of distributive shock   Pt w CKD hx, with SLOAN on CKD IV likely from sepsis   - HD as per renal through fem shiley for now until permacath   - continue to monitor for renal recovery, patient stating he's not making urine, likely will need long term dialysis as per renal   - IR for permacath likely tomorrow as femoral shiley is high risk of infection. Once he gets permacath, will take out shiley  - Pt got MRI w contrast today, will need dialysis x2, 5/28 and 5/29

## 2019-05-28 NOTE — PROGRESS NOTE ADULT - ASSESSMENT
62 yo M hx of CAD s/p 9 stents, DMT2 on insulin, HTN, GERD, prior cholecystectomy,  diverticulosis, former smoker(30PY), biliary stricture s/p ERCP with sphincterotomy and stent 4/2019 who presented as transfer from [facility] for acute cholangitis s/p ERCP and stent CBD stent placement by GI on 5/19.Hospital course further complicated by Type II NSTEMI and bradycardia. Hemodynamics improved with Dopamine infusion. 62 yo M hx of CAD s/p 9 stents, DMT2 on insulin, HTN, GERD, prior cholecystectomy,  diverticulosis, former smoker(30PY), biliary stricture s/p ERCP with sphincterotomy and stent 4/2019 who presented as transfer from [facility] for acute cholangitis s/p ERCP and stent CBD stent placement by GI on 5/19.Hospital course further complicated by Type II NSTEMI and bradycardia. Now on floors, resolved bradycardia, undergoing further imaging for biliary tree evaluation

## 2019-05-28 NOTE — PROGRESS NOTE ADULT - ASSESSMENT
Impression:  1. Septic shock (resolved) with E coli and Citrobacter bacteremia presumed secondary to biliary source, s/p ERCP 5/19/19 notable for bile in the duodenum and additional placement of plastic biliary stent alongside prior stent  2. Prior EUS/ERCP on 4/19/19 at OSH with 2 cm CBD stricture and with sphincterotomy and stent placement, biopsies negative and brushings with atypical cells (favor reactive atypia)  3. CAD s/p PCI x 9 with elevated troponin, NSTEMI on heparin infusion.  4. SLOAN on CKD   5. DM  6. GERD    Recommendations:  - Will plan for repeat EUS/ERCP in 2-4 weeks to further evaluate the biliary tree  - Antimicrobials per ID  - Monitor CBC, CMP  - Supportive care per primary team  - Page GI with questions or changes in clinical status

## 2019-05-28 NOTE — PROGRESS NOTE ADULT - SUBJECTIVE AND OBJECTIVE BOX
Chief Complaint:  Patient is a 63y old  Male who presents with a chief complaint of Septic Shock (28 May 2019 07:54)      Interval Events:     Allergies:  Cipro (Rash)  Plavix (Rash)      Hospital Medications:  aspirin  chewable 81 milliGRAM(s) Oral daily  docusate sodium 100 milliGRAM(s) Oral three times a day  heparin  Injectable 5000 Unit(s) SubCutaneous every 8 hours  insulin glargine Injectable (LANTUS) 12 Unit(s) SubCutaneous at bedtime  insulin lispro (HumaLOG) corrective regimen sliding scale   SubCutaneous every 6 hours  insulin lispro Injectable (HumaLOG) 4 Unit(s) SubCutaneous three times a day before meals  meropenem  IVPB      meropenem  IVPB 500 milliGRAM(s) IV Intermittent every 12 hours  metoclopramide Injectable 10 milliGRAM(s) IV Push once  pantoprazole  Injectable 40 milliGRAM(s) IV Push daily  petrolatum Ophthalmic Ointment 1 Application(s) Both EYES two times a day  senna 1 Tablet(s) Oral at bedtime      PMHX/PSHX:  Gout  Type II diabetes mellitus  HTN (hypertension)  CAD (coronary artery disease)  Cirrhosis  S/P cholecystectomy      Family history:      ROS: Negative, except as otherwise noted above    PHYSICAL EXAM:   Vital Signs:  Vital Signs Last 24 Hrs  T(C): 36.8 (28 May 2019 08:32), Max: 37.5 (27 May 2019 19:56)  T(F): 98.2 (28 May 2019 08:32), Max: 99.5 (27 May 2019 19:56)  HR: 62 (28 May 2019 08:32) (53 - 66)  BP: 164/63 (28 May 2019 08:35) (106/50 - 170/54)  BP(mean): --  RR: 18 (28 May 2019 08:32) (16 - 18)  SpO2: 100% (28 May 2019 08:32) (98% - 100%)  Daily     Daily Weight in k (27 May 2019 19:20)    GENERAL: No acute distress    HEENT:  Anicteric, no thrush  CHEST:  Non-labored breathing, lungs clear b/l  HEART:  +s1, s2 heart sounds, no murmurs  ABDOMEN:    EXTREMITIES:  Warm and well perfused, no edema  SKIN:    NEURO:       LABS:  CBC Full  -  ( 28 May 2019 06:17 )  WBC Count : 15.07 K/uL  Hemoglobin : 9.2 g/dL  Hematocrit : 27.5 %  Platelet Count - Automated : 200 K/uL  Mean Cell Volume : 88.7 fL  Auto Neutrophil # : 10.41 K/uL  Auto Neutrophil % : 69.0 %    05 @ 06:17  Na 135 mmol/L  K 3.8 mmol/L  Cl 97 mmol/L  CO2 23 mmol/L  BUN 44 mg/dL  Creat 5.34 mg/dL  Glucose 110 mg/dL  Ca 7.8 mg/dL    Total protein 5.7 g/dL  Albumin 2.5 g/dL  T bili 1.2 mg/dL  Alk phos 133 u/L  AST 25 u/L  ALT 38 u/L    PT/INR - ( 27 May 2019 15:40 )   PT: 12.1 SEC;   INR: 1.06 Chief Complaint:  Patient is a 63y old  Male who presents with a chief complaint of Septic Shock (28 May 2019 07:54)      Interval Events: Patient feels overall better. He denies abdominal pain, nausea, vomiting. He denies melena, hematochezia or hematemesis.     Allergies:  Cipro (Rash)  Plavix (Rash)      Hospital Medications:  aspirin  chewable 81 milliGRAM(s) Oral daily  docusate sodium 100 milliGRAM(s) Oral three times a day  heparin  Injectable 5000 Unit(s) SubCutaneous every 8 hours  insulin glargine Injectable (LANTUS) 12 Unit(s) SubCutaneous at bedtime  insulin lispro (HumaLOG) corrective regimen sliding scale   SubCutaneous every 6 hours  insulin lispro Injectable (HumaLOG) 4 Unit(s) SubCutaneous three times a day before meals  meropenem  IVPB      meropenem  IVPB 500 milliGRAM(s) IV Intermittent every 12 hours  metoclopramide Injectable 10 milliGRAM(s) IV Push once  pantoprazole  Injectable 40 milliGRAM(s) IV Push daily  petrolatum Ophthalmic Ointment 1 Application(s) Both EYES two times a day  senna 1 Tablet(s) Oral at bedtime      PMHX/PSHX:  Gout  Type II diabetes mellitus  HTN (hypertension)  CAD (coronary artery disease)  Cirrhosis  S/P cholecystectomy      Family history:      ROS: Negative, except as otherwise noted above    PHYSICAL EXAM:   Vital Signs:  Vital Signs Last 24 Hrs  T(C): 36.8 (28 May 2019 08:32), Max: 37.5 (27 May 2019 19:56)  T(F): 98.2 (28 May 2019 08:32), Max: 99.5 (27 May 2019 19:56)  HR: 62 (28 May 2019 08:32) (53 - 66)  BP: 164/63 (28 May 2019 08:35) (106/50 - 170/54)  BP(mean): --  RR: 18 (28 May 2019 08:32) (16 - 18)  SpO2: 100% (28 May 2019 08:32) (98% - 100%)  Daily     Daily Weight in k (27 May 2019 19:20)    GENERAL: No acute distress, lying in bed   HEENT:  Anicteric  CHEST:  Non-labored breathing   ABDOMEN:  Soft, nontender to palpation, no rebound, no guarding  EXTREMITIES:  Warm and well perfused  NEURO:   Awake, interactive, following commands    LABS:  CBC Full  -  ( 28 May 2019 06:17 )  WBC Count : 15.07 K/uL  Hemoglobin : 9.2 g/dL  Hematocrit : 27.5 %  Platelet Count - Automated : 200 K/uL  Mean Cell Volume : 88.7 fL  Auto Neutrophil # : 10.41 K/uL  Auto Neutrophil % : 69.0 %    - @ 06:17  Na 135 mmol/L  K 3.8 mmol/L  Cl 97 mmol/L  CO2 23 mmol/L  BUN 44 mg/dL  Creat 5.34 mg/dL  Glucose 110 mg/dL  Ca 7.8 mg/dL    Total protein 5.7 g/dL  Albumin 2.5 g/dL  T bili 1.2 mg/dL  Alk phos 133 u/L  AST 25 u/L  ALT 38 u/L    PT/INR - ( 27 May 2019 15:40 )   PT: 12.1 SEC;   INR: 1.06

## 2019-05-28 NOTE — CONSULT NOTE ADULT - SUBJECTIVE AND OBJECTIVE BOX
Patient is a 63y old  Male who presents with a chief complaint of Septic Shock (28 May 2019 15:26)    HPI:  64 yo M hx of CAD s/p 9 stents, DMT2 on insulin, HTN, GERD, prior cholecystectomy,  diverticulosis, former smoker(30PY), biliary stricture s/p ERCP with sphincteromy and stent 4/2019 who presents as transfer from [facility] in septic shock for further evaluation of acute cholangitis, for possible IR intervention or Advanced GI intervention. 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  Found to be in septic shock due to E Coli Bacteremia, with SLOAN, transaminitis and elevated troponins. In the ED at OSH, he developed lethargy, altered mental status, and hypoxia to 82% and was intubated for acute hypoxic resp failure. He was started on levophed and vasopressin and was admitted to their MICU for further workup. He received vanc, zosyn, micafungin, meropenem and gentamicin. He was started on heparin gtt for suspected NSTEMI due to troponin elevated and increasing CK. Blood cultures +E.coli 2/2 bottles, pending sensitivies.     Pt seen at dialysis, reports being tired and fatigued. Otherwise, was feeling fine.    REVIEW OF SYSTEMS:  General:  Fatigue  Eyes:  Good vision, no reported pain  ENT:  No sore throat, pain, runny nose, dysphagia  CV:  No pain, palpitations, hypo/hypertension  Resp:  No dyspnea, cough, tachypnea, wheezing  GI:  No pain, nausea, vomiting, diarrhea, constipation  :  No pain, bleeding, incontinence, nocturia  Muscle:  No pain, weakness  Breast:  No pain, abscess, mass, discharge  Neuro:  No weakness, tingling, memory problems  Psych:  No fatigue, insomnia, mood problems, depression  Endocrine:  No polyuria, polydypsia, cold/heat intolerance  Heme:  No petechiae, ecchymosis, easy bruisability  Skin:  No rash, tattoos, scars, edema    PAST MEDICAL & SURGICAL HISTORY:  Type II diabetes mellitus  HTN (hypertension)  CAD (coronary artery disease)  S/P cholecystectomy    Allergies  Cipro (Rash)  Plavix (Rash)    MEDICATIONS  (STANDING):  docusate sodium 100 milliGRAM(s) Oral three times a day  insulin lispro (HumaLOG) corrective regimen sliding scale   SubCutaneous every 6 hours  insulin lispro Injectable (HumaLOG) 4 Unit(s) SubCutaneous three times a day before meals  meropenem  IVPB      meropenem  IVPB 500 milliGRAM(s) IV Intermittent every 12 hours  metoclopramide Injectable 10 milliGRAM(s) IV Push once  pantoprazole  Injectable 40 milliGRAM(s) IV Push daily  petrolatum Ophthalmic Ointment 1 Application(s) Both EYES two times a day  senna 1 Tablet(s) Oral at bedtime    PHYSICAL EXAM:    Vital Signs Last 24 Hrs  T(C): 36.8 (28 May 2019 15:40), Max: 37.5 (27 May 2019 19:56)  T(F): 98.2 (28 May 2019 15:40), Max: 99.5 (27 May 2019 19:56)  HR: 66 (28 May 2019 15:40) (55 - 66)  BP: 157/66 (28 May 2019 15:40) (117/50 - 170/57)  BP(mean): --  RR: 18 (28 May 2019 15:40) (16 - 18)  SpO2: 92% (28 May 2019 11:36) (92% - 100%)    General:  Appears stated age, no distress, lethargic  HEENT:  NC/AT, EOMI, conjunctivae clear  Chest:  Full & symmetric excursion, no increased effort  Cardiovascular:  Regular rhythm, S1, S2, radial/pedal pulses 2+, no edema  Abdomen:  Soft, non-tender, non-distended    LABS:                        9.2    15.07 )-----------( 200      ( 28 May 2019 06:17 )             27.5     05-28    135  |  97<L>  |  44<H>  ----------------------------<  110<H>  3.8   |  23  |  5.34<H>    Ca    7.8<L>      28 May 2019 06:17  Phos  4.8     05-28  Mg     2.2     05-28    TPro  5.7<L>  /  Alb  2.5<L>  /  TBili  1.2  /  DBili  x   /  AST  25  /  ALT  38  /  AlkPhos  133<H>  05-28    PT/INR - ( 27 May 2019 15:40 )   PT: 12.1 SEC;   INR: 1.06 Patient is a 63y old  Male who presents with a chief complaint of Septic Shock (28 May 2019 15:26)    HPI:  62 yo M hx of CAD s/p 9 stents, DMT2 on insulin, HTN, GERD, prior cholecystectomy,  diverticulosis, former smoker(30PY), biliary stricture s/p ERCP with sphincteromy and stent 4/2019 who presents as transfer from [facility] in septic shock for further evaluation of acute cholangitis, for possible IR intervention or Advanced GI intervention. 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  Found to be in septic shock due to E Coli Bacteremia, with SLOAN, transaminitis and elevated troponins. In the ED at OSH, he developed lethargy, altered mental status, and hypoxia to 82% and was intubated for acute hypoxic resp failure. He was started on levophed and vasopressin and was admitted to their MICU for further workup. He received vanc, zosyn, micafungin, meropenem and gentamicin. He was started on heparin gtt for suspected NSTEMI due to troponin elevated and increasing CK.   Pt seen at dialysis, reports being tired and fatigued. Otherwise, was feeling fine.    REVIEW OF SYSTEMS:  General:  Fatigue  Eyes:  Good vision, no reported pain  ENT:  No sore throat, pain, runny nose, dysphagia  CV:  No pain, palpitations, hypo/hypertension  Resp:  No dyspnea, cough, tachypnea, wheezing  GI:  No pain, nausea, vomiting, diarrhea, constipation  :  No pain, bleeding, incontinence, nocturia  Muscle:  No pain, weakness  Breast:  No pain, abscess, mass, discharge  Neuro:  No weakness, tingling, memory problems  Psych:  No fatigue, insomnia, mood problems, depression  Endocrine:  No polyuria, polydypsia, cold/heat intolerance  Heme:  No petechiae, ecchymosis, easy bruisability  Skin:  No rash, tattoos, scars, edema    PAST MEDICAL & SURGICAL HISTORY:  Type II diabetes mellitus  HTN (hypertension)  CAD (coronary artery disease)  S/P cholecystectomy    Allergies  Cipro (Rash)  Plavix (Rash)    MEDICATIONS  (STANDING):  docusate sodium 100 milliGRAM(s) Oral three times a day  insulin lispro (HumaLOG) corrective regimen sliding scale   SubCutaneous every 6 hours  insulin lispro Injectable (HumaLOG) 4 Unit(s) SubCutaneous three times a day before meals  meropenem  IVPB      meropenem  IVPB 500 milliGRAM(s) IV Intermittent every 12 hours  metoclopramide Injectable 10 milliGRAM(s) IV Push once  pantoprazole  Injectable 40 milliGRAM(s) IV Push daily  petrolatum Ophthalmic Ointment 1 Application(s) Both EYES two times a day  senna 1 Tablet(s) Oral at bedtime    PHYSICAL EXAM:    Vital Signs Last 24 Hrs  T(C): 36.8 (28 May 2019 15:40), Max: 37.5 (27 May 2019 19:56)  T(F): 98.2 (28 May 2019 15:40), Max: 99.5 (27 May 2019 19:56)  HR: 66 (28 May 2019 15:40) (55 - 66)  BP: 157/66 (28 May 2019 15:40) (117/50 - 170/57)  BP(mean): --  RR: 18 (28 May 2019 15:40) (16 - 18)  SpO2: 92% (28 May 2019 11:36) (92% - 100%)    General:  Appears stated age, no distress, lethargic  HEENT:  NC/AT, EOMI, conjunctivae clear  Chest:  Full & symmetric excursion, no increased effort  Cardiovascular:  Regular rhythm, S1, S2, radial/pedal pulses 2+, no edema  Abdomen:  Soft, non-tender, non-distended    LABS:                        9.2    15.07 )-----------( 200      ( 28 May 2019 06:17 )             27.5     05-28    135  |  97<L>  |  44<H>  ----------------------------<  110<H>  3.8   |  23  |  5.34<H>    Ca    7.8<L>      28 May 2019 06:17  Phos  4.8     05-28  Mg     2.2     05-28    TPro  5.7<L>  /  Alb  2.5<L>  /  TBili  1.2  /  DBili  x   /  AST  25  /  ALT  38  /  AlkPhos  133<H>  05-28    PT/INR - ( 27 May 2019 15:40 )   PT: 12.1 SEC;   INR: 1.06

## 2019-05-28 NOTE — PROGRESS NOTE ADULT - ASSESSMENT
64 yo M hx of CAD s/p 9 stents, DMT2 on insulin, HTN, GERD, prior cholecystectomy,  diverticulosis, former smoker(30PY), biliary stricture s/p ERCP with sphincterotomy and stent 4/2019 who presented as transfer from [facility] for acute cholangitis s/p ERCP and stent CBD stent placement by GI on 5/19.Hospital course further complicated by Type II NSTEMI and bradycardia. Course complicated by bradycardia now off dopamine.    Significantly elevated biomarkers, even in the setting of SLOAN.  Likely large type II event, with probable underlying multi-vessel CAD.    #NSTEMI:  -When patient has completed GI workup for cholangitis (meaning no further MRCPs needed, patient near discharge-ready) would order pharmacologic nuclear stress    For all cardiology related questions, please call the current cardiology fellow on service at this time.  ** Please go to [email or web address] ; login: [email or web address] (case-sensitive) **    Darío Singh MD  Department of Cardiovascular Disease 62 yo M hx of CAD s/p 9 stents, DMT2 on insulin, HTN, GERD, prior cholecystectomy,  diverticulosis, former smoker(30PY), biliary stricture s/p ERCP with sphincterotomy and stent 4/2019 who presented as transfer from [facility] for acute cholangitis s/p ERCP and stent CBD stent placement by GI on 5/19.Hospital course further complicated by Type II NSTEMI and bradycardia. Course complicated by bradycardia now off dopamine.    Significantly elevated biomarkers, even in the setting of SLOAN.  Likely large type II event, with probable underlying multi-vessel CAD.    #NSTEMI:  -When patient has completed GI workup for cholangitis (meaning no further MRCPs needed, patient near discharge-ready) would order pharmacologic nuclear stress    #Pre-operative optimization:  Patient to have permacath placed.   Patient is at elevated cardiac risk due to new cardiomyopathy of unclear origin and recent NSTEMI, however procedure is low-risk and patient is no longer in any active CHF, ACS, or arrythmias.  No further immediate cardiac optimization required. (Stress test necessary for cardiomyopathy workup, not optimization)    For all cardiology related questions, please call the current cardiology fellow on service at this time.  ** Please go to [email or web address] ; login: "[email or web address] (case-sensitive) **    Darío Singh MD  Department of Cardiovascular Disease

## 2019-05-28 NOTE — PROGRESS NOTE ADULT - ATTENDING COMMENTS
The patient may safely undergo permacath/tunnel cath placement including the use of conscious sedation. This is a low risk procedure and the patient is not currently decompensated.

## 2019-05-28 NOTE — PROGRESS NOTE ADULT - SUBJECTIVE AND OBJECTIVE BOX
Ella Suyeon Yu PGY-1   [facility] Pager # [phone]  NS Pager # [phone]      Patient is a 63y old  Male who presents with a chief complaint of Septic Shock (27 May 2019 10:01)      SUBJECTIVE: No acute events overnight. Patient seen and examined at bedside.    REVIEW OF SYSTEMS:  CONSTITUTIONAL: No fever, weight loss, or fatigue  RESPIRATORY: No cough or shortness of breath  CARDIOVASCULAR: No chest pain, palpitations, dizziness, or leg swelling  GASTROINTESTINAL: No abdominal or epigastric pain. No nausea, vomiting, or hematemesis; No diarrhea or constipation. No melena or hematochezia.  GENITOURINARY: No dysuria  NEUROLOGICAL: No headaches  SKIN: No itching or lesions       MEDICATIONS  (STANDING):  aspirin  chewable 81 milliGRAM(s) Oral daily  docusate sodium 100 milliGRAM(s) Oral three times a day  heparin  Injectable 5000 Unit(s) SubCutaneous every 8 hours  insulin glargine Injectable (LANTUS) 12 Unit(s) SubCutaneous at bedtime  insulin lispro (HumaLOG) corrective regimen sliding scale   SubCutaneous every 6 hours  insulin lispro Injectable (HumaLOG) 4 Unit(s) SubCutaneous three times a day before meals  meropenem  IVPB      meropenem  IVPB 500 milliGRAM(s) IV Intermittent every 12 hours  metoclopramide Injectable 10 milliGRAM(s) IV Push once  pantoprazole  Injectable 40 milliGRAM(s) IV Push daily  petrolatum Ophthalmic Ointment 1 Application(s) Both EYES two times a day  senna 1 Tablet(s) Oral at bedtime    MEDICATIONS  (PRN):        Objective:    Vitals: Vital Signs Last 24 Hrs  T(C): 37 (05-28-19 @ 00:16), Max: 37.5 (05-27-19 @ 19:56)  T(F): 98.6 (05-28-19 @ 00:16), Max: 99.5 (05-27-19 @ 19:56)  HR: 66 (05-28-19 @ 00:16) (53 - 66)  BP: 131/52 (05-28-19 @ 00:16) (106/50 - 151/56)  BP(mean): 83 (05-27-19 @ 09:00) (77 - 83)  RR: 16 (05-28-19 @ 00:16) (13 - 18)  SpO2: 98% (05-28-19 @ 00:16) (95% - 99%)            I&O's Summary    27 May 2019 07:01  -  28 May 2019 07:00  --------------------------------------------------------  IN: 400 mL / OUT: 2900 mL / NET: -2500 mL        PHYSICAL EXAM:  GENERAL: NAD  HEAD:  Atraumatic, Normocephalic  CHEST/LUNG: Clear to auscultation bilaterally; No rales or wheezing  HEART: Regular rate and rhythm; No murmurs, rubs, or gallops  ABDOMEN: Soft, nontender, nondistended  EXTREMITIES:  No clubbing, cyanosis, or edema  LYMPH: No lymphadenopathy noted  SKIN: No rashes or lesions  NERVOUS SYSTEM:  Alert & Oriented X3    LABS:  05-28    135  |  97<L>  |  44<H>  ----------------------------<  110<H>  3.8   |  23  |  5.34<H>  05-27    137  |  96<L>  |  93<H>  ----------------------------<  136<H>  3.6   |  20<L>  |  8.23<H>  05-27    135  |  96<L>  |  84<H>  ----------------------------<  92  3.4<L>   |  20<L>  |  7.15<H>    Ca    7.8<L>      28 May 2019 06:17  Ca    8.1<L>      27 May 2019 15:40  Ca    7.7<L>      27 May 2019 02:46  Phos  4.8     05-28  Mg     2.2     05-28    TPro  5.7<L>  /  Alb  2.5<L>  /  TBili  1.2  /  DBili  x   /  AST  25  /  ALT  38  /  AlkPhos  133<H>  05-28  TPro  5.2<L>  /  Alb  2.3<L>  /  TBili  1.0  /  DBili  x   /  AST  22  /  ALT  39  /  AlkPhos  135<H>  05-27  TPro  5.3<L>  /  Alb  2.2<L>  /  TBili  1.0  /  DBili  x   /  AST  26  /  ALT  51<H>  /  AlkPhos  142<H>  05-27    PT/INR - ( 27 May 2019 15:40 )   PT: 12.1 SEC;   INR: 1.06                                                  9.2    15.07 )-----------( 200      ( 28 May 2019 06:17 )             27.5                         8.8    13.88 )-----------( 187      ( 27 May 2019 15:40 )             26.4                         9.6    16.29 )-----------( 173      ( 27 May 2019 02:46 )             28.3     CAPILLARY BLOOD GLUCOSE      POCT Blood Glucose.: 107 mg/dL (28 May 2019 05:55)  POCT Blood Glucose.: 109 mg/dL (27 May 2019 22:36)  POCT Blood Glucose.: 96 mg/dL (27 May 2019 21:40)  POCT Blood Glucose.: 98 mg/dL (27 May 2019 17:30)  POCT Blood Glucose.: 115 mg/dL (27 May 2019 12:42)  POCT Blood Glucose.: 106 mg/dL (27 May 2019 08:50)    RADIOLOGY:  Imaging Personally Reviewed: YES      Consultants involved in case: YES  Consultant(s) Notes Reviewed:  YES  Care Discussed with Consultants/Other Providers     Ella Suyeon Yu PGY-1 Ella Suyeon Yu PGY-1   [facility] Pager # [phone]  NS Pager # [phone]      Patient is a 63y old  Male who presents with a chief complaint of Septic Shock (27 May 2019 10:01)      SUBJECTIVE: No acute events overnight. Patient seen and examined at bedside. still tired, but feeling better than yesterday, no CP, no sob, no abd pain.     REVIEW OF SYSTEMS:  CONSTITUTIONAL: No fever, weight loss, or fatigue  RESPIRATORY: No cough or shortness of breath  CARDIOVASCULAR: No chest pain, palpitations, dizziness, or leg swelling  GASTROINTESTINAL: No abdominal or epigastric pain. No nausea, vomiting, or hematemesis; No diarrhea or constipation. No melena or hematochezia.  GENITOURINARY: No dysuria  NEUROLOGICAL: No headaches  SKIN: No itching or lesions       MEDICATIONS  (STANDING):  aspirin  chewable 81 milliGRAM(s) Oral daily  docusate sodium 100 milliGRAM(s) Oral three times a day  heparin  Injectable 5000 Unit(s) SubCutaneous every 8 hours  insulin glargine Injectable (LANTUS) 12 Unit(s) SubCutaneous at bedtime  insulin lispro (HumaLOG) corrective regimen sliding scale   SubCutaneous every 6 hours  insulin lispro Injectable (HumaLOG) 4 Unit(s) SubCutaneous three times a day before meals  meropenem  IVPB      meropenem  IVPB 500 milliGRAM(s) IV Intermittent every 12 hours  metoclopramide Injectable 10 milliGRAM(s) IV Push once  pantoprazole  Injectable 40 milliGRAM(s) IV Push daily  petrolatum Ophthalmic Ointment 1 Application(s) Both EYES two times a day  senna 1 Tablet(s) Oral at bedtime    MEDICATIONS  (PRN):        Objective:    Vitals: Vital Signs Last 24 Hrs  T(C): 37 (05-28-19 @ 00:16), Max: 37.5 (05-27-19 @ 19:56)  T(F): 98.6 (05-28-19 @ 00:16), Max: 99.5 (05-27-19 @ 19:56)  HR: 66 (05-28-19 @ 00:16) (53 - 66)  BP: 131/52 (05-28-19 @ 00:16) (106/50 - 151/56)  BP(mean): 83 (05-27-19 @ 09:00) (77 - 83)  RR: 16 (05-28-19 @ 00:16) (13 - 18)  SpO2: 98% (05-28-19 @ 00:16) (95% - 99%)            I&O's Summary    27 May 2019 07:01  -  28 May 2019 07:00  --------------------------------------------------------  IN: 400 mL / OUT: 2900 mL / NET: -2500 mL        PHYSICAL EXAM:  GENERAL: NAD  HEAD:  Atraumatic, Normocephalic  CHEST/LUNG: Clear to auscultation bilaterally; No rales or wheezing  HEART: Regular rate and rhythm; No murmurs, rubs, or gallops  ABDOMEN: Soft, nontender, nondistended   EXTREMITIES:  No clubbing, cyanosis, or edema, L sided femoral shiley site c/d/i  SKIN: No rashes or lesions  NERVOUS SYSTEM:  Alert & Oriented X3    LABS:  05-28    135  |  97<L>  |  44<H>  ----------------------------<  110<H>  3.8   |  23  |  5.34<H>  05-27    137  |  96<L>  |  93<H>  ----------------------------<  136<H>  3.6   |  20<L>  |  8.23<H>  05-27    135  |  96<L>  |  84<H>  ----------------------------<  92  3.4<L>   |  20<L>  |  7.15<H>    Ca    7.8<L>      28 May 2019 06:17  Ca    8.1<L>      27 May 2019 15:40  Ca    7.7<L>      27 May 2019 02:46  Phos  4.8     05-28  Mg     2.2     05-28    TPro  5.7<L>  /  Alb  2.5<L>  /  TBili  1.2  /  DBili  x   /  AST  25  /  ALT  38  /  AlkPhos  133<H>  05-28  TPro  5.2<L>  /  Alb  2.3<L>  /  TBili  1.0  /  DBili  x   /  AST  22  /  ALT  39  /  AlkPhos  135<H>  05-27  TPro  5.3<L>  /  Alb  2.2<L>  /  TBili  1.0  /  DBili  x   /  AST  26  /  ALT  51<H>  /  AlkPhos  142<H>  05-27    PT/INR - ( 27 May 2019 15:40 )   PT: 12.1 SEC;   INR: 1.06                                                  9.2    15.07 )-----------( 200      ( 28 May 2019 06:17 )             27.5                         8.8    13.88 )-----------( 187      ( 27 May 2019 15:40 )             26.4                         9.6    16.29 )-----------( 173      ( 27 May 2019 02:46 )             28.3     CAPILLARY BLOOD GLUCOSE      POCT Blood Glucose.: 107 mg/dL (28 May 2019 05:55)  POCT Blood Glucose.: 109 mg/dL (27 May 2019 22:36)  POCT Blood Glucose.: 96 mg/dL (27 May 2019 21:40)  POCT Blood Glucose.: 98 mg/dL (27 May 2019 17:30)  POCT Blood Glucose.: 115 mg/dL (27 May 2019 12:42)  POCT Blood Glucose.: 106 mg/dL (27 May 2019 08:50)    RADIOLOGY:  Imaging Personally Reviewed: YES      Consultants involved in case: YES  Consultant(s) Notes Reviewed:  YES  Care Discussed with Consultants/Other Providers     Ella Suyeon Yu PGY-1

## 2019-05-28 NOTE — PROGRESS NOTE ADULT - SUBJECTIVE AND OBJECTIVE BOX
Patient seen and examined at bedside.    Overnight Events:   Much improved since I last saw patient, awake and responsive. Denies any pain.    REVIEW OF SYSTEMS:  CONSTITUTIONAL: No weakness, fevers or chills  EYES/ENT: No visual changes;  No dysphagia  NECK: No pain or stiffness  RESPIRATORY: No cough, wheezing, hemoptysis; No shortness of breath  CARDIOVASCULAR: No chest pain or palpitations; No lower extremity edema  GASTROINTESTINAL: No abdominal or epigastric pain. No nausea, vomiting, or hematemesis; No diarrhea or constipation. No melena or hematochezia.  BACK: No back pain  GENITOURINARY: No dysuria, frequency or hematuria  NEUROLOGICAL: No numbness or weakness  SKIN: No itching, burning, rashes, or lesions   All other review of systems is negative unless indicated above.            Current Meds:  aspirin  chewable 81 milliGRAM(s) Oral daily  docusate sodium 100 milliGRAM(s) Oral three times a day  heparin  Injectable 5000 Unit(s) SubCutaneous every 8 hours  insulin glargine Injectable (LANTUS) 12 Unit(s) SubCutaneous at bedtime  insulin lispro (HumaLOG) corrective regimen sliding scale   SubCutaneous every 6 hours  insulin lispro Injectable (HumaLOG) 4 Unit(s) SubCutaneous three times a day before meals  meropenem  IVPB      meropenem  IVPB 500 milliGRAM(s) IV Intermittent every 12 hours  metoclopramide Injectable 10 milliGRAM(s) IV Push once  pantoprazole  Injectable 40 milliGRAM(s) IV Push daily  petrolatum Ophthalmic Ointment 1 Application(s) Both EYES two times a day  senna 1 Tablet(s) Oral at bedtime      Vitals:  T(F): 98.5 (05-28), Max: 99.5 (05-27)  HR: 63 (05-28) (55 - 66)  BP: 170/57 (05-28) (106/50 - 170/57)  RR: 18 (05-28)  SpO2: 92% (05-28)  I&O's Summary    27 May 2019 07:01  -  28 May 2019 07:00  --------------------------------------------------------  IN: 400 mL / OUT: 2900 mL / NET: -2500 mL    Physical Exam:  Appearance: No acute distress; well appearing  Eyes: PERRL, EOMI, pink conjunctiva  HENT: Normal oral mucosa  Cardiovascular: RRR, S1, S2, no murmurs, rubs, or gallops; no edema; no JVD  Respiratory: Clear to auscultation bilaterally  Gastrointestinal: soft, non-tender, non-distended with normal bowel sounds  Musculoskeletal: No clubbing; no joint deformity   Neurologic: Non-focal  Lymphatic: No lymphadenopathy  Psychiatry: AAOx3, mood & affect appropriate  Skin: No rashes, ecchymoses, or cyanosis                        9.2    15.07 )-----------( 200      ( 28 May 2019 06:17 )             27.5     05-28    135  |  97<L>  |  44<H>  ----------------------------<  110<H>  3.8   |  23  |  5.34<H>    Ca    7.8<L>      28 May 2019 06:17  Phos  4.8     05-28  Mg     2.2     05-28    TPro  5.7<L>  /  Alb  2.5<L>  /  TBili  1.2  /  DBili  x   /  AST  25  /  ALT  38  /  AlkPhos  133<H>  05-28    PT/INR - ( 27 May 2019 15:40 )   PT: 12.1 SEC;   INR: 1.06

## 2019-05-28 NOTE — PROGRESS NOTE ADULT - PROBLEM SELECTOR PLAN 1
Pt. with oliguric SLOAN on CKD in the setting of sepsis, hypotension and NSTEMI. On lab review of [facility], SCr elevated at 4.05 on 4/18/19 and decreased at 3.71 on 4/20/19. SCr elevated at 1.97 on 5/17/19. SCr elevated at 2.29 on 5/17/19 and further increased to 2.90 on 5/18/19. Pt. with likely ATN. Pt. initiated on CRRT on 5/18/19, discontinued on 5/22/19 due to HD catheter malfunction. Pt. transitioned to intermittent HD on 5/24/19, last treatment on 5/27/19 tolerating 2.5L of UF. Labs reviewed from today. Pt. remains anuric. No plans for HD today. Will monitor for HD needs daily. Consider replacement of L femoral non-tunneled catheter with IR placed tunneled HD catheter. Monitor UOP and daily weights. Avoid any potential nephrotoxins. Dose medications as per eGFR

## 2019-05-28 NOTE — PROGRESS NOTE ADULT - PROBLEM SELECTOR PLAN 2
- Acute cholangitis in setting of CBD stricture and recent ERCP with stent placement. 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

## 2019-05-29 LAB
ALBUMIN SERPL ELPH-MCNC: 2.9 G/DL — LOW (ref 3.3–5)
ALP SERPL-CCNC: 130 U/L — HIGH (ref 40–120)
ALT FLD-CCNC: 32 U/L — SIGNIFICANT CHANGE UP (ref 4–41)
ANION GAP SERPL CALC-SCNC: 18 MMO/L — HIGH (ref 7–14)
AST SERPL-CCNC: 28 U/L — SIGNIFICANT CHANGE UP (ref 4–40)
BACTERIA BLD CULT: SIGNIFICANT CHANGE UP
BACTERIA BLD CULT: SIGNIFICANT CHANGE UP
BASOPHILS # BLD AUTO: 0.02 K/UL — SIGNIFICANT CHANGE UP (ref 0–0.2)
BASOPHILS NFR BLD AUTO: 0.2 % — SIGNIFICANT CHANGE UP (ref 0–2)
BILIRUB SERPL-MCNC: 1.3 MG/DL — HIGH (ref 0.2–1.2)
BUN SERPL-MCNC: 28 MG/DL — HIGH (ref 7–23)
CALCIUM SERPL-MCNC: 8.4 MG/DL — SIGNIFICANT CHANGE UP (ref 8.4–10.5)
CHLORIDE SERPL-SCNC: 99 MMOL/L — SIGNIFICANT CHANGE UP (ref 98–107)
CO2 SERPL-SCNC: 23 MMOL/L — SIGNIFICANT CHANGE UP (ref 22–31)
CREAT SERPL-MCNC: 4.64 MG/DL — HIGH (ref 0.5–1.3)
EOSINOPHIL # BLD AUTO: 0.23 K/UL — SIGNIFICANT CHANGE UP (ref 0–0.5)
EOSINOPHIL NFR BLD AUTO: 1.8 % — SIGNIFICANT CHANGE UP (ref 0–6)
GLUCOSE BLDC GLUCOMTR-MCNC: 105 MG/DL — HIGH (ref 70–99)
GLUCOSE BLDC GLUCOMTR-MCNC: 106 MG/DL — HIGH (ref 70–99)
GLUCOSE BLDC GLUCOMTR-MCNC: 110 MG/DL — HIGH (ref 70–99)
GLUCOSE BLDC GLUCOMTR-MCNC: 113 MG/DL — HIGH (ref 70–99)
GLUCOSE BLDC GLUCOMTR-MCNC: 128 MG/DL — HIGH (ref 70–99)
GLUCOSE SERPL-MCNC: 125 MG/DL — HIGH (ref 70–99)
HCT VFR BLD CALC: 29 % — LOW (ref 39–50)
HGB BLD-MCNC: 9.2 G/DL — LOW (ref 13–17)
IMM GRANULOCYTES NFR BLD AUTO: 1 % — SIGNIFICANT CHANGE UP (ref 0–1.5)
LYMPHOCYTES # BLD AUTO: 1.27 K/UL — SIGNIFICANT CHANGE UP (ref 1–3.3)
LYMPHOCYTES # BLD AUTO: 10.1 % — LOW (ref 13–44)
MAGNESIUM SERPL-MCNC: 2 MG/DL — SIGNIFICANT CHANGE UP (ref 1.6–2.6)
MANUAL SMEAR VERIFICATION: SIGNIFICANT CHANGE UP
MCHC RBC-ENTMCNC: 29.2 PG — SIGNIFICANT CHANGE UP (ref 27–34)
MCHC RBC-ENTMCNC: 31.7 % — LOW (ref 32–36)
MCV RBC AUTO: 92.1 FL — SIGNIFICANT CHANGE UP (ref 80–100)
MONOCYTES # BLD AUTO: 1.56 K/UL — HIGH (ref 0–0.9)
MONOCYTES NFR BLD AUTO: 12.4 % — SIGNIFICANT CHANGE UP (ref 2–14)
NEUTROPHILS # BLD AUTO: 9.41 K/UL — HIGH (ref 1.8–7.4)
NEUTROPHILS NFR BLD AUTO: 74.5 % — SIGNIFICANT CHANGE UP (ref 43–77)
NRBC # FLD: 0.04 K/UL — SIGNIFICANT CHANGE UP (ref 0–0)
PHOSPHATE SERPL-MCNC: 4 MG/DL — SIGNIFICANT CHANGE UP (ref 2.5–4.5)
PLATELET # BLD AUTO: 242 K/UL — SIGNIFICANT CHANGE UP (ref 150–400)
PMV BLD: 11.3 FL — SIGNIFICANT CHANGE UP (ref 7–13)
POTASSIUM SERPL-MCNC: 3.9 MMOL/L — SIGNIFICANT CHANGE UP (ref 3.5–5.3)
POTASSIUM SERPL-SCNC: 3.9 MMOL/L — SIGNIFICANT CHANGE UP (ref 3.5–5.3)
PROT SERPL-MCNC: 6 G/DL — SIGNIFICANT CHANGE UP (ref 6–8.3)
RBC # BLD: 3.15 M/UL — LOW (ref 4.2–5.8)
RBC # FLD: 16.3 % — HIGH (ref 10.3–14.5)
SODIUM SERPL-SCNC: 140 MMOL/L — SIGNIFICANT CHANGE UP (ref 135–145)
WBC # BLD: 12.62 K/UL — HIGH (ref 3.8–10.5)
WBC # FLD AUTO: 12.62 K/UL — HIGH (ref 3.8–10.5)

## 2019-05-29 PROCEDURE — 99232 SBSQ HOSP IP/OBS MODERATE 35: CPT | Mod: GC

## 2019-05-29 PROCEDURE — 36558 INSERT TUNNELED CV CATH: CPT

## 2019-05-29 PROCEDURE — 76937 US GUIDE VASCULAR ACCESS: CPT | Mod: 26

## 2019-05-29 PROCEDURE — 99223 1ST HOSP IP/OBS HIGH 75: CPT

## 2019-05-29 PROCEDURE — 77001 FLUOROGUIDE FOR VEIN DEVICE: CPT | Mod: 26,GC

## 2019-05-29 PROCEDURE — 90935 HEMODIALYSIS ONE EVALUATION: CPT | Mod: GC

## 2019-05-29 PROCEDURE — 99233 SBSQ HOSP IP/OBS HIGH 50: CPT | Mod: GC

## 2019-05-29 PROCEDURE — 99232 SBSQ HOSP IP/OBS MODERATE 35: CPT

## 2019-05-29 RX ORDER — ACETAMINOPHEN 500 MG
1000 TABLET ORAL ONCE
Refills: 0 | Status: COMPLETED | OUTPATIENT
Start: 2019-05-29 | End: 2019-05-29

## 2019-05-29 RX ORDER — AMLODIPINE BESYLATE 2.5 MG/1
10 TABLET ORAL DAILY
Refills: 0 | Status: DISCONTINUED | OUTPATIENT
Start: 2019-05-29 | End: 2019-06-05

## 2019-05-29 RX ORDER — ACETAMINOPHEN 500 MG
1000 TABLET ORAL ONCE
Refills: 0 | Status: DISCONTINUED | OUTPATIENT
Start: 2019-05-29 | End: 2019-06-08

## 2019-05-29 RX ORDER — ACETAMINOPHEN 500 MG
650 TABLET ORAL EVERY 6 HOURS
Refills: 0 | Status: DISCONTINUED | OUTPATIENT
Start: 2019-05-29 | End: 2019-06-02

## 2019-05-29 RX ORDER — BUDESONIDE AND FORMOTEROL FUMARATE DIHYDRATE 160; 4.5 UG/1; UG/1
2 AEROSOL RESPIRATORY (INHALATION)
Refills: 0 | Status: DISCONTINUED | OUTPATIENT
Start: 2019-05-29 | End: 2019-06-08

## 2019-05-29 RX ORDER — IPRATROPIUM/ALBUTEROL SULFATE 18-103MCG
3 AEROSOL WITH ADAPTER (GRAM) INHALATION EVERY 6 HOURS
Refills: 0 | Status: DISCONTINUED | OUTPATIENT
Start: 2019-05-29 | End: 2019-06-08

## 2019-05-29 RX ORDER — AMLODIPINE BESYLATE 2.5 MG/1
5 TABLET ORAL DAILY
Refills: 0 | Status: DISCONTINUED | OUTPATIENT
Start: 2019-05-29 | End: 2019-05-29

## 2019-05-29 RX ORDER — INSULIN LISPRO 100/ML
VIAL (ML) SUBCUTANEOUS
Refills: 0 | Status: DISCONTINUED | OUTPATIENT
Start: 2019-05-29 | End: 2019-05-31

## 2019-05-29 RX ADMIN — Medication 100 MILLIGRAM(S): at 00:39

## 2019-05-29 RX ADMIN — AMLODIPINE BESYLATE 5 MILLIGRAM(S): 2.5 TABLET ORAL at 17:26

## 2019-05-29 RX ADMIN — MEROPENEM 100 MILLIGRAM(S): 1 INJECTION INTRAVENOUS at 17:26

## 2019-05-29 RX ADMIN — Medication 650 MILLIGRAM(S): at 18:25

## 2019-05-29 RX ADMIN — Medication 400 MILLIGRAM(S): at 06:09

## 2019-05-29 RX ADMIN — MEROPENEM 100 MILLIGRAM(S): 1 INJECTION INTRAVENOUS at 05:23

## 2019-05-29 RX ADMIN — HEPARIN SODIUM 5000 UNIT(S): 5000 INJECTION INTRAVENOUS; SUBCUTANEOUS at 22:41

## 2019-05-29 RX ADMIN — BUDESONIDE AND FORMOTEROL FUMARATE DIHYDRATE 2 PUFF(S): 160; 4.5 AEROSOL RESPIRATORY (INHALATION) at 22:41

## 2019-05-29 RX ADMIN — INSULIN GLARGINE 6 UNIT(S): 100 INJECTION, SOLUTION SUBCUTANEOUS at 22:40

## 2019-05-29 RX ADMIN — Medication 650 MILLIGRAM(S): at 18:56

## 2019-05-29 NOTE — PROGRESS NOTE ADULT - ASSESSMENT
Impression:  1. Septic shock (resolved) with E coli and Citrobacter bacteremia presumed secondary to biliary source, s/p ERCP 5/19/19 notable for bile in the duodenum and additional placement of plastic biliary stent alongside prior stent  2. Prior EUS/ERCP on 4/19/19 at OSH with 2 cm CBD stricture and with sphincterotomy and stent placement, biopsies negative and brushings with atypical cells (favor reactive atypia)  3. CAD s/p PCI x 9 with elevated troponin, NSTEMI on heparin infusion.  4. SLOAN on CKD   5. DM  6. GERD    Recommendations:  - Will plan for repeat EUS/ERCP to further evaluate for pancreaticobiliary pathology; timing to be determined based on clinical status  - Antimicrobials per ID  - Monitor CBC, CMP  - Supportive care per primary team  - Page GI with questions or changes in clinical status

## 2019-05-29 NOTE — PROGRESS NOTE ADULT - ASSESSMENT
62 yo M hx of CAD s/p 9 stents, DMT2 on insulin, HTN, GERD, prior cholecystectomy,  diverticulosis, former smoker(30PY), biliary stricture s/p ERCP with sphincterotomy and stent 4/2019 who presented as transfer from [facility] for acute cholangitis s/p ERCP and stent CBD stent placement by GI on 5/19.Hospital course further complicated by Type II NSTEMI and bradycardia. Now on floors, resolved bradycardia, undergoing further imaging for biliary tree evaluation

## 2019-05-29 NOTE — CONSULT NOTE ADULT - ASSESSMENT
63M w/ hx of CAD, DM, HTN, GERD, diverticulosis, hx of cholecystectomy, biliary stricture s/p ERCP w/ sphincterotomy and stent in April of 2019, transferred from OSH after p/w acute cholangitis, now at [facility], with CTAP showing 63M w/ hx of CAD, DM, HTN, GERD, diverticulosis, hx of cholecystectomy, biliary stricture s/p ERCP w/ sphincterotomy and stent in April of 2019, transferred from OSH after p/w acute cholangitis, now at [facility], with CTAP showing CBD dilatation, s/p ERCP w/ stent placed, and MRCP showing CBD stricture at the lvl of pancreatic head w/o noted mass.      - Will discuss with attending and follow up with updated plans              Ean Rico PGY-1  Team D Surgery  [id number] 63M w/ hx of CAD, DM, HTN, GERD, diverticulosis, hx of cholecystectomy, biliary stricture s/p ERCP w/ sphincterotomy and stent in April of 2019, transferred from OSH after p/w acute cholangitis, now at [facility], with CTAP showing CBD dilatation, s/p ERCP w/ stent placed, and MRCP showing CBD stricture at the lvl of pancreatic head w/o noted mass.    - No surgical intervention indicated at this time  - Continue care per primary team  - Will continue to follow and update with further plans              Ean Rico PGY-1  Team D Surgery  [id number]

## 2019-05-29 NOTE — PROGRESS NOTE ADULT - ASSESSMENT
64 yo M hx of CAD s/p 9 stents, DMT2 on insulin, HTN, GERD, prior cholecystectomy,  diverticulosis, former smoker(30PY), biliary stricture s/p ERCP with sphincterotomy and stent 4/2019 who presented as transfer from [facility] for acute cholangitis s/p ERCP and stent CBD stent placement by GI on 5/19.Hospital course further complicated by Type II NSTEMI and bradycardia.  Admitted for septic shock 2/2 cholangitis  in setting of CBD stricture w ERCP/stent placement, w ecoli and citrobacter bacteremia. s/p repeat ERCP with stent in CBD.  Presented with high lactate, bandemia, leukocytosis which have either resolved or improved  Has received 9 days of antibiotics   Likely had transient bacteremia secondary obstruction/stricture which resolved with antibiotics and stent placement   Cleared bacteremia quickly     Recommendations:  -Stop meropenem on 5/30  -Call ID service with additional questions

## 2019-05-29 NOTE — PROGRESS NOTE ADULT - SUBJECTIVE AND OBJECTIVE BOX
Ella Suyeon Yu PGY-1   [facility] Pager # [phone]  NS Pager # [phone]      Patient is a 63y old  Male who presents with a chief complaint of Septic Shock (29 May 2019 06:35)      SUBJECTIVE: No acute events overnight. Patient seen and examined at bedside.    REVIEW OF SYSTEMS:  CONSTITUTIONAL: No fever, weight loss, or fatigue  RESPIRATORY: No cough or shortness of breath  CARDIOVASCULAR: No chest pain, palpitations, dizziness, or leg swelling  GASTROINTESTINAL: No abdominal or epigastric pain. No nausea, vomiting, or hematemesis; No diarrhea or constipation. No melena or hematochezia.  GENITOURINARY: No dysuria  NEUROLOGICAL: No headaches  SKIN: No itching or lesions       MEDICATIONS  (STANDING):  amLODIPine   Tablet 5 milliGRAM(s) Oral daily  docusate sodium 100 milliGRAM(s) Oral three times a day  heparin  Injectable 5000 Unit(s) SubCutaneous every 8 hours  insulin glargine Injectable (LANTUS) 6 Unit(s) SubCutaneous at bedtime  insulin lispro (HumaLOG) corrective regimen sliding scale   SubCutaneous every 6 hours  insulin lispro Injectable (HumaLOG) 4 Unit(s) SubCutaneous three times a day before meals  meropenem  IVPB      meropenem  IVPB 500 milliGRAM(s) IV Intermittent every 12 hours  metoclopramide Injectable 10 milliGRAM(s) IV Push once  pantoprazole  Injectable 40 milliGRAM(s) IV Push daily  petrolatum Ophthalmic Ointment 1 Application(s) Both EYES two times a day  senna 1 Tablet(s) Oral at bedtime    MEDICATIONS  (PRN):        Objective:    Vitals: Vital Signs Last 24 Hrs  T(C): 37 (05-29-19 @ 05:21), Max: 37.3 (05-28-19 @ 23:32)  T(F): 98.6 (05-29-19 @ 05:21), Max: 99.2 (05-28-19 @ 23:32)  HR: 85 (05-29-19 @ 05:21) (62 - 85)  BP: 169/59 (05-29-19 @ 05:21) (137/70 - 170/62)  BP(mean): --  RR: 16 (05-29-19 @ 05:21) (16 - 18)  SpO2: 100% (05-29-19 @ 05:21) (92% - 100%)            I&O's Summary    28 May 2019 07:01  -  29 May 2019 07:00  --------------------------------------------------------  IN: 400 mL / OUT: 2900 mL / NET: -2500 mL        PHYSICAL EXAM:  GENERAL: NAD  HEAD:  Atraumatic, Normocephalic  CHEST/LUNG: Clear to auscultation bilaterally; No rales or wheezing  HEART: Regular rate and rhythm; No murmurs, rubs, or gallops  ABDOMEN: Soft, nontender, nondistended  EXTREMITIES:  No clubbing, cyanosis, or edema  LYMPH: No lymphadenopathy noted  SKIN: No rashes or lesions  NERVOUS SYSTEM:  Alert & Oriented X3    LABS:  05-29    140  |  99  |  28<H>  ----------------------------<  125<H>  3.9   |  23  |  4.64<H>  05-28    135  |  97<L>  |  44<H>  ----------------------------<  110<H>  3.8   |  23  |  5.34<H>  05-27    137  |  96<L>  |  93<H>  ----------------------------<  136<H>  3.6   |  20<L>  |  8.23<H>    Ca    8.4      29 May 2019 05:19  Ca    7.8<L>      28 May 2019 06:17  Ca    8.1<L>      27 May 2019 15:40  Phos  4.0     05-29  Mg     2.0     05-29    TPro  6.0  /  Alb  2.9<L>  /  TBili  1.3<H>  /  DBili  x   /  AST  28  /  ALT  32  /  AlkPhos  130<H>  05-29  TPro  5.7<L>  /  Alb  2.5<L>  /  TBili  1.2  /  DBili  x   /  AST  25  /  ALT  38  /  AlkPhos  133<H>  05-28  TPro  5.2<L>  /  Alb  2.3<L>  /  TBili  1.0  /  DBili  x   /  AST  22  /  ALT  39  /  AlkPhos  135<H>  05-27    PT/INR - ( 27 May 2019 15:40 )   PT: 12.1 SEC;   INR: 1.06                                                  9.2    12.62 )-----------( 242      ( 29 May 2019 05:19 )             29.0                         9.2    15.07 )-----------( 200      ( 28 May 2019 06:17 )             27.5                         8.8    13.88 )-----------( 187      ( 27 May 2019 15:40 )             26.4     CAPILLARY BLOOD GLUCOSE      POCT Blood Glucose.: 107 mg/dL (28 May 2019 21:42)  POCT Blood Glucose.: 83 mg/dL (28 May 2019 17:29)  POCT Blood Glucose.: 134 mg/dL (28 May 2019 13:55)    RADIOLOGY:  Imaging Personally Reviewed: YES      Consultants involved in case: YES  Consultant(s) Notes Reviewed:  YES  Care Discussed with Consultants/Other Providers     Ella Suyeon Yu PGY-1 Ella Suyeon Yu PGY-1   [facility] Pager # [phone]  NS Pager # [phone]      Patient is a 63y old  Male who presents with a chief complaint of Septic Shock (29 May 2019 06:35)      SUBJECTIVE: No acute events overnight. Patient seen and examined at bedside.     REVIEW OF SYSTEMS:  CONSTITUTIONAL: No fever, weight loss, or fatigue  RESPIRATORY: No cough or shortness of breath  CARDIOVASCULAR: No chest pain, palpitations, dizziness, or leg swelling  GASTROINTESTINAL: No abdominal or epigastric pain. No nausea, vomiting, or hematemesis; No diarrhea or constipation. No melena or hematochezia.  GENITOURINARY: No dysuria  NEUROLOGICAL: No headaches  SKIN: No itching or lesions       MEDICATIONS  (STANDING):  amLODIPine   Tablet 5 milliGRAM(s) Oral daily  docusate sodium 100 milliGRAM(s) Oral three times a day  heparin  Injectable 5000 Unit(s) SubCutaneous every 8 hours  insulin glargine Injectable (LANTUS) 6 Unit(s) SubCutaneous at bedtime  insulin lispro (HumaLOG) corrective regimen sliding scale   SubCutaneous every 6 hours  insulin lispro Injectable (HumaLOG) 4 Unit(s) SubCutaneous three times a day before meals  meropenem  IVPB      meropenem  IVPB 500 milliGRAM(s) IV Intermittent every 12 hours  metoclopramide Injectable 10 milliGRAM(s) IV Push once  pantoprazole  Injectable 40 milliGRAM(s) IV Push daily  petrolatum Ophthalmic Ointment 1 Application(s) Both EYES two times a day  senna 1 Tablet(s) Oral at bedtime    MEDICATIONS  (PRN):        Objective:    Vitals: Vital Signs Last 24 Hrs  T(C): 37 (05-29-19 @ 05:21), Max: 37.3 (05-28-19 @ 23:32)  T(F): 98.6 (05-29-19 @ 05:21), Max: 99.2 (05-28-19 @ 23:32)  HR: 85 (05-29-19 @ 05:21) (62 - 85)  BP: 169/59 (05-29-19 @ 05:21) (137/70 - 170/62)  BP(mean): --  RR: 16 (05-29-19 @ 05:21) (16 - 18)  SpO2: 100% (05-29-19 @ 05:21) (92% - 100%)            I&O's Summary    28 May 2019 07:01  -  29 May 2019 07:00  --------------------------------------------------------  IN: 400 mL / OUT: 2900 mL / NET: -2500 mL        PHYSICAL EXAM:  GENERAL: NAD  HEAD:  Atraumatic, Normocephalic  CHEST/LUNG: Clear to auscultation bilaterally; No rales or wheezing  HEART: Regular rate and rhythm; No murmurs, rubs, or gallops  ABDOMEN: Soft, nontender, nondistended  EXTREMITIES:  No clubbing, cyanosis, or edema  LYMPH: No lymphadenopathy noted  SKIN: No rashes or lesions  NERVOUS SYSTEM:  Alert & Oriented X3    LABS:  05-29    140  |  99  |  28<H>  ----------------------------<  125<H>  3.9   |  23  |  4.64<H>  05-28    135  |  97<L>  |  44<H>  ----------------------------<  110<H>  3.8   |  23  |  5.34<H>  05-27    137  |  96<L>  |  93<H>  ----------------------------<  136<H>  3.6   |  20<L>  |  8.23<H>    Ca    8.4      29 May 2019 05:19  Ca    7.8<L>      28 May 2019 06:17  Ca    8.1<L>      27 May 2019 15:40  Phos  4.0     05-29  Mg     2.0     05-29    TPro  6.0  /  Alb  2.9<L>  /  TBili  1.3<H>  /  DBili  x   /  AST  28  /  ALT  32  /  AlkPhos  130<H>  05-29  TPro  5.7<L>  /  Alb  2.5<L>  /  TBili  1.2  /  DBili  x   /  AST  25  /  ALT  38  /  AlkPhos  133<H>  05-28  TPro  5.2<L>  /  Alb  2.3<L>  /  TBili  1.0  /  DBili  x   /  AST  22  /  ALT  39  /  AlkPhos  135<H>  05-27    PT/INR - ( 27 May 2019 15:40 )   PT: 12.1 SEC;   INR: 1.06                                                  9.2    12.62 )-----------( 242      ( 29 May 2019 05:19 )             29.0                         9.2    15.07 )-----------( 200      ( 28 May 2019 06:17 )             27.5                         8.8    13.88 )-----------( 187      ( 27 May 2019 15:40 )             26.4     CAPILLARY BLOOD GLUCOSE      POCT Blood Glucose.: 107 mg/dL (28 May 2019 21:42)  POCT Blood Glucose.: 83 mg/dL (28 May 2019 17:29)  POCT Blood Glucose.: 134 mg/dL (28 May 2019 13:55)    RADIOLOGY:  Imaging Personally Reviewed: YES      Consultants involved in case: YES  Consultant(s) Notes Reviewed:  YES  Care Discussed with Consultants/Other Providers     Ella Suyeon Yu PGY-1

## 2019-05-29 NOTE — PROGRESS NOTE ADULT - SUBJECTIVE AND OBJECTIVE BOX
Chief Complaint:  Patient is a 63y old  Male who presents with a chief complaint of Septic Shock (29 May 2019 13:22)      Interval Events: Patient feels a little better this morning. He denies abdominal pain, vomiting, fever, chills, melena, hematochezia, or hematemesis.     Allergies:  Cipro (Rash)  Plavix (Rash)      Hospital Medications:  ALBUTerol/ipratropium for Nebulization 3 milliLiter(s) Nebulizer every 6 hours PRN  amLODIPine   Tablet 5 milliGRAM(s) Oral daily  buDESOnide  80 MICROgram(s)/formoterol 4.5 MICROgram(s) Inhaler 2 Puff(s) Inhalation two times a day  docusate sodium 100 milliGRAM(s) Oral three times a day  heparin  Injectable 5000 Unit(s) SubCutaneous every 8 hours  HYDROcodone/homatropine Syrup 5 milliLiter(s) Oral every 6 hours PRN  insulin glargine Injectable (LANTUS) 6 Unit(s) SubCutaneous at bedtime  insulin lispro (HumaLOG) corrective regimen sliding scale   SubCutaneous every 6 hours  insulin lispro Injectable (HumaLOG) 4 Unit(s) SubCutaneous three times a day before meals  meropenem  IVPB      meropenem  IVPB 500 milliGRAM(s) IV Intermittent every 12 hours  metoclopramide Injectable 10 milliGRAM(s) IV Push once  pantoprazole  Injectable 40 milliGRAM(s) IV Push daily  petrolatum Ophthalmic Ointment 1 Application(s) Both EYES two times a day  senna 1 Tablet(s) Oral at bedtime      PMHX/PSHX:  Gout  Type II diabetes mellitus  HTN (hypertension)  CAD (coronary artery disease)  Cirrhosis  S/P cholecystectomy      Family history:      ROS: Negative, except as otherwise noted above    PHYSICAL EXAM:   Vital Signs:  Vital Signs Last 24 Hrs  T(C): 37 (29 May 2019 15:27), Max: 37.6 (29 May 2019 15:10)  T(F): 98.6 (29 May 2019 15:27), Max: 99.7 (29 May 2019 15:10)  HR: 66 (29 May 2019 15:27) (65 - 89)  BP: 138/59 (29 May 2019 15:27) (132/55 - 177/74)  BP(mean): --  RR: 18 (29 May 2019 15:27) (16 - 18)  SpO2: 100% (29 May 2019 15:27) (97% - 100%)  Daily     Daily Weight in k.3 (28 May 2019 19:33)    GENERAL: No acute distress, lying in bed   HEENT:  Anicteric  CHEST:  Non-labored breathing   ABDOMEN:  Soft, nontender to palpation, no rebound, no guarding  EXTREMITIES:  Warm and well perfused  NEURO:   Awake, interactive, following commands    LABS:  CBC Full  -  ( 29 May 2019 05:19 )  WBC Count : 12.62 K/uL  Hemoglobin : 9.2 g/dL  Hematocrit : 29.0 %  Platelet Count - Automated : 242 K/uL  Mean Cell Volume : 92.1 fL  Auto Neutrophil # : 9.41 K/uL  Auto Neutrophil % : 74.5 %     @ 05:19  Na 140 mmol/L  K 3.9 mmol/L  Cl 99 mmol/L  CO2 23 mmol/L  BUN 28 mg/dL  Creat 4.64 mg/dL  Glucose 125 mg/dL  Ca 8.4 mg/dL    Total protein 6.0 g/dL  Albumin 2.9 g/dL  T bili 1.3 mg/dL  Alk phos 130 u/L  AST 28 u/L  ALT 32 u/L

## 2019-05-29 NOTE — PROGRESS NOTE ADULT - SUBJECTIVE AND OBJECTIVE BOX
Follow Up:      Inverval History/ROS:Patient is a 63y old  Male who presents with a chief complaint of Septic Shock (29 May 2019 08:07)    S/p permcath  No fever.  Feels okay.      Allergies    Cipro (Rash)  Plavix (Rash)    Intolerances        ANTIMICROBIALS:  meropenem  IVPB    meropenem  IVPB 500 every 12 hours      OTHER MEDS:  ALBUTerol/ipratropium for Nebulization 3 milliLiter(s) Nebulizer every 6 hours PRN  amLODIPine   Tablet 5 milliGRAM(s) Oral daily  buDESOnide  80 MICROgram(s)/formoterol 4.5 MICROgram(s) Inhaler 2 Puff(s) Inhalation two times a day  docusate sodium 100 milliGRAM(s) Oral three times a day  heparin  Injectable 5000 Unit(s) SubCutaneous every 8 hours  HYDROcodone/homatropine Syrup 5 milliLiter(s) Oral every 6 hours PRN  insulin glargine Injectable (LANTUS) 6 Unit(s) SubCutaneous at bedtime  insulin lispro (HumaLOG) corrective regimen sliding scale   SubCutaneous every 6 hours  insulin lispro Injectable (HumaLOG) 4 Unit(s) SubCutaneous three times a day before meals  metoclopramide Injectable 10 milliGRAM(s) IV Push once  pantoprazole  Injectable 40 milliGRAM(s) IV Push daily  petrolatum Ophthalmic Ointment 1 Application(s) Both EYES two times a day  senna 1 Tablet(s) Oral at bedtime      Vital Signs Last 24 Hrs  T(C): 36.8 (29 May 2019 11:40), Max: 37.3 (28 May 2019 23:32)  T(F): 98.2 (29 May 2019 11:40), Max: 99.2 (28 May 2019 23:32)  HR: 89 (29 May 2019 11:40) (65 - 89)  BP: 177/74 (29 May 2019 11:40) (132/55 - 177/74)  BP(mean): --  RR: 17 (29 May 2019 11:40) (16 - 18)  SpO2: 97% (29 May 2019 07:30) (97% - 100%)    PHYSICAL EXAM:  General: [ x] non-toxic  HEAD/EYES: [ ] PERRL [ ] white sclera [ ] icterus  ENT:  [ ] normal [x ] supple [ ] thrush [ ] pharyngeal exudate  Cardiovascular:   [ ] murmur [x ] normal [ ] PPM/AICD  Respiratory:  [ x] clear to ausculation bilaterally  GI:  [ ] soft, non-tender, normal bowel sounds  :  [ ] dawson [ x] no CVA tenderness   Musculoskeletal:  [ ] no synovitis  Neurologic:  [ x] non-focal exam   Skin:  [x ] no rash  Lymph: [ ] no lymphadenopathy  Psychiatric:  [ ] appropriate affect [ x] alert & oriented  Lines:  [x ] no phlebitis [ ] central line                                9.2    12.62 )-----------( 242      ( 29 May 2019 05:19 )             29.0       05-29    140  |  99  |  28<H>  ----------------------------<  125<H>  3.9   |  23  |  4.64<H>    Ca    8.4      29 May 2019 05:19  Phos  4.0     05-29  Mg     2.0     05-29    TPro  6.0  /  Alb  2.9<L>  /  TBili  1.3<H>  /  DBili  x   /  AST  28  /  ALT  32  /  AlkPhos  130<H>  05-29          MICROBIOLOGY:    RADIOLOGY:

## 2019-05-29 NOTE — PROGRESS NOTE ADULT - PROBLEM SELECTOR PLAN 1
Pt. with oliguric SLOAN on CKD in the setting of sepsis, hypotension and NSTEMI. On lab review of [facility], SCr elevated at 4.05 on 4/18/19 and decreased at 3.71 on 4/20/19. SCr elevated at 1.97 on 5/17/19. SCr elevated at 2.29 on 5/17/19 and further increased to 2.90 on 5/18/19. Pt. with likely severe ATN. Pt. initiated on CRRT on 5/18/19, discontinued on 5/22/19 due to HD catheter malfunction. Pt. transitioned to intermittent HD on 5/24/19, last treatment on 5/28/19 after receiving MRCP with contrast. Labs reviewed from today. Pt. remains anuric. Plan for HD today. Will monitor for HD needs daily. Pt. to have replacement of L femoral non-tunneled catheter with IR placed tunneled HD catheter. Monitor UOP and daily weights. Avoid any potential nephrotoxins. Dose medications as per eGFR

## 2019-05-29 NOTE — PROGRESS NOTE ADULT - PROBLEM SELECTOR PLAN 2
- Acute cholangitis in setting of CBD stricture and recent ERCP with stent placement. 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 no mass in the region of narrowing.    - patient was offered a whipple procedure in outside hospital- surg onc consulted for evluation

## 2019-05-29 NOTE — PROGRESS NOTE ADULT - PROBLEM SELECTOR PLAN 6
Pt w hx of HTN, holding all HTN meds   - if still hypertensive, will restart his amlodipine-valsartan and hydralazine  - will restart hydralazine as hes hypertensive  - hold ACE for renal injury Pt w hx of HTN, holding all HTN meds   - his home med is coreg, amlodipine-valsartan and hydralazine  - will restart amlodipine as hes hypertensive  - hold ACE for renal injury, coreg for bradycardia

## 2019-05-29 NOTE — PROGRESS NOTE ADULT - SUBJECTIVE AND OBJECTIVE BOX
[facility] DIVISION OF KIDNEY DISEASES AND HYPERTENSION -- FOLLOW UP NOTE  --------------------------------------------------------------------------------  HPI: 63-year-old male with medical history of CAD s/p 9 stents, DM, HTN, GERD, prior cholecystectomy,  diverticulosis, biliary stricture s/p ERCP with sphincteromy and stent 4/2019 who presents as transfer from [facility] where he was admitted with septic shock. Nephrology team is following for SLOAN on CKD and anuria. Pt. found to be in septic shock due to E Coli bacteremia was intubated for acute hypoxic respiratory failure and started on pressor support. He received vancomycin, zosyn, micafungin, merepenem and gentamicin initially. Also pt. developed NSTEMI and was started on heparin gtt with dobutamine due to concern for new LV dysfunction. Pt. with known history of CKD 3 secondary to DM and HTN, follows as outpatient with Dr Hodgson. As per pt's son SCr baseline 2.5-2.6 in March 2019 however states decrease to 1.9 in April 2019. On lab review of [facility] SCr elevated to 4.05 on 4/18/19 and decreased to 3.71 on 4/20/19. SCr elevated at 1.97 on 5/17/19. SCr elevated at 2.29 on 5/17/19 and further increased at 2.90 on 5/18/19. Pt. starting on CRRT on 5/18/19. CRRT was discontinued on 5/22/19 due to HD catheter malfunction. [facility] non-tunneled HD catheter was removed and replaced with left femoral non-tunneled HD catheter on 5/22/19. Pt. transitioned to intermittent HD on 5/24/19. Pt. underwent MRCP with [facility]ov[facility] 5/28/19. Last HD treatment performed directly after MRCP on 5/28/19, tolerating 2.5L of UF.    Patient seen and examined at bedside with wife present. Patient is lying flat in bed comfortably without NC O2. States he has not urinated, but has the urge to. Denies SOB, CP, abdominal pain, or headache.   PAST HISTORY  --------------------------------------------------------------------------------  No significant changes to PMH, PSH, FHx, SHx, unless otherwise noted    ALLERGIES & MEDICATIONS  --------------------------------------------------------------------------------  Allergies    Cipro (Rash)  Plavix (Rash)    Intolerances    Standing Inpatient Medications  docusate sodium 100 milliGRAM(s) Oral three times a day  heparin  Injectable 5000 Unit(s) SubCutaneous every 8 hours  insulin glargine Injectable (LANTUS) 6 Unit(s) SubCutaneous at bedtime  insulin lispro (HumaLOG) corrective regimen sliding scale   SubCutaneous every 6 hours  insulin lispro Injectable (HumaLOG) 4 Unit(s) SubCutaneous three times a day before meals  meropenem  IVPB      meropenem  IVPB 500 milliGRAM(s) IV Intermittent every 12 hours  metoclopramide Injectable 10 milliGRAM(s) IV Push once  pantoprazole  Injectable 40 milliGRAM(s) IV Push daily  petrolatum Ophthalmic Ointment 1 Application(s) Both EYES two times a day  senna 1 Tablet(s) Oral at bedtime    REVIEW OF SYSTEMS  --------------------------------------------------------------------------------  Resp: no SOB  CV: no CP  GI: no abdominal pain  MSK: edema present.   : anuria    All other systems were reviewed and are negative, except as noted.    VITALS/PHYSICAL EXAM  --------------------------------------------------------------------------------  T(C): 37 (05-29-19 @ 05:21), Max: 37.3 (05-28-19 @ 23:32)  HR: 85 (05-29-19 @ 05:21) (62 - 85)  BP: 169/59 (05-29-19 @ 05:21) (137/70 - 170/62)  RR: 16 (05-29-19 @ 05:21) (16 - 18)  SpO2: 100% (05-29-19 @ 05:21) (92% - 100%)  Wt(kg): --    05-27-19 @ 07:01  -  05-28-19 @ 07:00  --------------------------------------------------------  IN: 400 mL / OUT: 2900 mL / NET: -2500 mL    05-28-19 @ 07:01  -  05-29-19 @ 06:35  --------------------------------------------------------  IN: 400 mL / OUT: 2900 mL / NET: -2500 mL    Physical Exam:  	Gen: NAD  	HEENT: sclera anicteric   	Pulm: CTA b/l  	CV: S1S2  	Abd: Soft, +BS, urge to urinate when pressure applied suprapubically  	Ext: 1+ LE edema B/L  	Skin: Warm and dry  	Vascular access: Left femoral non-tunneled HD catheter site:  without bleeding    LABS/STUDIES  --------------------------------------------------------------------------------              9.2    15.07 >-----------<  200      [05-28-19 @ 06:17]              27.5     135  |  97  |  44  ----------------------------<  110      [05-28-19 @ 06:17]  3.8   |  23  |  5.34        Ca     7.8     [05-28-19 @ 06:17]      Mg     2.2     [05-28-19 @ 06:17]      Phos  4.8     [05-28-19 @ 06:17]    Creatinine Trend:  SCr 5.34 [05-28 @ 06:17]  SCr 8.23 [05-27 @ 15:40]  SCr 7.15 [05-27 @ 02:46]  SCr 4.98 [05-26 @ 00:55]  SCr 3.95 [05-25 @ 13:00]

## 2019-05-29 NOTE — PROGRESS NOTE ADULT - PROBLEM SELECTOR PLAN 3
Renal -Renal failure in setting of distributive shock   Pt w CKD hx, with SLOAN on CKD IV likely from sepsis   - HD as per renal through fem shiley for now until permacath   - continue to monitor for renal recovery, patient stating he's not making urine, likely will need long term dialysis as per renal   - IR for permacath today as femoral shiley is high risk of infection. Once he gets permacath, will take out shiley  - Pt got MRI w contrast today, will need dialysis x2, 5/28 and 5/29.

## 2019-05-29 NOTE — PROGRESS NOTE ADULT - ATTENDING COMMENTS
remains dialysis dependent. to have PermCath today after dialysis.   since he had CKD before his septic episode and had severe oliguric ATN it is likely he will need dialysis for a prolonged time until his kidneys start recovering..... so his disposition would to go to a dialysis unit as an SLOAN for the next few months

## 2019-05-29 NOTE — PROGRESS NOTE ADULT - PROBLEM SELECTOR PLAN 1
from acute cholangitis in setting of CBD stricture w ERCP/stent placement, w ecoli and citrobacter bacteremia. s/p repeat ERCP with stent in CBD.  - MICU downgraded, was extubated on 5/24, was on levo and dopamine drip. now all off. Dopamine drip d/roel 5/26 AM.   - on meropenem, repeat cx (-) so far, meropenem until 5/30  - repeat MRCP to assess biliary tree, showing mod intrahepatic biliary duct dilation tht's improved, extrahepatic duct dilation to 2cm, narrowing of CBD w new cbd stent. no mass in the region of narrowing.    - wbc trending down  - ID consulted, mykel until 5/30

## 2019-05-29 NOTE — PROGRESS NOTE ADULT - PROBLEM SELECTOR PLAN 5
- Distributive shock in setting of E coli/ Citerobacter bacteremia   - Likely had type II NSTEMI, trops peaked ~2000s, downtrended  -Dopamine infusion now off   - s/p hep drip  -EP and cards following -no interventions warranted at this time   - echo with severe global LV dysfunction, cards following, will do nuclear stress test  once near discharge

## 2019-05-29 NOTE — CONSULT NOTE ADULT - SUBJECTIVE AND OBJECTIVE BOX
General Surgery Consult        Consulting surgical team: Team D Surgery  Consulting attending: Dr. 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 Pt initially presented w/ n/v/d for a day prior to admission, found to be bacteriemic w/ E coli, w/ acute SLOAN, transaminitis, and elevated troponin. In the OSH ED, pt developed lethargy, AMS, hypoxia to 82%, was intubated for acute resp failure, started on levophed and vasopressin and admitted to MICU. He was febrile, had leukocytosis, w/ metabolic acidosis, and received vanc/zosyn, meropenem and gentamicin, in addition to hep gtt for suspected NSTEMI. Pt was transferred to [facility] MICU for further eval and possible interventions from GI and IR teams.    Since admission to [facility], US abd significant for biliary ductal dilatation w/ L hepatic lobe and proximal CBD, CTAP w/ no cont significant for dilatation of com hepatic duct to 1.6cm and of intrahepatic biliary duct of L lobe. ERCP was done by GI team, noted stricture in distal CBD, and placed additional stent in distal CBD alongside pre-existing stents. Post-ERCP, pt's WBC trending down, fevers controlled, pt receiving intermittent HD for SLOAN, getting followed by nephrology.  MRCP obtained yesterday significant for improved but persistent L intrahepatic biliary duct dilatation, extrahepatic duct dilatation to 2cm, and abrupt narrowing of CBD at the level of pancreatic head without identifiable associated mass. Pt currently on meropenem, most recent bcx from 5/24 showing no growth to date. Consult called for possible surgical intervention.    Pt was seen and examined while in HD, states that he was previously getting worked up at [facility], and was scheduled for Whipple procedure on 5/30/19. Pt stated that he'd like to have the surgery done at [facility] at this point. Otherwise, pt feels lethargic and weak but denies n/v, abd pain or subjective fever.            PAST MEDICAL HISTORY:  Gout  Type II diabetes mellitus  HTN (hypertension)  CAD (coronary artery disease)  Cirrhosis      PAST SURGICAL HISTORY:  S/P cholecystectomy  ERCP      MEDICATIONS:  ALBUTerol/ipratropium for Nebulization 3 milliLiter(s) Nebulizer every 6 hours PRN  amLODIPine   Tablet 5 milliGRAM(s) Oral daily  buDESOnide  80 MICROgram(s)/formoterol 4.5 MICROgram(s) Inhaler 2 Puff(s) Inhalation two times a day  docusate sodium 100 milliGRAM(s) Oral three times a day  heparin  Injectable 5000 Unit(s) SubCutaneous every 8 hours  HYDROcodone/homatropine Syrup 5 milliLiter(s) Oral every 6 hours PRN  insulin glargine Injectable (LANTUS) 6 Unit(s) SubCutaneous at bedtime  insulin lispro (HumaLOG) corrective regimen sliding scale   SubCutaneous every 6 hours  insulin lispro Injectable (HumaLOG) 4 Unit(s) SubCutaneous three times a day before meals  meropenem  IVPB      meropenem  IVPB 500 milliGRAM(s) IV Intermittent every 12 hours  metoclopramide Injectable 10 milliGRAM(s) IV Push once  pantoprazole  Injectable 40 milliGRAM(s) IV Push daily  petrolatum Ophthalmic Ointment 1 Application(s) Both EYES two times a day  senna 1 Tablet(s) Oral at bedtime      ALLERGIES:  Cipro (Rash)  Plavix (Rash)      VITALS & I/Os:  Vital Signs Last 24 Hrs  T(C): 36.8 (29 May 2019 11:40), Max: 37.3 (28 May 2019 23:32)  T(F): 98.2 (29 May 2019 11:40), Max: 99.2 (28 May 2019 23:32)  HR: 89 (29 May 2019 11:40) (65 - 89)  BP: 177/74 (29 May 2019 11:40) (132/55 - 177/74)  BP(mean): --  RR: 17 (29 May 2019 11:40) (16 - 18)  SpO2: 97% (29 May 2019 07:30) (97% - 100%)    I&O's Summary    28 May 2019 07:01  -  29 May 2019 07:00  --------------------------------------------------------  IN: 400 mL / OUT: 2900 mL / NET: -2500 mL        PHYSICAL EXAM:  General: No acute distress, laying in bed  Respiratory: Nonlabored  Cardiovascular: RRR  Abdominal: Soft, nondistended, nontender. No rebound or guarding. No organomegaly, no palpable mass.  Extremities: Warm    LABS:                        9.2    12.62 )-----------( 242      ( 29 May 2019 05:19 )             29.0     05-29    140  |  99  |  28<H>  ----------------------------<  125<H>  3.9   |  23  |  4.64<H>    Ca    8.4      29 May 2019 05:19  Phos  4.0     05-29  Mg     2.0     05-29        < end of copied text >  TPro  6.0  /  Alb  2.9<L>  /  TBili  1.3<H>  /  DBili  x   /  AST  28  /  ALT  32  /  AlkPhos  130<H>  05-29    Lactate:    PT/INR - ( 27 May 2019 15:40 )   PT: 12.1 SEC;   INR: 1.06           IMAGING:     < from: MR MRCP w/wo IV Cont (05.28.19 @ 14:13) >    EXAM:  MR MRCP WAW IC      FINDINGS:    LOWER CHEST: Moderate bilateral pleural effusion, right greater than left.    LIVER: Within normal limits.   BILE DUCTS: There is moderate intrahepatic and severe extrahepatic   biliary ductal dilatation. The extrahepatic bile duct measures 2.0 cm,   and narrows abruptly at the level of the pancreatic head. There is now   contiguous fluid signal from the dilated CBD to the level of the ampulla,   likely related to intervally placed biliary ductal stent. The coiled   proximal end of the stent is visualized in the mid CBD.  GALLBLADDER: Surgically removed.  SPLEEN: Within normal limits.  PANCREAS: Normal in signal intensity and enhancement. The pancreatic duct   is normal in caliber. No restricted diffusion. No evidence of pancreatic   mass.  ADRENALS: Within normal limits.  KIDNEYS/URETERS: Right lower pole renal cyst. No hydronephrosis.    VISUALIZED PORTIONS:    BOWEL: Within normal limits. Diverticulosis.  PERITONEUM: No ascites.  VESSELS: Within normal limits.  RETROPERITONEUM: No lymphadenopathy.    ABDOMINAL WALL: Superficial edema in the bilateral flanks.  BONES: Within normal limits.    IMPRESSION:   *  Moderate intrahepatic biliary ductal dilatation, improved compared to   prior exam.  *  Extrahepatic biliary ductal measuring up to 2.0 cm is also improved.   *  Abrupt narrowing of the CBD at the level of the pancreatic head with   evidenceof new CBD stent since prior examination 4/11/2019. No   identifiable mass in the region of abrupt narrowing.    ROSIE BOUDREAUX M.D., ATTENDING RADIOLOGIST  This document has been electronically signed. May 28 2019  3:24PM      < end of copied text >    < from: CT Abdomen and Pelvis No Cont (05.19.19 @ 14:07) >    EXAM:  CT ABDOMEN AND PELVIS        PROCEDURE DATE:  May 19 2019     FINDINGS:    LOWER CHEST: Small bilateral pleural effusions with bilateral lower lobe   partial compressive atelectasis. Coronary arterial calcification.    LIVER: Within normal limits.  BILE DUCTS: Internal common bile duct stent in place, with proximal tip   in the proximal CBD and distal tip near the ampulla of vater in the 2nd   portion of the duodenum. There is dilatation of the common hepatic duct   to 1.6 cm, as well as intrahepatic biliary ductal dilatation,   particularly involving the left lobe.  GALLBLADDER: Cholecystectomy  SPLEEN: Within normal limits.  PANCREAS: Within normal limits.  ADRENALS: Within normal limits.  KIDNEYS/URETERS: Right parapelvic cyst as seen on recent ultrasound. No   calculior hydronephrosis.    BLADDER: Collapsed around Rodriguez catheter..  REPRODUCTIVE ORGANS: Prostate within normal limits.    BOWEL: No bowel obstruction. Appendix is normal. Mild colonic   diverticulosis without evidence of acute diverticulitis.  PERITONEUM: Small volume ascites.  VESSELS:  Atherosclerotic calcification.  RETROPERITONEUM: No lymphadenopathy.    ABDOMINAL WALL: Diffuse subcutaneous edema.  BONES: Multilevel degenerative changes.     IMPRESSION: Extremely limited noncontrast examination.    CBD stent is in place, with the proximal portion in the proximal CBD and   distal tip in the second portion of the duodenum. There is dilatation of   the common hepatic duct to 1.6 cm and intrahepatic biliary ductal   dilatation, particularly involving the left lobe.    Small bilateral pleural effusions with bilateral lower lobe partial   compressive atelectasis.    ARNOLD STYLES M.D., RADIOLOGY RESIDENT  This document has been electronically signed.  CLIFFORD PANDEY M.D., ATTENDING RADIOLOGIST  This document has been electronically signed. May 19 2019  4:45PM      < end of copied text >    < from: US Abdomen Complete (05.18.19 @ 12:29) >    EXAM:  US ABDOMINAL COMPLETE                          PROCEDURE DATE:  05/18/2019      FINDINGS:    Liver: Slightly heterogeneous in echotexture.    Bile ducts: There is intrahepatic biliary ductal dilatation within the   left hepatic lobe. Proximal, bile duct measures 13 mm. Distal CBD is not   well-visualized    Gallbladder: Not visualized.        Pancreas: Not visualized due to overlying bowel gas.    Spleen: 8.2 cm. Within normal limits.    Right kidney: 9.9 cm. No hydronephrosis. 2.5 cm parapelvic cyst.    Left kidney: 10.2 cm.  No hydronephrosis.    Ascites: None.    Aorta and IVC: Visualized portionsare within normal limits.    Right pleural effusion partially visualized.    IMPRESSION:     Heterogeneous liver with mild biliary ductal dilatation within the left   hepatic lobe. Dilatation of the visualized proximal common bile duct.   Distal CBD is not well-visualized. MRI/MRCP may be obtained for further   evaluation.    XUAN WALKER M.D., ATTENDING RADIOLOGIST  This document has been electronically signed. May 18 2019 12:48PM       < end of copied text > General Surgery Consult        Consulting surgical team: Team D Surgery  Consulting attending: Dr. 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 Pt initially presented w/ n/v/d for a day prior to admission, found to be bacteriemic w/ E coli, w/ acute SLOAN, transaminitis, and elevated troponin. In the OSH ED, pt developed lethargy, AMS, hypoxia to 82%, was intubated for acute resp failure, started on levophed and vasopressin and admitted to MICU. He was febrile, had leukocytosis, w/ metabolic acidosis, and received vanc/zosyn, meropenem and gentamicin, in addition to hep gtt for suspected NSTEMI. Pt was transferred to [facility] MICU for further eval and possible interventions from GI and IR teams.    Since admission to [facility], US abd significant for biliary ductal dilatation w/ L hepatic lobe and proximal CBD, CTAP w/ no cont significant for dilatation of com hepatic duct to 1.6cm and of intrahepatic biliary duct of L lobe. ERCP was done by GI team, noted stricture in distal CBD, and placed additional stent in distal CBD alongside pre-existing stents. Post-ERCP, pt's WBC trending down, fevers controlled, pt receiving intermittent HD for SLOAN, getting followed by nephrology.  MRCP obtained yesterday significant for improved but persistent L intrahepatic biliary duct dilatation, extrahepatic duct dilatation to 2cm, and abrupt narrowing of CBD at the level of pancreatic head without identifiable associated mass. Pt currently on meropenem, most recent bcx from 5/24 showing no growth to date. Consult called for possible surgical intervention.    Pt was seen and examined while in HD, states that he was previously getting worked up at [facility], and was scheduled for Whipple procedure on 5/30/19. Pt stated that he'd like to have the surgery done at [facility] at this point. Otherwise, pt feels lethargic and weak but denies n/v, abd pain or subjective fever.            PAST MEDICAL HISTORY:  Gout  Type II diabetes mellitus  HTN (hypertension)  CAD (coronary artery disease)  Cirrhosis      PAST SURGICAL HISTORY:  S/P cholecystectomy  ERCP      MEDICATIONS:  ALBUTerol/ipratropium for Nebulization 3 milliLiter(s) Nebulizer every 6 hours PRN  amLODIPine   Tablet 5 milliGRAM(s) Oral daily  buDESOnide  80 MICROgram(s)/formoterol 4.5 MICROgram(s) Inhaler 2 Puff(s) Inhalation two times a day  docusate sodium 100 milliGRAM(s) Oral three times a day  heparin  Injectable 5000 Unit(s) SubCutaneous every 8 hours  HYDROcodone/homatropine Syrup 5 milliLiter(s) Oral every 6 hours PRN  insulin glargine Injectable (LANTUS) 6 Unit(s) SubCutaneous at bedtime  insulin lispro (HumaLOG) corrective regimen sliding scale   SubCutaneous every 6 hours  insulin lispro Injectable (HumaLOG) 4 Unit(s) SubCutaneous three times a day before meals  meropenem  IVPB      meropenem  IVPB 500 milliGRAM(s) IV Intermittent every 12 hours  metoclopramide Injectable 10 milliGRAM(s) IV Push once  pantoprazole  Injectable 40 milliGRAM(s) IV Push daily  petrolatum Ophthalmic Ointment 1 Application(s) Both EYES two times a day  senna 1 Tablet(s) Oral at bedtime      ALLERGIES:  Cipro (Rash)  Plavix (Rash)      VITALS & I/Os:  Vital Signs Last 24 Hrs  T(C): 36.8 (29 May 2019 11:40), Max: 37.3 (28 May 2019 23:32)  T(F): 98.2 (29 May 2019 11:40), Max: 99.2 (28 May 2019 23:32)  HR: 89 (29 May 2019 11:40) (65 - 89)  BP: 177/74 (29 May 2019 11:40) (132/55 - 177/74)  BP(mean): --  RR: 17 (29 May 2019 11:40) (16 - 18)  SpO2: 97% (29 May 2019 07:30) (97% - 100%)    I&O's Summary    28 May 2019 07:01  -  29 May 2019 07:00  --------------------------------------------------------  IN: 400 mL / OUT: 2900 mL / NET: -2500 mL        PHYSICAL EXAM:  General: No acute distress, laying in bed  Respiratory: Nonlabored  Cardiovascular: RRR  Abdominal: Soft, nondistended, nontender. No rebound or guarding. No organomegaly, no palpable mass.  Extremities: Warm    LABS:                        9.2    12.62 )-----------( 242      ( 29 May 2019 05:19 )             29.0     05-29    140  |  99  |  28<H>  ----------------------------<  125<H>  3.9   |  23  |  4.64<H>    Ca    8.4      29 May 2019 05:19  Phos  4.0     05-29  Mg     2.0     05-29        < end of copied text >  TPro  6.0  /  Alb  2.9<L>  /  TBili  1.3<H>  /  DBili  x   /  AST  28  /  ALT  32  /  AlkPhos  130<H>  05-29    Lactate:    PT/INR - ( 27 May 2019 15:40 )   PT: 12.1 SEC;   INR: 1.06           IMAGING:     < from: MR MRCP w/wo IV Cont (05.28.19 @ 14:13) >    EXAM:  MR MRCP WAW IC      FINDINGS:    LOWER CHEST: Moderate bilateral pleural effusion, right greater than left.    LIVER: Within normal limits.   BILE DUCTS: There is moderate intrahepatic and severe extrahepatic   biliary ductal dilatation. The extrahepatic bile duct measures 2.0 cm,   and narrows abruptly at the level of the pancreatic head. There is now   contiguous fluid signal from the dilated CBD to the level of the ampulla,   likely related to intervally placed biliary ductal stent. The coiled   proximal end of the stent is visualized in the mid CBD.  GALLBLADDER: Surgically removed.  SPLEEN: Within normal limits.  PANCREAS: Normal in signal intensity and enhancement. The pancreatic duct   is normal in caliber. No restricted diffusion. No evidence of pancreatic   mass.  ADRENALS: Within normal limits.  KIDNEYS/URETERS: Right lower pole renal cyst. No hydronephrosis.    VISUALIZED PORTIONS:    BOWEL: Within normal limits. Diverticulosis.  PERITONEUM: No ascites.  VESSELS: Within normal limits.  RETROPERITONEUM: No lymphadenopathy.    ABDOMINAL WALL: Superficial edema in the bilateral flanks.  BONES: Within normal limits.    IMPRESSION:   *  Moderate intrahepatic biliary ductal dilatation, improved compared to   prior exam.  *  Extrahepatic biliary ductal measuring up to 2.0 cm is also improved.   *  Abrupt narrowing of the CBD at the level of the pancreatic head with   evidenceof new CBD stent since prior examination 4/11/2019. No   identifiable mass in the region of abrupt narrowing.    ROSIE BOUDREAUX M.D., ATTENDING RADIOLOGIST  This document has been electronically signed. May 28 2019  3:24PM      < end of copied text >    < from: CT Abdomen and Pelvis No Cont (05.19.19 @ 14:07) >    EXAM:  CT ABDOMEN AND PELVIS        PROCEDURE DATE:  May 19 2019     FINDINGS:    LOWER CHEST: Small bilateral pleural effusions with bilateral lower lobe   partial compressive atelectasis. Coronary arterial calcification.    LIVER: Within normal limits.  BILE DUCTS: Internal common bile duct stent in place, with proximal tip   in the proximal CBD and distal tip near the ampulla of vater in the 2nd   portion of the duodenum. There is dilatation of the common hepatic duct   to 1.6 cm, as well as intrahepatic biliary ductal dilatation,   particularly involving the left lobe.  GALLBLADDER: Cholecystectomy  SPLEEN: Within normal limits.  PANCREAS: Within normal limits.  ADRENALS: Within normal limits.  KIDNEYS/URETERS: Right parapelvic cyst as seen on recent ultrasound. No   calculior hydronephrosis.    BLADDER: Collapsed around Rodriguez catheter..  REPRODUCTIVE ORGANS: Prostate within normal limits.    BOWEL: No bowel obstruction. Appendix is normal. Mild colonic   diverticulosis without evidence of acute diverticulitis.  PERITONEUM: Small volume ascites.  VESSELS:  Atherosclerotic calcification.  RETROPERITONEUM: No lymphadenopathy.    ABDOMINAL WALL: Diffuse subcutaneous edema.  BONES: Multilevel degenerative changes.     IMPRESSION: Extremely limited noncontrast examination.    CBD stent is in place, with the proximal portion in the proximal CBD and   distal tip in the second portion of the duodenum. There is dilatation of   the common hepatic duct to 1.6 cm and intrahepatic biliary ductal   dilatation, particularly involving the left lobe.    Small bilateral pleural effusions with bilateral lower lobe partial   compressive atelectasis.    ARNOLD STYLES M.D., RADIOLOGY RESIDENT  This document has been electronically signed.  CLIFFORD PANDEY M.D., ATTENDING RADIOLOGIST  This document has been electronically signed. May 19 2019  4:45PM      < end of copied text >    < from: US Abdomen Complete (05.18.19 @ 12:29) >    EXAM:  US ABDOMINAL COMPLETE                          PROCEDURE DATE:  05/18/2019      FINDINGS:    Liver: Slightly heterogeneous in echotexture.    Bile ducts: There is intrahepatic biliary ductal dilatation within the   left hepatic lobe. Proximal, bile duct measures 13 mm. Distal CBD is not   well-visualized    Gallbladder: Not visualized.        Pancreas: Not visualized due to overlying bowel gas.    Spleen: 8.2 cm. Within normal limits.    Right kidney: 9.9 cm. No hydronephrosis. 2.5 cm parapelvic cyst.    Left kidney: 10.2 cm.  No hydronephrosis.    Ascites: None.    Aorta and IVC: Visualized portionsare within normal limits.    Right pleural effusion partially visualized.    IMPRESSION:     Heterogeneous liver with mild biliary ductal dilatation within the left   hepatic lobe. Dilatation of the visualized proximal common bile duct.   Distal CBD is not well-visualized. MRI/MRCP may be obtained for further   evaluation.    XUAN WALKER M.D., ATTENDING RADIOLOGIST  This document has been electronically signed. May 18 2019 12:48PM       < end of copied text >

## 2019-05-29 NOTE — CHART NOTE - NSCHARTNOTEFT_GEN_A_CORE
Pre-Interventional Radiology Procedure Note    63y    Male    Procedure: PERMACATH    Diagnosis/Indication: Patient is a 63y old  Male who presents with a chief complaint of Septic Shock (29 May 2019 06:35)      Interventional Radiology Attending Physician: DR Aguilera    Ordering Attending Physician: DR Nicole    PAST MEDICAL & SURGICAL HISTORY:  Type II diabetes mellitus  HTN (hypertension)  CAD (coronary artery disease)  S/P cholecystectomy       CBC Full  -  ( 29 May 2019 05:19 )  WBC Count : 12.62 K/uL  RBC Count : 3.15 M/uL  Hemoglobin : 9.2 g/dL  Hematocrit : 29.0 %  Platelet Count - Automated : 242 K/uL  Mean Cell Volume : 92.1 fL  Mean Cell Hemoglobin : 29.2 pg  Mean Cell Hemoglobin Concentration : 31.7 %  Auto Neutrophil # : 9.41 K/uL  Auto Lymphocyte # : 1.27 K/uL  Auto Monocyte # : 1.56 K/uL  Auto Eosinophil # : 0.23 K/uL  Auto Basophil # : 0.02 K/uL  Auto Neutrophil % : 74.5 %  Auto Lymphocyte % : 10.1 %  Auto Monocyte % : 12.4 %  Auto Eosinophil % : 1.8 %  Auto Basophil % : 0.2 %    05-29    140  |  99  |  28<H>  ----------------------------<  125<H>  3.9   |  23  |  4.64<H>    Ca    8.4      29 May 2019 05:19  Phos  4.0     05-29  Mg     2.0     05-29    TPro  6.0  /  Alb  2.9<L>  /  TBili  1.3<H>  /  DBili  x   /  AST  28  /  ALT  32  /  AlkPhos  130<H>  05-29    PT/INR - ( 27 May 2019 15:40 )   PT: 12.1 SEC;   INR: 1.06

## 2019-05-30 LAB
ALBUMIN SERPL ELPH-MCNC: 2.5 G/DL — LOW (ref 3.3–5)
ALP SERPL-CCNC: 113 U/L — SIGNIFICANT CHANGE UP (ref 40–120)
ALT FLD-CCNC: 22 U/L — SIGNIFICANT CHANGE UP (ref 4–41)
ANION GAP SERPL CALC-SCNC: 17 MMO/L — HIGH (ref 7–14)
AST SERPL-CCNC: 27 U/L — SIGNIFICANT CHANGE UP (ref 4–40)
BASOPHILS # BLD AUTO: 0.01 K/UL — SIGNIFICANT CHANGE UP (ref 0–0.2)
BASOPHILS NFR BLD AUTO: 0.1 % — SIGNIFICANT CHANGE UP (ref 0–2)
BILIRUB SERPL-MCNC: 1.4 MG/DL — HIGH (ref 0.2–1.2)
BUN SERPL-MCNC: 24 MG/DL — HIGH (ref 7–23)
CALCIUM SERPL-MCNC: 8.2 MG/DL — LOW (ref 8.4–10.5)
CHLORIDE SERPL-SCNC: 95 MMOL/L — LOW (ref 98–107)
CO2 SERPL-SCNC: 24 MMOL/L — SIGNIFICANT CHANGE UP (ref 22–31)
CREAT SERPL-MCNC: 4.36 MG/DL — HIGH (ref 0.5–1.3)
EOSINOPHIL # BLD AUTO: 0.29 K/UL — SIGNIFICANT CHANGE UP (ref 0–0.5)
EOSINOPHIL NFR BLD AUTO: 2.6 % — SIGNIFICANT CHANGE UP (ref 0–6)
GLUCOSE BLDC GLUCOMTR-MCNC: 103 MG/DL — HIGH (ref 70–99)
GLUCOSE BLDC GLUCOMTR-MCNC: 119 MG/DL — HIGH (ref 70–99)
GLUCOSE BLDC GLUCOMTR-MCNC: 77 MG/DL — SIGNIFICANT CHANGE UP (ref 70–99)
GLUCOSE BLDC GLUCOMTR-MCNC: 91 MG/DL — SIGNIFICANT CHANGE UP (ref 70–99)
GLUCOSE SERPL-MCNC: 108 MG/DL — HIGH (ref 70–99)
HCT VFR BLD CALC: 27.4 % — LOW (ref 39–50)
HGB BLD-MCNC: 8.8 G/DL — LOW (ref 13–17)
IMM GRANULOCYTES NFR BLD AUTO: 0.8 % — SIGNIFICANT CHANGE UP (ref 0–1.5)
LYMPHOCYTES # BLD AUTO: 1.69 K/UL — SIGNIFICANT CHANGE UP (ref 1–3.3)
LYMPHOCYTES # BLD AUTO: 15.1 % — SIGNIFICANT CHANGE UP (ref 13–44)
MAGNESIUM SERPL-MCNC: 2 MG/DL — SIGNIFICANT CHANGE UP (ref 1.6–2.6)
MCHC RBC-ENTMCNC: 29.6 PG — SIGNIFICANT CHANGE UP (ref 27–34)
MCHC RBC-ENTMCNC: 32.1 % — SIGNIFICANT CHANGE UP (ref 32–36)
MCV RBC AUTO: 92.3 FL — SIGNIFICANT CHANGE UP (ref 80–100)
MONOCYTES # BLD AUTO: 1.61 K/UL — HIGH (ref 0–0.9)
MONOCYTES NFR BLD AUTO: 14.3 % — HIGH (ref 2–14)
NEUTROPHILS # BLD AUTO: 7.53 K/UL — HIGH (ref 1.8–7.4)
NEUTROPHILS NFR BLD AUTO: 67.1 % — SIGNIFICANT CHANGE UP (ref 43–77)
NRBC # FLD: 0.04 K/UL — SIGNIFICANT CHANGE UP (ref 0–0)
PHOSPHATE SERPL-MCNC: 4.4 MG/DL — SIGNIFICANT CHANGE UP (ref 2.5–4.5)
PLATELET # BLD AUTO: 257 K/UL — SIGNIFICANT CHANGE UP (ref 150–400)
PMV BLD: 10.6 FL — SIGNIFICANT CHANGE UP (ref 7–13)
POTASSIUM SERPL-MCNC: 4.2 MMOL/L — SIGNIFICANT CHANGE UP (ref 3.5–5.3)
POTASSIUM SERPL-SCNC: 4.2 MMOL/L — SIGNIFICANT CHANGE UP (ref 3.5–5.3)
PROT SERPL-MCNC: 5.9 G/DL — LOW (ref 6–8.3)
RBC # BLD: 2.97 M/UL — LOW (ref 4.2–5.8)
RBC # FLD: 16 % — HIGH (ref 10.3–14.5)
SODIUM SERPL-SCNC: 136 MMOL/L — SIGNIFICANT CHANGE UP (ref 135–145)
WBC # BLD: 11.22 K/UL — HIGH (ref 3.8–10.5)
WBC # FLD AUTO: 11.22 K/UL — HIGH (ref 3.8–10.5)

## 2019-05-30 PROCEDURE — 99232 SBSQ HOSP IP/OBS MODERATE 35: CPT | Mod: GC

## 2019-05-30 PROCEDURE — 99233 SBSQ HOSP IP/OBS HIGH 50: CPT | Mod: GC

## 2019-05-30 PROCEDURE — 93016 CV STRESS TEST SUPVJ ONLY: CPT | Mod: GC

## 2019-05-30 PROCEDURE — 78452 HT MUSCLE IMAGE SPECT MULT: CPT | Mod: 26

## 2019-05-30 PROCEDURE — 99232 SBSQ HOSP IP/OBS MODERATE 35: CPT

## 2019-05-30 PROCEDURE — 93018 CV STRESS TEST I&R ONLY: CPT | Mod: GC

## 2019-05-30 RX ORDER — ATORVASTATIN CALCIUM 80 MG/1
40 TABLET, FILM COATED ORAL AT BEDTIME
Refills: 0 | Status: DISCONTINUED | OUTPATIENT
Start: 2019-05-30 | End: 2019-06-08

## 2019-05-30 RX ORDER — ERYTHROPOIETIN 10000 [IU]/ML
4000 INJECTION, SOLUTION INTRAVENOUS; SUBCUTANEOUS
Refills: 0 | Status: DISCONTINUED | OUTPATIENT
Start: 2019-05-30 | End: 2019-06-08

## 2019-05-30 RX ADMIN — HEPARIN SODIUM 5000 UNIT(S): 5000 INJECTION INTRAVENOUS; SUBCUTANEOUS at 14:40

## 2019-05-30 RX ADMIN — HEPARIN SODIUM 5000 UNIT(S): 5000 INJECTION INTRAVENOUS; SUBCUTANEOUS at 22:47

## 2019-05-30 RX ADMIN — Medication 4 UNIT(S): at 18:04

## 2019-05-30 RX ADMIN — Medication 650 MILLIGRAM(S): at 08:38

## 2019-05-30 RX ADMIN — BUDESONIDE AND FORMOTEROL FUMARATE DIHYDRATE 2 PUFF(S): 160; 4.5 AEROSOL RESPIRATORY (INHALATION) at 08:40

## 2019-05-30 RX ADMIN — AMLODIPINE BESYLATE 10 MILLIGRAM(S): 2.5 TABLET ORAL at 06:20

## 2019-05-30 RX ADMIN — ATORVASTATIN CALCIUM 40 MILLIGRAM(S): 80 TABLET, FILM COATED ORAL at 22:47

## 2019-05-30 RX ADMIN — HEPARIN SODIUM 5000 UNIT(S): 5000 INJECTION INTRAVENOUS; SUBCUTANEOUS at 06:15

## 2019-05-30 RX ADMIN — MEROPENEM 100 MILLIGRAM(S): 1 INJECTION INTRAVENOUS at 06:14

## 2019-05-30 RX ADMIN — INSULIN GLARGINE 6 UNIT(S): 100 INJECTION, SOLUTION SUBCUTANEOUS at 22:48

## 2019-05-30 RX ADMIN — PANTOPRAZOLE SODIUM 40 MILLIGRAM(S): 20 TABLET, DELAYED RELEASE ORAL at 12:31

## 2019-05-30 RX ADMIN — Medication 650 MILLIGRAM(S): at 09:08

## 2019-05-30 RX ADMIN — MEROPENEM 100 MILLIGRAM(S): 1 INJECTION INTRAVENOUS at 18:03

## 2019-05-30 RX ADMIN — BUDESONIDE AND FORMOTEROL FUMARATE DIHYDRATE 2 PUFF(S): 160; 4.5 AEROSOL RESPIRATORY (INHALATION) at 22:47

## 2019-05-30 RX ADMIN — Medication 650 MILLIGRAM(S): at 23:22

## 2019-05-30 RX ADMIN — Medication 4 UNIT(S): at 12:30

## 2019-05-30 RX ADMIN — Medication 4 UNIT(S): at 08:42

## 2019-05-30 NOTE — PROGRESS NOTE ADULT - PROBLEM SELECTOR PLAN 1
Pt. with oliguric SLOAN on CKD in the setting of sepsis, hypotension and NSTEMI. On lab review of [facility], SCr elevated at 4.05 on 4/18/19 and decreased at 3.71 on 4/20/19. SCr elevated at 1.97 on 5/17/19. SCr elevated at 2.29 on 5/17/19 and further increased to 2.90 on 5/18/19. Pt. with likely severe ATN. Pt. initiated on CRRT on 5/18/19, discontinued on 5/22/19 due to HD catheter malfunction. Pt. transitioned to intermittent HD on 5/24/19, last treatment on 5/29/19. Pt. had replacement of non-tunneled HD catheter with IR placed RIJ tunneled HD catheter on 5/29/19. Labs reviewed from today. Pt. remains anuric. Plan for HD tomorrow. Will monitor for HD needs daily. Monitor UOP and daily weights. Avoid any potential nephrotoxins. Dose medications as per eGFR

## 2019-05-30 NOTE — PROGRESS NOTE ADULT - PROBLEM SELECTOR PLAN 5
- Distributive shock in setting of E coli/ Citerobacter bacteremia   - Likely had type II NSTEMI, trops peaked ~2000s, downtrended  -Dopamine infusion now off   - s/p hep drip  -EP and cards following -no interventions warranted at this time   - echo with severe global LV dysfunction, cards following, will need nuclear stress test  once near discharge

## 2019-05-30 NOTE — PROGRESS NOTE ADULT - PROBLEM SELECTOR PLAN 2
- Acute cholangitis in setting of CBD stricture and recent ERCP with stent placement. 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 no mass in the region of narrowing.    - patient was offered a whipple procedure at OSH, for 5/30  - surg onc consulted for evaluation, will follow up note, patient likely needs a surgical intervention but will discuss timing and type.

## 2019-05-30 NOTE — PROGRESS NOTE ADULT - SUBJECTIVE AND OBJECTIVE BOX
Ella Suyeon Yu PGY-1   [facility] Pager # [phone]  NS Pager # [phone]      Patient is a 63y old  Male who presents with a chief complaint of Septic Shock (30 May 2019 07:25)      SUBJECTIVE: No acute events overnight. Patient seen and examined at bedside.    REVIEW OF SYSTEMS:  CONSTITUTIONAL: No fever, weight loss, or fatigue  RESPIRATORY: No cough or shortness of breath  CARDIOVASCULAR: No chest pain, palpitations, dizziness, or leg swelling  GASTROINTESTINAL: No abdominal or epigastric pain. No nausea, vomiting, or hematemesis; No diarrhea or constipation. No melena or hematochezia.  GENITOURINARY: No dysuria  NEUROLOGICAL: No headaches  SKIN: No itching or lesions       MEDICATIONS  (STANDING):  acetaminophen  IVPB .. 1000 milliGRAM(s) IV Intermittent once  amLODIPine   Tablet 10 milliGRAM(s) Oral daily  buDESOnide  80 MICROgram(s)/formoterol 4.5 MICROgram(s) Inhaler 2 Puff(s) Inhalation two times a day  docusate sodium 100 milliGRAM(s) Oral three times a day  heparin  Injectable 5000 Unit(s) SubCutaneous every 8 hours  insulin glargine Injectable (LANTUS) 6 Unit(s) SubCutaneous at bedtime  insulin lispro (HumaLOG) corrective regimen sliding scale   SubCutaneous Before meals and at bedtime  insulin lispro Injectable (HumaLOG) 4 Unit(s) SubCutaneous three times a day before meals  meropenem  IVPB      meropenem  IVPB 500 milliGRAM(s) IV Intermittent every 12 hours  metoclopramide Injectable 10 milliGRAM(s) IV Push once  pantoprazole  Injectable 40 milliGRAM(s) IV Push daily  petrolatum Ophthalmic Ointment 1 Application(s) Both EYES two times a day  senna 1 Tablet(s) Oral at bedtime    MEDICATIONS  (PRN):  acetaminophen   Tablet .. 650 milliGRAM(s) Oral every 6 hours PRN Mild Pain (1 - 3), Moderate Pain (4 - 6), Severe Pain (7 - 10)  ALBUTerol/ipratropium for Nebulization 3 milliLiter(s) Nebulizer every 6 hours PRN Shortness of Breath and/or Wheezing  HYDROcodone/homatropine Syrup 5 milliLiter(s) Oral every 6 hours PRN Cough        Objective:    Vitals: Vital Signs Last 24 Hrs  T(C): 37.6 (05-30-19 @ 05:10), Max: 37.7 (05-29-19 @ 19:43)  T(F): 99.7 (05-30-19 @ 05:10), Max: 99.8 (05-29-19 @ 19:43)  HR: 66 (05-30-19 @ 05:10) (65 - 89)  BP: 160/58 (05-30-19 @ 05:10) (131/60 - 177/74)  BP(mean): --  RR: 17 (05-30-19 @ 05:10) (17 - 18)  SpO2: 99% (05-30-19 @ 05:10) (94% - 100%)            I&O's Summary    29 May 2019 07:01  -  30 May 2019 07:00  --------------------------------------------------------  IN: 400 mL / OUT: 1799 mL / NET: -1399 mL        PHYSICAL EXAM:  GENERAL: NAD  HEAD:  Atraumatic, Normocephalic  CHEST/LUNG: Clear to auscultation bilaterally; No rales or wheezing  HEART: Regular rate and rhythm; No murmurs, rubs, or gallops  ABDOMEN: Soft, nontender, nondistended  EXTREMITIES:  No clubbing, cyanosis, or edema  LYMPH: No lymphadenopathy noted  SKIN: No rashes or lesions  NERVOUS SYSTEM:  Alert & Oriented X3    LABS:  05-30    136  |  95<L>  |  24<H>  ----------------------------<  108<H>  4.2   |  24  |  4.36<H>  05-29    140  |  99  |  28<H>  ----------------------------<  125<H>  3.9   |  23  |  4.64<H>  05-28    135  |  97<L>  |  44<H>  ----------------------------<  110<H>  3.8   |  23  |  5.34<H>    Ca    8.2<L>      30 May 2019 05:58  Ca    8.4      29 May 2019 05:19  Ca    7.8<L>      28 May 2019 06:17  Phos  4.4     05-30  Mg     2.0     05-30    TPro  5.9<L>  /  Alb  2.5<L>  /  TBili  1.4<H>  /  DBili  x   /  AST  27  /  ALT  22  /  AlkPhos  113  05-30  TPro  6.0  /  Alb  2.9<L>  /  TBili  1.3<H>  /  DBili  x   /  AST  28  /  ALT  32  /  AlkPhos  130<H>  05-29  TPro  5.7<L>  /  Alb  2.5<L>  /  TBili  1.2  /  DBili  x   /  AST  25  /  ALT  38  /  AlkPhos  133<H>  05-28                                            8.8    11.22 )-----------( 257      ( 30 May 2019 05:58 )             27.4                         9.2    12.62 )-----------( 242      ( 29 May 2019 05:19 )             29.0                         9.2    15.07 )-----------( 200      ( 28 May 2019 06:17 )             27.5     CAPILLARY BLOOD GLUCOSE      POCT Blood Glucose.: 110 mg/dL (29 May 2019 22:39)  POCT Blood Glucose.: 113 mg/dL (29 May 2019 20:21)  POCT Blood Glucose.: 106 mg/dL (29 May 2019 17:25)  POCT Blood Glucose.: 105 mg/dL (29 May 2019 13:57)    RADIOLOGY:  Imaging Personally Reviewed: YES      Consultants involved in case: YES  Consultant(s) Notes Reviewed:  YES  Care Discussed with Consultants/Other Providers     Ella Suyeon Yu PGY-1

## 2019-05-30 NOTE — PROGRESS NOTE ADULT - PROBLEM SELECTOR PLAN 6
Pt w hx of HTN, holding all HTN meds   - his home med is coreg, amlodipine-valsartan and hydralazine  - amlodipine 10   - if still hypertensive, will add hydral  - hold ACE for renal injury, coreg for bradycardia

## 2019-05-30 NOTE — PROGRESS NOTE ADULT - ASSESSMENT
Impression:  1. Septic shock (resolved) with E coli and Citrobacter bacteremia presumed secondary to biliary source, s/p ERCP 5/19/19 notable for bile in the duodenum and additional placement of plastic biliary stent alongside prior stent  2. Prior EUS/ERCP on 4/19/19 at OSH with 2 cm CBD stricture and with sphincterotomy and stent placement, biopsies negative and brushings with atypical cells (favor reactive atypia)  3. CAD s/p PCI x 9 with elevated troponin, NSTEMI on heparin infusion.  4. SLOAN on CKD   5. DM  6. GERD    Recommendations:  - Will plan for repeat EUS/ERCP to further evaluate for pancreaticobiliary pathology, after cardiac workup/clearance  - Antimicrobials per ID  - Monitor CBC, CMP  - Supportive care per primary team  - Page GI with questions or changes in clinical status

## 2019-05-30 NOTE — PROGRESS NOTE ADULT - SUBJECTIVE AND OBJECTIVE BOX
No complaints  VSS  soft    D/w pt and wife.  Once clinical status improves, will plan for [facility] in next few weeks.  EUS/FNA may also be helpful to confirm suspicion for malignancy when pt is able to tolerate a procedure.

## 2019-05-30 NOTE — PROGRESS NOTE ADULT - ATTENDING COMMENTS
Patient seen and examined.  Case discussed with house staff.  Agree with above as edited.   Patient only c/o dry cough  #Septic Shock 2/2 Citrobacter and E. Coli bacteremia 2/2 cholangitis - d/w ID. D/C Meropenem after today.   #Pancreatic stricture - MRCP results reviewed and d/w GI and Surg Onc - Likely plan is for future EUS +/- ERCP followed by Surgical resection (likely in 2-3 weeks). d/w patient.  #SLOAN on CKD IV - secondary to ATN - now depending on HD - s/p permacath with Marina d/c'd   #NSTEMI, h/o CAD s/p PCI - Plan for pharm nuclear stress - c/w ASA. Holding Coreg 2/2 bradycardia. Restart crestor  #Cough - likely atelectasis - incentive spirometer given.  #Bradycardia - apprec. EP f/u. Likely 2/2 increased vagal tone 2/2 GI process - on tele. HR improving. Holding AV Caroline blockers.  #HTN - continue to hold ARB. Hold Coreg as above. Amlodipine resumed and now dose increased. If still hypertensive can resume hydralazine 25mg TID and uptitrate as needed  d/w ID, GI, Cards and surg onc.  Plan discussed with patient

## 2019-05-30 NOTE — PROGRESS NOTE ADULT - PROBLEM SELECTOR PLAN 3
Renal failure in setting of distributive shock   Pt w CKD hx, with SLOAN on CKD IV likely from sepsis   - HD as per renal through permacatklarissa, kalpana dutta d/roel on 5/29  - continue to monitor for renal recovery, still anuric   - no HD today as per renal, will do tomorrow 5/31

## 2019-05-30 NOTE — PROGRESS NOTE ADULT - ASSESSMENT
64 yo M hx of CAD s/p 9 stents, DMT2 on insulin, HTN, GERD, prior cholecystectomy,  diverticulosis, former smoker(30PY), biliary stricture s/p ERCP with sphincterotomy and stent 4/2019 who presented as transfer from [facility] for acute cholangitis s/p ERCP and stent CBD stent placement by GI on 5/19.Hospital course further complicated by Type II NSTEMI and bradycardia. Now on floors, resolved bradycardia, undergoing further imaging for biliary tree evaluation

## 2019-05-30 NOTE — PROGRESS NOTE ADULT - SUBJECTIVE AND OBJECTIVE BOX
[facility] DIVISION OF KIDNEY DISEASES AND HYPERTENSION -- FOLLOW UP NOTE  --------------------------------------------------------------------------------  HPI: 63-year-old male with medical history of CAD s/p 9 stents, DM, HTN, GERD, prior cholecystectomy,  diverticulosis, biliary stricture s/p ERCP with sphincteromy and stent 4/2019 who presents as transfer from [facility] where he was admitted with septic shock. Nephrology team is following for SLOAN on CKD and anuria. Pt. found to be in septic shock due to E Coli bacteremia was intubated for acute hypoxic respiratory failure and started on pressor support. He received vancomycin, zosyn, micafungin, merepenem and gentamicin initially. Also pt. developed NSTEMI and was started on heparin gtt with dobutamine due to concern for new LV dysfunction. Pt. with known history of CKD 3 secondary to DM and HTN, follows as outpatient with Dr Hodgson. As per pt's son SCr baseline 2.5-2.6 in March 2019 however states decrease to 1.9 in April 2019. On lab review of [facility] SCr elevated to 4.05 on 4/18/19 and decreased to 3.71 on 4/20/19. SCr elevated at 1.97 on 5/17/19. SCr elevated at 2.29 on 5/17/19 and further increased at 2.90 on 5/18/19. Pt. starting on CRRT on 5/18/19. CRRT was discontinued on 5/22/19 due to HD catheter malfunction. LIJ non-tunneled HD catheter was removed and replaced with left femoral non-tunneled HD catheter on 5/22/19. Pt. transitioned to intermittent HD on 5/24/19. Pt. underwent MRCP with Gadovist 5/28/19. Last HD treatment was performed yesterday, (2nd in series after being given Gadovist). Pt. had placement of RIJ tunneled HD catheter and subsequent removal of non-tunneled catheter on 5/29/19.    Patient seen and examined at bedside with wife present. Patient is lying flat in bed comfortably without NC O2. States he has not urinated, but has the urge to. Denies SOB, CP, abdominal pain, or headache.     PAST HISTORY  --------------------------------------------------------------------------------  No significant changes to PMH, PSH, FHx, SHx, unless otherwise noted    ALLERGIES & MEDICATIONS  --------------------------------------------------------------------------------  Allergies    Cipro (Rash)  Plavix (Rash)    Intolerances    Standing Inpatient Medications  acetaminophen  IVPB .. 1000 milliGRAM(s) IV Intermittent once  amLODIPine   Tablet 10 milliGRAM(s) Oral daily  buDESOnide  80 MICROgram(s)/formoterol 4.5 MICROgram(s) Inhaler 2 Puff(s) Inhalation two times a day  docusate sodium 100 milliGRAM(s) Oral three times a day  heparin  Injectable 5000 Unit(s) SubCutaneous every 8 hours  insulin glargine Injectable (LANTUS) 6 Unit(s) SubCutaneous at bedtime  insulin lispro (HumaLOG) corrective regimen sliding scale   SubCutaneous Before meals and at bedtime  insulin lispro Injectable (HumaLOG) 4 Unit(s) SubCutaneous three times a day before meals  meropenem  IVPB      meropenem  IVPB 500 milliGRAM(s) IV Intermittent every 12 hours  metoclopramide Injectable 10 milliGRAM(s) IV Push once  pantoprazole  Injectable 40 milliGRAM(s) IV Push daily  petrolatum Ophthalmic Ointment 1 Application(s) Both EYES two times a day  senna 1 Tablet(s) Oral at bedtime    REVIEW OF SYSTEMS  --------------------------------------------------------------------------------  Resp: no SOB  CV: no CP  GI: no abdominal pain  MSK: edema present.   : anuria    All other systems were reviewed and are negative, except as noted.    VITALS/PHYSICAL EXAM  --------------------------------------------------------------------------------  T(C): 37.6 (05-30-19 @ 05:10), Max: 37.7 (05-29-19 @ 19:43)  HR: 66 (05-30-19 @ 05:10) (65 - 89)  BP: 160/58 (05-30-19 @ 05:10) (131/60 - 177/74)  RR: 17 (05-30-19 @ 05:10) (17 - 18)  SpO2: 99% (05-30-19 @ 05:10) (94% - 100%)  Wt(kg): --    05-29-19 @ 07:01  -  05-30-19 @ 07:00  --------------------------------------------------------  IN: 400 mL / OUT: 1799 mL / NET: -1399 mL    Physical Exam:  	Gen: NAD  	HEENT: sclera anicteric   	Pulm: CTA b/l  	CV: S1S2  	Abd: Soft, +BS, urge to urinate when pressure applied suprapubically  	Ext: No edema B/L  	Skin: Warm and dry  	Vascular access: RIJ tunneled HD catheter site:  without bleeding  LABS/STUDIES  --------------------------------------------------------------------------------              8.8    11.22 >-----------<  257      [05-30-19 @ 05:58]              27.4     136  |  95  |  24  ----------------------------<  108      [05-30-19 @ 05:58]  4.2   |  24  |  4.36        Ca     8.2     [05-30-19 @ 05:58]      Mg     2.0     [05-30-19 @ 05:58]      Phos  4.4     [05-30-19 @ 05:58]    Creatinine Trend:  SCr 4.36 [05-30 @ 05:58]  SCr 4.64 [05-29 @ 05:19]  SCr 5.34 [05-28 @ 06:17]  SCr 8.23 [05-27 @ 15:40]  SCr 7.15 [05-27 @ 02:46]

## 2019-05-30 NOTE — PROGRESS NOTE ADULT - SUBJECTIVE AND OBJECTIVE BOX
Chief Complaint:  Patient is a 63y old  Male who presents with a chief complaint of Septic Shock (29 May 2019 15:58)      Interval Events:     Allergies:  Cipro (Rash)  Plavix (Rash)      Hospital Medications:  acetaminophen   Tablet .. 650 milliGRAM(s) Oral every 6 hours PRN  acetaminophen  IVPB .. 1000 milliGRAM(s) IV Intermittent once  ALBUTerol/ipratropium for Nebulization 3 milliLiter(s) Nebulizer every 6 hours PRN  amLODIPine   Tablet 10 milliGRAM(s) Oral daily  buDESOnide  80 MICROgram(s)/formoterol 4.5 MICROgram(s) Inhaler 2 Puff(s) Inhalation two times a day  docusate sodium 100 milliGRAM(s) Oral three times a day  heparin  Injectable 5000 Unit(s) SubCutaneous every 8 hours  HYDROcodone/homatropine Syrup 5 milliLiter(s) Oral every 6 hours PRN  insulin glargine Injectable (LANTUS) 6 Unit(s) SubCutaneous at bedtime  insulin lispro (HumaLOG) corrective regimen sliding scale   SubCutaneous Before meals and at bedtime  insulin lispro Injectable (HumaLOG) 4 Unit(s) SubCutaneous three times a day before meals  meropenem  IVPB      meropenem  IVPB 500 milliGRAM(s) IV Intermittent every 12 hours  metoclopramide Injectable 10 milliGRAM(s) IV Push once  pantoprazole  Injectable 40 milliGRAM(s) IV Push daily  petrolatum Ophthalmic Ointment 1 Application(s) Both EYES two times a day  senna 1 Tablet(s) Oral at bedtime      PMHX/PSHX:  Gout  Type II diabetes mellitus  HTN (hypertension)  CAD (coronary artery disease)  Cirrhosis  S/P cholecystectomy      Family history:      ROS: Negative, except as otherwise noted above    PHYSICAL EXAM:   Vital Signs:  Vital Signs Last 24 Hrs  T(C): 37.6 (30 May 2019 05:10), Max: 37.7 (29 May 2019 19:43)  T(F): 99.7 (30 May 2019 05:10), Max: 99.8 (29 May 2019 19:43)  HR: 66 (30 May 2019 05:10) (65 - 89)  BP: 160/58 (30 May 2019 05:10) (131/60 - 177/74)  BP(mean): --  RR: 17 (30 May 2019 05:10) (17 - 18)  SpO2: 99% (30 May 2019 05:10) (94% - 100%)  Daily     Daily     GENERAL: No acute distress    HEENT:  Anicteric, no thrush  CHEST:  Non-labored breathing, lungs clear b/l  HEART:  +s1, s2 heart sounds, no murmurs  ABDOMEN:    EXTREMITIES:  Warm and well perfused, no edema  SKIN:    NEURO:       LABS:  CBC Full  -  ( 30 May 2019 05:58 )  WBC Count : 11.22 K/uL  Hemoglobin : 8.8 g/dL  Hematocrit : 27.4 %  Platelet Count - Automated : 257 K/uL  Mean Cell Volume : 92.3 fL  Auto Neutrophil # : 7.53 K/uL  Auto Neutrophil % : 67.1 % Chief Complaint:  Patient is a 63y old  Male who presents with a chief complaint of Septic Shock (29 May 2019 15:58)      Interval Events: Patient complains of infiltrated IV/RUE discomfort. He denies abdominal pain, nausea, vomiting, fever, chills, melena, hematochezia, hematemesis.     Allergies:  Cipro (Rash)  Plavix (Rash)      Hospital Medications:  acetaminophen   Tablet .. 650 milliGRAM(s) Oral every 6 hours PRN  acetaminophen  IVPB .. 1000 milliGRAM(s) IV Intermittent once  ALBUTerol/ipratropium for Nebulization 3 milliLiter(s) Nebulizer every 6 hours PRN  amLODIPine   Tablet 10 milliGRAM(s) Oral daily  buDESOnide  80 MICROgram(s)/formoterol 4.5 MICROgram(s) Inhaler 2 Puff(s) Inhalation two times a day  docusate sodium 100 milliGRAM(s) Oral three times a day  heparin  Injectable 5000 Unit(s) SubCutaneous every 8 hours  HYDROcodone/homatropine Syrup 5 milliLiter(s) Oral every 6 hours PRN  insulin glargine Injectable (LANTUS) 6 Unit(s) SubCutaneous at bedtime  insulin lispro (HumaLOG) corrective regimen sliding scale   SubCutaneous Before meals and at bedtime  insulin lispro Injectable (HumaLOG) 4 Unit(s) SubCutaneous three times a day before meals  meropenem  IVPB      meropenem  IVPB 500 milliGRAM(s) IV Intermittent every 12 hours  metoclopramide Injectable 10 milliGRAM(s) IV Push once  pantoprazole  Injectable 40 milliGRAM(s) IV Push daily  petrolatum Ophthalmic Ointment 1 Application(s) Both EYES two times a day  senna 1 Tablet(s) Oral at bedtime      PMHX/PSHX:  Gout  Type II diabetes mellitus  HTN (hypertension)  CAD (coronary artery disease)  Cirrhosis  S/P cholecystectomy      Family history:      ROS: Negative, except as otherwise noted above    PHYSICAL EXAM:   Vital Signs:  Vital Signs Last 24 Hrs  T(C): 37.6 (30 May 2019 05:10), Max: 37.7 (29 May 2019 19:43)  T(F): 99.7 (30 May 2019 05:10), Max: 99.8 (29 May 2019 19:43)  HR: 66 (30 May 2019 05:10) (65 - 89)  BP: 160/58 (30 May 2019 05:10) (131/60 - 177/74)  BP(mean): --  RR: 17 (30 May 2019 05:10) (17 - 18)  SpO2: 99% (30 May 2019 05:10) (94% - 100%)  Daily     Daily     GENERAL: No acute distress, lying in bed   HEENT:  Anicteric  CHEST:  Non-labored breathing   ABDOMEN:  Soft, nontender to palpation, no rebound, no guarding  EXTREMITIES:  Warm and well perfused  NEURO:   Awake, interactive, following commands    LABS:  CBC Full  -  ( 30 May 2019 05:58 )  WBC Count : 11.22 K/uL  Hemoglobin : 8.8 g/dL  Hematocrit : 27.4 %  Platelet Count - Automated : 257 K/uL  Mean Cell Volume : 92.3 fL  Auto Neutrophil # : 7.53 K/uL  Auto Neutrophil % : 67.1 %

## 2019-05-30 NOTE — PROGRESS NOTE ADULT - PROBLEM SELECTOR PLAN 1
from acute cholangitis in setting of CBD stricture w ERCP/stent placement, w ecoli and citrobacter bacteremia. s/p repeat ERCP with stent in CBD.  - MICU downgraded, was extubated on 5/24, was on levo and dopamine drip.  - on meropenem, repeat cx (-) so far, meropenem until 5/30, discontinue today  - repeat MRCP to assess biliary tree, showing mod intrahepatic biliary duct dilation tht's improved, extrahepatic duct dilation to 2cm, narrowing of CBD w new cbd stent. no mass in the region of narrowing.    - wbc trending down  - ID consulted, mykel until 5/30, will d/c today

## 2019-05-31 LAB
ALBUMIN SERPL ELPH-MCNC: 2.5 G/DL — LOW (ref 3.3–5)
ALP SERPL-CCNC: 91 U/L — SIGNIFICANT CHANGE UP (ref 40–120)
ALT FLD-CCNC: 17 U/L — SIGNIFICANT CHANGE UP (ref 4–41)
ANION GAP SERPL CALC-SCNC: 15 MMO/L — HIGH (ref 7–14)
AST SERPL-CCNC: 27 U/L — SIGNIFICANT CHANGE UP (ref 4–40)
BASOPHILS # BLD AUTO: 0.03 K/UL — SIGNIFICANT CHANGE UP (ref 0–0.2)
BASOPHILS NFR BLD AUTO: 0.3 % — SIGNIFICANT CHANGE UP (ref 0–2)
BILIRUB SERPL-MCNC: 1 MG/DL — SIGNIFICANT CHANGE UP (ref 0.2–1.2)
BUN SERPL-MCNC: 33 MG/DL — HIGH (ref 7–23)
CALCIUM SERPL-MCNC: 7.8 MG/DL — LOW (ref 8.4–10.5)
CHLORIDE SERPL-SCNC: 97 MMOL/L — LOW (ref 98–107)
CO2 SERPL-SCNC: 24 MMOL/L — SIGNIFICANT CHANGE UP (ref 22–31)
CREAT SERPL-MCNC: 6.56 MG/DL — HIGH (ref 0.5–1.3)
EOSINOPHIL # BLD AUTO: 0.33 K/UL — SIGNIFICANT CHANGE UP (ref 0–0.5)
EOSINOPHIL NFR BLD AUTO: 3.1 % — SIGNIFICANT CHANGE UP (ref 0–6)
FERRITIN SERPL-MCNC: 640.4 NG/ML — HIGH (ref 30–400)
GLUCOSE BLDC GLUCOMTR-MCNC: 153 MG/DL — HIGH (ref 70–99)
GLUCOSE BLDC GLUCOMTR-MCNC: 76 MG/DL — SIGNIFICANT CHANGE UP (ref 70–99)
GLUCOSE BLDC GLUCOMTR-MCNC: 78 MG/DL — SIGNIFICANT CHANGE UP (ref 70–99)
GLUCOSE BLDC GLUCOMTR-MCNC: 80 MG/DL — SIGNIFICANT CHANGE UP (ref 70–99)
GLUCOSE BLDC GLUCOMTR-MCNC: 84 MG/DL — SIGNIFICANT CHANGE UP (ref 70–99)
GLUCOSE BLDC GLUCOMTR-MCNC: 86 MG/DL — SIGNIFICANT CHANGE UP (ref 70–99)
GLUCOSE SERPL-MCNC: 94 MG/DL — SIGNIFICANT CHANGE UP (ref 70–99)
HCT VFR BLD CALC: 24.4 % — LOW (ref 39–50)
HCT VFR BLD CALC: 25.2 % — LOW (ref 39–50)
HGB BLD-MCNC: 7.8 G/DL — LOW (ref 13–17)
HGB BLD-MCNC: 8.2 G/DL — LOW (ref 13–17)
IMM GRANULOCYTES NFR BLD AUTO: 0.7 % — SIGNIFICANT CHANGE UP (ref 0–1.5)
IRON SATN MFR SERPL: 175 UG/DL — SIGNIFICANT CHANGE UP (ref 155–535)
IRON SATN MFR SERPL: 47 UG/DL — SIGNIFICANT CHANGE UP (ref 45–165)
LYMPHOCYTES # BLD AUTO: 1.69 K/UL — SIGNIFICANT CHANGE UP (ref 1–3.3)
LYMPHOCYTES # BLD AUTO: 15.8 % — SIGNIFICANT CHANGE UP (ref 13–44)
MAGNESIUM SERPL-MCNC: 2 MG/DL — SIGNIFICANT CHANGE UP (ref 1.6–2.6)
MCHC RBC-ENTMCNC: 29.4 PG — SIGNIFICANT CHANGE UP (ref 27–34)
MCHC RBC-ENTMCNC: 29.8 PG — SIGNIFICANT CHANGE UP (ref 27–34)
MCHC RBC-ENTMCNC: 32 % — SIGNIFICANT CHANGE UP (ref 32–36)
MCHC RBC-ENTMCNC: 32.5 % — SIGNIFICANT CHANGE UP (ref 32–36)
MCV RBC AUTO: 91.6 FL — SIGNIFICANT CHANGE UP (ref 80–100)
MCV RBC AUTO: 92.1 FL — SIGNIFICANT CHANGE UP (ref 80–100)
MONOCYTES # BLD AUTO: 1.43 K/UL — HIGH (ref 0–0.9)
MONOCYTES NFR BLD AUTO: 13.3 % — SIGNIFICANT CHANGE UP (ref 2–14)
NEUTROPHILS # BLD AUTO: 7.18 K/UL — SIGNIFICANT CHANGE UP (ref 1.8–7.4)
NEUTROPHILS NFR BLD AUTO: 66.8 % — SIGNIFICANT CHANGE UP (ref 43–77)
NRBC # FLD: 0 K/UL — SIGNIFICANT CHANGE UP (ref 0–0)
NRBC # FLD: 0.02 K/UL — SIGNIFICANT CHANGE UP (ref 0–0)
PHOSPHATE SERPL-MCNC: 5.8 MG/DL — HIGH (ref 2.5–4.5)
PLATELET # BLD AUTO: 245 K/UL — SIGNIFICANT CHANGE UP (ref 150–400)
PLATELET # BLD AUTO: 259 K/UL — SIGNIFICANT CHANGE UP (ref 150–400)
PMV BLD: 10.2 FL — SIGNIFICANT CHANGE UP (ref 7–13)
PMV BLD: 10.8 FL — SIGNIFICANT CHANGE UP (ref 7–13)
POTASSIUM SERPL-MCNC: 3.7 MMOL/L — SIGNIFICANT CHANGE UP (ref 3.5–5.3)
POTASSIUM SERPL-SCNC: 3.7 MMOL/L — SIGNIFICANT CHANGE UP (ref 3.5–5.3)
PROT SERPL-MCNC: 5.4 G/DL — LOW (ref 6–8.3)
RBC # BLD: 2.65 M/UL — LOW (ref 4.2–5.8)
RBC # BLD: 2.75 M/UL — LOW (ref 4.2–5.8)
RBC # FLD: 15.6 % — HIGH (ref 10.3–14.5)
RBC # FLD: 15.9 % — HIGH (ref 10.3–14.5)
SODIUM SERPL-SCNC: 136 MMOL/L — SIGNIFICANT CHANGE UP (ref 135–145)
UIBC SERPL-MCNC: 128.1 UG/DL — SIGNIFICANT CHANGE UP (ref 110–370)
WBC # BLD: 10.73 K/UL — HIGH (ref 3.8–10.5)
WBC # BLD: 9.86 K/UL — SIGNIFICANT CHANGE UP (ref 3.8–10.5)
WBC # FLD AUTO: 10.73 K/UL — HIGH (ref 3.8–10.5)
WBC # FLD AUTO: 9.86 K/UL — SIGNIFICANT CHANGE UP (ref 3.8–10.5)

## 2019-05-31 PROCEDURE — 99232 SBSQ HOSP IP/OBS MODERATE 35: CPT

## 2019-05-31 PROCEDURE — ZZZZZ: CPT

## 2019-05-31 PROCEDURE — 99233 SBSQ HOSP IP/OBS HIGH 50: CPT | Mod: GC

## 2019-05-31 PROCEDURE — 90935 HEMODIALYSIS ONE EVALUATION: CPT | Mod: GC

## 2019-05-31 RX ORDER — CHLORHEXIDINE GLUCONATE 213 G/1000ML
1 SOLUTION TOPICAL
Refills: 0 | Status: DISCONTINUED | OUTPATIENT
Start: 2019-05-31 | End: 2019-06-08

## 2019-05-31 RX ORDER — INSULIN LISPRO 100/ML
VIAL (ML) SUBCUTANEOUS AT BEDTIME
Refills: 0 | Status: DISCONTINUED | OUTPATIENT
Start: 2019-05-31 | End: 2019-06-08

## 2019-05-31 RX ORDER — INSULIN LISPRO 100/ML
VIAL (ML) SUBCUTANEOUS
Refills: 0 | Status: DISCONTINUED | OUTPATIENT
Start: 2019-05-31 | End: 2019-06-08

## 2019-05-31 RX ORDER — ACETAMINOPHEN 500 MG
650 TABLET ORAL ONCE
Refills: 0 | Status: COMPLETED | OUTPATIENT
Start: 2019-05-31 | End: 2019-05-31

## 2019-05-31 RX ORDER — ASPIRIN/CALCIUM CARB/MAGNESIUM 324 MG
81 TABLET ORAL DAILY
Refills: 0 | Status: DISCONTINUED | OUTPATIENT
Start: 2019-05-31 | End: 2019-06-08

## 2019-05-31 RX ADMIN — HEPARIN SODIUM 5000 UNIT(S): 5000 INJECTION INTRAVENOUS; SUBCUTANEOUS at 05:14

## 2019-05-31 RX ADMIN — Medication 81 MILLIGRAM(S): at 14:59

## 2019-05-31 RX ADMIN — BUDESONIDE AND FORMOTEROL FUMARATE DIHYDRATE 2 PUFF(S): 160; 4.5 AEROSOL RESPIRATORY (INHALATION) at 10:29

## 2019-05-31 RX ADMIN — Medication 650 MILLIGRAM(S): at 09:01

## 2019-05-31 RX ADMIN — Medication 650 MILLIGRAM(S): at 21:12

## 2019-05-31 RX ADMIN — HEPARIN SODIUM 5000 UNIT(S): 5000 INJECTION INTRAVENOUS; SUBCUTANEOUS at 14:59

## 2019-05-31 RX ADMIN — BUDESONIDE AND FORMOTEROL FUMARATE DIHYDRATE 2 PUFF(S): 160; 4.5 AEROSOL RESPIRATORY (INHALATION) at 20:11

## 2019-05-31 RX ADMIN — CHLORHEXIDINE GLUCONATE 1 APPLICATION(S): 213 SOLUTION TOPICAL at 10:34

## 2019-05-31 RX ADMIN — Medication 650 MILLIGRAM(S): at 06:14

## 2019-05-31 RX ADMIN — Medication 650 MILLIGRAM(S): at 09:35

## 2019-05-31 RX ADMIN — PANTOPRAZOLE SODIUM 40 MILLIGRAM(S): 20 TABLET, DELAYED RELEASE ORAL at 15:01

## 2019-05-31 RX ADMIN — HEPARIN SODIUM 5000 UNIT(S): 5000 INJECTION INTRAVENOUS; SUBCUTANEOUS at 20:13

## 2019-05-31 RX ADMIN — ATORVASTATIN CALCIUM 40 MILLIGRAM(S): 80 TABLET, FILM COATED ORAL at 20:13

## 2019-05-31 RX ADMIN — Medication 650 MILLIGRAM(S): at 05:14

## 2019-05-31 RX ADMIN — Medication 650 MILLIGRAM(S): at 20:12

## 2019-05-31 RX ADMIN — ERYTHROPOIETIN 4000 UNIT(S): 10000 INJECTION, SOLUTION INTRAVENOUS; SUBCUTANEOUS at 08:55

## 2019-05-31 NOTE — PROGRESS NOTE ADULT - SUBJECTIVE AND OBJECTIVE BOX
Ella Suyeon Yu PGY-1   [facility] Pager # [phone]  NS Pager # [phone]      Patient is a 63y old  Male who presents with a chief complaint of Septic Shock (30 May 2019 16:01)      SUBJECTIVE: No acute events overnight. Patient seen and examined at bedside.    REVIEW OF SYSTEMS:  CONSTITUTIONAL: No fever, weight loss, or fatigue  RESPIRATORY: No cough or shortness of breath  CARDIOVASCULAR: No chest pain, palpitations, dizziness, or leg swelling  GASTROINTESTINAL: No abdominal or epigastric pain. No nausea, vomiting, or hematemesis; No diarrhea or constipation. No melena or hematochezia.  GENITOURINARY: No dysuria  NEUROLOGICAL: No headaches  SKIN: No itching or lesions       MEDICATIONS  (STANDING):  acetaminophen  IVPB .. 1000 milliGRAM(s) IV Intermittent once  amLODIPine   Tablet 10 milliGRAM(s) Oral daily  atorvastatin 40 milliGRAM(s) Oral at bedtime  buDESOnide  80 MICROgram(s)/formoterol 4.5 MICROgram(s) Inhaler 2 Puff(s) Inhalation two times a day  chlorhexidine 4% Liquid 1 Application(s) Topical <User Schedule>  docusate sodium 100 milliGRAM(s) Oral three times a day  epoetin lamin Injectable 4000 Unit(s) IV Push <User Schedule>  heparin  Injectable 5000 Unit(s) SubCutaneous every 8 hours  insulin lispro (HumaLOG) corrective regimen sliding scale   SubCutaneous Before meals and at bedtime  metoclopramide Injectable 10 milliGRAM(s) IV Push once  pantoprazole  Injectable 40 milliGRAM(s) IV Push daily  petrolatum Ophthalmic Ointment 1 Application(s) Both EYES two times a day  senna 1 Tablet(s) Oral at bedtime    MEDICATIONS  (PRN):  acetaminophen   Tablet .. 650 milliGRAM(s) Oral every 6 hours PRN Mild Pain (1 - 3), Moderate Pain (4 - 6), Severe Pain (7 - 10)  ALBUTerol/ipratropium for Nebulization 3 milliLiter(s) Nebulizer every 6 hours PRN Shortness of Breath and/or Wheezing  HYDROcodone/homatropine Syrup 5 milliLiter(s) Oral every 6 hours PRN Cough        Objective:    Vitals: Vital Signs Last 24 Hrs  T(C): 37.3 (05-31-19 @ 06:40), Max: 37.3 (05-31-19 @ 06:40)  T(F): 99.2 (05-31-19 @ 06:40), Max: 99.2 (05-31-19 @ 06:40)  HR: 64 (05-31-19 @ 06:40) (63 - 72)  BP: 141/72 (05-31-19 @ 06:40) (141/72 - 153/55)  BP(mean): --  RR: 18 (05-31-19 @ 06:40) (17 - 20)  SpO2: 98% (05-31-19 @ 05:01) (93% - 99%)            I&O's Summary      PHYSICAL EXAM:  GENERAL: NAD  HEAD:  Atraumatic, Normocephalic  CHEST/LUNG: Clear to auscultation bilaterally; No rales or wheezing  HEART: Regular rate and rhythm; No murmurs, rubs, or gallops  ABDOMEN: Soft, nontender, nondistended  EXTREMITIES:  No clubbing, cyanosis, or edema  LYMPH: No lymphadenopathy noted  SKIN: No rashes or lesions  NERVOUS SYSTEM:  Alert & Oriented X3    LABS:  05-30    136  |  95<L>  |  24<H>  ----------------------------<  108<H>  4.2   |  24  |  4.36<H>  05-29    140  |  99  |  28<H>  ----------------------------<  125<H>  3.9   |  23  |  4.64<H>    Ca    8.2<L>      30 May 2019 05:58  Ca    8.4      29 May 2019 05:19  Phos  4.4     05-30  Mg     2.0     05-30    TPro  5.9<L>  /  Alb  2.5<L>  /  TBili  1.4<H>  /  DBili  x   /  AST  27  /  ALT  22  /  AlkPhos  113  05-30  TPro  6.0  /  Alb  2.9<L>  /  TBili  1.3<H>  /  DBili  x   /  AST  28  /  ALT  32  /  AlkPhos  130<H>  05-29                                            8.8    11.22 )-----------( 257      ( 30 May 2019 05:58 )             27.4                         9.2    12.62 )-----------( 242      ( 29 May 2019 05:19 )             29.0     CAPILLARY BLOOD GLUCOSE      POCT Blood Glucose.: 78 mg/dL (31 May 2019 05:20)  POCT Blood Glucose.: 77 mg/dL (30 May 2019 21:14)  POCT Blood Glucose.: 103 mg/dL (30 May 2019 17:52)  POCT Blood Glucose.: 91 mg/dL (30 May 2019 13:43)  POCT Blood Glucose.: 119 mg/dL (30 May 2019 08:37)    RADIOLOGY:  Imaging Personally Reviewed: YES      Consultants involved in case: YES  Consultant(s) Notes Reviewed:  YES  Care Discussed with Consultants/Other Providers     Ella Suyeon Yu PGY-1

## 2019-05-31 NOTE — PROGRESS NOTE ADULT - ASSESSMENT
63M w/ hx of CAD, DM, HTN, GERD, diverticulosis, hx of cholecystectomy, biliary stricture s/p ERCP w/ sphincterotomy and stent in April of 2019, transferred from OSH after p/w acute cholangitis, now at [facility], with CTAP showing CBD dilatation, s/p ERCP w/ stent placed, and MRCP showing CBD stricture at the lvl of pancreatic head w/o noted mass.    Plan:  - EGD with EUS when stable; GI following  - Will evaluate for surgical intervention, likely pancreaticoduodenectomy, in next several weeks    Ger Ledesma, PGY2  [id number]

## 2019-05-31 NOTE — PROGRESS NOTE ADULT - PROBLEM SELECTOR PLAN 5
- Distributive shock in setting of E coli/ Citerobacter bacteremia   - Likely had type II NSTEMI, trops peaked ~2000s, downtrended  -EP and cards following -no interventions warranted at this time   - echo with severe global LV dysfunction, cards following  - pharm nuc stress test pending

## 2019-05-31 NOTE — PROGRESS NOTE ADULT - PROBLEM SELECTOR PLAN 1
from acute cholangitis in setting of CBD stricture w ERCP/stent placement, w ecoli and citrobacter bacteremia. s/p repeat ERCP with stent in CBD.  - finished full course of meropenem per ID recs  - repeat MRCP to assess biliary tree, showing mod intrahepatic biliary duct dilation tht's improved, extrahepatic duct dilation to 2cm, narrowing of CBD w new cbd stent. no mass in the region of narrowing.    - wbc trending down

## 2019-05-31 NOTE — PROGRESS NOTE ADULT - SUBJECTIVE AND OBJECTIVE BOX
Patient seen and examined at bedside.    Overnight Events:   Improving overall    REVIEW OF SYSTEMS:  CONSTITUTIONAL: No weakness, fevers or chills  EYES/ENT: No visual changes;  No dysphagia  NECK: No pain or stiffness  RESPIRATORY: No cough, wheezing, hemoptysis; No shortness of breath  CARDIOVASCULAR: No chest pain or palpitations; No lower extremity edema  GASTROINTESTINAL: No abdominal or epigastric pain. No nausea, vomiting, or hematemesis; No diarrhea or constipation. No melena or hematochezia.  BACK: No back pain  GENITOURINARY: No dysuria, frequency or hematuria  NEUROLOGICAL: No numbness or weakness  SKIN: No itching, burning, rashes, or lesions   All other review of systems is negative unless indicated above.            Current Meds:  acetaminophen   Tablet .. 650 milliGRAM(s) Oral every 6 hours PRN  acetaminophen  IVPB .. 1000 milliGRAM(s) IV Intermittent once  ALBUTerol/ipratropium for Nebulization 3 milliLiter(s) Nebulizer every 6 hours PRN  amLODIPine   Tablet 10 milliGRAM(s) Oral daily  aspirin enteric coated 81 milliGRAM(s) Oral daily  atorvastatin 40 milliGRAM(s) Oral at bedtime  buDESOnide  80 MICROgram(s)/formoterol 4.5 MICROgram(s) Inhaler 2 Puff(s) Inhalation two times a day  chlorhexidine 4% Liquid 1 Application(s) Topical <User Schedule>  docusate sodium 100 milliGRAM(s) Oral three times a day  epoetin lamin Injectable 4000 Unit(s) IV Push <User Schedule>  heparin  Injectable 5000 Unit(s) SubCutaneous every 8 hours  HYDROcodone/homatropine Syrup 5 milliLiter(s) Oral every 6 hours PRN  insulin lispro (HumaLOG) corrective regimen sliding scale   SubCutaneous three times a day before meals  insulin lispro (HumaLOG) corrective regimen sliding scale   SubCutaneous at bedtime  metoclopramide Injectable 10 milliGRAM(s) IV Push once  pantoprazole  Injectable 40 milliGRAM(s) IV Push daily  petrolatum Ophthalmic Ointment 1 Application(s) Both EYES two times a day  senna 1 Tablet(s) Oral at bedtime      Vitals:  T(F): 98.3 (05-31), Max: 99.2 (05-31)  HR: 63 (05-31) (63 - 72)  BP: 141/72 (05-31) (141/72 - 149/60)  RR: 18 (05-31)  SpO2: 99% (05-31)  I&O's Summary      Physical Exam:  Appearance: No acute distress; well appearing  Eyes: PERRL, EOMI, pink conjunctiva  HENT: Normal oral mucosa  Cardiovascular: RRR, S1, S2, no murmurs, rubs, or gallops; no edema; no JVD  Respiratory: Clear to auscultation bilaterally  Gastrointestinal: soft, non-tender, non-distended with normal bowel sounds  Musculoskeletal: No clubbing; no joint deformity   Neurologic: Non-focal  Lymphatic: No lymphadenopathy  Psychiatry: AAOx3, mood & affect appropriate  Skin: No rashes, ecchymoses, or cyanosis                          7.8    10.73 )-----------( 259      ( 31 May 2019 06:45 )             24.4     05-31    136  |  97<L>  |  33<H>  ----------------------------<  94  3.7   |  24  |  6.56<H>    Ca    7.8<L>      31 May 2019 06:45  Phos  5.8     05-31  Mg     2.0     05-31    TPro  5.4<L>  /  Alb  2.5<L>  /  TBili  1.0  /  DBili  x   /  AST  27  /  ALT  17  /  AlkPhos  91  05-31                  New ECG(s): Personally reviewed    Echo:    Stress Testing:     Cath:    New Imaging:    Interpretation of Telemetry:

## 2019-05-31 NOTE — PROGRESS NOTE ADULT - SUBJECTIVE AND OBJECTIVE BOX
D Team Surgery Consult - Daily Progress Note    SUBJECTIVE:  Doing well.   No overnight events.   Tolerating diet without pain or nausea.     OBJECTIVE:     ** VITAL SIGNS / I&O's **    T(C): 37.3 (05-31-19 @ 06:40), Max: 37.3 (05-31-19 @ 06:40)  T(F): 99.2 (05-31-19 @ 06:40), Max: 99.2 (05-31-19 @ 06:40)  HR: 64 (05-31-19 @ 06:40) (63 - 72)  BP: 141/72 (05-31-19 @ 06:40) (141/72 - 153/55)  RR: 18 (05-31-19 @ 06:40) (17 - 20)  SpO2: 98% (05-31-19 @ 05:01) (93% - 99%)      ** PHYSICAL EXAM **    -- CONSTITUTIONAL: AOx3. NAD. No jaundice. Sclera nonicteric.   -- CARDIOVASCULAR: normotensive, regular rate   -- RESPIRATORY: breathing comfortably   -- ABDOMEN: soft, nontender, nondistended   -- EXTREMITIES: warm, well perfused   -- NEUROLOGICAL: motor and sensation symmetric     ** LABS **                 7.8    10.73  )----------(  259       ( 31 May 2019 06:45 )               24.4        **RADS**  < from: MR MRCP w/wo IV Cont (05.28.19 @ 14:13) >    EXAM:  MR MRCP WAW IC      FINDINGS:    LOWER CHEST: Moderate bilateral pleural effusion, right greater than left.    LIVER: Within normal limits.   BILE DUCTS: There is moderate intrahepatic and severe extrahepatic   biliary ductal dilatation. The extrahepatic bile duct measures 2.0 cm,   and narrows abruptly at the level of the pancreatic head. There is now   contiguous fluid signal from the dilated CBD to the level of the ampulla,   likely related to intervally placed biliary ductal stent. The coiled   proximal end of the stent is visualized in the mid CBD.  GALLBLADDER: Surgically removed.  SPLEEN: Within normal limits.  PANCREAS: Normal in signal intensity and enhancement. The pancreatic duct   is normal in caliber. No restricted diffusion. No evidence of pancreatic   mass.  ADRENALS: Within normal limits.  KIDNEYS/URETERS: Right lower pole renal cyst. No hydronephrosis.    VISUALIZED PORTIONS:    BOWEL: Within normal limits. Diverticulosis.  PERITONEUM: No ascites.  VESSELS: Within normal limits.  RETROPERITONEUM: No lymphadenopathy.    ABDOMINAL WALL: Superficial edema in the bilateral flanks.  BONES: Within normal limits.    IMPRESSION:   *  Moderate intrahepatic biliary ductal dilatation, improved compared to   prior exam.  *  Extrahepatic biliary ductal measuring up to 2.0 cm is also improved.   *  Abrupt narrowing of the CBD at the level of the pancreatic head with   evidenceof new CBD stent since prior examination 4/11/2019. No   identifiable mass in the region of abrupt narrowing.    ROSIE BOUDREAUX M.D., ATTENDING RADIOLOGIST  This document has been electronically signed. May 28 2019  3:24PM      < end of copied text >    < from: CT Abdomen and Pelvis No Cont (05.19.19 @ 14:07) >    EXAM:  CT ABDOMEN AND PELVIS        PROCEDURE DATE:  May 19 2019     FINDINGS:    LOWER CHEST: Small bilateral pleural effusions with bilateral lower lobe   partial compressive atelectasis. Coronary arterial calcification.    LIVER: Within normal limits.  BILE DUCTS: Internal common bile duct stent in place, with proximal tip   in the proximal CBD and distal tip near the ampulla of vater in the 2nd   portion of the duodenum. There is dilatation of the common hepatic duct   to 1.6 cm, as well as intrahepatic biliary ductal dilatation,   particularly involving the left lobe.  GALLBLADDER: Cholecystectomy  SPLEEN: Within normal limits.  PANCREAS: Within normal limits.  ADRENALS: Within normal limits.  KIDNEYS/URETERS: Right parapelvic cyst as seen on recent ultrasound. No   calculior hydronephrosis.    BLADDER: Collapsed around Rodriguez catheter..  REPRODUCTIVE ORGANS: Prostate within normal limits.    BOWEL: No bowel obstruction. Appendix is normal. Mild colonic   diverticulosis without evidence of acute diverticulitis.  PERITONEUM: Small volume ascites.  VESSELS:  Atherosclerotic calcification.  RETROPERITONEUM: No lymphadenopathy.    ABDOMINAL WALL: Diffuse subcutaneous edema.  BONES: Multilevel degenerative changes.     IMPRESSION: Extremely limited noncontrast examination.    CBD stent is in place, with the proximal portion in the proximal CBD and   distal tip in the second portion of the duodenum. There is dilatation of   the common hepatic duct to 1.6 cm and intrahepatic biliary ductal   dilatation, particularly involving the left lobe.    Small bilateral pleural effusions with bilateral lower lobe partial   compressive atelectasis.    ARNOLD STYLES M.D., RADIOLOGY RESIDENT  This document has been electronically signed.  CLIFFORD PANDEY M.D., ATTENDING RADIOLOGIST  This document has been electronically signed. May 19 2019  4:45PM      < end of copied text >    < from: US Abdomen Complete (05.18.19 @ 12:29) >    EXAM:  US ABDOMINAL COMPLETE                          PROCEDURE DATE:  05/18/2019      FINDINGS:    Liver: Slightly heterogeneous in echotexture.    Bile ducts: There is intrahepatic biliary ductal dilatation within the   left hepatic lobe. Proximal, bile duct measures 13 mm. Distal CBD is not   well-visualized    Gallbladder: Not visualized.        Pancreas: Not visualized due to overlying bowel gas.    Spleen: 8.2 cm. Within normal limits.    Right kidney: 9.9 cm. No hydronephrosis. 2.5 cm parapelvic cyst.    Left kidney: 10.2 cm.  No hydronephrosis.    Ascites: None.    Aorta and IVC: Visualized portionsare within normal limits.    Right pleural effusion partially visualized.    IMPRESSION:     Heterogeneous liver with mild biliary ductal dilatation within the left   hepatic lobe. Dilatation of the visualized proximal common bile duct.   Distal CBD is not well-visualized. MRI/MRCP may be obtained for further   evaluation.

## 2019-05-31 NOTE — PROGRESS NOTE ADULT - PROBLEM SELECTOR PLAN 3
Renal failure in setting of distributive shock   Pt w CKD hx, with SLOAN on CKD IV likely from sepsis   - HD as per renal through permacath, kalpana dutta d/roel on 5/29  - continue to monitor for renal recovery, still anuric   - HD last session 5/30

## 2019-05-31 NOTE — PROGRESS NOTE ADULT - ATTENDING COMMENTS
Patient seen and examined.  Case discussed with house staff.  Agree with above as edited.   #Pancreatic stricture - MRCP results reviewed and d/w GI and Surg Onc - Likely plan is for future EUS +/- ERCP followed by Surgical resection (likely in 2-3 weeks). d/w patient.  #SLOAN on CKD IV - secondary to ATN - now depending on HD - s/p permacath with Shiley d/c'd   #NSTEMI, h/o CAD s/p PCI - Plan for pharm nuclear stress today. d/w Cards - c/w ASA and statin. Holding Coreg 2/2 bradycardia.  #Anemia - multifactorial - Drop in h/h today. No melena or BRBPR. Small eccyhmoses on RUE. Repeat h/h today.  #Cough - likely atelectasis - incentive spirometer given with improvement.  #Bradycardia - Likely 2/2 increased vagal tone 2/2 GI process - on tele. HR improved. Continue holding AV Caroline blockers.  #HTN - continue to hold ARB. Holding Coreg as above. Amlodipine resumed. If still hypertensive can resume hydralazine 25mg TID and uptitrate as needed  #Septic Shock 2/2 Citrobacter and E. Coli bacteremia 2/2 cholangitis - Shock and sepsis resolved. Now off abx.  d/w ID, GI, Cards and surg onc.  Plan discussed with patient

## 2019-05-31 NOTE — PROGRESS NOTE ADULT - ASSESSMENT
64 yo M hx of CAD s/p 9 stents (unclear when or anatomy of stent), DMT2 on insulin, HTN, GERD, prior cholecystectomy,  diverticulosis, former smoker(30PY), biliary stricture s/p ERCP with sphincterotomy and stent 4/2019 who presented as transfer from [facility] for acute cholangitis s/p ERCP and stent CBD stent placement by GI on 5/19.Hospital course further complicated by Type II NSTEMI and bradycardia. Course complicated by bradycardia now off dopamine.    Significantly elevated biomarkers, even in the setting of SLOAN.  Likely large type II event, with probable underlying multi-vessel CAD.    #NSTEMI:  -Pharm stress with fixed apical and inferior defects    #Pre-operative optimization:  Patient requiring EUS and biopsy  Patient is at elevated cardiac risk due to likely ischemic cardiomyopathy and recent NSTEMI  Patient is no longer in any active CHF, ACS, or arrythmias and has a stress test that is of low risk with primarily fixed defects.  No further immediate cardiac optimization required prior to EUS/biopsy.    Will need cardiac cath at some point given NSTEMI and decreased EF.    For all cardiology related questions, please call the current cardiology fellow on service at this time.  ** Please go to [email or web address] ; login: SCRM (case-sensitive) **    Darío Singh MD  Department of Cardiovascular Disease

## 2019-05-31 NOTE — PROGRESS NOTE ADULT - PROBLEM SELECTOR PLAN 2
- Acute cholangitis in setting of CBD stricture and recent ERCP with stent placement. 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 no mass in the region of narrowing.    - patient was offered a whipple procedure at OSH, for 5/30  - surg onc consulted for evaluation, will get whipple in the next 2-3weeks.  - aracelis likely need EUS/ERCP repeat if patient can tolerate.

## 2019-05-31 NOTE — PROGRESS NOTE ADULT - PROBLEM SELECTOR PLAN 1
Pt. with oliguric SLOAN on CKD in the setting of sepsis, hypotension and NSTEMI. On lab review of [facility], SCr elevated at 4.05 on 4/18/19 and decreased at 3.71 on 4/20/19. SCr elevated at 1.97 on 5/17/19. SCr elevated at 2.29 on 5/17/19 and further increased to 2.90 on 5/18/19. Pt. with likely severe ATN. Pt. initiated on CRRT on 5/18/19, discontinued on 5/22/19 due to HD catheter malfunction. Pt. transitioned to intermittent HD on 5/24/19, last treatment on 5/29/19. Pt. had replacement of non-tunneled HD catheter with IR placed RIJ tunneled HD catheter on 5/29/19. Labs reviewed from today. Pt. had one episode of urination yesterday. Pt. tolerating HD treatment today. Plan for next HD treatment on 6/3/19. Monitor UOP and daily weights. Avoid any potential nephrotoxins

## 2019-05-31 NOTE — PROGRESS NOTE ADULT - PROBLEM SELECTOR PLAN 6
Pt w hx of HTN, holding all HTN meds   - his home med is coreg, amlodipine-valsartan and hydralazine  - amlodipine 10   - BP 140s, will monitor, if increasing, will add hydral  - hold ACE for renal injury, coreg for bradycardia

## 2019-05-31 NOTE — PROGRESS NOTE ADULT - SUBJECTIVE AND OBJECTIVE BOX
[facility] DIVISION OF KIDNEY DISEASES AND HYPERTENSION -- FOLLOW UP NOTE  --------------------------------------------------------------------------------  HPI: 63-year-old male with medical history of CAD s/p 9 stents, DM, HTN, GERD, prior cholecystectomy,  diverticulosis, biliary stricture s/p ERCP with sphincteromy and stent 4/2019 who presents as transfer from [facility] where he was admitted with septic shock. Nephrology team is following for SLOAN on CKD and anuria. Pt. found to be in septic shock due to E Coli bacteremia was intubated for acute hypoxic respiratory failure and started on pressor support. He received vancomycin, zosyn, micafungin, merepenem and gentamicin initially. Also pt. developed NSTEMI and was started on heparin gtt with dobutamine due to concern for new LV dysfunction. Pt. with known history of CKD 3 secondary to DM and HTN, follows as outpatient with Dr Hodgson. As per pt's son SCr baseline 2.5-2.6 in March 2019 however states decrease to 1.9 in April 2019. On lab review of [facility] SCr elevated to 4.05 on 4/18/19 and decreased to 3.71 on 4/20/19. SCr elevated at 1.97 on 5/17/19. SCr elevated at 2.29 on 5/17/19 and further increased at 2.90 on 5/18/19. Pt. starting on CRRT on 5/18/19. CRRT was discontinued on 5/22/19 due to HD catheter malfunction. LIJ non-tunneled HD catheter was removed and replaced with left femoral non-tunneled HD catheter on 5/22/19. Pt. transitioned to intermittent HD on 5/24/19. Pt. underwent MRCP with Gadovist 5/28/19. Last HD treatment was performed 5/30/19 (2nd in series after being given Gadovist). Pt. had placement of RIJ tunneled HD catheter and subsequent removal of non-tunneled catheter on 5/29/19.     Patient seen and examined at bedside during HD rounds. Pt. complains of left hand/forearm pain for several days. Otherwise, states that he had one episode of urination yesterday. Denies RONIT POTTS, N/V/F/C.    PAST HISTORY  --------------------------------------------------------------------------------  No significant changes to PMH, PSH, FHx, SHx, unless otherwise noted    ALLERGIES & MEDICATIONS  --------------------------------------------------------------------------------  Allergies    Cipro (Rash)  Plavix (Rash)    Intolerances    Standing Inpatient Medications  acetaminophen  IVPB .. 1000 milliGRAM(s) IV Intermittent once  amLODIPine   Tablet 10 milliGRAM(s) Oral daily  atorvastatin 40 milliGRAM(s) Oral at bedtime  buDESOnide  80 MICROgram(s)/formoterol 4.5 MICROgram(s) Inhaler 2 Puff(s) Inhalation two times a day  chlorhexidine 4% Liquid 1 Application(s) Topical <User Schedule>  docusate sodium 100 milliGRAM(s) Oral three times a day  epoetin lamin Injectable 4000 Unit(s) IV Push <User Schedule>  heparin  Injectable 5000 Unit(s) SubCutaneous every 8 hours  insulin lispro (HumaLOG) corrective regimen sliding scale   SubCutaneous three times a day before meals  insulin lispro (HumaLOG) corrective regimen sliding scale   SubCutaneous at bedtime  metoclopramide Injectable 10 milliGRAM(s) IV Push once  pantoprazole  Injectable 40 milliGRAM(s) IV Push daily  petrolatum Ophthalmic Ointment 1 Application(s) Both EYES two times a day  senna 1 Tablet(s) Oral at bedtime    REVIEW OF SYSTEMS  --------------------------------------------------------------------------------  Resp: no SOB  CV: no CP  GI: no abdominal pain  MSK: edema present, + LUE pain  : anuria    All other systems were reviewed and are negative, except as noted.    VITALS/PHYSICAL EXAM  --------------------------------------------------------------------------------  T(C): 36.8 (05-31-19 @ 10:24), Max: 37.3 (05-31-19 @ 06:40)  HR: 63 (05-31-19 @ 10:24) (63 - 72)  BP: 141/72 (05-31-19 @ 06:40) (141/72 - 153/55)  RR: 18 (05-31-19 @ 10:24) (18 - 20)  SpO2: 99% (05-31-19 @ 10:24) (93% - 99%)  Wt(kg): --    Physical Exam:  	Gen: NAD  	HEENT: sclera anicteric   	Pulm: CTA b/l  	CV: S1S2  	Abd: Soft,  	Ext: trace edema B/L LE  	Skin: Warm and dry  	Vascular access: RIJ tunneled HD catheter site:  without bleeding    LABS/STUDIES  --------------------------------------------------------------------------------              7.8    10.73 >-----------<  259      [05-31-19 @ 06:45]              24.4     136  |  97  |  33  ----------------------------<  94      [05-31-19 @ 06:45]  3.7   |  24  |  6.56        Ca     7.8     [05-31-19 @ 06:45]      Mg     2.0     [05-31-19 @ 06:45]      Phos  5.8     [05-31-19 @ 06:45]    Creatinine Trend:  SCr 6.56 [05-31 @ 06:45]  SCr 4.36 [05-30 @ 05:58]  SCr 4.64 [05-29 @ 05:19]  SCr 5.34 [05-28 @ 06:17]  SCr 8.23 [05-27 @ 15:40]

## 2019-05-31 NOTE — PROGRESS NOTE ADULT - SUBJECTIVE AND OBJECTIVE BOX
Chief Complaint:  Patient is a 63y old  Male who presents with a chief complaint of Septic Shock (31 May 2019 08:21)      Interval Events: Patient denies abdominal pain, nausea, vomiting, pruritus.     Allergies:  Cipro (Rash)  Plavix (Rash)      Hospital Medications:  acetaminophen   Tablet .. 650 milliGRAM(s) Oral every 6 hours PRN  acetaminophen  IVPB .. 1000 milliGRAM(s) IV Intermittent once  ALBUTerol/ipratropium for Nebulization 3 milliLiter(s) Nebulizer every 6 hours PRN  amLODIPine   Tablet 10 milliGRAM(s) Oral daily  atorvastatin 40 milliGRAM(s) Oral at bedtime  buDESOnide  80 MICROgram(s)/formoterol 4.5 MICROgram(s) Inhaler 2 Puff(s) Inhalation two times a day  chlorhexidine 4% Liquid 1 Application(s) Topical <User Schedule>  docusate sodium 100 milliGRAM(s) Oral three times a day  epoetin lamin Injectable 4000 Unit(s) IV Push <User Schedule>  heparin  Injectable 5000 Unit(s) SubCutaneous every 8 hours  HYDROcodone/homatropine Syrup 5 milliLiter(s) Oral every 6 hours PRN  insulin lispro (HumaLOG) corrective regimen sliding scale   SubCutaneous Before meals and at bedtime  metoclopramide Injectable 10 milliGRAM(s) IV Push once  pantoprazole  Injectable 40 milliGRAM(s) IV Push daily  petrolatum Ophthalmic Ointment 1 Application(s) Both EYES two times a day  senna 1 Tablet(s) Oral at bedtime      PMHX/PSHX:  Gout  Type II diabetes mellitus  HTN (hypertension)  CAD (coronary artery disease)  Cirrhosis  S/P cholecystectomy      Family history:      ROS: Negative, except as otherwise noted above    PHYSICAL EXAM:   Vital Signs:  Vital Signs Last 24 Hrs  T(C): 37.3 (31 May 2019 06:40), Max: 37.3 (31 May 2019 06:40)  T(F): 99.2 (31 May 2019 06:40), Max: 99.2 (31 May 2019 06:40)  HR: 64 (31 May 2019 06:40) (63 - 72)  BP: 141/72 (31 May 2019 06:40) (141/72 - 153/55)  BP(mean): --  RR: 18 (31 May 2019 06:40) (18 - 20)  SpO2: 98% (31 May 2019 05:01) (93% - 99%)  Daily     Daily Weight in k (31 May 2019 06:40)    GENERAL: No acute distress, lying in bed   HEENT:  Anicteric  ABDOMEN:  Soft, nontender to palpation, no rebound, no guarding  EXTREMITIES:  Warm and well perfused  NEURO:   Awake, interactive, following commands    LABS:  CBC Full  -  ( 31 May 2019 06:45 )  WBC Count : 10.73 K/uL  Hemoglobin : 7.8 g/dL  Hematocrit : 24.4 %  Platelet Count - Automated : 259 K/uL  Mean Cell Volume : 92.1 fL  Auto Neutrophil # : 7.18 K/uL  Auto Neutrophil % : 66.8 %     @ 06:45  Na 136 mmol/L  K 3.7 mmol/L  Cl 97 mmol/L  CO2 24 mmol/L  BUN 33 mg/dL  Creat 6.56 mg/dL  Glucose 94 mg/dL  Ca 7.8 mg/dL    Total protein 5.4 g/dL  Albumin 2.5 g/dL  T bili 1.0 mg/dL  Alk phos 91 u/L  AST 27 u/L  ALT 17 u/L

## 2019-05-31 NOTE — PROGRESS NOTE ADULT - ASSESSMENT
64 yo M hx of CAD s/p 9 stents, DMT2 on insulin, HTN, GERD, prior cholecystectomy,  diverticulosis, former smoker(30PY), biliary stricture s/p ERCP with sphincterotomy and stent 4/2019 who presented as transfer from [facility] for acute cholangitis s/p ERCP and stent CBD stent placement by GI on 5/19.Hospital course further complicated by Type II NSTEMI and bradycardia. Now on floors, resolved bradycardia, undergoing further imaging for biliary tree evaluation

## 2019-06-01 DIAGNOSIS — N17.9 ACUTE KIDNEY FAILURE, UNSPECIFIED: ICD-10-CM

## 2019-06-01 DIAGNOSIS — Z29.9 ENCOUNTER FOR PROPHYLACTIC MEASURES, UNSPECIFIED: ICD-10-CM

## 2019-06-01 LAB
ALBUMIN SERPL ELPH-MCNC: 2.6 G/DL — LOW (ref 3.3–5)
ALP SERPL-CCNC: 100 U/L — SIGNIFICANT CHANGE UP (ref 40–120)
ALT FLD-CCNC: 19 U/L — SIGNIFICANT CHANGE UP (ref 4–41)
ANION GAP SERPL CALC-SCNC: 18 MMO/L — HIGH (ref 7–14)
AST SERPL-CCNC: 25 U/L — SIGNIFICANT CHANGE UP (ref 4–40)
BASOPHILS # BLD AUTO: 0.04 K/UL — SIGNIFICANT CHANGE UP (ref 0–0.2)
BASOPHILS NFR BLD AUTO: 0.4 % — SIGNIFICANT CHANGE UP (ref 0–2)
BILIRUB SERPL-MCNC: 0.9 MG/DL — SIGNIFICANT CHANGE UP (ref 0.2–1.2)
BUN SERPL-MCNC: 29 MG/DL — HIGH (ref 7–23)
CALCIUM SERPL-MCNC: 8.6 MG/DL — SIGNIFICANT CHANGE UP (ref 8.4–10.5)
CHLORIDE SERPL-SCNC: 95 MMOL/L — LOW (ref 98–107)
CO2 SERPL-SCNC: 21 MMOL/L — LOW (ref 22–31)
CREAT SERPL-MCNC: 5.65 MG/DL — HIGH (ref 0.5–1.3)
EOSINOPHIL # BLD AUTO: 0.3 K/UL — SIGNIFICANT CHANGE UP (ref 0–0.5)
EOSINOPHIL NFR BLD AUTO: 3.1 % — SIGNIFICANT CHANGE UP (ref 0–6)
GLUCOSE BLDC GLUCOMTR-MCNC: 118 MG/DL — HIGH (ref 70–99)
GLUCOSE BLDC GLUCOMTR-MCNC: 169 MG/DL — HIGH (ref 70–99)
GLUCOSE BLDC GLUCOMTR-MCNC: 172 MG/DL — HIGH (ref 70–99)
GLUCOSE BLDC GLUCOMTR-MCNC: 192 MG/DL — HIGH (ref 70–99)
GLUCOSE SERPL-MCNC: 130 MG/DL — HIGH (ref 70–99)
HCT VFR BLD CALC: 26.9 % — LOW (ref 39–50)
HGB BLD-MCNC: 8.7 G/DL — LOW (ref 13–17)
IMM GRANULOCYTES NFR BLD AUTO: 0.4 % — SIGNIFICANT CHANGE UP (ref 0–1.5)
LYMPHOCYTES # BLD AUTO: 1.85 K/UL — SIGNIFICANT CHANGE UP (ref 1–3.3)
LYMPHOCYTES # BLD AUTO: 19.4 % — SIGNIFICANT CHANGE UP (ref 13–44)
MAGNESIUM SERPL-MCNC: 1.8 MG/DL — SIGNIFICANT CHANGE UP (ref 1.6–2.6)
MCHC RBC-ENTMCNC: 29.9 PG — SIGNIFICANT CHANGE UP (ref 27–34)
MCHC RBC-ENTMCNC: 32.3 % — SIGNIFICANT CHANGE UP (ref 32–36)
MCV RBC AUTO: 92.4 FL — SIGNIFICANT CHANGE UP (ref 80–100)
MONOCYTES # BLD AUTO: 1.39 K/UL — HIGH (ref 0–0.9)
MONOCYTES NFR BLD AUTO: 14.5 % — HIGH (ref 2–14)
NEUTROPHILS # BLD AUTO: 5.94 K/UL — SIGNIFICANT CHANGE UP (ref 1.8–7.4)
NEUTROPHILS NFR BLD AUTO: 62.2 % — SIGNIFICANT CHANGE UP (ref 43–77)
NRBC # FLD: 0.02 K/UL — SIGNIFICANT CHANGE UP (ref 0–0)
PHOSPHATE SERPL-MCNC: 4.3 MG/DL — SIGNIFICANT CHANGE UP (ref 2.5–4.5)
PLATELET # BLD AUTO: 285 K/UL — SIGNIFICANT CHANGE UP (ref 150–400)
PMV BLD: 10.9 FL — SIGNIFICANT CHANGE UP (ref 7–13)
POTASSIUM SERPL-MCNC: 3.8 MMOL/L — SIGNIFICANT CHANGE UP (ref 3.5–5.3)
POTASSIUM SERPL-SCNC: 3.8 MMOL/L — SIGNIFICANT CHANGE UP (ref 3.5–5.3)
PROT SERPL-MCNC: 6.3 G/DL — SIGNIFICANT CHANGE UP (ref 6–8.3)
RBC # BLD: 2.91 M/UL — LOW (ref 4.2–5.8)
RBC # FLD: 15.7 % — HIGH (ref 10.3–14.5)
SODIUM SERPL-SCNC: 134 MMOL/L — LOW (ref 135–145)
WBC # BLD: 9.56 K/UL — SIGNIFICANT CHANGE UP (ref 3.8–10.5)
WBC # FLD AUTO: 9.56 K/UL — SIGNIFICANT CHANGE UP (ref 3.8–10.5)

## 2019-06-01 PROCEDURE — 99233 SBSQ HOSP IP/OBS HIGH 50: CPT | Mod: GC

## 2019-06-01 PROCEDURE — 99232 SBSQ HOSP IP/OBS MODERATE 35: CPT | Mod: GC

## 2019-06-01 RX ORDER — FAMOTIDINE 10 MG/ML
20 INJECTION INTRAVENOUS DAILY
Refills: 0 | Status: DISCONTINUED | OUTPATIENT
Start: 2019-06-01 | End: 2019-06-08

## 2019-06-01 RX ADMIN — Medication 1: at 12:52

## 2019-06-01 RX ADMIN — Medication 650 MILLIGRAM(S): at 13:30

## 2019-06-01 RX ADMIN — BUDESONIDE AND FORMOTEROL FUMARATE DIHYDRATE 2 PUFF(S): 160; 4.5 AEROSOL RESPIRATORY (INHALATION) at 09:05

## 2019-06-01 RX ADMIN — Medication 81 MILLIGRAM(S): at 12:53

## 2019-06-01 RX ADMIN — Medication 1: at 18:07

## 2019-06-01 RX ADMIN — HEPARIN SODIUM 5000 UNIT(S): 5000 INJECTION INTRAVENOUS; SUBCUTANEOUS at 22:12

## 2019-06-01 RX ADMIN — CHLORHEXIDINE GLUCONATE 1 APPLICATION(S): 213 SOLUTION TOPICAL at 09:05

## 2019-06-01 RX ADMIN — Medication 650 MILLIGRAM(S): at 12:53

## 2019-06-01 RX ADMIN — ATORVASTATIN CALCIUM 40 MILLIGRAM(S): 80 TABLET, FILM COATED ORAL at 22:12

## 2019-06-01 RX ADMIN — AMLODIPINE BESYLATE 10 MILLIGRAM(S): 2.5 TABLET ORAL at 05:14

## 2019-06-01 RX ADMIN — HEPARIN SODIUM 5000 UNIT(S): 5000 INJECTION INTRAVENOUS; SUBCUTANEOUS at 13:02

## 2019-06-01 RX ADMIN — HEPARIN SODIUM 5000 UNIT(S): 5000 INJECTION INTRAVENOUS; SUBCUTANEOUS at 05:13

## 2019-06-01 RX ADMIN — Medication 650 MILLIGRAM(S): at 21:43

## 2019-06-01 RX ADMIN — Medication 650 MILLIGRAM(S): at 20:43

## 2019-06-01 RX ADMIN — BUDESONIDE AND FORMOTEROL FUMARATE DIHYDRATE 2 PUFF(S): 160; 4.5 AEROSOL RESPIRATORY (INHALATION) at 22:12

## 2019-06-01 RX ADMIN — FAMOTIDINE 20 MILLIGRAM(S): 10 INJECTION INTRAVENOUS at 12:53

## 2019-06-01 NOTE — PROGRESS NOTE ADULT - PROBLEM SELECTOR PLAN 2
- Acute cholangitis in setting of CBD stricture and recent ERCP with stent placement. 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 no mass in the region of narrowing.    - patient was offered a whipple procedure at OSH, for 5/30  - surg onc consulted for evaluation, will get whipple in the next 2-3weeks.  - aracelis likely need EUS/ERCP on monday/tuesday, optimized by cards - Acute cholangitis in setting of CBD stricture and recent ERCP with stent placement. 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 no mass in the region of narrowing.    - patient was offered a whipple procedure at OSH, for 5/30  - surg onc consulted for evaluation, will get whipple in the next 2-3weeks.  - aracelis likely need EUS/ERCP on monday/tuesday, optimized by cards. Need to call GI on monday for timing.

## 2019-06-01 NOTE — PROGRESS NOTE ADULT - PROBLEM SELECTOR PLAN 7
Pt w profound bradycardia requiring dopamine gtt  - now off  - bradycardia likely from hypervagotonia from GI process as per EP  - no indication for PPM. Resolved   Pt w profound bradycardia requiring dopamine gtt  - now off  - bradycardia likely from hypervagotonia from GI process as per EP  - no indication for PPM.

## 2019-06-01 NOTE — PROGRESS NOTE ADULT - PROBLEM SELECTOR PLAN 3
Renal failure in setting of distributive shock   Pt w CKD hx, with SLOAN on CKD IV likely from sepsis   - HD as per renal through permacath, kalpana dutta d/roel on 5/29  - continue to monitor for renal recovery, still anuric   - HD through permcath Renal failure in setting of distributive shock   Pt w CKD hx, with SLOAN on CKD IV likely from sepsis   - HD as per renal through permacath, kalpana dutta d/roel on 5/29  - continue to monitor for renal recovery, still anuric   - HD through permcath  - pt has hx of being on PPI and getting SLOAN, talked to GI who recommended switching it while monitoring for renal recovery. started famotidine as per GI recs Renal failure in setting of distributive shock   Pt w CKD hx, with SLOAN on CKD IV likely from sepsis   - HD as per renal through permacath, kalpana dutta d/roel on 5/29  - continue to monitor for renal recovery, still anuric   - HD through permcath  - pt has hx of being on PPI and getting SLOAN, talked to GI who recommended switching it while monitoring for renal recovery. started famotidine as per GI recs  - urinating more, will try to measure I&Os

## 2019-06-01 NOTE — PROGRESS NOTE ADULT - SUBJECTIVE AND OBJECTIVE BOX
Ella Suyeon Yu PGY-1   [facility] Pager # [phone]  NS Pager # [phone]      Patient is a 63y old  Male who presents with a chief complaint of Septic Shock (31 May 2019 16:02)      SUBJECTIVE: No acute events overnight. Patient seen and examined at bedside.    REVIEW OF SYSTEMS:  CONSTITUTIONAL: No fever, weight loss, or fatigue  RESPIRATORY: No cough or shortness of breath  CARDIOVASCULAR: No chest pain, palpitations, dizziness, or leg swelling  GASTROINTESTINAL: No abdominal or epigastric pain. No nausea, vomiting, or hematemesis; No diarrhea or constipation. No melena or hematochezia.  GENITOURINARY: No dysuria  NEUROLOGICAL: No headaches  SKIN: No itching or lesions       MEDICATIONS  (STANDING):  acetaminophen  IVPB .. 1000 milliGRAM(s) IV Intermittent once  amLODIPine   Tablet 10 milliGRAM(s) Oral daily  aspirin enteric coated 81 milliGRAM(s) Oral daily  atorvastatin 40 milliGRAM(s) Oral at bedtime  buDESOnide  80 MICROgram(s)/formoterol 4.5 MICROgram(s) Inhaler 2 Puff(s) Inhalation two times a day  chlorhexidine 4% Liquid 1 Application(s) Topical <User Schedule>  docusate sodium 100 milliGRAM(s) Oral three times a day  epoetin lamin Injectable 4000 Unit(s) IV Push <User Schedule>  heparin  Injectable 5000 Unit(s) SubCutaneous every 8 hours  insulin lispro (HumaLOG) corrective regimen sliding scale   SubCutaneous three times a day before meals  insulin lispro (HumaLOG) corrective regimen sliding scale   SubCutaneous at bedtime  metoclopramide Injectable 10 milliGRAM(s) IV Push once  pantoprazole  Injectable 40 milliGRAM(s) IV Push daily  petrolatum Ophthalmic Ointment 1 Application(s) Both EYES two times a day  senna 1 Tablet(s) Oral at bedtime    MEDICATIONS  (PRN):  acetaminophen   Tablet .. 650 milliGRAM(s) Oral every 6 hours PRN Mild Pain (1 - 3), Moderate Pain (4 - 6), Severe Pain (7 - 10)  ALBUTerol/ipratropium for Nebulization 3 milliLiter(s) Nebulizer every 6 hours PRN Shortness of Breath and/or Wheezing  HYDROcodone/homatropine Syrup 5 milliLiter(s) Oral every 6 hours PRN Cough        Objective:    Vitals: Vital Signs Last 24 Hrs  T(C): 37.6 (06-01-19 @ 05:09), Max: 37.6 (06-01-19 @ 05:09)  T(F): 99.6 (06-01-19 @ 05:09), Max: 99.6 (06-01-19 @ 05:09)  HR: 73 (06-01-19 @ 05:09) (63 - 78)  BP: 135/52 (06-01-19 @ 05:09) (128/52 - 140/58)  BP(mean): --  RR: 18 (06-01-19 @ 05:09) (18 - 18)  SpO2: 98% (06-01-19 @ 05:09) (95% - 99%)            I&O's Summary      PHYSICAL EXAM:  GENERAL: NAD  HEAD:  Atraumatic, Normocephalic  CHEST/LUNG: Clear to auscultation bilaterally; No rales or wheezing  HEART: Regular rate and rhythm; No murmurs, rubs, or gallops  ABDOMEN: Soft, nontender, nondistended  EXTREMITIES:  No clubbing, cyanosis, or edema  LYMPH: No lymphadenopathy noted  SKIN: No rashes or lesions  NERVOUS SYSTEM:  Alert & Oriented X3    LABS:  06-01    134<L>  |  95<L>  |  29<H>  ----------------------------<  130<H>  3.8   |  21<L>  |  5.65<H>  05-31    136  |  97<L>  |  33<H>  ----------------------------<  94  3.7   |  24  |  6.56<H>  05-30    136  |  95<L>  |  24<H>  ----------------------------<  108<H>  4.2   |  24  |  4.36<H>    Ca    8.6      01 Jun 2019 06:19  Ca    7.8<L>      31 May 2019 06:45  Ca    8.2<L>      30 May 2019 05:58  Phos  4.3     06-01  Mg     1.8     06-01    TPro  6.3  /  Alb  2.6<L>  /  TBili  0.9  /  DBili  x   /  AST  25  /  ALT  19  /  AlkPhos  100  06-01  TPro  5.4<L>  /  Alb  2.5<L>  /  TBili  1.0  /  DBili  x   /  AST  27  /  ALT  17  /  AlkPhos  91  05-31  TPro  5.9<L>  /  Alb  2.5<L>  /  TBili  1.4<H>  /  DBili  x   /  AST  27  /  ALT  22  /  AlkPhos  113  05-30                                            8.7    9.56  )-----------( 285      ( 01 Jun 2019 06:19 )             26.9                         8.2    9.86  )-----------( 245      ( 31 May 2019 17:03 )             25.2                         7.8    10.73 )-----------( 259      ( 31 May 2019 06:45 )             24.4     CAPILLARY BLOOD GLUCOSE      POCT Blood Glucose.: 153 mg/dL (31 May 2019 21:41)  POCT Blood Glucose.: 86 mg/dL (31 May 2019 17:30)  POCT Blood Glucose.: 84 mg/dL (31 May 2019 14:08)  POCT Blood Glucose.: 76 mg/dL (31 May 2019 13:15)  POCT Blood Glucose.: 80 mg/dL (31 May 2019 09:06)    RADIOLOGY:  Imaging Personally Reviewed: YES      Consultants involved in case: YES  Consultant(s) Notes Reviewed:  YES  Care Discussed with Consultants/Other Providers     Ella Suyeon Yu PGY-1 Ella Suyeon Yu PGY-1   [facility] Pager # [phone]  NS Pager # [phone]      Patient is a 63y old  Male who presents with a chief complaint of Septic Shock (31 May 2019 16:02)      SUBJECTIVE: No acute events overnight. Patient seen and examined at bedside. Feeling better, still having some pain in R shoulder. wants to eat regular meal    REVIEW OF SYSTEMS:  CONSTITUTIONAL: No fever, weight loss, or fatigue  RESPIRATORY: No cough or shortness of breath  CARDIOVASCULAR: No chest pain, palpitations, dizziness, or leg swelling  GASTROINTESTINAL: No abdominal or epigastric pain. No nausea, vomiting, or hematemesis; No diarrhea or constipation. No melena or hematochezia.  GENITOURINARY: No dysuria  NEUROLOGICAL: No headaches  SKIN: No itching or lesions       MEDICATIONS  (STANDING):  acetaminophen  IVPB .. 1000 milliGRAM(s) IV Intermittent once  amLODIPine   Tablet 10 milliGRAM(s) Oral daily  aspirin enteric coated 81 milliGRAM(s) Oral daily  atorvastatin 40 milliGRAM(s) Oral at bedtime  buDESOnide  80 MICROgram(s)/formoterol 4.5 MICROgram(s) Inhaler 2 Puff(s) Inhalation two times a day  chlorhexidine 4% Liquid 1 Application(s) Topical <User Schedule>  docusate sodium 100 milliGRAM(s) Oral three times a day  epoetin lamin Injectable 4000 Unit(s) IV Push <User Schedule>  heparin  Injectable 5000 Unit(s) SubCutaneous every 8 hours  insulin lispro (HumaLOG) corrective regimen sliding scale   SubCutaneous three times a day before meals  insulin lispro (HumaLOG) corrective regimen sliding scale   SubCutaneous at bedtime  metoclopramide Injectable 10 milliGRAM(s) IV Push once  pantoprazole  Injectable 40 milliGRAM(s) IV Push daily  petrolatum Ophthalmic Ointment 1 Application(s) Both EYES two times a day  senna 1 Tablet(s) Oral at bedtime    MEDICATIONS  (PRN):  acetaminophen   Tablet .. 650 milliGRAM(s) Oral every 6 hours PRN Mild Pain (1 - 3), Moderate Pain (4 - 6), Severe Pain (7 - 10)  ALBUTerol/ipratropium for Nebulization 3 milliLiter(s) Nebulizer every 6 hours PRN Shortness of Breath and/or Wheezing  HYDROcodone/homatropine Syrup 5 milliLiter(s) Oral every 6 hours PRN Cough        Objective:    Vitals: Vital Signs Last 24 Hrs  T(C): 37.6 (06-01-19 @ 05:09), Max: 37.6 (06-01-19 @ 05:09)  T(F): 99.6 (06-01-19 @ 05:09), Max: 99.6 (06-01-19 @ 05:09)  HR: 73 (06-01-19 @ 05:09) (63 - 78)  BP: 135/52 (06-01-19 @ 05:09) (128/52 - 140/58)  BP(mean): --  RR: 18 (06-01-19 @ 05:09) (18 - 18)  SpO2: 98% (06-01-19 @ 05:09) (95% - 99%)            I&O's Summary      PHYSICAL EXAM:  GENERAL: NAD  CHEST/LUNG: Clear to auscultation bilaterally; No rales or wheezing  HEART: Regular rate and rhythm; No murmurs, rubs, or gallops, R shoulder bruise  ABDOMEN: Soft, nontender, nondistended  EXTREMITIES:  No clubbing, cyanosis, or edema, no hematoma on L femoral site.  SKIN: No rashes or lesions  NERVOUS SYSTEM:  Alert & Oriented X3    LABS:  06-01    134<L>  |  95<L>  |  29<H>  ----------------------------<  130<H>  3.8   |  21<L>  |  5.65<H>  05-31    136  |  97<L>  |  33<H>  ----------------------------<  94  3.7   |  24  |  6.56<H>  05-30    136  |  95<L>  |  24<H>  ----------------------------<  108<H>  4.2   |  24  |  4.36<H>    Ca    8.6      01 Jun 2019 06:19  Ca    7.8<L>      31 May 2019 06:45  Ca    8.2<L>      30 May 2019 05:58  Phos  4.3     06-01  Mg     1.8     06-01    TPro  6.3  /  Alb  2.6<L>  /  TBili  0.9  /  DBili  x   /  AST  25  /  ALT  19  /  AlkPhos  100  06-01  TPro  5.4<L>  /  Alb  2.5<L>  /  TBili  1.0  /  DBili  x   /  AST  27  /  ALT  17  /  AlkPhos  91  05-31  TPro  5.9<L>  /  Alb  2.5<L>  /  TBili  1.4<H>  /  DBili  x   /  AST  27  /  ALT  22  /  AlkPhos  113  05-30                                            8.7    9.56  )-----------( 285      ( 01 Jun 2019 06:19 )             26.9                         8.2    9.86  )-----------( 245      ( 31 May 2019 17:03 )             25.2                         7.8    10.73 )-----------( 259      ( 31 May 2019 06:45 )             24.4     CAPILLARY BLOOD GLUCOSE      POCT Blood Glucose.: 153 mg/dL (31 May 2019 21:41)  POCT Blood Glucose.: 86 mg/dL (31 May 2019 17:30)  POCT Blood Glucose.: 84 mg/dL (31 May 2019 14:08)  POCT Blood Glucose.: 76 mg/dL (31 May 2019 13:15)  POCT Blood Glucose.: 80 mg/dL (31 May 2019 09:06)    RADIOLOGY:  Imaging Personally Reviewed: YES      Consultants involved in case: YES  Consultant(s) Notes Reviewed:  YES  Care Discussed with Consultants/Other Providers     Ella Suyeon Yu PGY-1

## 2019-06-01 NOTE — PROGRESS NOTE ADULT - ASSESSMENT
64 yo M hx of CAD s/p 9 stents, DMT2 on insulin, HTN, GERD, prior cholecystectomy,  diverticulosis, former smoker(30PY), biliary stricture s/p ERCP with sphincterotomy and stent 4/2019 who presented as transfer from [facility] for acute cholangitis s/p ERCP and stent CBD stent placement by GI on 5/19.Hospital course further complicated by Type II NSTEMI and bradycardia. Now on floors, resolved bradycardia, undergoing workup for biliary tree strictures

## 2019-06-01 NOTE — PROGRESS NOTE ADULT - PROBLEM SELECTOR PLAN 1
Pt. with oliguric SLOAN on CKD in the setting of sepsis, hypotension and NSTEMI. On lab review of [facility], SCr elevated at 4.05 on 4/18/19 and decreased at 3.71 on 4/20/19. SCr elevated at 1.97 on 5/17/19. SCr elevated at 2.29 on 5/17/19 and further increased to 2.90 on 5/18/19. Pt. with likely severe ATN. Pt. initiated on CRRT on 5/18/19, then transitioned to HD on 5/24/19, last treatment on 5/31/19. Pt. had IR placement RIJ tunneled HD catheter on 5/29/19. Labs reviewed from today. Pt. starting to urinate more frequently. Plan for next HD treatment on 6/3/19. Monitor UOP and daily weights. Avoid any potential nephrotoxins. Pt. also with history of possible AIN from use of PPI. Pt. was discontinued of PPI on 5/31/19. Will monitor patient for renal recovery Pt. with oliguric SLOAN on CKD in the setting of sepsis, hypotension and NSTEMI. On lab review of [facility], SCr elevated at 4.05 on 4/18/19 and decreased at 3.71 on 4/20/19. SCr elevated at 1.97 on 5/17/19. SCr elevated at 2.29 on 5/17/19 and further increased to 2.90 on 5/18/19. Pt. with likely severe ATN. Pt. initiated on CRRT on 5/18/19, then transitioned to HD on 5/24/19, last treatment on 5/31/19. Pt. had IR placement RIJ tunneled HD catheter on 5/29/19. Labs reviewed from today. Pt. starting to urinate more frequently. Plan for next HD treatment on 6/3/19. Monitor UOP and daily weights. Avoid any potential nephrotoxins. Pt. also with history of possible AIN from use of PPI. Pt. was discontinued of PPI on 5/31/19. Has Sadler's esophagus so will need to check with GI.  Will monitor patient for renal recovery

## 2019-06-01 NOTE — PROGRESS NOTE ADULT - PROBLEM SELECTOR PLAN 5
- Distributive shock in setting of E coli/ Citerobacter bacteremia   - Likely had type II NSTEMI, trops peaked ~2000s, downtrended  -EP and cards following -no interventions warranted at this time   - echo with severe global LV dysfunction, cards following  - pharm nuc stress test fixed deficits, will need cath later on - Distributive shock in setting of E coli/ Citerobacter bacteremia   - Likely had type II NSTEMI, trops peaked ~2000s, downtrended  -EP and cards following -no interventions warranted at this time   - echo with severe global LV dysfunction, cards following  - pharm nuclear stress test fixed deficits, will need cath later on

## 2019-06-01 NOTE — PROGRESS NOTE ADULT - ATTENDING COMMENTS
Patient seen and examined.  Case discussed with house staff.  Agree with above as edited.   #Pancreatic stricture - Plan is for future EUS +/- ERCP followed by Surgical resection (likely in 2-3 weeks). d/w patient and wife bedside.  #SLOAN on CKD IV - secondary to ATN - now depending on HD - s/p permacath with Marina d/c'd. d/w nephrology attending. D/c'd PPI.  #NSTEMI, h/o CAD s/p PCI - Pharm nuclear stress shows fixed defects. apprec Cards f/u - c/w ASA and statin. Holding Coreg 2/2 bradycardia.  #Anemia - multifactorial - h/h is stable. Continue to monitor. No melena or BRBPR. Epo per Renal.  #Bradycardia - Likely 2/2 increased vagal tone 2/2 GI process - on tele. HR improved. Continue holding AV Caroline blockers.  #HTN - continue holding ARB given SLOAN on CKD. Holding Coreg as above. Amlodipine resumed. If still hypertensive can resume hydralazine 25mg TID and uptitrate as needed  #Septic Shock 2/2 Citrobacter and E. Coli bacteremia 2/2 cholangitis - Shock and sepsis resolved. Now off abx.  Plan discussed with patient and wife bedside. d/w RN

## 2019-06-01 NOTE — PROGRESS NOTE ADULT - PROBLEM SELECTOR PLAN 1
from acute cholangitis in setting of CBD stricture w ERCP/stent placement, w ecoli and citrobacter bacteremia. s/p repeat ERCP with stent in CBD.  - finished full course of meropenem per ID recs  - repeat MRCP showing mod intrahepatic biliary duct dilation tht's improved, extrahepatic duct dilation to 2cm, narrowing of CBD w new cbd stent. no mass in the region of narrowing.

## 2019-06-01 NOTE — PROGRESS NOTE ADULT - SUBJECTIVE AND OBJECTIVE BOX
[facility] DIVISION OF KIDNEY DISEASES AND HYPERTENSION -- FOLLOW UP NOTE  --------------------------------------------------------------------------------  HPI: 63-year-old male with medical history of CAD s/p 9 stents, DM, HTN, GERD, prior cholecystectomy,  diverticulosis, biliary stricture s/p ERCP with sphincteromy and stent 4/2019 who presents as transfer from [facility] where he was admitted with septic shock. Nephrology team is following for SLOAN on CKD and anuria. Pt. found to be in septic shock due to E Coli bacteremia was intubated for acute hypoxic respiratory failure and started on pressor support. He received vancomycin, zosyn, micafungin, merepenem and gentamicin initially. Also pt. developed NSTEMI and was started on heparin gtt with dobutamine due to concern for new LV dysfunction. Pt. with known history of CKD 3 secondary to DM and HTN, follows as outpatient with Dr Hodgson. As per pt's son SCr baseline 2.5-2.6 in March 2019 however states decrease to 1.9 in April 2019. On lab review of [facility] SCr elevated to 4.05 on 4/18/19 and decreased to 3.71 on 4/20/19. SCr elevated at 1.97 on 5/17/19. SCr elevated at 2.29 on 5/17/19 and further increased at 2.90 on 5/18/19. Pt. starting on CRRT on 5/18/19. CRRT was discontinued on 5/22/19 due to HD catheter malfunction. LIJ non-tunneled HD catheter was removed and replaced with left femoral non-tunneled HD catheter on 5/22/19. Pt. transitioned to intermittent HD on 5/24/19. Pt. had placement of RIJ tunneled HD catheter and subsequent removal of non-tunneled catheter on 5/29/19. Last HD treatment was tolerated well on 5/31/19    Patient seen and examined at bedside this AM with wife present. Patient feels well. States that he has more urination now. Admits to cough during HD. Otherwise, denies CP, SOB, N/V/F/C.    PAST HISTORY  --------------------------------------------------------------------------------  No significant changes to PMH, PSH, FHx, SHx, unless otherwise noted    ALLERGIES & MEDICATIONS  --------------------------------------------------------------------------------  Allergies    Cipro (Rash)  Plavix (Rash)    Intolerances    Standing Inpatient Medications  acetaminophen  IVPB .. 1000 milliGRAM(s) IV Intermittent once  amLODIPine   Tablet 10 milliGRAM(s) Oral daily  aspirin enteric coated 81 milliGRAM(s) Oral daily  atorvastatin 40 milliGRAM(s) Oral at bedtime  buDESOnide  80 MICROgram(s)/formoterol 4.5 MICROgram(s) Inhaler 2 Puff(s) Inhalation two times a day  chlorhexidine 4% Liquid 1 Application(s) Topical <User Schedule>  docusate sodium 100 milliGRAM(s) Oral three times a day  epoetin lamin Injectable 4000 Unit(s) IV Push <User Schedule>  famotidine    Tablet 20 milliGRAM(s) Oral daily  heparin  Injectable 5000 Unit(s) SubCutaneous every 8 hours  insulin lispro (HumaLOG) corrective regimen sliding scale   SubCutaneous three times a day before meals  insulin lispro (HumaLOG) corrective regimen sliding scale   SubCutaneous at bedtime  metoclopramide Injectable 10 milliGRAM(s) IV Push once  petrolatum Ophthalmic Ointment 1 Application(s) Both EYES two times a day  senna 1 Tablet(s) Oral at bedtime    REVIEW OF SYSTEMS  --------------------------------------------------------------------------------  Resp: no SOB, + cough (during HD)  CV: no CP  GI: no abdominal pain  MSK: No edema, no arm pain  : + urine production    All other systems were reviewed and are negative, except as noted.    VITALS/PHYSICAL EXAM  --------------------------------------------------------------------------------  T(C): 37.1 (06-01-19 @ 08:46), Max: 37.6 (06-01-19 @ 05:09)  HR: 67 (06-01-19 @ 08:46) (65 - 78)  BP: 140/57 (06-01-19 @ 08:46) (128/52 - 140/58)  RR: 17 (06-01-19 @ 08:46) (17 - 18)  SpO2: 100% (06-01-19 @ 08:46) (95% - 100%)  Wt(kg): --    Physical Exam:  	Gen: NAD  	HEENT: sclera anicteric   	Pulm: CTA b/l  	CV: S1S2  	Abd: Soft,  	Ext: No edema B/L LE  	Skin: Warm and dry  	Vascular access: RIJ tunneled HD catheter site:  without bleeding    LABS/STUDIES  --------------------------------------------------------------------------------              8.7    9.56  >-----------<  285      [06-01-19 @ 06:19]              26.9     134  |  95  |  29  ----------------------------<  130      [06-01-19 @ 06:19]  3.8   |  21  |  5.65        Ca     8.6     [06-01-19 @ 06:19]      Mg     1.8     [06-01-19 @ 06:19]      Phos  4.3     [06-01-19 @ 06:19]    Creatinine Trend:  SCr 5.65 [06-01 @ 06:19]  SCr 6.56 [05-31 @ 06:45]  SCr 4.36 [05-30 @ 05:58]  SCr 4.64 [05-29 @ 05:19]  SCr 5.34 [05-28 @ 06:17]

## 2019-06-02 DIAGNOSIS — M10.9 GOUT, UNSPECIFIED: ICD-10-CM

## 2019-06-02 LAB
ALBUMIN SERPL ELPH-MCNC: 2.4 G/DL — LOW (ref 3.3–5)
ALP SERPL-CCNC: 83 U/L — SIGNIFICANT CHANGE UP (ref 40–120)
ALT FLD-CCNC: 14 U/L — SIGNIFICANT CHANGE UP (ref 4–41)
ANION GAP SERPL CALC-SCNC: 14 MMO/L — SIGNIFICANT CHANGE UP (ref 7–14)
AST SERPL-CCNC: 19 U/L — SIGNIFICANT CHANGE UP (ref 4–40)
BASOPHILS # BLD AUTO: 0.04 K/UL — SIGNIFICANT CHANGE UP (ref 0–0.2)
BASOPHILS NFR BLD AUTO: 0.5 % — SIGNIFICANT CHANGE UP (ref 0–2)
BILIRUB SERPL-MCNC: 0.8 MG/DL — SIGNIFICANT CHANGE UP (ref 0.2–1.2)
BUN SERPL-MCNC: 39 MG/DL — HIGH (ref 7–23)
CALCIUM SERPL-MCNC: 8.4 MG/DL — SIGNIFICANT CHANGE UP (ref 8.4–10.5)
CHLORIDE SERPL-SCNC: 97 MMOL/L — LOW (ref 98–107)
CO2 SERPL-SCNC: 24 MMOL/L — SIGNIFICANT CHANGE UP (ref 22–31)
CREAT SERPL-MCNC: 7.25 MG/DL — HIGH (ref 0.5–1.3)
EOSINOPHIL # BLD AUTO: 0.31 K/UL — SIGNIFICANT CHANGE UP (ref 0–0.5)
EOSINOPHIL NFR BLD AUTO: 4.1 % — SIGNIFICANT CHANGE UP (ref 0–6)
GLUCOSE BLDC GLUCOMTR-MCNC: 159 MG/DL — HIGH (ref 70–99)
GLUCOSE BLDC GLUCOMTR-MCNC: 235 MG/DL — HIGH (ref 70–99)
GLUCOSE BLDC GLUCOMTR-MCNC: 265 MG/DL — HIGH (ref 70–99)
GLUCOSE BLDC GLUCOMTR-MCNC: 344 MG/DL — HIGH (ref 70–99)
GLUCOSE SERPL-MCNC: 189 MG/DL — HIGH (ref 70–99)
HCT VFR BLD CALC: 23.3 % — LOW (ref 39–50)
HGB BLD-MCNC: 7.7 G/DL — LOW (ref 13–17)
IMM GRANULOCYTES NFR BLD AUTO: 0.5 % — SIGNIFICANT CHANGE UP (ref 0–1.5)
LYMPHOCYTES # BLD AUTO: 1.5 K/UL — SIGNIFICANT CHANGE UP (ref 1–3.3)
LYMPHOCYTES # BLD AUTO: 19.7 % — SIGNIFICANT CHANGE UP (ref 13–44)
MAGNESIUM SERPL-MCNC: 1.8 MG/DL — SIGNIFICANT CHANGE UP (ref 1.6–2.6)
MCHC RBC-ENTMCNC: 30.4 PG — SIGNIFICANT CHANGE UP (ref 27–34)
MCHC RBC-ENTMCNC: 33 % — SIGNIFICANT CHANGE UP (ref 32–36)
MCV RBC AUTO: 92.1 FL — SIGNIFICANT CHANGE UP (ref 80–100)
MONOCYTES # BLD AUTO: 1.19 K/UL — HIGH (ref 0–0.9)
MONOCYTES NFR BLD AUTO: 15.7 % — HIGH (ref 2–14)
NEUTROPHILS # BLD AUTO: 4.52 K/UL — SIGNIFICANT CHANGE UP (ref 1.8–7.4)
NEUTROPHILS NFR BLD AUTO: 59.5 % — SIGNIFICANT CHANGE UP (ref 43–77)
NRBC # FLD: 0 K/UL — SIGNIFICANT CHANGE UP (ref 0–0)
PHOSPHATE SERPL-MCNC: 5.1 MG/DL — HIGH (ref 2.5–4.5)
PLATELET # BLD AUTO: 248 K/UL — SIGNIFICANT CHANGE UP (ref 150–400)
PMV BLD: 10.4 FL — SIGNIFICANT CHANGE UP (ref 7–13)
POTASSIUM SERPL-MCNC: 3.6 MMOL/L — SIGNIFICANT CHANGE UP (ref 3.5–5.3)
POTASSIUM SERPL-SCNC: 3.6 MMOL/L — SIGNIFICANT CHANGE UP (ref 3.5–5.3)
PROT SERPL-MCNC: 5.8 G/DL — LOW (ref 6–8.3)
RBC # BLD: 2.53 M/UL — LOW (ref 4.2–5.8)
RBC # FLD: 15.6 % — HIGH (ref 10.3–14.5)
SODIUM SERPL-SCNC: 135 MMOL/L — SIGNIFICANT CHANGE UP (ref 135–145)
WBC # BLD: 7.6 K/UL — SIGNIFICANT CHANGE UP (ref 3.8–10.5)
WBC # FLD AUTO: 7.6 K/UL — SIGNIFICANT CHANGE UP (ref 3.8–10.5)

## 2019-06-02 PROCEDURE — 73120 X-RAY EXAM OF HAND: CPT | Mod: 26,LT

## 2019-06-02 PROCEDURE — 99233 SBSQ HOSP IP/OBS HIGH 50: CPT | Mod: GC

## 2019-06-02 RX ORDER — ACETAMINOPHEN 500 MG
650 TABLET ORAL EVERY 6 HOURS
Refills: 0 | Status: DISCONTINUED | OUTPATIENT
Start: 2019-06-02 | End: 2019-06-08

## 2019-06-02 RX ORDER — LIDOCAINE 4 G/100G
1 CREAM TOPICAL DAILY
Refills: 0 | Status: DISCONTINUED | OUTPATIENT
Start: 2019-06-02 | End: 2019-06-08

## 2019-06-02 RX ORDER — OXYCODONE HYDROCHLORIDE 5 MG/1
10 TABLET ORAL EVERY 6 HOURS
Refills: 0 | Status: DISCONTINUED | OUTPATIENT
Start: 2019-06-02 | End: 2019-06-08

## 2019-06-02 RX ADMIN — Medication 2: at 21:57

## 2019-06-02 RX ADMIN — OXYCODONE HYDROCHLORIDE 10 MILLIGRAM(S): 5 TABLET ORAL at 23:18

## 2019-06-02 RX ADMIN — Medication 1: at 09:11

## 2019-06-02 RX ADMIN — BUDESONIDE AND FORMOTEROL FUMARATE DIHYDRATE 2 PUFF(S): 160; 4.5 AEROSOL RESPIRATORY (INHALATION) at 08:00

## 2019-06-02 RX ADMIN — HEPARIN SODIUM 5000 UNIT(S): 5000 INJECTION INTRAVENOUS; SUBCUTANEOUS at 13:14

## 2019-06-02 RX ADMIN — OXYCODONE HYDROCHLORIDE 10 MILLIGRAM(S): 5 TABLET ORAL at 11:42

## 2019-06-02 RX ADMIN — FAMOTIDINE 20 MILLIGRAM(S): 10 INJECTION INTRAVENOUS at 13:14

## 2019-06-02 RX ADMIN — AMLODIPINE BESYLATE 10 MILLIGRAM(S): 2.5 TABLET ORAL at 05:28

## 2019-06-02 RX ADMIN — SENNA PLUS 1 TABLET(S): 8.6 TABLET ORAL at 21:40

## 2019-06-02 RX ADMIN — Medication 650 MILLIGRAM(S): at 08:00

## 2019-06-02 RX ADMIN — OXYCODONE HYDROCHLORIDE 10 MILLIGRAM(S): 5 TABLET ORAL at 23:48

## 2019-06-02 RX ADMIN — CHLORHEXIDINE GLUCONATE 1 APPLICATION(S): 213 SOLUTION TOPICAL at 10:44

## 2019-06-02 RX ADMIN — Medication 40 MILLIGRAM(S): at 13:13

## 2019-06-02 RX ADMIN — Medication 3: at 17:38

## 2019-06-02 RX ADMIN — Medication 2: at 13:14

## 2019-06-02 RX ADMIN — BUDESONIDE AND FORMOTEROL FUMARATE DIHYDRATE 2 PUFF(S): 160; 4.5 AEROSOL RESPIRATORY (INHALATION) at 21:41

## 2019-06-02 RX ADMIN — Medication 650 MILLIGRAM(S): at 15:43

## 2019-06-02 RX ADMIN — Medication 100 MILLIGRAM(S): at 21:40

## 2019-06-02 RX ADMIN — ATORVASTATIN CALCIUM 40 MILLIGRAM(S): 80 TABLET, FILM COATED ORAL at 21:40

## 2019-06-02 RX ADMIN — Medication 81 MILLIGRAM(S): at 13:14

## 2019-06-02 RX ADMIN — Medication 650 MILLIGRAM(S): at 08:40

## 2019-06-02 RX ADMIN — HEPARIN SODIUM 5000 UNIT(S): 5000 INJECTION INTRAVENOUS; SUBCUTANEOUS at 21:40

## 2019-06-02 RX ADMIN — Medication 650 MILLIGRAM(S): at 15:11

## 2019-06-02 RX ADMIN — HEPARIN SODIUM 5000 UNIT(S): 5000 INJECTION INTRAVENOUS; SUBCUTANEOUS at 05:28

## 2019-06-02 RX ADMIN — OXYCODONE HYDROCHLORIDE 10 MILLIGRAM(S): 5 TABLET ORAL at 10:58

## 2019-06-02 NOTE — PROGRESS NOTE ADULT - PROBLEM SELECTOR PLAN 2
- Acute cholangitis in setting of CBD stricture and recent ERCP with stent placement. 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 no mass in the region of narrowing.    - patient was offered a whipple procedure at OSH, for 5/30  - surg onc consulted for evaluation, will get whipple in the next 2-3weeks.  - aracelis likely need EUS/ERCP on monday/tuesday, optimized by cards. Need to call GI on monday for timing. from acute cholangitis in setting of CBD stricture w ERCP/stent placement, w ecoli and citrobacter bacteremia. s/p repeat ERCP with stent in CBD.  - finished full course of meropenem per ID recs  - repeat MRCP showing mod intrahepatic biliary duct dilation tht's improved, extrahepatic duct dilation to 2cm, narrowing of CBD w new cbd stent. no mass in the region of narrowing.

## 2019-06-02 NOTE — PROGRESS NOTE ADULT - PROBLEM SELECTOR PLAN 6
Pt w hx of HTN, holding all HTN meds   - his home med is coreg, amlodipine-valsartan and hydralazine  - amlodipine 10   - BP 140s, will monitor, if increasing, will add hydral  - hold ACE for renal injury, coreg for bradycardia - Distributive shock in setting of E coli/ Citerobacter bacteremia   - Likely had type II NSTEMI, trops peaked ~2000s, downtrended  -EP and cards following -no interventions warranted at this time   - echo with severe global LV dysfunction, cards following  - pharm nuclear stress test fixed deficits, will need cath later on

## 2019-06-02 NOTE — PROGRESS NOTE ADULT - PROBLEM SELECTOR PLAN 8
Diet DASH and CC diet    DVTPPX SQH Resolved   Pt w profound bradycardia requiring dopamine gtt  - now off  - bradycardia likely from hypervagotonia from GI process as per EP  - no indication for PPM.

## 2019-06-02 NOTE — CHART NOTE - NSCHARTNOTEFT_GEN_A_CORE
Gastroenterology Brief Note   - cardiac clearance noted   - NPO after midnight in case of endo space on monday    - no definitive scheduled procedure tomorrow but advanced team to reach out in the AM tomorrow   - this was discussed with surgical resident yesterday

## 2019-06-02 NOTE — PROGRESS NOTE ADULT - PROBLEM SELECTOR PLAN 7
Resolved   Pt w profound bradycardia requiring dopamine gtt  - now off  - bradycardia likely from hypervagotonia from GI process as per EP  - no indication for PPM. Pt w hx of HTN, holding all HTN meds   - his home med is coreg, amlodipine-valsartan and hydralazine  - amlodipine 10   - BP 140s, will monitor, if increasing, will add hydral  - hold ACE for renal injury, coreg for bradycardia

## 2019-06-02 NOTE — PROGRESS NOTE ADULT - SUBJECTIVE AND OBJECTIVE BOX
Patient is a 63y old  Male who presents with a chief complaint of Septic Shock (01 Jun 2019 10:40)      SUBJECTIVE / OVERNIGHT EVENTS:  Patient denies chest pain, SOB, palpitations, abdominal pain, nausea, vomiting, chills, and cough    MEDICATIONS  (STANDING):  acetaminophen  IVPB .. 1000 milliGRAM(s) IV Intermittent once  amLODIPine   Tablet 10 milliGRAM(s) Oral daily  aspirin enteric coated 81 milliGRAM(s) Oral daily  atorvastatin 40 milliGRAM(s) Oral at bedtime  buDESOnide  80 MICROgram(s)/formoterol 4.5 MICROgram(s) Inhaler 2 Puff(s) Inhalation two times a day  chlorhexidine 4% Liquid 1 Application(s) Topical <User Schedule>  docusate sodium 100 milliGRAM(s) Oral three times a day  epoetin lamin Injectable 4000 Unit(s) IV Push <User Schedule>  famotidine    Tablet 20 milliGRAM(s) Oral daily  heparin  Injectable 5000 Unit(s) SubCutaneous every 8 hours  insulin lispro (HumaLOG) corrective regimen sliding scale   SubCutaneous three times a day before meals  insulin lispro (HumaLOG) corrective regimen sliding scale   SubCutaneous at bedtime  metoclopramide Injectable 10 milliGRAM(s) IV Push once  senna 1 Tablet(s) Oral at bedtime    MEDICATIONS  (PRN):  acetaminophen   Tablet .. 650 milliGRAM(s) Oral every 6 hours PRN Mild Pain (1 - 3), Moderate Pain (4 - 6), Severe Pain (7 - 10)  ALBUTerol/ipratropium for Nebulization 3 milliLiter(s) Nebulizer every 6 hours PRN Shortness of Breath and/or Wheezing  HYDROcodone/homatropine Syrup 5 milliLiter(s) Oral every 6 hours PRN Cough      T(F): 98.3 (06-02 @ 08:05), Max: 98.7 (06-01 @ 12:36)  HR: 64 (06-02 @ 08:05) (64 - 72)  BP: 130/60 (06-02 @ 08:05) (106/56 - 154/63)  RR: 18 (06-02 @ 08:05) (18 - 18)  SpO2: 95% (06-02 @ 08:05) (95% - 100%)  CAPILLARY BLOOD GLUCOSE      POCT Blood Glucose.: 159 mg/dL (02 Jun 2019 08:22)  POCT Blood Glucose.: 192 mg/dL (01 Jun 2019 21:38)  POCT Blood Glucose.: 169 mg/dL (01 Jun 2019 17:28)  POCT Blood Glucose.: 172 mg/dL (01 Jun 2019 12:32)    I&O's Summary    01 Jun 2019 07:01  -  02 Jun 2019 07:00  --------------------------------------------------------  IN: 100 mL / OUT: 400 mL / NET: -300 mL        PHYSICAL EXAM:  GEN: Appears in no acute distress  HEENT: PERRLA, EOMI and accommodate, neck supple, no lymphadenopathy, no JVD  MOUTH: moist mucous membranes, no exudates or lesions   PULM: Clear to auscultation bilaterally, no wheezes, rales, rhonchi  CV: RRR, S1S2, no murmurs, rubs or gallops  Abdominal: Soft, nontender to palpation, non-distended, normoactive bowel sounds  Extremities: WWP, No edema, + peripheral pulses  NEURO: AAOx3, moving all four extremities spontaneously  Skin: No rashes, lesions, hematomas, ecchymosis      LABS:  Labs personally reviewed.                        7.7    7.60  )-----------( 248      ( 02 Jun 2019 05:38 )             23.3     Hgb Trend: 7.7<--, 8.7<--, 8.2<--, 7.8<--, 8.8<--  06-02    135  |  97<L>  |  39<H>  ----------------------------<  189<H>  3.6   |  24  |  7.25<H>    Ca    8.4      02 Jun 2019 05:38  Phos  5.1     06-02  Mg     1.8     06-02    TPro  5.8<L>  /  Alb  2.4<L>  /  TBili  0.8  /  DBili  x   /  AST  19  /  ALT  14  /  AlkPhos  83  06-02    Creatinine Trend: 7.25<--, 5.65<--, 6.56<--, 4.36<--, 4.64<--, 5.34<--        RADIOLOGY & ADDITIONAL TESTS:  Imaging Personally Reviewed.    Consultants:      John Beard PGY-2 Pager: PORSCHE [phone]/ [phone]  Night Float: PORSCHE [id number]/ NS Patient is a 63y old  Male who presents with a chief complaint of Septic Shock (01 Jun 2019 10:40)      SUBJECTIVE / OVERNIGHT EVENTS: No major overnight events. Pt mentions developing worsening  L hand pain, mentions it feels like his gout flares. Pt mentions he takes Febuxostat and has not been taking it since he has been admitted. Pt also mentions R shoulder pain that improves with tylenol but has been affecting him more overnight. Pt tolerating PO. ambulating, Having BMs  Patient denies chest pain, SOB, palpitations, abdominal pain, nausea, vomiting, chills, and cough    MEDICATIONS  (STANDING):  acetaminophen  IVPB .. 1000 milliGRAM(s) IV Intermittent once  amLODIPine   Tablet 10 milliGRAM(s) Oral daily  aspirin enteric coated 81 milliGRAM(s) Oral daily  atorvastatin 40 milliGRAM(s) Oral at bedtime  buDESOnide  80 MICROgram(s)/formoterol 4.5 MICROgram(s) Inhaler 2 Puff(s) Inhalation two times a day  chlorhexidine 4% Liquid 1 Application(s) Topical <User Schedule>  docusate sodium 100 milliGRAM(s) Oral three times a day  epoetin lamin Injectable 4000 Unit(s) IV Push <User Schedule>  famotidine    Tablet 20 milliGRAM(s) Oral daily  heparin  Injectable 5000 Unit(s) SubCutaneous every 8 hours  insulin lispro (HumaLOG) corrective regimen sliding scale   SubCutaneous three times a day before meals  insulin lispro (HumaLOG) corrective regimen sliding scale   SubCutaneous at bedtime  metoclopramide Injectable 10 milliGRAM(s) IV Push once  senna 1 Tablet(s) Oral at bedtime    MEDICATIONS  (PRN):  acetaminophen   Tablet .. 650 milliGRAM(s) Oral every 6 hours PRN Mild Pain (1 - 3), Moderate Pain (4 - 6), Severe Pain (7 - 10)  ALBUTerol/ipratropium for Nebulization 3 milliLiter(s) Nebulizer every 6 hours PRN Shortness of Breath and/or Wheezing  HYDROcodone/homatropine Syrup 5 milliLiter(s) Oral every 6 hours PRN Cough      T(F): 98.3 (06-02 @ 08:05), Max: 98.7 (06-01 @ 12:36)  HR: 64 (06-02 @ 08:05) (64 - 72)  BP: 130/60 (06-02 @ 08:05) (106/56 - 154/63)  RR: 18 (06-02 @ 08:05) (18 - 18)  SpO2: 95% (06-02 @ 08:05) (95% - 100%)  CAPILLARY BLOOD GLUCOSE      POCT Blood Glucose.: 159 mg/dL (02 Jun 2019 08:22)  POCT Blood Glucose.: 192 mg/dL (01 Jun 2019 21:38)  POCT Blood Glucose.: 169 mg/dL (01 Jun 2019 17:28)  POCT Blood Glucose.: 172 mg/dL (01 Jun 2019 12:32)    I&O's Summary    01 Jun 2019 07:01  -  02 Jun 2019 07:00  --------------------------------------------------------  IN: 100 mL / OUT: 400 mL / NET: -300 mL        PHYSICAL EXAM:  GEN: Appears in no acute distress  HEENT: PERRLA, EOMI and accommodate, neck supple, no lymphadenopathy, no JVD  MOUTH: moist mucous membranes, no exudates or lesions   PULM: Clear to auscultation bilaterally, no wheezes, rales, rhonchi  CV: RRR, S1S2, no murmurs, rubs or gallops  Abdominal: Soft, nontender to palpation, non-distended, normoactive bowel sounds  Extremities: WWP, No edema, + peripheral pulses  NEURO: AAOx3, moving all four extremities spontaneously, strength 5/5 in R arm  Skin: No rashes, lesions, hematomas, ecchymosis  MSK: pt able to lift R arm to 90 degrees. L hand is edematous with 4th digit joint swollen, non erythematous, slightly warm      LABS:  Labs personally reviewed.                        7.7    7.60  )-----------( 248      ( 02 Jun 2019 05:38 )             23.3     Hgb Trend: 7.7<--, 8.7<--, 8.2<--, 7.8<--, 8.8<--  06-02    135  |  97<L>  |  39<H>  ----------------------------<  189<H>  3.6   |  24  |  7.25<H>    Ca    8.4      02 Jun 2019 05:38  Phos  5.1     06-02  Mg     1.8     06-02    TPro  5.8<L>  /  Alb  2.4<L>  /  TBili  0.8  /  DBili  x   /  AST  19  /  ALT  14  /  AlkPhos  83  06-02    Creatinine Trend: 7.25<--, 5.65<--, 6.56<--, 4.36<--, 4.64<--, 5.34<--        RADIOLOGY & ADDITIONAL TESTS:  Imaging Personally Reviewed.    Consultants: Sue Beard PGY-2 Pager: PORSCHE [phone]/ [phone]  Night Float: P[place]SCHE [id number]/ NS Patient is a 63y old  Male who presents with a chief complaint of Septic Shock (01 Jun 2019 10:40)      SUBJECTIVE / OVERNIGHT EVENTS: No major overnight events. Pt mentions developing worsening  L hand pain, mentions it feels like his gout flares. Pt mentions he takes Febuxostat and has not been taking it since he has been admitted. Pt also mentions R shoulder pain that improves with tylenol but has been affecting him more overnight. Pt tolerating PO. ambulating, Having BMs  Patient denies chest pain, SOB, palpitations, abdominal pain, nausea, vomiting, chills, and cough    MEDICATIONS  (STANDING):  acetaminophen  IVPB .. 1000 milliGRAM(s) IV Intermittent once  amLODIPine   Tablet 10 milliGRAM(s) Oral daily  aspirin enteric coated 81 milliGRAM(s) Oral daily  atorvastatin 40 milliGRAM(s) Oral at bedtime  buDESOnide  80 MICROgram(s)/formoterol 4.5 MICROgram(s) Inhaler 2 Puff(s) Inhalation two times a day  chlorhexidine 4% Liquid 1 Application(s) Topical <User Schedule>  docusate sodium 100 milliGRAM(s) Oral three times a day  epoetin lamin Injectable 4000 Unit(s) IV Push <User Schedule>  famotidine    Tablet 20 milliGRAM(s) Oral daily  heparin  Injectable 5000 Unit(s) SubCutaneous every 8 hours  insulin lispro (HumaLOG) corrective regimen sliding scale   SubCutaneous three times a day before meals  insulin lispro (HumaLOG) corrective regimen sliding scale   SubCutaneous at bedtime  metoclopramide Injectable 10 milliGRAM(s) IV Push once  senna 1 Tablet(s) Oral at bedtime    MEDICATIONS  (PRN):  acetaminophen   Tablet .. 650 milliGRAM(s) Oral every 6 hours PRN Mild Pain (1 - 3), Moderate Pain (4 - 6), Severe Pain (7 - 10)  ALBUTerol/ipratropium for Nebulization 3 milliLiter(s) Nebulizer every 6 hours PRN Shortness of Breath and/or Wheezing  HYDROcodone/homatropine Syrup 5 milliLiter(s) Oral every 6 hours PRN Cough      T(F): 98.3 (06-02 @ 08:05), Max: 98.7 (06-01 @ 12:36)  HR: 64 (06-02 @ 08:05) (64 - 72)  BP: 130/60 (06-02 @ 08:05) (106/56 - 154/63)  RR: 18 (06-02 @ 08:05) (18 - 18)  SpO2: 95% (06-02 @ 08:05) (95% - 100%)  CAPILLARY BLOOD GLUCOSE      POCT Blood Glucose.: 159 mg/dL (02 Jun 2019 08:22)  POCT Blood Glucose.: 192 mg/dL (01 Jun 2019 21:38)  POCT Blood Glucose.: 169 mg/dL (01 Jun 2019 17:28)  POCT Blood Glucose.: 172 mg/dL (01 Jun 2019 12:32)    I&O's Summary    01 Jun 2019 07:01  -  02 Jun 2019 07:00  --------------------------------------------------------  IN: 100 mL / OUT: 400 mL / NET: -300 mL        PHYSICAL EXAM:  GEN: Appears in no acute distress  HEENT: PERRLA, EOMI and accommodate, neck supple, no lymphadenopathy, no JVD  MOUTH: moist mucous membranes, no exudates or lesions   PULM: Clear to auscultation bilaterally, no wheezes, rales, rhonchi  CV: RRR, S1S2, no murmurs, rubs or gallops  Abdominal: Soft, nontender to palpation, non-distended, normoactive bowel sounds  Extremities: WWP, No edema, + peripheral pulses; swelling over L fourth digit PIP  NEURO: AAOx3, moving all four extremities spontaneously, strength 5/5 in R arm  Skin: No rashes, lesions, hematomas, ecchymosis  MSK: pt able to lift R arm to 90 degrees. L hand is edematous with 4th digit joint swollen, non erythematous, slightly warm      LABS:  Labs personally reviewed.                        7.7    7.60  )-----------( 248      ( 02 Jun 2019 05:38 )             23.3     Hgb Trend: 7.7<--, 8.7<--, 8.2<--, 7.8<--, 8.8<--  06-02    135  |  97<L>  |  39<H>  ----------------------------<  189<H>  3.6   |  24  |  7.25<H>    Ca    8.4      02 Jun 2019 05:38  Phos  5.1     06-02  Mg     1.8     06-02    TPro  5.8<L>  /  Alb  2.4<L>  /  TBili  0.8  /  DBili  x   /  AST  19  /  ALT  14  /  AlkPhos  83  06-02    Creatinine Trend: 7.25<--, 5.65<--, 6.56<--, 4.36<--, 4.64<--, 5.34<--        RADIOLOGY & ADDITIONAL TESTS:  Imaging Personally Reviewed.    Consultants: Sue Beard PGY-2 Pager: PORSCHE [phone]/ [phone]  Night Float: PORSCHE [id number]/ NS

## 2019-06-02 NOTE — PROGRESS NOTE ADULT - ASSESSMENT
64 yo M hx of CAD s/p 9 stents, DMT2 on insulin, HTN, GERD, prior cholecystectomy,  diverticulosis, former smoker(30PY), biliary stricture s/p ERCP with sphincterotomy and stent 4/2019 who presented as transfer from [facility] for acute cholangitis s/p ERCP and stent CBD stent placement by GI on 5/19.Hospital course further complicated by Type II NSTEMI and bradycardia. Now on floors, resolved bradycardia, undergoing workup for biliary tree strictures

## 2019-06-02 NOTE — PROGRESS NOTE ADULT - PROBLEM SELECTOR PLAN 5
- Distributive shock in setting of E coli/ Citerobacter bacteremia   - Likely had type II NSTEMI, trops peaked ~2000s, downtrended  -EP and cards following -no interventions warranted at this time   - echo with severe global LV dysfunction, cards following  - pharm nuclear stress test fixed deficits, will need cath later on Monitor FS   - hypoglycemic  - d/c all insulins  - SS  A1c 6.5 in april 2019

## 2019-06-02 NOTE — PROGRESS NOTE ADULT - PROBLEM SELECTOR PLAN 1
from acute cholangitis in setting of CBD stricture w ERCP/stent placement, w ecoli and citrobacter bacteremia. s/p repeat ERCP with stent in CBD.  - finished full course of meropenem per ID recs  - repeat MRCP showing mod intrahepatic biliary duct dilation tht's improved, extrahepatic duct dilation to 2cm, narrowing of CBD w new cbd stent. no mass in the region of narrowing. - Acute cholangitis in setting of CBD stricture and recent ERCP with stent placement. 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 no mass in the region of narrowing.    - patient was offered a whipple procedure at OSH, for 5/30  - surg onc consulted for evaluation, will get whipple in the next 2-3weeks.  - possible EUS/ERCP on monday/tuesday, optimized by cards. Need to call GI on monday for timing. NPO after midnight for now

## 2019-06-02 NOTE — PROGRESS NOTE ADULT - PROBLEM SELECTOR PLAN 4
Monitor FS   - hypoglycemic  - d/c all insulins  - SS  A1c 6.5 in april 2019 Renal failure in setting of distributive shock   Pt w CKD hx, with SLOAN on CKD IV likely from sepsis   - HD as per renal through permacath, kalpana dutta d/roel on 5/29  - continue to monitor for renal recovery, still anuric   - HD through permcath  - pt has hx of being on PPI and getting SLOAN, talked to GI who recommended switching it while monitoring for renal recovery. started famotidine as per GI recs  - urinating more, will try to measure I&Os

## 2019-06-02 NOTE — PROGRESS NOTE ADULT - PROBLEM SELECTOR PLAN 3
Renal failure in setting of distributive shock   Pt w CKD hx, with SLOAN on CKD IV likely from sepsis   - HD as per renal through permacath, kalpana dutta d/roel on 5/29  - continue to monitor for renal recovery, still anuric   - HD through permcath  - pt has hx of being on PPI and getting SLOAN, talked to GI who recommended switching it while monitoring for renal recovery. started famotidine as per GI recs  - urinating more, will try to measure I&Os -Possible flare given presentation and pt missing his febuxostat  -Rheum consulted, rec appreciated  -f/u Hand Xray and uric acid level  -Will give one time stat dose of prednisone 40mg and re-assess tomorrow

## 2019-06-03 LAB
ALBUMIN SERPL ELPH-MCNC: 2.6 G/DL — LOW (ref 3.3–5)
ALP SERPL-CCNC: 85 U/L — SIGNIFICANT CHANGE UP (ref 40–120)
ALT FLD-CCNC: 11 U/L — SIGNIFICANT CHANGE UP (ref 4–41)
ANION GAP SERPL CALC-SCNC: 16 MMO/L — HIGH (ref 7–14)
AST SERPL-CCNC: 16 U/L — SIGNIFICANT CHANGE UP (ref 4–40)
BASOPHILS # BLD AUTO: 0.02 K/UL — SIGNIFICANT CHANGE UP (ref 0–0.2)
BASOPHILS NFR BLD AUTO: 0.2 % — SIGNIFICANT CHANGE UP (ref 0–2)
BILIRUB SERPL-MCNC: 0.7 MG/DL — SIGNIFICANT CHANGE UP (ref 0.2–1.2)
BUN SERPL-MCNC: 49 MG/DL — HIGH (ref 7–23)
CALCIUM SERPL-MCNC: 9 MG/DL — SIGNIFICANT CHANGE UP (ref 8.4–10.5)
CHLORIDE SERPL-SCNC: 91 MMOL/L — LOW (ref 98–107)
CO2 SERPL-SCNC: 23 MMOL/L — SIGNIFICANT CHANGE UP (ref 22–31)
CREAT SERPL-MCNC: 8.35 MG/DL — HIGH (ref 0.5–1.3)
EOSINOPHIL # BLD AUTO: 0 K/UL — SIGNIFICANT CHANGE UP (ref 0–0.5)
EOSINOPHIL NFR BLD AUTO: 0 % — SIGNIFICANT CHANGE UP (ref 0–6)
GLUCOSE BLDC GLUCOMTR-MCNC: 128 MG/DL — HIGH (ref 70–99)
GLUCOSE BLDC GLUCOMTR-MCNC: 170 MG/DL — HIGH (ref 70–99)
GLUCOSE BLDC GLUCOMTR-MCNC: 263 MG/DL — HIGH (ref 70–99)
GLUCOSE BLDC GLUCOMTR-MCNC: 277 MG/DL — HIGH (ref 70–99)
GLUCOSE BLDC GLUCOMTR-MCNC: 298 MG/DL — HIGH (ref 70–99)
GLUCOSE SERPL-MCNC: 287 MG/DL — HIGH (ref 70–99)
HCT VFR BLD CALC: 24.1 % — LOW (ref 39–50)
HGB BLD-MCNC: 7.8 G/DL — LOW (ref 13–17)
IMM GRANULOCYTES NFR BLD AUTO: 0.5 % — SIGNIFICANT CHANGE UP (ref 0–1.5)
LYMPHOCYTES # BLD AUTO: 1.08 K/UL — SIGNIFICANT CHANGE UP (ref 1–3.3)
LYMPHOCYTES # BLD AUTO: 12.5 % — LOW (ref 13–44)
MAGNESIUM SERPL-MCNC: 1.7 MG/DL — SIGNIFICANT CHANGE UP (ref 1.6–2.6)
MCHC RBC-ENTMCNC: 29.8 PG — SIGNIFICANT CHANGE UP (ref 27–34)
MCHC RBC-ENTMCNC: 32.4 % — SIGNIFICANT CHANGE UP (ref 32–36)
MCV RBC AUTO: 92 FL — SIGNIFICANT CHANGE UP (ref 80–100)
MONOCYTES # BLD AUTO: 0.78 K/UL — SIGNIFICANT CHANGE UP (ref 0–0.9)
MONOCYTES NFR BLD AUTO: 9.1 % — SIGNIFICANT CHANGE UP (ref 2–14)
NEUTROPHILS # BLD AUTO: 6.69 K/UL — SIGNIFICANT CHANGE UP (ref 1.8–7.4)
NEUTROPHILS NFR BLD AUTO: 77.7 % — HIGH (ref 43–77)
NRBC # FLD: 0 K/UL — SIGNIFICANT CHANGE UP (ref 0–0)
PHOSPHATE SERPL-MCNC: 5.7 MG/DL — HIGH (ref 2.5–4.5)
PLATELET # BLD AUTO: 275 K/UL — SIGNIFICANT CHANGE UP (ref 150–400)
PMV BLD: 10.5 FL — SIGNIFICANT CHANGE UP (ref 7–13)
POTASSIUM SERPL-MCNC: 4.2 MMOL/L — SIGNIFICANT CHANGE UP (ref 3.5–5.3)
POTASSIUM SERPL-SCNC: 4.2 MMOL/L — SIGNIFICANT CHANGE UP (ref 3.5–5.3)
PROT SERPL-MCNC: 6.2 G/DL — SIGNIFICANT CHANGE UP (ref 6–8.3)
RBC # BLD: 2.62 M/UL — LOW (ref 4.2–5.8)
RBC # FLD: 15 % — HIGH (ref 10.3–14.5)
SODIUM SERPL-SCNC: 130 MMOL/L — LOW (ref 135–145)
URATE SERPL-MCNC: 6.5 MG/DL — SIGNIFICANT CHANGE UP (ref 3.4–8.8)
WBC # BLD: 8.61 K/UL — SIGNIFICANT CHANGE UP (ref 3.8–10.5)
WBC # FLD AUTO: 8.61 K/UL — SIGNIFICANT CHANGE UP (ref 3.8–10.5)

## 2019-06-03 PROCEDURE — 99232 SBSQ HOSP IP/OBS MODERATE 35: CPT | Mod: GC

## 2019-06-03 PROCEDURE — 99232 SBSQ HOSP IP/OBS MODERATE 35: CPT

## 2019-06-03 PROCEDURE — 99233 SBSQ HOSP IP/OBS HIGH 50: CPT | Mod: GC

## 2019-06-03 PROCEDURE — 99222 1ST HOSP IP/OBS MODERATE 55: CPT | Mod: GC

## 2019-06-03 PROCEDURE — 99233 SBSQ HOSP IP/OBS HIGH 50: CPT

## 2019-06-03 RX ORDER — INSULIN GLARGINE 100 [IU]/ML
6 INJECTION, SOLUTION SUBCUTANEOUS AT BEDTIME
Refills: 0 | Status: DISCONTINUED | OUTPATIENT
Start: 2019-06-03 | End: 2019-06-05

## 2019-06-03 RX ORDER — INSULIN GLARGINE 100 [IU]/ML
4 INJECTION, SOLUTION SUBCUTANEOUS AT BEDTIME
Refills: 0 | Status: DISCONTINUED | OUTPATIENT
Start: 2019-06-03 | End: 2019-06-03

## 2019-06-03 RX ADMIN — BUDESONIDE AND FORMOTEROL FUMARATE DIHYDRATE 2 PUFF(S): 160; 4.5 AEROSOL RESPIRATORY (INHALATION) at 20:24

## 2019-06-03 RX ADMIN — Medication 100 MILLIGRAM(S): at 21:54

## 2019-06-03 RX ADMIN — OXYCODONE HYDROCHLORIDE 10 MILLIGRAM(S): 5 TABLET ORAL at 23:44

## 2019-06-03 RX ADMIN — ERYTHROPOIETIN 4000 UNIT(S): 10000 INJECTION, SOLUTION INTRAVENOUS; SUBCUTANEOUS at 08:31

## 2019-06-03 RX ADMIN — HEPARIN SODIUM 5000 UNIT(S): 5000 INJECTION INTRAVENOUS; SUBCUTANEOUS at 21:54

## 2019-06-03 RX ADMIN — Medication 3: at 17:55

## 2019-06-03 RX ADMIN — CHLORHEXIDINE GLUCONATE 1 APPLICATION(S): 213 SOLUTION TOPICAL at 12:54

## 2019-06-03 RX ADMIN — Medication 1: at 12:46

## 2019-06-03 RX ADMIN — FAMOTIDINE 20 MILLIGRAM(S): 10 INJECTION INTRAVENOUS at 12:48

## 2019-06-03 RX ADMIN — HEPARIN SODIUM 5000 UNIT(S): 5000 INJECTION INTRAVENOUS; SUBCUTANEOUS at 12:48

## 2019-06-03 RX ADMIN — INSULIN GLARGINE 6 UNIT(S): 100 INJECTION, SOLUTION SUBCUTANEOUS at 21:54

## 2019-06-03 RX ADMIN — ATORVASTATIN CALCIUM 40 MILLIGRAM(S): 80 TABLET, FILM COATED ORAL at 21:54

## 2019-06-03 RX ADMIN — Medication 650 MILLIGRAM(S): at 18:00

## 2019-06-03 RX ADMIN — Medication 1: at 21:54

## 2019-06-03 RX ADMIN — Medication 100 MILLIGRAM(S): at 12:49

## 2019-06-03 RX ADMIN — Medication 40 MILLIGRAM(S): at 12:48

## 2019-06-03 RX ADMIN — Medication 81 MILLIGRAM(S): at 12:49

## 2019-06-03 NOTE — CONSULT NOTE ADULT - PROVIDER SPECIALTY LIST ADULT
Cardiology
Electrophysiology
Gastroenterology
Infectious Disease
Intervent Radiology
Intervent Radiology
Rheumatology
Surgery
Nephrology

## 2019-06-03 NOTE — PROGRESS NOTE ADULT - PROBLEM SELECTOR PLAN 4
Renal failure in setting of distributive shock   Pt w CKD hx, with SLOAN on CKD IV likely from sepsis   - HD as per renal through permacath, kalpana dutta d/roel on 5/29  - continue to monitor for renal recovery, still anuric   - HD through permcath  - pt has hx of being on PPI and getting SLOAN, talked to GI who recommended switching it while monitoring for renal recovery. started famotidine as per GI recs  - urinating more, will try to measure I&Os Renal failure in setting of distributive shock   Pt w CKD hx, with SLOAN on CKD IV likely from sepsis   - HD as per renal through permacath, kalpana dutta d/roel on 5/29  - continue to monitor for renal recovery, oliguric   - HD through permcath  - pt has hx of being on PPI and getting SLOAN, talked to GI who recommended switching it while monitoring for renal recovery. started famotidine as per GI recs  - urinating more, will try to measure I&Os

## 2019-06-03 NOTE — PROGRESS NOTE ADULT - ASSESSMENT
62 yo M with a PMH of ASHD s/p nine cardiac stents, reported recent normal 2D TTE, T2DM, HTN, GERD, cholecystectomy, diverticulosis, former smoker 30 py) and biliary stricture s/p ERCP with sphincterotomy and stent 4/2019 who presented as a transfer [facility] for further GI work up; found to have severe bradycardia in the 20s despite IV dopamine due to high vagal tone.  Currently off dopamine and maintaining NSR 60-70 bpm without bradycardia.  Echo showed severely LV systolic dysfunction.     - no pacing indication at this time  - continue telemetry monitor for sandy or tachyarrhythmia   -consider low dose BB for CM  - d/w EP attending, Dr. Andersen

## 2019-06-03 NOTE — PROGRESS NOTE ADULT - PROBLEM SELECTOR PLAN 6
- Distributive shock in setting of E coli/ Citerobacter bacteremia   - Likely had type II NSTEMI, trops peaked ~2000s, downtrended  -EP and cards following -no interventions warranted at this time   - echo with severe global LV dysfunction, cards following  - pharm nuclear stress test fixed deficits, will need cath later on

## 2019-06-03 NOTE — PROGRESS NOTE ADULT - SUBJECTIVE AND OBJECTIVE BOX
Ella Suyeon Yu PGY-1   [facility] Pager # [phone]  NS Pager # [phone]      Patient is a 63y old  Male who presents with a chief complaint of Septic Shock (02 Jun 2019 08:52)      SUBJECTIVE: No acute events overnight. Patient seen and examined at bedside.    REVIEW OF SYSTEMS:  CONSTITUTIONAL: No fever, weight loss, or fatigue  RESPIRATORY: No cough or shortness of breath  CARDIOVASCULAR: No chest pain, palpitations, dizziness, or leg swelling  GASTROINTESTINAL: No abdominal or epigastric pain. No nausea, vomiting, or hematemesis; No diarrhea or constipation. No melena or hematochezia.  GENITOURINARY: No dysuria  NEUROLOGICAL: No headaches  SKIN: No itching or lesions       MEDICATIONS  (STANDING):  acetaminophen  IVPB .. 1000 milliGRAM(s) IV Intermittent once  amLODIPine   Tablet 10 milliGRAM(s) Oral daily  aspirin enteric coated 81 milliGRAM(s) Oral daily  atorvastatin 40 milliGRAM(s) Oral at bedtime  buDESOnide  80 MICROgram(s)/formoterol 4.5 MICROgram(s) Inhaler 2 Puff(s) Inhalation two times a day  chlorhexidine 4% Liquid 1 Application(s) Topical <User Schedule>  docusate sodium 100 milliGRAM(s) Oral three times a day  epoetin lamin Injectable 4000 Unit(s) IV Push <User Schedule>  famotidine    Tablet 20 milliGRAM(s) Oral daily  heparin  Injectable 5000 Unit(s) SubCutaneous every 8 hours  insulin lispro (HumaLOG) corrective regimen sliding scale   SubCutaneous three times a day before meals  insulin lispro (HumaLOG) corrective regimen sliding scale   SubCutaneous at bedtime  metoclopramide Injectable 10 milliGRAM(s) IV Push once  senna 1 Tablet(s) Oral at bedtime    MEDICATIONS  (PRN):  acetaminophen   Tablet .. 650 milliGRAM(s) Oral every 6 hours PRN Mild Pain (1 - 3), Moderate Pain (4 - 6)  ALBUTerol/ipratropium for Nebulization 3 milliLiter(s) Nebulizer every 6 hours PRN Shortness of Breath and/or Wheezing  HYDROcodone/homatropine Syrup 5 milliLiter(s) Oral every 6 hours PRN Cough  lidocaine   Patch 1 Patch Transdermal daily PRN Shoulder pain  oxyCODONE    IR 10 milliGRAM(s) Oral every 6 hours PRN Severe Pain (7 - 10)        Objective:    Vitals: Vital Signs Last 24 Hrs  T(C): 36.5 (06-03-19 @ 07:05), Max: 37.1 (06-02-19 @ 16:53)  T(F): 97.7 (06-03-19 @ 07:05), Max: 98.7 (06-02-19 @ 16:53)  HR: 62 (06-03-19 @ 07:05) (62 - 70)  BP: 163/69 (06-03-19 @ 07:05) (121/55 - 163/69)  BP(mean): --  RR: 16 (06-03-19 @ 07:05) (16 - 18)  SpO2: 100% (06-03-19 @ 05:27) (95% - 100%)            I&O's Summary    02 Jun 2019 07:01  -  03 Jun 2019 07:00  --------------------------------------------------------  IN: 0 mL / OUT: 700 mL / NET: -700 mL        PHYSICAL EXAM:  GENERAL: NAD  HEAD:  Atraumatic, Normocephalic  CHEST/LUNG: Clear to auscultation bilaterally; No rales or wheezing  HEART: Regular rate and rhythm; No murmurs, rubs, or gallops  ABDOMEN: Soft, nontender, nondistended  EXTREMITIES:  No clubbing, cyanosis, or edema  LYMPH: No lymphadenopathy noted  SKIN: No rashes or lesions  NERVOUS SYSTEM:  Alert & Oriented X3    LABS:  06-02    135  |  97<L>  |  39<H>  ----------------------------<  189<H>  3.6   |  24  |  7.25<H>  06-01    134<L>  |  95<L>  |  29<H>  ----------------------------<  130<H>  3.8   |  21<L>  |  5.65<H>    Ca    8.4      02 Jun 2019 05:38  Ca    8.6      01 Jun 2019 06:19  Phos  5.1     06-02  Mg     1.8     06-02    TPro  5.8<L>  /  Alb  2.4<L>  /  TBili  0.8  /  DBili  x   /  AST  19  /  ALT  14  /  AlkPhos  83  06-02  TPro  6.3  /  Alb  2.6<L>  /  TBili  0.9  /  DBili  x   /  AST  25  /  ALT  19  /  AlkPhos  100  06-01                                            7.7    7.60  )-----------( 248      ( 02 Jun 2019 05:38 )             23.3                         8.7    9.56  )-----------( 285      ( 01 Jun 2019 06:19 )             26.9                         8.2    9.86  )-----------( 245      ( 31 May 2019 17:03 )             25.2     CAPILLARY BLOOD GLUCOSE      POCT Blood Glucose.: 298 mg/dL (03 Jun 2019 05:47)  POCT Blood Glucose.: 344 mg/dL (02 Jun 2019 21:52)  POCT Blood Glucose.: 265 mg/dL (02 Jun 2019 17:37)  POCT Blood Glucose.: 235 mg/dL (02 Jun 2019 12:28)  POCT Blood Glucose.: 159 mg/dL (02 Jun 2019 08:22)    RADIOLOGY:  Imaging Personally Reviewed: YES      Consultants involved in case: YES  Consultant(s) Notes Reviewed:  YES  Care Discussed with Consultants/Other Providers     Ella Suyeon Yu PGY-1 Ella Suyeon Yu PGY-1   [facility] Pager # [phone]  NS Pager # [phone]      Patient is a 63y old  Male who presents with a chief complaint of Septic Shock (02 Jun 2019 08:52)      SUBJECTIVE: No acute events overnight. Patient seen and examined at bedside. He was in HD this AM, complaining of some 4th digit pain L hand, otherwise no other complaints. Some soreness in his forearm. Feeling better.     REVIEW OF SYSTEMS:  CONSTITUTIONAL: No fever, weight loss, or fatigue  RESPIRATORY: No cough or shortness of breath  CARDIOVASCULAR: No chest pain, palpitations, dizziness, or leg swelling  GASTROINTESTINAL: No abdominal or epigastric pain. No nausea, vomiting, or hematemesis; No diarrhea or constipation. No melena or hematochezia.  GENITOURINARY: No dysuria  NEUROLOGICAL: No headaches  SKIN: No itching or lesions       MEDICATIONS  (STANDING):  acetaminophen  IVPB .. 1000 milliGRAM(s) IV Intermittent once  amLODIPine   Tablet 10 milliGRAM(s) Oral daily  aspirin enteric coated 81 milliGRAM(s) Oral daily  atorvastatin 40 milliGRAM(s) Oral at bedtime  buDESOnide  80 MICROgram(s)/formoterol 4.5 MICROgram(s) Inhaler 2 Puff(s) Inhalation two times a day  chlorhexidine 4% Liquid 1 Application(s) Topical <User Schedule>  docusate sodium 100 milliGRAM(s) Oral three times a day  epoetin lamin Injectable 4000 Unit(s) IV Push <User Schedule>  famotidine    Tablet 20 milliGRAM(s) Oral daily  heparin  Injectable 5000 Unit(s) SubCutaneous every 8 hours  insulin lispro (HumaLOG) corrective regimen sliding scale   SubCutaneous three times a day before meals  insulin lispro (HumaLOG) corrective regimen sliding scale   SubCutaneous at bedtime  metoclopramide Injectable 10 milliGRAM(s) IV Push once  senna 1 Tablet(s) Oral at bedtime    MEDICATIONS  (PRN):  acetaminophen   Tablet .. 650 milliGRAM(s) Oral every 6 hours PRN Mild Pain (1 - 3), Moderate Pain (4 - 6)  ALBUTerol/ipratropium for Nebulization 3 milliLiter(s) Nebulizer every 6 hours PRN Shortness of Breath and/or Wheezing  HYDROcodone/homatropine Syrup 5 milliLiter(s) Oral every 6 hours PRN Cough  lidocaine   Patch 1 Patch Transdermal daily PRN Shoulder pain  oxyCODONE    IR 10 milliGRAM(s) Oral every 6 hours PRN Severe Pain (7 - 10)        Objective:    Vitals: Vital Signs Last 24 Hrs  T(C): 36.5 (06-03-19 @ 07:05), Max: 37.1 (06-02-19 @ 16:53)  T(F): 97.7 (06-03-19 @ 07:05), Max: 98.7 (06-02-19 @ 16:53)  HR: 62 (06-03-19 @ 07:05) (62 - 70)  BP: 163/69 (06-03-19 @ 07:05) (121/55 - 163/69)  BP(mean): --  RR: 16 (06-03-19 @ 07:05) (16 - 18)  SpO2: 100% (06-03-19 @ 05:27) (95% - 100%)            I&O's Summary    02 Jun 2019 07:01  -  03 Jun 2019 07:00  --------------------------------------------------------  IN: 0 mL / OUT: 700 mL / NET: -700 mL        PHYSICAL EXAM:  GENERAL: NAD  HEAD:  Atraumatic, Normocephalic  CHEST/LUNG: Clear to auscultation bilaterally; No rales or wheezing  HEART: Regular rate and rhythm; No murmurs, rubs, or gallops  ABDOMEN: Soft, nontender, nondistended  EXTREMITIES:  No clubbing, cyanosis, or edema  HAND: L 4th PIP swelling, no erythema and no rash, no warmth.  SKIN: No rashes or lesions  NERVOUS SYSTEM:  Alert & Oriented X3    LABS:  06-02    135  |  97<L>  |  39<H>  ----------------------------<  189<H>  3.6   |  24  |  7.25<H>  06-01    134<L>  |  95<L>  |  29<H>  ----------------------------<  130<H>  3.8   |  21<L>  |  5.65<H>    Ca    8.4      02 Jun 2019 05:38  Ca    8.6      01 Jun 2019 06:19  Phos  5.1     06-02  Mg     1.8     06-02    TPro  5.8<L>  /  Alb  2.4<L>  /  TBili  0.8  /  DBili  x   /  AST  19  /  ALT  14  /  AlkPhos  83  06-02  TPro  6.3  /  Alb  2.6<L>  /  TBili  0.9  /  DBili  x   /  AST  25  /  ALT  19  /  AlkPhos  100  06-01                                            7.7    7.60  )-----------( 248      ( 02 Jun 2019 05:38 )             23.3                         8.7    9.56  )-----------( 285      ( 01 Jun 2019 06:19 )             26.9                         8.2    9.86  )-----------( 245      ( 31 May 2019 17:03 )             25.2     CAPILLARY BLOOD GLUCOSE      POCT Blood Glucose.: 298 mg/dL (03 Jun 2019 05:47)  POCT Blood Glucose.: 344 mg/dL (02 Jun 2019 21:52)  POCT Blood Glucose.: 265 mg/dL (02 Jun 2019 17:37)  POCT Blood Glucose.: 235 mg/dL (02 Jun 2019 12:28)  POCT Blood Glucose.: 159 mg/dL (02 Jun 2019 08:22)    RADIOLOGY:  Imaging Personally Reviewed: YES      Consultants involved in case: YES  Consultant(s) Notes Reviewed:  YES  Care Discussed with Consultants/Other Providers     Ella Suyeon Yu PGY-1

## 2019-06-03 NOTE — PROGRESS NOTE ADULT - SUBJECTIVE AND OBJECTIVE BOX
Chief Complaint:  Patient is a 63y old  Male who presents with a chief complaint of Septic Shock (03 Jun 2019 07:48)      Interval Events:   - patient in HD unit this am    Allergies:  Cipro (Rash)  Plavix (Rash)      Hospital Medications:  acetaminophen   Tablet .. 650 milliGRAM(s) Oral every 6 hours PRN  acetaminophen  IVPB .. 1000 milliGRAM(s) IV Intermittent once  ALBUTerol/ipratropium for Nebulization 3 milliLiter(s) Nebulizer every 6 hours PRN  amLODIPine   Tablet 10 milliGRAM(s) Oral daily  aspirin enteric coated 81 milliGRAM(s) Oral daily  atorvastatin 40 milliGRAM(s) Oral at bedtime  buDESOnide  80 MICROgram(s)/formoterol 4.5 MICROgram(s) Inhaler 2 Puff(s) Inhalation two times a day  chlorhexidine 4% Liquid 1 Application(s) Topical <User Schedule>  docusate sodium 100 milliGRAM(s) Oral three times a day  epoetin lamin Injectable 4000 Unit(s) IV Push <User Schedule>  famotidine    Tablet 20 milliGRAM(s) Oral daily  heparin  Injectable 5000 Unit(s) SubCutaneous every 8 hours  HYDROcodone/homatropine Syrup 5 milliLiter(s) Oral every 6 hours PRN  insulin glargine Injectable (LANTUS) 4 Unit(s) SubCutaneous at bedtime  insulin lispro (HumaLOG) corrective regimen sliding scale   SubCutaneous three times a day before meals  insulin lispro (HumaLOG) corrective regimen sliding scale   SubCutaneous at bedtime  lidocaine   Patch 1 Patch Transdermal daily PRN  metoclopramide Injectable 10 milliGRAM(s) IV Push once  oxyCODONE    IR 10 milliGRAM(s) Oral every 6 hours PRN  predniSONE   Tablet 40 milliGRAM(s) Oral once  senna 1 Tablet(s) Oral at bedtime      PMHX/PSHX:  Gout  Type II diabetes mellitus  HTN (hypertension)  CAD (coronary artery disease)  Cirrhosis  S/P cholecystectomy      Family history:        Vital Signs:  Vital Signs Last 24 Hrs  T(C): 36.4 (03 Jun 2019 10:17), Max: 37.1 (02 Jun 2019 16:53)  T(F): 97.6 (03 Jun 2019 10:17), Max: 98.7 (02 Jun 2019 16:53)  HR: 69 (03 Jun 2019 10:17) (62 - 70)  BP: 140/48 (03 Jun 2019 10:17) (121/55 - 163/69)  BP(mean): --  RR: 17 (03 Jun 2019 10:17) (16 - 18)  SpO2: 100% (03 Jun 2019 10:17) (95% - 100%)  Daily     Daily     LABS:                        7.8    8.61  )-----------( 275      ( 03 Jun 2019 06:40 )             24.1     06-03    130<L>  |  91<L>  |  49<H>  ----------------------------<  287<H>  4.2   |  23  |  8.35<H>    Ca    9.0      03 Jun 2019 06:40  Phos  5.7     06-03  Mg     1.7     06-03    TPro  6.2  /  Alb  2.6<L>  /  TBili  0.7  /  DBili  x   /  AST  16  /  ALT  11  /  AlkPhos  85  06-03    LIVER FUNCTIONS - ( 03 Jun 2019 06:40 )  Alb: 2.6 g/dL / Pro: 6.2 g/dL / ALK PHOS: 85 u/L / ALT: 11 u/L / AST: 16 u/L / GGT: x                   Imaging:

## 2019-06-03 NOTE — PROGRESS NOTE ADULT - SUBJECTIVE AND OBJECTIVE BOX
Patient is a 63y old  Male who presents with a chief complaint of Septic Shock (03 Jun 2019 11:31)  Denies dizziness, palpitations or chest pain.  Telemetry no bradyarrhythmia seen.      PAST MEDICAL & SURGICAL HISTORY:  Gout  Type II diabetes mellitus  HTN (hypertension)  CAD (coronary artery disease)  Cirrhosis  S/P cholecystectomy      MEDICATIONS  (STANDING):  acetaminophen  IVPB .. 1000 milliGRAM(s) IV Intermittent once  amLODIPine   Tablet 10 milliGRAM(s) Oral daily  aspirin enteric coated 81 milliGRAM(s) Oral daily  atorvastatin 40 milliGRAM(s) Oral at bedtime  buDESOnide  80 MICROgram(s)/formoterol 4.5 MICROgram(s) Inhaler 2 Puff(s) Inhalation two times a day  chlorhexidine 4% Liquid 1 Application(s) Topical <User Schedule>  docusate sodium 100 milliGRAM(s) Oral three times a day  epoetin lamin Injectable 4000 Unit(s) IV Push <User Schedule>  famotidine    Tablet 20 milliGRAM(s) Oral daily  heparin  Injectable 5000 Unit(s) SubCutaneous every 8 hours  insulin glargine Injectable (LANTUS) 4 Unit(s) SubCutaneous at bedtime  insulin lispro (HumaLOG) corrective regimen sliding scale   SubCutaneous three times a day before meals  insulin lispro (HumaLOG) corrective regimen sliding scale   SubCutaneous at bedtime  metoclopramide Injectable 10 milliGRAM(s) IV Push once  senna 1 Tablet(s) Oral at bedtime    MEDICATIONS  (PRN):  acetaminophen   Tablet .. 650 milliGRAM(s) Oral every 6 hours PRN Mild Pain (1 - 3), Moderate Pain (4 - 6)  ALBUTerol/ipratropium for Nebulization 3 milliLiter(s) Nebulizer every 6 hours PRN Shortness of Breath and/or Wheezing  HYDROcodone/homatropine Syrup 5 milliLiter(s) Oral every 6 hours PRN Cough  lidocaine   Patch 1 Patch Transdermal daily PRN Shoulder pain  oxyCODONE    IR 10 milliGRAM(s) Oral every 6 hours PRN Severe Pain (7 - 10)            Vital Signs Last 24 Hrs  T(C): 37 (03 Jun 2019 12:07), Max: 37.1 (02 Jun 2019 16:53)  T(F): 98.6 (03 Jun 2019 12:07), Max: 98.7 (02 Jun 2019 16:53)  HR: 67 (03 Jun 2019 12:07) (62 - 70)  BP: 132/56 (03 Jun 2019 12:07) (121/55 - 163/69)  BP(mean): --  RR: 16 (03 Jun 2019 12:07) (16 - 18)  SpO2: 100% (03 Jun 2019 12:07) (95% - 100%)            INTERPRETATION OF TELEMETRY: SR 60-70 bpm with occasional PVC's; one transient SVT at 140 lasted for 4-5 seconds     ECG:        LABS:                        7.8    8.61  )-----------( 275      ( 03 Jun 2019 06:40 )             24.1     06-03    130<L>  |  91<L>  |  49<H>  ----------------------------<  287<H>  4.2   |  23  |  8.35<H>    Ca    9.0      03 Jun 2019 06:40  Phos  5.7     06-03  Mg     1.7     06-03    TPro  6.2  /  Alb  2.6<L>  /  TBili  0.7  /  DBili  x   /  AST  16  /  ALT  11  /  AlkPhos  85  06-03              BNP  RADIOLOGY & ADDITIONAL STUDIES:  1. Mitral annular calcification, otherwise normal mitral  valve. Minimal mitral regurgitation.  2. Aortic valve leaflet morphology not well visualized.  Mild aortic regurgitation.  3. Endocardium not well visualized; grossly severe global  left ventricular systolic dysfunction.  In some views the  apex appears particularly hypokinetic.  Endocardial  visualization enhanced with intravenous injection of echo  contrast (Definity).  No obvious LV thrombus seen.  4. Unable to accurately evaluate right ventricular size or  systolic function.  ------------------------------------------------------------------------  Confirmed on  5/21/2019 - 17:33:49 by Gordy Castaneda M.D.  ------------------------------------------------------------------------      PHYSICAL EXAM:    GENERAL: In no apparent distress, well nourished, and hydrated.  NECK: Supple and normal thyroid.  No JVD or carotid bruit.  Carotid pulse is 2+ bilaterally.  HEART: Regular rate and rhythm; No murmurs, rubs, or gallops.  R chest with HD catheter.   PULMONARY: Clear to auscultation and perfusion.  diminished breath sound at base.  No rales, wheezing, or rhonchi bilaterally.  ABDOMEN: Soft, Nontender, Nondistended; Bowel sounds present  EXTREMITIES:  2+ Peripheral Pulses, No clubbing, cyanosis, or edema  NEUROLOGICAL: Grossly nonfocal

## 2019-06-03 NOTE — CONSULT NOTE ADULT - REASON FOR ADMISSION
Septic Shock
N/V
Septic Shock

## 2019-06-03 NOTE — PROGRESS NOTE ADULT - PROBLEM SELECTOR PLAN 8
Resolved   Pt w profound bradycardia requiring dopamine gtt  - now off  - bradycardia likely from hypervagotonia from GI process as per EP  - no indication for PPM. Resolved   - bradycardia likely from hypervagotonia from GI process as per EP  - no indication for PPM.

## 2019-06-03 NOTE — PROGRESS NOTE ADULT - ASSESSMENT
Impression:  1. Septic shock (resolved) with E coli and Citrobacter bacteremia presumed secondary to biliary source, s/p ERCP 5/19/19 notable for bile in the duodenum and additional placement of plastic biliary stent alongside prior stent  2. Prior EUS/ERCP on 4/19/19 at OSH with 2 cm CBD stricture and with sphincterotomy and stent placement, biopsies negative and brushings with atypical cells (favor reactive atypia)  3. CAD s/p PCI x 9 with elevated troponin, NSTEMI on heparin infusion.  4. SLOAN on CKD   5. DM  6. GERD    Recommendations:    - Will plan for repeat EUS/ERCP to further evaluate for pancreaticobiliary pathology tomorrow 6/4/2019, appreciate cardiology recommendations   - please keep NPO after midnight   - Antimicrobials per ID  - Monitor CBC, CMP  - Supportive care per primary team  - Page GI with questions or changes in clinical status    Maria Isabel Stein, PGY-5  GI fellow  B- [id number]/ [phone]  Please call GI fellow on call after 5pm and on weekends

## 2019-06-03 NOTE — PROGRESS NOTE ADULT - SUBJECTIVE AND OBJECTIVE BOX
[facility] DIVISION OF KIDNEY DISEASES AND HYPERTENSION -- FOLLOW UP NOTE  --------------------------------------------------------------------------------    HPI: 63-year-old male with medical history of CAD s/p 9 stents, DM, HTN, GERD, prior cholecystectomy,  diverticulosis, biliary stricture s/p ERCP with sphincteromy and stent 4/2019 who presents as transfer from [facility] where he was admitted with septic shock from E coli bactermemia s/p intubation for hypoxic resp failure s/p pressor support. Also pt. developed NSTEMI and was started on heparin gtt with dobutamine due to concern for new LV dysfunction. Pt. with known history of CKD 3 secondary to DM and HTN, follows as outpatient with Dr Hodgson. As per pt's son SCr baseline 2.5-2.6 in March 2019 however states decrease to 1.9 in April 2019. Pt. transitioned to intermittent HD on 5/24/19. Pt. had placement of RIJ tunneled HD catheter and subsequent removal of non-tunneled catheter on 5/29/19.     Seent this AM while on HD within HD unit. He has been tolerating it well. Patient states that he has a gout attack on a finger of his left hand but is otherwise feeling well. States that he has more urination now. Otherwise, denies CP, SOB, N/V/F/C.      PAST HISTORY  --------------------------------------------------------------------------------  No significant changes to PMH, PSH, FHx, SHx, unless otherwise noted    ALLERGIES & MEDICATIONS  --------------------------------------------------------------------------------  Allergies    Cipro (Rash)  Plavix (Rash)    Intolerances      Standing Inpatient Medications  acetaminophen  IVPB .. 1000 milliGRAM(s) IV Intermittent once  amLODIPine   Tablet 10 milliGRAM(s) Oral daily  aspirin enteric coated 81 milliGRAM(s) Oral daily  atorvastatin 40 milliGRAM(s) Oral at bedtime  buDESOnide  80 MICROgram(s)/formoterol 4.5 MICROgram(s) Inhaler 2 Puff(s) Inhalation two times a day  chlorhexidine 4% Liquid 1 Application(s) Topical <User Schedule>  docusate sodium 100 milliGRAM(s) Oral three times a day  epoetin lamin Injectable 4000 Unit(s) IV Push <User Schedule>  famotidine    Tablet 20 milliGRAM(s) Oral daily  heparin  Injectable 5000 Unit(s) SubCutaneous every 8 hours  insulin glargine Injectable (LANTUS) 4 Unit(s) SubCutaneous at bedtime  insulin lispro (HumaLOG) corrective regimen sliding scale   SubCutaneous three times a day before meals  insulin lispro (HumaLOG) corrective regimen sliding scale   SubCutaneous at bedtime  metoclopramide Injectable 10 milliGRAM(s) IV Push once  senna 1 Tablet(s) Oral at bedtime    PRN Inpatient Medications  acetaminophen   Tablet .. 650 milliGRAM(s) Oral every 6 hours PRN  ALBUTerol/ipratropium for Nebulization 3 milliLiter(s) Nebulizer every 6 hours PRN  HYDROcodone/homatropine Syrup 5 milliLiter(s) Oral every 6 hours PRN  lidocaine   Patch 1 Patch Transdermal daily PRN  oxyCODONE    IR 10 milliGRAM(s) Oral every 6 hours PRN      REVIEW OF SYSTEMS  --------------------------------------------------------------------------------  Gen: No weight changes, fatigue, fevers/chills, weakness  Skin: No rashes  Head/Eyes/Ears/Mouth: No headache; Normal hearing; Normal vision  Respiratory: No dyspnea, cough, wheezing  CV: No chest pain, PND, orthopnea  GI: No abdominal pain, diarrhea, constipation, nausea, vomiting  : No increased frequency, dysuria, hematuria, nocturia  MSK: No joint pain/swelling; no back pain; no edema  Neuro: No dizziness/lightheadedness  Heme: No easy bruising or bleeding  Endo: No heat/cold intolerance  Psych: No significant nervousness, anxiety, stress, depression    All other systems were reviewed and are negative, except as noted.    VITALS/PHYSICAL EXAM  --------------------------------------------------------------------------------  T(C): 37 (06-03-19 @ 12:07), Max: 37.1 (06-02-19 @ 16:53)  HR: 67 (06-03-19 @ 12:07) (62 - 69)  BP: 132/56 (06-03-19 @ 12:07) (121/55 - 163/69)  RR: 16 (06-03-19 @ 12:07) (16 - 18)  SpO2: 100% (06-03-19 @ 12:07) (95% - 100%)  Wt(kg): --        06-02-19 @ 07:01  -  06-03-19 @ 07:00  --------------------------------------------------------  IN: 0 mL / OUT: 700 mL / NET: -700 mL    06-03-19 @ 07:01  -  06-03-19 @ 13:40  --------------------------------------------------------  IN: 400 mL / OUT: 1900 mL / NET: -1500 mL      Physical Exam:  	Gen: NAD, well-appearing  	HEENT: PERRL, supple neck  	Pulm: CTA B/L  	CV: RRR, S1S2; no rub  	Abd: +BS, soft, nontender/nondistended  	UE: Warm,no clubbing, intact strength; no edema  	LE: Warm, no clubbing, intact strength; no edema  	Neuro: No focal deficits  	Psych: Normal affect and mood  	Skin: Warm, without rashes  	Vascular access: R IJ tunneled catheter    LABS/STUDIES  --------------------------------------------------------------------------------              7.8    8.61  >-----------<  275      [06-03-19 @ 06:40]              24.1     130  |  91  |  49  ----------------------------<  287      [06-03-19 @ 06:40]  4.2   |  23  |  8.35        Ca     9.0     [06-03-19 @ 06:40]      Mg     1.7     [06-03-19 @ 06:40]      Phos  5.7     [06-03-19 @ 06:40]    TPro  6.2  /  Alb  2.6  /  TBili  0.7  /  DBili  x   /  AST  16  /  ALT  11  /  AlkPhos  85  [06-03-19 @ 06:40]        Uric acid 6.5      [06-03-19 @ 06:40]    Creatinine Trend:  SCr 8.35 [06-03 @ 06:40]  SCr 7.25 [06-02 @ 05:38]  SCr 5.65 [06-01 @ 06:19]  SCr 6.56 [05-31 @ 06:45]  SCr 4.36 [05-30 @ 05:58]    Urinalysis - [05-18-19 @ 00:33]      Color Yellow / Appearance Slightly Turbid / SG 1.015 / pH 5.0      Gluc Negative / Ketone Trace  / Bili Moderate / Urobili 4       Blood Large / Protein 100 / Leuk Est Trace / Nitrite Negative      RBC 3-5 / WBC 11-25 / Hyaline 0-2 / Gran 0-2 / Sq Epi  / Non Sq Epi Moderate / Bacteria Many      Iron 47, TIBC 175, %sat --      [05-31-19 @ 06:45]  Ferritin 640.4      [05-31-19 @ 06:45]  HbA1c 6.5      [04-20-19 @ 06:47]    HBsAb 3.0      [05-22-19 @ 20:00]  HBsAg NEGATIVE      [05-22-19 @ 20:00]  HBcAb Nonreactive      [05-22-19 @ 20:00]  HCV 0.20, Nonreactive Hepatitis C AB  S/CO Ratio                        Interpretation  < 1.00                                   Non-Reactive  1.00 - 4.99                         Weakly-Reactive  >= 5.00                                Reactive  Non-Reactive: Aperson with a non-reactive HCV antibody  result is considered uninfected.  No further action is  needed unless recent infection is suspected.  In these  cases, consider repeat testing later to detect  seroconversion..  Weakly-Reactive: HCV antibody test is abnormal, HCV RNA  Qualitative test will follow.  Reactive: HCV antibody test is abnormal, HCV RNA  Qualitative test will follow.  Note: HCV antibody testing is performed on the [employer] system.      [05-22-19 @ 20:00]

## 2019-06-03 NOTE — PROGRESS NOTE ADULT - ATTENDING COMMENTS
Patient seen and examined.  Case discussed with house staff.  Agree with above as edited.   Patient developed gout flare of left 4th PIP. Started on prednisone yesterday with some relief.  #Pancreatic stricture - Plan is for EUS +/- ERCP tomorrow, followed by Surgical resection (likely in 2-3 weeks).   #SLOAN on CKD IV - secondary to ATN - now depending on HD - s/p permacath with Marina d/c'd. d/w nephrology attending. D/c'd PPI.  #NSTEMI, h/o CAD s/p PCI - Pharm nuclear stress shows fixed defects. apprec Cards f/u - c/w ASA and statin. Holding Coreg 2/2 bradycardia.  #Bradycardia - Likely 2/2 increased vagal tone 2/2 GI process - on tele. HR improved. Holding AV Caroline blockers - may resume post procedure tomorrow as there have been no further bradycardic episodes.  #HTN - continue holding ARB given SLOAN on CKD. Holding Coreg as above - may resume post procedure tomorrow as there have been no further bradycardic episodes. Continue Amlodipine. If still hypertensive can resume hydralazine 25mg TID and uptitrate as needed  #Anemia - multifactorial - h/h is stable. Continue to monitor. No melena or BRBPR. Epo per Renal.  #Septic Shock 2/2 Citrobacter and E. Coli bacteremia 2/2 cholangitis - Shock and sepsis resolved. Now off abx.  Plan discussed with patient and son bedside.

## 2019-06-03 NOTE — PROGRESS NOTE ADULT - PROBLEM SELECTOR PLAN 1
Pt. with oliguric SLOAN on CKD in the setting of sepsis, hypotension and NSTEMI. Pt. with likely severe ATN. Pt. starting to urinate more frequently however Cr still rising. Receiving HD treatment today will plan for next on Wed and monitor labs and Uo in between. Monitor UOP and daily weights. Avoid any potential nephrotoxins. Pt. also with history of possible AIN from use of PPI. Pt. was discontinued of PPI on 5/31/19. Has Sadler's esophagus so will need to check with GI.  Will monitor patient for renal recovery

## 2019-06-03 NOTE — PROGRESS NOTE ADULT - PROBLEM SELECTOR PLAN 1
- Acute cholangitis in setting of CBD stricture and recent ERCP with stent placement. 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 no mass in the region of narrowing.    - patient was offered a whipple procedure at OSH, for 5/30  - surg onc consulted for evaluation, will get whipple in the next 2-3weeks.  - possible EUS/ERCP on monday/tuesday, optimized by cards. Will call GI for timing.

## 2019-06-03 NOTE — PROGRESS NOTE ADULT - PROBLEM SELECTOR PLAN 3
-Possible flare given presentation and pt missing his febuxostat  -Rheum consulted, rec appreciated  -f/u Hand Xray and uric acid level  - prednisone 40mg x1, will reevaluate today - Possible flare given presentation and pt missing his febuxostat from SLOAN on CKD/ESRD  -f/u Hand Xray showing possible gout flare. Uric acid wnl  - prednisone 40mg x1 yesterday, x1 today, will reevaluate.

## 2019-06-03 NOTE — CONSULT NOTE ADULT - ASSESSMENT
62 y/o M w hx of gout, ESRD, HF, presents w acute arthritis of L 4th PIP, 2/2 to gout  Acute illness dehydration, ESRD, asa use risk factors for gout  Pt improved w steroids, but still w residual synovitis.     Recommend   Would give one more day of pred 40mg, and then start following taper   Pred 30mg X 3 days, 20mg X 3 days, 15 mg X 3 days, 10mg X 3 days, 5mg X 3 days  Will follow to see if patient's flares keep resolving w taper  Will have to discuss w outpat provider risk and benefits of uloric, as studies have shown higher cardiovascular events with uloric. Pt at high risk for cardiovascular events.     John Wong MD PGY4  [id number] 62 y/o M w hx of gout, ESRD, HF, presents w acute arthritis of L 4th PIP, 2/2 to gout  Acute illness dehydration, ESRD, asa use risk factors for gout  Pt improved w steroids, but still w residual synovitis.     Recommend   Start patient tmwr on following taper  Pred 30mg X 2 days, 20mg X 2 days, 15 mg X 2 days, 10mg X 2 days, 5mg X 2 days  Patient should be restarted outpat on uric acid lowering agent. Allopurinol can be renally dosed, and uloric not best option, given patient high cardiovascular risks.   Will sign off. Call back if any questions.     John Wong MD PGY4  [id number] 64 y/o M w hx of gout, ESRD, HF, presents w acute arthritis of L 4th PIP, 2/2 to gout  Acute illness dehydration, ESRD, asa use risk factors for gout  Pt improved w steroids, but still w residual synovitis.     Recommend   Start prednisone taper tomorrow as follows:   Pred 30mg X 2 days, 20mg X 2 days, 15 mg X 2 days, 10mg X 2 days, 5mg X 2 days  Patient should be restarted outpat on uric acid lowering agent. Allopurinol can be renally dosed, and uloric not best option, given patient high cardiovascular risks.   Will sign off. Call back if any questions.     John Wong MD PGY4  [id number]

## 2019-06-03 NOTE — CONSULT NOTE ADULT - CONSULT REQUESTED DATE/TIME
03-Jun-2019 14:04
18-May-2019
18-May-2019 16:34
19-May-2019
25-May-2019 15:31
28-May-2019 15:26
28-May-2019 16:40
18-May-2019 20:03
29-May-2019 13:35

## 2019-06-03 NOTE — PROGRESS NOTE ADULT - ASSESSMENT
64 yo M hx of CAD s/p 9 stents, DMT2 on insulin, HTN, GERD, prior cholecystectomy,  diverticulosis, former smoker(30PY), biliary stricture s/p ERCP with sphincterotomy and stent 4/2019 who presented as transfer from [facility] for acute cholangitis s/p ERCP and stent CBD stent placement by GI on 5/19.Hospital course further complicated by Type II NSTEMI and bradycardia. Now on floors, resolved bradycardia, undergoing workup for biliary tree strictures

## 2019-06-03 NOTE — PROGRESS NOTE ADULT - PROBLEM SELECTOR PLAN 5
Monitor FS   - hypoglycemic  - d/c all insulins  - SS  A1c 6.5 in april 2019 Monitor FS   - now hyperglycemic as pt got steroids  - SS  A1c 6.5 in april 2019

## 2019-06-03 NOTE — CONSULT NOTE ADULT - SUBJECTIVE AND OBJECTIVE BOX
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  Found to be in septic shock due to E Coli Bacteremia, with SLOAN, transaminitis and elevated troponins. In the ED at OSH, he developed lethargy, altered mental status, and hypoxia to 82% and was intubated for acute hypoxic resp failure. He was started on levophed and vasopressin and was admitted to their MICU for further workup. He received vanc, zosyn, micafungin, meropenem and gentamicin. He was started on heparin gtt for suspected NSTEMI due to troponin elevated and increasing CK. Blood cultures +E.coli 2/2 bottles, pending sensitivies.     Overnight, he developed leukocytosis, thrombocytopenia, worsening metabolic acidosis, and troponin continued to increase. He was transferred to [facility] MICU for further evaluation and possible intervention.    64 yo M hx of CAD s/p 9 stents, DMT2 on insulin, HTN, GERD, prior cholecystectomy,  diverticulosis, former smoker(30PY), biliary stricture s/p ERCP with sphincterotomy and stent 4/2019 who presented as transfer from [facility] for acute cholangitis s/p ERCP and stent CBD stent placement by GI on 5/19.Hospital course further complicated by Type II NSTEMI and bradycardia. Now on floors, resolved bradycardia, undergoing workup for biliary tree strictures. Rheumatology consulted for finger swelling.       PAST MEDICAL & SURGICAL HISTORY:  Type II diabetes mellitus  HTN (hypertension)  CAD (coronary artery disease)  S/P cholecystectomy      Review of Systems:  Gen:  No fevers/chills, weight loss  HEENT: No blurry vision, no difficulty swallowing  CVS: No chest pain/palpitations  Resp: No SOB/wheezing  GI: No N/V/C/D/abdominal pain  MSK:  Skin: No new rashes  Neuro: No headaches    MEDICATIONS  (STANDING):  acetaminophen  IVPB .. 1000 milliGRAM(s) IV Intermittent once  amLODIPine   Tablet 10 milliGRAM(s) Oral daily  aspirin enteric coated 81 milliGRAM(s) Oral daily  atorvastatin 40 milliGRAM(s) Oral at bedtime  buDESOnide  80 MICROgram(s)/formoterol 4.5 MICROgram(s) Inhaler 2 Puff(s) Inhalation two times a day  chlorhexidine 4% Liquid 1 Application(s) Topical <User Schedule>  docusate sodium 100 milliGRAM(s) Oral three times a day  epoetin lamin Injectable 4000 Unit(s) IV Push <User Schedule>  famotidine    Tablet 20 milliGRAM(s) Oral daily  heparin  Injectable 5000 Unit(s) SubCutaneous every 8 hours  insulin glargine Injectable (LANTUS) 4 Unit(s) SubCutaneous at bedtime  insulin lispro (HumaLOG) corrective regimen sliding scale   SubCutaneous three times a day before meals  insulin lispro (HumaLOG) corrective regimen sliding scale   SubCutaneous at bedtime  metoclopramide Injectable 10 milliGRAM(s) IV Push once  senna 1 Tablet(s) Oral at bedtime    MEDICATIONS  (PRN):  acetaminophen   Tablet .. 650 milliGRAM(s) Oral every 6 hours PRN Mild Pain (1 - 3), Moderate Pain (4 - 6)  ALBUTerol/ipratropium for Nebulization 3 milliLiter(s) Nebulizer every 6 hours PRN Shortness of Breath and/or Wheezing  HYDROcodone/homatropine Syrup 5 milliLiter(s) Oral every 6 hours PRN Cough  lidocaine   Patch 1 Patch Transdermal daily PRN Shoulder pain  oxyCODONE    IR 10 milliGRAM(s) Oral every 6 hours PRN Severe Pain (7 - 10)      Allergies    Cipro (Rash)  Plavix (Rash)    Intolerances        PERTINENT MEDICATION HISTORY:  · 	aspirin 81 mg oral tablet: 1 tab(s) orally once a day  · 	Uloric 40 mg oral tablet: 1 tab(s) orally once a day  · 	hydrALAZINE: 75 milligram(s) orally 3 times a day  · 	torsemide 10 mg oral tablet: 1 tab(s) orally once a day  · 	Crestor 10 mg oral tablet: 1 tab(s) orally once a day (at bedtime)    SOCIAL HISTORY:  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 Prior smoker of 30 years. No ETOH or drug use.    FAMILY HISTORY:      Vital Signs Last 24 Hrs  T(C): 37 (03 Jun 2019 12:07), Max: 37.1 (02 Jun 2019 16:53)  T(F): 98.6 (03 Jun 2019 12:07), Max: 98.7 (02 Jun 2019 16:53)  HR: 67 (03 Jun 2019 12:07) (62 - 69)  BP: 132/56 (03 Jun 2019 12:07) (121/55 - 163/69)  BP(mean): --  RR: 16 (03 Jun 2019 12:07) (16 - 18)  SpO2: 100% (03 Jun 2019 12:07) (95% - 100%)    Daily     Daily     Physical Exam:  General: No apparent distress  HEENT: EOMI, MMM  CVS: +S1/S2, RRR  Resp: CTA b/l  GI: Soft, NT/ND  MSK:    Neuro: AAOx3  muscle strength RUE LUE ; RLE LLE   Skin: no  rashes    LABS:                        7.8    8.61  )-----------( 275      ( 03 Jun 2019 06:40 )             24.1     06-03    130<L>  |  91<L>  |  49<H>  ----------------------------<  287<H>  4.2   |  23  |  8.35<H>    Ca    9.0      03 Jun 2019 06:40  Phos  5.7     06-03  Mg     1.7     06-03    TPro  6.2  /  Alb  2.6<L>  /  TBili  0.7  /  DBili  x   /  AST  16  /  ALT  11  /  AlkPhos  85  06-03          RADIOLOGY & ADDITIONAL STUDIES: KAMRAN FIGUEROA  [id number]    HISTORY OF PRESENT ILLNESS:    64 y/o M hx of gout, CKD now newly ESRD, ischemic cardiomyopathy, transferred from [place] for septic shock 2/2 to cholangitis, c/b bacteremia, c/b NTSEMI w bradycardia. Rheumatology consulted for  L 4th finger swelling. Located in PIP joints. Happened 2 days ago. Happened suddenly. No trauma. No fevers, chills. No rashes. Typical of gout flare, and pt always has gout flare in that finger. Dx w gout years ago, when he presented w podagra. Was on allopurinol, but 2/2 to renal dysfunction, was switched to febuxostat several years ago. Pt has had well control of his gout. Last flare 6 months ago. In same L 4th PIP. Denies alcohol consumption. Off uloric since hospitalization. Pt was given pred 40mg yesterday, and pred 40mg yesterday, and today w improvement.         PAST MEDICAL & SURGICAL HISTORY:  Type II diabetes mellitus  HTN (hypertension)  CAD (coronary artery disease)  S/P cholecystectomy      Review of Systems:  per HPI     MEDICATIONS  (STANDING):  acetaminophen  IVPB .. 1000 milliGRAM(s) IV Intermittent once  amLODIPine   Tablet 10 milliGRAM(s) Oral daily  aspirin enteric coated 81 milliGRAM(s) Oral daily  atorvastatin 40 milliGRAM(s) Oral at bedtime  buDESOnide  80 MICROgram(s)/formoterol 4.5 MICROgram(s) Inhaler 2 Puff(s) Inhalation two times a day  chlorhexidine 4% Liquid 1 Application(s) Topical <User Schedule>  docusate sodium 100 milliGRAM(s) Oral three times a day  epoetin lamin Injectable 4000 Unit(s) IV Push <User Schedule>  famotidine    Tablet 20 milliGRAM(s) Oral daily  heparin  Injectable 5000 Unit(s) SubCutaneous every 8 hours  insulin glargine Injectable (LANTUS) 4 Unit(s) SubCutaneous at bedtime  insulin lispro (HumaLOG) corrective regimen sliding scale   SubCutaneous three times a day before meals  insulin lispro (HumaLOG) corrective regimen sliding scale   SubCutaneous at bedtime  metoclopramide Injectable 10 milliGRAM(s) IV Push once  senna 1 Tablet(s) Oral at bedtime    MEDICATIONS  (PRN):  acetaminophen   Tablet .. 650 milliGRAM(s) Oral every 6 hours PRN Mild Pain (1 - 3), Moderate Pain (4 - 6)  ALBUTerol/ipratropium for Nebulization 3 milliLiter(s) Nebulizer every 6 hours PRN Shortness of Breath and/or Wheezing  HYDROcodone/homatropine Syrup 5 milliLiter(s) Oral every 6 hours PRN Cough  lidocaine   Patch 1 Patch Transdermal daily PRN Shoulder pain  oxyCODONE    IR 10 milliGRAM(s) Oral every 6 hours PRN Severe Pain (7 - 10)      Allergies    Cipro (Rash)  Plavix (Rash)    Intolerances        PERTINENT MEDICATION HISTORY:  · 	aspirin 81 mg oral tablet: 1 tab(s) orally once a day  · 	Uloric 40 mg oral tablet: 1 tab(s) orally once a day  · 	hydrALAZINE: 75 milligram(s) orally 3 times a day  · 	torsemide 10 mg oral tablet: 1 tab(s) orally once a day  · 	Crestor 10 mg oral tablet: 1 tab(s) orally once a day (at bedtime)    SOCIAL HISTORY:  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 No ETOH or drug use.    FAMILY HISTORY:      Vital Signs Last 24 Hrs  T(C): 37 (03 Jun 2019 12:07), Max: 37.1 (02 Jun 2019 16:53)  T(F): 98.6 (03 Jun 2019 12:07), Max: 98.7 (02 Jun 2019 16:53)  HR: 67 (03 Jun 2019 12:07) (62 - 69)  BP: 132/56 (03 Jun 2019 12:07) (121/55 - 163/69)  BP(mean): --  RR: 16 (03 Jun 2019 12:07) (16 - 18)  SpO2: 100% (03 Jun 2019 12:07) (95% - 100%)    Daily     Daily     Physical Exam:  General: NAD  HEENT: no oral ulcers, no lymphadenopathy   CVS: +S1/S2, no m,r,g   Resp: CTA b/l, no w,c   GI: Soft, NT/ND  MSK: L 4th PIP swollen, tender. Minimal warmth. Rest of joints w no synovitis.   Neuro: AAOx3  Skin: no  rashes    LABS:                        7.8    8.61  )-----------( 275      ( 03 Jun 2019 06:40 )             24.1     06-03    130<L>  |  91<L>  |  49<H>  ----------------------------<  287<H>  4.2   |  23  |  8.35<H>    Ca    9.0      03 Jun 2019 06:40  Phos  5.7     06-03  Mg     1.7     06-03    TPro  6.2  /  Alb  2.6<L>  /  TBili  0.7  /  DBili  x   /  AST  16  /  ALT  11  /  AlkPhos  85  06-03    Uric Acid, Serum (06.03.19 @ 06:40)    Uric Acid, Serum: 6.5 mg/dL            RADIOLOGY & ADDITIONAL STUDIES:

## 2019-06-03 NOTE — CHART NOTE - NSCHARTNOTEFT_GEN_A_CORE
NUTRITION FOLLOW-UP: Pt states his appetite is getting better. He states he is eating slightly more than 50%  of his tray. Pt has both Nepro and Glucerna supplements ordered but has not tried them yet. Encouraged Pt to try supplement as well as increase he po intake.     Weight: 92 kg    Labs: POCT-170, 128, 298, Na-130, BUN-49, Glu-287    Current Diet: NPO    Enteral Recommendations:    RD to Remain Available: Arianna Chavez MS, RDN, CDN pager [phone]     Additional Recommendations:   1) Monitor weight, labs, po intake and skin integrity.

## 2019-06-04 ENCOUNTER — RESULT REVIEW (OUTPATIENT)
Age: 64
End: 2019-06-04

## 2019-06-04 LAB
ALBUMIN SERPL ELPH-MCNC: 2.9 G/DL — LOW (ref 3.3–5)
ALP SERPL-CCNC: 98 U/L — SIGNIFICANT CHANGE UP (ref 40–120)
ALT FLD-CCNC: 14 U/L — SIGNIFICANT CHANGE UP (ref 4–41)
ANION GAP SERPL CALC-SCNC: 14 MMO/L — SIGNIFICANT CHANGE UP (ref 7–14)
AST SERPL-CCNC: 25 U/L — SIGNIFICANT CHANGE UP (ref 4–40)
BASOPHILS # BLD AUTO: 0.02 K/UL — SIGNIFICANT CHANGE UP (ref 0–0.2)
BASOPHILS NFR BLD AUTO: 0.2 % — SIGNIFICANT CHANGE UP (ref 0–2)
BILIRUB SERPL-MCNC: 0.7 MG/DL — SIGNIFICANT CHANGE UP (ref 0.2–1.2)
BUN SERPL-MCNC: 41 MG/DL — HIGH (ref 7–23)
CALCIUM SERPL-MCNC: 8.8 MG/DL — SIGNIFICANT CHANGE UP (ref 8.4–10.5)
CHLORIDE SERPL-SCNC: 92 MMOL/L — LOW (ref 98–107)
CO2 SERPL-SCNC: 25 MMOL/L — SIGNIFICANT CHANGE UP (ref 22–31)
CREAT SERPL-MCNC: 5.75 MG/DL — HIGH (ref 0.5–1.3)
EOSINOPHIL # BLD AUTO: 0 K/UL — SIGNIFICANT CHANGE UP (ref 0–0.5)
EOSINOPHIL NFR BLD AUTO: 0 % — SIGNIFICANT CHANGE UP (ref 0–6)
GLUCOSE BLDC GLUCOMTR-MCNC: 221 MG/DL — HIGH (ref 70–99)
GLUCOSE BLDC GLUCOMTR-MCNC: 245 MG/DL — HIGH (ref 70–99)
GLUCOSE BLDC GLUCOMTR-MCNC: 260 MG/DL — HIGH (ref 70–99)
GLUCOSE BLDC GLUCOMTR-MCNC: 283 MG/DL — HIGH (ref 70–99)
GLUCOSE SERPL-MCNC: 293 MG/DL — HIGH (ref 70–99)
HCT VFR BLD CALC: 25.4 % — LOW (ref 39–50)
HGB BLD-MCNC: 8.3 G/DL — LOW (ref 13–17)
IMM GRANULOCYTES NFR BLD AUTO: 0.8 % — SIGNIFICANT CHANGE UP (ref 0–1.5)
LYMPHOCYTES # BLD AUTO: 1.27 K/UL — SIGNIFICANT CHANGE UP (ref 1–3.3)
LYMPHOCYTES # BLD AUTO: 14.1 % — SIGNIFICANT CHANGE UP (ref 13–44)
MAGNESIUM SERPL-MCNC: 1.6 MG/DL — SIGNIFICANT CHANGE UP (ref 1.6–2.6)
MCHC RBC-ENTMCNC: 30.3 PG — SIGNIFICANT CHANGE UP (ref 27–34)
MCHC RBC-ENTMCNC: 32.7 % — SIGNIFICANT CHANGE UP (ref 32–36)
MCV RBC AUTO: 92.7 FL — SIGNIFICANT CHANGE UP (ref 80–100)
MONOCYTES # BLD AUTO: 0.9 K/UL — SIGNIFICANT CHANGE UP (ref 0–0.9)
MONOCYTES NFR BLD AUTO: 10 % — SIGNIFICANT CHANGE UP (ref 2–14)
NEUTROPHILS # BLD AUTO: 6.76 K/UL — SIGNIFICANT CHANGE UP (ref 1.8–7.4)
NEUTROPHILS NFR BLD AUTO: 74.9 % — SIGNIFICANT CHANGE UP (ref 43–77)
NRBC # FLD: 0 K/UL — SIGNIFICANT CHANGE UP (ref 0–0)
PHOSPHATE SERPL-MCNC: 3 MG/DL — SIGNIFICANT CHANGE UP (ref 2.5–4.5)
PLATELET # BLD AUTO: 259 K/UL — SIGNIFICANT CHANGE UP (ref 150–400)
PMV BLD: 10.4 FL — SIGNIFICANT CHANGE UP (ref 7–13)
POTASSIUM SERPL-MCNC: 4.4 MMOL/L — SIGNIFICANT CHANGE UP (ref 3.5–5.3)
POTASSIUM SERPL-SCNC: 4.4 MMOL/L — SIGNIFICANT CHANGE UP (ref 3.5–5.3)
PROT SERPL-MCNC: 6.7 G/DL — SIGNIFICANT CHANGE UP (ref 6–8.3)
RBC # BLD: 2.74 M/UL — LOW (ref 4.2–5.8)
RBC # FLD: 15.5 % — HIGH (ref 10.3–14.5)
SODIUM SERPL-SCNC: 131 MMOL/L — LOW (ref 135–145)
WBC # BLD: 9.02 K/UL — SIGNIFICANT CHANGE UP (ref 3.8–10.5)
WBC # FLD AUTO: 9.02 K/UL — SIGNIFICANT CHANGE UP (ref 3.8–10.5)

## 2019-06-04 PROCEDURE — 99233 SBSQ HOSP IP/OBS HIGH 50: CPT | Mod: GC

## 2019-06-04 PROCEDURE — 74328 X-RAY BILE DUCT ENDOSCOPY: CPT | Mod: 26,GC,59

## 2019-06-04 PROCEDURE — 43276 ERCP STENT EXCHANGE W/DILATE: CPT | Mod: GC,59

## 2019-06-04 PROCEDURE — 88305 TISSUE EXAM BY PATHOLOGIST: CPT | Mod: 26

## 2019-06-04 PROCEDURE — 88173 CYTOPATH EVAL FNA REPORT: CPT | Mod: 26

## 2019-06-04 PROCEDURE — 43242 EGD US FINE NEEDLE BX/ASPIR: CPT | Mod: GC,59

## 2019-06-04 RX ORDER — MEROPENEM 1 G/30ML
500 INJECTION INTRAVENOUS ONCE
Refills: 0 | Status: COMPLETED | OUTPATIENT
Start: 2019-06-04 | End: 2019-06-04

## 2019-06-04 RX ORDER — MEROPENEM 1 G/30ML
INJECTION INTRAVENOUS
Refills: 0 | Status: COMPLETED | OUTPATIENT
Start: 2019-06-04 | End: 2019-06-07

## 2019-06-04 RX ORDER — MEROPENEM 1 G/30ML
500 INJECTION INTRAVENOUS EVERY 12 HOURS
Refills: 0 | Status: COMPLETED | OUTPATIENT
Start: 2019-06-05 | End: 2019-06-07

## 2019-06-04 RX ADMIN — Medication 81 MILLIGRAM(S): at 18:03

## 2019-06-04 RX ADMIN — Medication 100 MILLIGRAM(S): at 22:19

## 2019-06-04 RX ADMIN — Medication 0.5 MILLIGRAM(S): at 17:20

## 2019-06-04 RX ADMIN — Medication 3: at 18:04

## 2019-06-04 RX ADMIN — Medication 30 MILLIGRAM(S): at 06:12

## 2019-06-04 RX ADMIN — MEROPENEM 100 MILLIGRAM(S): 1 INJECTION INTRAVENOUS at 18:54

## 2019-06-04 RX ADMIN — ATORVASTATIN CALCIUM 40 MILLIGRAM(S): 80 TABLET, FILM COATED ORAL at 22:19

## 2019-06-04 RX ADMIN — AMLODIPINE BESYLATE 10 MILLIGRAM(S): 2.5 TABLET ORAL at 05:20

## 2019-06-04 RX ADMIN — Medication 3: at 08:07

## 2019-06-04 RX ADMIN — FAMOTIDINE 20 MILLIGRAM(S): 10 INJECTION INTRAVENOUS at 18:03

## 2019-06-04 RX ADMIN — BUDESONIDE AND FORMOTEROL FUMARATE DIHYDRATE 2 PUFF(S): 160; 4.5 AEROSOL RESPIRATORY (INHALATION) at 22:19

## 2019-06-04 RX ADMIN — INSULIN GLARGINE 6 UNIT(S): 100 INJECTION, SOLUTION SUBCUTANEOUS at 22:19

## 2019-06-04 RX ADMIN — HEPARIN SODIUM 5000 UNIT(S): 5000 INJECTION INTRAVENOUS; SUBCUTANEOUS at 05:20

## 2019-06-04 RX ADMIN — OXYCODONE HYDROCHLORIDE 10 MILLIGRAM(S): 5 TABLET ORAL at 00:45

## 2019-06-04 RX ADMIN — CHLORHEXIDINE GLUCONATE 1 APPLICATION(S): 213 SOLUTION TOPICAL at 18:04

## 2019-06-04 RX ADMIN — SENNA PLUS 1 TABLET(S): 8.6 TABLET ORAL at 22:19

## 2019-06-04 NOTE — PROGRESS NOTE ADULT - ATTENDING COMMENTS
Patient seen and examined.  Case discussed with house staff.  Agree with above as edited.   Patient developed gout flare of left 4th PIP. Started on prednisone yesterday with some relief.  #Pancreatic stricture - EUS +/- ERCP today. Planning for outpatient Surgical resection (likely in 2-3 weeks).   #SLOAN on CKD IV - secondary to ATN - d/w Renal. Currently depending on HD - s/p permacath with Shiley d/c'd. D/c'd PPI.  #NSTEMI, h/o CAD s/p PCI - Pharm nuclear stress - fixed defects - c/w ASA and statin. Holding Coreg 2/2 bradycardia - will attempt to resume post porcedure.  #Bradycardia - Likely 2/2 increased vagal tone 2/2 GI process - on tele. HR improved. Holding AV Caroline blockers - may resume post procedure as there have been no further bradycardic episodes.  #HTN - continue holding ARB given SLOAN on CKD. Holding Coreg as above - may resume post procedure as there have been no further bradycardic episodes. Continue Amlodipine. If still hypertensive can resume hydralazine 25mg TID and uptitrate as needed  #Septic Shock 2/2 Citrobacter and E. Coli bacteremia 2/2 cholangitis - Shock and sepsis resolved. Now off abx.  Plan discussed with patient and wife bedside. Patient seen and examined.  Case discussed with house staff.  Agree with above as edited.   #Pancreatic stricture - EUS +/- ERCP today. Planning for outpatient Surgical resection (likely in 2-3 weeks).   #SLOAN on CKD IV - secondary to ATN - d/w Renal. Currently depending on HD - s/p permacath with Shiley d/c'd. D/c'd PPI.  #NSTEMI, h/o CAD s/p PCI - Pharm nuclear stress - fixed defects - c/w ASA and statin. Holding Coreg 2/2 bradycardia - will attempt to resume post porcedure.  #Bradycardia - Likely 2/2 increased vagal tone 2/2 GI process - on tele. HR improved. Holding AV Caroline blockers - may resume post procedure as there have been no further bradycardic episodes.  #Gout - acute flare of left PIP - some relief - on steroid taper. Appreciate Rheum recs. Off febuxostat given HD. Plan for low dose preventative allopurinol on d/c.  #HTN - continue holding ARB given SLOAN on CKD. Holding Coreg as above - may resume post procedure as there have been no further bradycardic episodes. Continue Amlodipine. If still hypertensive can resume hydralazine 25mg TID and uptitrate as needed  #Septic Shock 2/2 Citrobacter and E. Coli bacteremia 2/2 cholangitis - Shock and sepsis resolved. Now off abx.  Plan discussed with patient and wife bedside.

## 2019-06-04 NOTE — PROGRESS NOTE ADULT - SUBJECTIVE AND OBJECTIVE BOX
Ella Suyeon Yu PGY-1   [facility] Pager # [phone]  NS Pager # [phone]      Patient is a 63y old  Male who presents with a chief complaint of Septic Shock (03 Jun 2019 14:04)      SUBJECTIVE: No acute events overnight. Patient seen and examined at bedside.    REVIEW OF SYSTEMS:  CONSTITUTIONAL: No fever, weight loss, or fatigue  RESPIRATORY: No cough or shortness of breath  CARDIOVASCULAR: No chest pain, palpitations, dizziness, or leg swelling  GASTROINTESTINAL: No abdominal or epigastric pain. No nausea, vomiting, or hematemesis; No diarrhea or constipation. No melena or hematochezia.  GENITOURINARY: No dysuria  NEUROLOGICAL: No headaches  SKIN: No itching or lesions       MEDICATIONS  (STANDING):  acetaminophen  IVPB .. 1000 milliGRAM(s) IV Intermittent once  amLODIPine   Tablet 10 milliGRAM(s) Oral daily  aspirin enteric coated 81 milliGRAM(s) Oral daily  atorvastatin 40 milliGRAM(s) Oral at bedtime  buDESOnide  80 MICROgram(s)/formoterol 4.5 MICROgram(s) Inhaler 2 Puff(s) Inhalation two times a day  chlorhexidine 4% Liquid 1 Application(s) Topical <User Schedule>  docusate sodium 100 milliGRAM(s) Oral three times a day  epoetin lamin Injectable 4000 Unit(s) IV Push <User Schedule>  famotidine    Tablet 20 milliGRAM(s) Oral daily  heparin  Injectable 5000 Unit(s) SubCutaneous every 8 hours  insulin glargine Injectable (LANTUS) 6 Unit(s) SubCutaneous at bedtime  insulin lispro (HumaLOG) corrective regimen sliding scale   SubCutaneous three times a day before meals  insulin lispro (HumaLOG) corrective regimen sliding scale   SubCutaneous at bedtime  metoclopramide Injectable 10 milliGRAM(s) IV Push once  predniSONE   Tablet 30 milliGRAM(s) Oral daily  senna 1 Tablet(s) Oral at bedtime    MEDICATIONS  (PRN):  acetaminophen   Tablet .. 650 milliGRAM(s) Oral every 6 hours PRN Mild Pain (1 - 3), Moderate Pain (4 - 6)  ALBUTerol/ipratropium for Nebulization 3 milliLiter(s) Nebulizer every 6 hours PRN Shortness of Breath and/or Wheezing  HYDROcodone/homatropine Syrup 5 milliLiter(s) Oral every 6 hours PRN Cough  lidocaine   Patch 1 Patch Transdermal daily PRN Shoulder pain  oxyCODONE    IR 10 milliGRAM(s) Oral every 6 hours PRN Severe Pain (7 - 10)        Objective:    Vitals: Vital Signs Last 24 Hrs  T(C): 36.8 (06-04-19 @ 05:14), Max: 37 (06-03-19 @ 12:07)  T(F): 98.2 (06-04-19 @ 05:14), Max: 98.6 (06-03-19 @ 12:07)  HR: 68 (06-04-19 @ 05:14) (63 - 69)  BP: 145/68 (06-04-19 @ 05:14) (131/53 - 145/68)  BP(mean): --  RR: 18 (06-04-19 @ 05:14) (16 - 18)  SpO2: 100% (06-04-19 @ 05:14) (97% - 100%)            I&O's Summary    03 Jun 2019 07:01  -  04 Jun 2019 07:00  --------------------------------------------------------  IN: 400 mL / OUT: 2330 mL / NET: -1930 mL        PHYSICAL EXAM:  GENERAL: NAD  HEAD:  Atraumatic, Normocephalic  CHEST/LUNG: Clear to auscultation bilaterally; No rales or wheezing  HEART: Regular rate and rhythm; No murmurs, rubs, or gallops  ABDOMEN: Soft, nontender, nondistended  EXTREMITIES:  No clubbing, cyanosis, or edema  LYMPH: No lymphadenopathy noted  SKIN: No rashes or lesions  NERVOUS SYSTEM:  Alert & Oriented X3    LABS:  06-04    131<L>  |  92<L>  |  41<H>  ----------------------------<  293<H>  4.4   |  25  |  5.75<H>  06-03    130<L>  |  91<L>  |  49<H>  ----------------------------<  287<H>  4.2   |  23  |  8.35<H>  06-02    135  |  97<L>  |  39<H>  ----------------------------<  189<H>  3.6   |  24  |  7.25<H>    Ca    8.8      04 Jun 2019 05:56  Ca    9.0      03 Jun 2019 06:40  Ca    8.4      02 Jun 2019 05:38  Phos  3.0     06-04  Mg     1.6     06-04    TPro  6.7  /  Alb  2.9<L>  /  TBili  0.7  /  DBili  x   /  AST  25  /  ALT  14  /  AlkPhos  98  06-04  TPro  6.2  /  Alb  2.6<L>  /  TBili  0.7  /  DBili  x   /  AST  16  /  ALT  11  /  AlkPhos  85  06-03  TPro  5.8<L>  /  Alb  2.4<L>  /  TBili  0.8  /  DBili  x   /  AST  19  /  ALT  14  /  AlkPhos  83  06-02                                            8.3    9.02  )-----------( 259      ( 04 Jun 2019 05:56 )             25.4                         7.8    8.61  )-----------( 275      ( 03 Jun 2019 06:40 )             24.1                         7.7    7.60  )-----------( 248      ( 02 Jun 2019 05:38 )             23.3     CAPILLARY BLOOD GLUCOSE      POCT Blood Glucose.: 263 mg/dL (03 Jun 2019 21:38)  POCT Blood Glucose.: 277 mg/dL (03 Jun 2019 17:47)  POCT Blood Glucose.: 170 mg/dL (03 Jun 2019 12:20)  POCT Blood Glucose.: 128 mg/dL (03 Jun 2019 09:34)    RADIOLOGY:  Imaging Personally Reviewed: YES      Consultants involved in case: YES  Consultant(s) Notes Reviewed:  YES  Care Discussed with Consultants/Other Providers     Ella Suyeon Yu PGY-1 Ella Suyeon Yu PGY-1   [facility] Pager # [phone]  NS Pager # [phone]      Patient is a 63y old  Male who presents with a chief complaint of Septic Shock (03 Jun 2019 14:04)      SUBJECTIVE: No acute events overnight. Patient seen and examined at bedside. Pt was seen after ERCP/EUS and HD. Pt was tearful after learning of pancreatic head mass seen on EUS. Pt was having anxiety, requested small dose of ativan.     REVIEW OF SYSTEMS:  CONSTITUTIONAL: No fever, weight loss, or fatigue  RESPIRATORY: No cough or shortness of breath  CARDIOVASCULAR: No chest pain, palpitations, dizziness, or leg swelling  GASTROINTESTINAL: No abdominal or epigastric pain. No nausea, vomiting, or hematemesis; No diarrhea or constipation. No melena or hematochezia.  GENITOURINARY: No dysuria  NEUROLOGICAL: No headaches  SKIN: No itching or lesions       MEDICATIONS  (STANDING):  acetaminophen  IVPB .. 1000 milliGRAM(s) IV Intermittent once  amLODIPine   Tablet 10 milliGRAM(s) Oral daily  aspirin enteric coated 81 milliGRAM(s) Oral daily  atorvastatin 40 milliGRAM(s) Oral at bedtime  buDESOnide  80 MICROgram(s)/formoterol 4.5 MICROgram(s) Inhaler 2 Puff(s) Inhalation two times a day  chlorhexidine 4% Liquid 1 Application(s) Topical <User Schedule>  docusate sodium 100 milliGRAM(s) Oral three times a day  epoetin lamin Injectable 4000 Unit(s) IV Push <User Schedule>  famotidine    Tablet 20 milliGRAM(s) Oral daily  heparin  Injectable 5000 Unit(s) SubCutaneous every 8 hours  insulin glargine Injectable (LANTUS) 6 Unit(s) SubCutaneous at bedtime  insulin lispro (HumaLOG) corrective regimen sliding scale   SubCutaneous three times a day before meals  insulin lispro (HumaLOG) corrective regimen sliding scale   SubCutaneous at bedtime  metoclopramide Injectable 10 milliGRAM(s) IV Push once  predniSONE   Tablet 30 milliGRAM(s) Oral daily  senna 1 Tablet(s) Oral at bedtime    MEDICATIONS  (PRN):  acetaminophen   Tablet .. 650 milliGRAM(s) Oral every 6 hours PRN Mild Pain (1 - 3), Moderate Pain (4 - 6)  ALBUTerol/ipratropium for Nebulization 3 milliLiter(s) Nebulizer every 6 hours PRN Shortness of Breath and/or Wheezing  HYDROcodone/homatropine Syrup 5 milliLiter(s) Oral every 6 hours PRN Cough  lidocaine   Patch 1 Patch Transdermal daily PRN Shoulder pain  oxyCODONE    IR 10 milliGRAM(s) Oral every 6 hours PRN Severe Pain (7 - 10)        Objective:    Vitals: Vital Signs Last 24 Hrs  T(C): 36.8 (06-04-19 @ 05:14), Max: 37 (06-03-19 @ 12:07)  T(F): 98.2 (06-04-19 @ 05:14), Max: 98.6 (06-03-19 @ 12:07)  HR: 68 (06-04-19 @ 05:14) (63 - 69)  BP: 145/68 (06-04-19 @ 05:14) (131/53 - 145/68)  BP(mean): --  RR: 18 (06-04-19 @ 05:14) (16 - 18)  SpO2: 100% (06-04-19 @ 05:14) (97% - 100%)            I&O's Summary    03 Jun 2019 07:01  -  04 Jun 2019 07:00  --------------------------------------------------------  IN: 400 mL / OUT: 2330 mL / NET: -1930 mL        PHYSICAL EXAM:  GENERAL: NAD  HEAD:  Atraumatic, Normocephalic  CHEST/LUNG: Clear to auscultation bilaterally; No rales or wheezing  HEART: Regular rate and rhythm; No murmurs, rubs, or gallops  ABDOMEN: Soft, nontender, nondistended  EXTREMITIES:  No clubbing, cyanosis, or edema  LYMPH: No lymphadenopathy noted  SKIN: No rashes or lesions  NERVOUS SYSTEM:  Alert & Oriented X3    LABS:  06-04    131<L>  |  92<L>  |  41<H>  ----------------------------<  293<H>  4.4   |  25  |  5.75<H>  06-03    130<L>  |  91<L>  |  49<H>  ----------------------------<  287<H>  4.2   |  23  |  8.35<H>  06-02    135  |  97<L>  |  39<H>  ----------------------------<  189<H>  3.6   |  24  |  7.25<H>    Ca    8.8      04 Jun 2019 05:56  Ca    9.0      03 Jun 2019 06:40  Ca    8.4      02 Jun 2019 05:38  Phos  3.0     06-04  Mg     1.6     06-04    TPro  6.7  /  Alb  2.9<L>  /  TBili  0.7  /  DBili  x   /  AST  25  /  ALT  14  /  AlkPhos  98  06-04  TPro  6.2  /  Alb  2.6<L>  /  TBili  0.7  /  DBili  x   /  AST  16  /  ALT  11  /  AlkPhos  85  06-03  TPro  5.8<L>  /  Alb  2.4<L>  /  TBili  0.8  /  DBili  x   /  AST  19  /  ALT  14  /  AlkPhos  83  06-02                                            8.3    9.02  )-----------( 259      ( 04 Jun 2019 05:56 )             25.4                         7.8    8.61  )-----------( 275      ( 03 Jun 2019 06:40 )             24.1                         7.7    7.60  )-----------( 248      ( 02 Jun 2019 05:38 )             23.3     CAPILLARY BLOOD GLUCOSE      POCT Blood Glucose.: 263 mg/dL (03 Jun 2019 21:38)  POCT Blood Glucose.: 277 mg/dL (03 Jun 2019 17:47)  POCT Blood Glucose.: 170 mg/dL (03 Jun 2019 12:20)  POCT Blood Glucose.: 128 mg/dL (03 Jun 2019 09:34)    RADIOLOGY:  Imaging Personally Reviewed: YES      Consultants involved in case: YES  Consultant(s) Notes Reviewed:  YES  Care Discussed with Consultants/Other Providers     Ella Suyeon Yu PGY-1

## 2019-06-04 NOTE — PROGRESS NOTE ADULT - PROBLEM SELECTOR PLAN 4
Renal failure in setting of distributive shock   Pt w CKD hx, with SLOAN on CKD IV likely from sepsis   - HD as per renal through permacath, kalpana dutta d/roel on 5/29  - continue to monitor for renal recovery, oliguric   - HD through permcath  - pt has hx of being on PPI and getting SLOAN, talked to GI who recommended switching it while monitoring for renal recovery. started famotidine as per GI recs  - urinating more, will try to measure I&Os, about 500cc per day.

## 2019-06-04 NOTE — PROGRESS NOTE ADULT - PROBLEM SELECTOR PLAN 1
- Acute cholangitis in setting of CBD stricture and recent ERCP with stent placement. 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 no mass in the region of narrowing.    - patient was offered a whipple procedure at OSH, for 5/30  - surg onc consulted for evaluation, will get whipple in the next 2-3weeks.  - EUS/ERCP today.

## 2019-06-04 NOTE — PROGRESS NOTE ADULT - ASSESSMENT
64 yo M hx of CAD s/p 9 stents, DMT2 on insulin, HTN, GERD, prior cholecystectomy,  diverticulosis, former smoker(30PY), biliary stricture s/p ERCP with sphincterotomy and stent 4/2019 who presented as transfer from [facility] for acute cholangitis s/p ERCP and stent CBD stent placement by GI on 5/19.Hospital course further complicated by Type II NSTEMI and bradycardia. Now on floors, resolved bradycardia, undergoing workup for biliary tree strictures 64 yo M hx of CAD s/p 9 stents, DMT2 on insulin, HTN, GERD, prior cholecystectomy,  diverticulosis, former smoker(30PY), biliary stricture s/p ERCP with sphincterotomy and stent 4/2019 who presented as transfer from [facility] for acute cholangitis s/p ERCP and stent CBD stent placement by GI on 5/19. Hospital course further complicated by Type II NSTEMI and bradycardia. Now on floors, resolved bradycardia, undergoing workup for biliary tree strictures

## 2019-06-04 NOTE — CHART NOTE - NSCHARTNOTEFT_GEN_A_CORE
Telemetry reviewed: NSR 70 with occasional PVC's/V couplets seen.  No bradycardia or pauses seen.  No NSVT/VT seen.  Will follow up.

## 2019-06-04 NOTE — PROGRESS NOTE ADULT - PROBLEM SELECTOR PLAN 3
- Possible flare given presentation and pt missing his febuxostat from SLOAN on CKD/ESRD  -f/u Hand Xray showing possible gout flare. Uric acid wnl  - prednisone 40mg x2, tapering   - rheum following

## 2019-06-04 NOTE — PROGRESS NOTE ADULT - PROBLEM SELECTOR PLAN 8
Resolved   - bradycardia likely from hypervagotonia from GI process as per EP  - no indication for PPM.

## 2019-06-05 LAB
ANION GAP SERPL CALC-SCNC: 13 MMO/L — SIGNIFICANT CHANGE UP (ref 7–14)
BUN SERPL-MCNC: 44 MG/DL — HIGH (ref 7–23)
CALCIUM SERPL-MCNC: 8.3 MG/DL — LOW (ref 8.4–10.5)
CHLORIDE SERPL-SCNC: 97 MMOL/L — LOW (ref 98–107)
CO2 SERPL-SCNC: 25 MMOL/L — SIGNIFICANT CHANGE UP (ref 22–31)
CREAT SERPL-MCNC: 6.26 MG/DL — HIGH (ref 0.5–1.3)
GLUCOSE BLDC GLUCOMTR-MCNC: 152 MG/DL — HIGH (ref 70–99)
GLUCOSE BLDC GLUCOMTR-MCNC: 153 MG/DL — HIGH (ref 70–99)
GLUCOSE BLDC GLUCOMTR-MCNC: 222 MG/DL — HIGH (ref 70–99)
GLUCOSE BLDC GLUCOMTR-MCNC: 262 MG/DL — HIGH (ref 70–99)
GLUCOSE BLDC GLUCOMTR-MCNC: 421 MG/DL — HIGH (ref 70–99)
GLUCOSE BLDC GLUCOMTR-MCNC: 422 MG/DL — HIGH (ref 70–99)
GLUCOSE SERPL-MCNC: 192 MG/DL — HIGH (ref 70–99)
HCT VFR BLD CALC: 25.4 % — LOW (ref 39–50)
HGB BLD-MCNC: 8.2 G/DL — LOW (ref 13–17)
MAGNESIUM SERPL-MCNC: 1.6 MG/DL — SIGNIFICANT CHANGE UP (ref 1.6–2.6)
MCHC RBC-ENTMCNC: 30.5 PG — SIGNIFICANT CHANGE UP (ref 27–34)
MCHC RBC-ENTMCNC: 32.3 % — SIGNIFICANT CHANGE UP (ref 32–36)
MCV RBC AUTO: 94.4 FL — SIGNIFICANT CHANGE UP (ref 80–100)
NRBC # FLD: 0 K/UL — SIGNIFICANT CHANGE UP (ref 0–0)
PHOSPHATE SERPL-MCNC: 4.7 MG/DL — HIGH (ref 2.5–4.5)
PLATELET # BLD AUTO: 252 K/UL — SIGNIFICANT CHANGE UP (ref 150–400)
PMV BLD: 10 FL — SIGNIFICANT CHANGE UP (ref 7–13)
POTASSIUM SERPL-MCNC: 4 MMOL/L — SIGNIFICANT CHANGE UP (ref 3.5–5.3)
POTASSIUM SERPL-SCNC: 4 MMOL/L — SIGNIFICANT CHANGE UP (ref 3.5–5.3)
RBC # BLD: 2.69 M/UL — LOW (ref 4.2–5.8)
RBC # FLD: 15.8 % — HIGH (ref 10.3–14.5)
SODIUM SERPL-SCNC: 135 MMOL/L — SIGNIFICANT CHANGE UP (ref 135–145)
WBC # BLD: 10.06 K/UL — SIGNIFICANT CHANGE UP (ref 3.8–10.5)
WBC # FLD AUTO: 10.06 K/UL — SIGNIFICANT CHANGE UP (ref 3.8–10.5)

## 2019-06-05 PROCEDURE — 99233 SBSQ HOSP IP/OBS HIGH 50: CPT

## 2019-06-05 PROCEDURE — 99233 SBSQ HOSP IP/OBS HIGH 50: CPT | Mod: GC

## 2019-06-05 PROCEDURE — 99223 1ST HOSP IP/OBS HIGH 75: CPT | Mod: GC

## 2019-06-05 PROCEDURE — 93306 TTE W/DOPPLER COMPLETE: CPT | Mod: 26

## 2019-06-05 PROCEDURE — 99232 SBSQ HOSP IP/OBS MODERATE 35: CPT | Mod: GC

## 2019-06-05 RX ORDER — CARVEDILOL PHOSPHATE 80 MG/1
6.25 CAPSULE, EXTENDED RELEASE ORAL EVERY 12 HOURS
Refills: 0 | Status: DISCONTINUED | OUTPATIENT
Start: 2019-06-05 | End: 2019-06-08

## 2019-06-05 RX ORDER — CARVEDILOL PHOSPHATE 80 MG/1
3.12 CAPSULE, EXTENDED RELEASE ORAL EVERY 12 HOURS
Refills: 0 | Status: DISCONTINUED | OUTPATIENT
Start: 2019-06-05 | End: 2019-06-05

## 2019-06-05 RX ORDER — INSULIN LISPRO 100/ML
3 VIAL (ML) SUBCUTANEOUS ONCE
Refills: 0 | Status: DISCONTINUED | OUTPATIENT
Start: 2019-06-05 | End: 2019-06-05

## 2019-06-05 RX ORDER — INSULIN GLARGINE 100 [IU]/ML
10 INJECTION, SOLUTION SUBCUTANEOUS AT BEDTIME
Refills: 0 | Status: DISCONTINUED | OUTPATIENT
Start: 2019-06-05 | End: 2019-06-07

## 2019-06-05 RX ORDER — HEPARIN SODIUM 5000 [USP'U]/ML
5000 INJECTION INTRAVENOUS; SUBCUTANEOUS EVERY 8 HOURS
Refills: 0 | Status: DISCONTINUED | OUTPATIENT
Start: 2019-06-05 | End: 2019-06-06

## 2019-06-05 RX ORDER — INSULIN LISPRO 100/ML
2 VIAL (ML) SUBCUTANEOUS ONCE
Refills: 0 | Status: COMPLETED | OUTPATIENT
Start: 2019-06-05 | End: 2019-06-05

## 2019-06-05 RX ORDER — HYDRALAZINE HCL 50 MG
25 TABLET ORAL THREE TIMES A DAY
Refills: 0 | Status: DISCONTINUED | OUTPATIENT
Start: 2019-06-05 | End: 2019-06-07

## 2019-06-05 RX ORDER — INSULIN GLARGINE 100 [IU]/ML
8 INJECTION, SOLUTION SUBCUTANEOUS AT BEDTIME
Refills: 0 | Status: DISCONTINUED | OUTPATIENT
Start: 2019-06-05 | End: 2019-06-05

## 2019-06-05 RX ADMIN — Medication 2: at 12:51

## 2019-06-05 RX ADMIN — Medication 6: at 18:16

## 2019-06-05 RX ADMIN — INSULIN GLARGINE 10 UNIT(S): 100 INJECTION, SOLUTION SUBCUTANEOUS at 22:05

## 2019-06-05 RX ADMIN — CARVEDILOL PHOSPHATE 3.12 MILLIGRAM(S): 80 CAPSULE, EXTENDED RELEASE ORAL at 18:17

## 2019-06-05 RX ADMIN — MEROPENEM 100 MILLIGRAM(S): 1 INJECTION INTRAVENOUS at 05:03

## 2019-06-05 RX ADMIN — BUDESONIDE AND FORMOTEROL FUMARATE DIHYDRATE 2 PUFF(S): 160; 4.5 AEROSOL RESPIRATORY (INHALATION) at 22:06

## 2019-06-05 RX ADMIN — CARVEDILOL PHOSPHATE 3.12 MILLIGRAM(S): 80 CAPSULE, EXTENDED RELEASE ORAL at 12:06

## 2019-06-05 RX ADMIN — ATORVASTATIN CALCIUM 40 MILLIGRAM(S): 80 TABLET, FILM COATED ORAL at 22:07

## 2019-06-05 RX ADMIN — ERYTHROPOIETIN 4000 UNIT(S): 10000 INJECTION, SOLUTION INTRAVENOUS; SUBCUTANEOUS at 08:03

## 2019-06-05 RX ADMIN — Medication 30 MILLIGRAM(S): at 05:02

## 2019-06-05 RX ADMIN — BUDESONIDE AND FORMOTEROL FUMARATE DIHYDRATE 2 PUFF(S): 160; 4.5 AEROSOL RESPIRATORY (INHALATION) at 12:06

## 2019-06-05 RX ADMIN — FAMOTIDINE 20 MILLIGRAM(S): 10 INJECTION INTRAVENOUS at 12:06

## 2019-06-05 RX ADMIN — SENNA PLUS 1 TABLET(S): 8.6 TABLET ORAL at 22:06

## 2019-06-05 RX ADMIN — HEPARIN SODIUM 5000 UNIT(S): 5000 INJECTION INTRAVENOUS; SUBCUTANEOUS at 18:17

## 2019-06-05 RX ADMIN — Medication 2 UNIT(S): at 18:16

## 2019-06-05 RX ADMIN — Medication 100 MILLIGRAM(S): at 05:01

## 2019-06-05 RX ADMIN — Medication 1: at 22:05

## 2019-06-05 RX ADMIN — Medication 81 MILLIGRAM(S): at 12:06

## 2019-06-05 RX ADMIN — MEROPENEM 100 MILLIGRAM(S): 1 INJECTION INTRAVENOUS at 18:17

## 2019-06-05 RX ADMIN — OXYCODONE HYDROCHLORIDE 10 MILLIGRAM(S): 5 TABLET ORAL at 06:23

## 2019-06-05 RX ADMIN — OXYCODONE HYDROCHLORIDE 10 MILLIGRAM(S): 5 TABLET ORAL at 07:00

## 2019-06-05 RX ADMIN — CHLORHEXIDINE GLUCONATE 1 APPLICATION(S): 213 SOLUTION TOPICAL at 12:05

## 2019-06-05 RX ADMIN — Medication 25 MILLIGRAM(S): at 22:07

## 2019-06-05 RX ADMIN — Medication 100 MILLIGRAM(S): at 22:06

## 2019-06-05 NOTE — PROGRESS NOTE ADULT - ASSESSMENT
62 yo M hx of CAD s/p 9 stents, DMT2 on insulin, HTN, GERD, prior cholecystectomy,  diverticulosis, former smoker(30PY), biliary stricture s/p ERCP with sphincterotomy and stent 4/2019 who presented as transfer from [facility] for acute cholangitis s/p ERCP and stent CBD stent placement by GI on 5/19. Hospital course further complicated by Type II NSTEMI and bradycardia. Now on floors, resolved bradycardia, undergoing workup for biliary tree strictures

## 2019-06-05 NOTE — PROGRESS NOTE ADULT - PROBLEM SELECTOR PLAN 1
- Acute cholangitis in setting of CBD stricture and recent ERCP with stent placement.   - repeat MRCP showing mod intrahepatic biliary duct dilation tht's improved, extrahepatic duct dilation to 2cm, narrowing of CBD w new cbd stent. no mass in the region of narrowing.    - patient was offered a whipple procedure at OSH. surg onc consulted for evaluation inpatient, recommending whipple in the next 2-3weeks.  - repeat EUS/ERCP showing a hypodense mass near the pancreatic head.   - on meropenem for 3 days until 6/6 for manipulation of CBD - Acute cholangitis in setting of CBD stricture and recent ERCP with stent placement.   - repeat MRCP showing mod intrahepatic biliary duct dilation tht's improved, extrahepatic duct dilation to 2cm, narrowing of CBD w new cbd stent. no mass in the region of narrowing.   - patient was offered a whipple procedure at OSH. surg onc consulted for evaluation inpatient, recommending whipple in the next 2-3weeks.  - repeat EUS/ERCP showing a hypodense mass near the pancreatic head.   - on meropenem for 3 days until 6/6 for manipulation of CBD  - pt is to go for whipple procedure within 2-4wks, will need cards clearance, cards recommending cath prior to procedure  - Renal cleared for contrast for cath  - will speak to cards and patient about cath as if pt gets stent, will need to be on DAPT, and if triple vessel dz, then CABG. Will need to weigh risk and benefit as patient has whipple surgery coming up.  - cath likely scheduled for tomorrow for now.

## 2019-06-05 NOTE — PROGRESS NOTE ADULT - PROBLEM SELECTOR PLAN 2
from acute cholangitis in setting of CBD stricture w ERCP/stent placement, w ecoli and citrobacter bacteremia. s/p repeat ERCP with stent in CBD.  - finished full course of meropenem per ID recs  - repeat MRCP showing mod intrahepatic biliary duct dilation tht's improved, extrahepatic duct dilation to 2cm, narrowing of CBD w new cbd stent. no mass in the region of narrowing. from acute cholangitis in setting of CBD stricture w ERCP/stent placement, w ecoli and citrobacter bacteremia. s/p repeat ERCP with stent in CBD.  - finished full course of meropenem per ID recs, but now repeating for 3 days for manipulation of CBD.   - repeat MRCP showing mod intrahepatic biliary duct dilation tht's improved, extrahepatic duct dilation to 2cm, narrowing of CBD w new cbd stent. no mass in the region of narrowing.

## 2019-06-05 NOTE — PROGRESS NOTE ADULT - PROBLEM SELECTOR PLAN 7
Pt w hx of HTN, holding all HTN meds   - his home med is coreg, amlodipine-valsartan and hydralazine  - amlodipine 10   - BP 140s, will monitor, if increasing, will add hydral  - hold ACE for renal injury  -will reintroduce coreg at low dose 3.25mg today Pt w hx of HTN, holding all HTN meds   - his home med is coreg, amlodipine-valsartan and hydralazine  - amlodipine 10   - BP 140s, will monitor, if increasing, will add hydral  - hold ACE for renal injury  -will reintroduce coreg at low dose 3.175mg today, then increase to 6.25

## 2019-06-05 NOTE — PROGRESS NOTE ADULT - SUBJECTIVE AND OBJECTIVE BOX
Chief Complaint:  Patient is a 63y old  Male who presents with a chief complaint of Septic Shock (04 Jun 2019 07:34)      Interval Events: Patient feeling much better this morning.  No abdominal pain.  Wants to try to eat.  Anxious about disease process.    Allergies:  Cipro (Rash)  Plavix (Rash)      Hospital Medications:  acetaminophen   Tablet .. 650 milliGRAM(s) Oral every 6 hours PRN  acetaminophen  IVPB .. 1000 milliGRAM(s) IV Intermittent once  ALBUTerol/ipratropium for Nebulization 3 milliLiter(s) Nebulizer every 6 hours PRN  amLODIPine   Tablet 10 milliGRAM(s) Oral daily  aspirin enteric coated 81 milliGRAM(s) Oral daily  atorvastatin 40 milliGRAM(s) Oral at bedtime  buDESOnide  80 MICROgram(s)/formoterol 4.5 MICROgram(s) Inhaler 2 Puff(s) Inhalation two times a day  chlorhexidine 4% Liquid 1 Application(s) Topical <User Schedule>  docusate sodium 100 milliGRAM(s) Oral three times a day  epoetin lamin Injectable 4000 Unit(s) IV Push <User Schedule>  famotidine    Tablet 20 milliGRAM(s) Oral daily  HYDROcodone/homatropine Syrup 5 milliLiter(s) Oral every 6 hours PRN  insulin glargine Injectable (LANTUS) 6 Unit(s) SubCutaneous at bedtime  insulin lispro (HumaLOG) corrective regimen sliding scale   SubCutaneous three times a day before meals  insulin lispro (HumaLOG) corrective regimen sliding scale   SubCutaneous at bedtime  lidocaine   Patch 1 Patch Transdermal daily PRN  meropenem  IVPB      meropenem  IVPB 500 milliGRAM(s) IV Intermittent every 12 hours  metoclopramide Injectable 10 milliGRAM(s) IV Push once  oxyCODONE    IR 10 milliGRAM(s) Oral every 6 hours PRN  senna 1 Tablet(s) Oral at bedtime      PMHX/PSHX:  Gout  Type II diabetes mellitus  HTN (hypertension)  CAD (coronary artery disease)  Cirrhosis  S/P cholecystectomy      Family history:      ROS:     General:  No wt loss, fevers, chills, night sweats, fatigue,   Eyes:  Good vision, no reported pain  ENT:  No sore throat, pain, runny nose, dysphagia  CV:  No pain, palpitations, hypo/hypertension  Resp:  No dyspnea, cough, tachypnea, wheezing  GI:  See HPI  :  No pain, bleeding, incontinence, nocturia  Muscle:  No pain, weakness  Neuro:  No weakness, tingling, memory problems  Psych:  No fatigue, insomnia, mood problems, depression  Endocrine:  No polyuria, polydipsia, cold/heat intolerance  Heme:  No petechiae, ecchymosis, easy bruisability  Skin:  No rash, edema      PHYSICAL EXAM:     GENERAL: NAD  HEENT:  NC/AT  CHEST:  Full & symmetric excursion, no increased effort  ABDOMEN:  Soft, non-tender, non-distended, +BS  EXTREMITIES:  no edema  SKIN:  No rash  NEURO:  Alert      Vital Signs:  Vital Signs Last 24 Hrs  T(C): 36.4 (05 Jun 2019 04:56), Max: 36.8 (04 Jun 2019 23:52)  T(F): 97.6 (05 Jun 2019 04:56), Max: 98.2 (04 Jun 2019 23:52)  HR: 66 (05 Jun 2019 04:56) (65 - 71)  BP: 139/65 (05 Jun 2019 04:56) (133/59 - 152/65)  BP(mean): --  RR: 18 (05 Jun 2019 04:56) (18 - 18)  SpO2: 99% (05 Jun 2019 04:56) (93% - 100%)  Daily     Daily     LABS:                        8.3    9.02  )-----------( 259      ( 04 Jun 2019 05:56 )             25.4     06-04    131<L>  |  92<L>  |  41<H>  ----------------------------<  293<H>  4.4   |  25  |  5.75<H>    Ca    8.8      04 Jun 2019 05:56  Phos  3.0     06-04  Mg     1.6     06-04    TPro  6.7  /  Alb  2.9<L>  /  TBili  0.7  /  DBili  x   /  AST  25  /  ALT  14  /  AlkPhos  98  06-04    LIVER FUNCTIONS - ( 04 Jun 2019 05:56 )  Alb: 2.9 g/dL / Pro: 6.7 g/dL / ALK PHOS: 98 u/L / ALT: 14 u/L / AST: 25 u/L / GGT: x                   Imaging:  < from: ERCP (06.04.19 @ 10:54) >    [facility]  _______________________________________________________________________________  Patient Name: Siva Gray              Procedure Date: 6/4/2019 10:54 AM  MRN: [id number]                     Account Number: [id number]  YOB: 1955             Admit Type: Inpatient  Room: [place] [address]                         Gender: Male  Attending MD: ALBER MONSALVE MD      _______________________________________________________________________________     Procedure:           ERCP  Indications:         CBD stricture, cholangitis s/p ERCP with inconclusive                        brushings and stent placement  Providers:           ALBER MONSALVE MD, CANDI CARBONE (Fellow)  Medicines:           General Anesthesia, Indocin 100mg OK, Flouro                        48.6mGy/3.1min  Complications:       No immediate complications.  Procedure:           Pre-Anesthesia Assessment:                       - Prior to the procedure, a History and Physical was             performed, and patient medications and allergies were                        reviewed. The patient is competent. The risks and                        benefits of the procedure and the sedation options and                        risks were discussed with the patient. All questions                        were answered and informed consent was obtained. Patient                        identification and proposed procedure were verified by                        the physician, the nurse and the anesthetist in the                        endoscopy suite. Prophylactic Antibiotics: The patient                        does not require prophylactic antibiotics. Prior                        Anticoagulants: The patient has taken no previous                    anticoagulant or antiplatelet agents. ASA Grade                        Assessment: III - A patient with severe systemic                        disease. After reviewing the risks and benefits, the                        patient was deemed in satisfactory condition to undergo                        the procedure. The anesthesia plan was to use general                        anesthesia. Immediately prior to administration of                        medications, the patient was re-assessed for adequacy to                        receive sedatives. The heart rate, respiratory rate,                        oxygen saturations, blood pressure, adequacy of                        pulmonary ventilation, and response to care were               monitored throughout the procedure. The physical status                        of the patient was re-assessed after the procedure.                       After obtaining informed consent, the scope was passed                        under direct vision. Throughout the procedure, the                        patient's blood pressure, pulse, and oxygen saturations                        were monitored continuously. The was introduced through                        the mouth, and advanced tothe duodenum and used to                        inject contrast into the bile duct. After obtaining                        informed consent, the scope was passed under direct                        vision. Throughout the procedure, the patient's blood                        pressure, pulse, and oxygen saturations were monitored                        continuously.                                                                                   Findings:       EGD with duodenoscope:       The  film was normal. The scope was passed through the upper GI        tract without discovering UGI findings. Two plastic stents originating        in the biliary tree were emerging from the major papilla. The major        papilla was notable for a previous sphincterotomy. Two stents were        removed from the biliary tree using a snare.       EUS exam:       A linear echoendoscope was used for examination. Debris was noted in a        dilated common bile duct. The bile duct ended abruptly at a 10 mm        irregular hypoechoic mass originating in the head of the pancreas. This        mass abutted the portal vein. The mass was sampled with a 22g [employer]        FNB needle. 3 passes were obtained. The rest of the pancreatic        parenchyma appeared normal. The other visualized vessels appeared normal.       ERCP:       The bile duct was deeply cannulated with the Hydratome. Contrast was        injected. I personally interpreted the bile duct images. Ductal flow of        contrast was adequate. The distal common bile duct contained a single        tight stricture. Cells for cytology were obtained by brushing of the        bile duct stricture. One 6 Fr by 10 cm fully covered removable metal        stent was placed into the common bileduct under endoscopic and        fluoroscopic guidance. Bile, pus and sludge flowed through the stent.        The stent was in good position.                                                                                   Impression:          EUS:                       - 10 mm pancreatic head hypoechoic lesion s/p FNB x 3                        passes.                       ERCP:                       - Two stents from the biliary tree were seen in the                        major papilla, removed with a snare.                       - A moderate biliary stricture was found. Brushed for                        cytology.                       - One metal stent was placed into the common bile duct                        that traverses the distal CBD stricture.  Recommendation:      - Return patient to hospital [facility] for ongoing care.                       - Await cytology results and await path results.                       - NPO                       - IVF                       - Continue antibiotics.                                                                                   Attending Participation:       I was present and participated during the entire procedure, including        non-key portions.                                                       ___________________  ALBER MONSALVE MD  6/4/2019 1:55:21 PM  This report has been signed electronically.  Number of Addenda: 0    Note Initiated On: 6/4/2019 10:54 AM    < end of copied text >

## 2019-06-05 NOTE — PROGRESS NOTE ADULT - ASSESSMENT
Impression:  1. Septic shock (resolved) with E coli and Citrobacter bacteremia presumed secondary to biliary source, s/p ERCP 5/19/19 notable for bile in the duodenum and additional placement of plastic biliary stent alongside prior stent  2. Prior EUS/ERCP on 4/19/19 at OSH with 2 cm CBD stricture and with sphincterotomy and stent placement, biopsies negative and brushings with atypical cells (favor reactive atypia).  Now s/p repeat ERCP with stent exchange and EUS with FNA of panc head mass on 6/4  3. CAD s/p PCI x 9 with elevated troponin, NSTEMI on heparin infusion.  4. SLOAN on CKD   5. DM  6. GERD    Recommendations:  - Ok to restart AC at end of day today  - followup path from CBD stricture brushing and panc head FNA  - advance diet as tolerated  - would give 3 more days of abx given manipulation of CBD  - Monitor CBC, CMP  - Supportive care per primary team  - Page GI with questions or changes in clinical status

## 2019-06-05 NOTE — PROGRESS NOTE ADULT - ATTENDING COMMENTS
Patient seen and examined.  Case discussed with house staff.  Agree with above as edited.   #Pancreatic stricture - s/p EUS which revealed 10mm Pancreatic head mass. S/P ERCP with metal stent placement. Purulence visualized - back on meropenem until 6/7. Planning for outpatient Surgical resection, tentatively 6/18   #SLOAN on CKD IV - secondary to ATN - Now HD dependent. d/w Renal. s/p permacath with Marina d/c'd. D/c'd PPI.  #NSTEMI, h/o CAD s/p PCI - Pharm nuclear stress - fixed defects - d/w cardiology - repeat Echo shows recovered LV function. No cardiology contraindications to proceeding with Whipple. c/w ASA and statin. Coreg resumed but at lower dose given prior bradycardia - tele reviewed - HR 60s-90s.   #Gout - acute flare of left PIP - Much improved - on steroid taper. Off febuxostat given HD. Plan for low dose preventative allopurinol on d/c.  #HTN - continue holding ARB given SLOAN on CKD.  Coreg resumed. c/w Amlodipine. Monitor BP. If BP remains elevated, can either uptitrate coreg (provided HR allows) or resume hydralazine 25mg TID and uptitrate as needed  #Septic Shock 2/2 Citrobacter and E. Coli bacteremia 2/2 cholangitis - Shock and sepsis resolved. Now off abx.  Plan discussed with patient, patient's wife and patient's son.  Plan d/w Attendings from Cardiology, Nephrology, Gastroenterology and Surgical Oncology.

## 2019-06-05 NOTE — PROGRESS NOTE ADULT - PROBLEM SELECTOR PLAN 1
Pt. with oliguric SLOAN on CKD in the setting of sepsis, hypotension and NSTEMI. Pt. with likely severe ATN. Pt. starting to urinate more frequently however Cr still rising. Receiving HD treatment today will plan for next on Wed and monitor labs and Uo in between. Monitor UOP and daily weights. Avoid any potential nephrotoxins. Pt. also with history of possible AIN from use of PPI. Pt. was discontinued of PPI on 5/31/19. Has Sadler's esophagus so will need to check with GI.  Will monitor patient for renal recovery however it is unlikely given severe CKD prior to starting HD. If patient requires cardiac catheterization with contrast there is no contraindication to it as patient is unlikely to have renal recovery and the benefit outweighs the risk. Discussed this with patient as well.

## 2019-06-05 NOTE — PROGRESS NOTE ADULT - PROBLEM SELECTOR PLAN 6
- Distributive shock in setting of E coli/ Citerobacter bacteremia   - Likely had type II NSTEMI, trops peaked ~2000s, downtrended  -EP and cards following -no interventions warranted at this time   - echo with severe global LV dysfunction, cards following  - pharm nuclear stress test fixed deficits, will need cath later on - Distributive shock in setting of E coli/ Citerobacter bacteremia   - Likely had type II NSTEMI, trops peaked ~2000s, downtrended  - EP and cards following -no interventions warranted at this time   - echo with severe global LV dysfunction, cards following  - pharm nuclear stress test fixed deficits, will need cath, likely tomorrow.

## 2019-06-05 NOTE — PROGRESS NOTE ADULT - ATTENDING COMMENTS
Echo repeated today to re-evaluate LV function as prior echo was technically difficult  LV function today correlates with nuclear findings of apical infarct  There was minimal ischemia on nuclear stress test, no further cardiac workup warranted  Would continue current cardiac medications, can titrate up Coreg for better BP control  No contraindication to Whipple procedure

## 2019-06-05 NOTE — PROGRESS NOTE ADULT - ASSESSMENT
62 yo M hx of CAD s/p 9 stents, DM on insulin, HTN, GERD, prior cholecystectomy,  diverticulosis, former smoker(30PY), biliary stricture s/p ERCP with sphincterotomy and stent 4/2019 who presented as transfer from [facility] for acute cholangitis s/p ERCP and stent CBD stent placement by GI on 5/19. Hospital course further complicated by Type II NSTEMI and bradycardia.    Significantly elevated biomarkers, even in the setting of SLOAN.  Likely large type II event, with probable underlying multi-vessel CAD.    #NSTEMI:  Cardiac enzymes peaked on 5/19: HsTrop 2607, CK 1416, CKMB 126.  Stress test with large, severe fixed defects of apical and mid to distal inferior walls.  - continue ASA 81mg daily, atorvastatin 40mg qhs, carvedilol 3.125mg PO BID  - given likelihood of longterm HD and plan for Whipple in 2 weeks, will discuss C with interventional team    #HFrEF  TTE with severe global dysfunction.  - fluid removal with HD  - continue carvedilol 3.125mg PO BID  - SLOAN resulting in HD so no ACEi/ARB      Karl Nielsen MD  Cardiology Fellow  [phone]  All Cardiology service information can be found 24/7 on [email or web address], password: [email or web address] 64 yo M hx of CAD s/p 9 stents, DM on insulin, HTN, GERD, prior cholecystectomy,  diverticulosis, former smoker(30PY), biliary stricture s/p ERCP with sphincterotomy and stent 4/2019 who presented as transfer from [facility] for acute cholangitis s/p ERCP and stent CBD stent placement by GI on 5/19. Hospital course further complicated by Type II NSTEMI and bradycardia.    Significantly elevated biomarkers, even in the setting of SLOAN.  Likely large type II event, with probable underlying multi-vessel CAD.    #NSTEMI:  Cardiac enzymes peaked on 5/19: HsTrop 2607, CK 1416, CKMB 126.  Stress test with large, severe fixed defects of apical and mid to distal inferior walls.  - continue ASA 81mg daily, atorvastatin 40mg qhs  - increase carvedilol to 6.25mg PO BID  - given likelihood of longterm HD and plan for Whipple in 2 weeks, will discuss [facility] with interventional team, may be able to schedule for tomorrow    #HFrEF  TTE with severe global dysfunction.  - fluid removal with HD  - increase coreg as above  - would ideally replace amlodipine with hydralazine 25mg PO TID for afterload reduction/BP control  - SLOAN resulting in HD so no ACEi/ARB      Karl Nielsen MD  Cardiology Fellow  [phone]  All Cardiology service information can be found 24/7 on [email or web address], password: [email or web address]

## 2019-06-05 NOTE — PROGRESS NOTE ADULT - PROBLEM SELECTOR PLAN 9
Diet DASH and CC diet  DVT PPX: Holding for now, will restart tonight Diet DASH and CC diet  DVT PPX: HSQ

## 2019-06-05 NOTE — PROGRESS NOTE ADULT - PROBLEM SELECTOR PLAN 3
- Possible flare given presentation and pt missing his febuxostat from SLOAN on CKD/ESRD  -f/u Hand Xray showing possible gout flare. Uric acid wnl  - prednisone 40mg x2, tapering   - rheum following - Possible flare given presentation and pt missing his febuxostat from SLOAN on CKD/ESRD  -f/u Hand Xray showing possible gout flare. Uric acid wnl  - prednisone 40mg x2, tapering   - rheum following  - much improved

## 2019-06-05 NOTE — PROGRESS NOTE ADULT - SUBJECTIVE AND OBJECTIVE BOX
Ella Suyeon Yu PGY-1   [facility] Pager # [phone]  NS Pager # [phone]      Patient is a 63y old  Male who presents with a chief complaint of Septic Shock (05 Jun 2019 07:08)      SUBJECTIVE: No acute events overnight. Patient seen and examined at bedside.    REVIEW OF SYSTEMS:  CONSTITUTIONAL: No fever, weight loss, or fatigue  RESPIRATORY: No cough or shortness of breath  CARDIOVASCULAR: No chest pain, palpitations, dizziness, or leg swelling  GASTROINTESTINAL: No abdominal or epigastric pain. No nausea, vomiting, or hematemesis; No diarrhea or constipation. No melena or hematochezia.  GENITOURINARY: No dysuria  NEUROLOGICAL: No headaches  SKIN: No itching or lesions       MEDICATIONS  (STANDING):  acetaminophen  IVPB .. 1000 milliGRAM(s) IV Intermittent once  amLODIPine   Tablet 10 milliGRAM(s) Oral daily  aspirin enteric coated 81 milliGRAM(s) Oral daily  atorvastatin 40 milliGRAM(s) Oral at bedtime  buDESOnide  80 MICROgram(s)/formoterol 4.5 MICROgram(s) Inhaler 2 Puff(s) Inhalation two times a day  chlorhexidine 4% Liquid 1 Application(s) Topical <User Schedule>  docusate sodium 100 milliGRAM(s) Oral three times a day  epoetin lamin Injectable 4000 Unit(s) IV Push <User Schedule>  famotidine    Tablet 20 milliGRAM(s) Oral daily  insulin glargine Injectable (LANTUS) 6 Unit(s) SubCutaneous at bedtime  insulin lispro (HumaLOG) corrective regimen sliding scale   SubCutaneous three times a day before meals  insulin lispro (HumaLOG) corrective regimen sliding scale   SubCutaneous at bedtime  meropenem  IVPB      meropenem  IVPB 500 milliGRAM(s) IV Intermittent every 12 hours  metoclopramide Injectable 10 milliGRAM(s) IV Push once  senna 1 Tablet(s) Oral at bedtime    MEDICATIONS  (PRN):  acetaminophen   Tablet .. 650 milliGRAM(s) Oral every 6 hours PRN Mild Pain (1 - 3), Moderate Pain (4 - 6)  ALBUTerol/ipratropium for Nebulization 3 milliLiter(s) Nebulizer every 6 hours PRN Shortness of Breath and/or Wheezing  HYDROcodone/homatropine Syrup 5 milliLiter(s) Oral every 6 hours PRN Cough  lidocaine   Patch 1 Patch Transdermal daily PRN Shoulder pain  oxyCODONE    IR 10 milliGRAM(s) Oral every 6 hours PRN Severe Pain (7 - 10)        Objective:    Vitals: Vital Signs Last 24 Hrs  T(C): 36.4 (06-05-19 @ 04:56), Max: 36.8 (06-04-19 @ 23:52)  T(F): 97.6 (06-05-19 @ 04:56), Max: 98.2 (06-04-19 @ 23:52)  HR: 66 (06-05-19 @ 04:56) (65 - 71)  BP: 139/65 (06-05-19 @ 04:56) (133/59 - 152/65)  BP(mean): --  RR: 18 (06-05-19 @ 04:56) (18 - 18)  SpO2: 99% (06-05-19 @ 04:56) (93% - 100%)            I&O's Summary    04 Jun 2019 07:01  -  05 Jun 2019 07:00  --------------------------------------------------------  IN: 0 mL / OUT: 900 mL / NET: -900 mL        PHYSICAL EXAM:  GENERAL: NAD  HEAD:  Atraumatic, Normocephalic  CHEST/LUNG: Clear to auscultation bilaterally; No rales or wheezing  HEART: Regular rate and rhythm; No murmurs, rubs, or gallops  ABDOMEN: Soft, nontender, nondistended  EXTREMITIES:  No clubbing, cyanosis, or edema  LYMPH: No lymphadenopathy noted  SKIN: No rashes or lesions  NERVOUS SYSTEM:  Alert & Oriented X3    LABS:  06-04    131<L>  |  92<L>  |  41<H>  ----------------------------<  293<H>  4.4   |  25  |  5.75<H>  06-03    130<L>  |  91<L>  |  49<H>  ----------------------------<  287<H>  4.2   |  23  |  8.35<H>    Ca    8.8      04 Jun 2019 05:56  Ca    9.0      03 Jun 2019 06:40  Phos  3.0     06-04  Mg     1.6     06-04    TPro  6.7  /  Alb  2.9<L>  /  TBili  0.7  /  DBili  x   /  AST  25  /  ALT  14  /  AlkPhos  98  06-04  TPro  6.2  /  Alb  2.6<L>  /  TBili  0.7  /  DBili  x   /  AST  16  /  ALT  11  /  AlkPhos  85  06-03                                            8.3    9.02  )-----------( 259      ( 04 Jun 2019 05:56 )             25.4                         7.8    8.61  )-----------( 275      ( 03 Jun 2019 06:40 )             24.1     CAPILLARY BLOOD GLUCOSE      POCT Blood Glucose.: 152 mg/dL (05 Jun 2019 05:20)  POCT Blood Glucose.: 221 mg/dL (04 Jun 2019 22:15)  POCT Blood Glucose.: 283 mg/dL (04 Jun 2019 17:46)  POCT Blood Glucose.: 245 mg/dL (04 Jun 2019 08:37)  POCT Blood Glucose.: 260 mg/dL (04 Jun 2019 07:56)    RADIOLOGY:  Imaging Personally Reviewed: YES      Consultants involved in case: YES  Consultant(s) Notes Reviewed:  YES  Care Discussed with Consultants/Other Providers     Ella Suyeon Yu PGY-1 Ella Suyeon Yu PGY-1   [facility] Pager # [phone]  NS Pager # [phone]      Patient is a 63y old  Male who presents with a chief complaint of Septic Shock (05 Jun 2019 07:08)      SUBJECTIVE: No acute events overnight. Patient seen and examined at bedside. Patient was in dialysis, not in distress. 4th finger is much better    REVIEW OF SYSTEMS:  CONSTITUTIONAL: No fever, weight loss, or fatigue  RESPIRATORY: No cough or shortness of breath  CARDIOVASCULAR: No chest pain, palpitations, dizziness, or leg swelling  GASTROINTESTINAL: No abdominal or epigastric pain. No nausea, vomiting, or hematemesis; No diarrhea or constipation. No melena or hematochezia.  GENITOURINARY: No dysuria  NEUROLOGICAL: No headaches  SKIN: No itching or lesions       MEDICATIONS  (STANDING):  acetaminophen  IVPB .. 1000 milliGRAM(s) IV Intermittent once  amLODIPine   Tablet 10 milliGRAM(s) Oral daily  aspirin enteric coated 81 milliGRAM(s) Oral daily  atorvastatin 40 milliGRAM(s) Oral at bedtime  buDESOnide  80 MICROgram(s)/formoterol 4.5 MICROgram(s) Inhaler 2 Puff(s) Inhalation two times a day  chlorhexidine 4% Liquid 1 Application(s) Topical <User Schedule>  docusate sodium 100 milliGRAM(s) Oral three times a day  epoetin lamin Injectable 4000 Unit(s) IV Push <User Schedule>  famotidine    Tablet 20 milliGRAM(s) Oral daily  insulin glargine Injectable (LANTUS) 6 Unit(s) SubCutaneous at bedtime  insulin lispro (HumaLOG) corrective regimen sliding scale   SubCutaneous three times a day before meals  insulin lispro (HumaLOG) corrective regimen sliding scale   SubCutaneous at bedtime  meropenem  IVPB      meropenem  IVPB 500 milliGRAM(s) IV Intermittent every 12 hours  metoclopramide Injectable 10 milliGRAM(s) IV Push once  senna 1 Tablet(s) Oral at bedtime    MEDICATIONS  (PRN):  acetaminophen   Tablet .. 650 milliGRAM(s) Oral every 6 hours PRN Mild Pain (1 - 3), Moderate Pain (4 - 6)  ALBUTerol/ipratropium for Nebulization 3 milliLiter(s) Nebulizer every 6 hours PRN Shortness of Breath and/or Wheezing  HYDROcodone/homatropine Syrup 5 milliLiter(s) Oral every 6 hours PRN Cough  lidocaine   Patch 1 Patch Transdermal daily PRN Shoulder pain  oxyCODONE    IR 10 milliGRAM(s) Oral every 6 hours PRN Severe Pain (7 - 10)        Objective:    Vitals: Vital Signs Last 24 Hrs  T(C): 36.4 (06-05-19 @ 04:56), Max: 36.8 (06-04-19 @ 23:52)  T(F): 97.6 (06-05-19 @ 04:56), Max: 98.2 (06-04-19 @ 23:52)  HR: 66 (06-05-19 @ 04:56) (65 - 71)  BP: 139/65 (06-05-19 @ 04:56) (133/59 - 152/65)  BP(mean): --  RR: 18 (06-05-19 @ 04:56) (18 - 18)  SpO2: 99% (06-05-19 @ 04:56) (93% - 100%)            I&O's Summary    04 Jun 2019 07:01  -  05 Jun 2019 07:00  --------------------------------------------------------  IN: 0 mL / OUT: 900 mL / NET: -900 mL        PHYSICAL EXAM:  GENERAL: NAD  CHEST/LUNG: Clear to auscultation bilaterally; No rales or wheezing  HEART: Regular rate and rhythm; No murmurs, rubs, or gallops  ABDOMEN: Soft, nontender, nondistended  EXTREMITIES:  No clubbing, cyanosis, or edema, 4th digit much improved.   SKIN: No rashes or lesions  NERVOUS SYSTEM:  Alert & Oriented X3    LABS:  06-04    131<L>  |  92<L>  |  41<H>  ----------------------------<  293<H>  4.4   |  25  |  5.75<H>  06-03    130<L>  |  91<L>  |  49<H>  ----------------------------<  287<H>  4.2   |  23  |  8.35<H>    Ca    8.8      04 Jun 2019 05:56  Ca    9.0      03 Jun 2019 06:40  Phos  3.0     06-04  Mg     1.6     06-04    TPro  6.7  /  Alb  2.9<L>  /  TBili  0.7  /  DBili  x   /  AST  25  /  ALT  14  /  AlkPhos  98  06-04  TPro  6.2  /  Alb  2.6<L>  /  TBili  0.7  /  DBili  x   /  AST  16  /  ALT  11  /  AlkPhos  85  06-03                                            8.3    9.02  )-----------( 259      ( 04 Jun 2019 05:56 )             25.4                         7.8    8.61  )-----------( 275      ( 03 Jun 2019 06:40 )             24.1     CAPILLARY BLOOD GLUCOSE      POCT Blood Glucose.: 152 mg/dL (05 Jun 2019 05:20)  POCT Blood Glucose.: 221 mg/dL (04 Jun 2019 22:15)  POCT Blood Glucose.: 283 mg/dL (04 Jun 2019 17:46)  POCT Blood Glucose.: 245 mg/dL (04 Jun 2019 08:37)  POCT Blood Glucose.: 260 mg/dL (04 Jun 2019 07:56)    RADIOLOGY:  Imaging Personally Reviewed: YES      Consultants involved in case: YES  Consultant(s) Notes Reviewed:  YES  Care Discussed with Consultants/Other Providers     Ella Suyeon Yu PGY-1

## 2019-06-05 NOTE — PROGRESS NOTE ADULT - SUBJECTIVE AND OBJECTIVE BOX
Patient seen and examined at bedside.    Overnight Events:     Review Of Systems: No chest pain, shortness of breath, or palpitations            Current Meds:  acetaminophen   Tablet .. 650 milliGRAM(s) Oral every 6 hours PRN  acetaminophen  IVPB .. 1000 milliGRAM(s) IV Intermittent once  ALBUTerol/ipratropium for Nebulization 3 milliLiter(s) Nebulizer every 6 hours PRN  amLODIPine   Tablet 10 milliGRAM(s) Oral daily  aspirin enteric coated 81 milliGRAM(s) Oral daily  atorvastatin 40 milliGRAM(s) Oral at bedtime  buDESOnide  80 MICROgram(s)/formoterol 4.5 MICROgram(s) Inhaler 2 Puff(s) Inhalation two times a day  carvedilol 3.125 milliGRAM(s) Oral every 12 hours  chlorhexidine 4% Liquid 1 Application(s) Topical <User Schedule>  docusate sodium 100 milliGRAM(s) Oral three times a day  epoetin lamin Injectable 4000 Unit(s) IV Push <User Schedule>  famotidine    Tablet 20 milliGRAM(s) Oral daily  HYDROcodone/homatropine Syrup 5 milliLiter(s) Oral every 6 hours PRN  insulin glargine Injectable (LANTUS) 6 Unit(s) SubCutaneous at bedtime  insulin lispro (HumaLOG) corrective regimen sliding scale   SubCutaneous three times a day before meals  insulin lispro (HumaLOG) corrective regimen sliding scale   SubCutaneous at bedtime  lidocaine   Patch 1 Patch Transdermal daily PRN  meropenem  IVPB      meropenem  IVPB 500 milliGRAM(s) IV Intermittent every 12 hours  metoclopramide Injectable 10 milliGRAM(s) IV Push once  oxyCODONE    IR 10 milliGRAM(s) Oral every 6 hours PRN  senna 1 Tablet(s) Oral at bedtime      Vitals:  T(F): 97.6 (-), Max: 98.2 (-)  HR: 66 (-) (65 - 71)  BP: 161/64 (-) (125/65 - 161/64)  RR: 18 (-)  SpO2: 97% (-)  I&O's Summary    2019 07:01  -  2019 07:00  --------------------------------------------------------  IN: 0 mL / OUT: 900 mL / NET: -900 mL    2019 07:01  -  2019 12:08  --------------------------------------------------------  IN: 400 mL / OUT: 1900 mL / NET: -1500 mL        Physical Exam:  Appearance: No acute distress; well appearing  HEENT:  EOMI, sclera anicteric, Normal oral mucosa  Cardiovascular: RRR, S1, S2, no murmurs, rubs, or gallops; no edema; no JVD  Respiratory: Clear to auscultation bilaterally, no wheezes, rales, rhonchi  Gastrointestinal: soft, non-tender, non-distended with normal bowel sounds  Musculoskeletal: No clubbing; no joint deformity   Neurologic: Non-focal  Lymphatic: No lymphadenopathy  Psychiatry: AAOx3, mood & affect appropriate  Skin: No rashes, ecchymoses, or cyanosis                          8.2    10.06 )-----------( 252      ( 2019 07:25 )             25.4     06-05    135  |  97<L>  |  44<H>  ----------------------------<  192<H>  4.0   |  25  |  6.26<H>    Ca    8.3<L>      2019 07:25  Phos  4.7     06-05  Mg     1.6     06-05    TPro  6.7  /  Alb  2.9<L>  /  TBili  0.7  /  DBili  x   /  AST  25  /  ALT  14  /  AlkPhos  98        Echo:  19  CONCLUSIONS:  1. Mitral annular calcification, otherwise normal mitral  valve. Minimal mitral regurgitation.  2. Aortic valve leaflet morphology not well visualized.  Mild aortic regurgitation.  3. Endocardium not well visualized; grossly severe global  left ventricular systolic dysfunction.  In some views the  apex appears particularly hypokinetic.  Endocardial  visualization enhanced with intravenous injection of echo  contrast (Definity).  No obvious LV thrombus seen.  4. Unable to accurately evaluate right ventricular size or  systolic function.    Stress Testin19  Conclusion:  The left ventricle was normal in size. There are large,  severe defects in the apical and mid to distal inferior  walls that are predominantly fixed, suggestive of infarct  with minimal nicole-infarct ischemia.      Interpretation of Telemetry: sinus 80s, PVCs Patient seen and examined at bedside.    Overnight Events: Feeling well, sitting up by the window.    REVIEW OF SYSTEMS:  CONSTITUTIONAL: No weakness, fevers or chills  EYES/ENT: No visual changes;  No dysphagia  NECK: No pain or stiffness  RESPIRATORY: No cough, wheezing, hemoptysis; No shortness of breath  CARDIOVASCULAR: No chest pain or palpitations; No lower extremity edema  GASTROINTESTINAL: No abdominal or epigastric pain. No nausea, vomiting, or hematemesis; No diarrhea or constipation. No melena or hematochezia.  BACK: No back pain  GENITOURINARY: No dysuria, frequency or hematuria  NEUROLOGICAL: No numbness or weakness  SKIN: No itching, burning, rashes, or lesions   All other review of systems is negative unless indicated above.         Current Meds:  acetaminophen   Tablet .. 650 milliGRAM(s) Oral every 6 hours PRN  acetaminophen  IVPB .. 1000 milliGRAM(s) IV Intermittent once  ALBUTerol/ipratropium for Nebulization 3 milliLiter(s) Nebulizer every 6 hours PRN  amLODIPine   Tablet 10 milliGRAM(s) Oral daily  aspirin enteric coated 81 milliGRAM(s) Oral daily  atorvastatin 40 milliGRAM(s) Oral at bedtime  buDESOnide  80 MICROgram(s)/formoterol 4.5 MICROgram(s) Inhaler 2 Puff(s) Inhalation two times a day  carvedilol 3.125 milliGRAM(s) Oral every 12 hours  chlorhexidine 4% Liquid 1 Application(s) Topical <User Schedule>  docusate sodium 100 milliGRAM(s) Oral three times a day  epoetin lamin Injectable 4000 Unit(s) IV Push <User Schedule>  famotidine    Tablet 20 milliGRAM(s) Oral daily  HYDROcodone/homatropine Syrup 5 milliLiter(s) Oral every 6 hours PRN  insulin glargine Injectable (LANTUS) 6 Unit(s) SubCutaneous at bedtime  insulin lispro (HumaLOG) corrective regimen sliding scale   SubCutaneous three times a day before meals  insulin lispro (HumaLOG) corrective regimen sliding scale   SubCutaneous at bedtime  lidocaine   Patch 1 Patch Transdermal daily PRN  meropenem  IVPB      meropenem  IVPB 500 milliGRAM(s) IV Intermittent every 12 hours  metoclopramide Injectable 10 milliGRAM(s) IV Push once  oxyCODONE    IR 10 milliGRAM(s) Oral every 6 hours PRN  senna 1 Tablet(s) Oral at bedtime      Vitals:  T(F): 97.6 (-), Max: 98.2 (-04)  HR: 66 () (65 - 71)  BP: 161/64 (-05) (125/65 - 161/64)  RR: 18 (-)  SpO2: 97% ()  I&O's Summary    2019 07:  -  2019 07:00  --------------------------------------------------------  IN: 0 mL / OUT: 900 mL / NET: -900 mL    2019 07:  -  2019 12:08  --------------------------------------------------------  IN: 400 mL / OUT: 1900 mL / NET: -1500 mL        Physical Exam:  Appearance: No acute distress; well appearing  HEENT:  EOMI, sclera anicteric, Normal oral mucosa  Cardiovascular: RRR, S1, S2, no murmurs, rubs, or gallops; no edema; no JVD. HD tunneled catheter on right chest  Respiratory: Clear to auscultation bilaterally, no wheezes, rales, rhonchi  Gastrointestinal: soft, non-tender, non-distended with normal bowel sounds  Musculoskeletal: No clubbing; no joint deformity   Neurologic: Non-focal  Extremities: 1+ edema to mid shins b/l  Psychiatry: AAOx3, mood & affect appropriate  Skin: No rashes, ecchymoses, or cyanosis                          8.2    10.06 )-----------( 252      ( 2019 07:25 )             25.4     06-05    135  |  97<L>  |  44<H>  ----------------------------<  192<H>  4.0   |  25  |  6.26<H>    Ca    8.3<L>      2019 07:25  Phos  4.7     06-05  Mg     1.6     06-05    TPro  6.7  /  Alb  2.9<L>  /  TBili  0.7  /  DBili  x   /  AST  25  /  ALT  14  /  AlkPhos  98        Echo:  19  CONCLUSIONS:  1. Mitral annular calcification, otherwise normal mitral  valve. Minimal mitral regurgitation.  2. Aortic valve leaflet morphology not well visualized.  Mild aortic regurgitation.  3. Endocardium not well visualized; grossly severe global  left ventricular systolic dysfunction.  In some views the  apex appears particularly hypokinetic.  Endocardial  visualization enhanced with intravenous injection of echo  contrast (Definity).  No obvious LV thrombus seen.  4. Unable to accurately evaluate right ventricular size or  systolic function.    Stress Testin19  Conclusion:  The left ventricle was normal in size. There are large,  severe defects in the apical and mid to distal inferior  walls that are predominantly fixed, suggestive of infarct  with minimal nicole-infarct ischemia.      Interpretation of Telemetry: sinus 80s, PVCs

## 2019-06-05 NOTE — PROGRESS NOTE ADULT - SUBJECTIVE AND OBJECTIVE BOX
[facility] DIVISION OF KIDNEY DISEASES AND HYPERTENSION -- FOLLOW UP NOTE  --------------------------------------------------------------------------------    Patient seen and examined while on HD this morning. Feeling well. Denies any shortness of breath. He has leg swelling bilaterally. His gout flare has improved.       PAST HISTORY  --------------------------------------------------------------------------------  No significant changes to PMH, PSH, FHx, SHx, unless otherwise noted    ALLERGIES & MEDICATIONS  --------------------------------------------------------------------------------  Allergies    Cipro (Rash)  Plavix (Rash)    Intolerances      Standing Inpatient Medications  acetaminophen  IVPB .. 1000 milliGRAM(s) IV Intermittent once  amLODIPine   Tablet 10 milliGRAM(s) Oral daily  aspirin enteric coated 81 milliGRAM(s) Oral daily  atorvastatin 40 milliGRAM(s) Oral at bedtime  buDESOnide  80 MICROgram(s)/formoterol 4.5 MICROgram(s) Inhaler 2 Puff(s) Inhalation two times a day  carvedilol 3.125 milliGRAM(s) Oral every 12 hours  chlorhexidine 4% Liquid 1 Application(s) Topical <User Schedule>  docusate sodium 100 milliGRAM(s) Oral three times a day  epoetin lamin Injectable 4000 Unit(s) IV Push <User Schedule>  famotidine    Tablet 20 milliGRAM(s) Oral daily  insulin glargine Injectable (LANTUS) 6 Unit(s) SubCutaneous at bedtime  insulin lispro (HumaLOG) corrective regimen sliding scale   SubCutaneous three times a day before meals  insulin lispro (HumaLOG) corrective regimen sliding scale   SubCutaneous at bedtime  meropenem  IVPB      meropenem  IVPB 500 milliGRAM(s) IV Intermittent every 12 hours  metoclopramide Injectable 10 milliGRAM(s) IV Push once  senna 1 Tablet(s) Oral at bedtime    PRN Inpatient Medications  acetaminophen   Tablet .. 650 milliGRAM(s) Oral every 6 hours PRN  ALBUTerol/ipratropium for Nebulization 3 milliLiter(s) Nebulizer every 6 hours PRN  HYDROcodone/homatropine Syrup 5 milliLiter(s) Oral every 6 hours PRN  lidocaine   Patch 1 Patch Transdermal daily PRN  oxyCODONE    IR 10 milliGRAM(s) Oral every 6 hours PRN      REVIEW OF SYSTEMS  --------------------------------------------------------------------------------  Gen: No weight changes, fatigue, fevers/chills, weakness  Skin: No rashes  Head/Eyes/Ears/Mouth: No headache; Normal hearing; Normal vision   Respiratory: No dyspnea, cough, wheezing  CV: No chest pain, PND, orthopnea  GI: No abdominal pain, diarrhea, constipation, nausea, vomiting  : No increased frequency, dysuria, hematuria, nocturia  MSK: No joint pain/swelling; no back pain; no edema  Neuro: No dizziness/lightheadedness, weakness  Psych: No significant nervousness, anxiety, stress, depression    All other systems were reviewed and are negative, except as noted.    VITALS/PHYSICAL EXAM  --------------------------------------------------------------------------------  T(C): 36.4 (06-05-19 @ 11:41), Max: 36.8 (06-04-19 @ 23:52)  HR: 66 (06-05-19 @ 11:41) (65 - 71)  BP: 161/64 (06-05-19 @ 11:41) (125/65 - 161/64)  RR: 18 (06-05-19 @ 11:41) (16 - 18)  SpO2: 97% (06-05-19 @ 11:41) (93% - 100%)  Wt(kg): --        06-04-19 @ 07:01  -  06-05-19 @ 07:00  --------------------------------------------------------  IN: 0 mL / OUT: 900 mL / NET: -900 mL    06-05-19 @ 07:01  -  06-05-19 @ 11:52  --------------------------------------------------------  IN: 400 mL / OUT: 1900 mL / NET: -1500 mL      Physical Exam:  	Gen: NAD, well-appearing  	HEENT: PERRL, supple neck  	Pulm: CTA B/L  	CV: RRR, S1S2; no rub  	Abd: +BS, soft, nontender/nondistended  	: No suprapubic tenderness  	UE: Warm, intact strength; no edema  	LE: Warm, intact strength; no edema  	Neuro: No focal deficits  	Psych: Normal affect and mood  	Skin: Warm, without rashes  	Vascular access: R IJ tunneled catheter    LABS/STUDIES  --------------------------------------------------------------------------------              8.2    10.06 >-----------<  252      [06-05-19 @ 07:25]              25.4     135  |  97  |  44  ----------------------------<  192      [06-05-19 @ 07:25]  4.0   |  25  |  6.26        Ca     8.3     [06-05-19 @ 07:25]      Mg     1.6     [06-05-19 @ 07:25]      Phos  4.7     [06-05-19 @ 07:25]    TPro  6.7  /  Alb  2.9  /  TBili  0.7  /  DBili  x   /  AST  25  /  ALT  14  /  AlkPhos  98  [06-04-19 @ 05:56]          Creatinine Trend:  SCr 6.26 [06-05 @ 07:25]  SCr 5.75 [06-04 @ 05:56]  SCr 8.35 [06-03 @ 06:40]  SCr 7.25 [06-02 @ 05:38]  SCr 5.65 [06-01 @ 06:19]    Urinalysis - [05-18-19 @ 00:33]      Color Yellow / Appearance Slightly Turbid / SG 1.015 / pH 5.0      Gluc Negative / Ketone Trace  / Bili Moderate / Urobili 4       Blood Large / Protein 100 / Leuk Est Trace / Nitrite Negative      RBC 3-5 / WBC 11-25 / Hyaline 0-2 / Gran 0-2 / Sq Epi  / Non Sq Epi Moderate / Bacteria Many      Iron 47, TIBC 175, %sat --      [05-31-19 @ 06:45]  Ferritin 640.4      [05-31-19 @ 06:45]  HbA1c 6.5      [04-20-19 @ 06:47]    HBsAb 3.0      [05-22-19 @ 20:00]  HBsAg NEGATIVE      [05-22-19 @ 20:00]  HBcAb Nonreactive      [05-22-19 @ 20:00]  HCV 0.20, Nonreactive Hepatitis C AB  S/CO Ratio                        Interpretation  < 1.00                                   Non-Reactive  1.00 - 4.99                         Weakly-Reactive  >= 5.00                                Reactive  Non-Reactive: Aperson with a non-reactive HCV antibody  result is considered uninfected.  No further action is  needed unless recent infection is suspected.  In these  cases, consider repeat testing later to detect  seroconversion..  Weakly-Reactive: HCV antibody test is abnormal, HCV RNA  Qualitative test will follow.  Reactive: HCV antibody test is abnormal, HCV RNA  Qualitative test will follow.  Note: HCV antibody testing is performed on the [employer]   system.      [05-22-19 @ 20:00]

## 2019-06-06 ENCOUNTER — TRANSCRIPTION ENCOUNTER (OUTPATIENT)
Age: 64
End: 2019-06-06

## 2019-06-06 LAB
ALBUMIN SERPL ELPH-MCNC: 2.5 G/DL — LOW (ref 3.3–5)
ALP SERPL-CCNC: 79 U/L — SIGNIFICANT CHANGE UP (ref 40–120)
ALT FLD-CCNC: 13 U/L — SIGNIFICANT CHANGE UP (ref 4–41)
ANION GAP SERPL CALC-SCNC: 13 MMO/L — SIGNIFICANT CHANGE UP (ref 7–14)
AST SERPL-CCNC: 15 U/L — SIGNIFICANT CHANGE UP (ref 4–40)
BASOPHILS # BLD AUTO: 0.03 K/UL — SIGNIFICANT CHANGE UP (ref 0–0.2)
BASOPHILS NFR BLD AUTO: 0.3 % — SIGNIFICANT CHANGE UP (ref 0–2)
BILIRUB SERPL-MCNC: 0.6 MG/DL — SIGNIFICANT CHANGE UP (ref 0.2–1.2)
BUN SERPL-MCNC: 34 MG/DL — HIGH (ref 7–23)
CALCIUM SERPL-MCNC: 7.6 MG/DL — LOW (ref 8.4–10.5)
CHLORIDE SERPL-SCNC: 98 MMOL/L — SIGNIFICANT CHANGE UP (ref 98–107)
CO2 SERPL-SCNC: 25 MMOL/L — SIGNIFICANT CHANGE UP (ref 22–31)
CREAT SERPL-MCNC: 4.67 MG/DL — HIGH (ref 0.5–1.3)
EOSINOPHIL # BLD AUTO: 0.13 K/UL — SIGNIFICANT CHANGE UP (ref 0–0.5)
EOSINOPHIL NFR BLD AUTO: 1.4 % — SIGNIFICANT CHANGE UP (ref 0–6)
GLUCOSE BLDC GLUCOMTR-MCNC: 219 MG/DL — HIGH (ref 70–99)
GLUCOSE BLDC GLUCOMTR-MCNC: 257 MG/DL — HIGH (ref 70–99)
GLUCOSE BLDC GLUCOMTR-MCNC: 257 MG/DL — HIGH (ref 70–99)
GLUCOSE BLDC GLUCOMTR-MCNC: 265 MG/DL — HIGH (ref 70–99)
GLUCOSE BLDC GLUCOMTR-MCNC: 325 MG/DL — HIGH (ref 70–99)
GLUCOSE SERPL-MCNC: 244 MG/DL — HIGH (ref 70–99)
HCT VFR BLD CALC: 24.7 % — LOW (ref 39–50)
HGB BLD-MCNC: 7.8 G/DL — LOW (ref 13–17)
IMM GRANULOCYTES NFR BLD AUTO: 1.2 % — SIGNIFICANT CHANGE UP (ref 0–1.5)
LYMPHOCYTES # BLD AUTO: 1.49 K/UL — SIGNIFICANT CHANGE UP (ref 1–3.3)
LYMPHOCYTES # BLD AUTO: 16.2 % — SIGNIFICANT CHANGE UP (ref 13–44)
MAGNESIUM SERPL-MCNC: 1.5 MG/DL — LOW (ref 1.6–2.6)
MCHC RBC-ENTMCNC: 30 PG — SIGNIFICANT CHANGE UP (ref 27–34)
MCHC RBC-ENTMCNC: 31.6 % — LOW (ref 32–36)
MCV RBC AUTO: 95 FL — SIGNIFICANT CHANGE UP (ref 80–100)
MONOCYTES # BLD AUTO: 1.07 K/UL — HIGH (ref 0–0.9)
MONOCYTES NFR BLD AUTO: 11.7 % — SIGNIFICANT CHANGE UP (ref 2–14)
NEUTROPHILS # BLD AUTO: 6.34 K/UL — SIGNIFICANT CHANGE UP (ref 1.8–7.4)
NEUTROPHILS NFR BLD AUTO: 69.2 % — SIGNIFICANT CHANGE UP (ref 43–77)
NON-GYNECOLOGICAL CYTOLOGY STUDY: SIGNIFICANT CHANGE UP
NON-GYNECOLOGICAL CYTOLOGY STUDY: SIGNIFICANT CHANGE UP
NRBC # FLD: 0 K/UL — SIGNIFICANT CHANGE UP (ref 0–0)
PHOSPHATE SERPL-MCNC: 2.3 MG/DL — LOW (ref 2.5–4.5)
PLATELET # BLD AUTO: 212 K/UL — SIGNIFICANT CHANGE UP (ref 150–400)
PMV BLD: 10 FL — SIGNIFICANT CHANGE UP (ref 7–13)
POTASSIUM SERPL-MCNC: 3.6 MMOL/L — SIGNIFICANT CHANGE UP (ref 3.5–5.3)
POTASSIUM SERPL-SCNC: 3.6 MMOL/L — SIGNIFICANT CHANGE UP (ref 3.5–5.3)
PROT SERPL-MCNC: 6 G/DL — SIGNIFICANT CHANGE UP (ref 6–8.3)
RBC # BLD: 2.6 M/UL — LOW (ref 4.2–5.8)
RBC # FLD: 15.8 % — HIGH (ref 10.3–14.5)
SODIUM SERPL-SCNC: 136 MMOL/L — SIGNIFICANT CHANGE UP (ref 135–145)
WBC # BLD: 9.17 K/UL — SIGNIFICANT CHANGE UP (ref 3.8–10.5)
WBC # FLD AUTO: 9.17 K/UL — SIGNIFICANT CHANGE UP (ref 3.8–10.5)

## 2019-06-06 PROCEDURE — 99233 SBSQ HOSP IP/OBS HIGH 50: CPT | Mod: GC

## 2019-06-06 PROCEDURE — 99232 SBSQ HOSP IP/OBS MODERATE 35: CPT

## 2019-06-06 PROCEDURE — 93010 ELECTROCARDIOGRAM REPORT: CPT

## 2019-06-06 PROCEDURE — 99232 SBSQ HOSP IP/OBS MODERATE 35: CPT | Mod: GC

## 2019-06-06 RX ORDER — MAGNESIUM SULFATE 500 MG/ML
2 VIAL (ML) INJECTION ONCE
Refills: 0 | Status: COMPLETED | OUTPATIENT
Start: 2019-06-06 | End: 2019-06-06

## 2019-06-06 RX ORDER — HEPARIN SODIUM 5000 [USP'U]/ML
5000 INJECTION INTRAVENOUS; SUBCUTANEOUS EVERY 8 HOURS
Refills: 0 | Status: DISCONTINUED | OUTPATIENT
Start: 2019-06-06 | End: 2019-06-08

## 2019-06-06 RX ADMIN — CARVEDILOL PHOSPHATE 6.25 MILLIGRAM(S): 80 CAPSULE, EXTENDED RELEASE ORAL at 17:20

## 2019-06-06 RX ADMIN — OXYCODONE HYDROCHLORIDE 10 MILLIGRAM(S): 5 TABLET ORAL at 02:30

## 2019-06-06 RX ADMIN — SENNA PLUS 1 TABLET(S): 8.6 TABLET ORAL at 22:38

## 2019-06-06 RX ADMIN — Medication 3: at 19:29

## 2019-06-06 RX ADMIN — Medication 3: at 09:38

## 2019-06-06 RX ADMIN — HEPARIN SODIUM 5000 UNIT(S): 5000 INJECTION INTRAVENOUS; SUBCUTANEOUS at 06:15

## 2019-06-06 RX ADMIN — BUDESONIDE AND FORMOTEROL FUMARATE DIHYDRATE 2 PUFF(S): 160; 4.5 AEROSOL RESPIRATORY (INHALATION) at 22:38

## 2019-06-06 RX ADMIN — Medication 100 MILLIGRAM(S): at 06:15

## 2019-06-06 RX ADMIN — HEPARIN SODIUM 5000 UNIT(S): 5000 INJECTION INTRAVENOUS; SUBCUTANEOUS at 13:08

## 2019-06-06 RX ADMIN — CARVEDILOL PHOSPHATE 6.25 MILLIGRAM(S): 80 CAPSULE, EXTENDED RELEASE ORAL at 06:15

## 2019-06-06 RX ADMIN — FAMOTIDINE 20 MILLIGRAM(S): 10 INJECTION INTRAVENOUS at 13:08

## 2019-06-06 RX ADMIN — BUDESONIDE AND FORMOTEROL FUMARATE DIHYDRATE 2 PUFF(S): 160; 4.5 AEROSOL RESPIRATORY (INHALATION) at 09:40

## 2019-06-06 RX ADMIN — Medication 81 MILLIGRAM(S): at 13:08

## 2019-06-06 RX ADMIN — OXYCODONE HYDROCHLORIDE 10 MILLIGRAM(S): 5 TABLET ORAL at 01:25

## 2019-06-06 RX ADMIN — MEROPENEM 100 MILLIGRAM(S): 1 INJECTION INTRAVENOUS at 06:14

## 2019-06-06 RX ADMIN — Medication 25 MILLIGRAM(S): at 22:38

## 2019-06-06 RX ADMIN — ATORVASTATIN CALCIUM 40 MILLIGRAM(S): 80 TABLET, FILM COATED ORAL at 22:38

## 2019-06-06 RX ADMIN — Medication 50 GRAM(S): at 11:29

## 2019-06-06 RX ADMIN — CHLORHEXIDINE GLUCONATE 1 APPLICATION(S): 213 SOLUTION TOPICAL at 09:40

## 2019-06-06 RX ADMIN — Medication 100 MILLIGRAM(S): at 22:38

## 2019-06-06 RX ADMIN — Medication 4: at 13:45

## 2019-06-06 RX ADMIN — MEROPENEM 100 MILLIGRAM(S): 1 INJECTION INTRAVENOUS at 17:21

## 2019-06-06 RX ADMIN — INSULIN GLARGINE 10 UNIT(S): 100 INJECTION, SOLUTION SUBCUTANEOUS at 22:39

## 2019-06-06 RX ADMIN — Medication 25 MILLIGRAM(S): at 06:15

## 2019-06-06 RX ADMIN — Medication 25 MILLIGRAM(S): at 13:08

## 2019-06-06 RX ADMIN — Medication 20 MILLIGRAM(S): at 06:15

## 2019-06-06 RX ADMIN — HEPARIN SODIUM 5000 UNIT(S): 5000 INJECTION INTRAVENOUS; SUBCUTANEOUS at 22:38

## 2019-06-06 NOTE — PROGRESS NOTE ADULT - PROBLEM SELECTOR PLAN 7
Pt w hx of HTN, holding all HTN meds   - his home med is coreg, amlodipine-valsartan and hydralazine  - amlodipine 10   - BP 140s, will monitor, if increasing, will add hydral  - hold ACE for renal injury  - coreg 6.25

## 2019-06-06 NOTE — DISCHARGE NOTE PROVIDER - NSDCCPCAREPLAN_GEN_ALL_CORE_FT
PRINCIPAL DISCHARGE DIAGNOSIS  Diagnosis: Septic shock  Assessment and Plan of Treatment: You came in with sepsis from E coli bacteremia from cholangitis You were transferred to the MICU at [facility] and got a ERCP with stent. You developed SLOAN on CKD and you got a shiley with CVVHD, which was eventually transitioned to permacath. We had you on meropenem for your infection. You also had bradycardia in the MICU, and we consulted our electrophysiology team and they thought it was hypervagotonia from the repeat ERCPs. You were on dopamine, but eventually weaned off and you improved, and we placed you back on your home coreg. You got a MRCP which showedpatent stent, and stricture in the head of pancreas and tail of pancreas. You went for a repeat ERCP/EUS and it showed a hypodense lesion in the head of pancreas. Surg onc was consulted and they recommended a whipple procedure, which is tentatively scheduled for June 18th. Your HD sessions are T/Th/Sat, and your surgery is June 18th, so please make sure to get HD on monday prior to surgery. Please follow up with your PMD after discharge.         SECONDARY DISCHARGE DIAGNOSES  Diagnosis: Gout, joint  Assessment and Plan of Treatment: You had a gout flare in the hospital on your L 4th PIP. We treated you with steroids, called rheumatology. We are tapering the steroids and we will send you out with allopurinol 100 after dialysis days.    Diagnosis: NSTEMI (non-ST elevated myocardial infarction)  Assessment and Plan of Treatment: During MICU stay, you had a NSTEMI with trops ~2000s. You had 3 days of heparin drip and got a nuclear stress test which showed fixed deficits and also got an echo that showed apical abnormalities which was consistent with the stress test. Cardiology did not think you need a cath prior to your surgery. Please follow up with cardiology after discharge. PRINCIPAL DISCHARGE DIAGNOSIS  Diagnosis: Septic shock  Assessment and Plan of Treatment: You came in with sepsis from E coli bacteremia from cholangitis You were transferred to the MICU at [facility] and got a ERCP with stent. You developed SLOAN on CKD and you got a shiley with CVVHD, which was eventually transitioned to permacath. We had you on meropenem for your infection. You also had bradycardia in the MICU, and we consulted our electrophysiology team and they thought it was hypervagotonia from the repeat ERCPs. You were on dopamine, but eventually weaned off and you improved, and we placed you back on your home coreg. You got a MRCP which showedpatent stent, and stricture in the head of pancreas and tail of pancreas. You went for a repeat ERCP/EUS and it showed a hypodense lesion in the head of pancreas. Surg onc was consulted and they recommended a whipple procedure, which is tentatively scheduled for June 18th. Your HD sessions are T/Th/Sat, and your surgery is June 18th, so please make sure to get HD on monday prior to surgery. Please follow up with your PMD after discharge.         SECONDARY DISCHARGE DIAGNOSES  Diagnosis: Gout, joint  Assessment and Plan of Treatment: You had a gout flare in the hospital on your L 4th PIP. We treated you with steroids, called rheumatology. We are tapering the steroids and we will send you out with allopurinol 100 after dialysis days. Please take 15mg prednisone 6/9, 10mg on 6/10 and 6/11, and 5mg on 6/12, 6/13    Diagnosis: NSTEMI (non-ST elevated myocardial infarction)  Assessment and Plan of Treatment: During MICU stay, you had a NSTEMI with trops ~2000s. You had 3 days of heparin drip and got a nuclear stress test which showed fixed deficits and also got an echo that showed apical abnormalities which was consistent with the stress test. Cardiology did not think you need a cath prior to your surgery. Please follow up with cardiology after discharge.

## 2019-06-06 NOTE — PROGRESS NOTE ADULT - ATTENDING COMMENTS
Patient seen and examined.  Case discussed with house staff.  Agree with above as edited.   #Pancreatic stricture - s/p EUS which revealed 10mm Pancreatic head mass. S/P ERCP with metal stent placement. Purulence visualized - back on meropenem until 6/7. Planning for outpatient Surgical resection, tentatively 6/18.  #SLOAN on CKD IV - secondary to ATN - Now HD dependent. d/w Renal. c/w HD via permacath. Working on outpatient HD slots.  #NSTEMI, h/o CAD s/p PCI - Pharm nuclear stress - fixed defects - Appreciate cardiology f/u- Repeat Echo shows recovery in LV function. No cardiology contraindications to proceeding with Whipple. c/w ASA and statin. Coreg resumed but at lower dose given prior bradycardia.   #NSVT - 7 beats. Asymptomatic. No other events on tele - coreg increased. c/w tele monitoring. - tele reviewed - HR 60s-70s.   #Gout - acute flare of left PIP continues to improve on steroid taper. Off febuxostat given HD. Plan for low dose preventative allopurinol on d/c.  #HTN - continue holding ARB given SLOAN on CKD.  c/w Amlodipine and coreg. Hydralazine started.  #Septic Shock 2/2 Citrobacter and E. Coli bacteremia 2/2 cholangitis - Shock and sepsis resolved. Now off abx.  Plan discussed with patient  and patient's son.

## 2019-06-06 NOTE — PROGRESS NOTE ADULT - ASSESSMENT
62 yo M hx of CAD s/p 9 stents, DMT2 on insulin, HTN, GERD, prior cholecystectomy,  diverticulosis, former smoker(30PY), biliary stricture s/p ERCP with sphincterotomy and stent 4/2019 who presented as transfer from [facility] for acute cholangitis s/p ERCP and stent CBD stent placement by GI on 5/19. Hospital course further complicated by Type II NSTEMI and bradycardia. Now on floors, resolved bradycardia, undergoing workup for biliary tree strictures

## 2019-06-06 NOTE — PROGRESS NOTE ADULT - PROBLEM SELECTOR PLAN 3
- Possible flare given presentation and pt missing his febuxostat from SLOAN on CKD/ESRD  -f/u Hand Xray showing possible gout flare. Uric acid wnl  - prednisone 40mg x2, tapering   - rheum following  - much improved

## 2019-06-06 NOTE — PROGRESS NOTE ADULT - SUBJECTIVE AND OBJECTIVE BOX
Ella Suyeon Yu PGY-1   [facility] Pager # [phone]  NS Pager # [phone]      Patient is a 63y old  Male who presents with a chief complaint of Septic Shock (06 Jun 2019 06:32)      SUBJECTIVE: No acute events overnight. Patient seen and examined at bedside.    REVIEW OF SYSTEMS:  CONSTITUTIONAL: No fever, weight loss, or fatigue  RESPIRATORY: No cough or shortness of breath  CARDIOVASCULAR: No chest pain, palpitations, dizziness, or leg swelling  GASTROINTESTINAL: No abdominal or epigastric pain. No nausea, vomiting, or hematemesis; No diarrhea or constipation. No melena or hematochezia.  GENITOURINARY: No dysuria  NEUROLOGICAL: No headaches  SKIN: No itching or lesions       MEDICATIONS  (STANDING):  acetaminophen  IVPB .. 1000 milliGRAM(s) IV Intermittent once  aspirin enteric coated 81 milliGRAM(s) Oral daily  atorvastatin 40 milliGRAM(s) Oral at bedtime  buDESOnide  80 MICROgram(s)/formoterol 4.5 MICROgram(s) Inhaler 2 Puff(s) Inhalation two times a day  carvedilol 6.25 milliGRAM(s) Oral every 12 hours  chlorhexidine 4% Liquid 1 Application(s) Topical <User Schedule>  docusate sodium 100 milliGRAM(s) Oral three times a day  epoetin lamin Injectable 4000 Unit(s) IV Push <User Schedule>  famotidine    Tablet 20 milliGRAM(s) Oral daily  heparin  Injectable 5000 Unit(s) SubCutaneous every 8 hours  hydrALAZINE 25 milliGRAM(s) Oral three times a day  insulin glargine Injectable (LANTUS) 10 Unit(s) SubCutaneous at bedtime  insulin lispro (HumaLOG) corrective regimen sliding scale   SubCutaneous three times a day before meals  insulin lispro (HumaLOG) corrective regimen sliding scale   SubCutaneous at bedtime  meropenem  IVPB      meropenem  IVPB 500 milliGRAM(s) IV Intermittent every 12 hours  metoclopramide Injectable 10 milliGRAM(s) IV Push once  predniSONE   Tablet 20 milliGRAM(s) Oral daily  senna 1 Tablet(s) Oral at bedtime    MEDICATIONS  (PRN):  acetaminophen   Tablet .. 650 milliGRAM(s) Oral every 6 hours PRN Mild Pain (1 - 3), Moderate Pain (4 - 6)  ALBUTerol/ipratropium for Nebulization 3 milliLiter(s) Nebulizer every 6 hours PRN Shortness of Breath and/or Wheezing  lidocaine   Patch 1 Patch Transdermal daily PRN Shoulder pain  oxyCODONE    IR 10 milliGRAM(s) Oral every 6 hours PRN Severe Pain (7 - 10)        Objective:    Vitals: Vital Signs Last 24 Hrs  T(C): 37 (06-06-19 @ 06:07), Max: 37 (06-06-19 @ 06:07)  T(F): 98.6 (06-06-19 @ 06:07), Max: 98.6 (06-06-19 @ 06:07)  HR: 71 (06-06-19 @ 06:07) (65 - 73)  BP: 143/55 (06-06-19 @ 06:07) (123/56 - 161/64)  BP(mean): --  RR: 18 (06-06-19 @ 06:07) (16 - 18)  SpO2: 98% (06-06-19 @ 06:07) (97% - 100%)            I&O's Summary    05 Jun 2019 07:01  -  06 Jun 2019 07:00  --------------------------------------------------------  IN: 640 mL / OUT: 2000 mL / NET: -1360 mL        PHYSICAL EXAM:  GENERAL: NAD  HEAD:  Atraumatic, Normocephalic  CHEST/LUNG: Clear to auscultation bilaterally; No rales or wheezing  HEART: Regular rate and rhythm; No murmurs, rubs, or gallops  ABDOMEN: Soft, nontender, nondistended  EXTREMITIES:  No clubbing, cyanosis, or edema  LYMPH: No lymphadenopathy noted  SKIN: No rashes or lesions  NERVOUS SYSTEM:  Alert & Oriented X3    LABS:  06-05    135  |  97<L>  |  44<H>  ----------------------------<  192<H>  4.0   |  25  |  6.26<H>  06-04    131<L>  |  92<L>  |  41<H>  ----------------------------<  293<H>  4.4   |  25  |  5.75<H>    Ca    8.3<L>      05 Jun 2019 07:25  Ca    8.8      04 Jun 2019 05:56  Phos  4.7     06-05  Mg     1.6     06-05    TPro  6.7  /  Alb  2.9<L>  /  TBili  0.7  /  DBili  x   /  AST  25  /  ALT  14  /  AlkPhos  98  06-04                                            7.8    9.17  )-----------( 212      ( 06 Jun 2019 05:34 )             24.7                         8.2    10.06 )-----------( 252      ( 05 Jun 2019 07:25 )             25.4                         8.3    9.02  )-----------( 259      ( 04 Jun 2019 05:56 )             25.4     CAPILLARY BLOOD GLUCOSE      POCT Blood Glucose.: 262 mg/dL (05 Jun 2019 21:33)  POCT Blood Glucose.: 421 mg/dL (05 Jun 2019 17:51)  POCT Blood Glucose.: 422 mg/dL (05 Jun 2019 17:48)  POCT Blood Glucose.: 222 mg/dL (05 Jun 2019 12:20)  POCT Blood Glucose.: 153 mg/dL (05 Jun 2019 09:05)    RADIOLOGY:  Imaging Personally Reviewed: YES      Consultants involved in case: YES  Consultant(s) Notes Reviewed:  YES  Care Discussed with Consultants/Other Providers     Ella Suyeon Yu PGY-1 Ella Suyeon Yu PGY-1   [facility] Pager # [phone]  NS Pager # [phone]      Patient is a 63y old  Male who presents with a chief complaint of Septic Shock (06 Jun 2019 06:32)      SUBJECTIVE: No acute events overnight. Patient seen and examined at bedside. Patient sitting in chair, ready to go home, feeling fine. Fin    REVIEW OF SYSTEMS:  CONSTITUTIONAL: No fever, weight loss, or fatigue  RESPIRATORY: No cough or shortness of breath  CARDIOVASCULAR: No chest pain, palpitations, dizziness, or leg swelling  GASTROINTESTINAL: No abdominal or epigastric pain. No nausea, vomiting, or hematemesis; No diarrhea or constipation. No melena or hematochezia.  GENITOURINARY: No dysuria  NEUROLOGICAL: No headaches  SKIN: No itching or lesions       MEDICATIONS  (STANDING):  acetaminophen  IVPB .. 1000 milliGRAM(s) IV Intermittent once  aspirin enteric coated 81 milliGRAM(s) Oral daily  atorvastatin 40 milliGRAM(s) Oral at bedtime  buDESOnide  80 MICROgram(s)/formoterol 4.5 MICROgram(s) Inhaler 2 Puff(s) Inhalation two times a day  carvedilol 6.25 milliGRAM(s) Oral every 12 hours  chlorhexidine 4% Liquid 1 Application(s) Topical <User Schedule>  docusate sodium 100 milliGRAM(s) Oral three times a day  epoetin lamin Injectable 4000 Unit(s) IV Push <User Schedule>  famotidine    Tablet 20 milliGRAM(s) Oral daily  heparin  Injectable 5000 Unit(s) SubCutaneous every 8 hours  hydrALAZINE 25 milliGRAM(s) Oral three times a day  insulin glargine Injectable (LANTUS) 10 Unit(s) SubCutaneous at bedtime  insulin lispro (HumaLOG) corrective regimen sliding scale   SubCutaneous three times a day before meals  insulin lispro (HumaLOG) corrective regimen sliding scale   SubCutaneous at bedtime  meropenem  IVPB      meropenem  IVPB 500 milliGRAM(s) IV Intermittent every 12 hours  metoclopramide Injectable 10 milliGRAM(s) IV Push once  predniSONE   Tablet 20 milliGRAM(s) Oral daily  senna 1 Tablet(s) Oral at bedtime    MEDICATIONS  (PRN):  acetaminophen   Tablet .. 650 milliGRAM(s) Oral every 6 hours PRN Mild Pain (1 - 3), Moderate Pain (4 - 6)  ALBUTerol/ipratropium for Nebulization 3 milliLiter(s) Nebulizer every 6 hours PRN Shortness of Breath and/or Wheezing  lidocaine   Patch 1 Patch Transdermal daily PRN Shoulder pain  oxyCODONE    IR 10 milliGRAM(s) Oral every 6 hours PRN Severe Pain (7 - 10)        Objective:    Vitals: Vital Signs Last 24 Hrs  T(C): 37 (06-06-19 @ 06:07), Max: 37 (06-06-19 @ 06:07)  T(F): 98.6 (06-06-19 @ 06:07), Max: 98.6 (06-06-19 @ 06:07)  HR: 71 (06-06-19 @ 06:07) (65 - 73)  BP: 143/55 (06-06-19 @ 06:07) (123/56 - 161/64)  BP(mean): --  RR: 18 (06-06-19 @ 06:07) (16 - 18)  SpO2: 98% (06-06-19 @ 06:07) (97% - 100%)            I&O's Summary    05 Jun 2019 07:01  -  06 Jun 2019 07:00  --------------------------------------------------------  IN: 640 mL / OUT: 2000 mL / NET: -1360 mL        PHYSICAL EXAM:  GENERAL: NAD  HEAD:  Atraumatic, Normocephalic  CHEST/LUNG: Clear to auscultation bilaterally; No rales or wheezing  HEART: Regular rate and rhythm; No murmurs, rubs, or gallops  ABDOMEN: Soft, nontender, nondistended  EXTREMITIES:  No clubbing, cyanosis, or edema  LYMPH: No lymphadenopathy noted  SKIN: No rashes or lesions  NERVOUS SYSTEM:  Alert & Oriented X3    LABS:  06-05    135  |  97<L>  |  44<H>  ----------------------------<  192<H>  4.0   |  25  |  6.26<H>  06-04    131<L>  |  92<L>  |  41<H>  ----------------------------<  293<H>  4.4   |  25  |  5.75<H>    Ca    8.3<L>      05 Jun 2019 07:25  Ca    8.8      04 Jun 2019 05:56  Phos  4.7     06-05  Mg     1.6     06-05    TPro  6.7  /  Alb  2.9<L>  /  TBili  0.7  /  DBili  x   /  AST  25  /  ALT  14  /  AlkPhos  98  06-04                                            7.8    9.17  )-----------( 212      ( 06 Jun 2019 05:34 )             24.7                         8.2    10.06 )-----------( 252      ( 05 Jun 2019 07:25 )             25.4                         8.3    9.02  )-----------( 259      ( 04 Jun 2019 05:56 )             25.4     CAPILLARY BLOOD GLUCOSE      POCT Blood Glucose.: 262 mg/dL (05 Jun 2019 21:33)  POCT Blood Glucose.: 421 mg/dL (05 Jun 2019 17:51)  POCT Blood Glucose.: 422 mg/dL (05 Jun 2019 17:48)  POCT Blood Glucose.: 222 mg/dL (05 Jun 2019 12:20)  POCT Blood Glucose.: 153 mg/dL (05 Jun 2019 09:05)    RADIOLOGY:  Imaging Personally Reviewed: YES      Consultants involved in case: YES  Consultant(s) Notes Reviewed:  YES  Care Discussed with Consultants/Other Providers     Ella Suyeon Yu PGY-1 Ella Suyeon Yu PGY-1   [facility] Pager # [phone]  NS Pager # [phone]      Patient is a 63y old  Male who presents with a chief complaint of Septic Shock (06 Jun 2019 06:32)      SUBJECTIVE: No acute events overnight. Patient seen and examined at bedside. Patient sitting in chair, ready to go home, feeling fine. L 4th digit much improved. Peeing a little bit.     REVIEW OF SYSTEMS:  CONSTITUTIONAL: No fever, weight loss, or fatigue  RESPIRATORY: No cough or shortness of breath  CARDIOVASCULAR: No chest pain, palpitations, dizziness, or leg swelling  GASTROINTESTINAL: No abdominal or epigastric pain. No nausea, vomiting, or hematemesis; No diarrhea or constipation. No melena or hematochezia.  GENITOURINARY: No dysuria  NEUROLOGICAL: No headaches  SKIN: No itching or lesions       MEDICATIONS  (STANDING):  acetaminophen  IVPB .. 1000 milliGRAM(s) IV Intermittent once  aspirin enteric coated 81 milliGRAM(s) Oral daily  atorvastatin 40 milliGRAM(s) Oral at bedtime  buDESOnide  80 MICROgram(s)/formoterol 4.5 MICROgram(s) Inhaler 2 Puff(s) Inhalation two times a day  carvedilol 6.25 milliGRAM(s) Oral every 12 hours  chlorhexidine 4% Liquid 1 Application(s) Topical <User Schedule>  docusate sodium 100 milliGRAM(s) Oral three times a day  epoetin lamin Injectable 4000 Unit(s) IV Push <User Schedule>  famotidine    Tablet 20 milliGRAM(s) Oral daily  heparin  Injectable 5000 Unit(s) SubCutaneous every 8 hours  hydrALAZINE 25 milliGRAM(s) Oral three times a day  insulin glargine Injectable (LANTUS) 10 Unit(s) SubCutaneous at bedtime  insulin lispro (HumaLOG) corrective regimen sliding scale   SubCutaneous three times a day before meals  insulin lispro (HumaLOG) corrective regimen sliding scale   SubCutaneous at bedtime  meropenem  IVPB      meropenem  IVPB 500 milliGRAM(s) IV Intermittent every 12 hours  metoclopramide Injectable 10 milliGRAM(s) IV Push once  predniSONE   Tablet 20 milliGRAM(s) Oral daily  senna 1 Tablet(s) Oral at bedtime    MEDICATIONS  (PRN):  acetaminophen   Tablet .. 650 milliGRAM(s) Oral every 6 hours PRN Mild Pain (1 - 3), Moderate Pain (4 - 6)  ALBUTerol/ipratropium for Nebulization 3 milliLiter(s) Nebulizer every 6 hours PRN Shortness of Breath and/or Wheezing  lidocaine   Patch 1 Patch Transdermal daily PRN Shoulder pain  oxyCODONE    IR 10 milliGRAM(s) Oral every 6 hours PRN Severe Pain (7 - 10)        Objective:    Vitals: Vital Signs Last 24 Hrs  T(C): 37 (06-06-19 @ 06:07), Max: 37 (06-06-19 @ 06:07)  T(F): 98.6 (06-06-19 @ 06:07), Max: 98.6 (06-06-19 @ 06:07)  HR: 71 (06-06-19 @ 06:07) (65 - 73)  BP: 143/55 (06-06-19 @ 06:07) (123/56 - 161/64)  BP(mean): --  RR: 18 (06-06-19 @ 06:07) (16 - 18)  SpO2: 98% (06-06-19 @ 06:07) (97% - 100%)            I&O's Summary    05 Jun 2019 07:01  -  06 Jun 2019 07:00  --------------------------------------------------------  IN: 640 mL / OUT: 2000 mL / NET: -1360 mL        PHYSICAL EXAM:  GENERAL: NAD  CHEST/LUNG: Clear to auscultation bilaterally; No rales or wheezing  HEART: Regular rate and rhythm; No murmurs, rubs, or gallops  ABDOMEN: Soft, nontender, nondistended  EXTREMITIES:  No clubbing, cyanosis, or edema  SKIN: No rashes or lesions  NERVOUS SYSTEM:  Alert & Oriented X3    LABS:  06-05    135  |  97<L>  |  44<H>  ----------------------------<  192<H>  4.0   |  25  |  6.26<H>  06-04    131<L>  |  92<L>  |  41<H>  ----------------------------<  293<H>  4.4   |  25  |  5.75<H>    Ca    8.3<L>      05 Jun 2019 07:25  Ca    8.8      04 Jun 2019 05:56  Phos  4.7     06-05  Mg     1.6     06-05    TPro  6.7  /  Alb  2.9<L>  /  TBili  0.7  /  DBili  x   /  AST  25  /  ALT  14  /  AlkPhos  98  06-04                                            7.8    9.17  )-----------( 212      ( 06 Jun 2019 05:34 )             24.7                         8.2    10.06 )-----------( 252      ( 05 Jun 2019 07:25 )             25.4                         8.3    9.02  )-----------( 259      ( 04 Jun 2019 05:56 )             25.4     CAPILLARY BLOOD GLUCOSE      POCT Blood Glucose.: 262 mg/dL (05 Jun 2019 21:33)  POCT Blood Glucose.: 421 mg/dL (05 Jun 2019 17:51)  POCT Blood Glucose.: 422 mg/dL (05 Jun 2019 17:48)  POCT Blood Glucose.: 222 mg/dL (05 Jun 2019 12:20)  POCT Blood Glucose.: 153 mg/dL (05 Jun 2019 09:05)    RADIOLOGY:  Imaging Personally Reviewed: YES      Consultants involved in case: YES  Consultant(s) Notes Reviewed:  YES  Care Discussed with Consultants/Other Providers     Ella Suyeon Yu PGY-1

## 2019-06-06 NOTE — PROGRESS NOTE ADULT - PROBLEM SELECTOR PLAN 1
- Acute cholangitis in setting of CBD stricture and recent ERCP with stent placement.   - repeat MRCP showing mod intrahepatic biliary duct dilation that's improved, extrahepatic duct dilation to 2cm, narrowing of CBD w new CBD stent. no mass in the region of narrowing.   - patient was offered a whipple procedure at OSH. surg onc consulted for evaluation inpatient, recommending whipple tentatively 6/18  - repeat EUS/ERCP showing a hypodense mass near the pancreatic head.   - on meropenem for 3 days until 6/7 for manipulation of CBD  - cards no need for cath since echo deficits are now reversed.   - Renal cleared for contrast for cath - Acute cholangitis in setting of CBD stricture and recent ERCP with stent placement.   - repeat MRCP showing mod intrahepatic biliary duct dilation that's improved, extrahepatic duct dilation to 2cm, narrowing of CBD w new CBD stent. no mass in the region of narrowing.   - patient was offered a whipple procedure at OSH. surg onc consulted for evaluation inpatient, recommending whipple tentatively 6/18  - repeat EUS/ERCP showing a hypodense mass near the pancreatic head.   - on meropenem for 3 days until 6/7 for manipulation of CBD  - cards no need for cath since echo deficits are now reversed.

## 2019-06-06 NOTE — PROGRESS NOTE ADULT - PROBLEM SELECTOR PLAN 6
- Distributive shock in setting of E coli/ Citerobacter bacteremia   - Likely had type II NSTEMI, trops peaked ~2000s, downtrended  - EP and cards following -no interventions warranted at this time   - echo with severe global LV dysfunction, cards following  - pharm nuclear stress test fixed deficits, will need cath, likely tomorrow.

## 2019-06-06 NOTE — PROGRESS NOTE ADULT - SUBJECTIVE AND OBJECTIVE BOX
Chief Complaint:  Patient is a 63y old  Male who presents with a chief complaint of Septic Shock (2019 12:08)      Interval Events: No adverse events overnight.  No abd pain.  Pending path.    Allergies:  Cipro (Rash)  Plavix (Rash)      Hospital Medications:  acetaminophen   Tablet .. 650 milliGRAM(s) Oral every 6 hours PRN  acetaminophen  IVPB .. 1000 milliGRAM(s) IV Intermittent once  ALBUTerol/ipratropium for Nebulization 3 milliLiter(s) Nebulizer every 6 hours PRN  aspirin enteric coated 81 milliGRAM(s) Oral daily  atorvastatin 40 milliGRAM(s) Oral at bedtime  buDESOnide  80 MICROgram(s)/formoterol 4.5 MICROgram(s) Inhaler 2 Puff(s) Inhalation two times a day  carvedilol 6.25 milliGRAM(s) Oral every 12 hours  chlorhexidine 4% Liquid 1 Application(s) Topical <User Schedule>  docusate sodium 100 milliGRAM(s) Oral three times a day  epoetin lamin Injectable 4000 Unit(s) IV Push <User Schedule>  famotidine    Tablet 20 milliGRAM(s) Oral daily  heparin  Injectable 5000 Unit(s) SubCutaneous every 8 hours  hydrALAZINE 25 milliGRAM(s) Oral three times a day  insulin glargine Injectable (LANTUS) 10 Unit(s) SubCutaneous at bedtime  insulin lispro (HumaLOG) corrective regimen sliding scale   SubCutaneous three times a day before meals  insulin lispro (HumaLOG) corrective regimen sliding scale   SubCutaneous at bedtime  lidocaine   Patch 1 Patch Transdermal daily PRN  meropenem  IVPB      meropenem  IVPB 500 milliGRAM(s) IV Intermittent every 12 hours  metoclopramide Injectable 10 milliGRAM(s) IV Push once  oxyCODONE    IR 10 milliGRAM(s) Oral every 6 hours PRN  predniSONE   Tablet 20 milliGRAM(s) Oral daily  senna 1 Tablet(s) Oral at bedtime      PMHX/PSHX:  Gout  Type II diabetes mellitus  HTN (hypertension)  CAD (coronary artery disease)  Cirrhosis  S/P cholecystectomy      Family history:      ROS:     General:  No wt loss, fevers, chills, night sweats, fatigue,   Eyes:  Good vision, no reported pain  ENT:  No sore throat, pain, runny nose, dysphagia  CV:  No pain, palpitations, hypo/hypertension  Resp:  No dyspnea, cough, tachypnea, wheezing  GI:  See HPI  :  No pain, bleeding, incontinence, nocturia  Muscle:  No pain, weakness  Neuro:  No weakness, tingling, memory problems  Psych:  No fatigue, insomnia, mood problems, depression  Endocrine:  No polyuria, polydipsia, cold/heat intolerance  Heme:  No petechiae, ecchymosis, easy bruisability  Skin:  No rash, edema      PHYSICAL EXAM:     GENERAL: NAD  HEENT:  NC/AT  CHEST:  Full & symmetric excursion, no increased effort  ABDOMEN:  Soft, non-tender, non-distended, +BS  EXTREMITIES:  no edema  SKIN:  No rash  NEURO:  Alert      Vital Signs:  Vital Signs Last 24 Hrs  T(C): 37 (2019 06:07), Max: 37 (2019 06:07)  T(F): 98.6 (2019 06:07), Max: 98.6 (2019 06:07)  HR: 71 (2019 06:07) (65 - 73)  BP: 143/55 (2019 06:07) (123/56 - 161/64)  BP(mean): --  RR: 18 (2019 06:07) (16 - 18)  SpO2: 98% (2019 06:07) (97% - 100%)  Daily     Daily Weight in k.2 (2019 10:30)    LABS:                        8.2    10.06 )-----------( 252      ( 2019 07:25 )             25.4     06-05    135  |  97<L>  |  44<H>  ----------------------------<  192<H>  4.0   |  25  |  6.26<H>    Ca    8.3<L>      2019 07:25  Phos  4.7     -05  Mg     1.6     -05                Imaging:  reviewed

## 2019-06-06 NOTE — PROGRESS NOTE ADULT - PROBLEM SELECTOR PLAN 4
Renal failure in setting of distributive shock   Pt w CKD hx, with SLOAN on CKD IV likely from sepsis   - HD as per renal through permacath, kalpana dutta d/roel on 5/29  - continue to monitor for renal recovery, oliguric   - HD through permcath  - pt has hx of being on PPI and getting SLOAN, talked to GI who recommended switching it while monitoring for renal recovery. started famotidine as per GI recs  - urinating more, will try to measure I&Os, about 900cc per day.

## 2019-06-06 NOTE — PROGRESS NOTE ADULT - ATTENDING COMMENTS
D/w pt and son plan for whipple procedure -- tentatively for 6/18.  Will need med and card clearances. PLan DC to home and return through [facility]

## 2019-06-06 NOTE — PROGRESS NOTE ADULT - ASSESSMENT
Impression:  1. Septic shock (resolved) with E coli and Citrobacter bacteremia presumed secondary to biliary source, s/p ERCP 5/19/19 notable for bile in the duodenum and additional placement of plastic biliary stent alongside prior stent  2. Prior EUS/ERCP on 4/19/19 at OSH with 2 cm CBD stricture and with sphincterotomy and stent placement, biopsies negative and brushings with atypical cells (favor reactive atypia).  Now s/p repeat ERCP with stent exchange and EUS with FNA of panc head mass on 6/4  3. CAD s/p PCI x 9 with elevated troponin, NSTEMI on heparin infusion.  4. SLOAN on CKD   5. DM  6. GERD    Recommendations:  - followup path from CBD stricture brushing and panc head FNA  - followup surg onc/ onc recs once path returns   - diet as tolerated  - continue abx x 1 more day   - Monitor CBC, CMP  - Supportive care per primary team  - Page GI with questions or changes in clinical status

## 2019-06-06 NOTE — PROGRESS NOTE ADULT - SUBJECTIVE AND OBJECTIVE BOX
Surgery D Team Daily Progress Note    SUBJECTIVE:   Patient seen and examined, no acute events overnight. Patient reports minimal epigastric pain, denies N/V.      OBJECTIVE:   Vital Signs Last 24 Hrs  T(C): 37.1 (06 Jun 2019 12:48), Max: 37.1 (06 Jun 2019 08:32)  T(F): 98.8 (06 Jun 2019 12:48), Max: 98.8 (06 Jun 2019 08:32)  HR: 62 (06 Jun 2019 12:48) (62 - 73)  BP: 124/57 (06 Jun 2019 12:48) (123/56 - 143/55)  BP(mean): --  RR: 18 (06 Jun 2019 12:48) (17 - 18)  SpO2: 99% (06 Jun 2019 12:48) (97% - 100%)    PHYSICAL EXAM:  General: NAD, resting comfortably  Resp: unlabored breathing on RA  CV: HDS, rrr  Abd: soft, nontender, nondistended  Extr: wwp     CBC (06-06 @ 05:34)                          7.8<L>                   9.17    )--------------(  212        69.2  % Neuts, 16.2  % Lymphs, ANC: 6.34                            24.7<L>  CBC (06-05 @ 07:25)                          8.2<L>                   10.06   )--------------(  252        --    % Neuts, --    % Lymphs, ANC: --                              25.4<L>    BMP (06-06 @ 05:34)       136     |  98      |  34<H> 			Ca++ --      Ca 7.6<L>       ---------------------------------( 244<H>		Mg 1.5<L>       3.6     |  25      |  4.67<H>			Ph 2.3<L>  BMP (06-05 @ 07:25)       135     |  97<L>   |  44<H> 			Ca++ --      Ca 8.3<L>       ---------------------------------( 192<H>		Mg 1.6          4.0     |  25      |  6.26<H>			Ph 4.7<H>    LFTs (06-06 @ 05:34)      TPro 6.0 / Alb 2.5<L> / TBili 0.6 / DBili -- / AST 15 / ALT 13 / AlkPhos 79    RADIOLOGY & ADDITIONAL STUDIES:   < from: ERCP (06.04.19 @ 10:54) >  Findings:       EGD with duodenoscope:       The  film was normal. The scope was passed through the upper GI        tract without discovering UGI findings. Two plastic stents originating        in the biliary tree were emerging from the major papilla. The major        papilla was notable for a previous sphincterotomy. Two stents were        removed from the biliary tree using a snare.       EUS exam:       A linear echoendoscope was used for examination. Debris was noted in a        dilated common bile duct. The bile duct ended abruptly at a 10 mm        irregular hypoechoic mass originating in the head of the pancreas. This        mass abutted the portal vein. The mass was sampled with a 22g [employer]        FNB needle. 3 passes were obtained. The rest of the pancreatic        parenchyma appeared normal. The other visualized vessels appeared normal.       ERCP:       The bile duct was deeply cannulated with the Hydratome. Contrast was        injected. I personally interpreted the bile duct images. Ductal flow of        contrast was adequate. The distal common bile duct contained a single        tight stricture. Cells for cytology were obtained by brushing of the        bile duct stricture. One 6 Fr by 10 cm fully covered removable metal        stent was placed into the common bileduct under endoscopic and        fluoroscopic guidance. Bile, pus and sludge flowed through the stent.        The stent was in good position.                                                                                   Impression:          EUS:                       - 10 mm pancreatic head hypoechoic lesion s/p FNB x 3                        passes.                       ERCP:                       - Two stents from the biliary tree were seen in the                        major papilla, removed with a snare.                       - A moderate biliary stricture was found. Brushed for                        cytology.                       - One metal stent was placed into the common bile duct                        that traverses the distal CBD stricture.  Recommendation:      - Return patient to hospital [facility] for ongoing care.                       - Await cytology results and await path results.                       - NPO                       - IVF                       - Continue antibiotics.                                                                                   Attending Participation:       I was present and participated during the entire procedure, including        non-key portions.                                                     ___________________  ALBER MONSALVE MD  6/4/2019 1:55:21 PM  This report has been signed electronically.  Number of Addenda: 0    Note Initiated On: 6/4/2019 10:54 AM    < end of copied text >

## 2019-06-06 NOTE — PROGRESS NOTE ADULT - ASSESSMENT
63M w/ hx of CAD, DM, HTN, GERD, diverticulosis, hx of cholecystectomy, biliary stricture s/p ERCP w/ sphincterotomy and stent in April of 2019, transferred from OSH after p/w acute cholangitis, now at [facility], with CTAP showing CBD dilatation, s/p ERCP w/ stent placed, and MRCP showing CBD stricture at the lvl of pancreatic head w/o noted mass.    Plan:  - EGD with EUS as above  - Patient tentatively planned for surgical intervention, likely pancreaticoduodenectomy, on or around 6/18 pending resolution of active medical issues  - Please document medical clearance/risk stratification for procedure    Serena Aguillon PGY-2  Surgery Pager [phone]

## 2019-06-06 NOTE — DISCHARGE NOTE PROVIDER - HOSPITAL COURSE
63M ex smoker w/ h/o CAD (9 stents), HTN, GERD, diverticulosis, cholecystectomy and ERCP w/ sphincterotomy and biliary stent placement  who presented to OSH in septic shock state, was intubated, started on levo/vaso/dobutamine transferred to [facility] MICU for further management, with Ecoli bacteremia. Pt had a repeat ERCP in [facility] with a stent placement, and patient got a shiley with CVVHD for SLOAN on CKD. Pt had bradycardia after getting ERCP, EP was consulted and thought it was likely due to hypervagotonia from recent ERCPs. Pt was on dopamine which was eventually weaned off. Pt did not require pacemaker. Course was c/b NSTEMI with trops ~2000, received heparin drip. Pt extubated and transferred to floors. Pt got an MRCP which shows patent stent, and stricture in the head of pancreas and tail of pancreas. Surg onc was consulted for 2nd opinion as patient was recommended for whipple in the past. Surg onc recommended whipple as it can be cholangiocarcinoma that is not seen on MRI/CT. Patient was transitioned to dialysis with permacath placed by IR. Pt got a nuclear stress test to prepare for a repeat EUS/ERCP. nuclear stress test showing fixed deficits, so patient went for EUS/ERCP, and they did a biopsy of a hypodense mass that was seen. Pt had a gout flare in his 4th finger which was treated with steroids with rheumatology recommendations. Cards was following the patient and they did a repeat echo which showed findings correlating with apical infarct on nuclear stress test, and they did not require a cath prior to whipple procedure. Pt got an outpatient dialysis center prior to discharge and was discharged with plans for a whipple procedure as outpatient. 63M ex smoker w/ h/o CAD (9 stents), HTN, GERD, diverticulosis, cholecystectomy and ERCP w/ sphincterotomy and biliary stent placement  who presented to OSH in septic shock state, was intubated, started on levo/vaso/dobutamine transferred to [facility] MICU for further management, with Ecoli and citrobacter bacteremia.         MICU COURSE: Patient was treated with meropenem, Course c/b acute hypoxic respiratory failure s/p intubation, ARF with severe metabolic acidosis started on CVVHD, and Type II MI started on heparin gtt. Was on adilia,  and levophed. Pt was weaned off dobutamine and was continued on hep gtt for 48 hours. Continued on meropenem for E. Coli bacteremia and went for repeat ERCP with GI and had biliary stent replaced. Pt developed bradycardia and was started on dopamine gtt but was able to be weaned off all other pressors. Pt was transitioned to intermittent HD, last treatment on 19 tolerating 2.5L of UF On  pt self-extubated with improving mental status and passed S&S evaluation. Patient was weaned off dopamine.        Floors:    Patient got a shiley with CVVHD for SLOAN on CKD, which was eventually switched over to permacath. Pt had bradycardia after getting ERCP, EP was consulted and thought it was likely due to hypervagotonia from recent ERCPs.  Pt did not require pacemaker. Course was c/b NSTEMI with trops ~2000, received heparin drip. Pt extubated and transferred to floors. Pt got an MRCP which shows patent stent, and stricture in the head of pancreas and tail of pancreas. Surg onc was consulted for 2nd opinion as patient was recommended for whipple in the past. Surg onc recommended whipple as it can be cholangiocarcinoma that is not seen on MRI/CT. Patient was transitioned to dialysis with permacath placed by IR. Pt got a nuclear stress test to prepare for a repeat EUS/ERCP. nuclear stress test showing fixed deficits, so patient went for EUS/ERCP, and they did a biopsy of a hypodense mass that was seen. Pt had a gout flare in his 4th finger which was treated with steroids with rheumatology recommendations. Cards was following the patient and they did a repeat echo which showed findings correlating with apical infarct on nuclear stress test, and they did not require a cath prior to whipple procedure. Pt got an outpatient dialysis center prior to discharge and was discharged with plans for a whipple procedure as outpatient on .

## 2019-06-06 NOTE — DISCHARGE NOTE PROVIDER - CARE PROVIDER_API CALL
Thee Rodriguez)  Internal Medicine  [address] [place] [address]  Phone: [phone]  Fax: [phone]  Follow Up Time: 1 week

## 2019-06-06 NOTE — PROGRESS NOTE ADULT - PROBLEM SELECTOR PLAN 2
from acute cholangitis in setting of CBD stricture w ERCP/stent placement, w ecoli and citrobacter bacteremia. s/p repeat ERCP with stent in CBD.  - finished full course of meropenem per ID recs, but now repeating for 3 days for manipulation of CBD.   - repeat MRCP showing mod intrahepatic biliary duct dilation tht's improved, extrahepatic duct dilation to 2cm, narrowing of CBD w new cbd stent. no mass in the region of narrowing.

## 2019-06-07 ENCOUNTER — TRANSCRIPTION ENCOUNTER (OUTPATIENT)
Age: 64
End: 2019-06-07

## 2019-06-07 DIAGNOSIS — D64.9 ANEMIA, UNSPECIFIED: ICD-10-CM

## 2019-06-07 LAB
ALBUMIN SERPL ELPH-MCNC: 2.4 G/DL — LOW (ref 3.3–5)
ALP SERPL-CCNC: 75 U/L — SIGNIFICANT CHANGE UP (ref 40–120)
ALT FLD-CCNC: 13 U/L — SIGNIFICANT CHANGE UP (ref 4–41)
ANION GAP SERPL CALC-SCNC: 15 MMO/L — HIGH (ref 7–14)
AST SERPL-CCNC: 16 U/L — SIGNIFICANT CHANGE UP (ref 4–40)
BASOPHILS # BLD AUTO: 0.04 K/UL — SIGNIFICANT CHANGE UP (ref 0–0.2)
BASOPHILS NFR BLD AUTO: 0.5 % — SIGNIFICANT CHANGE UP (ref 0–2)
BILIRUB SERPL-MCNC: 0.6 MG/DL — SIGNIFICANT CHANGE UP (ref 0.2–1.2)
BUN SERPL-MCNC: 57 MG/DL — HIGH (ref 7–23)
CALCIUM SERPL-MCNC: 7.9 MG/DL — LOW (ref 8.4–10.5)
CHLORIDE SERPL-SCNC: 97 MMOL/L — LOW (ref 98–107)
CO2 SERPL-SCNC: 24 MMOL/L — SIGNIFICANT CHANGE UP (ref 22–31)
CREAT SERPL-MCNC: 5.52 MG/DL — HIGH (ref 0.5–1.3)
EOSINOPHIL # BLD AUTO: 0.21 K/UL — SIGNIFICANT CHANGE UP (ref 0–0.5)
EOSINOPHIL NFR BLD AUTO: 2.5 % — SIGNIFICANT CHANGE UP (ref 0–6)
GLUCOSE BLDC GLUCOMTR-MCNC: 166 MG/DL — HIGH (ref 70–99)
GLUCOSE BLDC GLUCOMTR-MCNC: 173 MG/DL — HIGH (ref 70–99)
GLUCOSE BLDC GLUCOMTR-MCNC: 226 MG/DL — HIGH (ref 70–99)
GLUCOSE BLDC GLUCOMTR-MCNC: 255 MG/DL — HIGH (ref 70–99)
GLUCOSE BLDC GLUCOMTR-MCNC: 291 MG/DL — HIGH (ref 70–99)
GLUCOSE SERPL-MCNC: 230 MG/DL — HIGH (ref 70–99)
HCT VFR BLD CALC: 24.2 % — LOW (ref 39–50)
HGB BLD-MCNC: 7.7 G/DL — LOW (ref 13–17)
IMM GRANULOCYTES NFR BLD AUTO: 1.1 % — SIGNIFICANT CHANGE UP (ref 0–1.5)
LYMPHOCYTES # BLD AUTO: 1.55 K/UL — SIGNIFICANT CHANGE UP (ref 1–3.3)
LYMPHOCYTES # BLD AUTO: 18.4 % — SIGNIFICANT CHANGE UP (ref 13–44)
MAGNESIUM SERPL-MCNC: 1.7 MG/DL — SIGNIFICANT CHANGE UP (ref 1.6–2.6)
MCHC RBC-ENTMCNC: 30.1 PG — SIGNIFICANT CHANGE UP (ref 27–34)
MCHC RBC-ENTMCNC: 31.8 % — LOW (ref 32–36)
MCV RBC AUTO: 94.5 FL — SIGNIFICANT CHANGE UP (ref 80–100)
MONOCYTES # BLD AUTO: 1.09 K/UL — HIGH (ref 0–0.9)
MONOCYTES NFR BLD AUTO: 12.9 % — SIGNIFICANT CHANGE UP (ref 2–14)
NEUTROPHILS # BLD AUTO: 5.45 K/UL — SIGNIFICANT CHANGE UP (ref 1.8–7.4)
NEUTROPHILS NFR BLD AUTO: 64.6 % — SIGNIFICANT CHANGE UP (ref 43–77)
NRBC # FLD: 0 K/UL — SIGNIFICANT CHANGE UP (ref 0–0)
PHOSPHATE SERPL-MCNC: 3.5 MG/DL — SIGNIFICANT CHANGE UP (ref 2.5–4.5)
PLATELET # BLD AUTO: 184 K/UL — SIGNIFICANT CHANGE UP (ref 150–400)
PMV BLD: 9.8 FL — SIGNIFICANT CHANGE UP (ref 7–13)
POTASSIUM SERPL-MCNC: 3.5 MMOL/L — SIGNIFICANT CHANGE UP (ref 3.5–5.3)
POTASSIUM SERPL-SCNC: 3.5 MMOL/L — SIGNIFICANT CHANGE UP (ref 3.5–5.3)
PROT SERPL-MCNC: 6.2 G/DL — SIGNIFICANT CHANGE UP (ref 6–8.3)
RBC # BLD: 2.56 M/UL — LOW (ref 4.2–5.8)
RBC # FLD: 15.6 % — HIGH (ref 10.3–14.5)
SODIUM SERPL-SCNC: 136 MMOL/L — SIGNIFICANT CHANGE UP (ref 135–145)
WBC # BLD: 8.43 K/UL — SIGNIFICANT CHANGE UP (ref 3.8–10.5)
WBC # FLD AUTO: 8.43 K/UL — SIGNIFICANT CHANGE UP (ref 3.8–10.5)

## 2019-06-07 PROCEDURE — 99232 SBSQ HOSP IP/OBS MODERATE 35: CPT | Mod: GC

## 2019-06-07 PROCEDURE — 99233 SBSQ HOSP IP/OBS HIGH 50: CPT | Mod: GC

## 2019-06-07 RX ORDER — HYDRALAZINE HCL 50 MG
50 TABLET ORAL EVERY 8 HOURS
Refills: 0 | Status: DISCONTINUED | OUTPATIENT
Start: 2019-06-07 | End: 2019-06-08

## 2019-06-07 RX ORDER — INSULIN GLARGINE 100 [IU]/ML
12 INJECTION, SOLUTION SUBCUTANEOUS AT BEDTIME
Refills: 0 | Status: DISCONTINUED | OUTPATIENT
Start: 2019-06-07 | End: 2019-06-08

## 2019-06-07 RX ADMIN — FAMOTIDINE 20 MILLIGRAM(S): 10 INJECTION INTRAVENOUS at 12:38

## 2019-06-07 RX ADMIN — Medication 20 MILLIGRAM(S): at 05:41

## 2019-06-07 RX ADMIN — ATORVASTATIN CALCIUM 40 MILLIGRAM(S): 80 TABLET, FILM COATED ORAL at 21:41

## 2019-06-07 RX ADMIN — Medication 81 MILLIGRAM(S): at 12:38

## 2019-06-07 RX ADMIN — Medication 1: at 08:29

## 2019-06-07 RX ADMIN — HEPARIN SODIUM 5000 UNIT(S): 5000 INJECTION INTRAVENOUS; SUBCUTANEOUS at 21:42

## 2019-06-07 RX ADMIN — Medication 3: at 13:02

## 2019-06-07 RX ADMIN — INSULIN GLARGINE 12 UNIT(S): 100 INJECTION, SOLUTION SUBCUTANEOUS at 22:03

## 2019-06-07 RX ADMIN — HEPARIN SODIUM 5000 UNIT(S): 5000 INJECTION INTRAVENOUS; SUBCUTANEOUS at 05:41

## 2019-06-07 RX ADMIN — ERYTHROPOIETIN 4000 UNIT(S): 10000 INJECTION, SOLUTION INTRAVENOUS; SUBCUTANEOUS at 07:57

## 2019-06-07 RX ADMIN — HEPARIN SODIUM 5000 UNIT(S): 5000 INJECTION INTRAVENOUS; SUBCUTANEOUS at 13:03

## 2019-06-07 RX ADMIN — CARVEDILOL PHOSPHATE 6.25 MILLIGRAM(S): 80 CAPSULE, EXTENDED RELEASE ORAL at 17:52

## 2019-06-07 RX ADMIN — BUDESONIDE AND FORMOTEROL FUMARATE DIHYDRATE 2 PUFF(S): 160; 4.5 AEROSOL RESPIRATORY (INHALATION) at 21:42

## 2019-06-07 RX ADMIN — MEROPENEM 100 MILLIGRAM(S): 1 INJECTION INTRAVENOUS at 05:40

## 2019-06-07 RX ADMIN — MEROPENEM 100 MILLIGRAM(S): 1 INJECTION INTRAVENOUS at 17:53

## 2019-06-07 RX ADMIN — Medication 50 MILLIGRAM(S): at 21:41

## 2019-06-07 RX ADMIN — Medication 50 MILLIGRAM(S): at 13:03

## 2019-06-07 RX ADMIN — Medication 100 MILLIGRAM(S): at 05:41

## 2019-06-07 RX ADMIN — Medication 3: at 18:08

## 2019-06-07 RX ADMIN — CHLORHEXIDINE GLUCONATE 1 APPLICATION(S): 213 SOLUTION TOPICAL at 12:39

## 2019-06-07 NOTE — PROGRESS NOTE ADULT - ATTENDING COMMENTS
Patient seen and examined.  Case discussed with house staff.  Agree with above as edited.   #Pancreatic stricture - s/p EUS with 10mm Pancreatic head mass. S/P ERCP with metal stent placement. Final day of IV meropenem today. Planning for outpatient Surgical resection, with surgical oncology for 6/18.  #Preoperative Evaluation - Cardiology preop evaluation done. See Cardiology progress note from 6/5/19.  Patient is medically optimized for time sensitive surgery (pancreaticoduodenectomy) with continued outpatient Hemodialysis. Would continue with carvedilol at current dose nicole-operatively.  No further preoperative testing indicated.  #SLOAN on CKD IV - secondary to ATN - Now HD dependent. d/w Renal. c/w HD via permacath. Outpatient HD for T, T, S set up to start on Tuesday 6/11/19. d/w Renal - will dialyze tomorrow am then d/c home with outpatient f/u. Patient will likely require partial session on 6/17/19 for preoperative optimization - d/w renal and HD social work. d/w patient.  #NSTEMI, h/o CAD s/p PCI - Pharm nuclear stress - fixed defects - Appreciate cardiology f/u- Repeat Echo shows recovery in LV function. No cardiology contraindications to proceeding with Whipple. c/w ASA, coreg, hydralazin and statin.   #Gout - acute flare of left PIP continues to improve. Finish steroid taper. Off febuxostat given HD. Plan for low dose preventative allopurinol on d/c.  #HTN - continue holding ARB given SLOAN on CKD.  c/w hydralazine and coreg  Plan discussed with patient  and patient's son.

## 2019-06-07 NOTE — DISCHARGE NOTE NURSING/CASE MANAGEMENT/SOCIAL WORK - NSDCDPATPORTLINK_GEN_ALL_CORE
You can access the [facility], offered by [facility], by registering with the following website: [email or web address]

## 2019-06-07 NOTE — PROGRESS NOTE ADULT - SUBJECTIVE AND OBJECTIVE BOX
Juice Ogden MD PGY-2  Contact/Pager - [phone]      Patient is a 63y old  Male who presents with a chief complaint of Septic Shock (07 Jun 2019 06:48)        SUBJECTIVE / OVERNIGHT EVENTS: No acute events ON.       MEDICATIONS  (STANDING):  acetaminophen  IVPB .. 1000 milliGRAM(s) IV Intermittent once  aspirin enteric coated 81 milliGRAM(s) Oral daily  atorvastatin 40 milliGRAM(s) Oral at bedtime  buDESOnide  80 MICROgram(s)/formoterol 4.5 MICROgram(s) Inhaler 2 Puff(s) Inhalation two times a day  carvedilol 6.25 milliGRAM(s) Oral every 12 hours  chlorhexidine 4% Liquid 1 Application(s) Topical <User Schedule>  docusate sodium 100 milliGRAM(s) Oral three times a day  epoetin lamin Injectable 4000 Unit(s) IV Push <User Schedule>  famotidine    Tablet 20 milliGRAM(s) Oral daily  heparin  Injectable 5000 Unit(s) SubCutaneous every 8 hours  hydrALAZINE 50 milliGRAM(s) Oral every 8 hours  insulin glargine Injectable (LANTUS) 10 Unit(s) SubCutaneous at bedtime  insulin lispro (HumaLOG) corrective regimen sliding scale   SubCutaneous three times a day before meals  insulin lispro (HumaLOG) corrective regimen sliding scale   SubCutaneous at bedtime  meropenem  IVPB      meropenem  IVPB 500 milliGRAM(s) IV Intermittent every 12 hours  metoclopramide Injectable 10 milliGRAM(s) IV Push once  predniSONE   Tablet   Oral   senna 1 Tablet(s) Oral at bedtime    MEDICATIONS  (PRN):  acetaminophen   Tablet .. 650 milliGRAM(s) Oral every 6 hours PRN Mild Pain (1 - 3), Moderate Pain (4 - 6)  ALBUTerol/ipratropium for Nebulization 3 milliLiter(s) Nebulizer every 6 hours PRN Shortness of Breath and/or Wheezing  lidocaine   Patch 1 Patch Transdermal daily PRN Shoulder pain  oxyCODONE    IR 10 milliGRAM(s) Oral every 6 hours PRN Severe Pain (7 - 10)      Allergies    Cipro (Rash)  Plavix (Rash)    Intolerances          Vital Signs Last 24 Hrs  T(C): 37 (07 Jun 2019 07:00), Max: 37.2 (07 Jun 2019 05:46)  T(F): 98.6 (07 Jun 2019 07:00), Max: 98.9 (07 Jun 2019 05:46)  HR: 65 (07 Jun 2019 07:00) (61 - 65)  BP: 170/76 (07 Jun 2019 07:00) (114/68 - 170/76)  BP(mean): --  RR: 16 (07 Jun 2019 07:00) (16 - 18)  SpO2: 98% (07 Jun 2019 05:46) (97% - 99%)  CAPILLARY BLOOD GLUCOSE      POCT Blood Glucose.: 226 mg/dL (07 Jun 2019 05:52)  POCT Blood Glucose.: 219 mg/dL (06 Jun 2019 22:01)  POCT Blood Glucose.: 257 mg/dL (06 Jun 2019 19:25)  POCT Blood Glucose.: 265 mg/dL (06 Jun 2019 17:48)  POCT Blood Glucose.: 325 mg/dL (06 Jun 2019 13:34)  POCT Blood Glucose.: 257 mg/dL (06 Jun 2019 09:19)    I&O's Summary    06 Jun 2019 07:01  -  07 Jun 2019 07:00  --------------------------------------------------------  IN: 0 mL / OUT: 1325 mL / NET: -1325 mL          PHYSICAL EXAM:  GENERAL: NAD, well-developed  HEAD:  AT, NC  EYES: EOMI, PERRLA, conjunctiva and sclera clear  ENMT: Airway patent. MMM. Good dentition, no lesions.  NECK: Supple, No JVD  CHEST/LUNG: CTABL; No wheezing, rhonci, or rales. Permacath in place   HEART: RRR; Normal S1, S2. No murmurs, rubs, or gallops  ABDOMEN: Soft, NT, ND; Bowel sounds present. No organomegaly  EXTREMITIES:  2+ Peripheral Pulses, No clubbing, cyanosis, or edema  PSYCH: AAOx3  NEUROLOGY: non-focal  SKIN: Warm, dry, intact; No rashes or lesions      LABS:                        7.7    8.43  )-----------( 184      ( 07 Jun 2019 07:00 )             24.2     06-06    136  |  98  |  34<H>  ----------------------------<  244<H>  3.6   |  25  |  4.67<H>    Ca    7.6<L>      06 Jun 2019 05:34  Phos  2.3     06-06  Mg     1.5     06-06    TPro  6.0  /  Alb  2.5<L>  /  TBili  0.6  /  DBili  x   /  AST  15  /  ALT  13  /  AlkPhos  79  06-06    LIVER FUNCTIONS - ( 06 Jun 2019 05:34 )  Alb: 2.5 g/dL / Pro: 6.0 g/dL / ALK PHOS: 79 u/L / ALT: 13 u/L / AST: 15 u/L / GGT: x                           RADIOLOGY & ADDITIONAL TESTS:    Imaging Personally Reviewed:     Consultant(s) Notes Reviewed:     Care Discussed with Consultants/Other Providers: Juice Ogden MD PGY-2  Contact/Pager - [phone]      Patient is a 63y old  Male who presents with a chief complaint of Septic Shock (07 Jun 2019 06:48)        SUBJECTIVE / OVERNIGHT EVENTS: No acute events ON. Pt w/o acute complaints, feels better today. Denies SOB, CP, abdominal pain. States R hand swelling/pain improved.       MEDICATIONS  (STANDING):  acetaminophen  IVPB .. 1000 milliGRAM(s) IV Intermittent once  aspirin enteric coated 81 milliGRAM(s) Oral daily  atorvastatin 40 milliGRAM(s) Oral at bedtime  buDESOnide  80 MICROgram(s)/formoterol 4.5 MICROgram(s) Inhaler 2 Puff(s) Inhalation two times a day  carvedilol 6.25 milliGRAM(s) Oral every 12 hours  chlorhexidine 4% Liquid 1 Application(s) Topical <User Schedule>  docusate sodium 100 milliGRAM(s) Oral three times a day  epoetin lamin Injectable 4000 Unit(s) IV Push <User Schedule>  famotidine    Tablet 20 milliGRAM(s) Oral daily  heparin  Injectable 5000 Unit(s) SubCutaneous every 8 hours  hydrALAZINE 50 milliGRAM(s) Oral every 8 hours  insulin glargine Injectable (LANTUS) 10 Unit(s) SubCutaneous at bedtime  insulin lispro (HumaLOG) corrective regimen sliding scale   SubCutaneous three times a day before meals  insulin lispro (HumaLOG) corrective regimen sliding scale   SubCutaneous at bedtime  meropenem  IVPB      meropenem  IVPB 500 milliGRAM(s) IV Intermittent every 12 hours  metoclopramide Injectable 10 milliGRAM(s) IV Push once  predniSONE   Tablet   Oral   senna 1 Tablet(s) Oral at bedtime    MEDICATIONS  (PRN):  acetaminophen   Tablet .. 650 milliGRAM(s) Oral every 6 hours PRN Mild Pain (1 - 3), Moderate Pain (4 - 6)  ALBUTerol/ipratropium for Nebulization 3 milliLiter(s) Nebulizer every 6 hours PRN Shortness of Breath and/or Wheezing  lidocaine   Patch 1 Patch Transdermal daily PRN Shoulder pain  oxyCODONE    IR 10 milliGRAM(s) Oral every 6 hours PRN Severe Pain (7 - 10)      Allergies    Cipro (Rash)  Plavix (Rash)    Intolerances          Vital Signs Last 24 Hrs  T(C): 37 (07 Jun 2019 07:00), Max: 37.2 (07 Jun 2019 05:46)  T(F): 98.6 (07 Jun 2019 07:00), Max: 98.9 (07 Jun 2019 05:46)  HR: 65 (07 Jun 2019 07:00) (61 - 65)  BP: 170/76 (07 Jun 2019 07:00) (114/68 - 170/76)  BP(mean): --  RR: 16 (07 Jun 2019 07:00) (16 - 18)  SpO2: 98% (07 Jun 2019 05:46) (97% - 99%)  CAPILLARY BLOOD GLUCOSE      POCT Blood Glucose.: 226 mg/dL (07 Jun 2019 05:52)  POCT Blood Glucose.: 219 mg/dL (06 Jun 2019 22:01)  POCT Blood Glucose.: 257 mg/dL (06 Jun 2019 19:25)  POCT Blood Glucose.: 265 mg/dL (06 Jun 2019 17:48)  POCT Blood Glucose.: 325 mg/dL (06 Jun 2019 13:34)  POCT Blood Glucose.: 257 mg/dL (06 Jun 2019 09:19)    I&O's Summary    06 Jun 2019 07:01  -  07 Jun 2019 07:00  --------------------------------------------------------  IN: 0 mL / OUT: 1325 mL / NET: -1325 mL          PHYSICAL EXAM:  GENERAL: NAD, well-developed  HEAD:  AT, NC  EYES: EOMI, PERRLA, conjunctiva and sclera clear  ENMT: Airway patent. MMM. Good dentition, no lesions.  NECK: Supple, No JVD  CHEST/LUNG: CTABL; No wheezing, rhonci, or rales. Permacath in place   HEART: RRR; Normal S1, S2. No murmurs, rubs, or gallops  ABDOMEN: Soft, NT, ND; Bowel sounds present. No organomegaly  EXTREMITIES:  2+ Peripheral Pulses, No clubbing, cyanosis, or edema  PSYCH: AAOx3  NEUROLOGY: non-focal  SKIN: Warm, dry, intact; No rashes or lesions      LABS:                        7.7    8.43  )-----------( 184      ( 07 Jun 2019 07:00 )             24.2     06-06    136  |  98  |  34<H>  ----------------------------<  244<H>  3.6   |  25  |  4.67<H>    Ca    7.6<L>      06 Jun 2019 05:34  Phos  2.3     06-06  Mg     1.5     06-06    TPro  6.0  /  Alb  2.5<L>  /  TBili  0.6  /  DBili  x   /  AST  15  /  ALT  13  /  AlkPhos  79  06-06    LIVER FUNCTIONS - ( 06 Jun 2019 05:34 )  Alb: 2.5 g/dL / Pro: 6.0 g/dL / ALK PHOS: 79 u/L / ALT: 13 u/L / AST: 15 u/L / GGT: x                           RADIOLOGY & ADDITIONAL TESTS:    Imaging Personally Reviewed:     Consultant(s) Notes Reviewed:     Care Discussed with Consultants/Other Providers:

## 2019-06-07 NOTE — PROGRESS NOTE ADULT - PROBLEM SELECTOR PLAN 1
- Acute cholangitis in setting of CBD stricture and recent ERCP with stent placement.   - repeat MRCP showing mod intrahepatic biliary duct dilation that's improved, extrahepatic duct dilation to 2cm, narrowing of CBD w new CBD stent.   - patient was offered a whipple procedure at OSH. surg onc consulted for evaluation inpatient, recommending whipple tentatively 6/18  - repeat EUS/ERCP showing a hypodense mass near the pancreatic head.   - on meropenem for 3 days until 6/7 for manipulation of CBD  - cards no need for cath since echo deficits are now reversed.

## 2019-06-07 NOTE — PROGRESS NOTE ADULT - PROBLEM SELECTOR PLAN 7
Pt w hx of HTN,   - his home med is coreg, amlodipine-valsartan and hydralazine  -restarted coreg, now at 6.25 BID, hydralazine increased to 50 mg TID  - hold ACE for renal injury

## 2019-06-07 NOTE — PROGRESS NOTE ADULT - SUBJECTIVE AND OBJECTIVE BOX
Chief Complaint:  Patient is a 63y old  Male who presents with a chief complaint of Septic Shock (06 Jun 2019 16:20)      Interval Events: No adverse events overnight.  Path from brushing and FNB came back without malignant cells, however, still high concern for PDAC.    Allergies:  Cipro (Rash)  Plavix (Rash)      Hospital Medications:  acetaminophen   Tablet .. 650 milliGRAM(s) Oral every 6 hours PRN  acetaminophen  IVPB .. 1000 milliGRAM(s) IV Intermittent once  ALBUTerol/ipratropium for Nebulization 3 milliLiter(s) Nebulizer every 6 hours PRN  aspirin enteric coated 81 milliGRAM(s) Oral daily  atorvastatin 40 milliGRAM(s) Oral at bedtime  buDESOnide  80 MICROgram(s)/formoterol 4.5 MICROgram(s) Inhaler 2 Puff(s) Inhalation two times a day  carvedilol 6.25 milliGRAM(s) Oral every 12 hours  chlorhexidine 4% Liquid 1 Application(s) Topical <User Schedule>  docusate sodium 100 milliGRAM(s) Oral three times a day  epoetin lamin Injectable 4000 Unit(s) IV Push <User Schedule>  famotidine    Tablet 20 milliGRAM(s) Oral daily  heparin  Injectable 5000 Unit(s) SubCutaneous every 8 hours  hydrALAZINE 25 milliGRAM(s) Oral three times a day  insulin glargine Injectable (LANTUS) 10 Unit(s) SubCutaneous at bedtime  insulin lispro (HumaLOG) corrective regimen sliding scale   SubCutaneous three times a day before meals  insulin lispro (HumaLOG) corrective regimen sliding scale   SubCutaneous at bedtime  lidocaine   Patch 1 Patch Transdermal daily PRN  meropenem  IVPB      meropenem  IVPB 500 milliGRAM(s) IV Intermittent every 12 hours  metoclopramide Injectable 10 milliGRAM(s) IV Push once  oxyCODONE    IR 10 milliGRAM(s) Oral every 6 hours PRN  predniSONE   Tablet   Oral   senna 1 Tablet(s) Oral at bedtime      PMHX/PSHX:  Gout  Type II diabetes mellitus  HTN (hypertension)  CAD (coronary artery disease)  Cirrhosis  S/P cholecystectomy      Family history:      ROS:     General:  No wt loss, fevers, chills, night sweats, fatigue,   Eyes:  Good vision, no reported pain  ENT:  No sore throat, pain, runny nose, dysphagia  CV:  No pain, palpitations, hypo/hypertension  Resp:  No dyspnea, cough, tachypnea, wheezing  GI:  See HPI  :  No pain, bleeding, incontinence, nocturia  Muscle:  No pain, weakness  Neuro:  No weakness, tingling, memory problems  Psych:  No fatigue, insomnia, mood problems, depression  Endocrine:  No polyuria, polydipsia, cold/heat intolerance  Heme:  No petechiae, ecchymosis, easy bruisability  Skin:  No rash, edema      PHYSICAL EXAM:     GENERAL: NAD  HEENT:  NC/AT  CHEST:  Full & symmetric excursion, no increased effort  ABDOMEN:  Soft, non-tender, non-distended, +BS  EXTREMITIES:  no edema  SKIN:  No rash  NEURO:  Alert      Vital Signs:  Vital Signs Last 24 Hrs  T(C): 37.2 (07 Jun 2019 05:46), Max: 37.2 (07 Jun 2019 05:46)  T(F): 98.9 (07 Jun 2019 05:46), Max: 98.9 (07 Jun 2019 05:46)  HR: 61 (07 Jun 2019 05:46) (61 - 64)  BP: 167/69 (07 Jun 2019 05:46) (114/68 - 167/69)  BP(mean): --  RR: 17 (07 Jun 2019 05:46) (17 - 18)  SpO2: 98% (07 Jun 2019 05:46) (97% - 99%)  Daily     Daily     LABS:                        7.8    9.17  )-----------( 212      ( 06 Jun 2019 05:34 )             24.7     06-06    136  |  98  |  34<H>  ----------------------------<  244<H>  3.6   |  25  |  4.67<H>    Ca    7.6<L>      06 Jun 2019 05:34  Phos  2.3     06-06  Mg     1.5     06-06    TPro  6.0  /  Alb  2.5<L>  /  TBili  0.6  /  DBili  x   /  AST  15  /  ALT  13  /  AlkPhos  79  06-06    LIVER FUNCTIONS - ( 06 Jun 2019 05:34 )  Alb: 2.5 g/dL / Pro: 6.0 g/dL / ALK PHOS: 79 u/L / ALT: 13 u/L / AST: 15 u/L / GGT: x                   Imaging:  reviewed

## 2019-06-07 NOTE — PROGRESS NOTE ADULT - PROBLEM SELECTOR PLAN 2
Patient with anemia in setting of CKD. Latest Hb noted to be below target range. C/w epogen TIW during HD. Monitor Hb.

## 2019-06-07 NOTE — PROGRESS NOTE ADULT - ASSESSMENT
Impression:  1. Septic shock (resolved) with E coli and Citrobacter bacteremia presumed secondary to biliary source, s/p ERCP 5/19/19 notable for bile in the duodenum and additional placement of plastic biliary stent alongside prior stent  2. Prior EUS/ERCP on 4/19/19 at OSH with 2 cm CBD stricture and with sphincterotomy and stent placement, biopsies negative and brushings with atypical cells (favor reactive atypia).  Now s/p repeat ERCP with stent exchange and EUS with FNA of panc head mass on 6/4.  Cytology from CND brushing and FNB of mass negative  3. CAD s/p PCI x 9 with elevated troponin, NSTEMI on heparin infusion.  4. SLOAN on CKD   5. DM  6. GERD    Recommendations:  - followup IgG4 (ordered for AM labs) to ensure this lesion is not 2/2 autoimmune panc, however low suspicion of this and high suspicion for PDAC despite negative biopsies (lesion was small and PDAC generally hypocellular)  - followup surg onc plan for surgical resection  - diet as tolerated  - Monitor CBC, CMP  - Supportive care per primary team    Please call GI back with any further questions

## 2019-06-07 NOTE — PROGRESS NOTE ADULT - PROBLEM SELECTOR PLAN 1
Pt. with oliguric SLOAN on CKD in the setting of sepsis, hypotension and NSTEMI. Pt. with likely severe ATN and is currently dialysis dependent. However UOP noted to be 1.3 L over past 24 hours. Patient seen and examined during HD. BP noted to stable during HD. HD catheter working well with good blood flow achieved. As per , patient has outpatient HD center available for TTS schedule. Will arrange for 2 hour session of HD tomorrow to switch to TTS schedule. Monitor Scr, I/O, and electrolytes. Avoid NSAIDs, RCAs, and other nephrotoxins.

## 2019-06-07 NOTE — PROGRESS NOTE ADULT - PROBLEM SELECTOR PLAN 6
- Distributive shock in setting of E coli/ Citerobacter bacteremia   - Likely had type II NSTEMI, trops peaked ~2000s, downtrended  - EP and cards following -no interventions warranted at this time   - echo with severe global LV dysfunction, cards following  - pharm nuclear stress test fixed deficits, no need for cath for cards

## 2019-06-07 NOTE — PROGRESS NOTE ADULT - SUBJECTIVE AND OBJECTIVE BOX
[facility] Division of Kidney Diseases & Hypertension  FOLLOW UP NOTE  [phone]--------------------------------------------------------------------------------  Chief Complaint:Other sepsis      24 hour events/subjective:        PAST HISTORY  --------------------------------------------------------------------------------  No significant changes to PMH, PSH, FHx, SHx, unless otherwise noted    ALLERGIES & MEDICATIONS  --------------------------------------------------------------------------------  Allergies    Cipro (Rash)  Plavix (Rash)    Intolerances      Standing Inpatient Medications  acetaminophen  IVPB .. 1000 milliGRAM(s) IV Intermittent once  aspirin enteric coated 81 milliGRAM(s) Oral daily  atorvastatin 40 milliGRAM(s) Oral at bedtime  buDESOnide  80 MICROgram(s)/formoterol 4.5 MICROgram(s) Inhaler 2 Puff(s) Inhalation two times a day  carvedilol 6.25 milliGRAM(s) Oral every 12 hours  chlorhexidine 4% Liquid 1 Application(s) Topical <User Schedule>  docusate sodium 100 milliGRAM(s) Oral three times a day  epoetin lamin Injectable 4000 Unit(s) IV Push <User Schedule>  famotidine    Tablet 20 milliGRAM(s) Oral daily  heparin  Injectable 5000 Unit(s) SubCutaneous every 8 hours  hydrALAZINE 50 milliGRAM(s) Oral every 8 hours  insulin glargine Injectable (LANTUS) 12 Unit(s) SubCutaneous at bedtime  insulin lispro (HumaLOG) corrective regimen sliding scale   SubCutaneous three times a day before meals  insulin lispro (HumaLOG) corrective regimen sliding scale   SubCutaneous at bedtime  meropenem  IVPB      meropenem  IVPB 500 milliGRAM(s) IV Intermittent every 12 hours  metoclopramide Injectable 10 milliGRAM(s) IV Push once  predniSONE   Tablet   Oral   senna 1 Tablet(s) Oral at bedtime    PRN Inpatient Medications  acetaminophen   Tablet .. 650 milliGRAM(s) Oral every 6 hours PRN  ALBUTerol/ipratropium for Nebulization 3 milliLiter(s) Nebulizer every 6 hours PRN  lidocaine   Patch 1 Patch Transdermal daily PRN  oxyCODONE    IR 10 milliGRAM(s) Oral every 6 hours PRN      REVIEW OF SYSTEMS  --------------------------------------------------------------------------------  Gen: No  fevers/chills  Skin: No rashes  Head/Eyes/Ears/Mouth: No headache; Normal hearing; Normal vision w/o blurriness  Respiratory: No dyspnea, cough, wheezing, hemoptysis  CV: No chest pain, PND, orthopnea  GI: No abdominal pain, diarrhea, constipation, nausea, vomiting  : No increased frequency, dysuria, hematuria, nocturia  MSK: No joint pain/swelling; no back pain; no edema  Neuro: No dizziness/lightheadedness, weakness, seizures, numbness, tingling      All other systems were reviewed and are negative, except as noted.    VITALS/PHYSICAL EXAM  --------------------------------------------------------------------------------  T(C): 36.7 (06-07-19 @ 10:48), Max: 37.2 (06-07-19 @ 05:46)  HR: 62 (06-07-19 @ 10:48) (61 - 65)  BP: 144/63 (06-07-19 @ 10:48) (114/68 - 170/76)  RR: 15 (06-07-19 @ 10:48) (15 - 18)  SpO2: 98% (06-07-19 @ 10:48) (98% - 99%)  Wt(kg): --        06-06-19 @ 07:01  -  06-07-19 @ 07:00  --------------------------------------------------------  IN: 0 mL / OUT: 1325 mL / NET: -1325 mL    06-07-19 @ 07:01  -  06-07-19 @ 11:35  --------------------------------------------------------  IN: 600 mL / OUT: 2100 mL / NET: -1500 mL      Physical Exam:  	Gen: NAD, well-appearing  	HEENT: PERRL, supple neck, clear oropharynx  	Pulm: CTA B/L  	CV: RRR, S1S2;  	Back: No spinal or CVA tenderness  	Abd: +BS, soft, nontender/nondistended  	: No suprapubic tenderness                      Extremities: no bilateral LE edema noted.                       Neuro: No focal deficits, intact gait  	Skin: Warm, without rashes  	Vascular access:    LABS/STUDIES  --------------------------------------------------------------------------------              7.7    8.43  >-----------<  184      [06-07-19 @ 07:00]              24.2     136  |  97  |  57  ----------------------------<  230      [06-07-19 @ 07:00]  3.5   |  24  |  5.52        Ca     7.9     [06-07-19 @ 07:00]      Mg     1.7     [06-07-19 @ 07:00]      Phos  3.5     [06-07-19 @ 07:00]    TPro  6.2  /  Alb  2.4  /  TBili  0.6  /  DBili  x   /  AST  16  /  ALT  13  /  AlkPhos  75  [06-07-19 @ 07:00]          Creatinine Trend:  SCr 5.52 [06-07 @ 07:00]  SCr 4.67 [06-06 @ 05:34]  SCr 6.26 [06-05 @ 07:25]  SCr 5.75 [06-04 @ 05:56]  SCr 8.35 [06-03 @ 06:40] [facility] Division of Kidney Diseases & Hypertension  FOLLOW UP NOTE  [phone]--------------------------------------------------------------------------------    HPI: 63-year-old male with medical history of CAD s/p 9 stents, DM, HTN, GERD, prior cholecystectomy,  diverticulosis, biliary stricture s/p ERCP with sphincteromy and stent 4/2019 who presents as transfer from [facility] where he was admitted with septic shock. Nephrology team is following for SLOAN on CKD and anuria. Pt. found to be in septic shock due to E Coli bacteremia was intubated for acute hypoxic respiratory failure and started on pressor support. He received vancomycin, zosyn, micafungin, merepenem and gentamicin initially. Also pt. developed NSTEMI and was started on heparin gtt with dobutamine due to concern for new LV dysfunction. Pt. with known history of CKD 3 secondary to DM and HTN, follows as outpatient with Dr Hodgson. As per pt's son SCr baseline 2.5-2.6 in March 2019 however states decrease to 1.9 in April 2019. On lab review of [facility] SCr elevated to 4.05 on 4/18/19 and decreased to 3.71 on 4/20/19. SCr elevated at 1.97 on 5/17/19. SCr elevated at 2.29 on 5/17/19 and further increased at 2.90 on 5/18/19. Pt. starting on CRRT on 5/18/19. CRRT was discontinued on 5/22/19 due to HD catheter malfunction. LIJ non-tunneled HD catheter was removed and replaced with left femoral non-tunneled HD catheter on 5/22/19. Pt. transitioned to intermittent HD on 5/24/19. Pt. had placement of RIJ tunneled HD catheter and subsequent removal of non-tunneled catheter on 5/29/19. Last HD treatment was tolerated well on 5/31/19    Patient seen and examined during HD today. Denies CP, SOB, N/V/F/C. UOP noted to be ~ 1.3 L over past 24 hours.         PAST HISTORY  --------------------------------------------------------------------------------  No significant changes to PMH, PSH, FHx, SHx, unless otherwise noted    ALLERGIES & MEDICATIONS  --------------------------------------------------------------------------------  Allergies    Cipro (Rash)  Plavix (Rash)    Intolerances      Standing Inpatient Medications  acetaminophen  IVPB .. 1000 milliGRAM(s) IV Intermittent once  aspirin enteric coated 81 milliGRAM(s) Oral daily  atorvastatin 40 milliGRAM(s) Oral at bedtime  buDESOnide  80 MICROgram(s)/formoterol 4.5 MICROgram(s) Inhaler 2 Puff(s) Inhalation two times a day  carvedilol 6.25 milliGRAM(s) Oral every 12 hours  chlorhexidine 4% Liquid 1 Application(s) Topical <User Schedule>  docusate sodium 100 milliGRAM(s) Oral three times a day  epoetin lamin Injectable 4000 Unit(s) IV Push <User Schedule>  famotidine    Tablet 20 milliGRAM(s) Oral daily  heparin  Injectable 5000 Unit(s) SubCutaneous every 8 hours  hydrALAZINE 50 milliGRAM(s) Oral every 8 hours  insulin glargine Injectable (LANTUS) 12 Unit(s) SubCutaneous at bedtime  insulin lispro (HumaLOG) corrective regimen sliding scale   SubCutaneous three times a day before meals  insulin lispro (HumaLOG) corrective regimen sliding scale   SubCutaneous at bedtime  meropenem  IVPB      meropenem  IVPB 500 milliGRAM(s) IV Intermittent every 12 hours  metoclopramide Injectable 10 milliGRAM(s) IV Push once  predniSONE   Tablet   Oral   senna 1 Tablet(s) Oral at bedtime    PRN Inpatient Medications  acetaminophen   Tablet .. 650 milliGRAM(s) Oral every 6 hours PRN  ALBUTerol/ipratropium for Nebulization 3 milliLiter(s) Nebulizer every 6 hours PRN  lidocaine   Patch 1 Patch Transdermal daily PRN  oxyCODONE    IR 10 milliGRAM(s) Oral every 6 hours PRN      REVIEW OF SYSTEMS  --------------------------------------------------------------------------------    Gen: no fever/chills  Resp: no SOB  CV: no CP  GI: no abdominal pain  MSK: no edema.     All other systems were reviewed and are negative, except as noted.    VITALS/PHYSICAL EXAM  --------------------------------------------------------------------------------  T(C): 36.7 (06-07-19 @ 10:48), Max: 37.2 (06-07-19 @ 05:46)  HR: 62 (06-07-19 @ 10:48) (61 - 65)  BP: 144/63 (06-07-19 @ 10:48) (114/68 - 170/76)  RR: 15 (06-07-19 @ 10:48) (15 - 18)  SpO2: 98% (06-07-19 @ 10:48) (98% - 99%)  Wt(kg): --        06-06-19 @ 07:01  -  06-07-19 @ 07:00  --------------------------------------------------------  IN: 0 mL / OUT: 1325 mL / NET: -1325 mL    06-07-19 @ 07:01  -  06-07-19 @ 11:35  --------------------------------------------------------  IN: 600 mL / OUT: 2100 mL / NET: -1500 mL      Physical Exam:  	  Gen: NAD, well-appearing  	HEENT: PERRL, supple neck  	Pulm: CTA B/L  	CV: RRR, S1S2; no rub  	Abd: +BS, soft, nontender/nondistended  	Neuro: No focal deficits  	Psych: Normal affect and mood  	Skin: Warm, without rashes  	Vascular access: R IJ tunneled catheter    LABS/STUDIES  --------------------------------------------------------------------------------              7.7    8.43  >-----------<  184      [06-07-19 @ 07:00]              24.2     136  |  97  |  57  ----------------------------<  230      [06-07-19 @ 07:00]  3.5   |  24  |  5.52        Ca     7.9     [06-07-19 @ 07:00]      Mg     1.7     [06-07-19 @ 07:00]      Phos  3.5     [06-07-19 @ 07:00]    TPro  6.2  /  Alb  2.4  /  TBili  0.6  /  DBili  x   /  AST  16  /  ALT  13  /  AlkPhos  75  [06-07-19 @ 07:00]          Creatinine Trend:  SCr 5.52 [06-07 @ 07:00]  SCr 4.67 [06-06 @ 05:34]  SCr 6.26 [06-05 @ 07:25]  SCr 5.75 [06-04 @ 05:56]  SCr 8.35 [06-03 @ 06:40]

## 2019-06-07 NOTE — PROGRESS NOTE ADULT - PROBLEM SELECTOR PLAN 4
Renal failure in setting of distributive shock   Pt w CKD hx, with SLOAN on CKD IV likely from sepsis   - HD as per renal through permacath, kalpana dutta d/roel on 5/29  - continue to monitor for renal recovery, oliguric   - HD through permcath  - pt has hx of being on PPI and getting SLOAN, talked to GI who recommended switching it while monitoring for renal recovery. started famotidine as per GI recs  - urinating more, will try to measure I&Os, 1.3 L out last 24 hrs

## 2019-06-07 NOTE — PROGRESS NOTE ADULT - ASSESSMENT
62 yo M hx of CAD s/p 9 stents, DMT2 on insulin, HTN, GERD, prior cholecystectomy,  diverticulosis, former smoker(30PY), biliary stricture s/p ERCP with sphincterotomy and stent 4/2019 who presented as transfer from [facility] for acute cholangitis s/p ERCP and stent CBD stent placement by GI on 5/19. Hospital course further complicated by Type II NSTEMI and bradycardia. Now on floors, resolved bradycardia, undergoing workup for biliary tree strictures, likely pancreatic CA given mass seen on EUS

## 2019-06-07 NOTE — PROGRESS NOTE ADULT - PROBLEM SELECTOR PLAN 3
- Possible flare given presentation and pt missing his febuxostat from SLOAN on CKD/ESRD  -f/u Hand Xray showing possible gout flare. Uric acid wnl  - prednisone now tapering   - rheum following  - much improved

## 2019-06-07 NOTE — CHART NOTE - NSCHARTNOTEFT_GEN_A_CORE
Pt planned for discharge tomorrow, will start HD on 6/11/19 and continue with Tuesday, Thursday, and Saturday. Patient is planned for [facility] on 6/18/19, Tuesday and will not able to get HD before the surgery.    Kirstin was notified, spoke to  at [facility], stated it would not be difficult to arrange for HD on 6/17/19 before the surgery. Will also speak to patient and his son  Alireza Gray  to speak to outpatient HD center upon discharge.    Juice Ogden, PGY-2  Internal Medicine   P: [phone]

## 2019-06-07 NOTE — PROGRESS NOTE ADULT - PROBLEM SELECTOR PLAN 2
from acute cholangitis in setting of CBD stricture w ERCP/stent placement, w ecoli and citrobacter bacteremia. s/p repeat ERCP with stent in CBD.  - finished full course of meropenem per ID recs, but now repeating for 3 days for manipulation of CBD. Day 3/3 today  - repeat MRCP showing mod intrahepatic biliary duct dilation tht's improved, extrahepatic duct dilation to 2cm, narrowing of CBD w new cbd stent.

## 2019-06-08 VITALS
HEART RATE: 60 BPM | OXYGEN SATURATION: 100 % | TEMPERATURE: 98 F | WEIGHT: 192.68 LBS | SYSTOLIC BLOOD PRESSURE: 162 MMHG | RESPIRATION RATE: 17 BRPM | DIASTOLIC BLOOD PRESSURE: 78 MMHG

## 2019-06-08 LAB
ANION GAP SERPL CALC-SCNC: 15 MMO/L — HIGH (ref 7–14)
BLD GP AB SCN SERPL QL: NEGATIVE — SIGNIFICANT CHANGE UP
BUN SERPL-MCNC: 42 MG/DL — HIGH (ref 7–23)
CALCIUM SERPL-MCNC: 8.2 MG/DL — LOW (ref 8.4–10.5)
CHLORIDE SERPL-SCNC: 96 MMOL/L — LOW (ref 98–107)
CO2 SERPL-SCNC: 24 MMOL/L — SIGNIFICANT CHANGE UP (ref 22–31)
CREAT SERPL-MCNC: 4 MG/DL — HIGH (ref 0.5–1.3)
GLUCOSE BLDC GLUCOMTR-MCNC: 192 MG/DL — HIGH (ref 70–99)
GLUCOSE BLDC GLUCOMTR-MCNC: 252 MG/DL — HIGH (ref 70–99)
GLUCOSE SERPL-MCNC: 278 MG/DL — HIGH (ref 70–99)
HCT VFR BLD CALC: 26 % — LOW (ref 39–50)
HGB BLD-MCNC: 8.4 G/DL — LOW (ref 13–17)
MAGNESIUM SERPL-MCNC: 1.5 MG/DL — LOW (ref 1.6–2.6)
MCHC RBC-ENTMCNC: 30.1 PG — SIGNIFICANT CHANGE UP (ref 27–34)
MCHC RBC-ENTMCNC: 32.3 % — SIGNIFICANT CHANGE UP (ref 32–36)
MCV RBC AUTO: 93.2 FL — SIGNIFICANT CHANGE UP (ref 80–100)
NRBC # FLD: 0 K/UL — SIGNIFICANT CHANGE UP (ref 0–0)
PHOSPHATE SERPL-MCNC: 3.6 MG/DL — SIGNIFICANT CHANGE UP (ref 2.5–4.5)
PLATELET # BLD AUTO: 159 K/UL — SIGNIFICANT CHANGE UP (ref 150–400)
PMV BLD: 9.6 FL — SIGNIFICANT CHANGE UP (ref 7–13)
POTASSIUM SERPL-MCNC: 4.1 MMOL/L — SIGNIFICANT CHANGE UP (ref 3.5–5.3)
POTASSIUM SERPL-SCNC: 4.1 MMOL/L — SIGNIFICANT CHANGE UP (ref 3.5–5.3)
RBC # BLD: 2.79 M/UL — LOW (ref 4.2–5.8)
RBC # FLD: 15.4 % — HIGH (ref 10.3–14.5)
RH IG SCN BLD-IMP: POSITIVE — SIGNIFICANT CHANGE UP
SODIUM SERPL-SCNC: 135 MMOL/L — SIGNIFICANT CHANGE UP (ref 135–145)
WBC # BLD: 6.47 K/UL — SIGNIFICANT CHANGE UP (ref 3.8–10.5)
WBC # FLD AUTO: 6.47 K/UL — SIGNIFICANT CHANGE UP (ref 3.8–10.5)

## 2019-06-08 PROCEDURE — 99232 SBSQ HOSP IP/OBS MODERATE 35: CPT | Mod: GC

## 2019-06-08 PROCEDURE — 99239 HOSP IP/OBS DSCHRG MGMT >30: CPT | Mod: GC

## 2019-06-08 RX ORDER — (INSULIN DEGLUDEC AND LIRAGLUTIDE) 100; 3.6 [IU]/ML; MG/ML
50 INJECTION, SOLUTION SUBCUTANEOUS
Qty: 0 | Refills: 0 | DISCHARGE

## 2019-06-08 RX ORDER — ISOPROPYL ALCOHOL, BENZOCAINE .7; .06 ML/ML; ML/ML
1 SWAB TOPICAL
Qty: 100 | Refills: 1
Start: 2019-06-08 | End: 2019-07-27

## 2019-06-08 RX ORDER — INSULIN LISPRO 100/ML
0 VIAL (ML) SUBCUTANEOUS
Qty: 0 | Refills: 0 | DISCHARGE

## 2019-06-08 RX ORDER — CARVEDILOL PHOSPHATE 80 MG/1
1 CAPSULE, EXTENDED RELEASE ORAL
Qty: 0 | Refills: 0 | DISCHARGE

## 2019-06-08 RX ORDER — AMLODIPINE AND VALSARTAN 5; 320 MG/1; MG/1
1 TABLET, FILM COATED ORAL
Qty: 0 | Refills: 0 | DISCHARGE

## 2019-06-08 RX ORDER — FEBUXOSTAT 40 MG/1
1 TABLET ORAL
Qty: 0 | Refills: 0 | DISCHARGE

## 2019-06-08 RX ORDER — ALLOPURINOL 300 MG
1 TABLET ORAL
Qty: 13 | Refills: 0
Start: 2019-06-08 | End: 2019-07-07

## 2019-06-08 RX ORDER — HYDRALAZINE HCL 50 MG
1 TABLET ORAL
Qty: 90 | Refills: 0
Start: 2019-06-08 | End: 2019-07-07

## 2019-06-08 RX ORDER — HYDRALAZINE HCL 50 MG
75 TABLET ORAL
Qty: 0 | Refills: 0 | DISCHARGE

## 2019-06-08 RX ORDER — INSULIN GLARGINE 100 [IU]/ML
30 INJECTION, SOLUTION SUBCUTANEOUS
Qty: 0 | Refills: 0 | DISCHARGE

## 2019-06-08 RX ORDER — MAGNESIUM OXIDE 400 MG ORAL TABLET 241.3 MG
400 TABLET ORAL ONCE
Refills: 0 | Status: COMPLETED | OUTPATIENT
Start: 2019-06-08 | End: 2019-06-08

## 2019-06-08 RX ORDER — INSULIN GLARGINE 100 [IU]/ML
12 INJECTION, SOLUTION SUBCUTANEOUS
Qty: 4 | Refills: 0
Start: 2019-06-08 | End: 2019-07-07

## 2019-06-08 RX ORDER — CARVEDILOL PHOSPHATE 80 MG/1
1 CAPSULE, EXTENDED RELEASE ORAL
Qty: 0 | Refills: 0 | DISCHARGE
Start: 2019-06-08

## 2019-06-08 RX ORDER — FAMOTIDINE 10 MG/ML
1 INJECTION INTRAVENOUS
Qty: 30 | Refills: 0
Start: 2019-06-08 | End: 2019-07-07

## 2019-06-08 RX ORDER — LANSOPRAZOLE 15 MG/1
1 CAPSULE, DELAYED RELEASE ORAL
Qty: 0 | Refills: 0 | DISCHARGE

## 2019-06-08 RX ADMIN — Medication 15 MILLIGRAM(S): at 05:30

## 2019-06-08 RX ADMIN — BUDESONIDE AND FORMOTEROL FUMARATE DIHYDRATE 2 PUFF(S): 160; 4.5 AEROSOL RESPIRATORY (INHALATION) at 08:49

## 2019-06-08 RX ADMIN — Medication 1: at 12:10

## 2019-06-08 RX ADMIN — Medication 50 MILLIGRAM(S): at 05:30

## 2019-06-08 RX ADMIN — Medication 3: at 08:49

## 2019-06-08 RX ADMIN — MAGNESIUM OXIDE 400 MG ORAL TABLET 400 MILLIGRAM(S): 241.3 TABLET ORAL at 14:28

## 2019-06-08 RX ADMIN — CARVEDILOL PHOSPHATE 6.25 MILLIGRAM(S): 80 CAPSULE, EXTENDED RELEASE ORAL at 05:30

## 2019-06-08 NOTE — PROGRESS NOTE ADULT - PROBLEM SELECTOR PLAN 4
Renal failure in setting of distributive shock   Pt w CKD hx, with SLOAN on CKD IV likely from sepsis   - HD as per renal through permacath, kalpana dutta d/roel on 5/29  - continue to monitor for renal recovery, oliguric   - HD through permcath  - pt has hx of being on PPI and getting SLOAN, talked to GI who recommended switching it while monitoring for renal recovery. started famotidine as per GI recs  - urinating more, will try to measure I&Os, 1.3 L out last 24 hrs Renal failure in setting of distributive shock   Pt w CKD hx, with SLOAN on CKD IV likely from sepsis   - HD as per renal through permacath, kalpana dutta d/roel on 5/29  - continue to monitor for renal recovery, oliguric   - HD through permcath  - pt has hx of being on PPI and getting SLOAN, talked to GI who recommended switching it while monitoring for renal recovery. started famotidine as per GI recs

## 2019-06-08 NOTE — PROVIDER CONTACT NOTE (OTHER) - ASSESSMENT
Denies chest pain, SOB, palpitations, no chest pressure. Pt is asymptomatic, no acute distress noted.
pt has not had any urine output today
Pt AOx4, /58, HR 65, temp 98.0, 100% on RA
Pt AOx4, sleeping comfortably, /56, HR 68, temp 98.3, 99% on RA
Pt. in bed, A&O x4, complaining of muscle pain all over the body.
patient with lunch FS= 76 after returning to unit from stress lab. Patient asymptomatic. Patient given 4 oz of diet ginger ale. 15 minute recheck FS= 84. Patient A&Ox4, VSS, no distress noted.
pt A&Ox4, VSS, no s/s of distress, pt ambulating in hallway

## 2019-06-08 NOTE — PROVIDER CONTACT NOTE (OTHER) - BACKGROUND
Pt admitted for sepsis. PMH of DM, HTN, CAD, SLOAN
Pt admitted for sepsis. PMH of DM, HTN, CAD, SLOAN
Pt. transferred from [facility] - Septic Shock post biliary stent placement
pt s/p EGD, EUS, ERCP with stent placement
s/p 5/18admitted with nausea, vomiting diarrhea; septic shock; 5/19 ERCP
Pt admitted for septic shock, eval of acute cholangitis, SLOAN.
pt admitted for septic shock

## 2019-06-08 NOTE — PROGRESS NOTE ADULT - PROVIDER SPECIALTY LIST ADULT
Anesthesia
Cardiology
Electrophysiology
Gastroenterology
Internal Medicine
MICU
Nephrology
Surgery
Surgery
Gastroenterology
Infectious Disease
Nephrology
Cardiology
Gastroenterology
Gastroenterology
Internal Medicine
MICU
Surgery
Nephrology
Internal Medicine
Nephrology
Internal Medicine

## 2019-06-08 NOTE — PROGRESS NOTE ADULT - SUBJECTIVE AND OBJECTIVE BOX
[facility] DIVISION OF KIDNEY DISEASES AND HYPERTENSION -- FOLLOW UP NOTE  --------------------------------------------------------------------------------  HPI: 63-year-old male with medical history of CAD s/p 9 stents, DM, HTN, GERD, prior cholecystectomy,  diverticulosis, biliary stricture s/p ERCP with sphincteromy and stent 4/2019 who presents as transfer from [facility] where he was admitted with septic shock. Nephrology team is following for SLOAN on CKD and anuria. Pt. found to be in septic shock due to E Coli bacteremia was intubated for acute hypoxic respiratory failure and started on pressor support. He received vancomycin, zosyn, micafungin, merepenem and gentamicin initially. Also pt. developed NSTEMI and was started on heparin gtt with dobutamine due to concern for new LV dysfunction. Pt. with known history of CKD 3 secondary to DM and HTN, follows as outpatient with Dr Hodgson. As per pt's son SCr baseline 2.5-2.6 in March 2019 however states decrease to 1.9 in April 2019. On lab review of [facility] SCr elevated to 4.05 on 4/18/19 and decreased to 3.71 on 4/20/19. SCr elevated at 1.97 on 5/17/19. SCr elevated at 2.29 on 5/17/19 and further increased at 2.90 on 5/18/19. Pt. starting on CRRT on 5/18/19. CRRT was discontinued on 5/22/19. Pt. transitioned to intermittent HD on 5/24/19. Pt. had placement of RIJ tunneled HD catheter on 5/29/19. Last HD treatment was tolerated well on 6/7/19    Patient seen and examined with wife at bedside, currently feels well, denies CP, SOB, N/V/F/C. UOP noted to be ~ 400cc over past 24 hours.     PAST HISTORY  --------------------------------------------------------------------------------  No significant changes to PMH, PSH, FHx, SHx, unless otherwise noted    ALLERGIES & MEDICATIONS  --------------------------------------------------------------------------------  Allergies    Cipro (Rash)  Plavix (Rash)    Intolerances      Standing Inpatient Medications  acetaminophen  IVPB .. 1000 milliGRAM(s) IV Intermittent once  aspirin enteric coated 81 milliGRAM(s) Oral daily  atorvastatin 40 milliGRAM(s) Oral at bedtime  buDESOnide  80 MICROgram(s)/formoterol 4.5 MICROgram(s) Inhaler 2 Puff(s) Inhalation two times a day  carvedilol 6.25 milliGRAM(s) Oral every 12 hours  chlorhexidine 4% Liquid 1 Application(s) Topical <User Schedule>  docusate sodium 100 milliGRAM(s) Oral three times a day  epoetin lamin Injectable 4000 Unit(s) IV Push <User Schedule>  famotidine    Tablet 20 milliGRAM(s) Oral daily  heparin  Injectable 5000 Unit(s) SubCutaneous every 8 hours  hydrALAZINE 50 milliGRAM(s) Oral every 8 hours  insulin glargine Injectable (LANTUS) 12 Unit(s) SubCutaneous at bedtime  insulin lispro (HumaLOG) corrective regimen sliding scale   SubCutaneous three times a day before meals  insulin lispro (HumaLOG) corrective regimen sliding scale   SubCutaneous at bedtime  metoclopramide Injectable 10 milliGRAM(s) IV Push once  predniSONE   Tablet   Oral   predniSONE   Tablet 15 milliGRAM(s) Oral daily  senna 1 Tablet(s) Oral at bedtime    PRN Inpatient Medications  acetaminophen   Tablet .. 650 milliGRAM(s) Oral every 6 hours PRN  ALBUTerol/ipratropium for Nebulization 3 milliLiter(s) Nebulizer every 6 hours PRN  lidocaine   Patch 1 Patch Transdermal daily PRN  oxyCODONE    IR 10 milliGRAM(s) Oral every 6 hours PRN      REVIEW OF SYSTEMS  --------------------------------------------------------------------------------  Gen: No fevers/chills  Respiratory: No dyspnea  CV: No chest pain  GI: No abdominal pain  MSK: + edema    All other systems were reviewed and are negative, except as noted.    VITALS/PHYSICAL EXAM  --------------------------------------------------------------------------------  T(C): 36.7 (06-08-19 @ 05:28), Max: 36.9 (06-07-19 @ 23:51)  HR: 65 (06-08-19 @ 05:28) (60 - 73)  BP: 164/62 (06-08-19 @ 05:28) (143/61 - 164/62)  RR: 18 (06-08-19 @ 05:28) (15 - 18)  SpO2: 100% (06-08-19 @ 05:28) (97% - 100%)  Wt(kg): --    06-07-19 @ 07:01  -  06-08-19 @ 07:00  --------------------------------------------------------  IN: 600 mL / OUT: 2500 mL / NET: -1900 mL    Physical Exam:             Gen: NAD, well-appearing  	HEENT: PERRL, supple neck  	Pulm: CTA B/L  	CV: RRR, S1S2; no rub  	Abd: +BS, soft, nontender/nondistended              Ext: trace LE edema B/L  	Neuro: No focal deficits  	Skin: Warm, without rashes  	Vascular access: R IJ tunneled catheter    LABS/STUDIES  --------------------------------------------------------------------------------              7.7    8.43  >-----------<  184      [06-07-19 @ 07:00]              24.2     136  |  97  |  57  ----------------------------<  230      [06-07-19 @ 07:00]  3.5   |  24  |  5.52        Ca     7.9     [06-07-19 @ 07:00]      Mg     1.7     [06-07-19 @ 07:00]      Phos  3.5     [06-07-19 @ 07:00]    TPro  6.2  /  Alb  2.4  /  TBili  0.6  /  DBili  x   /  AST  16  /  ALT  13  /  AlkPhos  75  [06-07-19 @ 07:00]          Creatinine Trend:  SCr 5.52 [06-07 @ 07:00]  SCr 4.67 [06-06 @ 05:34]  SCr 6.26 [06-05 @ 07:25]  SCr 5.75 [06-04 @ 05:56]  SCr 8.35 [06-03 @ 06:40] [facility] DIVISION OF KIDNEY DISEASES AND HYPERTENSION -- FOLLOW UP NOTE  --------------------------------------------------------------------------------  HPI: 63-year-old male with medical history of CAD s/p 9 stents, DM, HTN, GERD, prior cholecystectomy,  diverticulosis, biliary stricture s/p ERCP with sphincteromy and stent 4/2019 who presents as transfer from [facility] where he was admitted with septic shock. Nephrology team is following for SLOAN on CKD and anuria. Pt. found to be in septic shock due to E Coli bacteremia was intubated for acute hypoxic respiratory failure and started on pressor support. He received vancomycin, zosyn, micafungin, merepenem and gentamicin initially. Also pt. developed NSTEMI and was started on heparin gtt with dobutamine due to concern for new LV dysfunction. Pt. with known history of CKD 3 secondary to DM and HTN, follows as outpatient with Dr Hodgson. As per pt's son SCr baseline 2.5-2.6 in March 2019 however states decrease to 1.9 in April 2019. On lab review of [facility] SCr elevated to 4.05 on 4/18/19 and decreased to 3.71 on 4/20/19. SCr elevated at 1.97 on 5/17/19. SCr elevated at 2.29 on 5/17/19 and further increased at 2.90 on 5/18/19. Pt. starting on CRRT on 5/18/19. CRRT was discontinued on 5/22/19. Pt. transitioned to intermittent HD on 5/24/19. Pt. had placement of RIJ tunneled HD catheter on 5/29/19. Last HD treatment was tolerated well on 6/7/19    Patient seen and examined with wife at bedside, currently feels well, denies CP, SOB, N/V/F/C. UOP noted to be ~ 400cc over past 24 hours.     PAST HISTORY  --------------------------------------------------------------------------------  No significant changes to PMH, PSH, FHx, SHx, unless otherwise noted    ALLERGIES & MEDICATIONS  --------------------------------------------------------------------------------  Allergies    Cipro (Rash)  Plavix (Rash)    Intolerances    Standing Inpatient Medications  acetaminophen  IVPB .. 1000 milliGRAM(s) IV Intermittent once  aspirin enteric coated 81 milliGRAM(s) Oral daily  atorvastatin 40 milliGRAM(s) Oral at bedtime  buDESOnide  80 MICROgram(s)/formoterol 4.5 MICROgram(s) Inhaler 2 Puff(s) Inhalation two times a day  carvedilol 6.25 milliGRAM(s) Oral every 12 hours  chlorhexidine 4% Liquid 1 Application(s) Topical <User Schedule>  docusate sodium 100 milliGRAM(s) Oral three times a day  epoetin lamin Injectable 4000 Unit(s) IV Push <User Schedule>  famotidine    Tablet 20 milliGRAM(s) Oral daily  heparin  Injectable 5000 Unit(s) SubCutaneous every 8 hours  hydrALAZINE 50 milliGRAM(s) Oral every 8 hours  insulin glargine Injectable (LANTUS) 12 Unit(s) SubCutaneous at bedtime  insulin lispro (HumaLOG) corrective regimen sliding scale   SubCutaneous three times a day before meals  insulin lispro (HumaLOG) corrective regimen sliding scale   SubCutaneous at bedtime  metoclopramide Injectable 10 milliGRAM(s) IV Push once  predniSONE   Tablet   Oral   predniSONE   Tablet 15 milliGRAM(s) Oral daily  senna 1 Tablet(s) Oral at bedtime    PRN Inpatient Medications  acetaminophen   Tablet .. 650 milliGRAM(s) Oral every 6 hours PRN  ALBUTerol/ipratropium for Nebulization 3 milliLiter(s) Nebulizer every 6 hours PRN  lidocaine   Patch 1 Patch Transdermal daily PRN  oxyCODONE    IR 10 milliGRAM(s) Oral every 6 hours PRN    REVIEW OF SYSTEMS  --------------------------------------------------------------------------------  Gen: No fevers/chills  Respiratory: No dyspnea  CV: No chest pain  GI: No abdominal pain  MSK: + edema    All other systems were reviewed and are negative, except as noted.    VITALS/PHYSICAL EXAM  --------------------------------------------------------------------------------  T(C): 36.7 (06-08-19 @ 05:28), Max: 36.9 (06-07-19 @ 23:51)  HR: 65 (06-08-19 @ 05:28) (60 - 73)  BP: 164/62 (06-08-19 @ 05:28) (143/61 - 164/62)  RR: 18 (06-08-19 @ 05:28) (15 - 18)  SpO2: 100% (06-08-19 @ 05:28) (97% - 100%)  Wt(kg): --    06-07-19 @ 07:01  -  06-08-19 @ 07:00  --------------------------------------------------------  IN: 600 mL / OUT: 2500 mL / NET: -1900 mL    Physical Exam:             Gen: NAD, well-appearing  	HEENT: supple neck, no JVD  	Pulm: CTA B/L  	CV: RRR, S1S2; no rub  	Abd: +BS, soft, nontender/nondistended              Ext: LE pitting edema B/L  	Neuro: No focal deficits  	Skin: Warm, without rashes  	Vascular access: Right IJ tunneled HD catheter site: no bleeding    LABS/STUDIES  --------------------------------------------------------------------------------              7.7    8.43  >-----------<  184      [06-07-19 @ 07:00]              24.2     136  |  97  |  57  ----------------------------<  230      [06-07-19 @ 07:00]  3.5   |  24  |  5.52        Ca     7.9     [06-07-19 @ 07:00]      Mg     1.7     [06-07-19 @ 07:00]      Phos  3.5     [06-07-19 @ 07:00]    TPro  6.2  /  Alb  2.4  /  TBili  0.6  /  DBili  x   /  AST  16  /  ALT  13  /  AlkPhos  75  [06-07-19 @ 07:00]    Creatinine Trend:  SCr 5.52 [06-07 @ 07:00]  SCr 4.67 [06-06 @ 05:34]  SCr 6.26 [06-05 @ 07:25]  SCr 5.75 [06-04 @ 05:56]  SCr 8.35 [06-03 @ 06:40]

## 2019-06-08 NOTE — PROGRESS NOTE ADULT - SUBJECTIVE AND OBJECTIVE BOX
Ella Suyeon Yu PGY-1   [facility] Pager # [phone]  NS Pager # [phone]      Patient is a 63y old  Male who presents with a chief complaint of Septic Shock (07 Jun 2019 11:35)      SUBJECTIVE: No acute events overnight. Patient seen and examined at bedside.    REVIEW OF SYSTEMS:  CONSTITUTIONAL: No fever, weight loss, or fatigue  RESPIRATORY: No cough or shortness of breath  CARDIOVASCULAR: No chest pain, palpitations, dizziness, or leg swelling  GASTROINTESTINAL: No abdominal or epigastric pain. No nausea, vomiting, or hematemesis; No diarrhea or constipation. No melena or hematochezia.  GENITOURINARY: No dysuria  NEUROLOGICAL: No headaches  SKIN: No itching or lesions       MEDICATIONS  (STANDING):  acetaminophen  IVPB .. 1000 milliGRAM(s) IV Intermittent once  aspirin enteric coated 81 milliGRAM(s) Oral daily  atorvastatin 40 milliGRAM(s) Oral at bedtime  buDESOnide  80 MICROgram(s)/formoterol 4.5 MICROgram(s) Inhaler 2 Puff(s) Inhalation two times a day  carvedilol 6.25 milliGRAM(s) Oral every 12 hours  chlorhexidine 4% Liquid 1 Application(s) Topical <User Schedule>  docusate sodium 100 milliGRAM(s) Oral three times a day  epoetin lamin Injectable 4000 Unit(s) IV Push <User Schedule>  famotidine    Tablet 20 milliGRAM(s) Oral daily  heparin  Injectable 5000 Unit(s) SubCutaneous every 8 hours  hydrALAZINE 50 milliGRAM(s) Oral every 8 hours  insulin glargine Injectable (LANTUS) 12 Unit(s) SubCutaneous at bedtime  insulin lispro (HumaLOG) corrective regimen sliding scale   SubCutaneous three times a day before meals  insulin lispro (HumaLOG) corrective regimen sliding scale   SubCutaneous at bedtime  metoclopramide Injectable 10 milliGRAM(s) IV Push once  predniSONE   Tablet   Oral   predniSONE   Tablet 15 milliGRAM(s) Oral daily  senna 1 Tablet(s) Oral at bedtime    MEDICATIONS  (PRN):  acetaminophen   Tablet .. 650 milliGRAM(s) Oral every 6 hours PRN Mild Pain (1 - 3), Moderate Pain (4 - 6)  ALBUTerol/ipratropium for Nebulization 3 milliLiter(s) Nebulizer every 6 hours PRN Shortness of Breath and/or Wheezing  lidocaine   Patch 1 Patch Transdermal daily PRN Shoulder pain  oxyCODONE    IR 10 milliGRAM(s) Oral every 6 hours PRN Severe Pain (7 - 10)        Objective:    Vitals: Vital Signs Last 24 Hrs  T(C): 36.7 (06-08-19 @ 05:28), Max: 36.9 (06-07-19 @ 23:51)  T(F): 98 (06-08-19 @ 05:28), Max: 98.4 (06-07-19 @ 23:51)  HR: 65 (06-08-19 @ 05:28) (60 - 73)  BP: 164/62 (06-08-19 @ 05:28) (143/61 - 164/62)  BP(mean): --  RR: 18 (06-08-19 @ 05:28) (15 - 18)  SpO2: 100% (06-08-19 @ 05:28) (97% - 100%)            I&O's Summary    07 Jun 2019 07:01  -  08 Jun 2019 07:00  --------------------------------------------------------  IN: 600 mL / OUT: 2500 mL / NET: -1900 mL        PHYSICAL EXAM:  GENERAL: NAD  HEAD:  Atraumatic, Normocephalic  CHEST/LUNG: Clear to auscultation bilaterally; No rales or wheezing  HEART: Regular rate and rhythm; No murmurs, rubs, or gallops  ABDOMEN: Soft, nontender, nondistended  EXTREMITIES:  No clubbing, cyanosis, or edema  LYMPH: No lymphadenopathy noted  SKIN: No rashes or lesions  NERVOUS SYSTEM:  Alert & Oriented X3    LABS:  06-07    136  |  97<L>  |  57<H>  ----------------------------<  230<H>  3.5   |  24  |  5.52<H>  06-06    136  |  98  |  34<H>  ----------------------------<  244<H>  3.6   |  25  |  4.67<H>    Ca    7.9<L>      07 Jun 2019 07:00  Ca    7.6<L>      06 Jun 2019 05:34  Phos  3.5     06-07  Mg     1.7     06-07    TPro  6.2  /  Alb  2.4<L>  /  TBili  0.6  /  DBili  x   /  AST  16  /  ALT  13  /  AlkPhos  75  06-07  TPro  6.0  /  Alb  2.5<L>  /  TBili  0.6  /  DBili  x   /  AST  15  /  ALT  13  /  AlkPhos  79  06-06                                            7.7    8.43  )-----------( 184      ( 07 Jun 2019 07:00 )             24.2                         7.8    9.17  )-----------( 212      ( 06 Jun 2019 05:34 )             24.7     CAPILLARY BLOOD GLUCOSE      POCT Blood Glucose.: 173 mg/dL (07 Jun 2019 21:51)  POCT Blood Glucose.: 291 mg/dL (07 Jun 2019 17:51)  POCT Blood Glucose.: 255 mg/dL (07 Jun 2019 12:54)  POCT Blood Glucose.: 166 mg/dL (07 Jun 2019 08:08)    RADIOLOGY:  Imaging Personally Reviewed: YES      Consultants involved in case: YES  Consultant(s) Notes Reviewed:  YES  Care Discussed with Consultants/Other Providers     Ella Suyeon Yu PGY-1 Ella Suyeon Yu PGY-1   [facility] Pager # [phone]  NS Pager # [phone]      Patient is a 63y old  Male who presents with a chief complaint of Septic Shock (07 Jun 2019 11:35)      SUBJECTIVE: No acute events overnight. Patient seen and examined at bedside. feeling good, excited to go home    REVIEW OF SYSTEMS:  CONSTITUTIONAL: No fever, weight loss, or fatigue  RESPIRATORY: No cough or shortness of breath  CARDIOVASCULAR: No chest pain, palpitations, dizziness, or leg swelling  GASTROINTESTINAL: No abdominal or epigastric pain. No nausea, vomiting, or hematemesis; No diarrhea or constipation. No melena or hematochezia.  GENITOURINARY: No dysuria  NEUROLOGICAL: No headaches  SKIN: No itching or lesions       MEDICATIONS  (STANDING):  acetaminophen  IVPB .. 1000 milliGRAM(s) IV Intermittent once  aspirin enteric coated 81 milliGRAM(s) Oral daily  atorvastatin 40 milliGRAM(s) Oral at bedtime  buDESOnide  80 MICROgram(s)/formoterol 4.5 MICROgram(s) Inhaler 2 Puff(s) Inhalation two times a day  carvedilol 6.25 milliGRAM(s) Oral every 12 hours  chlorhexidine 4% Liquid 1 Application(s) Topical <User Schedule>  docusate sodium 100 milliGRAM(s) Oral three times a day  epoetin lamin Injectable 4000 Unit(s) IV Push <User Schedule>  famotidine    Tablet 20 milliGRAM(s) Oral daily  heparin  Injectable 5000 Unit(s) SubCutaneous every 8 hours  hydrALAZINE 50 milliGRAM(s) Oral every 8 hours  insulin glargine Injectable (LANTUS) 12 Unit(s) SubCutaneous at bedtime  insulin lispro (HumaLOG) corrective regimen sliding scale   SubCutaneous three times a day before meals  insulin lispro (HumaLOG) corrective regimen sliding scale   SubCutaneous at bedtime  metoclopramide Injectable 10 milliGRAM(s) IV Push once  predniSONE   Tablet   Oral   predniSONE   Tablet 15 milliGRAM(s) Oral daily  senna 1 Tablet(s) Oral at bedtime    MEDICATIONS  (PRN):  acetaminophen   Tablet .. 650 milliGRAM(s) Oral every 6 hours PRN Mild Pain (1 - 3), Moderate Pain (4 - 6)  ALBUTerol/ipratropium for Nebulization 3 milliLiter(s) Nebulizer every 6 hours PRN Shortness of Breath and/or Wheezing  lidocaine   Patch 1 Patch Transdermal daily PRN Shoulder pain  oxyCODONE    IR 10 milliGRAM(s) Oral every 6 hours PRN Severe Pain (7 - 10)        Objective:    Vitals: Vital Signs Last 24 Hrs  T(C): 36.7 (06-08-19 @ 05:28), Max: 36.9 (06-07-19 @ 23:51)  T(F): 98 (06-08-19 @ 05:28), Max: 98.4 (06-07-19 @ 23:51)  HR: 65 (06-08-19 @ 05:28) (60 - 73)  BP: 164/62 (06-08-19 @ 05:28) (143/61 - 164/62)  BP(mean): --  RR: 18 (06-08-19 @ 05:28) (15 - 18)  SpO2: 100% (06-08-19 @ 05:28) (97% - 100%)            I&O's Summary    07 Jun 2019 07:01  -  08 Jun 2019 07:00  --------------------------------------------------------  IN: 600 mL / OUT: 2500 mL / NET: -1900 mL        PHYSICAL EXAM:  GENERAL: NAD  CHEST/LUNG: Clear to auscultation bilaterally; No rales or wheezing  HEART: Regular rate and rhythm; No murmurs, rubs, or gallops  ABDOMEN: Soft, nontender, nondistended  EXTREMITIES:  No clubbing, cyanosis, or edema, 4th digit L improved.   SKIN: No rashes or lesions  NERVOUS SYSTEM:  Alert & Oriented X3    LABS:  06-07    136  |  97<L>  |  57<H>  ----------------------------<  230<H>  3.5   |  24  |  5.52<H>  06-06    136  |  98  |  34<H>  ----------------------------<  244<H>  3.6   |  25  |  4.67<H>    Ca    7.9<L>      07 Jun 2019 07:00  Ca    7.6<L>      06 Jun 2019 05:34  Phos  3.5     06-07  Mg     1.7     06-07    TPro  6.2  /  Alb  2.4<L>  /  TBili  0.6  /  DBili  x   /  AST  16  /  ALT  13  /  AlkPhos  75  06-07  TPro  6.0  /  Alb  2.5<L>  /  TBili  0.6  /  DBili  x   /  AST  15  /  ALT  13  /  AlkPhos  79  06-06                                            7.7    8.43  )-----------( 184      ( 07 Jun 2019 07:00 )             24.2                         7.8    9.17  )-----------( 212      ( 06 Jun 2019 05:34 )             24.7     CAPILLARY BLOOD GLUCOSE      POCT Blood Glucose.: 173 mg/dL (07 Jun 2019 21:51)  POCT Blood Glucose.: 291 mg/dL (07 Jun 2019 17:51)  POCT Blood Glucose.: 255 mg/dL (07 Jun 2019 12:54)  POCT Blood Glucose.: 166 mg/dL (07 Jun 2019 08:08)    RADIOLOGY:  Imaging Personally Reviewed: YES      Consultants involved in case: YES  Consultant(s) Notes Reviewed:  YES  Care Discussed with Consultants/Other Providers     Ella Suyeon Yu PGY-1

## 2019-06-08 NOTE — PROGRESS NOTE ADULT - ATTENDING COMMENTS
Patient seen and examined.  Case discussed with house staff.  Agree with above as edited.   #Pancreatic stricture - s/p EUS with 10mm Pancreatic head mass and ERCP with metal stent placement. s/p full course of IV meropenem.  Planning for outpatient Surgical resection, with surgical oncology for 6/18.  #Preoperative Evaluation - Cardiology preop evaluation done (see Cardiology progress note from 6/5/19).  Medicine preop eval doen and documented yesterday.  #SLOAN on CKD IV - secondary to ATN - Now HD dependent. dOutpatient HD for T, T, S set up to start on Tuesday 6/11/19. Patient will likely require partial session on 6/17/19 for preoperative optimization - d/w renal and HD  yesterda. d/w patient and wife.  #NSTEMI, h/o CAD s/p PCI - Pharm nuclear stress - fixed defects - Appreciate cardiology f/u- Repeat Echo shows recovery in LV function. No cardiology contraindications to proceeding with Whipple. c/w ASA, coreg, hydralazine and statin.   #Gout - acute flare of left PIP continues to improve. Finish steroid taper. Off febuxostat given HD. Start low dose preventative allopurinol on d/c.  #HTN - continue holding ARB given SLOAN on CKD.  c/w hydralazine and coreg  Plan discussed with patient, wife and patient's son.  D/C time: 40 minutes Patient seen and examined.  Case discussed with house staff.  Agree with above as edited.   #Pancreatic stricture - s/p EUS with 10mm Pancreatic head mass and ERCP with metal stent placement. s/p full course of IV meropenem.  Planning for outpatient Surgical resection, with surgical oncology for 6/18.  #Preoperative Evaluation - Cardiology preop evaluation done (see Cardiology progress note from 6/5/19).  Medicine preop eval doen and documented yesterday.  #SLOAN on CKD IV - secondary to ATN - Now HD dependent. dOutpatient HD for T, T, S set up to start on Tuesday 6/11/19. Patient will likely require partial session on 6/17/19 for preoperative optimization - d/w renal and HD  yesterda. d/w patient and wife.  #NSTEMI, h/o CAD s/p PCI - Pharm nuclear stress - fixed defects - Appreciate cardiology f/u- Repeat Echo shows recovery in LV function. No cardiology contraindications to proceeding with Whipple. c/w ASA, coreg, hydralazine and statin.   #Gout - acute flare of left PIP continues to improve. Finish steroid taper. Off febuxostat given HD. Start low dose preventative allopurinol on d/c.  #HTN - continue holding ARB given SLOAN on CKD.  c/w hydralazine and coreg  Plan discussed with patient, wife and patient's son.  D/C time: 55 minutes

## 2019-06-08 NOTE — PROGRESS NOTE ADULT - REASON FOR ADMISSION
Septic Shock

## 2019-06-08 NOTE — PROGRESS NOTE ADULT - NSHPATTENDINGPLANDISCUSS_GEN_ALL_CORE
patient
patient and primary team attending
MICU team
fellow, ICU team
team
MICU team
Team
team
MICU team
MICU team at bedside
Medicine attending
Pt and his wife at bedside
Pt and son at bedside
team
primary team attending
Dr. Gray
HS

## 2019-06-08 NOTE — PROGRESS NOTE ADULT - PROBLEM SELECTOR PLAN 2
Patient with anemia in setting of CKD. Latest Hb noted to be below target range. Pt. on epogen TIW during HD. Monitor Hb. Patient with anemia in setting of CKD. Latest Hb noted to be below target range. Pt. on epogen TIW during HD. Monitor Hb

## 2019-06-08 NOTE — PROGRESS NOTE ADULT - PROBLEM SELECTOR PLAN 3
- Possible flare given presentation and pt missing his febuxostat from SLOAN on CKD/ESRD  -f/u Hand Xray showing possible gout flare. Uric acid wnl  - prednisone now tapering   - rheum following  - much improved - Possible flare given presentation and pt missing his febuxostat from SLOAN on CKD/ESRD  - Uric acid wnl  - prednisone now tapering   - rheum following   - much improved

## 2019-06-08 NOTE — PROVIDER CONTACT NOTE (OTHER) - NAME OF MD/NP/PA/DO NOTIFIED:
Duc GAO
Duc GAO
MAYRA HANNA MD (MED TEAM 4 [id number])
MD Ogden
XAVIER MARLEY MD
Alin notified
oJhan Rowland MD

## 2019-06-08 NOTE — PROVIDER CONTACT NOTE (OTHER) - REASON
Pt /56
Pt /58
Pt. complaining of muscle pain all over the body.
patient with lunch FS= 76
pt with no IV access
Pt had 6 beats of v tach
no urine output

## 2019-06-08 NOTE — PROVIDER CONTACT NOTE (OTHER) - DATE AND TIME:
02-Jun-2019 00:29
02-Jun-2019 05:35
08-Jun-2019 03:45
27-May-2019 21:16
31-May-2019 14:21
03-Jun-2019 13:11
06-Jun-2019 00:29

## 2019-06-08 NOTE — PROVIDER CONTACT NOTE (OTHER) - SITUATION
Patient with lunch FS= 76 after returning to unit from stress lab. Patient asymptomatic. Patient given 4 oz of diet ginger ale. 15 minute recheck FS= 84. Patient A&Ox4, VSS, no distress noted.
Pt /56
Pt /58
Pt. complaining of muscle pain all over the body.
pt c/o pain to IV site, no s/s of infiltration. pt also due to be discharged later today
Pt had 6 beats of ventricular tachycardia on monitor.
pt has not had any urine output today

## 2019-06-08 NOTE — PROGRESS NOTE ADULT - PROBLEM SELECTOR PLAN 1
Pt. with oliguric SLOAN on CKD in the setting of sepsis, hypotension and NSTEMI. Pt. with likely severe ATN and is currently dialysis dependent. UOP noted to be 400 cc over past 24 hours. As per , patient has outpatient HD center available for TTS schedule. Plan for HD today. Monitor Scr, I/O, and electrolytes. Avoid NSAIDs, RCAs, and other nephrotoxins. Pt. with oliguric SLOAN on CKD in the setting of sepsis, hypotension and NSTEMI. Pt. with likely ATN. Pt. currently dialysis dependent. UOP noted to be 400 cc over past 24 hours. Pt. had HD yesterday. Plan for HD today. Next HD planned as outpatient on Tuesday 6/11/19. Monitor Scr, I/O, and electrolytes. Avoid NSAIDs, RCAs, and other nephrotoxins

## 2019-06-08 NOTE — PROVIDER CONTACT NOTE (OTHER) - ACTION/TREATMENT ORDERED:
MD made aware, perform EKG and MD will come to assess
no pending orders, will continue to monitor
MD aware, stated will order Tylenol.
MD Ogden called and made aware. No new orders at this time. Continue to monitor.
MD aware, ok for pt to be without IV access as he is going home today
MD notified. No new orders. Will continue to monitor
MD notified. No new orders. Will continue to monitor

## 2019-06-08 NOTE — PROVIDER CONTACT NOTE (OTHER) - RECOMMENDATIONS
MD made aware, EKG recommended
informed team
MD Ogden called and made aware. No new orders at this time. Continue to monitor.
MD noyola
Notify MD
Notify MD

## 2019-06-08 NOTE — PROGRESS NOTE ADULT - ASSESSMENT
64 yo M hx of CAD s/p 9 stents, DMT2 on insulin, HTN, GERD, prior cholecystectomy,  diverticulosis, former smoker(30PY), biliary stricture s/p ERCP with sphincterotomy and stent 4/2019 who presented as transfer from [facility] for acute cholangitis s/p ERCP and stent CBD stent placement by GI on 5/19. Hospital course further complicated by Type II NSTEMI and bradycardia. Now on floors, resolved bradycardia, undergoing workup for biliary tree strictures, likely pancreatic CA given mass seen on EUS

## 2019-06-09 LAB
IGG1 SER-MCNC: 682 MG/DL — SIGNIFICANT CHANGE UP (ref 248–810)
IGG2 SER-MCNC: 366 MG/DL — SIGNIFICANT CHANGE UP (ref 130–555)
IGG3 SER-MCNC: 176 MG/DL — HIGH (ref 15–102)
IGG4 SER-MCNC: 80 MG/DL — SIGNIFICANT CHANGE UP (ref 2–96)

## 2019-06-17 ENCOUNTER — TRANSCRIPTION ENCOUNTER (OUTPATIENT)
Age: 64
End: 2019-06-17

## 2019-06-18 ENCOUNTER — APPOINTMENT (OUTPATIENT)
Dept: SURGICAL ONCOLOGY | Facility: HOSPITAL | Age: 64
End: 2019-06-18

## 2019-06-18 ENCOUNTER — RESULT REVIEW (OUTPATIENT)
Age: 64
End: 2019-06-18

## 2019-06-18 ENCOUNTER — INPATIENT (INPATIENT)
Facility: HOSPITAL | Age: 64
LOS: 7 days | Discharge: HOME | End: 2019-06-26
Attending: SURGERY | Admitting: SURGERY
Payer: COMMERCIAL

## 2019-06-18 VITALS
SYSTOLIC BLOOD PRESSURE: 149 MMHG | HEIGHT: 67 IN | OXYGEN SATURATION: 95 % | RESPIRATION RATE: 17 BRPM | WEIGHT: 190.04 LBS | TEMPERATURE: 98 F | HEART RATE: 61 BPM | DIASTOLIC BLOOD PRESSURE: 60 MMHG

## 2019-06-18 DIAGNOSIS — K83.1 OBSTRUCTION OF BILE DUCT: ICD-10-CM

## 2019-06-18 DIAGNOSIS — Z90.49 ACQUIRED ABSENCE OF OTHER SPECIFIED PARTS OF DIGESTIVE TRACT: Chronic | ICD-10-CM

## 2019-06-18 LAB
ALBUMIN SERPL ELPH-MCNC: 3.3 G/DL — SIGNIFICANT CHANGE UP (ref 3.3–5)
ALBUMIN SERPL ELPH-MCNC: 3.3 G/DL — SIGNIFICANT CHANGE UP (ref 3.3–5)
ALP SERPL-CCNC: 49 U/L — SIGNIFICANT CHANGE UP (ref 40–120)
ALP SERPL-CCNC: 57 U/L — SIGNIFICANT CHANGE UP (ref 40–120)
ALT FLD-CCNC: 15 U/L — SIGNIFICANT CHANGE UP (ref 4–41)
ALT FLD-CCNC: 15 U/L — SIGNIFICANT CHANGE UP (ref 4–41)
ANION GAP SERPL CALC-SCNC: 13 MMO/L — SIGNIFICANT CHANGE UP (ref 7–14)
ANION GAP SERPL CALC-SCNC: 15 MMO/L — HIGH (ref 7–14)
APTT BLD: 27.7 SEC — SIGNIFICANT CHANGE UP (ref 27.5–36.3)
APTT BLD: 29 SEC — SIGNIFICANT CHANGE UP (ref 27.5–36.3)
AST SERPL-CCNC: 24 U/L — SIGNIFICANT CHANGE UP (ref 4–40)
AST SERPL-CCNC: 28 U/L — SIGNIFICANT CHANGE UP (ref 4–40)
BASE EXCESS BLDA CALC-SCNC: 0.8 MMOL/L — SIGNIFICANT CHANGE UP
BASE EXCESS BLDA CALC-SCNC: 1.7 MMOL/L — SIGNIFICANT CHANGE UP
BASE EXCESS BLDA CALC-SCNC: 1.8 MMOL/L — SIGNIFICANT CHANGE UP
BASE EXCESS BLDA CALC-SCNC: 4.2 MMOL/L — SIGNIFICANT CHANGE UP
BASOPHILS # BLD AUTO: 0.05 K/UL — SIGNIFICANT CHANGE UP (ref 0–0.2)
BASOPHILS NFR BLD AUTO: 0.4 % — SIGNIFICANT CHANGE UP (ref 0–2)
BILIRUB DIRECT SERPL-MCNC: 0.4 MG/DL — HIGH (ref 0.1–0.2)
BILIRUB SERPL-MCNC: 0.7 MG/DL — SIGNIFICANT CHANGE UP (ref 0.2–1.2)
BILIRUB SERPL-MCNC: 1.1 MG/DL — SIGNIFICANT CHANGE UP (ref 0.2–1.2)
BLD GP AB SCN SERPL QL: NEGATIVE — SIGNIFICANT CHANGE UP
BUN SERPL-MCNC: 20 MG/DL — SIGNIFICANT CHANGE UP (ref 7–23)
BUN SERPL-MCNC: 22 MG/DL — SIGNIFICANT CHANGE UP (ref 7–23)
CA-I BLDA-SCNC: 1.09 MMOL/L — LOW (ref 1.15–1.29)
CA-I BLDA-SCNC: 1.12 MMOL/L — LOW (ref 1.15–1.29)
CA-I BLDA-SCNC: 1.15 MMOL/L — SIGNIFICANT CHANGE UP (ref 1.15–1.29)
CA-I BLDA-SCNC: 1.24 MMOL/L — SIGNIFICANT CHANGE UP (ref 1.15–1.29)
CALCIUM SERPL-MCNC: 8.4 MG/DL — SIGNIFICANT CHANGE UP (ref 8.4–10.5)
CALCIUM SERPL-MCNC: 9 MG/DL — SIGNIFICANT CHANGE UP (ref 8.4–10.5)
CHLORIDE SERPL-SCNC: 101 MMOL/L — SIGNIFICANT CHANGE UP (ref 98–107)
CHLORIDE SERPL-SCNC: 101 MMOL/L — SIGNIFICANT CHANGE UP (ref 98–107)
CHLORIDE SERPL-SCNC: 103 MMOL/L — SIGNIFICANT CHANGE UP (ref 98–107)
CO2 SERPL-SCNC: 24 MMOL/L — SIGNIFICANT CHANGE UP (ref 22–31)
CO2 SERPL-SCNC: 24 MMOL/L — SIGNIFICANT CHANGE UP (ref 22–31)
CO2 SERPL-SCNC: 25 MMOL/L — SIGNIFICANT CHANGE UP (ref 22–31)
CREAT SERPL-MCNC: 2.71 MG/DL — HIGH (ref 0.5–1.3)
CREAT SERPL-MCNC: 2.74 MG/DL — HIGH (ref 0.5–1.3)
EOSINOPHIL # BLD AUTO: 0.11 K/UL — SIGNIFICANT CHANGE UP (ref 0–0.5)
EOSINOPHIL NFR BLD AUTO: 1 % — SIGNIFICANT CHANGE UP (ref 0–6)
GAS PNL BLDV: 140 MMOL/L — SIGNIFICANT CHANGE UP (ref 136–146)
GLUCOSE BLDA-MCNC: 137 MG/DL — HIGH (ref 70–99)
GLUCOSE BLDA-MCNC: 225 MG/DL — HIGH (ref 70–99)
GLUCOSE BLDA-MCNC: 235 MG/DL — HIGH (ref 70–99)
GLUCOSE BLDA-MCNC: 241 MG/DL — HIGH (ref 70–99)
GLUCOSE BLDC GLUCOMTR-MCNC: 233 MG/DL — HIGH (ref 70–99)
GLUCOSE BLDV-MCNC: 129 MG/DL — HIGH (ref 70–99)
GLUCOSE SERPL-MCNC: 195 MG/DL — HIGH (ref 70–99)
GLUCOSE SERPL-MCNC: 238 MG/DL — HIGH (ref 70–99)
HCO3 BLDA-SCNC: 25 MMOL/L — SIGNIFICANT CHANGE UP (ref 22–26)
HCO3 BLDA-SCNC: 26 MMOL/L — SIGNIFICANT CHANGE UP (ref 22–26)
HCO3 BLDA-SCNC: 26 MMOL/L — SIGNIFICANT CHANGE UP (ref 22–26)
HCO3 BLDA-SCNC: 28 MMOL/L — HIGH (ref 22–26)
HCT VFR BLD CALC: 28.3 % — LOW (ref 39–50)
HCT VFR BLD CALC: 33.4 % — LOW (ref 39–50)
HCT VFR BLDA CALC: 25.8 % — LOW (ref 39–51)
HCT VFR BLDA CALC: 26.6 % — LOW (ref 39–51)
HCT VFR BLDA CALC: 28.5 % — LOW (ref 39–51)
HCT VFR BLDA CALC: 32.4 % — LOW (ref 39–51)
HCT VFR BLDV CALC: 30.4 % — LOW (ref 39–51)
HGB BLD-MCNC: 10.9 G/DL — LOW (ref 13–17)
HGB BLD-MCNC: 9.2 G/DL — LOW (ref 13–17)
HGB BLDA-MCNC: 10.5 G/DL — LOW (ref 13–17)
HGB BLDA-MCNC: 8.3 G/DL — LOW (ref 13–17)
HGB BLDA-MCNC: 8.6 G/DL — LOW (ref 13–17)
HGB BLDA-MCNC: 9.2 G/DL — LOW (ref 13–17)
IMM GRANULOCYTES NFR BLD AUTO: 1.1 % — SIGNIFICANT CHANGE UP (ref 0–1.5)
INR BLD: 1.06 — SIGNIFICANT CHANGE UP (ref 0.88–1.17)
INR BLD: 1.14 — SIGNIFICANT CHANGE UP (ref 0.88–1.17)
LYMPHOCYTES # BLD AUTO: 0.92 K/UL — LOW (ref 1–3.3)
LYMPHOCYTES # BLD AUTO: 8.1 % — LOW (ref 13–44)
MAGNESIUM SERPL-MCNC: 1.5 MG/DL — LOW (ref 1.6–2.6)
MAGNESIUM SERPL-MCNC: 1.6 MG/DL — SIGNIFICANT CHANGE UP (ref 1.6–2.6)
MCHC RBC-ENTMCNC: 29.7 PG — SIGNIFICANT CHANGE UP (ref 27–34)
MCHC RBC-ENTMCNC: 30.9 PG — SIGNIFICANT CHANGE UP (ref 27–34)
MCHC RBC-ENTMCNC: 32.5 % — SIGNIFICANT CHANGE UP (ref 32–36)
MCHC RBC-ENTMCNC: 32.6 % — SIGNIFICANT CHANGE UP (ref 32–36)
MCV RBC AUTO: 91 FL — SIGNIFICANT CHANGE UP (ref 80–100)
MCV RBC AUTO: 95 FL — SIGNIFICANT CHANGE UP (ref 80–100)
MONOCYTES # BLD AUTO: 0.38 K/UL — SIGNIFICANT CHANGE UP (ref 0–0.9)
MONOCYTES NFR BLD AUTO: 3.4 % — SIGNIFICANT CHANGE UP (ref 2–14)
NEUTROPHILS # BLD AUTO: 9.71 K/UL — HIGH (ref 1.8–7.4)
NEUTROPHILS NFR BLD AUTO: 86 % — HIGH (ref 43–77)
NRBC # FLD: 0 K/UL — SIGNIFICANT CHANGE UP (ref 0–0)
NRBC # FLD: 0 K/UL — SIGNIFICANT CHANGE UP (ref 0–0)
PCO2 BLDA: 34 MMHG — LOW (ref 35–48)
PCO2 BLDA: 34 MMHG — LOW (ref 35–48)
PCO2 BLDA: 37 MMHG — SIGNIFICANT CHANGE UP (ref 35–48)
PCO2 BLDA: 38 MMHG — SIGNIFICANT CHANGE UP (ref 35–48)
PH BLDA: 7.44 PH — SIGNIFICANT CHANGE UP (ref 7.35–7.45)
PH BLDA: 7.47 PH — HIGH (ref 7.35–7.45)
PH BLDA: 7.48 PH — HIGH (ref 7.35–7.45)
PH BLDA: 7.48 PH — HIGH (ref 7.35–7.45)
PHOSPHATE SERPL-MCNC: 2.4 MG/DL — LOW (ref 2.5–4.5)
PHOSPHATE SERPL-MCNC: 4.6 MG/DL — HIGH (ref 2.5–4.5)
PLATELET # BLD AUTO: 194 K/UL — SIGNIFICANT CHANGE UP (ref 150–400)
PLATELET # BLD AUTO: 214 K/UL — SIGNIFICANT CHANGE UP (ref 150–400)
PMV BLD: 9.2 FL — SIGNIFICANT CHANGE UP (ref 7–13)
PMV BLD: 9.9 FL — SIGNIFICANT CHANGE UP (ref 7–13)
PO2 BLDA: 171 MMHG — HIGH (ref 83–108)
PO2 BLDA: 208 MMHG — HIGH (ref 83–108)
PO2 BLDA: 227 MMHG — HIGH (ref 83–108)
PO2 BLDA: 397 MMHG — HIGH (ref 83–108)
POTASSIUM BLDA-SCNC: 3 MMOL/L — LOW (ref 3.4–4.5)
POTASSIUM BLDA-SCNC: 3.6 MMOL/L — SIGNIFICANT CHANGE UP (ref 3.4–4.5)
POTASSIUM BLDA-SCNC: 3.7 MMOL/L — SIGNIFICANT CHANGE UP (ref 3.4–4.5)
POTASSIUM BLDA-SCNC: 3.9 MMOL/L — SIGNIFICANT CHANGE UP (ref 3.4–4.5)
POTASSIUM BLDV-SCNC: 3.7 MMOL/L — SIGNIFICANT CHANGE UP (ref 3.4–4.5)
POTASSIUM SERPL-MCNC: 3.7 MMOL/L — SIGNIFICANT CHANGE UP (ref 3.5–5.3)
POTASSIUM SERPL-MCNC: 3.7 MMOL/L — SIGNIFICANT CHANGE UP (ref 3.5–5.3)
POTASSIUM SERPL-MCNC: 4.3 MMOL/L — SIGNIFICANT CHANGE UP (ref 3.5–5.3)
POTASSIUM SERPL-SCNC: 3.7 MMOL/L — SIGNIFICANT CHANGE UP (ref 3.5–5.3)
POTASSIUM SERPL-SCNC: 3.7 MMOL/L — SIGNIFICANT CHANGE UP (ref 3.5–5.3)
POTASSIUM SERPL-SCNC: 4.3 MMOL/L — SIGNIFICANT CHANGE UP (ref 3.5–5.3)
PROT SERPL-MCNC: 5.8 G/DL — LOW (ref 6–8.3)
PROT SERPL-MCNC: 6.1 G/DL — SIGNIFICANT CHANGE UP (ref 6–8.3)
PROTHROM AB SERPL-ACNC: 12.1 SEC — SIGNIFICANT CHANGE UP (ref 9.8–13.1)
PROTHROM AB SERPL-ACNC: 12.7 SEC — SIGNIFICANT CHANGE UP (ref 9.8–13.1)
RBC # BLD: 2.98 M/UL — LOW (ref 4.2–5.8)
RBC # BLD: 3.67 M/UL — LOW (ref 4.2–5.8)
RBC # FLD: 14.6 % — HIGH (ref 10.3–14.5)
RBC # FLD: 17.3 % — HIGH (ref 10.3–14.5)
RH IG SCN BLD-IMP: POSITIVE — SIGNIFICANT CHANGE UP
SAO2 % BLDA: 98.7 % — SIGNIFICANT CHANGE UP (ref 95–99)
SAO2 % BLDA: 99.6 % — HIGH (ref 95–99)
SAO2 % BLDA: 99.7 % — HIGH (ref 95–99)
SAO2 % BLDA: 99.8 % — HIGH (ref 95–99)
SODIUM BLDA-SCNC: 136 MMOL/L — SIGNIFICANT CHANGE UP (ref 136–146)
SODIUM BLDA-SCNC: 139 MMOL/L — SIGNIFICANT CHANGE UP (ref 136–146)
SODIUM SERPL-SCNC: 140 MMOL/L — SIGNIFICANT CHANGE UP (ref 135–145)
SODIUM SERPL-SCNC: 140 MMOL/L — SIGNIFICANT CHANGE UP (ref 135–145)
SODIUM SERPL-SCNC: 141 MMOL/L — SIGNIFICANT CHANGE UP (ref 135–145)
WBC # BLD: 11.29 K/UL — HIGH (ref 3.8–10.5)
WBC # BLD: 16.78 K/UL — HIGH (ref 3.8–10.5)
WBC # FLD AUTO: 11.29 K/UL — HIGH (ref 3.8–10.5)
WBC # FLD AUTO: 16.78 K/UL — HIGH (ref 3.8–10.5)

## 2019-06-18 PROCEDURE — 74018 RADEX ABDOMEN 1 VIEW: CPT | Mod: 26

## 2019-06-18 PROCEDURE — 49203: CPT

## 2019-06-18 PROCEDURE — 48150 PARTIAL REMOVAL OF PANCREAS: CPT | Mod: 82

## 2019-06-18 PROCEDURE — 88307 TISSUE EXAM BY PATHOLOGIST: CPT | Mod: 26

## 2019-06-18 PROCEDURE — 38747 REMOVE ABDOMINAL LYMPH NODES: CPT | Mod: 82,59

## 2019-06-18 PROCEDURE — 48150 PARTIAL REMOVAL OF PANCREAS: CPT

## 2019-06-18 PROCEDURE — 88300 SURGICAL PATH GROSS: CPT | Mod: 26,59

## 2019-06-18 PROCEDURE — 88309 TISSUE EXAM BY PATHOLOGIST: CPT | Mod: 26

## 2019-06-18 PROCEDURE — 49203: CPT | Mod: 82

## 2019-06-18 PROCEDURE — 38747 REMOVE ABDOMINAL LYMPH NODES: CPT | Mod: 59

## 2019-06-18 PROCEDURE — 44015 INSERT NEEDLE CATH BOWEL: CPT | Mod: 82

## 2019-06-18 PROCEDURE — 88331 PATH CONSLTJ SURG 1 BLK 1SPC: CPT | Mod: 26

## 2019-06-18 PROCEDURE — 44015 INSERT NEEDLE CATH BOWEL: CPT

## 2019-06-18 PROCEDURE — 88332 PATH CONSLTJ SURG EA ADD BLK: CPT | Mod: 26

## 2019-06-18 PROCEDURE — 88304 TISSUE EXAM BY PATHOLOGIST: CPT | Mod: 26

## 2019-06-18 RX ORDER — METHADONE HYDROCHLORIDE 40 MG/1
5 TABLET ORAL ONCE
Refills: 0 | Status: DISCONTINUED | OUTPATIENT
Start: 2019-06-18 | End: 2019-06-22

## 2019-06-18 RX ORDER — ACETAMINOPHEN 500 MG
1000 TABLET ORAL ONCE
Refills: 0 | Status: COMPLETED | OUTPATIENT
Start: 2019-06-18 | End: 2019-06-18

## 2019-06-18 RX ORDER — INSULIN LISPRO 100/ML
VIAL (ML) SUBCUTANEOUS EVERY 6 HOURS
Refills: 0 | Status: DISCONTINUED | OUTPATIENT
Start: 2019-06-18 | End: 2019-06-22

## 2019-06-18 RX ORDER — SODIUM CHLORIDE 9 MG/ML
1000 INJECTION, SOLUTION INTRAVENOUS
Refills: 0 | Status: DISCONTINUED | OUTPATIENT
Start: 2019-06-18 | End: 2019-06-19

## 2019-06-18 RX ORDER — HYDROMORPHONE HYDROCHLORIDE 2 MG/ML
0.5 INJECTION INTRAMUSCULAR; INTRAVENOUS; SUBCUTANEOUS
Refills: 0 | Status: DISCONTINUED | OUTPATIENT
Start: 2019-06-18 | End: 2019-06-19

## 2019-06-18 RX ORDER — HYDROMORPHONE HYDROCHLORIDE 2 MG/ML
0.5 INJECTION INTRAMUSCULAR; INTRAVENOUS; SUBCUTANEOUS
Refills: 0 | Status: DISCONTINUED | OUTPATIENT
Start: 2019-06-18 | End: 2019-06-22

## 2019-06-18 RX ORDER — ONDANSETRON 8 MG/1
4 TABLET, FILM COATED ORAL ONCE
Refills: 0 | Status: DISCONTINUED | OUTPATIENT
Start: 2019-06-18 | End: 2019-06-19

## 2019-06-18 RX ORDER — HYDROMORPHONE HYDROCHLORIDE 2 MG/ML
30 INJECTION INTRAMUSCULAR; INTRAVENOUS; SUBCUTANEOUS
Refills: 0 | Status: DISCONTINUED | OUTPATIENT
Start: 2019-06-18 | End: 2019-06-22

## 2019-06-18 RX ORDER — PANTOPRAZOLE SODIUM 20 MG/1
40 TABLET, DELAYED RELEASE ORAL DAILY
Refills: 0 | Status: DISCONTINUED | OUTPATIENT
Start: 2019-06-18 | End: 2019-06-22

## 2019-06-18 RX ORDER — SODIUM CHLORIDE 9 MG/ML
1000 INJECTION INTRAMUSCULAR; INTRAVENOUS; SUBCUTANEOUS
Refills: 0 | Status: DISCONTINUED | OUTPATIENT
Start: 2019-06-18 | End: 2019-06-18

## 2019-06-18 RX ORDER — HEPARIN SODIUM 5000 [USP'U]/ML
5000 INJECTION INTRAVENOUS; SUBCUTANEOUS EVERY 8 HOURS
Refills: 0 | Status: DISCONTINUED | OUTPATIENT
Start: 2019-06-18 | End: 2019-06-26

## 2019-06-18 RX ORDER — DEXTROSE 50 % IN WATER 50 %
12.5 SYRINGE (ML) INTRAVENOUS ONCE
Refills: 0 | Status: DISCONTINUED | OUTPATIENT
Start: 2019-06-18 | End: 2019-06-22

## 2019-06-18 RX ORDER — DEXTROSE 50 % IN WATER 50 %
15 SYRINGE (ML) INTRAVENOUS ONCE
Refills: 0 | Status: DISCONTINUED | OUTPATIENT
Start: 2019-06-18 | End: 2019-06-22

## 2019-06-18 RX ORDER — SODIUM CHLORIDE 9 MG/ML
1000 INJECTION, SOLUTION INTRAVENOUS
Refills: 0 | Status: DISCONTINUED | OUTPATIENT
Start: 2019-06-18 | End: 2019-06-18

## 2019-06-18 RX ORDER — ONDANSETRON 8 MG/1
4 TABLET, FILM COATED ORAL EVERY 6 HOURS
Refills: 0 | Status: DISCONTINUED | OUTPATIENT
Start: 2019-06-18 | End: 2019-06-22

## 2019-06-18 RX ORDER — DEXTROSE 50 % IN WATER 50 %
25 SYRINGE (ML) INTRAVENOUS ONCE
Refills: 0 | Status: DISCONTINUED | OUTPATIENT
Start: 2019-06-18 | End: 2019-06-22

## 2019-06-18 RX ORDER — ACETAMINOPHEN 500 MG
1000 TABLET ORAL ONCE
Refills: 0 | Status: COMPLETED | OUTPATIENT
Start: 2019-06-19 | End: 2019-06-19

## 2019-06-18 RX ORDER — GLUCAGON INJECTION, SOLUTION 0.5 MG/.1ML
1 INJECTION, SOLUTION SUBCUTANEOUS ONCE
Refills: 0 | Status: DISCONTINUED | OUTPATIENT
Start: 2019-06-18 | End: 2019-06-22

## 2019-06-18 RX ORDER — SODIUM CHLORIDE 9 MG/ML
250 INJECTION, SOLUTION INTRAVENOUS ONCE
Refills: 0 | Status: COMPLETED | OUTPATIENT
Start: 2019-06-18 | End: 2019-06-18

## 2019-06-18 RX ORDER — HYDROMORPHONE HYDROCHLORIDE 2 MG/ML
1 INJECTION INTRAMUSCULAR; INTRAVENOUS; SUBCUTANEOUS
Refills: 0 | Status: DISCONTINUED | OUTPATIENT
Start: 2019-06-18 | End: 2019-06-19

## 2019-06-18 RX ORDER — NALOXONE HYDROCHLORIDE 4 MG/.1ML
0.1 SPRAY NASAL
Refills: 0 | Status: DISCONTINUED | OUTPATIENT
Start: 2019-06-18 | End: 2019-06-22

## 2019-06-18 RX ADMIN — HYDROMORPHONE HYDROCHLORIDE 0.5 MILLIGRAM(S): 2 INJECTION INTRAMUSCULAR; INTRAVENOUS; SUBCUTANEOUS at 18:55

## 2019-06-18 RX ADMIN — HYDROMORPHONE HYDROCHLORIDE 30 MILLILITER(S): 2 INJECTION INTRAMUSCULAR; INTRAVENOUS; SUBCUTANEOUS at 19:21

## 2019-06-18 RX ADMIN — HYDROMORPHONE HYDROCHLORIDE 30 MILLILITER(S): 2 INJECTION INTRAMUSCULAR; INTRAVENOUS; SUBCUTANEOUS at 20:11

## 2019-06-18 RX ADMIN — HYDROMORPHONE HYDROCHLORIDE 0.5 MILLIGRAM(S): 2 INJECTION INTRAMUSCULAR; INTRAVENOUS; SUBCUTANEOUS at 18:10

## 2019-06-18 RX ADMIN — Medication 2: at 21:00

## 2019-06-18 RX ADMIN — HYDROMORPHONE HYDROCHLORIDE 0.5 MILLIGRAM(S): 2 INJECTION INTRAMUSCULAR; INTRAVENOUS; SUBCUTANEOUS at 18:40

## 2019-06-18 RX ADMIN — Medication 400 MILLIGRAM(S): at 21:36

## 2019-06-18 RX ADMIN — HYDROMORPHONE HYDROCHLORIDE 0.5 MILLIGRAM(S): 2 INJECTION INTRAMUSCULAR; INTRAVENOUS; SUBCUTANEOUS at 18:20

## 2019-06-18 RX ADMIN — HYDROMORPHONE HYDROCHLORIDE 0.5 MILLIGRAM(S): 2 INJECTION INTRAMUSCULAR; INTRAVENOUS; SUBCUTANEOUS at 19:30

## 2019-06-18 RX ADMIN — SODIUM CHLORIDE 1000 MILLILITER(S): 9 INJECTION, SOLUTION INTRAVENOUS at 20:00

## 2019-06-18 RX ADMIN — HYDROMORPHONE HYDROCHLORIDE 0.5 MILLIGRAM(S): 2 INJECTION INTRAMUSCULAR; INTRAVENOUS; SUBCUTANEOUS at 19:15

## 2019-06-18 RX ADMIN — Medication 1000 MILLIGRAM(S): at 22:01

## 2019-06-18 RX ADMIN — HEPARIN SODIUM 5000 UNIT(S): 5000 INJECTION INTRAVENOUS; SUBCUTANEOUS at 22:00

## 2019-06-18 RX ADMIN — SODIUM CHLORIDE 500 MILLILITER(S): 9 INJECTION, SOLUTION INTRAVENOUS at 23:15

## 2019-06-18 NOTE — CONSULT NOTE ADULT - SUBJECTIVE AND OBJECTIVE BOX
SICU Consultation Note  =====================================================  HPI: 62 yo M with PMH of CAD s/p 9 stents (allergic to plavix), DMT2 on insulin, HTN, GERD, prior cholecystectomy,  diverticulosis, former smoker (30 packed years), biliary stricture s/p ERCP with sphincteromy and stent 4/2019 who presents as transfer from [facility] in septic shock for further evaluation of acute cholangitis, for possible IR intervention or Advanced GI intervention. 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  Found to be in septic shock due to E Coli Bacteremia, with SLOAN requiring placement of right chest permacath for dialysis, transaminitis and elevated troponins. In the ED at OSH, he developed lethargy, altered mental status, and hypoxia to 82% and was intubated for acute hypoxic respiratory failure. He was started on levophed and vasopressin and was admitted to their MICU for further workup. He received vanc, zosyn, micafungin, meropenem and gentamicin. He was started on heparin gtt for suspected NSTEMI due to troponin elevated and increasing CK. Pt stable and discharged home; pt had a CT / MRI showing a questionable lesion vs stricturing further workup with ERCP revealing a 10 mm pancreatic head hypoechoic lesion. Pt now s/p Whipple for cholangiocarcinoma      Surgery Information  EBL:   600cc      Blood Products: 4u PRBC            PAST MEDICAL & SURGICAL HISTORY:  Type II diabetes mellitus  HTN (hypertension)  CAD (coronary artery disease)  S/P cholecystectomy    Home Meds:   Allergies: Cipro (Rash)  Plavix (Rash)    Soc:   Advanced Directives: Presumed Full Code     ROS:    General: Non-Contributory  Skin/Breast: Non-Contributory  Ophthalmologic: Non-Contributory  ENMT: Non-Contributory  Respiratory and Thorax: Non-Contributory  Cardiovascular: Non-Contributory  Gastrointestinal: Report abdominal pain post op  Genitourinary: Non-Contributory  Musculoskeletal: Non-Contributory  Neurological: Non-Contributory  Psychiatric: Non-Contributory  Hematology/Lymphatics: Non-Contributory  Endocrine: Non-Contributory  Allergic/Immunologic: Non-Contributory    CURRENT MEDICATIONS:   --------------------------------------------------------------------------------------  Neurologic Medications  acetaminophen  IVPB .. 1000 milliGRAM(s) IV Intermittent once  HYDROmorphone  Injectable 0.5 milliGRAM(s) IV Push every 10 minutes PRN Moderate Pain (4 - 6)  HYDROmorphone  Injectable 1 milliGRAM(s) IV Push every 10 minutes PRN Severe Pain (7 - 10)  HYDROmorphone PCA (1 mG/mL) 30 milliLiter(s) PCA Continuous PCA Continuous  HYDROmorphone PCA (1 mG/mL) Rescue Clinician Bolus 0.5 milliGRAM(s) IV Push every 15 minutes PRN for Pain Scale GREATER THAN 6  methadone Injectable 5 milliGRAM(s) IV Push once  ondansetron Injectable 4 milliGRAM(s) IV Push every 6 hours PRN Nausea  ondansetron Injectable 4 milliGRAM(s) IV Push once PRN Nausea and/or Vomiting    Respiratory Medications    Cardiovascular Medications    Gastrointestinal Medications  sodium chloride 0.9%. 1000 milliLiter(s) IV Continuous <Continuous>  sodium phosphate IVPB 15 milliMole(s) IV Intermittent once    Genitourinary Medications    Hematologic/Oncologic Medications  heparin  Injectable 5000 Unit(s) SubCutaneous every 8 hours    Antimicrobial/Immunologic Medications    Endocrine/Metabolic Medications    Topical/Other Medications  naloxone Injectable 0.1 milliGRAM(s) IV Push every 3 minutes PRN For ANY of the following changes in patient status:  A. RR LESS THAN 10 breaths per minute, B. Oxygen saturation LESS THAN 90%, C. Sedation score of 6    --------------------------------------------------------------------------------------  ICU Vital Signs Last 24 Hrs  T(C): 36.3 (18 Jun 2019 18:05), Max: 36.7 (18 Jun 2019 06:26)  T(F): 97.3 (18 Jun 2019 18:05), Max: 98.1 (18 Jun 2019 06:26)  HR: 67 (18 Jun 2019 19:30) (61 - 67)  BP: 121/62 (18 Jun 2019 19:30) (121/62 - 149/60)  BP(mean): 76 (18 Jun 2019 19:30) (73 - 81)  ABP: 131/48 (18 Jun 2019 19:30) (131/48 - 161/60)  ABP(mean): 74 (18 Jun 2019 19:30) (74 - 94)  RR: 11 (18 Jun 2019 19:30) (10 - 19)  SpO2: 93% (18 Jun 2019 19:30) (93% - 99%)    --------------------------------------------------------------------------------------  I&O's Detail    18 Jun 2019 07:01  -  18 Jun 2019 19:51  --------------------------------------------------------  IN:    sodium chloride 0.9%.: 150 mL  Total IN: 150 mL    OUT:    Bulb: 100 mL    Bulb: 10 mL    Indwelling Catheter - Urethral: 20 mL  Total OUT: 130 mL    Total NET: 20 mL      --------------------------------------------------------------------------------------    EXAM:  General/Neuro  RASS:   GCS:   Exam: Normal, NAD, alert, oriented x 3, no focal deficits.     Respiratory  Exam: Lungs clear to auscultation, Normal expansion/effort.      Cardiovascular  Exam: S1, S2.  Regular rate and rhythm. Cardiac Rhythm: Normal Sinus Rhythm      GI  Exam: Abdomen soft, appropriate tenderness, non-distended. Jejunostomy tube in place.  Nasogastric tube in place. Midline incision with C/D/I dressing  Current Diet:  NPO      Tubes/Lines/Drains dawson, 2 YUMIKO drains, left radial A line, J-tube, NGT  [x] Peripheral IV           Extremities  Exam: Extremities warm, pink, well-perfused.      :   Exam: Dawson catheter in place.     LABS  --------------------------------------------------------------------------------------  CBC (06-18 @ 19:00)                              10.9<L>                         16.78<H>  )----------------(  194        --    % Neutrophils, --    % Lymphocytes, ANC: --                                  33.4<L>  CBC (06-18 @ 11:38)                              9.2<L>                         11.29<H>  )----------------(  214        86.0<H>% Neutrophils, 8.1<L>% Lymphocytes, ANC: 9.71<H>                              28.3<L>    BMP (06-18 @ 19:00)             141     |  103     |  22    		Ca++ --      Ca 9.0                ---------------------------------( 238<H>		Mg 1.6                4.3     |  25      |  2.71<H>			Ph 4.6<H>  BMP (06-18 @ 11:38)             140     |  101     |  20    		Ca++ --      Ca 8.4                ---------------------------------( 195<H>		Mg 1.5<L>             3.7     |  24      |  2.74<H>			Ph 2.4<L>    LFTs (06-18 @ 19:00)      TPro 5.8<L> / Alb 3.3 / TBili 1.1 / DBili 0.4<H> / AST 28 / ALT 15 / AlkPhos 49  LFTs (06-18 @ 11:38)      TPro 6.1 / Alb 3.3 / TBili 0.7 / DBili -- / AST 24 / ALT 15 / AlkPhos 57    Coags (06-18 @ 19:00)  aPTT 27.7 / INR 1.06 / PT 12.1  Coags (06-18 @ 11:38)  aPTT 29.0 / INR 1.14 / PT 12.7      ABG (06-18 @ 15:44)     7.47<H> / 34<L> / 227<H> / 25 / 0.8 / 99.8<H>%     Lactate:    ABG (06-18 @ 14:08)     7.44 / 38 / 208<H> / 26 / 1.7 / 99.7<H>%     Lactate:        --------------------------------------------------------------------------------------    ASSESSMENT:  62 yo M with PMH of CAD s/p 9 stents (allergic to plavix), DMT2 on insulin, HTN, GERD, prior cholecystectomy,  diverticulosis, former smoker (30 packed years), biliary stricture s/p ERCP with sphincteromy and stent 4/2019 who found to be in septic shock due to E Coli Bacteremia, with SLOAN requiring placement of right chest permacath for dialysis who is now s/p Whipple for cholangiocarcinoma    PLAN:   Neurologic: Dilaudid PCA for pain mgmt, IV tylenol PRN  Respiratory: supplement O2 via NC as needed  Cardiovascular: NA  Gastrointestinal/Nutrition:   Renal/Genitourinary:   Hematologic:   Infectious Disease:   Lines/Tubes:  Endocrine:   Disposition:     --------------------------------------------------------------------------------------    Critical Care Diagnoses: SICU Consultation Note  =====================================================  HPI: 62 yo M with PMH of CAD s/p 9 stents (allergic to plavix), DMT2 on insulin, HTN, GERD, prior cholecystectomy,  diverticulosis, former smoker (30 packed years), biliary stricture s/p ERCP with sphincteromy and stent 4/2019 who presents as transfer from [facility] in septic shock for further evaluation of acute cholangitis, for possible IR intervention or Advanced GI intervention. 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  Found to be in septic shock due to E Coli Bacteremia, with SLOAN requiring placement of right chest permacath for dialysis, transaminitis and elevated troponins. In the ED at OSH, he developed lethargy, altered mental status, and hypoxia to 82% and was intubated for acute hypoxic respiratory failure. He was started on levophed and vasopressin and was admitted to their MICU for further workup. He received vanc, zosyn, micafungin, meropenem and gentamicin. He was started on heparin gtt for suspected NSTEMI due to troponin elevated and increasing CK. Pt stable and discharged home; pt had a CT / MRI showing a questionable lesion vs stricturing further workup with ERCP revealing a 10 mm pancreatic head hypoechoic lesion. Pt now s/p Whipple for cholangiocarcinoma      Surgery Information  EBL:   600cc      Blood Products: 4u PRBC            PAST MEDICAL & SURGICAL HISTORY:  Type II diabetes mellitus  HTN (hypertension)  CAD (coronary artery disease)  S/P cholecystectomy    Home Meds:   Allergies: Cipro (Rash)  Plavix (Rash)    Soc:   Advanced Directives: Presumed Full Code     ROS:    General: Non-Contributory  Skin/Breast: Non-Contributory  Ophthalmologic: Non-Contributory  ENMT: Non-Contributory  Respiratory and Thorax: Non-Contributory  Cardiovascular: Non-Contributory  Gastrointestinal: Report abdominal pain post op  Genitourinary: Non-Contributory  Musculoskeletal: Non-Contributory  Neurological: Non-Contributory  Psychiatric: Non-Contributory  Hematology/Lymphatics: Non-Contributory  Endocrine: Non-Contributory  Allergic/Immunologic: Non-Contributory    CURRENT MEDICATIONS:   --------------------------------------------------------------------------------------  Neurologic Medications  acetaminophen  IVPB .. 1000 milliGRAM(s) IV Intermittent once  HYDROmorphone  Injectable 0.5 milliGRAM(s) IV Push every 10 minutes PRN Moderate Pain (4 - 6)  HYDROmorphone  Injectable 1 milliGRAM(s) IV Push every 10 minutes PRN Severe Pain (7 - 10)  HYDROmorphone PCA (1 mG/mL) 30 milliLiter(s) PCA Continuous PCA Continuous  HYDROmorphone PCA (1 mG/mL) Rescue Clinician Bolus 0.5 milliGRAM(s) IV Push every 15 minutes PRN for Pain Scale GREATER THAN 6  methadone Injectable 5 milliGRAM(s) IV Push once  ondansetron Injectable 4 milliGRAM(s) IV Push every 6 hours PRN Nausea  ondansetron Injectable 4 milliGRAM(s) IV Push once PRN Nausea and/or Vomiting    Respiratory Medications    Cardiovascular Medications    Gastrointestinal Medications  sodium chloride 0.9%. 1000 milliLiter(s) IV Continuous <Continuous>  sodium phosphate IVPB 15 milliMole(s) IV Intermittent once    Genitourinary Medications    Hematologic/Oncologic Medications  heparin  Injectable 5000 Unit(s) SubCutaneous every 8 hours    Antimicrobial/Immunologic Medications    Endocrine/Metabolic Medications    Topical/Other Medications  naloxone Injectable 0.1 milliGRAM(s) IV Push every 3 minutes PRN For ANY of the following changes in patient status:  A. RR LESS THAN 10 breaths per minute, B. Oxygen saturation LESS THAN 90%, C. Sedation score of 6    --------------------------------------------------------------------------------------  ICU Vital Signs Last 24 Hrs  T(C): 36.3 (18 Jun 2019 18:05), Max: 36.7 (18 Jun 2019 06:26)  T(F): 97.3 (18 Jun 2019 18:05), Max: 98.1 (18 Jun 2019 06:26)  HR: 67 (18 Jun 2019 19:30) (61 - 67)  BP: 121/62 (18 Jun 2019 19:30) (121/62 - 149/60)  BP(mean): 76 (18 Jun 2019 19:30) (73 - 81)  ABP: 131/48 (18 Jun 2019 19:30) (131/48 - 161/60)  ABP(mean): 74 (18 Jun 2019 19:30) (74 - 94)  RR: 11 (18 Jun 2019 19:30) (10 - 19)  SpO2: 93% (18 Jun 2019 19:30) (93% - 99%)    --------------------------------------------------------------------------------------  I&O's Detail    18 Jun 2019 07:01  -  18 Jun 2019 19:51  --------------------------------------------------------  IN:    sodium chloride 0.9%.: 150 mL  Total IN: 150 mL    OUT:    Bulb: 100 mL    Bulb: 10 mL    Indwelling Catheter - Urethral: 20 mL  Total OUT: 130 mL    Total NET: 20 mL      --------------------------------------------------------------------------------------    EXAM:  General/Neuro  RASS:   GCS:   Exam: Normal, NAD, alert, oriented x 3, no focal deficits.     Respiratory  Exam: Lungs clear to auscultation, Normal expansion/effort.      Cardiovascular  Exam: S1, S2.  Regular rate and rhythm. Cardiac Rhythm: Normal Sinus Rhythm      GI  Exam: Abdomen soft, appropriate tenderness, non-distended. Jejunostomy tube in place.  Nasogastric tube in place. Midline incision with C/D/I dressing  Current Diet:  NPO      Tubes/Lines/Drains dawson, 2 YUMIKO drains, left radial A line, J-tube, NGT  [x] Peripheral IV           Extremities  Exam: Extremities warm, pink, well-perfused.      :   Exam: Dawson catheter in place.     LABS  --------------------------------------------------------------------------------------  CBC (06-18 @ 19:00)                              10.9<L>                         16.78<H>  )----------------(  194        --    % Neutrophils, --    % Lymphocytes, ANC: --                                  33.4<L>  CBC (06-18 @ 11:38)                              9.2<L>                         11.29<H>  )----------------(  214        86.0<H>% Neutrophils, 8.1<L>% Lymphocytes, ANC: 9.71<H>                              28.3<L>    BMP (06-18 @ 19:00)             141     |  103     |  22    		Ca++ --      Ca 9.0                ---------------------------------( 238<H>		Mg 1.6                4.3     |  25      |  2.71<H>			Ph 4.6<H>  BMP (06-18 @ 11:38)             140     |  101     |  20    		Ca++ --      Ca 8.4                ---------------------------------( 195<H>		Mg 1.5<L>             3.7     |  24      |  2.74<H>			Ph 2.4<L>    LFTs (06-18 @ 19:00)      TPro 5.8<L> / Alb 3.3 / TBili 1.1 / DBili 0.4<H> / AST 28 / ALT 15 / AlkPhos 49  LFTs (06-18 @ 11:38)      TPro 6.1 / Alb 3.3 / TBili 0.7 / DBili -- / AST 24 / ALT 15 / AlkPhos 57    Coags (06-18 @ 19:00)  aPTT 27.7 / INR 1.06 / PT 12.1  Coags (06-18 @ 11:38)  aPTT 29.0 / INR 1.14 / PT 12.7      ABG (06-18 @ 15:44)     7.47<H> / 34<L> / 227<H> / 25 / 0.8 / 99.8<H>%     Lactate:    ABG (06-18 @ 14:08)     7.44 / 38 / 208<H> / 26 / 1.7 / 99.7<H>%     Lactate:        --------------------------------------------------------------------------------------    ASSESSMENT:  62 yo M with PMH of CAD s/p 9 stents (allergic to plavix), DMT2 on insulin, HTN, GERD, prior cholecystectomy,  diverticulosis, former smoker (30 packed years), biliary stricture s/p ERCP with sphincteromy and stent 4/2019 who found to be in septic shock due to E Coli Bacteremia, with SLOAN requiring placement of right chest permacath for dialysis who is now s/p Whipple for cholangiocarcinoma    PLAN:   Neurologic: Dilaudid PCA for pain mgmt, IV tylenol PRN  Respiratory: supplement O2 via NC as needed  Cardiovascular: monitor vitals via A line  Gastrointestinal/Nutrition: NPO, IVF, NGT, J tube to gravity  Renal/Genitourinary: dawson, strict Is and Os, replete electrolytes as needed  Hematologic: transfuse if <7  Infectious Disease: VIJAYA  Lines/Tubes: left radial A line, dawson, J-tube, NGT, YUMIKO x 2,   Endocrine: VIJAYA  Disposition: SICU    --------------------------------------------------------------------------------------    Critical Care Diagnoses: Breath sounds clear and equal bilaterally.

## 2019-06-18 NOTE — H&P ADULT - NSHPSOCIALHISTORY_GEN_ALL_CORE
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 No ETOH or drug use.

## 2019-06-18 NOTE — CHART NOTE - NSCHARTNOTEFT_GEN_A_CORE
S: Patient underwent Whipple and tolerated procedure without issue. Pt was sent to PACU for recovery and subsequently transferred to the floor. Patient was examined and on exam denies chest pain, shortness of breath, nausea, vomiting, lightheadedness, or dizziness.  Pain was well controlled.      O:T(C): 37 (06-18-19 @ 21:00), Max: 37 (06-18-19 @ 21:00)  HR: 67 (06-18-19 @ 22:00) (64 - 68)  BP: 120/51 (06-18-19 @ 22:00) (116/52 - 144/60)  RR: 14 (06-18-19 @ 22:00) (10 - 22)  SpO2: 97% (06-18-19 @ 22:00) (93% - 99%)  Wt(kg): --                        10.9   16.78 )-----------( 194      ( 18 Jun 2019 19:00 )             33.4        06-18    141  |  103  |  22  ----------------------------<  238<H>  4.3   |  25  |  2.71<H>    Ca    9.0      18 Jun 2019 19:00  Phos  4.6     06-18  Mg     1.6     06-18      Gen: NAD  Pulm: Nonlabored  Abd: Soft, nontender, nondistended. Midline dressing with strikethrough, otherwise c/d/i. YUMIKO drains with serosanguinous output.  Extremities: well perfused        Assessment/Plan:  63y Male s/p Open Whipple procedure    Pain control  Reg Diet  DVT PPX  Out of bed and encourage early ambulation  Incentive spirometry. S: Patient underwent Whipple and tolerated procedure without issue. Pt was sent to PACU for recovery and subsequently transferred to the floor. Patient was examined and on exam denies chest pain, shortness of breath, nausea, vomiting, lightheadedness, or dizziness.  Pain was well controlled.      O:T(C): 37 (06-18-19 @ 21:00), Max: 37 (06-18-19 @ 21:00)  HR: 67 (06-18-19 @ 22:00) (64 - 68)  BP: 120/51 (06-18-19 @ 22:00) (116/52 - 144/60)  RR: 14 (06-18-19 @ 22:00) (10 - 22)  SpO2: 97% (06-18-19 @ 22:00) (93% - 99%)  Wt(kg): --                        10.9   16.78 )-----------( 194      ( 18 Jun 2019 19:00 )             33.4        06-18    141  |  103  |  22  ----------------------------<  238<H>  4.3   |  25  |  2.71<H>    Ca    9.0      18 Jun 2019 19:00  Phos  4.6     06-18  Mg     1.6     06-18      Gen: NAD, NGT in place   Pulm: Nonlabored  Abd: Soft, nontender, nondistended. Midline dressing with strikethrough, otherwise c/d/i. YUMIKO drains with serosanguinous output.  Extremities: well perfused      Assessment/Plan:  63y Male s/p Open Whipple procedure    Pain control  NPO  DVT PPX  Out of bed and encourage early ambulation  Incentive spirometry.

## 2019-06-18 NOTE — H&P ADULT - ASSESSMENT
A/P 62 yo M hx of CAD s/p 9 stents, DMT2 on insulin, HTN, GERD, prior cholecystectomy,  diverticulosis, former smoker(30PY), biliary stricture s/p ERCP with sphincteromy and stent 4/2019 who presents as transfer from [facility] in septic shock for further evaluation of acute cholangitis, ERCP revealing a 10mm lesion plan for Whipple procedure today with Dr. Viveros       Full Code, Dr Gray,son, is primary Decision maker.

## 2019-06-18 NOTE — BRIEF OPERATIVE NOTE - OPERATION/FINDINGS
Whipple. GDA identified and ligated. Kocher maneuver performed. Stomach transected, followed by pancreas and finally by CBD, with removal of previously placed stent. Hepaticojejunostomy created first, followed by pancreatico-j which was stented with a 5 Fr pediatric feeding tube, and gastro-j. Jejunostomy feeding tube placed. Two intraabdominal 19 Hungarian pat drains placed, both across surgical field.

## 2019-06-18 NOTE — H&P ADULT - NSHPLABSRESULTS_GEN_ALL_CORE
11.0   22.39 )-----------( 110                   35.1       -18    147<H>  |  119<H>  |  33<H>  ----------------------------<  138<H>  4.1   |  13<L>  |  2.90<H>    Ca    6.7<L>      18 May 2019 11:24  Phos  3.0       Mg     1.9         TPro  5.7<L>  /  Alb  2.2<L>  /  TBili  3.9<H>  /  DBili  x   /  AST  333<H>  /  ALT  273<H>  /  AlkPhos  258<H>          PT/INR - ( 18 May 2019 16:51 )   PT: 24.6 SEC;   INR: 2.11          PTT - ( 18 May 2019 16:51 )  PTT:147.3 SEC  CAPILLARY BLOOD GLUCOSE      POCT Blood Glucose.: 130 mg/dL (18 May 2019 17:43)          Urinalysis Basic - ( 18 May 2019 00:33 )    Color: Yellow / Appearance: Slightly Turbid / S.015 / pH: x  Gluc: x / Ketone: Trace  / Bili: Moderate / Urobili: 4 mg/dL   Blood: x / Protein: 100 mg/dL / Nitrite: Negative   Leuk Esterase: Trace / RBC: 3-5 /HPF / WBC 11-25   Sq Epi: x / Non Sq Epi: Moderate / Bacteria

## 2019-06-18 NOTE — H&P ADULT - HISTORY OF PRESENT ILLNESS
62 yo M hx of CAD s/p 9 stents, DMT2 on insulin, HTN, GERD, prior cholecystectomy,  diverticulosis, former smoker(30PY), biliary stricture s/p ERCP with sphincteromy and stent 4/2019 who presents as transfer from [facility] in septic shock for further evaluation of acute cholangitis, for possible IR intervention or Advanced GI intervention. 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  Found to be in septic shock due to E Coli Bacteremia, with SLOAN, transaminitis and elevated troponins. In the ED at OSH, he developed lethargy, altered mental status, and hypoxia to 82% and was intubated for acute hypoxic resp failure. He was started on levophed and vasopressin and was admitted to their MICU for further workup. He received vanc, zosyn, micafungin, meropenem and gentamicin. He was started on heparin gtt for suspected NSTEMI due to troponin elevated and increasing CK. Pt stable and discharged home; pt had a CT / MRI showing a questionable lesion vs stricturing further workup with ERCP revealing a 10 mm pancreatic head hypoechoic lesion. Pt now SDA for a whipple procedure

## 2019-06-18 NOTE — H&P ADULT - ATTENDING COMMENTS
D/w pt and family plan for ex-lap whipple, feeding jorge    Discussed r/b/a post op expectations poss complications.      Pt and family understand and agree to proceed.

## 2019-06-18 NOTE — ASU PREOP CHECKLIST - INTERNAL PROSTHESES
yes(specify)/cardiac stents x 9, stent bile duct cardiac stents x 9, stent bile duct RACW PERMACATH/yes(specify)

## 2019-06-19 DIAGNOSIS — D64.9 ANEMIA, UNSPECIFIED: ICD-10-CM

## 2019-06-19 DIAGNOSIS — N17.9 ACUTE KIDNEY FAILURE, UNSPECIFIED: ICD-10-CM

## 2019-06-19 DIAGNOSIS — I10 ESSENTIAL (PRIMARY) HYPERTENSION: ICD-10-CM

## 2019-06-19 LAB
ALBUMIN SERPL ELPH-MCNC: 2.8 G/DL — LOW (ref 3.3–5)
ALP SERPL-CCNC: 48 U/L — SIGNIFICANT CHANGE UP (ref 40–120)
ALT FLD-CCNC: 15 U/L — SIGNIFICANT CHANGE UP (ref 4–41)
ANION GAP SERPL CALC-SCNC: 14 MMO/L — SIGNIFICANT CHANGE UP (ref 7–14)
AST SERPL-CCNC: 26 U/L — SIGNIFICANT CHANGE UP (ref 4–40)
BILIRUB DIRECT SERPL-MCNC: 0.2 MG/DL — SIGNIFICANT CHANGE UP (ref 0.1–0.2)
BILIRUB SERPL-MCNC: 0.7 MG/DL — SIGNIFICANT CHANGE UP (ref 0.2–1.2)
BUN SERPL-MCNC: 29 MG/DL — HIGH (ref 7–23)
CALCIUM SERPL-MCNC: 8.4 MG/DL — SIGNIFICANT CHANGE UP (ref 8.4–10.5)
CHLORIDE SERPL-SCNC: 104 MMOL/L — SIGNIFICANT CHANGE UP (ref 98–107)
CO2 SERPL-SCNC: 21 MMOL/L — LOW (ref 22–31)
CREAT SERPL-MCNC: 3.24 MG/DL — HIGH (ref 0.5–1.3)
GLUCOSE BLDC GLUCOMTR-MCNC: 118 MG/DL — HIGH (ref 70–99)
GLUCOSE BLDC GLUCOMTR-MCNC: 165 MG/DL — HIGH (ref 70–99)
GLUCOSE BLDC GLUCOMTR-MCNC: 186 MG/DL — HIGH (ref 70–99)
GLUCOSE BLDC GLUCOMTR-MCNC: 200 MG/DL — HIGH (ref 70–99)
GLUCOSE SERPL-MCNC: 200 MG/DL — HIGH (ref 70–99)
HCT VFR BLD CALC: 31.5 % — LOW (ref 39–50)
HGB BLD-MCNC: 10.3 G/DL — LOW (ref 13–17)
MAGNESIUM SERPL-MCNC: 1.6 MG/DL — SIGNIFICANT CHANGE UP (ref 1.6–2.6)
MCHC RBC-ENTMCNC: 29.9 PG — SIGNIFICANT CHANGE UP (ref 27–34)
MCHC RBC-ENTMCNC: 32.7 % — SIGNIFICANT CHANGE UP (ref 32–36)
MCV RBC AUTO: 91.3 FL — SIGNIFICANT CHANGE UP (ref 80–100)
NRBC # FLD: 0 K/UL — SIGNIFICANT CHANGE UP (ref 0–0)
PHOSPHATE SERPL-MCNC: 5.5 MG/DL — HIGH (ref 2.5–4.5)
PLATELET # BLD AUTO: 184 K/UL — SIGNIFICANT CHANGE UP (ref 150–400)
PMV BLD: 10.2 FL — SIGNIFICANT CHANGE UP (ref 7–13)
POTASSIUM SERPL-MCNC: 4.4 MMOL/L — SIGNIFICANT CHANGE UP (ref 3.5–5.3)
POTASSIUM SERPL-SCNC: 4.4 MMOL/L — SIGNIFICANT CHANGE UP (ref 3.5–5.3)
PROT SERPL-MCNC: 5.2 G/DL — LOW (ref 6–8.3)
RBC # BLD: 3.45 M/UL — LOW (ref 4.2–5.8)
RBC # FLD: 17.9 % — HIGH (ref 10.3–14.5)
SODIUM SERPL-SCNC: 139 MMOL/L — SIGNIFICANT CHANGE UP (ref 135–145)
WBC # BLD: 19.17 K/UL — HIGH (ref 3.8–10.5)
WBC # FLD AUTO: 19.17 K/UL — HIGH (ref 3.8–10.5)

## 2019-06-19 PROCEDURE — 99223 1ST HOSP IP/OBS HIGH 75: CPT | Mod: GC

## 2019-06-19 PROCEDURE — 74018 RADEX ABDOMEN 1 VIEW: CPT | Mod: 26,76

## 2019-06-19 PROCEDURE — 99233 SBSQ HOSP IP/OBS HIGH 50: CPT

## 2019-06-19 RX ORDER — ACETAMINOPHEN 500 MG
1000 TABLET ORAL ONCE
Refills: 0 | Status: COMPLETED | OUTPATIENT
Start: 2019-06-20 | End: 2019-06-20

## 2019-06-19 RX ORDER — METOPROLOL TARTRATE 50 MG
5 TABLET ORAL EVERY 6 HOURS
Refills: 0 | Status: DISCONTINUED | OUTPATIENT
Start: 2019-06-19 | End: 2019-06-23

## 2019-06-19 RX ORDER — SODIUM CHLORIDE 9 MG/ML
1000 INJECTION INTRAMUSCULAR; INTRAVENOUS; SUBCUTANEOUS
Refills: 0 | Status: DISCONTINUED | OUTPATIENT
Start: 2019-06-19 | End: 2019-06-24

## 2019-06-19 RX ORDER — METOPROLOL TARTRATE 50 MG
5 TABLET ORAL EVERY 6 HOURS
Refills: 0 | Status: DISCONTINUED | OUTPATIENT
Start: 2019-06-19 | End: 2019-06-19

## 2019-06-19 RX ORDER — ACETAMINOPHEN 500 MG
1000 TABLET ORAL EVERY 6 HOURS
Refills: 0 | Status: DISCONTINUED | OUTPATIENT
Start: 2019-06-19 | End: 2019-06-19

## 2019-06-19 RX ORDER — ACETAMINOPHEN 500 MG
1000 TABLET ORAL ONCE
Refills: 0 | Status: COMPLETED | OUTPATIENT
Start: 2019-06-19 | End: 2019-06-19

## 2019-06-19 RX ORDER — CYCLOBENZAPRINE HYDROCHLORIDE 10 MG/1
5 TABLET, FILM COATED ORAL THREE TIMES A DAY
Refills: 0 | Status: DISCONTINUED | OUTPATIENT
Start: 2019-06-19 | End: 2019-06-21

## 2019-06-19 RX ADMIN — Medication 1000 MILLIGRAM(S): at 03:30

## 2019-06-19 RX ADMIN — Medication 1000 MILLIGRAM(S): at 19:29

## 2019-06-19 RX ADMIN — HEPARIN SODIUM 5000 UNIT(S): 5000 INJECTION INTRAVENOUS; SUBCUTANEOUS at 06:00

## 2019-06-19 RX ADMIN — CYCLOBENZAPRINE HYDROCHLORIDE 5 MILLIGRAM(S): 10 TABLET, FILM COATED ORAL at 18:29

## 2019-06-19 RX ADMIN — Medication 1: at 09:00

## 2019-06-19 RX ADMIN — PANTOPRAZOLE SODIUM 40 MILLIGRAM(S): 20 TABLET, DELAYED RELEASE ORAL at 15:31

## 2019-06-19 RX ADMIN — HEPARIN SODIUM 5000 UNIT(S): 5000 INJECTION INTRAVENOUS; SUBCUTANEOUS at 15:30

## 2019-06-19 RX ADMIN — Medication 5 MILLIGRAM(S): at 08:30

## 2019-06-19 RX ADMIN — Medication 400 MILLIGRAM(S): at 03:15

## 2019-06-19 RX ADMIN — HYDROMORPHONE HYDROCHLORIDE 30 MILLILITER(S): 2 INJECTION INTRAMUSCULAR; INTRAVENOUS; SUBCUTANEOUS at 08:41

## 2019-06-19 RX ADMIN — Medication 5 MILLIGRAM(S): at 20:18

## 2019-06-19 RX ADMIN — Medication 1: at 15:30

## 2019-06-19 RX ADMIN — HYDROMORPHONE HYDROCHLORIDE 30 MILLILITER(S): 2 INJECTION INTRAMUSCULAR; INTRAVENOUS; SUBCUTANEOUS at 08:11

## 2019-06-19 RX ADMIN — HYDROMORPHONE HYDROCHLORIDE 30 MILLILITER(S): 2 INJECTION INTRAMUSCULAR; INTRAVENOUS; SUBCUTANEOUS at 20:27

## 2019-06-19 RX ADMIN — Medication 1: at 03:00

## 2019-06-19 RX ADMIN — Medication 400 MILLIGRAM(S): at 09:30

## 2019-06-19 RX ADMIN — HYDROMORPHONE HYDROCHLORIDE 0.5 MILLIGRAM(S): 2 INJECTION INTRAMUSCULAR; INTRAVENOUS; SUBCUTANEOUS at 07:15

## 2019-06-19 RX ADMIN — HEPARIN SODIUM 5000 UNIT(S): 5000 INJECTION INTRAVENOUS; SUBCUTANEOUS at 22:08

## 2019-06-19 RX ADMIN — Medication 400 MILLIGRAM(S): at 15:29

## 2019-06-19 RX ADMIN — Medication 400 MILLIGRAM(S): at 19:03

## 2019-06-19 NOTE — PROGRESS NOTE ADULT - SUBJECTIVE AND OBJECTIVE BOX
ANESTHESIA POSTOP CHECK    63y Male POD#1 s/p Open Whipple with epidural  Patient seen and examined at bedside.     Vital Signs Last 24 Hrs  T(C): 36.7 (19 Jun 2019 05:00), Max: 37 (18 Jun 2019 21:00)  T(F): 98 (19 Jun 2019 05:00), Max: 98.6 (18 Jun 2019 21:00)  HR: 61 (19 Jun 2019 08:00) (58 - 68)  BP: 164/63 (19 Jun 2019 08:00) (116/52 - 164/63)  BP(mean): 90 (19 Jun 2019 08:00) (67 - 92)  RR: 13 (19 Jun 2019 08:00) (10 - 22)  SpO2: 100% (19 Jun 2019 08:00) (93% - 100%)  I&O's Summary    18 Jun 2019 07:01  -  19 Jun 2019 07:00  --------------------------------------------------------  IN: 825 mL / OUT: 707 mL / NET: 118 mL    19 Jun 2019 07:01  -  19 Jun 2019 08:14  --------------------------------------------------------  IN: 0 mL / OUT: 47.5 mL / NET: -47.5 mL        No apparent anesthetic complications at this time.  All questions were answered.      Lubna Thompson D.O.  Anesthesia CA-1   Pager [phone]

## 2019-06-19 NOTE — PROGRESS NOTE ADULT - SUBJECTIVE AND OBJECTIVE BOX
Surgery Progress Note    SUBJECTIVE: Pt seen and examined at bedside. Patient comfortable and in no-apparent distress. Some issues with pain overnight -> pain service considering different options for pain control. Low UOP -> bolus'd twice overnight by SICU.    Vital Signs Last 24 Hrs  T(C): 36.7 (19 Jun 2019 11:55), Max: 37.1 (19 Jun 2019 08:30)  T(F): 98.1 (19 Jun 2019 11:55), Max: 98.8 (19 Jun 2019 08:30)  HR: 63 (19 Jun 2019 11:55) (58 - 68)  BP: 109/53 (19 Jun 2019 11:55) (105/55 - 164/63)  BP(mean): 67 (19 Jun 2019 11:00) (67 - 92)  RR: 16 (19 Jun 2019 11:55) (10 - 22)  SpO2: 95% (19 Jun 2019 11:55) (93% - 100%)    Physical Exam:  General Appearance: in NAD, NGT to LCWS  Respiratory: No labored breathing  CV: Pulse regularly present  Abdomen: Soft, appropriately tender, midline incision c/d/i, j-tube in place, YUMIKO on left leobardo-abdomen w/ serous output, YUMIKO on right leobardo-abdomen with SS output    LABS:                        10.3   19.17 )-----------( 184      ( 19 Jun 2019 06:05 )             31.5     06-19    139  |  104  |  29<H>  ----------------------------<  200<H>  4.4   |  21<L>  |  3.24<H>    Ca    8.4      19 Jun 2019 06:05  Phos  5.5     06-19  Mg     1.6     06-19    TPro  5.2<L>  /  Alb  2.8<L>  /  TBili  0.7  /  DBili  0.2  /  AST  26  /  ALT  15  /  AlkPhos  48  06-19    PT/INR - ( 18 Jun 2019 19:00 )   PT: 12.1 SEC;   INR: 1.06          PTT - ( 18 Jun 2019 19:00 )  PTT:27.7 SEC      INs and OUTs:    06-18-19 @ 07:01  -  06-19-19 @ 07:00  --------------------------------------------------------  IN: 825 mL / OUT: 707 mL / NET: 118 mL    06-19-19 @ 07:01  - 06-19-19 @ 13:47  --------------------------------------------------------  IN: 190 mL / OUT: 207.5 mL / NET: -17.5 mL

## 2019-06-19 NOTE — PROGRESS NOTE ADULT - ASSESSMENT
62 y/o M w/ PMHx of CAD s/p 9 stents, DMT2 on insulin, HTN, GERD, diverticulosis, former smoker (30 PY), s/p cholecystectomy, biliary stricture s/p ERCP with sphincterotomy and stent (4/2019), and cholangiocarcinoma now s/p open Whipple and feeding j-tube placement on 6/18.    - Renal c/s for SLOAN on CKD, appreciate recs. Plan for HD today.  - Appreciate pain service recs. Current pain control regimen: standing IV tylenol, PCA.  - DVT ppx: SQH.  - C/w metoprolol 5 mg q6h.  - ISS, f/u FS.  - C/w PPI daily.  - NPO / NGT to LCWS.  - J-tube study today.    D Team Surgery  [id number]

## 2019-06-19 NOTE — CONSULT NOTE ADULT - PROBLEM SELECTOR RECOMMENDATION 3
Patient with anemia in setting of CKD. Hb noted to be in target range. No PORFIRIO therapy at this time. Blood transfusions as per primary team. Monitor Hb.

## 2019-06-19 NOTE — CONSULT NOTE ADULT - SUBJECTIVE AND OBJECTIVE BOX
[facility] Division of Kidney Diseases & Hypertension  INITIAL CONSULT NOTE  [phone]--------------------------------------------------------------------------------    HPI: 63-year-old male with medical history of CKD, CAD s/p 9 stents, DM, HTN, GERD, prior cholecystectomy, diverticulosis, biliary stricture is currently admitted for elective Whipple's procedure for pancreatis mass. Nephrology was consulted for management of SLOAN on CKD. Pt. has known history of CKD 3 secondary to DM and HTN, follows as outpatient with Dr Hodgson.  SCr baseline was ~ 2.5-2.6 in March 2019. Patient was recently hospitalized in [facility] from 5/18/19 to 6/8/19 for management of septic shock from acute cholangitis and NSTEMI. Patient developed oliguric SLOAN during hospital course and was started on CRRT on 5/18/19. Patient clinically improved and patient was switched to IHD on 5/24/19. Patient was then discharged home and currently receives HD as outpatient at [facility]. Last HD was done on 6/17/19. Currently patient is lying in bed and denies SOB, CP, nausea, vomiting, headache, or LE edema.    PAST HISTORY  --------------------------------------------------------------------------------  PAST MEDICAL & SURGICAL HISTORY:  Type II diabetes mellitus  HTN (hypertension)  CAD (coronary artery disease)  S/P cholecystectomy    FAMILY HISTORY:    PAST SOCIAL HISTORY: Prior history of smoking 30 years ago.     ALLERGIES & MEDICATIONS  --------------------------------------------------------------------------------  Allergies    Cipro (Rash)  Plavix (Rash)    Intolerances      Standing Inpatient Medications  acetaminophen  IVPB .. 1000 milliGRAM(s) IV Intermittent once  acetaminophen  IVPB .. 1000 milliGRAM(s) IV Intermittent once  dextrose 5%. 1000 milliLiter(s) IV Continuous <Continuous>  dextrose 50% Injectable 12.5 Gram(s) IV Push once  dextrose 50% Injectable 25 Gram(s) IV Push once  dextrose 50% Injectable 25 Gram(s) IV Push once  heparin  Injectable 5000 Unit(s) SubCutaneous every 8 hours  HYDROmorphone PCA (1 mG/mL) 30 milliLiter(s) PCA Continuous PCA Continuous  insulin lispro (HumaLOG) corrective regimen sliding scale   SubCutaneous every 6 hours  methadone Injectable 5 milliGRAM(s) IV Push once  metoprolol tartrate Injectable 5 milliGRAM(s) IV Push every 6 hours  pantoprazole  Injectable 40 milliGRAM(s) IV Push daily  sodium chloride 0.9%. 1000 milliLiter(s) IV Continuous <Continuous>    PRN Inpatient Medications  dextrose 40% Gel 15 Gram(s) Oral once PRN  glucagon  Injectable 1 milliGRAM(s) IntraMuscular once PRN  HYDROmorphone PCA (1 mG/mL) Rescue Clinician Bolus 0.5 milliGRAM(s) IV Push every 15 minutes PRN  naloxone Injectable 0.1 milliGRAM(s) IV Push every 3 minutes PRN  ondansetron Injectable 4 milliGRAM(s) IV Push every 6 hours PRN      REVIEW OF SYSTEMS  --------------------------------------------------------------------------------  Gen: No  fevers/chills, weakness  Skin: No rashes  Head/Eyes/Ears/Mouth: No headache  Respiratory: No dyspnea, cough  CV: No chest pain,   GI: No abdominal pain, diarrhea, nausea, vomiting  : Makes urine   MSK: No edema   Neuro: No dizziness/lightheadedness    All other systems were reviewed and are negative, except as noted.    VITALS/PHYSICAL EXAM  --------------------------------------------------------------------------------  T(C): 36.7 (06-19-19 @ 11:55), Max: 37.1 (06-19-19 @ 08:30)  HR: 63 (06-19-19 @ 11:55) (58 - 68)  BP: 109/53 (06-19-19 @ 11:55) (105/55 - 164/63)  RR: 16 (06-19-19 @ 11:55) (10 - 22)  SpO2: 95% (06-19-19 @ 11:55) (93% - 100%)  Wt(kg): --  Height (cm): 170.18 (06-18-19 @ 06:33)  Weight (kg): 86.2 (06-18-19 @ 06:33)  BMI (kg/m2): 29.8 (06-18-19 @ 06:33)  BSA (m2): 1.98 (06-18-19 @ 06:33)      06-18-19 @ 07:01  -  06-19-19 @ 07:00  --------------------------------------------------------  IN: 825 mL / OUT: 707 mL / NET: 118 mL    06-19-19 @ 07:01  -  06-19-19 @ 12:23  --------------------------------------------------------  IN: 190 mL / OUT: 207.5 mL / NET: -17.5 mL      Physical Exam:  	Gen: NAD  	HEENT: sclera anicteric   	Pulm: CTA B/L  	CV:  S1S2  	Abd: +BS, soft, multiple abdominal drains present.   	Ext: No B/L Lower ext edema  	Neuro: No focal deficits  	Skin: Warm, without rashes  	Vascular access: Right IJ tunneled HD catheter present.     LABS/STUDIES  --------------------------------------------------------------------------------              10.3   19.17 >-----------<  184      [06-19-19 @ 06:05]              31.5     139  |  104  |  29  ----------------------------<  200      [06-19-19 @ 06:05]  4.4   |  21  |  3.24        Ca     8.4     [06-19-19 @ 06:05]      Mg     1.6     [06-19-19 @ 06:05]      Phos  5.5     [06-19-19 @ 06:05]    TPro  5.2  /  Alb  2.8  /  TBili  0.7  /  DBili  0.2  /  AST  26  /  ALT  15  /  AlkPhos  48  [06-19-19 @ 06:05]    PT/INR: PT 12.1 , INR 1.06       [06-18-19 @ 19:00]  PTT: 27.7       [06-18-19 @ 19:00]      Creatinine Trend:  SCr 3.24 [06-19 @ 06:05]  SCr 2.71 [06-18 @ 19:00]  SCr 2.74 [06-18 @ 11:38]  SCr 4.00 [06-08 @ 10:40]  SCr 5.52 [06-07 @ 07:00]

## 2019-06-19 NOTE — PROGRESS NOTE ADULT - SUBJECTIVE AND OBJECTIVE BOX
SICU Daily Progress Note  ----------------------------------------------------------  Overnight events: Post op, pt will low urine output. Given 250mL LR bolus X 2.    HPI: 62 yo M with PMH of CAD s/p 9 stents (allergic to plavix), DMT2 on insulin, HTN, GERD, prior cholecystectomy,  diverticulosis, former smoker (30 packed years), biliary stricture s/p ERCP with sphincteromy and stent 4/2019 who presents as transfer from [facility] in septic shock for further evaluation of acute cholangitis, for possible IR intervention or Advanced GI intervention. 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  Found to be in septic shock due to E Coli Bacteremia, with SLOAN requiring placement of right chest permacath for dialysis, transaminitis and elevated troponins. In the ED at OSH, he developed lethargy, altered mental status, and hypoxia to 82% and was intubated for acute hypoxic respiratory failure. He was started on levophed and vasopressin and was admitted to their MICU for further workup. He received vanc, zosyn, micafungin, meropenem and gentamicin. He was started on heparin gtt for suspected NSTEMI due to troponin elevated and increasing CK. Pt stable and discharged home; pt had a CT / MRI showing a questionable lesion vs stricturing further workup with ERCP revealing a 10 mm pancreatic head hypoechoic lesion. Pt now s/p Whipple for cholangiocarcinoma      Surgery Information  EBL:   600cc      Blood Products: 4u PRBC            PAST MEDICAL & SURGICAL HISTORY:  Type II diabetes mellitus  HTN (hypertension)  CAD (coronary artery disease)  S/P cholecystectomy    Home Meds:   Allergies: Cipro (Rash)  Plavix (Rash)    Soc:   Advanced Directives: Presumed Full Code     ROS:    General: Non-Contributory  Skin/Breast: Non-Contributory  Ophthalmologic: Non-Contributory  ENMT: Non-Contributory  Respiratory and Thorax: Non-Contributory  Cardiovascular: Non-Contributory  Gastrointestinal: Report abdominal pain post op  Genitourinary: Non-Contributory  Musculoskeletal: Non-Contributory  Neurological: Non-Contributory  Psychiatric: Non-Contributory  Hematology/Lymphatics: Non-Contributory  Endocrine: Non-Contributory  Allergic/Immunologic: Non-Contributory    CURRENT MEDICATIONS:   --------------------------------------------------------------------------------------  Neurologic Medications  acetaminophen  IVPB .. 1000 milliGRAM(s) IV Intermittent once  HYDROmorphone  Injectable 0.5 milliGRAM(s) IV Push every 10 minutes PRN Moderate Pain (4 - 6)  HYDROmorphone  Injectable 1 milliGRAM(s) IV Push every 10 minutes PRN Severe Pain (7 - 10)  HYDROmorphone PCA (1 mG/mL) 30 milliLiter(s) PCA Continuous PCA Continuous  HYDROmorphone PCA (1 mG/mL) Rescue Clinician Bolus 0.5 milliGRAM(s) IV Push every 15 minutes PRN for Pain Scale GREATER THAN 6  methadone Injectable 5 milliGRAM(s) IV Push once  ondansetron Injectable 4 milliGRAM(s) IV Push every 6 hours PRN Nausea  ondansetron Injectable 4 milliGRAM(s) IV Push once PRN Nausea and/or Vomiting    Respiratory Medications    Cardiovascular Medications    Gastrointestinal Medications  sodium chloride 0.9%. 1000 milliLiter(s) IV Continuous <Continuous>  sodium phosphate IVPB 15 milliMole(s) IV Intermittent once    Genitourinary Medications    Hematologic/Oncologic Medications  heparin  Injectable 5000 Unit(s) SubCutaneous every 8 hours    Antimicrobial/Immunologic Medications    Endocrine/Metabolic Medications    Topical/Other Medications  naloxone Injectable 0.1 milliGRAM(s) IV Push every 3 minutes PRN For ANY of the following changes in patient status:  A. RR LESS THAN 10 breaths per minute, B. Oxygen saturation LESS THAN 90%, C. Sedation score of 6    --------------------------------------------------------------------------------------  ICU Vital Signs Last 24 Hrs  T(C): 36.3 (18 Jun 2019 18:05), Max: 36.7 (18 Jun 2019 06:26)  T(F): 97.3 (18 Jun 2019 18:05), Max: 98.1 (18 Jun 2019 06:26)  HR: 67 (18 Jun 2019 19:30) (61 - 67)  BP: 121/62 (18 Jun 2019 19:30) (121/62 - 149/60)  BP(mean): 76 (18 Jun 2019 19:30) (73 - 81)  ABP: 131/48 (18 Jun 2019 19:30) (131/48 - 161/60)  ABP(mean): 74 (18 Jun 2019 19:30) (74 - 94)  RR: 11 (18 Jun 2019 19:30) (10 - 19)  SpO2: 93% (18 Jun 2019 19:30) (93% - 99%)    --------------------------------------------------------------------------------------  I&O's Detail    18 Jun 2019 07:01  -  18 Jun 2019 19:51  --------------------------------------------------------  IN:    sodium chloride 0.9%.: 150 mL  Total IN: 150 mL    OUT:    Bulb: 100 mL    Bulb: 10 mL    Indwelling Catheter - Urethral: 20 mL  Total OUT: 130 mL    Total NET: 20 mL      --------------------------------------------------------------------------------------    EXAM:  General/Neuro  RASS:   GCS:   Exam: Normal, NAD, alert, oriented x 3, no focal deficits.     Respiratory  Exam: Lungs clear to auscultation, Normal expansion/effort.      Cardiovascular  Exam: S1, S2.  Regular rate and rhythm. Cardiac Rhythm: Normal Sinus Rhythm      GI  Exam: Abdomen soft, appropriate tenderness, non-distended. Jejunostomy tube in place.  Nasogastric tube in place. Midline incision with C/D/I dressing  Current Diet:  NPO      Tubes/Lines/Drains dawson, 2 YUMIKO drains, left radial A line, J-tube, NGT  [x] Peripheral IV           Extremities  Exam: Extremities warm, pink, well-perfused.      :   Exam: Dawson catheter in place.     LABS  --------------------------------------------------------------------------------------  CBC (06-18 @ 19:00)                              10.9<L>                         16.78<H>  )----------------(  194        --    % Neutrophils, --    % Lymphocytes, ANC: --                                  33.4<L>  CBC (06-18 @ 11:38)                              9.2<L>                         11.29<H>  )----------------(  214        86.0<H>% Neutrophils, 8.1<L>% Lymphocytes, ANC: 9.71<H>                              28.3<L>    BMP (06-18 @ 19:00)             141     |  103     |  22    		Ca++ --      Ca 9.0                ---------------------------------( 238<H>		Mg 1.6                4.3     |  25      |  2.71<H>			Ph 4.6<H>  BMP (06-18 @ 11:38)             140     |  101     |  20    		Ca++ --      Ca 8.4                ---------------------------------( 195<H>		Mg 1.5<L>             3.7     |  24      |  2.74<H>			Ph 2.4<L>    LFTs (06-18 @ 19:00)      TPro 5.8<L> / Alb 3.3 / TBili 1.1 / DBili 0.4<H> / AST 28 / ALT 15 / AlkPhos 49  LFTs (06-18 @ 11:38)      TPro 6.1 / Alb 3.3 / TBili 0.7 / DBili -- / AST 24 / ALT 15 / AlkPhos 57    Coags (06-18 @ 19:00)  aPTT 27.7 / INR 1.06 / PT 12.1  Coags (06-18 @ 11:38)  aPTT 29.0 / INR 1.14 / PT 12.7      ABG (06-18 @ 15:44)     7.47<H> / 34<L> / 227<H> / 25 / 0.8 / 99.8<H>%     Lactate:    ABG (06-18 @ 14:08)     7.44 / 38 / 208<H> / 26 / 1.7 / 99.7<H>%     Lactate:        --------------------------------------------------------------------------------------    ASSESSMENT:  62 yo M with PMH of CAD s/p 9 stents (allergic to plavix), DMT2 on insulin, HTN, GERD, prior cholecystectomy,  diverticulosis, former smoker (30 packed years), biliary stricture s/p ERCP with sphincteromy and stent 4/2019 who found to be in septic shock due to E Coli Bacteremia, with SLOAN requiring placement of right chest permacath for dialysis who is now s/p Whipple for cholangiocarcinoma    PLAN:   Neurologic: Dilaudid PCA for pain mgmt, IV tylenol PRN  Respiratory: supplement O2 via NC as needed  Cardiovascular: monitor vitals via A line  Gastrointestinal/Nutrition: NPO, IVF, NGT, J tube to gravity  Renal/Genitourinary: dawson, strict Is and Os, monitor urine out, replete electrolytes as needed  Hematologic: transfuse if <7  Infectious Disease: VIJAYA  Lines/Tubes: left radial A line, dawson, J-tube, NGT, YUMIKO x 2,   Endocrine: VIJAYA  Disposition: SICU

## 2019-06-19 NOTE — PROGRESS NOTE ADULT - SUBJECTIVE AND OBJECTIVE BOX
Anesthesia Pain Management Service    SUBJECTIVE: Patient is doing well with IV PCA and no significant problems reported. Complaining of pain, worse with coughing.    Pain Scale Score	At rest: _8__ 	With Activity: _10__ 	[X ] Refer to charted pain scores    THERAPY:    [ ] IV PCA Morphine		[ ] 5 mg/mL	[ ] 1 mg/mL  [X ] IV PCA Hydromorphone	[ ] 5 mg/mL	[X ] 1 mg/mL  [ ] IV PCA Fentanyl		[ ] 50 micrograms/mL    Demand dose __0.2_ lockout __6_ (minutes) Continuous Rate _0__ Total: __2.5_  mg used (in past 24 hours)      MEDICATIONS  (STANDING):  acetaminophen  IVPB .. 1000 milliGRAM(s) IV Intermittent once  acetaminophen  IVPB .. 1000 milliGRAM(s) IV Intermittent once  dextrose 5%. 1000 milliLiter(s) (50 mL/Hr) IV Continuous <Continuous>  dextrose 50% Injectable 12.5 Gram(s) IV Push once  dextrose 50% Injectable 25 Gram(s) IV Push once  dextrose 50% Injectable 25 Gram(s) IV Push once  heparin  Injectable 5000 Unit(s) SubCutaneous every 8 hours  HYDROmorphone PCA (1 mG/mL) 30 milliLiter(s) PCA Continuous PCA Continuous  insulin lispro (HumaLOG) corrective regimen sliding scale   SubCutaneous every 6 hours  methadone Injectable 5 milliGRAM(s) IV Push once  metoprolol tartrate Injectable 5 milliGRAM(s) IV Push every 6 hours  pantoprazole  Injectable 40 milliGRAM(s) IV Push daily    MEDICATIONS  (PRN):  dextrose 40% Gel 15 Gram(s) Oral once PRN Blood Glucose LESS THAN 70 milliGRAM(s)/deciliter  glucagon  Injectable 1 milliGRAM(s) IntraMuscular once PRN Glucose LESS THAN 70 milligrams/deciliter  HYDROmorphone  Injectable 0.5 milliGRAM(s) IV Push every 10 minutes PRN Moderate Pain (4 - 6)  HYDROmorphone  Injectable 1 milliGRAM(s) IV Push every 10 minutes PRN Severe Pain (7 - 10)  HYDROmorphone PCA (1 mG/mL) Rescue Clinician Bolus 0.5 milliGRAM(s) IV Push every 15 minutes PRN for Pain Scale GREATER THAN 6  naloxone Injectable 0.1 milliGRAM(s) IV Push every 3 minutes PRN For ANY of the following changes in patient status:  A. RR LESS THAN 10 breaths per minute, B. Oxygen saturation LESS THAN 90%, C. Sedation score of 6  ondansetron Injectable 4 milliGRAM(s) IV Push every 6 hours PRN Nausea  ondansetron Injectable 4 milliGRAM(s) IV Push once PRN Nausea and/or Vomiting      OBJECTIVE:    Sedation Score:	[ X] Alert	[ ] Drowsy 	[ ] Arousable	[ ] Asleep	[ ] Unresponsive    Side Effects:	[X ] None	[ ] Nausea	[ ] Vomiting	[ ] Pruritus  		[ ] Other:    Vital Signs Last 24 Hrs  T(C): 36.7 (19 Jun 2019 05:00), Max: 37 (18 Jun 2019 21:00)  T(F): 98 (19 Jun 2019 05:00), Max: 98.6 (18 Jun 2019 21:00)  HR: 61 (19 Jun 2019 07:45) (58 - 68)  BP: 155/58 (19 Jun 2019 07:00) (116/52 - 155/58)  BP(mean): 84 (19 Jun 2019 07:00) (67 - 92)  RR: 14 (19 Jun 2019 07:45) (10 - 22)  SpO2: 100% (19 Jun 2019 07:45) (93% - 100%)    ASSESSMENT/ PLAN    Therapy to  be:	[ X] Continue   [ ] Discontinued   [ ] Change to prn Analgesics    Documentation and Verification of current medications:   [X] Done	[ ] Not done, not elligible    Comments: IV Ketamine given after discussion and consent from patient with improvement in pain. Will re-evaluate.    Progress Note written now but Patient was seen earlier. Anesthesia Pain Management Service    SUBJECTIVE: Patient is doing well with IV PCA and no significant problems reported. Complaining of pain, worse with coughing.    Pain Scale Score	At rest: _8__ 	With Activity: _10__ 	[X ] Refer to charted pain scores    THERAPY:    [ ] IV PCA Morphine		[ ] 5 mg/mL	[ ] 1 mg/mL  [X ] IV PCA Hydromorphone	[ ] 5 mg/mL	[X ] 1 mg/mL  [ ] IV PCA Fentanyl		[ ] 50 micrograms/mL    Demand dose __0.2_ lockout __6_ (minutes) Continuous Rate _0__ Total: __2.5_  mg used (in past 24 hours)      MEDICATIONS  (STANDING):  acetaminophen  IVPB .. 1000 milliGRAM(s) IV Intermittent once  acetaminophen  IVPB .. 1000 milliGRAM(s) IV Intermittent once  dextrose 5%. 1000 milliLiter(s) (50 mL/Hr) IV Continuous <Continuous>  dextrose 50% Injectable 12.5 Gram(s) IV Push once  dextrose 50% Injectable 25 Gram(s) IV Push once  dextrose 50% Injectable 25 Gram(s) IV Push once  heparin  Injectable 5000 Unit(s) SubCutaneous every 8 hours  HYDROmorphone PCA (1 mG/mL) 30 milliLiter(s) PCA Continuous PCA Continuous  insulin lispro (HumaLOG) corrective regimen sliding scale   SubCutaneous every 6 hours  methadone Injectable 5 milliGRAM(s) IV Push once  metoprolol tartrate Injectable 5 milliGRAM(s) IV Push every 6 hours  pantoprazole  Injectable 40 milliGRAM(s) IV Push daily    MEDICATIONS  (PRN):  dextrose 40% Gel 15 Gram(s) Oral once PRN Blood Glucose LESS THAN 70 milliGRAM(s)/deciliter  glucagon  Injectable 1 milliGRAM(s) IntraMuscular once PRN Glucose LESS THAN 70 milligrams/deciliter  HYDROmorphone  Injectable 0.5 milliGRAM(s) IV Push every 10 minutes PRN Moderate Pain (4 - 6)  HYDROmorphone  Injectable 1 milliGRAM(s) IV Push every 10 minutes PRN Severe Pain (7 - 10)  HYDROmorphone PCA (1 mG/mL) Rescue Clinician Bolus 0.5 milliGRAM(s) IV Push every 15 minutes PRN for Pain Scale GREATER THAN 6  naloxone Injectable 0.1 milliGRAM(s) IV Push every 3 minutes PRN For ANY of the following changes in patient status:  A. RR LESS THAN 10 breaths per minute, B. Oxygen saturation LESS THAN 90%, C. Sedation score of 6  ondansetron Injectable 4 milliGRAM(s) IV Push every 6 hours PRN Nausea  ondansetron Injectable 4 milliGRAM(s) IV Push once PRN Nausea and/or Vomiting      OBJECTIVE:    Sedation Score:	[ X] Alert	[ ] Drowsy 	[ ] Arousable	[ ] Asleep	[ ] Unresponsive    Side Effects:	[X ] None	[ ] Nausea	[ ] Vomiting	[ ] Pruritus  		[ ] Other:    Vital Signs Last 24 Hrs  T(C): 36.7 (19 Jun 2019 05:00), Max: 37 (18 Jun 2019 21:00)  T(F): 98 (19 Jun 2019 05:00), Max: 98.6 (18 Jun 2019 21:00)  HR: 61 (19 Jun 2019 07:45) (58 - 68)  BP: 155/58 (19 Jun 2019 07:00) (116/52 - 155/58)  BP(mean): 84 (19 Jun 2019 07:00) (67 - 92)  RR: 14 (19 Jun 2019 07:45) (10 - 22)  SpO2: 100% (19 Jun 2019 07:45) (93% - 100%)    ASSESSMENT/ PLAN    Therapy to  be:	[ X] Continue   [ ] Discontinued   [ ] Change to prn Analgesics    Documentation and Verification of current medications:   [X] Done	[ ] Not done, not elligible    Comments:   Continue IV PCA.  Ketamine 10mg IV x2 doses given in PACU after discussion and consent from patient with improvement in pain.    Progress Note written now but Patient was seen earlier.

## 2019-06-20 LAB
ALBUMIN SERPL ELPH-MCNC: 2.6 G/DL — LOW (ref 3.3–5)
ALP SERPL-CCNC: 59 U/L — SIGNIFICANT CHANGE UP (ref 40–120)
ALT FLD-CCNC: 14 U/L — SIGNIFICANT CHANGE UP (ref 4–41)
ANION GAP SERPL CALC-SCNC: 14 MMO/L — SIGNIFICANT CHANGE UP (ref 7–14)
AST SERPL-CCNC: 21 U/L — SIGNIFICANT CHANGE UP (ref 4–40)
BASOPHILS # BLD AUTO: 0.03 K/UL — SIGNIFICANT CHANGE UP (ref 0–0.2)
BASOPHILS NFR BLD AUTO: 0.2 % — SIGNIFICANT CHANGE UP (ref 0–2)
BILIRUB SERPL-MCNC: 0.8 MG/DL — SIGNIFICANT CHANGE UP (ref 0.2–1.2)
BUN SERPL-MCNC: 19 MG/DL — SIGNIFICANT CHANGE UP (ref 7–23)
CALCIUM SERPL-MCNC: 8 MG/DL — LOW (ref 8.4–10.5)
CHLORIDE SERPL-SCNC: 97 MMOL/L — LOW (ref 98–107)
CO2 SERPL-SCNC: 27 MMOL/L — SIGNIFICANT CHANGE UP (ref 22–31)
CREAT SERPL-MCNC: 2.44 MG/DL — HIGH (ref 0.5–1.3)
EOSINOPHIL # BLD AUTO: 0.03 K/UL — SIGNIFICANT CHANGE UP (ref 0–0.5)
EOSINOPHIL NFR BLD AUTO: 0.2 % — SIGNIFICANT CHANGE UP (ref 0–6)
GLUCOSE BLDC GLUCOMTR-MCNC: 146 MG/DL — HIGH (ref 70–99)
GLUCOSE BLDC GLUCOMTR-MCNC: 154 MG/DL — HIGH (ref 70–99)
GLUCOSE BLDC GLUCOMTR-MCNC: 160 MG/DL — HIGH (ref 70–99)
GLUCOSE BLDC GLUCOMTR-MCNC: 175 MG/DL — HIGH (ref 70–99)
GLUCOSE BLDC GLUCOMTR-MCNC: 184 MG/DL — HIGH (ref 70–99)
GLUCOSE BLDC GLUCOMTR-MCNC: 196 MG/DL — HIGH (ref 70–99)
GLUCOSE SERPL-MCNC: 157 MG/DL — HIGH (ref 70–99)
HCT VFR BLD CALC: 32.2 % — LOW (ref 39–50)
HGB BLD-MCNC: 10.1 G/DL — LOW (ref 13–17)
IMM GRANULOCYTES NFR BLD AUTO: 1.7 % — HIGH (ref 0–1.5)
LYMPHOCYTES # BLD AUTO: 1.04 K/UL — SIGNIFICANT CHANGE UP (ref 1–3.3)
LYMPHOCYTES # BLD AUTO: 5.9 % — LOW (ref 13–44)
MAGNESIUM SERPL-MCNC: 1.7 MG/DL — SIGNIFICANT CHANGE UP (ref 1.6–2.6)
MCHC RBC-ENTMCNC: 29.8 PG — SIGNIFICANT CHANGE UP (ref 27–34)
MCHC RBC-ENTMCNC: 31.4 % — LOW (ref 32–36)
MCV RBC AUTO: 95 FL — SIGNIFICANT CHANGE UP (ref 80–100)
MONOCYTES # BLD AUTO: 1.6 K/UL — HIGH (ref 0–0.9)
MONOCYTES NFR BLD AUTO: 9.1 % — SIGNIFICANT CHANGE UP (ref 2–14)
NEUTROPHILS # BLD AUTO: 14.52 K/UL — HIGH (ref 1.8–7.4)
NEUTROPHILS NFR BLD AUTO: 82.9 % — HIGH (ref 43–77)
NRBC # FLD: 0 K/UL — SIGNIFICANT CHANGE UP (ref 0–0)
PHOSPHATE SERPL-MCNC: 4.2 MG/DL — SIGNIFICANT CHANGE UP (ref 2.5–4.5)
PLATELET # BLD AUTO: 162 K/UL — SIGNIFICANT CHANGE UP (ref 150–400)
PMV BLD: 10 FL — SIGNIFICANT CHANGE UP (ref 7–13)
POTASSIUM SERPL-MCNC: 4.3 MMOL/L — SIGNIFICANT CHANGE UP (ref 3.5–5.3)
POTASSIUM SERPL-SCNC: 4.3 MMOL/L — SIGNIFICANT CHANGE UP (ref 3.5–5.3)
PROT SERPL-MCNC: 5.6 G/DL — LOW (ref 6–8.3)
RBC # BLD: 3.39 M/UL — LOW (ref 4.2–5.8)
RBC # FLD: 17.2 % — HIGH (ref 10.3–14.5)
SODIUM SERPL-SCNC: 138 MMOL/L — SIGNIFICANT CHANGE UP (ref 135–145)
WBC # BLD: 17.51 K/UL — HIGH (ref 3.8–10.5)
WBC # FLD AUTO: 17.51 K/UL — HIGH (ref 3.8–10.5)

## 2019-06-20 RX ADMIN — Medication 400 MILLIGRAM(S): at 06:06

## 2019-06-20 RX ADMIN — Medication 1: at 15:46

## 2019-06-20 RX ADMIN — Medication 400 MILLIGRAM(S): at 01:35

## 2019-06-20 RX ADMIN — Medication 400 MILLIGRAM(S): at 13:38

## 2019-06-20 RX ADMIN — HEPARIN SODIUM 5000 UNIT(S): 5000 INJECTION INTRAVENOUS; SUBCUTANEOUS at 13:37

## 2019-06-20 RX ADMIN — PANTOPRAZOLE SODIUM 40 MILLIGRAM(S): 20 TABLET, DELAYED RELEASE ORAL at 13:35

## 2019-06-20 RX ADMIN — HYDROMORPHONE HYDROCHLORIDE 30 MILLILITER(S): 2 INJECTION INTRAMUSCULAR; INTRAVENOUS; SUBCUTANEOUS at 19:47

## 2019-06-20 RX ADMIN — HEPARIN SODIUM 5000 UNIT(S): 5000 INJECTION INTRAVENOUS; SUBCUTANEOUS at 21:17

## 2019-06-20 RX ADMIN — Medication 1: at 04:50

## 2019-06-20 RX ADMIN — Medication 1000 MILLIGRAM(S): at 02:35

## 2019-06-20 RX ADMIN — Medication 5 MILLIGRAM(S): at 05:58

## 2019-06-20 RX ADMIN — HYDROMORPHONE HYDROCHLORIDE 30 MILLILITER(S): 2 INJECTION INTRAMUSCULAR; INTRAVENOUS; SUBCUTANEOUS at 07:51

## 2019-06-20 RX ADMIN — HEPARIN SODIUM 5000 UNIT(S): 5000 INJECTION INTRAVENOUS; SUBCUTANEOUS at 05:58

## 2019-06-20 RX ADMIN — HYDROMORPHONE HYDROCHLORIDE 30 MILLILITER(S): 2 INJECTION INTRAMUSCULAR; INTRAVENOUS; SUBCUTANEOUS at 07:53

## 2019-06-20 RX ADMIN — Medication 1: at 13:36

## 2019-06-20 RX ADMIN — Medication 5 MILLIGRAM(S): at 13:37

## 2019-06-20 RX ADMIN — SODIUM CHLORIDE 30 MILLILITER(S): 9 INJECTION INTRAMUSCULAR; INTRAVENOUS; SUBCUTANEOUS at 07:53

## 2019-06-20 RX ADMIN — Medication 400 MILLIGRAM(S): at 19:01

## 2019-06-20 RX ADMIN — Medication 1000 MILLIGRAM(S): at 14:08

## 2019-06-20 RX ADMIN — Medication 5 MILLIGRAM(S): at 01:35

## 2019-06-20 RX ADMIN — Medication 1000 MILLIGRAM(S): at 19:03

## 2019-06-20 RX ADMIN — Medication 5 MILLIGRAM(S): at 19:01

## 2019-06-20 NOTE — PROGRESS NOTE ADULT - SUBJECTIVE AND OBJECTIVE BOX
Anesthesia Pain Management Service    SUBJECTIVE: Patient is doing well with IV PCA and no significant problems reported.  Patient states IV PCA helps with his pain.    Pain Scale Score	At rest: _3/10_ 	With Activity: ___ 	[X ] Refer to charted pain scores    THERAPY:    [ ] IV PCA Morphine		[ ] 5 mg/mL	[ ] 1 mg/mL  [X ] IV PCA Hydromorphone	[ ] 5 mg/mL	[X ] 1 mg/mL  [ ] IV PCA Fentanyl		[ ] 50 micrograms/mL    Demand dose __0.2_ lockout __6_ (minutes) Continuous Rate _0__ Total: _5.7__  mg used (in past 24 hours)      MEDICATIONS  (STANDING):  acetaminophen  IVPB .. 1000 milliGRAM(s) IV Intermittent once  acetaminophen  IVPB .. 1000 milliGRAM(s) IV Intermittent once  dextrose 50% Injectable 12.5 Gram(s) IV Push once  dextrose 50% Injectable 25 Gram(s) IV Push once  dextrose 50% Injectable 25 Gram(s) IV Push once  heparin  Injectable 5000 Unit(s) SubCutaneous every 8 hours  HYDROmorphone PCA (1 mG/mL) 30 milliLiter(s) PCA Continuous PCA Continuous  insulin lispro (HumaLOG) corrective regimen sliding scale   SubCutaneous every 6 hours  methadone Injectable 5 milliGRAM(s) IV Push once  metoprolol tartrate Injectable 5 milliGRAM(s) IV Push every 6 hours  pantoprazole  Injectable 40 milliGRAM(s) IV Push daily  sodium chloride 0.9%. 1000 milliLiter(s) (30 mL/Hr) IV Continuous <Continuous>    MEDICATIONS  (PRN):  cyclobenzaprine 5 milliGRAM(s) Oral three times a day PRN Muscle Spasm  dextrose 40% Gel 15 Gram(s) Oral once PRN Blood Glucose LESS THAN 70 milliGRAM(s)/deciliter  glucagon  Injectable 1 milliGRAM(s) IntraMuscular once PRN Glucose LESS THAN 70 milligrams/deciliter  HYDROmorphone PCA (1 mG/mL) Rescue Clinician Bolus 0.5 milliGRAM(s) IV Push every 15 minutes PRN for Pain Scale GREATER THAN 6  naloxone Injectable 0.1 milliGRAM(s) IV Push every 3 minutes PRN For ANY of the following changes in patient status:  A. RR LESS THAN 10 breaths per minute, B. Oxygen saturation LESS THAN 90%, C. Sedation score of 6  ondansetron Injectable 4 milliGRAM(s) IV Push every 6 hours PRN Nausea      OBJECTIVE:  Patient lying in bed, comfortable, NPO with NGT.    Sedation Score:	[ X] Alert	[ ] Drowsy 	[ ] Arousable	[ ] Asleep	[ ] Unresponsive    Side Effects:	[X ] None	[ ] Nausea	[ ] Vomiting	[ ] Pruritus  		[ ] Other:    Vital Signs Last 24 Hrs  T(C): 37.3 (20 Jun 2019 04:44), Max: 37.4 (20 Jun 2019 00:23)  T(F): 99.1 (20 Jun 2019 04:44), Max: 99.3 (20 Jun 2019 00:23)  HR: 70 (20 Jun 2019 05:54) (61 - 79)  BP: 141/55 (20 Jun 2019 04:44) (105/55 - 164/63)  BP(mean): 67 (19 Jun 2019 11:00) (67 - 67)  RR: 18 (20 Jun 2019 04:44) (15 - 18)  SpO2: 99% (20 Jun 2019 05:54) (95% - 100%)    ASSESSMENT/ PLAN    Therapy to  be:	[ X] Continue   [ ] Discontinued   [ ] Change to prn Analgesics    Documentation and Verification of current medications:   [X] Done	[ ] Not done, not elligible    Comments:  Continue current pain regimen, with IV Tylenol.

## 2019-06-20 NOTE — PROGRESS NOTE ADULT - SUBJECTIVE AND OBJECTIVE BOX
SURGERY DAILY PROGRESS NOTE:       Subjective:  Patient seen and examined on AM rounds. No acute events overnight. HD performed yesterday. AF/VSS. Pain controlled. Denies N/V. Bowel function ****    Objective:    Physical Exam:  General Appearance: in NAD, NGT to LCWS  Respiratory: No labored breathing  CV: Pulse regularly present  Abdomen: Soft, appropriately tender, midline incision c/d/i, j-tube in place, YUMIKO on left leobardo-abdomen w/ serous output, YUMIKO on right leobardo-abdomen with SS output    Vital Signs Last 24 Hrs  T(C): 37.3 (20 Jun 2019 04:44), Max: 37.4 (20 Jun 2019 00:23)  T(F): 99.1 (20 Jun 2019 04:44), Max: 99.3 (20 Jun 2019 00:23)  HR: 72 (20 Jun 2019 04:44) (58 - 79)  BP: 141/55 (20 Jun 2019 04:44) (105/55 - 164/63)  BP(mean): 67 (19 Jun 2019 11:00) (67 - 92)  RR: 18 (20 Jun 2019 04:44) (12 - 18)  SpO2: 98% (20 Jun 2019 04:44) (95% - 100%)    I&O's Detail    18 Jun 2019 07:01  -  19 Jun 2019 07:00  --------------------------------------------------------  IN:    IV PiggyBack: 100 mL    Lactated Ringers IV Bolus: 500 mL    sodium chloride 0.9%: 225 mL  Total IN: 825 mL    OUT:    Bulb: 290 mL    Bulb: 135 mL    Indwelling Catheter - Urethral: 129 mL    Nasoenteral Tube: 153 mL  Total OUT: 707 mL    Total NET: 118 mL      19 Jun 2019 07:01  -  20 Jun 2019 05:30  --------------------------------------------------------  IN:    IV PiggyBack: 100 mL    Other: 400 mL    sodium chloride 0.9%.: 90 mL  Total IN: 590 mL    OUT:    Bulb: 40 mL    Bulb: 22.5 mL    Indwelling Catheter - Urethral: 195 mL    Nasoenteral Tube: 120 mL    Other: 1900 mL  Total OUT: 2277.5 mL    Total NET: -1687.5 mL          Daily     Daily     MEDICATIONS  (STANDING):  acetaminophen  IVPB .. 1000 milliGRAM(s) IV Intermittent once  acetaminophen  IVPB .. 1000 milliGRAM(s) IV Intermittent once  acetaminophen  IVPB .. 1000 milliGRAM(s) IV Intermittent once  dextrose 50% Injectable 12.5 Gram(s) IV Push once  dextrose 50% Injectable 25 Gram(s) IV Push once  dextrose 50% Injectable 25 Gram(s) IV Push once  heparin  Injectable 5000 Unit(s) SubCutaneous every 8 hours  HYDROmorphone PCA (1 mG/mL) 30 milliLiter(s) PCA Continuous PCA Continuous  insulin lispro (HumaLOG) corrective regimen sliding scale   SubCutaneous every 6 hours  methadone Injectable 5 milliGRAM(s) IV Push once  metoprolol tartrate Injectable 5 milliGRAM(s) IV Push every 6 hours  pantoprazole  Injectable 40 milliGRAM(s) IV Push daily  sodium chloride 0.9%. 1000 milliLiter(s) (30 mL/Hr) IV Continuous <Continuous>    MEDICATIONS  (PRN):  cyclobenzaprine 5 milliGRAM(s) Oral three times a day PRN Muscle Spasm  dextrose 40% Gel 15 Gram(s) Oral once PRN Blood Glucose LESS THAN 70 milliGRAM(s)/deciliter  glucagon  Injectable 1 milliGRAM(s) IntraMuscular once PRN Glucose LESS THAN 70 milligrams/deciliter  HYDROmorphone PCA (1 mG/mL) Rescue Clinician Bolus 0.5 milliGRAM(s) IV Push every 15 minutes PRN for Pain Scale GREATER THAN 6  naloxone Injectable 0.1 milliGRAM(s) IV Push every 3 minutes PRN For ANY of the following changes in patient status:  A. RR LESS THAN 10 breaths per minute, B. Oxygen saturation LESS THAN 90%, C. Sedation score of 6  ondansetron Injectable 4 milliGRAM(s) IV Push every 6 hours PRN Nausea      LABS:                        10.3   19.17 )-----------( 184      ( 19 Jun 2019 06:05 )             31.5     06-19    139  |  104  |  29<H>  ----------------------------<  200<H>  4.4   |  21<L>  |  3.24<H>    Ca    8.4      19 Jun 2019 06:05  Phos  5.5     06-19  Mg     1.6     06-19    TPro  5.2<L>  /  Alb  2.8<L>  /  TBili  0.7  /  DBili  0.2  /  AST  26  /  ALT  15  /  AlkPhos  48  06-19    PT/INR - ( 18 Jun 2019 19:00 )   PT: 12.1 SEC;   INR: 1.06          PTT - ( 18 Jun 2019 19:00 )  PTT:27.7 SEC      RADIOLOGY & ADDITIONAL STUDIES: SURGERY DAILY PROGRESS NOTE:     Subjective:  Patient seen and examined on AM rounds. No acute events overnight. HD performed yesterday. AF/VSS. Pain controlled. Denies N/V. Not currently with bowel function.    Objective:    Physical Exam:  General Appearance: in NAD, NGT to LCWS  Respiratory: No labored breathing  CV: Pulse regularly present  Abdomen: Soft, appropriately tender, midline incision c/d/i, j-tube in place, YUMIKO on left leobardo-abdomen w/ serous output, YUMIKO on right leobardo-abdomen with SS output    Vital Signs Last 24 Hrs  T(C): 37.3 (20 Jun 2019 04:44), Max: 37.4 (20 Jun 2019 00:23)  T(F): 99.1 (20 Jun 2019 04:44), Max: 99.3 (20 Jun 2019 00:23)  HR: 72 (20 Jun 2019 04:44) (58 - 79)  BP: 141/55 (20 Jun 2019 04:44) (105/55 - 164/63)  BP(mean): 67 (19 Jun 2019 11:00) (67 - 92)  RR: 18 (20 Jun 2019 04:44) (12 - 18)  SpO2: 98% (20 Jun 2019 04:44) (95% - 100%)    I&O's Detail    18 Jun 2019 07:01  -  19 Jun 2019 07:00  --------------------------------------------------------  IN:    IV PiggyBack: 100 mL    Lactated Ringers IV Bolus: 500 mL    sodium chloride 0.9%: 225 mL  Total IN: 825 mL    OUT:    Bulb: 290 mL    Bulb: 135 mL    Indwelling Catheter - Urethral: 129 mL    Nasoenteral Tube: 153 mL  Total OUT: 707 mL    Total NET: 118 mL      19 Jun 2019 07:01  -  20 Jun 2019 05:30  --------------------------------------------------------  IN:    IV PiggyBack: 100 mL    Other: 400 mL    sodium chloride 0.9%.: 90 mL  Total IN: 590 mL    OUT:    Bulb: 40 mL    Bulb: 22.5 mL    Indwelling Catheter - Urethral: 195 mL    Nasoenteral Tube: 120 mL    Other: 1900 mL  Total OUT: 2277.5 mL    Total NET: -1687.5 mL          Daily     Daily     MEDICATIONS  (STANDING):  acetaminophen  IVPB .. 1000 milliGRAM(s) IV Intermittent once  acetaminophen  IVPB .. 1000 milliGRAM(s) IV Intermittent once  acetaminophen  IVPB .. 1000 milliGRAM(s) IV Intermittent once  dextrose 50% Injectable 12.5 Gram(s) IV Push once  dextrose 50% Injectable 25 Gram(s) IV Push once  dextrose 50% Injectable 25 Gram(s) IV Push once  heparin  Injectable 5000 Unit(s) SubCutaneous every 8 hours  HYDROmorphone PCA (1 mG/mL) 30 milliLiter(s) PCA Continuous PCA Continuous  insulin lispro (HumaLOG) corrective regimen sliding scale   SubCutaneous every 6 hours  methadone Injectable 5 milliGRAM(s) IV Push once  metoprolol tartrate Injectable 5 milliGRAM(s) IV Push every 6 hours  pantoprazole  Injectable 40 milliGRAM(s) IV Push daily  sodium chloride 0.9%. 1000 milliLiter(s) (30 mL/Hr) IV Continuous <Continuous>    MEDICATIONS  (PRN):  cyclobenzaprine 5 milliGRAM(s) Oral three times a day PRN Muscle Spasm  dextrose 40% Gel 15 Gram(s) Oral once PRN Blood Glucose LESS THAN 70 milliGRAM(s)/deciliter  glucagon  Injectable 1 milliGRAM(s) IntraMuscular once PRN Glucose LESS THAN 70 milligrams/deciliter  HYDROmorphone PCA (1 mG/mL) Rescue Clinician Bolus 0.5 milliGRAM(s) IV Push every 15 minutes PRN for Pain Scale GREATER THAN 6  naloxone Injectable 0.1 milliGRAM(s) IV Push every 3 minutes PRN For ANY of the following changes in patient status:  A. RR LESS THAN 10 breaths per minute, B. Oxygen saturation LESS THAN 90%, C. Sedation score of 6  ondansetron Injectable 4 milliGRAM(s) IV Push every 6 hours PRN Nausea      LABS:                        10.3   19.17 )-----------( 184      ( 19 Jun 2019 06:05 )             31.5     06-19    139  |  104  |  29<H>  ----------------------------<  200<H>  4.4   |  21<L>  |  3.24<H>    Ca    8.4      19 Jun 2019 06:05  Phos  5.5     06-19  Mg     1.6     06-19    TPro  5.2<L>  /  Alb  2.8<L>  /  TBili  0.7  /  DBili  0.2  /  AST  26  /  ALT  15  /  AlkPhos  48  06-19    PT/INR - ( 18 Jun 2019 19:00 )   PT: 12.1 SEC;   INR: 1.06          PTT - ( 18 Jun 2019 19:00 )  PTT:27.7 SEC      RADIOLOGY & ADDITIONAL STUDIES:

## 2019-06-20 NOTE — PROGRESS NOTE ADULT - ASSESSMENT
64 y/o M w/ PMHx of CAD s/p 9 stents, DMT2 on insulin, HTN, GERD, diverticulosis, former smoker (30 PY), s/p cholecystectomy, biliary stricture s/p ERCP with sphincterotomy and stent (4/2019), and cholangiocarcinoma now s/p open Whipple and feeding j-tube placement on 6/18.    - Renal c/s for SLOAN on CKD, appreciate recs. HD yesterday  - Appreciate pain service recs. Current pain control regimen: standing IV tylenol, PCA.  - DVT ppx: SQH.  - C/w metoprolol 5 mg q6h.  - ISS, f/u FS.  - C/w PPI daily.  - NPO / NGT to LCWS.  - J-tube study done    D Team Surgery  [id number] 62 y/o M w/ PMHx of CAD s/p 9 stents, DMT2 on insulin, HTN, GERD, diverticulosis, former smoker (30 PY), s/p cholecystectomy, biliary stricture s/p ERCP with sphincterotomy and stent (4/2019), and cholangiocarcinoma now s/p open Whipple and feeding j-tube placement on 6/18.    - Appreciate renal recs for SLOAN on CKD. HD yesterday and plan for next session tomorrow.  - Appreciate pain service recs. Current pain control regimen: standing IV tylenol, PCA.  - DVT ppx: SQH.  - C/w metoprolol 5 mg q6h.  - ISS, f/u FS.  - C/w PPI daily.  - NPO / NGT to LCWS.  - J-tube study done - no contrast extravasation.  - OOB/early ambulation/IS.    D Team Surgery  [id number]

## 2019-06-21 LAB
ALBUMIN SERPL ELPH-MCNC: 2.6 G/DL — LOW (ref 3.3–5)
ALP SERPL-CCNC: 77 U/L — SIGNIFICANT CHANGE UP (ref 40–120)
ALT FLD-CCNC: 13 U/L — SIGNIFICANT CHANGE UP (ref 4–41)
ANION GAP SERPL CALC-SCNC: 14 MMO/L — SIGNIFICANT CHANGE UP (ref 7–14)
APTT BLD: 31.5 SEC — SIGNIFICANT CHANGE UP (ref 27.5–36.3)
AST SERPL-CCNC: 16 U/L — SIGNIFICANT CHANGE UP (ref 4–40)
BILIRUB DIRECT SERPL-MCNC: 0.3 MG/DL — HIGH (ref 0.1–0.2)
BILIRUB SERPL-MCNC: 1 MG/DL — SIGNIFICANT CHANGE UP (ref 0.2–1.2)
BUN SERPL-MCNC: 28 MG/DL — HIGH (ref 7–23)
CALCIUM SERPL-MCNC: 8.4 MG/DL — SIGNIFICANT CHANGE UP (ref 8.4–10.5)
CHLORIDE SERPL-SCNC: 99 MMOL/L — SIGNIFICANT CHANGE UP (ref 98–107)
CO2 SERPL-SCNC: 25 MMOL/L — SIGNIFICANT CHANGE UP (ref 22–31)
CREAT SERPL-MCNC: 2.67 MG/DL — HIGH (ref 0.5–1.3)
GLUCOSE BLDC GLUCOMTR-MCNC: 104 MG/DL — HIGH (ref 70–99)
GLUCOSE BLDC GLUCOMTR-MCNC: 119 MG/DL — HIGH (ref 70–99)
GLUCOSE BLDC GLUCOMTR-MCNC: 163 MG/DL — HIGH (ref 70–99)
GLUCOSE BLDC GLUCOMTR-MCNC: 169 MG/DL — HIGH (ref 70–99)
GLUCOSE BLDC GLUCOMTR-MCNC: 172 MG/DL — HIGH (ref 70–99)
GLUCOSE BLDC GLUCOMTR-MCNC: 173 MG/DL — HIGH (ref 70–99)
GLUCOSE BLDC GLUCOMTR-MCNC: 186 MG/DL — HIGH (ref 70–99)
GLUCOSE SERPL-MCNC: 183 MG/DL — HIGH (ref 70–99)
HBV SURFACE AG SER-ACNC: NEGATIVE — SIGNIFICANT CHANGE UP
HCT VFR BLD CALC: 31.4 % — LOW (ref 39–50)
HGB BLD-MCNC: 9.9 G/DL — LOW (ref 13–17)
INR BLD: 1.12 — SIGNIFICANT CHANGE UP (ref 0.88–1.17)
MAGNESIUM SERPL-MCNC: 1.7 MG/DL — SIGNIFICANT CHANGE UP (ref 1.6–2.6)
MCHC RBC-ENTMCNC: 29.9 PG — SIGNIFICANT CHANGE UP (ref 27–34)
MCHC RBC-ENTMCNC: 31.5 % — LOW (ref 32–36)
MCV RBC AUTO: 94.9 FL — SIGNIFICANT CHANGE UP (ref 80–100)
NRBC # FLD: 0 K/UL — SIGNIFICANT CHANGE UP (ref 0–0)
PHOSPHATE SERPL-MCNC: 3.4 MG/DL — SIGNIFICANT CHANGE UP (ref 2.5–4.5)
PLATELET # BLD AUTO: 155 K/UL — SIGNIFICANT CHANGE UP (ref 150–400)
PMV BLD: 9.9 FL — SIGNIFICANT CHANGE UP (ref 7–13)
POTASSIUM SERPL-MCNC: 3.6 MMOL/L — SIGNIFICANT CHANGE UP (ref 3.5–5.3)
POTASSIUM SERPL-SCNC: 3.6 MMOL/L — SIGNIFICANT CHANGE UP (ref 3.5–5.3)
PROT SERPL-MCNC: 6 G/DL — SIGNIFICANT CHANGE UP (ref 6–8.3)
PROTHROM AB SERPL-ACNC: 12.5 SEC — SIGNIFICANT CHANGE UP (ref 9.8–13.1)
RBC # BLD: 3.31 M/UL — LOW (ref 4.2–5.8)
RBC # FLD: 16.1 % — HIGH (ref 10.3–14.5)
SODIUM SERPL-SCNC: 138 MMOL/L — SIGNIFICANT CHANGE UP (ref 135–145)
WBC # BLD: 15.66 K/UL — HIGH (ref 3.8–10.5)
WBC # FLD AUTO: 15.66 K/UL — HIGH (ref 3.8–10.5)

## 2019-06-21 PROCEDURE — 90935 HEMODIALYSIS ONE EVALUATION: CPT | Mod: GC

## 2019-06-21 RX ORDER — ACETAMINOPHEN 500 MG
1000 TABLET ORAL ONCE
Refills: 0 | Status: COMPLETED | OUTPATIENT
Start: 2019-06-22 | End: 2019-06-22

## 2019-06-21 RX ORDER — ACETAMINOPHEN 500 MG
1000 TABLET ORAL ONCE
Refills: 0 | Status: COMPLETED | OUTPATIENT
Start: 2019-06-21 | End: 2019-06-21

## 2019-06-21 RX ORDER — CYCLOBENZAPRINE HYDROCHLORIDE 10 MG/1
5 TABLET, FILM COATED ORAL THREE TIMES A DAY
Refills: 0 | Status: DISCONTINUED | OUTPATIENT
Start: 2019-06-21 | End: 2019-06-21

## 2019-06-21 RX ORDER — CYCLOBENZAPRINE HYDROCHLORIDE 10 MG/1
5 TABLET, FILM COATED ORAL AT BEDTIME
Refills: 0 | Status: DISCONTINUED | OUTPATIENT
Start: 2019-06-21 | End: 2019-06-26

## 2019-06-21 RX ORDER — MAGNESIUM SULFATE 500 MG/ML
2 VIAL (ML) INJECTION ONCE
Refills: 0 | Status: COMPLETED | OUTPATIENT
Start: 2019-06-21 | End: 2019-06-21

## 2019-06-21 RX ORDER — CHLORHEXIDINE GLUCONATE 213 G/1000ML
1 SOLUTION TOPICAL
Refills: 0 | Status: DISCONTINUED | OUTPATIENT
Start: 2019-06-21 | End: 2019-06-26

## 2019-06-21 RX ADMIN — PANTOPRAZOLE SODIUM 40 MILLIGRAM(S): 20 TABLET, DELAYED RELEASE ORAL at 14:15

## 2019-06-21 RX ADMIN — Medication 50 GRAM(S): at 17:38

## 2019-06-21 RX ADMIN — HEPARIN SODIUM 5000 UNIT(S): 5000 INJECTION INTRAVENOUS; SUBCUTANEOUS at 14:16

## 2019-06-21 RX ADMIN — CHLORHEXIDINE GLUCONATE 1 APPLICATION(S): 213 SOLUTION TOPICAL at 14:16

## 2019-06-21 RX ADMIN — HYDROMORPHONE HYDROCHLORIDE 30 MILLILITER(S): 2 INJECTION INTRAMUSCULAR; INTRAVENOUS; SUBCUTANEOUS at 19:23

## 2019-06-21 RX ADMIN — Medication 400 MILLIGRAM(S): at 19:44

## 2019-06-21 RX ADMIN — Medication 5 MILLIGRAM(S): at 14:15

## 2019-06-21 RX ADMIN — Medication 400 MILLIGRAM(S): at 14:14

## 2019-06-21 RX ADMIN — Medication 1000 MILLIGRAM(S): at 15:00

## 2019-06-21 RX ADMIN — Medication 1: at 19:44

## 2019-06-21 RX ADMIN — HEPARIN SODIUM 5000 UNIT(S): 5000 INJECTION INTRAVENOUS; SUBCUTANEOUS at 05:34

## 2019-06-21 RX ADMIN — Medication 1: at 01:02

## 2019-06-21 RX ADMIN — Medication 5 MILLIGRAM(S): at 00:45

## 2019-06-21 RX ADMIN — Medication 5 MILLIGRAM(S): at 17:38

## 2019-06-21 RX ADMIN — Medication 1: at 05:38

## 2019-06-21 RX ADMIN — HYDROMORPHONE HYDROCHLORIDE 0.5 MILLIGRAM(S): 2 INJECTION INTRAMUSCULAR; INTRAVENOUS; SUBCUTANEOUS at 10:58

## 2019-06-21 NOTE — PROGRESS NOTE ADULT - SUBJECTIVE AND OBJECTIVE BOX
[facility] Division of Kidney Diseases & Hypertension  FOLLOW UP NOTE  [phone]--------------------------------------------------------------------------------    HPI: 63-year-old male with medical history of CKD, CAD s/p 9 stents, DM, HTN, GERD, prior cholecystectomy, diverticulosis, biliary stricture is currently admitted for elective Whipple's procedure for pancreatis mass. Nephrology currently following patient for SLOAN on CKD. Patient was recently hospitalized in [facility] from 5/18/19 to 6/8/19 for management of septic shock from acute cholangitis and NSTEMI and developed oliguric SLOAN during hospital course. Patient was started on RRT and has been dialysis dependent.     Patient seen and examined during HD. Denies SOB, CP, abdominal pain, nausea, vomiting, dizziness. UOP noted to be ~310 cc over past 24 hours.     PAST HISTORY  --------------------------------------------------------------------------------  No significant changes to PMH, PSH, FHx, SHx, unless otherwise noted    ALLERGIES & MEDICATIONS  --------------------------------------------------------------------------------  Allergies    Cipro (Rash)  Plavix (Rash)    Intolerances      Standing Inpatient Medications  acetaminophen  IVPB .. 1000 milliGRAM(s) IV Intermittent once  acetaminophen  IVPB .. 1000 milliGRAM(s) IV Intermittent once  dextrose 50% Injectable 12.5 Gram(s) IV Push once  dextrose 50% Injectable 25 Gram(s) IV Push once  dextrose 50% Injectable 25 Gram(s) IV Push once  heparin  Injectable 5000 Unit(s) SubCutaneous every 8 hours  HYDROmorphone PCA (1 mG/mL) 30 milliLiter(s) PCA Continuous PCA Continuous  insulin lispro (HumaLOG) corrective regimen sliding scale   SubCutaneous every 6 hours  magnesium sulfate  IVPB 2 Gram(s) IV Intermittent once  methadone Injectable 5 milliGRAM(s) IV Push once  metoprolol tartrate Injectable 5 milliGRAM(s) IV Push every 6 hours  pantoprazole  Injectable 40 milliGRAM(s) IV Push daily  sodium chloride 0.9%. 1000 milliLiter(s) IV Continuous <Continuous>    PRN Inpatient Medications  cyclobenzaprine 5 milliGRAM(s) Oral three times a day PRN  dextrose 40% Gel 15 Gram(s) Oral once PRN  glucagon  Injectable 1 milliGRAM(s) IntraMuscular once PRN  HYDROmorphone PCA (1 mG/mL) Rescue Clinician Bolus 0.5 milliGRAM(s) IV Push every 15 minutes PRN  naloxone Injectable 0.1 milliGRAM(s) IV Push every 3 minutes PRN  ondansetron Injectable 4 milliGRAM(s) IV Push every 6 hours PRN      REVIEW OF SYSTEMS  --------------------------------------------------------------------------------    Gen: no fever/chills  Resp: no SOB  CV: no CP  GI: no abdominal pain   MSK: no edema     VITALS/PHYSICAL EXAM  --------------------------------------------------------------------------------  T(C): 37.4 (06-21-19 @ 10:50), Max: 37.5 (06-20-19 @ 13:36)  HR: 76 (06-21-19 @ 10:50) (63 - 82)  BP: 154/48 (06-21-19 @ 10:50) (133/60 - 161/70)  RR: 18 (06-21-19 @ 10:50) (16 - 18)  SpO2: 96% (06-21-19 @ 10:50) (96% - 99%)  Wt(kg): --        06-20-19 @ 07:01  -  06-21-19 @ 07:00  --------------------------------------------------------  IN: 360 mL / OUT: 1035 mL / NET: -675 mL    06-21-19 @ 07:01  -  06-21-19 @ 11:50  --------------------------------------------------------  IN: 500 mL / OUT: 2027.5 mL / NET: -1527.5 mL      Physical Exam:  	             Gen: NAD  	HEENT: sclera anicteric   	Pulm: CTA B/L  	CV:  S1S2  	Abd: +BS, soft, multiple abdominal drains present.   	Ext: No B/L Lower ext edema  	Neuro: No focal deficits  	Skin: Warm, without rashes  	Vascular access: Right IJ tunneled HD catheter present.     LABS/STUDIES  --------------------------------------------------------------------------------              9.9    15.66 >-----------<  155      [06-21-19 @ 06:55]              31.4     138  |  99  |  28  ----------------------------<  183      [06-21-19 @ 06:55]  3.6   |  25  |  2.67        Ca     8.4     [06-21-19 @ 06:55]      Mg     1.7     [06-21-19 @ 06:55]      Phos  3.4     [06-21-19 @ 06:55]    TPro  6.0  /  Alb  2.6  /  TBili  1.0  /  DBili  0.3  /  AST  16  /  ALT  13  /  AlkPhos  77  [06-21-19 @ 06:55]    PT/INR: PT 12.5 , INR 1.12       [06-21-19 @ 06:55]  PTT: 31.5       [06-21-19 @ 06:55]      Creatinine Trend:  SCr 2.67 [06-21 @ 06:55]  SCr 2.44 [06-20 @ 07:00]  SCr 3.24 [06-19 @ 06:05]  SCr 2.71 [06-18 @ 19:00]  SCr 2.74 [06-18 @ 11:38]          HBsAg NEGATIVE      [06-21-19 @ 06:55]

## 2019-06-21 NOTE — PROGRESS NOTE ADULT - SUBJECTIVE AND OBJECTIVE BOX
Anesthesia Pain Management Service    SUBJECTIVE: Patient is doing well with IV PCA and no significant problems reported.    Pain Scale Score	At rest: _5__ 	With Activity: ___ 	[X ] Refer to charted pain scores    THERAPY:    [ ] IV PCA Morphine		[ ] 5 mg/mL	[ ] 1 mg/mL  [X ] IV PCA Hydromorphone	[ ] 5 mg/mL	[X ] 1 mg/mL  [ ] IV PCA Fentanyl		[ ] 50 micrograms/mL    Demand dose __0.2_ lockout __6_ (minutes) Continuous Rate _0__ Total: __5.75_  mg used (in past 24 hours)      MEDICATIONS  (STANDING):  dextrose 50% Injectable 12.5 Gram(s) IV Push once  dextrose 50% Injectable 25 Gram(s) IV Push once  dextrose 50% Injectable 25 Gram(s) IV Push once  heparin  Injectable 5000 Unit(s) SubCutaneous every 8 hours  HYDROmorphone PCA (1 mG/mL) 30 milliLiter(s) PCA Continuous PCA Continuous  insulin lispro (HumaLOG) corrective regimen sliding scale   SubCutaneous every 6 hours  magnesium sulfate  IVPB 2 Gram(s) IV Intermittent once  methadone Injectable 5 milliGRAM(s) IV Push once  metoprolol tartrate Injectable 5 milliGRAM(s) IV Push every 6 hours  pantoprazole  Injectable 40 milliGRAM(s) IV Push daily  sodium chloride 0.9%. 1000 milliLiter(s) (30 mL/Hr) IV Continuous <Continuous>    MEDICATIONS  (PRN):  cyclobenzaprine 5 milliGRAM(s) Oral three times a day PRN Muscle Spasm  dextrose 40% Gel 15 Gram(s) Oral once PRN Blood Glucose LESS THAN 70 milliGRAM(s)/deciliter  glucagon  Injectable 1 milliGRAM(s) IntraMuscular once PRN Glucose LESS THAN 70 milligrams/deciliter  HYDROmorphone PCA (1 mG/mL) Rescue Clinician Bolus 0.5 milliGRAM(s) IV Push every 15 minutes PRN for Pain Scale GREATER THAN 6  naloxone Injectable 0.1 milliGRAM(s) IV Push every 3 minutes PRN For ANY of the following changes in patient status:  A. RR LESS THAN 10 breaths per minute, B. Oxygen saturation LESS THAN 90%, C. Sedation score of 6  ondansetron Injectable 4 milliGRAM(s) IV Push every 6 hours PRN Nausea      OBJECTIVE: laying in bed     Sedation Score:	[ X] Alert	[ ] Drowsy 	[ ] Arousable	[ ] Asleep	[ ] Unresponsive    Side Effects:	[X ] None	[ ] Nausea	[ ] Vomiting	[ ] Pruritus  		[ ] Other:    Vital Signs Last 24 Hrs  T(C): 37.3 (21 Jun 2019 06:50), Max: 37.5 (20 Jun 2019 13:36)  T(F): 99.1 (21 Jun 2019 06:50), Max: 99.5 (20 Jun 2019 13:36)  HR: 76 (21 Jun 2019 06:50) (63 - 82)  BP: 161/70 (21 Jun 2019 06:50) (133/60 - 161/70)  BP(mean): --  RR: 16 (21 Jun 2019 06:50) (16 - 18)  SpO2: 97% (21 Jun 2019 05:25) (96% - 99%)    ASSESSMENT/ PLAN    Therapy to  be:	[ X] Continue   [ ] Discontinued   [ ] Change to prn Analgesics    Documentation and Verification of current medications:   [X] Done	[ ] Not done, not elligible    Comments: Pt. in dialysis NPO with NGT, continue current therapy.

## 2019-06-21 NOTE — PROGRESS NOTE ADULT - SUBJECTIVE AND OBJECTIVE BOX
Surgery Progress Note    SUBJECTIVE: Pt seen and examined at bedside. Patient comfortable and in no-apparent distress. No nausea, vomiting, diarrhea. Pain is controlled. -gas/-BM.    Vital Signs Last 24 Hrs  T(C): 37.4 (21 Jun 2019 10:50), Max: 37.5 (20 Jun 2019 13:36)  T(F): 99.3 (21 Jun 2019 10:50), Max: 99.5 (20 Jun 2019 13:36)  HR: 76 (21 Jun 2019 10:50) (63 - 82)  BP: 154/48 (21 Jun 2019 10:50) (133/60 - 161/70)  BP(mean): --  RR: 18 (21 Jun 2019 10:50) (16 - 18)  SpO2: 96% (21 Jun 2019 10:50) (96% - 99%)    Physical Exam:  General Appearance: in NAD, NGT to LCWS  Respiratory: No labored breathing  CV: Pulse regularly present  Abdomen: Soft, nontender, midline incision c/d/i, j-tube in place, JPs on left and right leobardo-abdomen with SS output    LABS:                        9.9    15.66 )-----------( 155      ( 21 Jun 2019 06:55 )             31.4     06-21    138  |  99  |  28<H>  ----------------------------<  183<H>  3.6   |  25  |  2.67<H>    Ca    8.4      21 Jun 2019 06:55  Phos  3.4     06-21  Mg     1.7     06-21    TPro  6.0  /  Alb  2.6<L>  /  TBili  1.0  /  DBili  0.3<H>  /  AST  16  /  ALT  13  /  AlkPhos  77  06-21    PT/INR - ( 21 Jun 2019 06:55 )   PT: 12.5 SEC;   INR: 1.12          PTT - ( 21 Jun 2019 06:55 )  PTT:31.5 SEC      INs and OUTs:    06-20-19 @ 07:01  -  06-21-19 @ 07:00  --------------------------------------------------------  IN: 360 mL / OUT: 1035 mL / NET: -675 mL    06-21-19 @ 07:01  -  06-21-19 @ 11:01  --------------------------------------------------------  IN: 500 mL / OUT: 2000 mL / NET: -1500 mL

## 2019-06-21 NOTE — PROGRESS NOTE ADULT - ASSESSMENT
62 y/o M w/ PMHx of CAD s/p 9 stents, DMT2 on insulin, HTN, GERD, diverticulosis, former smoker (30 PY), s/p cholecystectomy, biliary stricture s/p ERCP with sphincterotomy and stent (4/2019), and cholangiocarcinoma now s/p open Whipple and feeding j-tube placement on 6/18.    - Appreciate renal recs for SLOAN on CKD. Plan for next session of HD today.  - Appreciate pain service recs. Current pain control regimen: standing IV tylenol, PCA, PRN PO flexeril.  - DVT ppx: SQH.  - C/w metoprolol 5 mg q6h.  - ISS, f/u FS.  - C/w PPI daily.  - NPO / NGT to LCWS.  - Await return of bowel function.  - J-tube study done - no contrast extravasation.  - OOB/early ambulation/IS.    D Team Surgery  [id number]

## 2019-06-21 NOTE — PROGRESS NOTE ADULT - PROBLEM SELECTOR PLAN 3
Patient with anemia in setting of ESRD and recent blood loss. Latest Hb noted to be be below target range. Will start on epogen on next treatment if Hb remains low. Blood transfusions as per primary team. Monitor Hb. Patient with anemia in setting of ESRD and recent blood loss. Latest Hb noted to be be below target range. will monitor cbc.

## 2019-06-22 LAB
ALBUMIN SERPL ELPH-MCNC: 2.8 G/DL — LOW (ref 3.3–5)
ALP SERPL-CCNC: 76 U/L — SIGNIFICANT CHANGE UP (ref 40–120)
ALT FLD-CCNC: 10 U/L — SIGNIFICANT CHANGE UP (ref 4–41)
AMYLASE FLD-CCNC: 45 U/L — SIGNIFICANT CHANGE UP
AMYLASE FLD-CCNC: 90 U/L — SIGNIFICANT CHANGE UP
ANION GAP SERPL CALC-SCNC: 17 MMO/L — HIGH (ref 7–14)
AST SERPL-CCNC: 13 U/L — SIGNIFICANT CHANGE UP (ref 4–40)
BILIRUB SERPL-MCNC: 1 MG/DL — SIGNIFICANT CHANGE UP (ref 0.2–1.2)
BUN SERPL-MCNC: 22 MG/DL — SIGNIFICANT CHANGE UP (ref 7–23)
CALCIUM SERPL-MCNC: 8.8 MG/DL — SIGNIFICANT CHANGE UP (ref 8.4–10.5)
CHLORIDE SERPL-SCNC: 97 MMOL/L — LOW (ref 98–107)
CO2 SERPL-SCNC: 24 MMOL/L — SIGNIFICANT CHANGE UP (ref 22–31)
CREAT SERPL-MCNC: 2.4 MG/DL — HIGH (ref 0.5–1.3)
GLUCOSE BLDC GLUCOMTR-MCNC: 134 MG/DL — HIGH (ref 70–99)
GLUCOSE BLDC GLUCOMTR-MCNC: 215 MG/DL — HIGH (ref 70–99)
GLUCOSE BLDC GLUCOMTR-MCNC: 230 MG/DL — HIGH (ref 70–99)
GLUCOSE BLDC GLUCOMTR-MCNC: 232 MG/DL — HIGH (ref 70–99)
GLUCOSE SERPL-MCNC: 138 MG/DL — HIGH (ref 70–99)
HCT VFR BLD CALC: 31.9 % — LOW (ref 39–50)
HGB BLD-MCNC: 10.1 G/DL — LOW (ref 13–17)
MAGNESIUM SERPL-MCNC: 2.4 MG/DL — SIGNIFICANT CHANGE UP (ref 1.6–2.6)
MCHC RBC-ENTMCNC: 29.2 PG — SIGNIFICANT CHANGE UP (ref 27–34)
MCHC RBC-ENTMCNC: 31.7 % — LOW (ref 32–36)
MCV RBC AUTO: 92.2 FL — SIGNIFICANT CHANGE UP (ref 80–100)
NRBC # FLD: 0 K/UL — SIGNIFICANT CHANGE UP (ref 0–0)
PHOSPHATE SERPL-MCNC: 3.8 MG/DL — SIGNIFICANT CHANGE UP (ref 2.5–4.5)
PLATELET # BLD AUTO: 179 K/UL — SIGNIFICANT CHANGE UP (ref 150–400)
PMV BLD: 10.3 FL — SIGNIFICANT CHANGE UP (ref 7–13)
POTASSIUM SERPL-MCNC: 3.4 MMOL/L — LOW (ref 3.5–5.3)
POTASSIUM SERPL-SCNC: 3.4 MMOL/L — LOW (ref 3.5–5.3)
PROT SERPL-MCNC: 6.6 G/DL — SIGNIFICANT CHANGE UP (ref 6–8.3)
RBC # BLD: 3.46 M/UL — LOW (ref 4.2–5.8)
RBC # FLD: 15.5 % — HIGH (ref 10.3–14.5)
SODIUM SERPL-SCNC: 138 MMOL/L — SIGNIFICANT CHANGE UP (ref 135–145)
WBC # BLD: 10.93 K/UL — HIGH (ref 3.8–10.5)
WBC # FLD AUTO: 10.93 K/UL — HIGH (ref 3.8–10.5)

## 2019-06-22 RX ORDER — DEXTROSE 50 % IN WATER 50 %
25 SYRINGE (ML) INTRAVENOUS ONCE
Refills: 0 | Status: DISCONTINUED | OUTPATIENT
Start: 2019-06-22 | End: 2019-06-26

## 2019-06-22 RX ORDER — INSULIN LISPRO 100/ML
VIAL (ML) SUBCUTANEOUS AT BEDTIME
Refills: 0 | Status: DISCONTINUED | OUTPATIENT
Start: 2019-06-22 | End: 2019-06-26

## 2019-06-22 RX ORDER — PANTOPRAZOLE SODIUM 20 MG/1
40 TABLET, DELAYED RELEASE ORAL
Refills: 0 | Status: DISCONTINUED | OUTPATIENT
Start: 2019-06-22 | End: 2019-06-26

## 2019-06-22 RX ORDER — SODIUM CHLORIDE 9 MG/ML
1000 INJECTION, SOLUTION INTRAVENOUS
Refills: 0 | Status: DISCONTINUED | OUTPATIENT
Start: 2019-06-22 | End: 2019-06-26

## 2019-06-22 RX ORDER — INSULIN LISPRO 100/ML
VIAL (ML) SUBCUTANEOUS
Refills: 0 | Status: DISCONTINUED | OUTPATIENT
Start: 2019-06-22 | End: 2019-06-26

## 2019-06-22 RX ORDER — DEXTROSE 50 % IN WATER 50 %
15 SYRINGE (ML) INTRAVENOUS ONCE
Refills: 0 | Status: DISCONTINUED | OUTPATIENT
Start: 2019-06-22 | End: 2019-06-26

## 2019-06-22 RX ORDER — GLUCAGON INJECTION, SOLUTION 0.5 MG/.1ML
1 INJECTION, SOLUTION SUBCUTANEOUS ONCE
Refills: 0 | Status: DISCONTINUED | OUTPATIENT
Start: 2019-06-22 | End: 2019-06-26

## 2019-06-22 RX ORDER — PSYLLIUM SEED (WITH DEXTROSE)
1 POWDER (GRAM) ORAL DAILY
Refills: 0 | Status: DISCONTINUED | OUTPATIENT
Start: 2019-06-22 | End: 2019-06-24

## 2019-06-22 RX ORDER — ACETAMINOPHEN 500 MG
975 TABLET ORAL EVERY 6 HOURS
Refills: 0 | Status: DISCONTINUED | OUTPATIENT
Start: 2019-06-23 | End: 2019-06-26

## 2019-06-22 RX ORDER — OXYCODONE HYDROCHLORIDE 5 MG/1
10 TABLET ORAL EVERY 4 HOURS
Refills: 0 | Status: DISCONTINUED | OUTPATIENT
Start: 2019-06-22 | End: 2019-06-26

## 2019-06-22 RX ORDER — OXYCODONE HYDROCHLORIDE 5 MG/1
5 TABLET ORAL EVERY 4 HOURS
Refills: 0 | Status: DISCONTINUED | OUTPATIENT
Start: 2019-06-22 | End: 2019-06-26

## 2019-06-22 RX ORDER — DEXTROSE 50 % IN WATER 50 %
12.5 SYRINGE (ML) INTRAVENOUS ONCE
Refills: 0 | Status: DISCONTINUED | OUTPATIENT
Start: 2019-06-22 | End: 2019-06-26

## 2019-06-22 RX ADMIN — Medication 1: at 00:20

## 2019-06-22 RX ADMIN — HEPARIN SODIUM 5000 UNIT(S): 5000 INJECTION INTRAVENOUS; SUBCUTANEOUS at 05:53

## 2019-06-22 RX ADMIN — Medication 1 PACKET(S): at 12:29

## 2019-06-22 RX ADMIN — HYDROMORPHONE HYDROCHLORIDE 30 MILLILITER(S): 2 INJECTION INTRAMUSCULAR; INTRAVENOUS; SUBCUTANEOUS at 07:28

## 2019-06-22 RX ADMIN — HEPARIN SODIUM 5000 UNIT(S): 5000 INJECTION INTRAVENOUS; SUBCUTANEOUS at 14:41

## 2019-06-22 RX ADMIN — Medication 400 MILLIGRAM(S): at 08:57

## 2019-06-22 RX ADMIN — CHLORHEXIDINE GLUCONATE 1 APPLICATION(S): 213 SOLUTION TOPICAL at 12:35

## 2019-06-22 RX ADMIN — HEPARIN SODIUM 5000 UNIT(S): 5000 INJECTION INTRAVENOUS; SUBCUTANEOUS at 00:21

## 2019-06-22 RX ADMIN — Medication 400 MILLIGRAM(S): at 02:27

## 2019-06-22 RX ADMIN — Medication 400 MILLIGRAM(S): at 14:41

## 2019-06-22 RX ADMIN — Medication 400 MILLIGRAM(S): at 20:58

## 2019-06-22 RX ADMIN — Medication 1000 MILLIGRAM(S): at 09:27

## 2019-06-22 RX ADMIN — PANTOPRAZOLE SODIUM 40 MILLIGRAM(S): 20 TABLET, DELAYED RELEASE ORAL at 12:28

## 2019-06-22 RX ADMIN — Medication 1000 MILLIGRAM(S): at 21:58

## 2019-06-22 RX ADMIN — Medication 5 MILLIGRAM(S): at 18:05

## 2019-06-22 RX ADMIN — Medication 2: at 18:05

## 2019-06-22 RX ADMIN — Medication 5 MILLIGRAM(S): at 12:29

## 2019-06-22 RX ADMIN — Medication 1000 MILLIGRAM(S): at 15:11

## 2019-06-22 RX ADMIN — HEPARIN SODIUM 5000 UNIT(S): 5000 INJECTION INTRAVENOUS; SUBCUTANEOUS at 21:18

## 2019-06-22 RX ADMIN — CYCLOBENZAPRINE HYDROCHLORIDE 5 MILLIGRAM(S): 10 TABLET, FILM COATED ORAL at 20:59

## 2019-06-22 RX ADMIN — SODIUM CHLORIDE 30 MILLILITER(S): 9 INJECTION INTRAMUSCULAR; INTRAVENOUS; SUBCUTANEOUS at 07:27

## 2019-06-22 RX ADMIN — OXYCODONE HYDROCHLORIDE 10 MILLIGRAM(S): 5 TABLET ORAL at 18:20

## 2019-06-22 NOTE — PROGRESS NOTE ADULT - SUBJECTIVE AND OBJECTIVE BOX
Anesthesia Pain Management Service    SUBJECTIVE: Patient is doing well with IV PCA and no significant problems reported.    Pain Scale Score	At rest: ___ 	With Activity: ___ 	[X ] Refer to charted pain scores    THERAPY:    [ ] IV PCA Morphine		[ ] 5 mg/mL	[ ] 1 mg/mL  [X ] IV PCA Hydromorphone	[ ] 5 mg/mL	[X ] 1 mg/mL  [ ] IV PCA Fentanyl		[ ] 50 micrograms/mL    Demand dose __0.2_ lockout __6_ (minutes) Continuous Rate _0__ Total: 3.5mg___  Daily      MEDICATIONS  (STANDING):  acetaminophen  IVPB .. 1000 milliGRAM(s) IV Intermittent once  acetaminophen  IVPB .. 1000 milliGRAM(s) IV Intermittent once  chlorhexidine 4% Liquid 1 Application(s) Topical <User Schedule>  cyclobenzaprine 5 milliGRAM(s) Oral at bedtime  dextrose 50% Injectable 12.5 Gram(s) IV Push once  dextrose 50% Injectable 25 Gram(s) IV Push once  dextrose 50% Injectable 25 Gram(s) IV Push once  heparin  Injectable 5000 Unit(s) SubCutaneous every 8 hours  insulin lispro (HumaLOG) corrective regimen sliding scale   SubCutaneous every 6 hours  methadone Injectable 5 milliGRAM(s) IV Push once  metoprolol tartrate Injectable 5 milliGRAM(s) IV Push every 6 hours  pantoprazole  Injectable 40 milliGRAM(s) IV Push daily  sodium chloride 0.9%. 1000 milliLiter(s) (30 mL/Hr) IV Continuous <Continuous>    MEDICATIONS  (PRN):  dextrose 40% Gel 15 Gram(s) Oral once PRN Blood Glucose LESS THAN 70 milliGRAM(s)/deciliter  glucagon  Injectable 1 milliGRAM(s) IntraMuscular once PRN Glucose LESS THAN 70 milligrams/deciliter  oxyCODONE    IR 5 milliGRAM(s) Oral every 4 hours PRN Moderate Pain (4 - 6)  oxyCODONE    IR 10 milliGRAM(s) Oral every 4 hours PRN Severe Pain (7 - 10)      OBJECTIVE:    Sedation Score:	[ X] Alert	[ ] Drowsy 	[ ] Arousable	[ ] Asleep	[ ] Unresponsive    Side Effects:	[X ] None	[ ] Nausea	[ ] Vomiting	[ ] Pruritus  		[ ] Other:    Vital Signs Last 24 Hrs  T(C): 36.4 (22 Jun 2019 08:52), Max: 37.4 (21 Jun 2019 10:50)  T(F): 97.6 (22 Jun 2019 08:52), Max: 99.3 (21 Jun 2019 10:50)  HR: 55 (22 Jun 2019 08:52) (54 - 97)  BP: 155/49 (22 Jun 2019 08:52) (121/50 - 158/66)  BP(mean): --  RR: 18 (22 Jun 2019 08:52) (16 - 18)  SpO2: 95% (22 Jun 2019 08:52) (94% - 100%)    ASSESSMENT/ PLAN    Therapy to  be:	[ X] Continue   [ ] Discontinued   [ ] Change to prn Analgesics    Documentation and Verification of current medications:   [X] Done	[ ] Not done, not elligible    Comments:

## 2019-06-22 NOTE — PHYSICAL THERAPY INITIAL EVALUATION ADULT - PERTINENT HX OF CURRENT PROBLEM, REHAB EVAL
Pt. is a 63 year old male admitted to [facility] due to obstruction of bile duct. PMH: HTN, CAD.

## 2019-06-22 NOTE — PROVIDER CONTACT NOTE (OTHER) - SITUATION
notified by spouse that pts NGT is out. inquired whether pt pulled it out and if it was an accident in his sleep. pt states "its out now".

## 2019-06-22 NOTE — CHART NOTE - NSCHARTNOTEFT_GEN_A_CORE
notified by rn that patient had pulled out his NGT this evening. went to see pt. pt said he passed "a lot" of gas prior to the NGT coming out and then some after it came out. pt denies nausea/vomiting. abd exam soft, nontender, mildly to minimally distended, incision c/d/i, YUMIKO SS output, j-tube in place.     given pt now passing gas and ngt output decreased prior to self-dc, will keep ngt out for now. if pt has nausea/vomiting, will likely replace ngt.    pt is now POD4 s/p open whipple and j-tube placement on 6/18. now s/p self-d/c of ngt, but also has return of bowel function with +flatus. will monitor pt without ngt for now.    d team surgery  [id number]

## 2019-06-22 NOTE — PROGRESS NOTE ADULT - ASSESSMENT
62 y/o M w/ PMHx of CAD s/p 9 stents, DMT2 on insulin, HTN, GERD, diverticulosis, former smoker (30 PY), s/p cholecystectomy, biliary stricture s/p ERCP with sphincterotomy and stent (4/2019), and cholangiocarcinoma now s/p open Whipple and feeding j-tube placement on 6/18.    - Appreciate renal recs for SLOAN on CKD.   - Appreciate pain service recs. Current pain control regimen: standing IV tylenol, PCA, PRN PO flexeril.  - DVT ppx: SQH.  - C/w metoprolol 5 mg q6h.  - ISS, f/u FS.  - C/w PPI daily.  - Nil Per Os, continue IV Fluids.   - Continue to monitor bowel function.   - J-tube study done - no contrast extravasation.  - OOB/early ambulation/IS.    D Team Surgery  [id number] 62 y/o M w/ PMHx of CAD s/p 9 stents, DMT2 on insulin, HTN, GERD, diverticulosis, former smoker (30 PY), s/p cholecystectomy, biliary stricture s/p ERCP with sphincterotomy and stent (4/2019), and cholangiocarcinoma now s/p open Whipple and feeding j-tube placement on 6/18. NGT self-d/c'd on 6/21. Now passing flatus.    - Appreciate renal recs for SLOAN on CKD.   - Appreciate pain service recs. Current pain control regimen: standing IV tylenol, PCA, PRN PO flexeril.  - DVT ppx: SQH.  - C/w metoprolol 5 mg q6h.  - ISS, f/u FS.  - C/w PPI daily.  - NPO / IVF.  - Continue to monitor bowel function.   - J-tube study done - no contrast extravasation.  - OOB/early ambulation/IS.    PT rhoda, appreciate recs.    D Team Surgery  [id number]

## 2019-06-22 NOTE — PROGRESS NOTE ADULT - SUBJECTIVE AND OBJECTIVE BOX
Surgery Progress Note     Subjective/24hour Events:   Patient seen and examined.   Overnight NGT self-dc'ed.   Passing flatus, no BM.   Otherwise no acute events overnight.   Pain controlled.     Vital Signs:  Vital Signs Last 24 Hrs  T(C): 36.9 (22 Jun 2019 05:48), Max: 37.4 (21 Jun 2019 10:50)  T(F): 98.4 (22 Jun 2019 05:48), Max: 99.3 (21 Jun 2019 10:50)  HR: 55 (22 Jun 2019 05:48) (54 - 97)  BP: 158/66 (22 Jun 2019 05:48) (121/50 - 161/70)  BP(mean): --  RR: 18 (22 Jun 2019 05:48) (16 - 18)  SpO2: 99% (22 Jun 2019 05:48) (94% - 100%)    CAPILLARY BLOOD GLUCOSE      POCT Blood Glucose.: 134 mg/dL (22 Jun 2019 05:47)  POCT Blood Glucose.: 173 mg/dL (21 Jun 2019 23:49)  POCT Blood Glucose.: 163 mg/dL (21 Jun 2019 19:19)  POCT Blood Glucose.: 119 mg/dL (21 Jun 2019 12:30)  POCT Blood Glucose.: 104 mg/dL (21 Jun 2019 09:22)      I&O's Detail    20 Jun 2019 07:01  -  21 Jun 2019 07:00  --------------------------------------------------------  IN:    sodium chloride 0.9%.: 360 mL  Total IN: 360 mL    OUT:    Bulb: 245 mL    Bulb: 130 mL    Nasoenteral Tube: 350 mL    Voided: 310 mL  Total OUT: 1035 mL    Total NET: -675 mL      21 Jun 2019 07:01  -  22 Jun 2019 06:29  --------------------------------------------------------  IN:    IV PiggyBack: 100 mL    Other: 550 mL    sodium chloride 0.9%.: 300 mL  Total IN: 950 mL    OUT:    Bulb: 37.5 mL    Bulb: 80 mL    Nasoenteral Tube: 100 mL    Other: 2000 mL    Voided: 250 mL  Total OUT: 2467.5 mL    Total NET: -1517.5 mL          MEDICATIONS  (STANDING):  acetaminophen  IVPB .. 1000 milliGRAM(s) IV Intermittent once  acetaminophen  IVPB .. 1000 milliGRAM(s) IV Intermittent once  acetaminophen  IVPB .. 1000 milliGRAM(s) IV Intermittent once  chlorhexidine 4% Liquid 1 Application(s) Topical <User Schedule>  cyclobenzaprine 5 milliGRAM(s) Oral at bedtime  dextrose 50% Injectable 12.5 Gram(s) IV Push once  dextrose 50% Injectable 25 Gram(s) IV Push once  dextrose 50% Injectable 25 Gram(s) IV Push once  heparin  Injectable 5000 Unit(s) SubCutaneous every 8 hours  HYDROmorphone PCA (1 mG/mL) 30 milliLiter(s) PCA Continuous PCA Continuous  insulin lispro (HumaLOG) corrective regimen sliding scale   SubCutaneous every 6 hours  methadone Injectable 5 milliGRAM(s) IV Push once  metoprolol tartrate Injectable 5 milliGRAM(s) IV Push every 6 hours  pantoprazole  Injectable 40 milliGRAM(s) IV Push daily  sodium chloride 0.9%. 1000 milliLiter(s) (30 mL/Hr) IV Continuous <Continuous>    MEDICATIONS  (PRN):  dextrose 40% Gel 15 Gram(s) Oral once PRN Blood Glucose LESS THAN 70 milliGRAM(s)/deciliter  glucagon  Injectable 1 milliGRAM(s) IntraMuscular once PRN Glucose LESS THAN 70 milligrams/deciliter  HYDROmorphone PCA (1 mG/mL) Rescue Clinician Bolus 0.5 milliGRAM(s) IV Push every 15 minutes PRN for Pain Scale GREATER THAN 6  naloxone Injectable 0.1 milliGRAM(s) IV Push every 3 minutes PRN For ANY of the following changes in patient status:  A. RR LESS THAN 10 breaths per minute, B. Oxygen saturation LESS THAN 90%, C. Sedation score of 6  ondansetron Injectable 4 milliGRAM(s) IV Push every 6 hours PRN Nausea      Physical Exam:  General Appearance: in NAD, NGT to LCWS  Respiratory: No labored breathing  CV: Pulse regularly present  Abdomen: Soft, nontender, midline incision c/d/i, j-tube in place, JPs on left and right leobardo-abdomen with SS output      Labs:    06-21    138  |  99  |  28<H>  ----------------------------<  183<H>  3.6   |  25  |  2.67<H>    Ca    8.4      21 Jun 2019 06:55  Phos  3.4     06-21  Mg     1.7     06-21    TPro  6.0  /  Alb  2.6<L>  /  TBili  1.0  /  DBili  0.3<H>  /  AST  16  /  ALT  13  /  AlkPhos  77  06-21    LIVER FUNCTIONS - ( 21 Jun 2019 06:55 )  Alb: 2.6 g/dL / Pro: 6.0 g/dL / ALK PHOS: 77 u/L / ALT: 13 u/L / AST: 16 u/L / GGT: x                                 9.9    15.66 )-----------( 155      ( 21 Jun 2019 06:55 )             31.4     PT/INR - ( 21 Jun 2019 06:55 )   PT: 12.5 SEC;   INR: 1.12          PTT - ( 21 Jun 2019 06:55 )  PTT:31.5 SEC

## 2019-06-23 LAB
ANION GAP SERPL CALC-SCNC: 17 MMO/L — HIGH (ref 7–14)
BUN SERPL-MCNC: 25 MG/DL — HIGH (ref 7–23)
CALCIUM SERPL-MCNC: 8.4 MG/DL — SIGNIFICANT CHANGE UP (ref 8.4–10.5)
CHLORIDE SERPL-SCNC: 95 MMOL/L — LOW (ref 98–107)
CO2 SERPL-SCNC: 23 MMOL/L — SIGNIFICANT CHANGE UP (ref 22–31)
CREAT SERPL-MCNC: 2.44 MG/DL — HIGH (ref 0.5–1.3)
GLUCOSE BLDC GLUCOMTR-MCNC: 142 MG/DL — HIGH (ref 70–99)
GLUCOSE BLDC GLUCOMTR-MCNC: 211 MG/DL — HIGH (ref 70–99)
GLUCOSE BLDC GLUCOMTR-MCNC: 226 MG/DL — HIGH (ref 70–99)
GLUCOSE BLDC GLUCOMTR-MCNC: 234 MG/DL — HIGH (ref 70–99)
GLUCOSE SERPL-MCNC: 186 MG/DL — HIGH (ref 70–99)
HBA1C BLD-MCNC: 5.6 % — SIGNIFICANT CHANGE UP (ref 4–5.6)
HCT VFR BLD CALC: 29.4 % — LOW (ref 39–50)
HGB BLD-MCNC: 9.5 G/DL — LOW (ref 13–17)
MAGNESIUM SERPL-MCNC: 2 MG/DL — SIGNIFICANT CHANGE UP (ref 1.6–2.6)
MCHC RBC-ENTMCNC: 29.4 PG — SIGNIFICANT CHANGE UP (ref 27–34)
MCHC RBC-ENTMCNC: 32.3 % — SIGNIFICANT CHANGE UP (ref 32–36)
MCV RBC AUTO: 91 FL — SIGNIFICANT CHANGE UP (ref 80–100)
NRBC # FLD: 0.04 K/UL — SIGNIFICANT CHANGE UP (ref 0–0)
PHOSPHATE SERPL-MCNC: 2.3 MG/DL — LOW (ref 2.5–4.5)
PLATELET # BLD AUTO: 165 K/UL — SIGNIFICANT CHANGE UP (ref 150–400)
PMV BLD: 10.5 FL — SIGNIFICANT CHANGE UP (ref 7–13)
POTASSIUM SERPL-MCNC: 3.2 MMOL/L — LOW (ref 3.5–5.3)
POTASSIUM SERPL-SCNC: 3.2 MMOL/L — LOW (ref 3.5–5.3)
RBC # BLD: 3.23 M/UL — LOW (ref 4.2–5.8)
RBC # FLD: 14.6 % — HIGH (ref 10.3–14.5)
SODIUM SERPL-SCNC: 135 MMOL/L — SIGNIFICANT CHANGE UP (ref 135–145)
WBC # BLD: 8.49 K/UL — SIGNIFICANT CHANGE UP (ref 3.8–10.5)
WBC # FLD AUTO: 8.49 K/UL — SIGNIFICANT CHANGE UP (ref 3.8–10.5)

## 2019-06-23 RX ORDER — ATORVASTATIN CALCIUM 80 MG/1
40 TABLET, FILM COATED ORAL AT BEDTIME
Refills: 0 | Status: DISCONTINUED | OUTPATIENT
Start: 2019-06-23 | End: 2019-06-26

## 2019-06-23 RX ORDER — CARVEDILOL PHOSPHATE 80 MG/1
6.25 CAPSULE, EXTENDED RELEASE ORAL EVERY 12 HOURS
Refills: 0 | Status: DISCONTINUED | OUTPATIENT
Start: 2019-06-23 | End: 2019-06-26

## 2019-06-23 RX ORDER — INSULIN GLARGINE 100 [IU]/ML
10 INJECTION, SOLUTION SUBCUTANEOUS AT BEDTIME
Refills: 0 | Status: DISCONTINUED | OUTPATIENT
Start: 2019-06-23 | End: 2019-06-26

## 2019-06-23 RX ORDER — INSULIN GLARGINE 100 [IU]/ML
12 INJECTION, SOLUTION SUBCUTANEOUS AT BEDTIME
Refills: 0 | Status: DISCONTINUED | OUTPATIENT
Start: 2019-06-23 | End: 2019-06-23

## 2019-06-23 RX ORDER — HYDRALAZINE HCL 50 MG
50 TABLET ORAL THREE TIMES A DAY
Refills: 0 | Status: DISCONTINUED | OUTPATIENT
Start: 2019-06-23 | End: 2019-06-26

## 2019-06-23 RX ADMIN — OXYCODONE HYDROCHLORIDE 10 MILLIGRAM(S): 5 TABLET ORAL at 22:54

## 2019-06-23 RX ADMIN — PANTOPRAZOLE SODIUM 40 MILLIGRAM(S): 20 TABLET, DELAYED RELEASE ORAL at 05:08

## 2019-06-23 RX ADMIN — OXYCODONE HYDROCHLORIDE 10 MILLIGRAM(S): 5 TABLET ORAL at 05:08

## 2019-06-23 RX ADMIN — Medication 975 MILLIGRAM(S): at 13:20

## 2019-06-23 RX ADMIN — ATORVASTATIN CALCIUM 40 MILLIGRAM(S): 80 TABLET, FILM COATED ORAL at 22:09

## 2019-06-23 RX ADMIN — Medication 2: at 13:20

## 2019-06-23 RX ADMIN — Medication 975 MILLIGRAM(S): at 09:40

## 2019-06-23 RX ADMIN — OXYCODONE HYDROCHLORIDE 10 MILLIGRAM(S): 5 TABLET ORAL at 00:31

## 2019-06-23 RX ADMIN — Medication 975 MILLIGRAM(S): at 14:00

## 2019-06-23 RX ADMIN — CYCLOBENZAPRINE HYDROCHLORIDE 5 MILLIGRAM(S): 10 TABLET, FILM COATED ORAL at 22:10

## 2019-06-23 RX ADMIN — Medication 975 MILLIGRAM(S): at 22:54

## 2019-06-23 RX ADMIN — INSULIN GLARGINE 10 UNIT(S): 100 INJECTION, SOLUTION SUBCUTANEOUS at 22:11

## 2019-06-23 RX ADMIN — OXYCODONE HYDROCHLORIDE 10 MILLIGRAM(S): 5 TABLET ORAL at 06:00

## 2019-06-23 RX ADMIN — Medication 50 MILLIGRAM(S): at 22:09

## 2019-06-23 RX ADMIN — CARVEDILOL PHOSPHATE 6.25 MILLIGRAM(S): 80 CAPSULE, EXTENDED RELEASE ORAL at 18:00

## 2019-06-23 RX ADMIN — HEPARIN SODIUM 5000 UNIT(S): 5000 INJECTION INTRAVENOUS; SUBCUTANEOUS at 22:11

## 2019-06-23 RX ADMIN — Medication 50 MILLIGRAM(S): at 00:31

## 2019-06-23 RX ADMIN — OXYCODONE HYDROCHLORIDE 10 MILLIGRAM(S): 5 TABLET ORAL at 15:33

## 2019-06-23 RX ADMIN — Medication 975 MILLIGRAM(S): at 22:10

## 2019-06-23 RX ADMIN — OXYCODONE HYDROCHLORIDE 10 MILLIGRAM(S): 5 TABLET ORAL at 01:30

## 2019-06-23 RX ADMIN — OXYCODONE HYDROCHLORIDE 10 MILLIGRAM(S): 5 TABLET ORAL at 22:11

## 2019-06-23 RX ADMIN — Medication 975 MILLIGRAM(S): at 09:07

## 2019-06-23 RX ADMIN — OXYCODONE HYDROCHLORIDE 10 MILLIGRAM(S): 5 TABLET ORAL at 16:10

## 2019-06-23 RX ADMIN — Medication 2: at 09:08

## 2019-06-23 RX ADMIN — HEPARIN SODIUM 5000 UNIT(S): 5000 INJECTION INTRAVENOUS; SUBCUTANEOUS at 13:20

## 2019-06-23 RX ADMIN — CHLORHEXIDINE GLUCONATE 1 APPLICATION(S): 213 SOLUTION TOPICAL at 09:08

## 2019-06-23 RX ADMIN — Medication 50 MILLIGRAM(S): at 13:20

## 2019-06-23 NOTE — PROGRESS NOTE ADULT - SUBJECTIVE AND OBJECTIVE BOX
Surgery Progress Note     Subjective/24hour Events:   Patient seen and examined.   No acute events overnight.   Pain controlled.   Tolerating diet without N/V.   Passing flatus, no BM.     Vital Signs:  Vital Signs Last 24 Hrs  T(C): 36.8 (23 Jun 2019 00:27), Max: 37.1 (22 Jun 2019 20:55)  T(F): 98.3 (23 Jun 2019 00:27), Max: 98.8 (22 Jun 2019 20:55)  HR: 60 (23 Jun 2019 00:27) (55 - 65)  BP: 158/62 (23 Jun 2019 00:27) (147/56 - 166/58)  BP(mean): --  RR: 18 (23 Jun 2019 00:27) (17 - 18)  SpO2: 98% (23 Jun 2019 00:27) (95% - 99%)    CAPILLARY BLOOD GLUCOSE      POCT Blood Glucose.: 230 mg/dL (22 Jun 2019 21:10)  POCT Blood Glucose.: 232 mg/dL (22 Jun 2019 17:26)  POCT Blood Glucose.: 215 mg/dL (22 Jun 2019 12:44)  POCT Blood Glucose.: 134 mg/dL (22 Jun 2019 05:47)      I&O's Detail    21 Jun 2019 07:01  -  22 Jun 2019 07:00  --------------------------------------------------------  IN:    IV PiggyBack: 100 mL    Other: 550 mL    sodium chloride 0.9%.: 300 mL  Total IN: 950 mL    OUT:    Bulb: 37.5 mL    Bulb: 80 mL    Nasoenteral Tube: 100 mL    Other: 2000 mL    Voided: 250 mL  Total OUT: 2467.5 mL    Total NET: -1517.5 mL      22 Jun 2019 07:01  -  23 Jun 2019 04:54  --------------------------------------------------------  IN:  Total IN: 0 mL    OUT:    Bulb: 65 mL    Bulb: 75 mL  Total OUT: 140 mL    Total NET: -140 mL          MEDICATIONS  (STANDING):  acetaminophen   Tablet .. 975 milliGRAM(s) Oral every 6 hours  chlorhexidine 4% Liquid 1 Application(s) Topical <User Schedule>  cyclobenzaprine 5 milliGRAM(s) Oral at bedtime  dextrose 5%. 1000 milliLiter(s) (50 mL/Hr) IV Continuous <Continuous>  dextrose 50% Injectable 12.5 Gram(s) IV Push once  dextrose 50% Injectable 25 Gram(s) IV Push once  dextrose 50% Injectable 25 Gram(s) IV Push once  heparin  Injectable 5000 Unit(s) SubCutaneous every 8 hours  hydrALAZINE 50 milliGRAM(s) Oral three times a day  insulin lispro (HumaLOG) corrective regimen sliding scale   SubCutaneous three times a day before meals  insulin lispro (HumaLOG) corrective regimen sliding scale   SubCutaneous at bedtime  pantoprazole    Tablet 40 milliGRAM(s) Oral before breakfast  psyllium Powder 1 Packet(s) Oral daily  sodium chloride 0.9%. 1000 milliLiter(s) (30 mL/Hr) IV Continuous <Continuous>    MEDICATIONS  (PRN):  dextrose 40% Gel 15 Gram(s) Oral once PRN Blood Glucose LESS THAN 70 milliGRAM(s)/deciliter  glucagon  Injectable 1 milliGRAM(s) IntraMuscular once PRN Glucose LESS THAN 70 milligrams/deciliter  oxyCODONE    IR 5 milliGRAM(s) Oral every 4 hours PRN Moderate Pain (4 - 6)  oxyCODONE    IR 10 milliGRAM(s) Oral every 4 hours PRN Severe Pain (7 - 10)      Physical Exam:  General Appearance: in NAD.   Respiratory: No labored breathing  Abdomen: Soft, nontender, midline incision c/d/i, j-tube in place, JPs on left and right leobardo-abdomen with SS output    Labs:    06-22    138  |  97<L>  |  22  ----------------------------<  138<H>  3.4<L>   |  24  |  2.40<H>    Ca    8.8      22 Jun 2019 06:08  Phos  3.8     06-22  Mg     2.4     06-22    TPro  6.6  /  Alb  2.8<L>  /  TBili  1.0  /  DBili  x   /  AST  13  /  ALT  10  /  AlkPhos  76  06-22    LIVER FUNCTIONS - ( 22 Jun 2019 06:08 )  Alb: 2.8 g/dL / Pro: 6.6 g/dL / ALK PHOS: 76 u/L / ALT: 10 u/L / AST: 13 u/L / GGT: x                                 10.1   10.93 )-----------( 179      ( 22 Jun 2019 06:08 )             31.9     PT/INR - ( 21 Jun 2019 06:55 )   PT: 12.5 SEC;   INR: 1.12          PTT - ( 21 Jun 2019 06:55 )  PTT:31.5 SEC    Imaging: Surgery Progress Note     Subjective/24hour Events:   Patient seen and examined.   No acute events overnight.   Pain controlled.   Tolerating diet without N/V.   Passing flatus, no BM.   Up in chair, ambulating.    Vital Signs:  Vital Signs Last 24 Hrs  T(C): 36.8 (23 Jun 2019 00:27), Max: 37.1 (22 Jun 2019 20:55)  T(F): 98.3 (23 Jun 2019 00:27), Max: 98.8 (22 Jun 2019 20:55)  HR: 60 (23 Jun 2019 00:27) (55 - 65)  BP: 158/62 (23 Jun 2019 00:27) (147/56 - 166/58)  BP(mean): --  RR: 18 (23 Jun 2019 00:27) (17 - 18)  SpO2: 98% (23 Jun 2019 00:27) (95% - 99%)    CAPILLARY BLOOD GLUCOSE      POCT Blood Glucose.: 230 mg/dL (22 Jun 2019 21:10)  POCT Blood Glucose.: 232 mg/dL (22 Jun 2019 17:26)  POCT Blood Glucose.: 215 mg/dL (22 Jun 2019 12:44)  POCT Blood Glucose.: 134 mg/dL (22 Jun 2019 05:47)      I&O's Detail    21 Jun 2019 07:01  -  22 Jun 2019 07:00  --------------------------------------------------------  IN:    IV PiggyBack: 100 mL    Other: 550 mL    sodium chloride 0.9%.: 300 mL  Total IN: 950 mL    OUT:    Bulb: 37.5 mL    Bulb: 80 mL    Nasoenteral Tube: 100 mL    Other: 2000 mL    Voided: 250 mL  Total OUT: 2467.5 mL    Total NET: -1517.5 mL      22 Jun 2019 07:01  -  23 Jun 2019 04:54  --------------------------------------------------------  IN:  Total IN: 0 mL    OUT:    Bulb: 65 mL    Bulb: 75 mL  Total OUT: 140 mL    Total NET: -140 mL          MEDICATIONS  (STANDING):  acetaminophen   Tablet .. 975 milliGRAM(s) Oral every 6 hours  chlorhexidine 4% Liquid 1 Application(s) Topical <User Schedule>  cyclobenzaprine 5 milliGRAM(s) Oral at bedtime  dextrose 5%. 1000 milliLiter(s) (50 mL/Hr) IV Continuous <Continuous>  dextrose 50% Injectable 12.5 Gram(s) IV Push once  dextrose 50% Injectable 25 Gram(s) IV Push once  dextrose 50% Injectable 25 Gram(s) IV Push once  heparin  Injectable 5000 Unit(s) SubCutaneous every 8 hours  hydrALAZINE 50 milliGRAM(s) Oral three times a day  insulin lispro (HumaLOG) corrective regimen sliding scale   SubCutaneous three times a day before meals  insulin lispro (HumaLOG) corrective regimen sliding scale   SubCutaneous at bedtime  pantoprazole    Tablet 40 milliGRAM(s) Oral before breakfast  psyllium Powder 1 Packet(s) Oral daily  sodium chloride 0.9%. 1000 milliLiter(s) (30 mL/Hr) IV Continuous <Continuous>    MEDICATIONS  (PRN):  dextrose 40% Gel 15 Gram(s) Oral once PRN Blood Glucose LESS THAN 70 milliGRAM(s)/deciliter  glucagon  Injectable 1 milliGRAM(s) IntraMuscular once PRN Glucose LESS THAN 70 milligrams/deciliter  oxyCODONE    IR 5 milliGRAM(s) Oral every 4 hours PRN Moderate Pain (4 - 6)  oxyCODONE    IR 10 milliGRAM(s) Oral every 4 hours PRN Severe Pain (7 - 10)      Physical Exam:  General Appearance: in NAD.   Respiratory: No labored breathing  Abdomen: Soft, nontender, midline incision c/d/i, j-tube in place, JPs on left and right leobardo-abdomen with SS output    Labs:    06-22    138  |  97<L>  |  22  ----------------------------<  138<H>  3.4<L>   |  24  |  2.40<H>    Ca    8.8      22 Jun 2019 06:08  Phos  3.8     06-22  Mg     2.4     06-22    TPro  6.6  /  Alb  2.8<L>  /  TBili  1.0  /  DBili  x   /  AST  13  /  ALT  10  /  AlkPhos  76  06-22    LIVER FUNCTIONS - ( 22 Jun 2019 06:08 )  Alb: 2.8 g/dL / Pro: 6.6 g/dL / ALK PHOS: 76 u/L / ALT: 10 u/L / AST: 13 u/L / GGT: x                                 10.1   10.93 )-----------( 179      ( 22 Jun 2019 06:08 )             31.9     PT/INR - ( 21 Jun 2019 06:55 )   PT: 12.5 SEC;   INR: 1.12          PTT - ( 21 Jun 2019 06:55 )  PTT:31.5 SEC    Imaging: Surgery Progress Note     Subjective/24hour Events:   Patient seen and examined.   No acute events overnight.   Pain controlled.   Tolerating diet without N/V.   Passing flatus, having BM.   Up in chair, ambulating.    Vital Signs:  Vital Signs Last 24 Hrs  T(C): 36.8 (23 Jun 2019 00:27), Max: 37.1 (22 Jun 2019 20:55)  T(F): 98.3 (23 Jun 2019 00:27), Max: 98.8 (22 Jun 2019 20:55)  HR: 60 (23 Jun 2019 00:27) (55 - 65)  BP: 158/62 (23 Jun 2019 00:27) (147/56 - 166/58)  BP(mean): --  RR: 18 (23 Jun 2019 00:27) (17 - 18)  SpO2: 98% (23 Jun 2019 00:27) (95% - 99%)    CAPILLARY BLOOD GLUCOSE      POCT Blood Glucose.: 230 mg/dL (22 Jun 2019 21:10)  POCT Blood Glucose.: 232 mg/dL (22 Jun 2019 17:26)  POCT Blood Glucose.: 215 mg/dL (22 Jun 2019 12:44)  POCT Blood Glucose.: 134 mg/dL (22 Jun 2019 05:47)      I&O's Detail    21 Jun 2019 07:01  -  22 Jun 2019 07:00  --------------------------------------------------------  IN:    IV PiggyBack: 100 mL    Other: 550 mL    sodium chloride 0.9%.: 300 mL  Total IN: 950 mL    OUT:    Bulb: 37.5 mL    Bulb: 80 mL    Nasoenteral Tube: 100 mL    Other: 2000 mL    Voided: 250 mL  Total OUT: 2467.5 mL    Total NET: -1517.5 mL      22 Jun 2019 07:01  -  23 Jun 2019 04:54  --------------------------------------------------------  IN:  Total IN: 0 mL    OUT:    Bulb: 65 mL    Bulb: 75 mL  Total OUT: 140 mL    Total NET: -140 mL          MEDICATIONS  (STANDING):  acetaminophen   Tablet .. 975 milliGRAM(s) Oral every 6 hours  chlorhexidine 4% Liquid 1 Application(s) Topical <User Schedule>  cyclobenzaprine 5 milliGRAM(s) Oral at bedtime  dextrose 5%. 1000 milliLiter(s) (50 mL/Hr) IV Continuous <Continuous>  dextrose 50% Injectable 12.5 Gram(s) IV Push once  dextrose 50% Injectable 25 Gram(s) IV Push once  dextrose 50% Injectable 25 Gram(s) IV Push once  heparin  Injectable 5000 Unit(s) SubCutaneous every 8 hours  hydrALAZINE 50 milliGRAM(s) Oral three times a day  insulin lispro (HumaLOG) corrective regimen sliding scale   SubCutaneous three times a day before meals  insulin lispro (HumaLOG) corrective regimen sliding scale   SubCutaneous at bedtime  pantoprazole    Tablet 40 milliGRAM(s) Oral before breakfast  psyllium Powder 1 Packet(s) Oral daily  sodium chloride 0.9%. 1000 milliLiter(s) (30 mL/Hr) IV Continuous <Continuous>    MEDICATIONS  (PRN):  dextrose 40% Gel 15 Gram(s) Oral once PRN Blood Glucose LESS THAN 70 milliGRAM(s)/deciliter  glucagon  Injectable 1 milliGRAM(s) IntraMuscular once PRN Glucose LESS THAN 70 milligrams/deciliter  oxyCODONE    IR 5 milliGRAM(s) Oral every 4 hours PRN Moderate Pain (4 - 6)  oxyCODONE    IR 10 milliGRAM(s) Oral every 4 hours PRN Severe Pain (7 - 10)      Physical Exam:  General Appearance: in NAD.   Respiratory: No labored breathing  Abdomen: Soft, nontender, midline incision c/d/i, j-tube in place, JPs on left and right leobardo-abdomen with minimal SS output    Labs:    06-22    138  |  97<L>  |  22  ----------------------------<  138<H>  3.4<L>   |  24  |  2.40<H>    Ca    8.8      22 Jun 2019 06:08  Phos  3.8     06-22  Mg     2.4     06-22    TPro  6.6  /  Alb  2.8<L>  /  TBili  1.0  /  DBili  x   /  AST  13  /  ALT  10  /  AlkPhos  76  06-22    LIVER FUNCTIONS - ( 22 Jun 2019 06:08 )  Alb: 2.8 g/dL / Pro: 6.6 g/dL / ALK PHOS: 76 u/L / ALT: 10 u/L / AST: 13 u/L / GGT: x                                 10.1   10.93 )-----------( 179      ( 22 Jun 2019 06:08 )             31.9     PT/INR - ( 21 Jun 2019 06:55 )   PT: 12.5 SEC;   INR: 1.12          PTT - ( 21 Jun 2019 06:55 )  PTT:31.5 SEC    Imaging: Surgery Progress Note     Subjective/24hour Events:   Patient seen and examined.   No acute events overnight.   Pain controlled w/ PO medications  Tolerating diet without N/V.   Passing flatus, having BM.   Up in chair, ambulating.    Vital Signs:  Vital Signs Last 24 Hrs  T(C): 36.8 (23 Jun 2019 00:27), Max: 37.1 (22 Jun 2019 20:55)  T(F): 98.3 (23 Jun 2019 00:27), Max: 98.8 (22 Jun 2019 20:55)  HR: 60 (23 Jun 2019 00:27) (55 - 65)  BP: 158/62 (23 Jun 2019 00:27) (147/56 - 166/58)  BP(mean): --  RR: 18 (23 Jun 2019 00:27) (17 - 18)  SpO2: 98% (23 Jun 2019 00:27) (95% - 99%)    CAPILLARY BLOOD GLUCOSE      POCT Blood Glucose.: 230 mg/dL (22 Jun 2019 21:10)  POCT Blood Glucose.: 232 mg/dL (22 Jun 2019 17:26)  POCT Blood Glucose.: 215 mg/dL (22 Jun 2019 12:44)  POCT Blood Glucose.: 134 mg/dL (22 Jun 2019 05:47)      I&O's Detail    21 Jun 2019 07:01  -  22 Jun 2019 07:00  --------------------------------------------------------  IN:    IV PiggyBack: 100 mL    Other: 550 mL    sodium chloride 0.9%.: 300 mL  Total IN: 950 mL    OUT:    Bulb: 37.5 mL    Bulb: 80 mL    Nasoenteral Tube: 100 mL    Other: 2000 mL    Voided: 250 mL  Total OUT: 2467.5 mL    Total NET: -1517.5 mL      22 Jun 2019 07:01  -  23 Jun 2019 04:54  --------------------------------------------------------  IN:  Total IN: 0 mL    OUT:    Bulb: 65 mL    Bulb: 75 mL  Total OUT: 140 mL    Total NET: -140 mL          MEDICATIONS  (STANDING):  acetaminophen   Tablet .. 975 milliGRAM(s) Oral every 6 hours  chlorhexidine 4% Liquid 1 Application(s) Topical <User Schedule>  cyclobenzaprine 5 milliGRAM(s) Oral at bedtime  dextrose 5%. 1000 milliLiter(s) (50 mL/Hr) IV Continuous <Continuous>  dextrose 50% Injectable 12.5 Gram(s) IV Push once  dextrose 50% Injectable 25 Gram(s) IV Push once  dextrose 50% Injectable 25 Gram(s) IV Push once  heparin  Injectable 5000 Unit(s) SubCutaneous every 8 hours  hydrALAZINE 50 milliGRAM(s) Oral three times a day  insulin lispro (HumaLOG) corrective regimen sliding scale   SubCutaneous three times a day before meals  insulin lispro (HumaLOG) corrective regimen sliding scale   SubCutaneous at bedtime  pantoprazole    Tablet 40 milliGRAM(s) Oral before breakfast  psyllium Powder 1 Packet(s) Oral daily  sodium chloride 0.9%. 1000 milliLiter(s) (30 mL/Hr) IV Continuous <Continuous>    MEDICATIONS  (PRN):  dextrose 40% Gel 15 Gram(s) Oral once PRN Blood Glucose LESS THAN 70 milliGRAM(s)/deciliter  glucagon  Injectable 1 milliGRAM(s) IntraMuscular once PRN Glucose LESS THAN 70 milligrams/deciliter  oxyCODONE    IR 5 milliGRAM(s) Oral every 4 hours PRN Moderate Pain (4 - 6)  oxyCODONE    IR 10 milliGRAM(s) Oral every 4 hours PRN Severe Pain (7 - 10)      Physical Exam:  General Appearance: in NAD.   Respiratory: No labored breathing  Abdomen: Soft, nontender, midline incision c/d/i, j-tube in place, JPs on left and right leobardo-abdomen with minimal SS output    Labs:    06-22    138  |  97<L>  |  22  ----------------------------<  138<H>  3.4<L>   |  24  |  2.40<H>    Ca    8.8      22 Jun 2019 06:08  Phos  3.8     06-22  Mg     2.4     06-22    TPro  6.6  /  Alb  2.8<L>  /  TBili  1.0  /  DBili  x   /  AST  13  /  ALT  10  /  AlkPhos  76  06-22    LIVER FUNCTIONS - ( 22 Jun 2019 06:08 )  Alb: 2.8 g/dL / Pro: 6.6 g/dL / ALK PHOS: 76 u/L / ALT: 10 u/L / AST: 13 u/L / GGT: x                                 10.1   10.93 )-----------( 179      ( 22 Jun 2019 06:08 )             31.9     PT/INR - ( 21 Jun 2019 06:55 )   PT: 12.5 SEC;   INR: 1.12          PTT - ( 21 Jun 2019 06:55 )  PTT:31.5 SEC    Imaging:

## 2019-06-23 NOTE — PROGRESS NOTE ADULT - ASSESSMENT
64 y/o M w/ PMHx of CAD s/p 9 stents, DMT2 on insulin, HTN, GERD, diverticulosis, former smoker (30 PY), s/p cholecystectomy, biliary stricture s/p ERCP with sphincterotomy and stent (4/2019), and cholangiocarcinoma now s/p open Whipple and feeding j-tube placement on 6/18. NGT self-d/c'd on 6/21. Now passing flatus.    - Appreciate renal recs for SLOAN on CKD.   - Appreciate pain service recs. Current pain control regimen: standing IV tylenol, PCA, PRN PO flexeril.  - DVT ppx: SQH.  - C/w metoprolol 5 mg q6h.  - ISS, f/u FS.  - C/w PPI daily.  - Clear Liquid Diet, to advance as tolerated.   - Continue to monitor bowel function.   - OOB/early ambulation/IS.  - Dispo: PT recs home no needs.     D Team Surgical Oncology Pager #[phone] 62 y/o M w/ PMHx of CAD s/p 9 stents, DMT2 on insulin, HTN, GERD, diverticulosis, former smoker (30 PY), s/p cholecystectomy, biliary stricture s/p ERCP with sphincterotomy and stent (4/2019), and cholangiocarcinoma now s/p open Whipple and feeding j-tube placement on 6/18. NGT self-d/c'd on 6/21. Now passing flatus.    - Appreciate renal recs for SLOAN on CKD.   - Appreciate pain service recs. Current pain control regimen: Standing tylenol, oxycodone, PRN PO flexeril.  - DVT ppx: SQH.  - C/w metoprolol 5 mg q6h.  - ISS, f/u FS.  - C/w PPI daily.  - Advanced to regular diet  - Continue to monitor bowel function.   - OOB/early ambulation/IS.  - Dispo: PT recs home no needs.     D Team Surgical Oncology Pager #[phone] 62 y/o M w/ PMHx of CAD s/p 9 stents, DMT2 on insulin, HTN, GERD, diverticulosis, former smoker (30 PY), s/p cholecystectomy, biliary stricture s/p ERCP with sphincterotomy and stent (4/2019), and cholangiocarcinoma now s/p open Whipple and feeding j-tube placement on 6/18. NGT self-d/c'd on 6/21. Now passing flatus.    - Appreciate renal recs for SLOAN on CKD.   - Appreciate pain service recs. Current pain control regimen: Standing tylenol, oxycodone, PRN PO flexeril.  - DVT ppx: SQH.  - C/w metoprolol 5 mg q6h.  - ISS, f/u FS.  - C/w PPI daily.  - Advanced to regular diet  - Restarted home medications  - Continue to monitor bowel function.   - OOB/early ambulation/IS.  - Dispo: PT recs home no needs.     D Team Surgical Oncology Pager #[phone]

## 2019-06-24 DIAGNOSIS — Z29.9 ENCOUNTER FOR PROPHYLACTIC MEASURES, UNSPECIFIED: ICD-10-CM

## 2019-06-24 DIAGNOSIS — I10 ESSENTIAL (PRIMARY) HYPERTENSION: ICD-10-CM

## 2019-06-24 DIAGNOSIS — K83.1 OBSTRUCTION OF BILE DUCT: ICD-10-CM

## 2019-06-24 DIAGNOSIS — R05 COUGH: ICD-10-CM

## 2019-06-24 DIAGNOSIS — E11.9 TYPE 2 DIABETES MELLITUS WITHOUT COMPLICATIONS: ICD-10-CM

## 2019-06-24 LAB
ANION GAP SERPL CALC-SCNC: 12 MMO/L — SIGNIFICANT CHANGE UP (ref 7–14)
BUN SERPL-MCNC: 23 MG/DL — SIGNIFICANT CHANGE UP (ref 7–23)
CALCIUM SERPL-MCNC: 8 MG/DL — LOW (ref 8.4–10.5)
CHLORIDE SERPL-SCNC: 98 MMOL/L — SIGNIFICANT CHANGE UP (ref 98–107)
CO2 SERPL-SCNC: 26 MMOL/L — SIGNIFICANT CHANGE UP (ref 22–31)
CREAT SERPL-MCNC: 2.13 MG/DL — HIGH (ref 0.5–1.3)
GLUCOSE BLDC GLUCOMTR-MCNC: 102 MG/DL — HIGH (ref 70–99)
GLUCOSE BLDC GLUCOMTR-MCNC: 119 MG/DL — HIGH (ref 70–99)
GLUCOSE BLDC GLUCOMTR-MCNC: 127 MG/DL — HIGH (ref 70–99)
GLUCOSE BLDC GLUCOMTR-MCNC: 155 MG/DL — HIGH (ref 70–99)
GLUCOSE BLDC GLUCOMTR-MCNC: 87 MG/DL — SIGNIFICANT CHANGE UP (ref 70–99)
GLUCOSE SERPL-MCNC: 124 MG/DL — HIGH (ref 70–99)
HCT VFR BLD CALC: 28.4 % — LOW (ref 39–50)
HGB BLD-MCNC: 9.1 G/DL — LOW (ref 13–17)
MAGNESIUM SERPL-MCNC: 1.8 MG/DL — SIGNIFICANT CHANGE UP (ref 1.6–2.6)
MCHC RBC-ENTMCNC: 29.2 PG — SIGNIFICANT CHANGE UP (ref 27–34)
MCHC RBC-ENTMCNC: 32 % — SIGNIFICANT CHANGE UP (ref 32–36)
MCV RBC AUTO: 91 FL — SIGNIFICANT CHANGE UP (ref 80–100)
NRBC # FLD: 0 K/UL — SIGNIFICANT CHANGE UP (ref 0–0)
PHOSPHATE SERPL-MCNC: 2.4 MG/DL — LOW (ref 2.5–4.5)
PLATELET # BLD AUTO: 173 K/UL — SIGNIFICANT CHANGE UP (ref 150–400)
PMV BLD: 9.7 FL — SIGNIFICANT CHANGE UP (ref 7–13)
POTASSIUM SERPL-MCNC: 3.1 MMOL/L — LOW (ref 3.5–5.3)
POTASSIUM SERPL-SCNC: 3.1 MMOL/L — LOW (ref 3.5–5.3)
RBC # BLD: 3.12 M/UL — LOW (ref 4.2–5.8)
RBC # FLD: 14.5 % — SIGNIFICANT CHANGE UP (ref 10.3–14.5)
SODIUM SERPL-SCNC: 136 MMOL/L — SIGNIFICANT CHANGE UP (ref 135–145)
WBC # BLD: 9.05 K/UL — SIGNIFICANT CHANGE UP (ref 3.8–10.5)
WBC # FLD AUTO: 9.05 K/UL — SIGNIFICANT CHANGE UP (ref 3.8–10.5)

## 2019-06-24 PROCEDURE — 71045 X-RAY EXAM CHEST 1 VIEW: CPT | Mod: 26

## 2019-06-24 PROCEDURE — 99233 SBSQ HOSP IP/OBS HIGH 50: CPT

## 2019-06-24 PROCEDURE — 99233 SBSQ HOSP IP/OBS HIGH 50: CPT | Mod: GC

## 2019-06-24 RX ORDER — DOCUSATE SODIUM 100 MG
100 CAPSULE ORAL THREE TIMES A DAY
Refills: 0 | Status: DISCONTINUED | OUTPATIENT
Start: 2019-06-24 | End: 2019-06-26

## 2019-06-24 RX ORDER — FUROSEMIDE 40 MG
40 TABLET ORAL DAILY
Refills: 0 | Status: DISCONTINUED | OUTPATIENT
Start: 2019-06-25 | End: 2019-06-26

## 2019-06-24 RX ORDER — OXYCODONE HYDROCHLORIDE 5 MG/1
5 TABLET ORAL ONCE
Refills: 0 | Status: DISCONTINUED | OUTPATIENT
Start: 2019-06-24 | End: 2019-06-24

## 2019-06-24 RX ADMIN — CHLORHEXIDINE GLUCONATE 1 APPLICATION(S): 213 SOLUTION TOPICAL at 14:55

## 2019-06-24 RX ADMIN — OXYCODONE HYDROCHLORIDE 10 MILLIGRAM(S): 5 TABLET ORAL at 14:30

## 2019-06-24 RX ADMIN — CARVEDILOL PHOSPHATE 6.25 MILLIGRAM(S): 80 CAPSULE, EXTENDED RELEASE ORAL at 17:33

## 2019-06-24 RX ADMIN — OXYCODONE HYDROCHLORIDE 5 MILLIGRAM(S): 5 TABLET ORAL at 15:45

## 2019-06-24 RX ADMIN — Medication 1 PACKET(S): at 14:57

## 2019-06-24 RX ADMIN — Medication 975 MILLIGRAM(S): at 05:07

## 2019-06-24 RX ADMIN — PANTOPRAZOLE SODIUM 40 MILLIGRAM(S): 20 TABLET, DELAYED RELEASE ORAL at 08:06

## 2019-06-24 RX ADMIN — HEPARIN SODIUM 5000 UNIT(S): 5000 INJECTION INTRAVENOUS; SUBCUTANEOUS at 22:29

## 2019-06-24 RX ADMIN — OXYCODONE HYDROCHLORIDE 10 MILLIGRAM(S): 5 TABLET ORAL at 13:22

## 2019-06-24 RX ADMIN — OXYCODONE HYDROCHLORIDE 10 MILLIGRAM(S): 5 TABLET ORAL at 22:29

## 2019-06-24 RX ADMIN — Medication 975 MILLIGRAM(S): at 23:29

## 2019-06-24 RX ADMIN — Medication 50 MILLIGRAM(S): at 22:29

## 2019-06-24 RX ADMIN — Medication 975 MILLIGRAM(S): at 13:42

## 2019-06-24 RX ADMIN — ATORVASTATIN CALCIUM 40 MILLIGRAM(S): 80 TABLET, FILM COATED ORAL at 22:29

## 2019-06-24 RX ADMIN — Medication 975 MILLIGRAM(S): at 11:43

## 2019-06-24 RX ADMIN — OXYCODONE HYDROCHLORIDE 5 MILLIGRAM(S): 5 TABLET ORAL at 16:36

## 2019-06-24 RX ADMIN — Medication 50 MILLIGRAM(S): at 14:57

## 2019-06-24 RX ADMIN — CYCLOBENZAPRINE HYDROCHLORIDE 5 MILLIGRAM(S): 10 TABLET, FILM COATED ORAL at 22:29

## 2019-06-24 RX ADMIN — Medication 975 MILLIGRAM(S): at 05:40

## 2019-06-24 RX ADMIN — HEPARIN SODIUM 5000 UNIT(S): 5000 INJECTION INTRAVENOUS; SUBCUTANEOUS at 05:07

## 2019-06-24 RX ADMIN — Medication 975 MILLIGRAM(S): at 17:34

## 2019-06-24 RX ADMIN — OXYCODONE HYDROCHLORIDE 10 MILLIGRAM(S): 5 TABLET ORAL at 23:00

## 2019-06-24 RX ADMIN — OXYCODONE HYDROCHLORIDE 10 MILLIGRAM(S): 5 TABLET ORAL at 09:24

## 2019-06-24 RX ADMIN — HEPARIN SODIUM 5000 UNIT(S): 5000 INJECTION INTRAVENOUS; SUBCUTANEOUS at 14:56

## 2019-06-24 RX ADMIN — Medication 100 MILLIGRAM(S): at 22:29

## 2019-06-24 RX ADMIN — INSULIN GLARGINE 10 UNIT(S): 100 INJECTION, SOLUTION SUBCUTANEOUS at 22:26

## 2019-06-24 NOTE — PROGRESS NOTE ADULT - PROBLEM SELECTOR PLAN 3
Patient with anemia in setting of ESRD and recent blood loss. Latest Hb noted to be be below target range. will monitor cbc.

## 2019-06-24 NOTE — CONSULT NOTE ADULT - ASSESSMENT
63 year old male with SLOAN on CKD presumed to be from ATN and is currently dialysis dependent.
63M hx CAD s/p 9 stents, DMT2 on insulin, HTN, GERD, prior cholecystectomy, diverticulosis, former smoker(30PY), biliary stricture s/p ERCP with sphincterotomy and stent 4/2019, course c/b ascending cholangitis/septic shock from E.Coli bacteremia requiring pressor support and initiation of HD, presented on June 18th for Whipple procedure.  Medicine was consulted for evaluation of productive cough, which at this time does not seem to be consistent with infectious process.

## 2019-06-24 NOTE — CONSULT NOTE ADULT - PROBLEM SELECTOR RECOMMENDATION 5
- Septic shock/ATN mediated, initiated on HD last admission, now tolerating routine HD   - Care per renal, pt hopeful that he will not have to permanently be on HD  - Nonoliguric; avoid nephrotoxins

## 2019-06-24 NOTE — PROGRESS NOTE ADULT - SUBJECTIVE AND OBJECTIVE BOX
D Team Surgery    SUBJECTIVE: Pt seen and examined at bedside.  Pt doing well.  Familia reg diet.  Denies N/V.  +/- GI function.  C/o cough, would like to be seen by medicine.  No other complaints at this time.    OBJECTIVE: T(C): 36.8 (06-24-19 @ 05:05), Max: 37.7 (06-23-19 @ 22:37)  HR: 64 (06-24-19 @ 05:05) (59 - 72)  BP: 153/65 (06-24-19 @ 05:05) (146/52 - 158/61)  RR: 18 (06-24-19 @ 05:05) (17 - 18)  SpO2: 98% (06-24-19 @ 05:05) (96% - 99%)  Wt(kg): --  I&O's Summary    23 Jun 2019 07:01  -  24 Jun 2019 07:00  --------------------------------------------------------  IN: 0 mL / OUT: 890 mL / NET: -890 mL      I&O's Detail    23 Jun 2019 07:01  -  24 Jun 2019 07:00  --------------------------------------------------------  IN:  Total IN: 0 mL    OUT:    Bulb: 55 mL    Bulb: 155 mL    Voided: 680 mL  Total OUT: 890 mL    Total NET: -890 mL        General: NAD  Abdomen: soft, NT, ND, J tube in place.  JPx2 serosang    MEDICATIONS  (STANDING):  acetaminophen   Tablet .. 975 milliGRAM(s) Oral every 6 hours  atorvastatin 40 milliGRAM(s) Oral at bedtime  carvedilol 6.25 milliGRAM(s) Oral every 12 hours  chlorhexidine 4% Liquid 1 Application(s) Topical <User Schedule>  cyclobenzaprine 5 milliGRAM(s) Oral at bedtime  dextrose 5%. 1000 milliLiter(s) (50 mL/Hr) IV Continuous <Continuous>  dextrose 50% Injectable 12.5 Gram(s) IV Push once  dextrose 50% Injectable 25 Gram(s) IV Push once  dextrose 50% Injectable 25 Gram(s) IV Push once  heparin  Injectable 5000 Unit(s) SubCutaneous every 8 hours  hydrALAZINE 50 milliGRAM(s) Oral three times a day  insulin glargine Injectable (LANTUS) 10 Unit(s) SubCutaneous at bedtime  insulin lispro (HumaLOG) corrective regimen sliding scale   SubCutaneous three times a day before meals  insulin lispro (HumaLOG) corrective regimen sliding scale   SubCutaneous at bedtime  pantoprazole    Tablet 40 milliGRAM(s) Oral before breakfast  psyllium Powder 1 Packet(s) Oral daily  sodium chloride 0.9%. 1000 milliLiter(s) (30 mL/Hr) IV Continuous <Continuous>    MEDICATIONS  (PRN):  dextrose 40% Gel 15 Gram(s) Oral once PRN Blood Glucose LESS THAN 70 milliGRAM(s)/deciliter  glucagon  Injectable 1 milliGRAM(s) IntraMuscular once PRN Glucose LESS THAN 70 milligrams/deciliter  oxyCODONE    IR 5 milliGRAM(s) Oral every 4 hours PRN Moderate Pain (4 - 6)  oxyCODONE    IR 10 milliGRAM(s) Oral every 4 hours PRN Severe Pain (7 - 10)      LABS:                        9.5    8.49  )-----------( 165      ( 23 Jun 2019 06:01 )             29.4     06-23    135  |  95<L>  |  25<H>  ----------------------------<  186<H>  3.2<L>   |  23  |  2.44<H>    Ca    8.4      23 Jun 2019 06:01  Phos  2.3     06-23  Mg     2.0     06-23

## 2019-06-24 NOTE — CONSULT NOTE ADULT - PROBLEM SELECTOR RECOMMENDATION 9
Patient with history of SLOAN on CKD in setting of sepsis, hypotension, and NSTEMI and is currently dialysis dependent. Last HD was done on 6/17/19 at outpatient HD center. Labs reviewed from today. Patient is clinically stable. Plan for HD today. Monitor BMP daily.
- Low suspicion of acute infectious process, but will return to perform bedside sonogram once pt is out of HD.  If sono with suspicious findings, will likely recommend further imaging (i.e. CXR vs CT chest)   - Pt without leukocytosis/fevers would monitor off abx.  If any additional URI symptoms develop, would check RVP  - Encouraged frequent use of incentive spirometer (more than 3x daily that he was doing)

## 2019-06-24 NOTE — CONSULT NOTE ADULT - PROBLEM SELECTOR RECOMMENDATION 3
- - FSG well controlled, started on Lantus 10U last night  - Continue HISS   - A1c well controlled 5.6

## 2019-06-24 NOTE — CONSULT NOTE ADULT - PROBLEM SELECTOR RECOMMENDATION 2
- s/p Whsara, surg path still pending  - Care per surgical team  - Pt self d/c'd NGT, now tolerating PO diet and passing flatus - s/p Whipple, surg path still pending  - Care per surgical team  - Pt self d/c'd NGT, now tolerating PO diet and passing flatus  - Pain control per surgical team/pain management

## 2019-06-24 NOTE — CHART NOTE - NSCHARTNOTEFT_GEN_A_CORE
Full physical exam performed after HD (addendum to consult note from earlier today):    GENERAL: NAD, well-developed  EYES: EOMI, PERRLA, conjunctiva and sclera clear  NECK: Supple, No JVD  CHEST/LUNG: Decreased breath sounds in bases, no wheezes/rales   HEART: Regular rate and rhythm; No murmurs, rubs, or gallops  ABDOMEN: Soft, +tenderness, midabdominal incision site C/D/I, YUMIKO drain with serosanguinous drainage, Nondistended; Bowel sounds present  EXTREMITIES:  2+ Peripheral Pulses, No clubbing, cyanosis; trace edema b/l ankles   PSYCH: AAOx3  NEUROLOGY: non-focal  SKIN: No rashes or lesions    Bedside ultrasound performed:  B-lines noted in mid to lower lung bases bilaterally  No consolidations noted in R lung fields  LLL atelectasis vs. hazy opacity.      Recommend:  Please check CXR PA/Lateral  Monitor off abx at this time, no clinical evidence of PNA.   Agree with starting Lasix  Care discussed with surgical CRISTINA Sparks.

## 2019-06-24 NOTE — PROGRESS NOTE ADULT - SUBJECTIVE AND OBJECTIVE BOX
[facility] Division of Kidney Diseases & Hypertension  FOLLOW UP NOTE  --------------------------------------------------------------------------------  HPI: 63-year-old male with medical history of CKD, CAD s/p 9 stents, DM, HTN, GERD, prior cholecystectomy, diverticulosis, biliary stricture is currently admitted for elective Whipple's procedure for pancreatis mass. Nephrology currently following patient for SLOAN on CKD. Patient was recently hospitalized in [facility] from 5/18/19 to 6/8/19 for management of septic shock from acute cholangitis and NSTEMI and developed oliguric SLOAN during hospital course. Patient was started on RRT and has been dialysis dependent.     Patient seen and examined at the bedside this morning prior to HD.  He feels well he has no chest pain no sob no nvd.    PAST HISTORY  --------------------------------------------------------------------------------  No significant changes to PMH, PSH, FHx, SHx, unless otherwise noted    ALLERGIES & MEDICATIONS  --------------------------------------------------------------------------------  Allergies    Cipro (Rash)  Plavix (Rash)    Intolerances      Standing Inpatient Medications  acetaminophen   Tablet .. 975 milliGRAM(s) Oral every 6 hours  atorvastatin 40 milliGRAM(s) Oral at bedtime  carvedilol 6.25 milliGRAM(s) Oral every 12 hours  chlorhexidine 4% Liquid 1 Application(s) Topical <User Schedule>  cyclobenzaprine 5 milliGRAM(s) Oral at bedtime  dextrose 5%. 1000 milliLiter(s) IV Continuous <Continuous>  dextrose 50% Injectable 12.5 Gram(s) IV Push once  dextrose 50% Injectable 25 Gram(s) IV Push once  dextrose 50% Injectable 25 Gram(s) IV Push once  heparin  Injectable 5000 Unit(s) SubCutaneous every 8 hours  hydrALAZINE 50 milliGRAM(s) Oral three times a day  insulin glargine Injectable (LANTUS) 10 Unit(s) SubCutaneous at bedtime  insulin lispro (HumaLOG) corrective regimen sliding scale   SubCutaneous three times a day before meals  insulin lispro (HumaLOG) corrective regimen sliding scale   SubCutaneous at bedtime  pantoprazole    Tablet 40 milliGRAM(s) Oral before breakfast  psyllium Powder 1 Packet(s) Oral daily    PRN Inpatient Medications  dextrose 40% Gel 15 Gram(s) Oral once PRN  glucagon  Injectable 1 milliGRAM(s) IntraMuscular once PRN  oxyCODONE    IR 5 milliGRAM(s) Oral every 4 hours PRN  oxyCODONE    IR 10 milliGRAM(s) Oral every 4 hours PRN      REVIEW OF SYSTEMS  --------------------------------------------------------------------------------  Gen: no fever  Respiratory: not sob  CV: no chest pain  GI:  not reporting abdominal pain. good appetite  : still urinating    VITALS/PHYSICAL EXAM  --------------------------------------------------------------------------------  T(C): 36.6 (06-24-19 @ 11:27), Max: 37.7 (06-23-19 @ 22:37)  HR: 60 (06-24-19 @ 11:27) (60 - 72)  BP: 177/76 (06-24-19 @ 11:27) (146/52 - 177/76)  RR: 18 (06-24-19 @ 11:27) (17 - 18)  SpO2: 98% (06-24-19 @ 05:05) (96% - 98%)  CVP(mm Hg): --        06-23-19 @ 07:01  -  06-24-19 @ 07:00  --------------------------------------------------------  IN: 0 mL / OUT: 890 mL / NET: -890 mL    06-24-19 @ 07:01  -  06-24-19 @ 13:29  --------------------------------------------------------  IN: 0 mL / OUT: 90 mL / NET: -90 mL      Physical Exam:  	             Gen: NAD  	HEENT: sclera anicteric   	Pulm: CTA B/L  	CV:  S1S2  	Abd: +BS, soft, multiple abdominal drains present.   	Ext: trace bilateral lower extremity edema  	Neuro: No focal deficits  	Skin: Warm, without rashes  	Vascular access: Right IJ tunneled HD catheter present.     LABS/STUDIES  --------------------------------------------------------------------------------              9.1    9.05  >-----------<  173      [06-24-19 @ 11:00]              28.4     136  |  98  |  23  ----------------------------<  124      [06-24-19 @ 11:00]  3.1   |  26  |  2.13        Ca     8.0     [06-24-19 @ 11:00]      Mg     1.8     [06-24-19 @ 11:00]      Phos  2.4     [06-24-19 @ 11:00]            Creatinine Trend:  SCr 2.13 [06-24 @ 11:00]  SCr 2.44 [06-23 @ 06:01]  SCr 2.40 [06-22 @ 06:08]  SCr 2.67 [06-21 @ 06:55]  SCr 2.44 [06-20 @ 07:00]        HbA1c 5.6      [06-23-19 @ 06:01]    HBsAg NEGATIVE      [06-21-19 @ 06:55]

## 2019-06-24 NOTE — PROGRESS NOTE ADULT - ASSESSMENT
64 y/o M w/ PMHx of CAD s/p 9 stents, DMT2 on insulin, HTN, GERD, diverticulosis, former smoker (30 PY), s/p cholecystectomy, biliary stricture s/p ERCP with sphincterotomy and stent (4/2019), and cholangiocarcinoma now s/p open Whipple and feeding j-tube placement on 6/18. NGT self-d/c'd on 6/21. Now passing flatus.    - Appreciate renal recs for SLOAN on CKD.   - Appreciate pain service recs. Current pain control regimen: Standing tylenol, oxycodone, PRN PO flexeril.  - DVT ppx: SQH.  - ISS, f/u FS.  - C/w PPI daily.  - Advanced to regular diet  - Restarted home medications  - Continue to monitor bowel function.   - OOB/early ambulation/IS.  - Dispo: PT recs home no needs.   - Spoke with medicine, will see today    D Team Surgical Oncology Pager #[phone]

## 2019-06-24 NOTE — CONSULT NOTE ADULT - REASON FOR ADMISSION
" I am having a whipple procedure"

## 2019-06-24 NOTE — CONSULT NOTE ADULT - PROBLEM SELECTOR RECOMMENDATION 4
- BP suboptimally controlled, however currently receiving HD  - Resume current meds and can uptitrate Hydralazine if remains uncontrolled.  BP may also be elevated in setting of pain

## 2019-06-24 NOTE — CONSULT NOTE ADULT - SUBJECTIVE AND OBJECTIVE BOX
63M hx CAD s/p 9 stents, DMT2 on insulin, HTN, GERD, prior cholecystectomy, diverticulosis, former smoker(30PY), biliary stricture s/p ERCP with sphincterotomy and stent 4/2019, course c/b ascending cholangitis/septic shock from E.Coli bacteremia requiring pressor support and initiation of HD, presented on June 18th for Whipple procedure.  Pt tolerated Whipple well and is now POD #6.  Medicine was consulted for progression of productive cough. 63M hx CAD s/p 9 stents, DMT2 on insulin, HTN, GERD, prior cholecystectomy, diverticulosis, former smoker(30PY), biliary stricture s/p ERCP with sphincterotomy and stent 4/2019, course c/b ascending cholangitis/septic shock from E.Coli bacteremia requiring pressor support and initiation of HD, presented on June 18th for Whipple procedure.  Pt tolerated Whipple well and is now POD #6.  Medicine was consulted for progression of productive cough.  Cough started day after procedure, initially a mild dry cough, but now described as congested cough. Pt is unable to expectorate any sputum.  Denies fevers/chills, N/V/D; has been using incentive spirometer 3x daily and has been trying to ambulate as much as possible.  Denies CP, LE edema, wheezing, SOB, and/or pleurisy.  On chart review, patient with no evidence of hypoxia       MEDICATIONS  (STANDING):  acetaminophen   Tablet .. 975 milliGRAM(s) Oral every 6 hours  atorvastatin 40 milliGRAM(s) Oral at bedtime  carvedilol 6.25 milliGRAM(s) Oral every 12 hours  chlorhexidine 4% Liquid 1 Application(s) Topical <User Schedule>  cyclobenzaprine 5 milliGRAM(s) Oral at bedtime  dextrose 5%. 1000 milliLiter(s) (50 mL/Hr) IV Continuous <Continuous>  dextrose 50% Injectable 12.5 Gram(s) IV Push once  dextrose 50% Injectable 25 Gram(s) IV Push once  dextrose 50% Injectable 25 Gram(s) IV Push once  heparin  Injectable 5000 Unit(s) SubCutaneous every 8 hours  hydrALAZINE 50 milliGRAM(s) Oral three times a day  insulin glargine Injectable (LANTUS) 10 Unit(s) SubCutaneous at bedtime  insulin lispro (HumaLOG) corrective regimen sliding scale   SubCutaneous three times a day before meals  insulin lispro (HumaLOG) corrective regimen sliding scale   SubCutaneous at bedtime  pantoprazole    Tablet 40 milliGRAM(s) Oral before breakfast  psyllium Powder 1 Packet(s) Oral daily    MEDICATIONS  (PRN):  dextrose 40% Gel 15 Gram(s) Oral once PRN Blood Glucose LESS THAN 70 milliGRAM(s)/deciliter  glucagon  Injectable 1 milliGRAM(s) IntraMuscular once PRN Glucose LESS THAN 70 milligrams/deciliter  oxyCODONE    IR 5 milliGRAM(s) Oral every 4 hours PRN Moderate Pain (4 - 6)  oxyCODONE    IR 10 milliGRAM(s) Oral every 4 hours PRN Severe Pain (7 - 10)      Vital Signs Last 24 Hrs  T(C): 36.8 (24 Jun 2019 05:05), Max: 37.7 (23 Jun 2019 22:37)  T(F): 98.2 (24 Jun 2019 05:05), Max: 99.9 (23 Jun 2019 22:37)  HR: 64 (24 Jun 2019 05:05) (59 - 72)  BP: 153/65 (24 Jun 2019 05:05) (146/52 - 158/61)  BP(mean): --  RR: 18 (24 Jun 2019 05:05) (17 - 18)  SpO2: 98% (24 Jun 2019 05:05) (96% - 99%)  CAPILLARY BLOOD GLUCOSE      POCT Blood Glucose.: 127 mg/dL (24 Jun 2019 09:17)  POCT Blood Glucose.: 119 mg/dL (24 Jun 2019 05:13)  POCT Blood Glucose.: 226 mg/dL (23 Jun 2019 21:41)  POCT Blood Glucose.: 142 mg/dL (23 Jun 2019 17:32)  POCT Blood Glucose.: 211 mg/dL (23 Jun 2019 12:39)    I&O's Summary    23 Jun 2019 07:01  -  24 Jun 2019 07:00  --------------------------------------------------------  IN: 0 mL / OUT: 890 mL / NET: -890 mL      PHYSICAL EXAM  GENERAL: NAD, well-developed, lying supine in stretched while in HD   Will perform physical exam and bedside sonogram once patient is done with HD     LABS:                        9.1    9.05  )-----------( 173      ( 24 Jun 2019 11:00 )             28.4     06-23    135  |  95<L>  |  25<H>  ----------------------------<  186<H>  3.2<L>   |  23  |  2.44<H>    Ca    8.4      23 Jun 2019 06:01  Phos  2.3     06-23  Mg     2.0     06-23    Blood Gas Profile w/Lytes - Arterial (06.18.19 @ 15:44)    pH, Arterial: 7.47 pH    pCO2, Arterial: 34 mmHg    pO2, Arterial: 227 mmHg    HCO3, Arterial: 25 mmol/L    Base Excess, Arterial: 0.8: BASE EXCESS: REFERENCE RANGE = 0 (+/-) 2 mmol/l mmol/L    Oxygen Saturation, Arterial: 99.8 %    Blood Gas Arterial - Sodium: 136 mmol/L    Blood Gas Arterial - Potassium: 3.9 mmol/L    Blood Gas Arterial - Glucose: 241 mg/dL    Blood Gas Arterial - Hemoglobin: 10.5 g/dL    Blood Gas Arterial - Hematocrit: 32.4: Delta: 26.6 on 06/18/  Delta: 26.6 on 06/18/ %      RADIOLOGY & ADDITIONAL TESTS:    Imaging Personally Reviewed:  < from: Xray Abdomen 1 View PORTABLE -Routine (06.19.19 @ 14:45) >  IMPRESSION:     Injected contrast through the percutaneous jejunostomy tube opacifies the   loops of jejunum.    No evidence of obvious contrast extravasation.    < end of copied text >    < from: TTE with Doppler (w/Cont) (06.05.19 @ 15:28) >  CONCLUSIONS:  Limited repeat study to re-evaluate left ventricular  systolic function.  Endocardium not well visualized;  grossly mild segmental left ventricular systolic  dysfunction.  Hypokinesis of the apex.  Endocardial  visualization enhancedwith intravenous injection of echo  contrast (Definity).  No LV thrombus seen.  *** Compared with echocardiogram of 5/21/2019, left  ventricular systolic function has improved.    < end of copied text > 63M hx CAD s/p 9 stents, DMT2 on insulin, HTN, GERD, prior cholecystectomy, diverticulosis, former smoker(30PY), biliary stricture s/p ERCP with sphincterotomy and stent 4/2019, course c/b ascending cholangitis/septic shock from E.Coli bacteremia requiring pressor support and initiation of HD, presented on June 18th for Whipple procedure.  Pt tolerated Whipple well and is now POD #6.  Medicine was consulted for progression of productive cough.  Cough started day after procedure, initially a mild dry cough, but now described as congested cough. Pt is unable to expectorate any sputum.  Denies fevers/chills, N/V/D; has been using incentive spirometer 3x daily and has been trying to ambulate as much as possible.  Denies CP, LE edema, wheezing, SOB, and/or pleurisy.  Denies other URI/flu symptoms such as sore throat, rhinorrhea, earache, headache, myalgias.  On chart review, patient with no evidence of hypoxia and currently is comfortable, lying on stretcher in HD speaking in full sentences.       MEDICATIONS  (STANDING):  acetaminophen   Tablet .. 975 milliGRAM(s) Oral every 6 hours  atorvastatin 40 milliGRAM(s) Oral at bedtime  carvedilol 6.25 milliGRAM(s) Oral every 12 hours  chlorhexidine 4% Liquid 1 Application(s) Topical <User Schedule>  cyclobenzaprine 5 milliGRAM(s) Oral at bedtime  dextrose 5%. 1000 milliLiter(s) (50 mL/Hr) IV Continuous <Continuous>  dextrose 50% Injectable 12.5 Gram(s) IV Push once  dextrose 50% Injectable 25 Gram(s) IV Push once  dextrose 50% Injectable 25 Gram(s) IV Push once  heparin  Injectable 5000 Unit(s) SubCutaneous every 8 hours  hydrALAZINE 50 milliGRAM(s) Oral three times a day  insulin glargine Injectable (LANTUS) 10 Unit(s) SubCutaneous at bedtime  insulin lispro (HumaLOG) corrective regimen sliding scale   SubCutaneous three times a day before meals  insulin lispro (HumaLOG) corrective regimen sliding scale   SubCutaneous at bedtime  pantoprazole    Tablet 40 milliGRAM(s) Oral before breakfast  psyllium Powder 1 Packet(s) Oral daily    MEDICATIONS  (PRN):  dextrose 40% Gel 15 Gram(s) Oral once PRN Blood Glucose LESS THAN 70 milliGRAM(s)/deciliter  glucagon  Injectable 1 milliGRAM(s) IntraMuscular once PRN Glucose LESS THAN 70 milligrams/deciliter  oxyCODONE    IR 5 milliGRAM(s) Oral every 4 hours PRN Moderate Pain (4 - 6)  oxyCODONE    IR 10 milliGRAM(s) Oral every 4 hours PRN Severe Pain (7 - 10)      Vital Signs Last 24 Hrs  T(C): 36.8 (24 Jun 2019 05:05), Max: 37.7 (23 Jun 2019 22:37)  T(F): 98.2 (24 Jun 2019 05:05), Max: 99.9 (23 Jun 2019 22:37)  HR: 64 (24 Jun 2019 05:05) (59 - 72)  BP: 153/65 (24 Jun 2019 05:05) (146/52 - 158/61)  BP(mean): --  RR: 18 (24 Jun 2019 05:05) (17 - 18)  SpO2: 98% (24 Jun 2019 05:05) (96% - 99%)  CAPILLARY BLOOD GLUCOSE      POCT Blood Glucose.: 127 mg/dL (24 Jun 2019 09:17)  POCT Blood Glucose.: 119 mg/dL (24 Jun 2019 05:13)  POCT Blood Glucose.: 226 mg/dL (23 Jun 2019 21:41)  POCT Blood Glucose.: 142 mg/dL (23 Jun 2019 17:32)  POCT Blood Glucose.: 211 mg/dL (23 Jun 2019 12:39)    I&O's Summary    23 Jun 2019 07:01  -  24 Jun 2019 07:00  --------------------------------------------------------  IN: 0 mL / OUT: 890 mL / NET: -890 mL      PHYSICAL EXAM  GENERAL: NAD, well-developed, lying supine in stretched while in HD   Will perform physical exam and bedside sonogram once patient is done with HD     LABS:                        9.1    9.05  )-----------( 173      ( 24 Jun 2019 11:00 )             28.4     06-23    135  |  95<L>  |  25<H>  ----------------------------<  186<H>  3.2<L>   |  23  |  2.44<H>    Ca    8.4      23 Jun 2019 06:01  Phos  2.3     06-23  Mg     2.0     06-23    Blood Gas Profile w/Lytes - Arterial (06.18.19 @ 15:44)    pH, Arterial: 7.47 pH    pCO2, Arterial: 34 mmHg    pO2, Arterial: 227 mmHg    HCO3, Arterial: 25 mmol/L    Base Excess, Arterial: 0.8: BASE EXCESS: REFERENCE RANGE = 0 (+/-) 2 mmol/l mmol/L    Oxygen Saturation, Arterial: 99.8 %    Blood Gas Arterial - Sodium: 136 mmol/L    Blood Gas Arterial - Potassium: 3.9 mmol/L    Blood Gas Arterial - Glucose: 241 mg/dL    Blood Gas Arterial - Hemoglobin: 10.5 g/dL    Blood Gas Arterial - Hematocrit: 32.4: Delta: 26.6 on 06/18/  Delta: 26.6 on 06/18/ %      RADIOLOGY & ADDITIONAL TESTS:    Imaging Personally Reviewed:  < from: Xray Abdomen 1 View PORTABLE -Routine (06.19.19 @ 14:45) >  IMPRESSION:     Injected contrast through the percutaneous jejunostomy tube opacifies the   loops of jejunum.    No evidence of obvious contrast extravasation.    < end of copied text >    < from: TTE with Doppler (w/Cont) (06.05.19 @ 15:28) >  CONCLUSIONS:  Limited repeat study to re-evaluate left ventricular  systolic function.  Endocardium not well visualized;  grossly mild segmental left ventricular systolic  dysfunction.  Hypokinesis of the apex.  Endocardial  visualization enhancedwith intravenous injection of echo  contrast (Definity).  No LV thrombus seen.  *** Compared with echocardiogram of 5/21/2019, left  ventricular systolic function has improved.    < end of copied text >

## 2019-06-25 ENCOUNTER — TRANSCRIPTION ENCOUNTER (OUTPATIENT)
Age: 64
End: 2019-06-25

## 2019-06-25 LAB
ALBUMIN SERPL ELPH-MCNC: 2.7 G/DL — LOW (ref 3.3–5)
ALP SERPL-CCNC: 88 U/L — SIGNIFICANT CHANGE UP (ref 40–120)
ALT FLD-CCNC: 10 U/L — SIGNIFICANT CHANGE UP (ref 4–41)
AMYLASE FLD-CCNC: 24 U/L — SIGNIFICANT CHANGE UP
ANION GAP SERPL CALC-SCNC: 17 MMO/L — HIGH (ref 7–14)
AST SERPL-CCNC: 23 U/L — SIGNIFICANT CHANGE UP (ref 4–40)
BILIRUB SERPL-MCNC: 0.7 MG/DL — SIGNIFICANT CHANGE UP (ref 0.2–1.2)
BUN SERPL-MCNC: 15 MG/DL — SIGNIFICANT CHANGE UP (ref 7–23)
CALCIUM SERPL-MCNC: 8.5 MG/DL — SIGNIFICANT CHANGE UP (ref 8.4–10.5)
CHLORIDE SERPL-SCNC: 93 MMOL/L — LOW (ref 98–107)
CO2 SERPL-SCNC: 24 MMOL/L — SIGNIFICANT CHANGE UP (ref 22–31)
CREAT SERPL-MCNC: 1.73 MG/DL — HIGH (ref 0.5–1.3)
GLUCOSE BLDC GLUCOMTR-MCNC: 134 MG/DL — HIGH (ref 70–99)
GLUCOSE BLDC GLUCOMTR-MCNC: 138 MG/DL — HIGH (ref 70–99)
GLUCOSE BLDC GLUCOMTR-MCNC: 141 MG/DL — HIGH (ref 70–99)
GLUCOSE BLDC GLUCOMTR-MCNC: 161 MG/DL — HIGH (ref 70–99)
GLUCOSE SERPL-MCNC: 135 MG/DL — HIGH (ref 70–99)
HCT VFR BLD CALC: 34.3 % — LOW (ref 39–50)
HGB BLD-MCNC: 11 G/DL — LOW (ref 13–17)
MAGNESIUM SERPL-MCNC: 1.7 MG/DL — SIGNIFICANT CHANGE UP (ref 1.6–2.6)
MCHC RBC-ENTMCNC: 29.3 PG — SIGNIFICANT CHANGE UP (ref 27–34)
MCHC RBC-ENTMCNC: 32.1 % — SIGNIFICANT CHANGE UP (ref 32–36)
MCV RBC AUTO: 91.5 FL — SIGNIFICANT CHANGE UP (ref 80–100)
NRBC # FLD: 0 K/UL — SIGNIFICANT CHANGE UP (ref 0–0)
PHOSPHATE SERPL-MCNC: 2.6 MG/DL — SIGNIFICANT CHANGE UP (ref 2.5–4.5)
PLATELET # BLD AUTO: 238 K/UL — SIGNIFICANT CHANGE UP (ref 150–400)
PMV BLD: 10.3 FL — SIGNIFICANT CHANGE UP (ref 7–13)
POTASSIUM SERPL-MCNC: 3.9 MMOL/L — SIGNIFICANT CHANGE UP (ref 3.5–5.3)
POTASSIUM SERPL-SCNC: 3.9 MMOL/L — SIGNIFICANT CHANGE UP (ref 3.5–5.3)
PROT SERPL-MCNC: 6.8 G/DL — SIGNIFICANT CHANGE UP (ref 6–8.3)
RBC # BLD: 3.75 M/UL — LOW (ref 4.2–5.8)
RBC # FLD: 14.6 % — HIGH (ref 10.3–14.5)
SODIUM SERPL-SCNC: 134 MMOL/L — LOW (ref 135–145)
WBC # BLD: 11.28 K/UL — HIGH (ref 3.8–10.5)
WBC # FLD AUTO: 11.28 K/UL — HIGH (ref 3.8–10.5)

## 2019-06-25 PROCEDURE — 99233 SBSQ HOSP IP/OBS HIGH 50: CPT | Mod: GC

## 2019-06-25 PROCEDURE — 99233 SBSQ HOSP IP/OBS HIGH 50: CPT

## 2019-06-25 RX ORDER — CYCLOBENZAPRINE HYDROCHLORIDE 10 MG/1
1 TABLET, FILM COATED ORAL
Qty: 0 | Refills: 0 | DISCHARGE
Start: 2019-06-25

## 2019-06-25 RX ORDER — FUROSEMIDE 40 MG
1 TABLET ORAL
Qty: 0 | Refills: 0 | DISCHARGE
Start: 2019-06-25

## 2019-06-25 RX ORDER — DOCUSATE SODIUM 100 MG
1 CAPSULE ORAL
Qty: 0 | Refills: 0 | DISCHARGE
Start: 2019-06-25

## 2019-06-25 RX ORDER — MAGNESIUM SULFATE 500 MG/ML
2 VIAL (ML) INJECTION ONCE
Refills: 0 | Status: COMPLETED | OUTPATIENT
Start: 2019-06-25 | End: 2019-06-25

## 2019-06-25 RX ORDER — OXYCODONE HYDROCHLORIDE 5 MG/1
1 TABLET ORAL
Qty: 0 | Refills: 0 | DISCHARGE
Start: 2019-06-25

## 2019-06-25 RX ADMIN — Medication 975 MILLIGRAM(S): at 11:58

## 2019-06-25 RX ADMIN — Medication 975 MILLIGRAM(S): at 05:40

## 2019-06-25 RX ADMIN — HEPARIN SODIUM 5000 UNIT(S): 5000 INJECTION INTRAVENOUS; SUBCUTANEOUS at 22:17

## 2019-06-25 RX ADMIN — HEPARIN SODIUM 5000 UNIT(S): 5000 INJECTION INTRAVENOUS; SUBCUTANEOUS at 14:14

## 2019-06-25 RX ADMIN — Medication 50 MILLIGRAM(S): at 05:10

## 2019-06-25 RX ADMIN — Medication 40 MILLIGRAM(S): at 05:10

## 2019-06-25 RX ADMIN — ATORVASTATIN CALCIUM 40 MILLIGRAM(S): 80 TABLET, FILM COATED ORAL at 22:16

## 2019-06-25 RX ADMIN — Medication 975 MILLIGRAM(S): at 05:09

## 2019-06-25 RX ADMIN — INSULIN GLARGINE 10 UNIT(S): 100 INJECTION, SOLUTION SUBCUTANEOUS at 22:17

## 2019-06-25 RX ADMIN — Medication 975 MILLIGRAM(S): at 18:20

## 2019-06-25 RX ADMIN — Medication 975 MILLIGRAM(S): at 22:18

## 2019-06-25 RX ADMIN — Medication 50 GRAM(S): at 11:58

## 2019-06-25 RX ADMIN — PANTOPRAZOLE SODIUM 40 MILLIGRAM(S): 20 TABLET, DELAYED RELEASE ORAL at 05:10

## 2019-06-25 RX ADMIN — Medication 100 MILLIGRAM(S): at 22:17

## 2019-06-25 RX ADMIN — OXYCODONE HYDROCHLORIDE 10 MILLIGRAM(S): 5 TABLET ORAL at 23:20

## 2019-06-25 RX ADMIN — Medication 975 MILLIGRAM(S): at 18:22

## 2019-06-25 RX ADMIN — HEPARIN SODIUM 5000 UNIT(S): 5000 INJECTION INTRAVENOUS; SUBCUTANEOUS at 05:10

## 2019-06-25 RX ADMIN — OXYCODONE HYDROCHLORIDE 10 MILLIGRAM(S): 5 TABLET ORAL at 09:26

## 2019-06-25 RX ADMIN — Medication 100 MILLIGRAM(S): at 14:14

## 2019-06-25 RX ADMIN — OXYCODONE HYDROCHLORIDE 10 MILLIGRAM(S): 5 TABLET ORAL at 22:16

## 2019-06-25 RX ADMIN — OXYCODONE HYDROCHLORIDE 10 MILLIGRAM(S): 5 TABLET ORAL at 10:00

## 2019-06-25 RX ADMIN — Medication 50 MILLIGRAM(S): at 14:14

## 2019-06-25 RX ADMIN — CARVEDILOL PHOSPHATE 6.25 MILLIGRAM(S): 80 CAPSULE, EXTENDED RELEASE ORAL at 05:10

## 2019-06-25 RX ADMIN — CYCLOBENZAPRINE HYDROCHLORIDE 5 MILLIGRAM(S): 10 TABLET, FILM COATED ORAL at 22:17

## 2019-06-25 RX ADMIN — CARVEDILOL PHOSPHATE 6.25 MILLIGRAM(S): 80 CAPSULE, EXTENDED RELEASE ORAL at 22:17

## 2019-06-25 RX ADMIN — Medication 50 MILLIGRAM(S): at 22:18

## 2019-06-25 RX ADMIN — CHLORHEXIDINE GLUCONATE 1 APPLICATION(S): 213 SOLUTION TOPICAL at 12:04

## 2019-06-25 NOTE — PROGRESS NOTE ADULT - SUBJECTIVE AND OBJECTIVE BOX
Patient is a 63y old  Male who presents with a chief complaint of " I am having a whipple procedure" (25 Jun 2019 16:13)      SUBJECTIVE / OVERNIGHT EVENTS: Pt feels better today, reports improved cough, however was able to expectorate last night.  Remains afebrile, CP, SOB.  Noted to have mild leukocytosis today.       MEDICATIONS  (STANDING):  acetaminophen   Tablet .. 975 milliGRAM(s) Oral every 6 hours  atorvastatin 40 milliGRAM(s) Oral at bedtime  carvedilol 6.25 milliGRAM(s) Oral every 12 hours  chlorhexidine 4% Liquid 1 Application(s) Topical <User Schedule>  cyclobenzaprine 5 milliGRAM(s) Oral at bedtime  dextrose 5%. 1000 milliLiter(s) (50 mL/Hr) IV Continuous <Continuous>  dextrose 50% Injectable 12.5 Gram(s) IV Push once  dextrose 50% Injectable 25 Gram(s) IV Push once  dextrose 50% Injectable 25 Gram(s) IV Push once  docusate sodium 100 milliGRAM(s) Oral three times a day  furosemide    Tablet 40 milliGRAM(s) Oral daily  heparin  Injectable 5000 Unit(s) SubCutaneous every 8 hours  hydrALAZINE 50 milliGRAM(s) Oral three times a day  insulin glargine Injectable (LANTUS) 10 Unit(s) SubCutaneous at bedtime  insulin lispro (HumaLOG) corrective regimen sliding scale   SubCutaneous three times a day before meals  insulin lispro (HumaLOG) corrective regimen sliding scale   SubCutaneous at bedtime  pantoprazole    Tablet 40 milliGRAM(s) Oral before breakfast    MEDICATIONS  (PRN):  dextrose 40% Gel 15 Gram(s) Oral once PRN Blood Glucose LESS THAN 70 milliGRAM(s)/deciliter  glucagon  Injectable 1 milliGRAM(s) IntraMuscular once PRN Glucose LESS THAN 70 milligrams/deciliter  oxyCODONE    IR 5 milliGRAM(s) Oral every 4 hours PRN Moderate Pain (4 - 6)  oxyCODONE    IR 10 milliGRAM(s) Oral every 4 hours PRN Severe Pain (7 - 10)      Vital Signs Last 24 Hrs  T(C): 36.7 (25 Jun 2019 15:46), Max: 36.9 (25 Jun 2019 11:38)  T(F): 98.1 (25 Jun 2019 15:46), Max: 98.5 (25 Jun 2019 11:38)  HR: 56 (25 Jun 2019 15:46) (56 - 72)  BP: 136/57 (25 Jun 2019 15:46) (136/57 - 147/59)  BP(mean): --  RR: 18 (25 Jun 2019 15:46) (14 - 18)  SpO2: 99% (25 Jun 2019 15:46) (97% - 100%)  CAPILLARY BLOOD GLUCOSE      POCT Blood Glucose.: 138 mg/dL (25 Jun 2019 13:07)  POCT Blood Glucose.: 134 mg/dL (25 Jun 2019 09:03)  POCT Blood Glucose.: 155 mg/dL (24 Jun 2019 21:33)  POCT Blood Glucose.: 102 mg/dL (24 Jun 2019 17:28)    I&O's Summary    24 Jun 2019 07:01  -  25 Jun 2019 07:00  --------------------------------------------------------  IN: 400 mL / OUT: 2575 mL / NET: -2175 mL    25 Jun 2019 07:01  -  25 Jun 2019 16:46  --------------------------------------------------------  IN: 0 mL / OUT: 20 mL / NET: -20 mL      PHYSICAL EXAM  GENERAL: NAD, well-developed  NECK: Supple, No JVD  CHEST/LUNG: Decreased breath sounds in LLL, CTA in all other lung fields, no wheezing   HEART: Regular rate and rhythm; No murmurs, rubs, or gallops  ABDOMEN: Soft, Mild tenderness post surgically, Nondistended; Bowel sounds present  EXTREMITIES:  2+ Peripheral Pulses, No clubbing, cyanosis, or edema  PSYCH: AAOx3  SKIN: No rashes or lesions    LABS:                        11.0   11.28 )-----------( 238      ( 25 Jun 2019 05:28 )             34.3     06-25    134<L>  |  93<L>  |  15  ----------------------------<  135<H>  3.9   |  24  |  1.73<H>    Ca    8.5      25 Jun 2019 05:28  Phos  2.6     06-25  Mg     1.7     06-25    TPro  6.8  /  Alb  2.7<L>  /  TBili  0.7  /  DBili  x   /  AST  23  /  ALT  10  /  AlkPhos  88  06-25      RADIOLOGY & ADDITIONAL TESTS:    Imaging Personally Reviewed:  < from: Xray Chest 1 View- PORTABLE-Urgent (06.24.19 @ 17:29) >  FINDINGS:    Right subclavian central line tip within SVC. Left coronary stentsis   present.    Small left pleural effusion and underlying atelectasis/consolidation.   Pulmonary vascular congestion. No pneumothorax.    < end of copied text >    Consultant(s) Notes Reviewed:  Renal, Surgery     Care Discussed with Consultants/Other Providers: Renal Dr. Chauhan , Surgery CRISTINA De Anda

## 2019-06-25 NOTE — PROGRESS NOTE ADULT - ASSESSMENT
64 y/o M w/ PMHx of CAD s/p 9 stents, DMT2 on insulin, HTN, GERD, diverticulosis, former smoker (30 PY), s/p cholecystectomy, biliary stricture s/p ERCP with sphincterotomy and stent (4/2019), and cholangiocarcinoma now s/p open Whipple and feeding j-tube placement on 6/18. NGT self-d/c'd on 6/21. Now passing flatus.    - Appreciate renal recs for SLOAN on CKD.   - Appreciate pain service recs. Current pain control regimen: Standing tylenol, oxycodone, PRN PO flexeril.  - DVT ppx: SQH.  - ISS, f/u FS.  - C/w PPI daily.  - Advanced to regular diet  - Restarted home medications  - Continue to monitor bowel function.   - OOB/early ambulation/IS.  - Dispo: PT recs home no needs.   - Spoke with medicine, will see today    D Team Surgical Oncology Pager #[phone] 64 y/o M w/ PMHx of CAD s/p 9 stents, DMT2 on insulin, HTN, GERD, diverticulosis, former smoker (30 PY), s/p cholecystectomy, biliary stricture s/p ERCP with sphincterotomy and stent (4/2019), and cholangiocarcinoma now s/p open Whipple and feeding j-tube placement on 6/18. NGT self-d/c'd on 6/21. Now passing flatus.    - Appreciate renal recs for SLOAN on CKD.   - Appreciate pain service recs. Current pain control regimen: Standing tylenol, oxycodone, PRN PO flexeril.  - Appreciate medicine recs  - DVT ppx: SQH.  - ISS, f/u FS.  - C/w PPI daily.  - Advanced to regular diet  - Restarted home medications  - Continue to monitor bowel function.   - OOB/early ambulation/IS.  - Dispo: PT recs home no needs.       D Team Surgical Oncology Pager #[phone] 62 y/o M w/ PMHx of CAD s/p 9 stents, DMT2 on insulin, HTN, GERD, diverticulosis, former smoker (30 PY), s/p cholecystectomy, biliary stricture s/p ERCP with sphincterotomy and stent (4/2019), and cholangiocarcinoma now s/p open Whipple and feeding j-tube placement on 6/18. NGT self-d/c'd on 6/21. Now passing flatus.    - Appreciate renal recs for outpatient HD  - Appreciate pain service recs. Current pain control regimen: Standing tylenol, oxycodone, PRN PO flexeril.  - Appreciate medicine recs  - DVT ppx: SQH.  - ISS, f/u FS.  - C/w PPI daily.  - Advanced to regular diet  - Restarted home medications  - Continue to monitor bowel function.   - OOB/early ambulation/IS.  - Dispo: PT recs home no needs; home today      D Team Surgical Oncology Pager #[phone]

## 2019-06-25 NOTE — PROGRESS NOTE ADULT - PROBLEM SELECTOR PLAN 3
Patient with anemia in setting of ESRD and recent blood loss. Latest Hb noted to be acceptable. will monitor cbc.

## 2019-06-25 NOTE — DISCHARGE NOTE PROVIDER - NSDCFUADDINST_GEN_ALL_CORE_FT
Discharge instruction for mom and baby discussed. No prescription given. Questions and concerns have been answered. Sleep sack given.      no vigorous exercise for 2 weeks

## 2019-06-25 NOTE — PROGRESS NOTE ADULT - SUBJECTIVE AND OBJECTIVE BOX
D Team Surgery    SUBJECTIVE: Pt seen and examined at bedside.  Pt doing well.  Familia reg diet.  Denies N/V.  +/- GI function.  No other complaints at this time.    OBJECTIVE:  Vital Signs Last 24 Hrs  T(C): 36.7 (24 Jun 2019 19:39), Max: 37.1 (24 Jun 2019 14:55)  T(F): 98 (24 Jun 2019 19:39), Max: 98.8 (24 Jun 2019 14:55)  HR: 72 (24 Jun 2019 19:39) (60 - 72)  BP: 144/65 (24 Jun 2019 19:39) (136/72 - 177/76)  BP(mean): --  RR: 18 (24 Jun 2019 19:39) (16 - 18)  SpO2: 97% (24 Jun 2019 19:39) (97% - 99%)    06-23-19 @ 07:01  -  06-24-19 @ 07:00  --------------------------------------------------------  IN: 0 mL / OUT: 890 mL / NET: -890 mL    06-24-19 @ 07:01  -  06-25-19 @ 02:22  --------------------------------------------------------  IN: 400 mL / OUT: 2550 mL / NET: -2150 mL      MEDICATIONS  (STANDING):  acetaminophen   Tablet .. 975 milliGRAM(s) Oral every 6 hours  atorvastatin 40 milliGRAM(s) Oral at bedtime  carvedilol 6.25 milliGRAM(s) Oral every 12 hours  chlorhexidine 4% Liquid 1 Application(s) Topical <User Schedule>  cyclobenzaprine 5 milliGRAM(s) Oral at bedtime  dextrose 5%. 1000 milliLiter(s) (50 mL/Hr) IV Continuous <Continuous>  dextrose 50% Injectable 12.5 Gram(s) IV Push once  dextrose 50% Injectable 25 Gram(s) IV Push once  dextrose 50% Injectable 25 Gram(s) IV Push once  docusate sodium 100 milliGRAM(s) Oral three times a day  furosemide    Tablet 40 milliGRAM(s) Oral daily  heparin  Injectable 5000 Unit(s) SubCutaneous every 8 hours  hydrALAZINE 50 milliGRAM(s) Oral three times a day  insulin glargine Injectable (LANTUS) 10 Unit(s) SubCutaneous at bedtime  insulin lispro (HumaLOG) corrective regimen sliding scale   SubCutaneous three times a day before meals  insulin lispro (HumaLOG) corrective regimen sliding scale   SubCutaneous at bedtime  pantoprazole    Tablet 40 milliGRAM(s) Oral before breakfast    MEDICATIONS  (PRN):  dextrose 40% Gel 15 Gram(s) Oral once PRN Blood Glucose LESS THAN 70 milliGRAM(s)/deciliter  glucagon  Injectable 1 milliGRAM(s) IntraMuscular once PRN Glucose LESS THAN 70 milligrams/deciliter  oxyCODONE    IR 5 milliGRAM(s) Oral every 4 hours PRN Moderate Pain (4 - 6)  oxyCODONE    IR 10 milliGRAM(s) Oral every 4 hours PRN Severe Pain (7 - 10)      LABS:                        9.1    9.05  )-----------( 173      ( 24 Jun 2019 11:00 )             28.4     06-24    136  |  98  |  23  ----------------------------<  124<H>  3.1<L>   |  26  |  2.13<H>    Ca    8.0<L>      24 Jun 2019 11:00  Phos  2.4     06-24  Mg     1.8     06-24              General: NAD  Abdomen: soft, NT, ND, J tube in place.  JPx2 serosang D Team Surgery    SUBJECTIVE: Pt seen and examined at bedside.  Pt doing well.  Familia reg diet.  Denies N/V.  +/- GI function. Ambulating. Left YUMIKO drain pulled yesterday.  No other complaints at this time.    OBJECTIVE:  Vital Signs Last 24 Hrs  T(C): 36.7 (24 Jun 2019 19:39), Max: 37.1 (24 Jun 2019 14:55)  T(F): 98 (24 Jun 2019 19:39), Max: 98.8 (24 Jun 2019 14:55)  HR: 72 (24 Jun 2019 19:39) (60 - 72)  BP: 144/65 (24 Jun 2019 19:39) (136/72 - 177/76)  BP(mean): --  RR: 18 (24 Jun 2019 19:39) (16 - 18)  SpO2: 97% (24 Jun 2019 19:39) (97% - 99%)    06-23-19 @ 07:01  -  06-24-19 @ 07:00  --------------------------------------------------------  IN: 0 mL / OUT: 890 mL / NET: -890 mL    06-24-19 @ 07:01  -  06-25-19 @ 02:22  --------------------------------------------------------  IN: 400 mL / OUT: 2550 mL / NET: -2150 mL      MEDICATIONS  (STANDING):  acetaminophen   Tablet .. 975 milliGRAM(s) Oral every 6 hours  atorvastatin 40 milliGRAM(s) Oral at bedtime  carvedilol 6.25 milliGRAM(s) Oral every 12 hours  chlorhexidine 4% Liquid 1 Application(s) Topical <User Schedule>  cyclobenzaprine 5 milliGRAM(s) Oral at bedtime  dextrose 5%. 1000 milliLiter(s) (50 mL/Hr) IV Continuous <Continuous>  dextrose 50% Injectable 12.5 Gram(s) IV Push once  dextrose 50% Injectable 25 Gram(s) IV Push once  dextrose 50% Injectable 25 Gram(s) IV Push once  docusate sodium 100 milliGRAM(s) Oral three times a day  furosemide    Tablet 40 milliGRAM(s) Oral daily  heparin  Injectable 5000 Unit(s) SubCutaneous every 8 hours  hydrALAZINE 50 milliGRAM(s) Oral three times a day  insulin glargine Injectable (LANTUS) 10 Unit(s) SubCutaneous at bedtime  insulin lispro (HumaLOG) corrective regimen sliding scale   SubCutaneous three times a day before meals  insulin lispro (HumaLOG) corrective regimen sliding scale   SubCutaneous at bedtime  pantoprazole    Tablet 40 milliGRAM(s) Oral before breakfast    MEDICATIONS  (PRN):  dextrose 40% Gel 15 Gram(s) Oral once PRN Blood Glucose LESS THAN 70 milliGRAM(s)/deciliter  glucagon  Injectable 1 milliGRAM(s) IntraMuscular once PRN Glucose LESS THAN 70 milligrams/deciliter  oxyCODONE    IR 5 milliGRAM(s) Oral every 4 hours PRN Moderate Pain (4 - 6)  oxyCODONE    IR 10 milliGRAM(s) Oral every 4 hours PRN Severe Pain (7 - 10)      LABS:                        9.1    9.05  )-----------( 173      ( 24 Jun 2019 11:00 )             28.4     06-24    136  |  98  |  23  ----------------------------<  124<H>  3.1<L>   |  26  |  2.13<H>    Ca    8.0<L>      24 Jun 2019 11:00  Phos  2.4     06-24  Mg     1.8     06-24              General: NAD  Abdomen: soft, NT, ND, J tube in place.  R YUMIKO serosang

## 2019-06-25 NOTE — DISCHARGE NOTE PROVIDER - CARE PROVIDER_API CALL
Jorge Viveros)  Surgery  [address] [place] [address]  Phone: [phone]  Fax: [phone]  Follow Up Time: Jorge Viveros)  Surgery  [address] [place] [address]  Phone: [phone]  Fax: [phone]  Follow Up Time:     Percy Chauhan)  Internal Medicine  [address] [place] [address]  Phone: [phone]  Fax: [phone]  Follow Up Time:

## 2019-06-25 NOTE — DISCHARGE NOTE PROVIDER - PROVIDER TOKENS
PROVIDER:[TOKEN:[[id number]]] PROVIDER:[[id number]]],PROVIDER:[TOKEN:[[id number]]]

## 2019-06-25 NOTE — DISCHARGE NOTE PROVIDER - NSDCFUADDAPPT_GEN_ALL_CORE_FT
Please follow up with your PCP after discharge from the hospital.    Please follow up with your nephrologist after discharge from the hospital. Please follow up with your PCP after discharge from the hospital.    Please follow up with your nephrologist as soon as possible after discharge from the hospital.

## 2019-06-25 NOTE — PROGRESS NOTE ADULT - SUBJECTIVE AND OBJECTIVE BOX
[facility] Division of Kidney Diseases & Hypertension  FOLLOW UP NOTE  --------------------------------------------------------------------------------  HPI: 63-year-old male with medical history of CKD, CAD s/p 9 stents, DM, HTN, GERD, prior cholecystectomy, diverticulosis, biliary stricture is currently admitted for elective Whipple's procedure for pancreatis mass. Nephrology currently following patient for SLOAN on CKD. Patient was recently hospitalized in [facility] from 5/18/19 to 6/8/19 for management of septic shock from acute cholangitis and NSTEMI and developed oliguric SLOAN during hospital course. Patient was started on RRT and has been dialysis dependent.     Patient seen and examined at the bedside this morning.  He feels well.  He is not in pain he is doing a 24 urine collection.  He reports that he did not get much of a diuretic affect from the lasix.          PAST HISTORY  --------------------------------------------------------------------------------  No significant changes to PMH, PSH, FHx, SHx, unless otherwise noted    ALLERGIES & MEDICATIONS  --------------------------------------------------------------------------------  Allergies    Cipro (Rash)  Plavix (Rash)    Intolerances      Standing Inpatient Medications  acetaminophen   Tablet .. 975 milliGRAM(s) Oral every 6 hours  atorvastatin 40 milliGRAM(s) Oral at bedtime  carvedilol 6.25 milliGRAM(s) Oral every 12 hours  chlorhexidine 4% Liquid 1 Application(s) Topical <User Schedule>  cyclobenzaprine 5 milliGRAM(s) Oral at bedtime  dextrose 5%. 1000 milliLiter(s) IV Continuous <Continuous>  dextrose 50% Injectable 12.5 Gram(s) IV Push once  dextrose 50% Injectable 25 Gram(s) IV Push once  dextrose 50% Injectable 25 Gram(s) IV Push once  docusate sodium 100 milliGRAM(s) Oral three times a day  furosemide    Tablet 40 milliGRAM(s) Oral daily  heparin  Injectable 5000 Unit(s) SubCutaneous every 8 hours  hydrALAZINE 50 milliGRAM(s) Oral three times a day  insulin glargine Injectable (LANTUS) 10 Unit(s) SubCutaneous at bedtime  insulin lispro (HumaLOG) corrective regimen sliding scale   SubCutaneous three times a day before meals  insulin lispro (HumaLOG) corrective regimen sliding scale   SubCutaneous at bedtime  pantoprazole    Tablet 40 milliGRAM(s) Oral before breakfast    PRN Inpatient Medications  dextrose 40% Gel 15 Gram(s) Oral once PRN  glucagon  Injectable 1 milliGRAM(s) IntraMuscular once PRN  oxyCODONE    IR 5 milliGRAM(s) Oral every 4 hours PRN  oxyCODONE    IR 10 milliGRAM(s) Oral every 4 hours PRN      REVIEW OF SYSTEMS  --------------------------------------------------------------------------------  Gen: No fever  Respiratory: not sob  CV: no cp  GI: no ab pain  : urinating  MSK: no pain    VITALS/PHYSICAL EXAM  --------------------------------------------------------------------------------  T(C): 36.7 (06-25-19 @ 15:46), Max: 36.9 (06-24-19 @ 16:19)  HR: 56 (06-25-19 @ 15:46) (56 - 72)  BP: 136/57 (06-25-19 @ 15:46) (136/57 - 147/59)  RR: 18 (06-25-19 @ 15:46) (14 - 18)  SpO2: 99% (06-25-19 @ 15:46) (97% - 100%)  CVP(mm Hg): --        06-24-19 @ 07:01  -  06-25-19 @ 07:00  --------------------------------------------------------  IN: 400 mL / OUT: 2575 mL / NET: -2175 mL    06-25-19 @ 07:01  -  06-25-19 @ 16:14  --------------------------------------------------------  IN: 0 mL / OUT: 20 mL / NET: -20 mL      Physical Exam:  	              Gen: NAD  	HEENT: sclera anicteric   	Pulm: CTA B/L  	CV:  S1S2  	Abd: +BS, soft, multiple abdominal drains present.   	Ext: trace bilateral lower extremity edema  	Neuro: No focal deficits  	Skin: Warm, without rashes  	Vascular access: Right IJ tunneled HD catheter present.     LABS/STUDIES  --------------------------------------------------------------------------------              11.0   11.28 >-----------<  238      [06-25-19 @ 05:28]              34.3     134  |  93  |  15  ----------------------------<  135      [06-25-19 @ 05:28]  3.9   |  24  |  1.73        Ca     8.5     [06-25-19 @ 05:28]      Mg     1.7     [06-25-19 @ 05:28]      Phos  2.6     [06-25-19 @ 05:28]    TPro  6.8  /  Alb  2.7  /  TBili  0.7  /  DBili  x   /  AST  23  /  ALT  10  /  AlkPhos  88  [06-25-19 @ 05:28]          Creatinine Trend:  SCr 1.73 [06-25 @ 05:28]  SCr 2.13 [06-24 @ 11:00]  SCr 2.44 [06-23 @ 06:01]  SCr 2.40 [06-22 @ 06:08]  SCr 2.67 [06-21 @ 06:55]        HbA1c 5.6      [06-23-19 @ 06:01]    HBsAg NEGATIVE      [06-21-19 @ 06:55]

## 2019-06-25 NOTE — PROGRESS NOTE ADULT - PROBLEM SELECTOR PLAN 5
- Discussed with renal, pt with ongoing 24 hour urine collection.    - Pt to start Torsemide tomorrow  - Hopeful that patient will not need further HD

## 2019-06-25 NOTE — PROGRESS NOTE ADULT - ASSESSMENT
63M hx CAD s/p 9 stents, DMT2 on insulin, HTN, GERD, prior cholecystectomy, diverticulosis, former smoker(30PY), biliary stricture s/p ERCP with sphincterotomy and stent 4/2019, course c/b ascending cholangitis/septic shock from E.Coli bacteremia requiring pressor support and initiation of HD, presented on June 18th for Whipple procedure.  Medicine was consulted for evaluation of productive cough, which at this time does not seem to be consistent with infectious process.

## 2019-06-25 NOTE — DISCHARGE NOTE PROVIDER - CARE PROVIDERS DIRECT ADDRESSES
,[email or web address]

## 2019-06-25 NOTE — DISCHARGE NOTE PROVIDER - HOSPITAL COURSE
62 yo M hx of CAD s/p 9 stents, DMT2 on insulin, HTN, GERD, prior cholecystectomy,  diverticulosis, former smoker(30PY), biliary stricture s/p ERCP with sphincteromy and stent 4/2019 who presents as transfer from [facility] in septic shock for further evaluation of acute cholangitis, for possible IR intervention or Advanced GI intervention. 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  Found to be in septic shock due to E Coli Bacteremia, with SLOAN, transaminitis and elevated troponins. In the ED at OSH, he developed lethargy, altered mental status, and hypoxia to 82% and was intubated for acute hypoxic resp failure. He was started on levophed and vasopressin and was admitted to their MICU for further workup. He received vanc, zosyn, micafungin, meropenem and gentamicin. He was started on heparin gtt for suspected NSTEMI due to troponin elevated and increasing CK. Pt stable and discharged home; pt had a CT / MRI showing a questionable lesion vs stricturing further workup with ERCP revealing a 10 mm pancreatic head hypoechoic lesion. Pt now SDA for a whipple procedure. 64 yo M hx of CAD s/p 9 stents, DMT2 on insulin, HTN, GERD, prior cholecystectomy,  diverticulosis, former smoker(30PY), biliary stricture s/p ERCP with sphincteromy and stent 4/2019 who presents as transfer from [facility] in septic shock for further evaluation of acute cholangitis, for possible IR intervention or Advanced GI intervention. 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  Found to be in septic shock due to E Coli Bacteremia, with SLOAN, transaminitis and elevated troponins. In the ED at OSH, he developed lethargy, altered mental status, and hypoxia to 82% and was intubated for acute hypoxic resp failure. He was started on levophed and vasopressin and was admitted to their MICU for further workup. He received vanc, zosyn, micafungin, meropenem and gentamicin. He was started on heparin gtt for suspected NSTEMI due to troponin elevated and increasing CK. Pt stable and discharged home; pt had a CT / MRI showing a questionable lesion vs stricturing further workup with ERCP revealing a 10 mm pancreatic head hypoechoic lesion. The patient was admitted for a planned whipple procedure. The patient tolerated the procedure well and recovered on a surgical floor without complication. The patient has been HD dependent from an SLOAN during his intial septic presentation. Patient was followed by nephrology and *** 62 yo M hx of CAD s/p 9 stents, DMT2 on insulin, HTN, GERD, prior cholecystectomy,  diverticulosis, former smoker(30PY), biliary stricture s/p ERCP with sphincteromy and stent 4/2019 who presents as transfer from [facility] in septic shock for further evaluation of acute cholangitis, for possible IR intervention or Advanced GI intervention. 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  Found to be in septic shock due to E Coli Bacteremia, with SLOAN, transaminitis and elevated troponins. In the ED at OSH, he developed lethargy, altered mental status, and hypoxia to 82% and was intubated for acute hypoxic resp failure. He was started on levophed and vasopressin and was admitted to their MICU for further workup. He received vanc, zosyn, micafungin, meropenem and gentamicin. He was started on heparin gtt for suspected NSTEMI due to troponin elevated and increasing CK. Pt stable and discharged home; pt had a CT / MRI showing a questionable lesion vs stricturing further workup with ERCP revealing a 10 mm pancreatic head hypoechoic lesion. The patient was admitted for a planned whipple procedure. The patient tolerated the procedure well and recovered on a surgical floor without complication. The patient has been HD dependent from an SLOAN during his intial septic presentation. Patient was followed by nephrology and as kidney function improved it was determined that the patient could be discharged without HD but close nephrology follow up outpatient. At the time of discharge, the patient was hemodynamically stable, was tolerating PO diet, was voiding urine and passing stool, was ambulating, and was comfortable with adequate pain control.

## 2019-06-26 ENCOUNTER — TRANSCRIPTION ENCOUNTER (OUTPATIENT)
Age: 64
End: 2019-06-26

## 2019-06-26 VITALS
SYSTOLIC BLOOD PRESSURE: 138 MMHG | OXYGEN SATURATION: 98 % | TEMPERATURE: 98 F | DIASTOLIC BLOOD PRESSURE: 53 MMHG | HEART RATE: 62 BPM | RESPIRATION RATE: 17 BRPM

## 2019-06-26 LAB
ANION GAP SERPL CALC-SCNC: 18 MMO/L — HIGH (ref 7–14)
BUN SERPL-MCNC: 23 MG/DL — SIGNIFICANT CHANGE UP (ref 7–23)
CALCIUM SERPL-MCNC: 8.7 MG/DL — SIGNIFICANT CHANGE UP (ref 8.4–10.5)
CHLORIDE SERPL-SCNC: 89 MMOL/L — LOW (ref 98–107)
CO2 SERPL-SCNC: 23 MMOL/L — SIGNIFICANT CHANGE UP (ref 22–31)
CREAT 24H UR-MCNC: 1004 G/24HR — HIGH (ref 0.8–1.8)
CREAT SERPL-MCNC: 2.27 MG/DL — HIGH (ref 0.5–1.3)
GLUCOSE BLDC GLUCOMTR-MCNC: 151 MG/DL — HIGH (ref 70–99)
GLUCOSE SERPL-MCNC: 122 MG/DL — HIGH (ref 70–99)
HCT VFR BLD CALC: 35 % — LOW (ref 39–50)
HGB BLD-MCNC: 11.4 G/DL — LOW (ref 13–17)
MAGNESIUM SERPL-MCNC: 2 MG/DL — SIGNIFICANT CHANGE UP (ref 1.6–2.6)
MCHC RBC-ENTMCNC: 29.6 PG — SIGNIFICANT CHANGE UP (ref 27–34)
MCHC RBC-ENTMCNC: 32.6 % — SIGNIFICANT CHANGE UP (ref 32–36)
MCV RBC AUTO: 90.9 FL — SIGNIFICANT CHANGE UP (ref 80–100)
NRBC # FLD: 0 K/UL — SIGNIFICANT CHANGE UP (ref 0–0)
PHOSPHATE SERPL-MCNC: 3.2 MG/DL — SIGNIFICANT CHANGE UP (ref 2.5–4.5)
PLATELET # BLD AUTO: 316 K/UL — SIGNIFICANT CHANGE UP (ref 150–400)
PMV BLD: 9.8 FL — SIGNIFICANT CHANGE UP (ref 7–13)
POTASSIUM SERPL-MCNC: 3.6 MMOL/L — SIGNIFICANT CHANGE UP (ref 3.5–5.3)
POTASSIUM SERPL-SCNC: 3.6 MMOL/L — SIGNIFICANT CHANGE UP (ref 3.5–5.3)
RBC # BLD: 3.85 M/UL — LOW (ref 4.2–5.8)
RBC # FLD: 14.6 % — HIGH (ref 10.3–14.5)
SODIUM SERPL-SCNC: 130 MMOL/L — LOW (ref 135–145)
SPECIMEN SOURCE: SIGNIFICANT CHANGE UP
SPECIMEN VOL 24H UR: 800 ML — SIGNIFICANT CHANGE UP
WBC # BLD: 13.16 K/UL — HIGH (ref 3.8–10.5)
WBC # FLD AUTO: 13.16 K/UL — HIGH (ref 3.8–10.5)

## 2019-06-26 PROCEDURE — 99233 SBSQ HOSP IP/OBS HIGH 50: CPT | Mod: GC

## 2019-06-26 RX ORDER — CYCLOBENZAPRINE HYDROCHLORIDE 10 MG/1
1 TABLET, FILM COATED ORAL
Qty: 28 | Refills: 0
Start: 2019-06-26 | End: 2019-07-09

## 2019-06-26 RX ORDER — OXYCODONE HYDROCHLORIDE 5 MG/1
1 TABLET ORAL
Qty: 42 | Refills: 0
Start: 2019-06-26 | End: 2019-07-02

## 2019-06-26 RX ADMIN — Medication 40 MILLIGRAM(S): at 06:06

## 2019-06-26 RX ADMIN — CARVEDILOL PHOSPHATE 6.25 MILLIGRAM(S): 80 CAPSULE, EXTENDED RELEASE ORAL at 06:06

## 2019-06-26 RX ADMIN — PANTOPRAZOLE SODIUM 40 MILLIGRAM(S): 20 TABLET, DELAYED RELEASE ORAL at 06:05

## 2019-06-26 RX ADMIN — HEPARIN SODIUM 5000 UNIT(S): 5000 INJECTION INTRAVENOUS; SUBCUTANEOUS at 06:06

## 2019-06-26 RX ADMIN — Medication 975 MILLIGRAM(S): at 11:17

## 2019-06-26 RX ADMIN — Medication 50 MILLIGRAM(S): at 06:06

## 2019-06-26 RX ADMIN — OXYCODONE HYDROCHLORIDE 10 MILLIGRAM(S): 5 TABLET ORAL at 11:18

## 2019-06-26 RX ADMIN — Medication 1: at 09:13

## 2019-06-26 RX ADMIN — Medication 975 MILLIGRAM(S): at 11:47

## 2019-06-26 RX ADMIN — OXYCODONE HYDROCHLORIDE 10 MILLIGRAM(S): 5 TABLET ORAL at 06:05

## 2019-06-26 RX ADMIN — CHLORHEXIDINE GLUCONATE 1 APPLICATION(S): 213 SOLUTION TOPICAL at 11:18

## 2019-06-26 RX ADMIN — Medication 100 MILLIGRAM(S): at 06:06

## 2019-06-26 RX ADMIN — Medication 975 MILLIGRAM(S): at 06:05

## 2019-06-26 NOTE — PROGRESS NOTE ADULT - PROBLEM SELECTOR PLAN 2
BP is currently in acceptable range. Can start loop diuretic as above.
- Path pending  - Postsurgical care per surg onc team  - Tolerating PO diet, J tube in place, pt self d/c'd NGT and passing flatus
BP noted to elevated today in setting of pain. Currently getting HD with UF. Can restart outpatient anti hypertensive medications once patient is able to restart diet. Monitor BP.
BP noted to elevated today in setting of pain. Currently getting HD with UF. Can start loop diuretic as above.
BP noted to elevated today in setting of pain. Currently getting HD with UF. Can start loop diuretic as above.

## 2019-06-26 NOTE — PROGRESS NOTE ADULT - ATTENDING COMMENTS
Agree with above
Cont NGT.  Await GI function
62 yo M with PMH of CAD s/p 9 stents (allergic to plavix), DMT2 on insulin, HTN, GERD, prior cholecystectomy,  diverticulosis, former smoker (30 packed years), biliary stricture s/p ERCP with sphincteromy and stent 4/2019 who found to be in septic shock due to E Coli Bacteremia, with SLOAN requiring placement of right chest permacath for dialysis who is now s/p Whipple for cholangiocarcinoma    PLAN:   Neurologic: Dilaudid PCA for pain mgmt, IV tylenol PRN  Respiratory: supplement O2 via NC as needed  Cardiovascular: monitor vitals via A line  Gastrointestinal/Nutrition: NPO, IVF, NGT, J tube to gravity  Renal/Genitourinary: dawson, strict Is and Os, monitor urine out, replete electrolytes as needed  Hematologic: transfuse if <7  Infectious Disease: VIJAYA  Lines/Tubes: left radial A line, dawson, J-tube, NGT, YUMIKO x 2,   Endocrine: VIJAYA  Disposition: SICU  The patient is not a critical care patient with no life threatening hemodynamic and metabolic instability in SICU.  I have personally interviewed when possible and examined the patient, reviewed data and laboratory tests/x-rays and all pertinent electronic images.  I was physically present for the key portions of the evaluation and management (E/M) service provided.   The SICU team has a constant risk benefit analyzes discussion with the primary team, all consultants, House Staff and PA's on all decisions.  These diagnoses are unrelated to the surgical procedure noted above.  I meet with family if needed to get further history, discuss the case and make care decisions for this patient who might not be able to participate.  Time involved in performance of separately billable procedures was not counted toward my critical care time. There is no overlap.  I spent 55-75 minutes ( 0800Hrs-0915Hrs in AM/ 1600Hrs-1715Hrs in PM, or other time indicated) of critical care time for the diagnoses, assessment, plan and interventions.
I saw and evaluated the patient this morning and he appears to have recovered renal function probably a CKD stage IV.  restarted his home loop diuretic.  he will need to follow up with his nephrologist within a week.  I explained to him that he will need to continue to keep the catheter clean as he has done previously.

## 2019-06-26 NOTE — PROGRESS NOTE ADULT - ASSESSMENT
62 y/o M w/ PMHx of CAD s/p 9 stents, DMT2 on insulin, HTN, GERD, diverticulosis, former smoker (30 PY), s/p cholecystectomy, biliary stricture s/p ERCP with sphincterotomy and stent (4/2019), and cholangiocarcinoma now s/p open Whipple and feeding j-tube placement on 6/18. NGT self-d/c'd on 6/21. Now with bowel function.    - Appreciate renal recs for outpatient HD  - Appreciate pain service recs. Current pain control regimen: Standing tylenol, oxycodone, PRN PO flexeril.  - Appreciate medicine recs  - DVT ppx: SQH.  - ISS, f/u FS.  - C/w PPI daily.  - Advanced to regular diet  - Restarted home medications  - Continue to monitor bowel function.   - OOB/early ambulation/IS.  - Dispo: PT recs home no needs      D Team Surgical Oncology Pager #[phone] 62 y/o M w/ PMHx of CAD s/p 9 stents, DMT2 on insulin, HTN, GERD, diverticulosis, former smoker (30 PY), s/p cholecystectomy, biliary stricture s/p ERCP with sphincterotomy and stent (4/2019), and cholangiocarcinoma now s/p open Whipple and feeding j-tube placement on 6/18. NGT self-d/c'd on 6/21. Now with bowel function.    - Appreciate renal recs for outpatient HD. Plan for DC today pending nephrology recommendations  - Appreciate pain service recs. Current pain control regimen: Standing tylenol, oxycodone, PRN PO flexeril.  - Appreciate medicine recs  - DVT ppx: SQH.  - ISS, f/u FS.  - C/w PPI daily.  - Advanced to regular diet  - Restarted home medications  - Continue to monitor bowel function.   - OOB/early ambulation/IS.  - Dispo: PT recs home no needs      D Team Surgical Oncology Pager #[phone] 62 y/o M w/ PMHx of CAD s/p 9 stents, DMT2 on insulin, HTN, GERD, diverticulosis, former smoker (30 PY), s/p cholecystectomy, biliary stricture s/p ERCP with sphincterotomy and stent (4/2019), and cholangiocarcinoma now s/p open Whipple and feeding j-tube placement on 6/18. NGT self-d/c'd on 6/21. Now with bowel function.    - Appreciate renal recs for outpatient HD. Plan for DC today pending nephrology recommendations  -- Spoke with nephro, no need for HD today. Follow up soon after discharge with outpatient nephrologist.   - Appreciate pain service recs. Current pain control regimen: Standing tylenol, oxycodone, PRN PO flexeril.  - Appreciate medicine recs  - DVT ppx: SQH.  - ISS, f/u FS.  - C/w PPI daily.  - Advanced to regular diet  - Restarted home medications  - Continue to monitor bowel function.   - OOB/early ambulation/IS.  - Dispo: PT recs home no needs      D Team Surgical Oncology Pager #[phone]

## 2019-06-26 NOTE — PROGRESS NOTE ADULT - PROBLEM SELECTOR PROBLEM 3
Anemia
Type 2 diabetes mellitus without complication, with long-term current use of insulin
Anemia

## 2019-06-26 NOTE — PROGRESS NOTE ADULT - PROBLEM SELECTOR PLAN 1
Patient with history of SLOAN on CKD in setting of sepsis, hypotension, and NSTEMI and was started on HD. Last HD was done on 6/24/19. 24 hour urine creatinine collection noted; creatinine clearance calculated to be 27. Scr. currently at 2.27 which was patient's baseline. Patient with resolved SLOAN. Recommend to stop lasix and switch to torsemide 20 mg daily. Patient will need to follow up with his nephrologist in 1 week after discharge for further management of CKD and removal of tunneled HD catheter. Monitor BMP daily. Avoid NSAIDs, RCAs at this time.
- Pt reports improvement.  Noted to have mild leukocytosis with CXR showing atelectasis vs. consolidation. Pt remains afebrile and reports improvement in cough. Would continue to monitor off abx
Patient with history of SLOAN on CKD in setting of sepsis, hypotension, and NSTEMI and is currently dialysis dependent. Patient had HD this morning.  Recommend 24 urine collection for creatinine clearance starting tomorrow morning and begin lasix 40 daily.  Check results of 24 hour urine collection wednesday morning.  Hold dialysis on wednesday.
Patient with history of SLOAN on CKD in setting of sepsis, hypotension, and NSTEMI and is currently dialysis dependent. Patient seen during HD today and tolerating it well. BP noted to be stable during HD. HD catheter working well at prescribed blood flow. Latest Scr. noted to be 2.67 which is around patient baseline. UOP noted to be ~300 cc over past 24 hours. Patient with resolving SLOAN?? Plan to hold HD over weekend. Monitor Scr, strict I/O and electrolytes. Avoid NSAIDs, RCAs, and ACE-I/ARBs at this time.
Patient with history of SLOAN on CKD in setting of sepsis, hypotension, and NSTEMI and is currently dialysis dependent. Patient had HD this morning.  Recommend follow up 24 urine collection for creatinine clearance and change lasix 40 daily to torsemide 20 daily.  Check results of 24 hour urine collection wednesday morning.  Hold dialysis on wednesday.

## 2019-06-26 NOTE — DISCHARGE NOTE NURSING/CASE MANAGEMENT/SOCIAL WORK - NSDCPNINST_GEN_ALL_CORE
Follow up with MD as instructed. Take medications as prescribed. Eat a heart healthy diet. Gradually increase activity. Call MD if any redness, swelling, oozing or drainage at incision site. Call MD for temp of 100.4 or higher.

## 2019-06-26 NOTE — PROGRESS NOTE ADULT - REASON FOR ADMISSION
" I am having a whipple procedure"
"I am having a whipple procedure"
" I am having a whipple procedure"

## 2019-06-26 NOTE — DISCHARGE NOTE NURSING/CASE MANAGEMENT/SOCIAL WORK - NSDCPEEMAIL_GEN_ALL_CORE
[facility] email [email or web address]

## 2019-06-26 NOTE — DISCHARGE NOTE NURSING/CASE MANAGEMENT/SOCIAL WORK - NSDCFUADDAPPT_GEN_ALL_CORE_FT
Please follow up with your PCP after discharge from the hospital.    Please follow up with your nephrologist as soon as possible after discharge from the hospital.

## 2019-06-26 NOTE — DISCHARGE NOTE NURSING/CASE MANAGEMENT/SOCIAL WORK - NSDCDPATPORTLINK_GEN_ALL_CORE
You can access the [facility] Patient Portal, offered by [facility], by registering with the following website: [email or web address]

## 2019-06-26 NOTE — PROGRESS NOTE ADULT - SUBJECTIVE AND OBJECTIVE BOX
Surgery Progress Note    SUBJECTIVE: Pt seen and examined at bedside. Patient comfortable and in no-apparent distress. Complaining of new abdominal pain this AM with some nausea. No vomiting or diarrhea. +Gas/+BM. Tolerating diet.    Vital Signs Last 24 Hrs  T(C): 36.9 (26 Jun 2019 06:02), Max: 36.9 (25 Jun 2019 11:38)  T(F): 98.4 (26 Jun 2019 06:02), Max: 98.5 (25 Jun 2019 11:38)  HR: 61 (26 Jun 2019 06:02) (56 - 68)  BP: 158/60 (26 Jun 2019 06:02) (136/51 - 158/60)  BP(mean): --  RR: 18 (26 Jun 2019 06:02) (14 - 18)  SpO2: 95% (26 Jun 2019 06:02) (95% - 100%)    Physical Exam:  General Appearance: Appears well, NAD  Respiratory: No labored breathing  CV: Pulse regularly present  Abdomen: Soft, nontender, midline incision c/d/i, R YUMIKO with SS output, J tube in place    LABS:                        11.4   13.16 )-----------( 316      ( 26 Jun 2019 05:36 )             35.0     06-26    130<L>  |  89<L>  |  23  ----------------------------<  122<H>  3.6   |  23  |  2.27<H>    Ca    8.7      26 Jun 2019 05:36  Phos  3.2     06-26  Mg     2.0     06-26    TPro  6.8  /  Alb  2.7<L>  /  TBili  0.7  /  DBili  x   /  AST  23  /  ALT  10  /  AlkPhos  88  06-25          INs and OUTs:    06-25-19 @ 07:01  -  06-26-19 @ 07:00  --------------------------------------------------------  IN: 0 mL / OUT: 485 mL / NET: -485 mL

## 2019-06-26 NOTE — PROGRESS NOTE ADULT - SUBJECTIVE AND OBJECTIVE BOX
[facility] Division of Kidney Diseases & Hypertension  FOLLOW UP NOTE  [phone]--------------------------------------------------------------------------------  Chief Complaint: SLOAN on CKD      24 hour events/subjective:    Patient seen and examined at bedside.   Denies SOB, CP, abdominal pain, nausea, vomiting, or decrease in appetite.  UOP documented to be 400 cc over past 24 hours.     PAST HISTORY  --------------------------------------------------------------------------------  No significant changes to PMH, PSH, FHx, SHx, unless otherwise noted    ALLERGIES & MEDICATIONS  --------------------------------------------------------------------------------  Allergies    Cipro (Rash)  Plavix (Rash)    Intolerances      Standing Inpatient Medications  acetaminophen   Tablet .. 975 milliGRAM(s) Oral every 6 hours  atorvastatin 40 milliGRAM(s) Oral at bedtime  carvedilol 6.25 milliGRAM(s) Oral every 12 hours  chlorhexidine 4% Liquid 1 Application(s) Topical <User Schedule>  cyclobenzaprine 5 milliGRAM(s) Oral at bedtime  dextrose 5%. 1000 milliLiter(s) IV Continuous <Continuous>  dextrose 50% Injectable 12.5 Gram(s) IV Push once  dextrose 50% Injectable 25 Gram(s) IV Push once  dextrose 50% Injectable 25 Gram(s) IV Push once  docusate sodium 100 milliGRAM(s) Oral three times a day  heparin  Injectable 5000 Unit(s) SubCutaneous every 8 hours  hydrALAZINE 50 milliGRAM(s) Oral three times a day  insulin glargine Injectable (LANTUS) 10 Unit(s) SubCutaneous at bedtime  insulin lispro (HumaLOG) corrective regimen sliding scale   SubCutaneous three times a day before meals  insulin lispro (HumaLOG) corrective regimen sliding scale   SubCutaneous at bedtime  pantoprazole    Tablet 40 milliGRAM(s) Oral before breakfast  torsemide 20 milliGRAM(s) Oral daily    PRN Inpatient Medications  dextrose 40% Gel 15 Gram(s) Oral once PRN  glucagon  Injectable 1 milliGRAM(s) IntraMuscular once PRN  oxyCODONE    IR 5 milliGRAM(s) Oral every 4 hours PRN  oxyCODONE    IR 10 milliGRAM(s) Oral every 4 hours PRN      REVIEW OF SYSTEMS  --------------------------------------------------------------------------------    Gen: No fever  Respiratory: not sob  CV: no cp  GI: no ab pain  : urinating  MSK: no pain    VITALS/PHYSICAL EXAM  --------------------------------------------------------------------------------  T(C): 36.9 (06-26-19 @ 11:15), Max: 36.9 (06-26-19 @ 06:02)  HR: 62 (06-26-19 @ 11:15) (56 - 68)  BP: 138/53 (06-26-19 @ 11:15) (136/51 - 158/60)  RR: 17 (06-26-19 @ 11:15) (15 - 18)  SpO2: 98% (06-26-19 @ 11:15) (95% - 100%)  Wt(kg): --        06-25-19 @ 07:01  -  06-26-19 @ 07:00  --------------------------------------------------------  IN: 0 mL / OUT: 485 mL / NET: -485 mL      Physical Exam:  	      Gen: NAD  	HEENT: sclera anicteric   	Pulm: CTA B/L  	CV:  S1S2  	Abd: +BS, soft, multiple abdominal drains present.   	Ext: trace bilateral lower extremity edema  	Neuro: No focal deficits  	Skin: Warm, without rashes  	Vascular access: Right IJ tunneled HD catheter present.     LABS/STUDIES  --------------------------------------------------------------------------------              11.4   13.16 >-----------<  316      [06-26-19 @ 05:36]              35.0     130  |  89  |  23  ----------------------------<  122      [06-26-19 @ 05:36]  3.6   |  23  |  2.27        Ca     8.7     [06-26-19 @ 05:36]      Mg     2.0     [06-26-19 @ 05:36]      Phos  3.2     [06-26-19 @ 05:36]    TPro  6.8  /  Alb  2.7  /  TBili  0.7  /  DBili  x   /  AST  23  /  ALT  10  /  AlkPhos  88  [06-25-19 @ 05:28]          Creatinine Trend:  SCr 2.27 [06-26 @ 05:36]  SCr 1.73 [06-25 @ 05:28]  SCr 2.13 [06-24 @ 11:00]  SCr 2.44 [06-23 @ 06:01]  SCr 2.40 [06-22 @ 06:08]        HbA1c 5.6      [06-23-19 @ 06:01]    HBsAg NEGATIVE      [06-21-19 @ 06:55] [facility] Division of Kidney Diseases & Hypertension  FOLLOW UP NOTE  [phone]--------------------------------------------------------------------------------  Chief Complaint: SLOAN on CKD      24 hour events/subjective:    Patient seen and examined at bedside.   Denies SOB, CP, abdominal pain, nausea, vomiting, or decrease in appetite.  UOP documented to be 400 cc over past 24 hours.     PAST HISTORY  --------------------------------------------------------------------------------  No significant changes to PMH, PSH, FHx, SHx, unless otherwise noted    ALLERGIES & MEDICATIONS  --------------------------------------------------------------------------------  Allergies    Cipro (Rash)  Plavix (Rash)    Intolerances      Standing Inpatient Medications  acetaminophen   Tablet .. 975 milliGRAM(s) Oral every 6 hours  atorvastatin 40 milliGRAM(s) Oral at bedtime  carvedilol 6.25 milliGRAM(s) Oral every 12 hours  chlorhexidine 4% Liquid 1 Application(s) Topical <User Schedule>  cyclobenzaprine 5 milliGRAM(s) Oral at bedtime  dextrose 5%. 1000 milliLiter(s) IV Continuous <Continuous>  dextrose 50% Injectable 12.5 Gram(s) IV Push once  dextrose 50% Injectable 25 Gram(s) IV Push once  dextrose 50% Injectable 25 Gram(s) IV Push once  docusate sodium 100 milliGRAM(s) Oral three times a day  heparin  Injectable 5000 Unit(s) SubCutaneous every 8 hours  hydrALAZINE 50 milliGRAM(s) Oral three times a day  insulin glargine Injectable (LANTUS) 10 Unit(s) SubCutaneous at bedtime  insulin lispro (HumaLOG) corrective regimen sliding scale   SubCutaneous three times a day before meals  insulin lispro (HumaLOG) corrective regimen sliding scale   SubCutaneous at bedtime  pantoprazole    Tablet 40 milliGRAM(s) Oral before breakfast  torsemide 20 milliGRAM(s) Oral daily    PRN Inpatient Medications  dextrose 40% Gel 15 Gram(s) Oral once PRN  glucagon  Injectable 1 milliGRAM(s) IntraMuscular once PRN  oxyCODONE    IR 5 milliGRAM(s) Oral every 4 hours PRN  oxyCODONE    IR 10 milliGRAM(s) Oral every 4 hours PRN      REVIEW OF SYSTEMS  --------------------------------------------------------------------------------    Gen: No fever  Respiratory: not sob  CV: no cp  GI: no ab pain  : urinating  MSK: no pain    VITALS/PHYSICAL EXAM  --------------------------------------------------------------------------------  T(C): 36.9 (06-26-19 @ 11:15), Max: 36.9 (06-26-19 @ 06:02)  HR: 62 (06-26-19 @ 11:15) (56 - 68)  BP: 138/53 (06-26-19 @ 11:15) (136/51 - 158/60)  RR: 17 (06-26-19 @ 11:15) (15 - 18)  SpO2: 98% (06-26-19 @ 11:15) (95% - 100%)  Wt(kg): --        06-25-19 @ 07:01  -  06-26-19 @ 07:00  --------------------------------------------------------  IN: 0 mL / OUT: 485 mL / NET: -485 mL      Physical Exam:  	              Gen: NAD  	HEENT: sclera anicteric   	Pulm: CTA B/L  	CV:  S1S2  	Abd: +BS, soft, multiple abdominal drains present.   	Ext: trace bilateral lower extremity edema  	Neuro: No focal deficits  	Skin: Warm, without rashes  	Vascular access: Right IJ tunneled HD catheter present.     LABS/STUDIES  --------------------------------------------------------------------------------              11.4   13.16 >-----------<  316      [06-26-19 @ 05:36]              35.0     130  |  89  |  23  ----------------------------<  122      [06-26-19 @ 05:36]  3.6   |  23  |  2.27        Ca     8.7     [06-26-19 @ 05:36]      Mg     2.0     [06-26-19 @ 05:36]      Phos  3.2     [06-26-19 @ 05:36]    TPro  6.8  /  Alb  2.7  /  TBili  0.7  /  DBili  x   /  AST  23  /  ALT  10  /  AlkPhos  88  [06-25-19 @ 05:28]          Creatinine Trend:  SCr 2.27 [06-26 @ 05:36]  SCr 1.73 [06-25 @ 05:28]  SCr 2.13 [06-24 @ 11:00]  SCr 2.44 [06-23 @ 06:01]  SCr 2.40 [06-22 @ 06:08]        HbA1c 5.6      [06-23-19 @ 06:01]    HBsAg NEGATIVE      [06-21-19 @ 06:55]

## 2019-06-26 NOTE — PROGRESS NOTE ADULT - PROVIDER SPECIALTY LIST ADULT
Anesthesia
Anesthesia
Hospitalist
Nephrology
Nephrology
Pain Medicine
SICU
Surgery
Anesthesia
Pain Medicine
Nephrology
Surgery
Nephrology

## 2019-06-26 NOTE — PROGRESS NOTE ADULT - PROBLEM SELECTOR PROBLEM 1
SLOAN (acute kidney injury)
Cough
SLOAN (acute kidney injury)

## 2019-06-26 NOTE — DISCHARGE NOTE NURSING/CASE MANAGEMENT/SOCIAL WORK - NSDCPEWEB_GEN_ALL_CORE
[facility] for Tobacco Control website --- [email or web address] website --- [email or web address]

## 2019-06-27 LAB
BACTERIA NPH CULT: SIGNIFICANT CHANGE UP
SURGICAL PATHOLOGY STUDY: SIGNIFICANT CHANGE UP

## 2019-06-27 NOTE — PROGRESS NOTE ADULT - PROBLEM SELECTOR PROBLEM 4
CAD (coronary artery disease) Island Pedicle Flap With Canthal Suspension Text: The defect edges were debeveled with a #15 scalpel blade.  Given the location of the defect, shape of the defect and the proximity to free margins an island pedicle advancement flap was deemed most appropriate.  Using a sterile surgical marker, an appropriate advancement flap was drawn incorporating the defect, outlining the appropriate donor tissue and placing the expected incisions within the relaxed skin tension lines where possible. The area thus outlined was incised deep to adipose tissue with a #15 scalpel blade.  The skin margins were undermined to an appropriate distance in all directions around the primary defect and laterally outward around the island pedicle utilizing iris scissors.  There was minimal undermining beneath the pedicle flap. A suspension suture was placed in the canthal tendon to prevent tension and prevent ectropion.

## 2019-06-28 RX ORDER — METOCLOPRAMIDE 10 MG/1
10 TABLET ORAL 4 TIMES DAILY
Qty: 120 | Refills: 2 | Status: ACTIVE | COMMUNITY
Start: 2019-06-28 | End: 1900-01-01

## 2019-06-30 ENCOUNTER — INPATIENT (INPATIENT)
Facility: HOSPITAL | Age: 64
LOS: 9 days | Discharge: HOME HEALTH | End: 2019-07-10
Attending: SURGERY | Admitting: SURGERY
Payer: COMMERCIAL

## 2019-06-30 VITALS
HEART RATE: 86 BPM | SYSTOLIC BLOOD PRESSURE: 155 MMHG | DIASTOLIC BLOOD PRESSURE: 82 MMHG | OXYGEN SATURATION: 96 % | TEMPERATURE: 99 F | RESPIRATION RATE: 18 BRPM

## 2019-06-30 DIAGNOSIS — Z90.49 ACQUIRED ABSENCE OF OTHER SPECIFIED PARTS OF DIGESTIVE TRACT: Chronic | ICD-10-CM

## 2019-06-30 LAB
ALBUMIN SERPL ELPH-MCNC: 2.8 G/DL — LOW (ref 3.3–5)
ALP SERPL-CCNC: 105 U/L — SIGNIFICANT CHANGE UP (ref 40–120)
ALT FLD-CCNC: 13 U/L — SIGNIFICANT CHANGE UP (ref 4–41)
ANION GAP SERPL CALC-SCNC: 15 MMO/L — HIGH (ref 7–14)
AST SERPL-CCNC: 24 U/L — SIGNIFICANT CHANGE UP (ref 4–40)
BASE EXCESS BLDV CALC-SCNC: 1.9 MMOL/L — SIGNIFICANT CHANGE UP
BASOPHILS # BLD AUTO: 0.04 K/UL — SIGNIFICANT CHANGE UP (ref 0–0.2)
BASOPHILS NFR BLD AUTO: 0.2 % — SIGNIFICANT CHANGE UP (ref 0–2)
BILIRUB SERPL-MCNC: 0.6 MG/DL — SIGNIFICANT CHANGE UP (ref 0.2–1.2)
BLOOD GAS VENOUS - CREATININE: 3.3 MG/DL — HIGH (ref 0.5–1.3)
BLOOD GAS VENOUS - FIO2: 21 — SIGNIFICANT CHANGE UP
BUN SERPL-MCNC: 50 MG/DL — HIGH (ref 7–23)
CALCIUM SERPL-MCNC: 8.6 MG/DL — SIGNIFICANT CHANGE UP (ref 8.4–10.5)
CHLORIDE BLDV-SCNC: 92 MMOL/L — LOW (ref 96–108)
CHLORIDE SERPL-SCNC: 88 MMOL/L — LOW (ref 98–107)
CO2 SERPL-SCNC: 25 MMOL/L — SIGNIFICANT CHANGE UP (ref 22–31)
CREAT SERPL-MCNC: 3.25 MG/DL — HIGH (ref 0.5–1.3)
EOSINOPHIL # BLD AUTO: 0.26 K/UL — SIGNIFICANT CHANGE UP (ref 0–0.5)
EOSINOPHIL NFR BLD AUTO: 1.6 % — SIGNIFICANT CHANGE UP (ref 0–6)
GAS PNL BLDV: 126 MMOL/L — LOW (ref 136–146)
GLUCOSE BLDV-MCNC: 279 MG/DL — HIGH (ref 70–99)
GLUCOSE SERPL-MCNC: 300 MG/DL — HIGH (ref 70–99)
HCO3 BLDV-SCNC: 25 MMOL/L — SIGNIFICANT CHANGE UP (ref 20–27)
HCT VFR BLD CALC: 29.8 % — LOW (ref 39–50)
HCT VFR BLDV CALC: 31 % — LOW (ref 39–51)
HGB BLD-MCNC: 9.8 G/DL — LOW (ref 13–17)
HGB BLDV-MCNC: 10 G/DL — LOW (ref 13–17)
IMM GRANULOCYTES NFR BLD AUTO: 1 % — SIGNIFICANT CHANGE UP (ref 0–1.5)
LACTATE BLDV-MCNC: 2 MMOL/L — SIGNIFICANT CHANGE UP (ref 0.5–2)
LYMPHOCYTES # BLD AUTO: 0.97 K/UL — LOW (ref 1–3.3)
LYMPHOCYTES # BLD AUTO: 5.8 % — LOW (ref 13–44)
MCHC RBC-ENTMCNC: 29 PG — SIGNIFICANT CHANGE UP (ref 27–34)
MCHC RBC-ENTMCNC: 32.9 % — SIGNIFICANT CHANGE UP (ref 32–36)
MCV RBC AUTO: 88.2 FL — SIGNIFICANT CHANGE UP (ref 80–100)
MONOCYTES # BLD AUTO: 2.14 K/UL — HIGH (ref 0–0.9)
MONOCYTES NFR BLD AUTO: 12.8 % — SIGNIFICANT CHANGE UP (ref 2–14)
NEUTROPHILS # BLD AUTO: 13.19 K/UL — HIGH (ref 1.8–7.4)
NEUTROPHILS NFR BLD AUTO: 78.6 % — HIGH (ref 43–77)
NRBC # FLD: 0 K/UL — SIGNIFICANT CHANGE UP (ref 0–0)
PCO2 BLDV: 46 MMHG — SIGNIFICANT CHANGE UP (ref 41–51)
PH BLDV: 7.38 PH — SIGNIFICANT CHANGE UP (ref 7.32–7.43)
PLATELET # BLD AUTO: 475 K/UL — HIGH (ref 150–400)
PMV BLD: 8.9 FL — SIGNIFICANT CHANGE UP (ref 7–13)
PO2 BLDV: 32 MMHG — LOW (ref 35–40)
POTASSIUM BLDV-SCNC: 5.1 MMOL/L — HIGH (ref 3.4–4.5)
POTASSIUM SERPL-MCNC: 3.4 MMOL/L — LOW (ref 3.5–5.3)
POTASSIUM SERPL-SCNC: 3.4 MMOL/L — LOW (ref 3.5–5.3)
PROT SERPL-MCNC: 7 G/DL — SIGNIFICANT CHANGE UP (ref 6–8.3)
RBC # BLD: 3.38 M/UL — LOW (ref 4.2–5.8)
RBC # FLD: 14.3 % — SIGNIFICANT CHANGE UP (ref 10.3–14.5)
REVIEW TO FOLLOW: YES — SIGNIFICANT CHANGE UP
SAO2 % BLDV: 54.8 % — LOW (ref 60–85)
SODIUM SERPL-SCNC: 128 MMOL/L — LOW (ref 135–145)
WBC # BLD: 16.76 K/UL — HIGH (ref 3.8–10.5)
WBC # FLD AUTO: 16.76 K/UL — HIGH (ref 3.8–10.5)

## 2019-06-30 PROCEDURE — 93970 EXTREMITY STUDY: CPT | Mod: 26

## 2019-06-30 RX ORDER — ONDANSETRON 8 MG/1
4 TABLET, FILM COATED ORAL ONCE
Refills: 0 | Status: COMPLETED | OUTPATIENT
Start: 2019-06-30 | End: 2019-07-01

## 2019-06-30 RX ORDER — ACETAMINOPHEN 500 MG
1000 TABLET ORAL ONCE
Refills: 0 | Status: COMPLETED | OUTPATIENT
Start: 2019-06-30 | End: 2019-06-30

## 2019-06-30 RX ORDER — MEROPENEM 1 G/30ML
1000 INJECTION INTRAVENOUS ONCE
Refills: 0 | Status: COMPLETED | OUTPATIENT
Start: 2019-06-30 | End: 2019-07-01

## 2019-06-30 RX ORDER — SODIUM CHLORIDE 9 MG/ML
1000 INJECTION INTRAMUSCULAR; INTRAVENOUS; SUBCUTANEOUS ONCE
Refills: 0 | Status: COMPLETED | OUTPATIENT
Start: 2019-06-30 | End: 2019-06-30

## 2019-06-30 NOTE — ED PROVIDER NOTE - ATTENDING CONTRIBUTION TO CARE
I performed a face-to-face evaluation of the patient and performed a history and physical examination. I agree with the history and physical examination.    Haley: Recent Whipple for pancreatic Ca p/w abd pain and F. Concern for leak, abscess. Labs, CT, Surg consult. I performed a face-to-face evaluation of the patient and performed a history and physical examination. I agree with the history and physical examination.    Haley: Recent Whipple for pancreatic Ca p/w abd pain and F. Concern for leak, abscess. Labs, Abx, CT, Surg consult.

## 2019-06-30 NOTE — ED ADULT TRIAGE NOTE - CHIEF COMPLAINT QUOTE
Pt walk in c/o Diffused Abdominal area,(Whipple Sx) D/C Wedenesday(6/26) worse on Incision area for passed 3 days with Nausea Hiccups, Surgeon is called and advised to got o ED for further eval. PMHx HTN DM  Gout Pancreattic CA not on chemo. with Dialysis Cath. on RCW Denies HA dizziness SOB CP palpitation

## 2019-06-30 NOTE — ED PROVIDER NOTE - OBJECTIVE STATEMENT
Haley: 63 M, Whipple here 6/18/19 for pancreatic Ca, p/w abd pain (diffuse, but worse at incision site. Vomit (yellow) x 1. No diarrhea. In ED, found to have F. During hospital stay, had SLOAN (improving). LBP from lying on stretcher. (B) leg swelling (he thinks L > R). Coughing clear mucous since was here.

## 2019-06-30 NOTE — ED ADULT NURSE NOTE - OBJECTIVE STATEMENT
Pt received in room 1, Pt has hx of Whipple surgery and was DC'd Wednesday. Pt c/o diffused abdominal pain, bloating, nausea, vomiting X 1 episode. Incision site looks dry, clean, intact, dressing appears dry, clean, intact. Non tender upon palpation of abdomen.   Pt denies CP, SOB, headache, Pt denies urinary symptoms.   20G placed in , Labs collected and sent. Surgery MD at bedside for consult. Will continue to monitor.

## 2019-06-30 NOTE — ED PROVIDER NOTE - PROGRESS NOTE DETAILS
Jose Angel Barahona MD, PGY3: Large left pneumothorax, surgery made aware. Placed pigtail drain prior to CT. Pt feels better, appears well, in NAD.

## 2019-06-30 NOTE — ED PROVIDER NOTE - PHYSICAL EXAMINATION
Well appearing, well nourished, awake, alert, oriented to person, place, time/situation and in no apparent distress.    Airway patent    Eyes without scleral injection. No jaundice.    Strong pulse.    Respirations unlabored.    Abdomen soft, tender along vertical incision site, incision site no e/o infection, no guarding.    Spine appears normal, range of motion is not limited, no muscle or joint tenderness. 2+ (B) LE edema.    Alert and oriented, no gross motor or sensory deficits.    Skin normal color for race, warm, dry and intact. No evidence of rash.    No SI/HI.

## 2019-06-30 NOTE — ED ADULT NURSE NOTE - NSIMPLEMENTINTERV_GEN_ALL_ED
Implemented All Fall with Harm Risk Interventions:  [place] to call system. Call bell, personal items and telephone within reach. Instruct patient to call for assistance. Room bathroom lighting operational. Non-slip footwear when patient is off stretcher. Physically safe environment: no spills, clutter or unnecessary equipment. Stretcher in lowest position, wheels locked, appropriate side rails in place. Provide visual cue, wrist band, yellow gown, etc. Monitor gait and stability. Monitor for mental status changes and reorient to person, place, and time. Review medications for side effects contributing to fall risk. Reinforce activity limits and safety measures with patient and family. Provide visual clues: red socks.

## 2019-06-30 NOTE — ED PROVIDER NOTE - CLINICAL SUMMARY MEDICAL DECISION MAKING FREE TEXT BOX
Haley: Recent Whipple for pancreatic Ca p/w abd pain and F. Concern for leak, abscess. Labs, CT, Surg consult. Haley: Recent Whipple for pancreatic Ca p/w abd pain and F. Concern for leak, abscess. Labs, Abx, CT, Surg consult.

## 2019-06-30 NOTE — ED PROVIDER NOTE - CARE PLAN
Principal Discharge DX:	Post-operative complication  Secondary Diagnosis:	Abdominal pain Principal Discharge DX:	Post-operative complication  Secondary Diagnosis:	Abdominal pain  Secondary Diagnosis:	Pneumothorax

## 2019-07-01 DIAGNOSIS — E11.22 TYPE 2 DIABETES MELLITUS WITH DIABETIC CHRONIC KIDNEY DISEASE: ICD-10-CM

## 2019-07-01 DIAGNOSIS — I10 ESSENTIAL (PRIMARY) HYPERTENSION: ICD-10-CM

## 2019-07-01 DIAGNOSIS — N17.9 ACUTE KIDNEY FAILURE, UNSPECIFIED: ICD-10-CM

## 2019-07-01 DIAGNOSIS — C22.1 INTRAHEPATIC BILE DUCT CARCINOMA: ICD-10-CM

## 2019-07-01 DIAGNOSIS — R06.6 HICCOUGH: ICD-10-CM

## 2019-07-01 DIAGNOSIS — E87.1 HYPO-OSMOLALITY AND HYPONATREMIA: ICD-10-CM

## 2019-07-01 DIAGNOSIS — R93.5 ABNORMAL FINDINGS ON DIAGNOSTIC IMAGING OF OTHER ABDOMINAL REGIONS, INCLUDING RETROPERITONEUM: ICD-10-CM

## 2019-07-01 DIAGNOSIS — T84.9XXA UNSPECIFIED COMPLICATION OF INTERNAL ORTHOPEDIC PROSTHETIC DEVICE, IMPLANT AND GRAFT, INITIAL ENCOUNTER: ICD-10-CM

## 2019-07-01 DIAGNOSIS — Z29.9 ENCOUNTER FOR PROPHYLACTIC MEASURES, UNSPECIFIED: ICD-10-CM

## 2019-07-01 DIAGNOSIS — I25.10 ATHEROSCLEROTIC HEART DISEASE OF NATIVE CORONARY ARTERY WITHOUT ANGINA PECTORIS: ICD-10-CM

## 2019-07-01 DIAGNOSIS — J93.9 PNEUMOTHORAX, UNSPECIFIED: ICD-10-CM

## 2019-07-01 LAB
ALBUMIN SERPL ELPH-MCNC: 2.5 G/DL — LOW (ref 3.3–5)
ALP SERPL-CCNC: 90 U/L — SIGNIFICANT CHANGE UP (ref 40–120)
ALT FLD-CCNC: 11 U/L — SIGNIFICANT CHANGE UP (ref 4–41)
ANION GAP SERPL CALC-SCNC: 16 MMO/L — HIGH (ref 7–14)
ANISOCYTOSIS BLD QL: SIGNIFICANT CHANGE UP
APPEARANCE UR: CLEAR — SIGNIFICANT CHANGE UP
APTT BLD: 33.8 SEC — SIGNIFICANT CHANGE UP (ref 27.5–36.3)
AST SERPL-CCNC: 17 U/L — SIGNIFICANT CHANGE UP (ref 4–40)
BACTERIA # UR AUTO: NEGATIVE — SIGNIFICANT CHANGE UP
BASOPHILS NFR SPEC: 0 % — SIGNIFICANT CHANGE UP (ref 0–2)
BILIRUB SERPL-MCNC: 0.7 MG/DL — SIGNIFICANT CHANGE UP (ref 0.2–1.2)
BILIRUB UR-MCNC: NEGATIVE — SIGNIFICANT CHANGE UP
BLASTS # FLD: 0 % — SIGNIFICANT CHANGE UP (ref 0–0)
BLD GP AB SCN SERPL QL: NEGATIVE — SIGNIFICANT CHANGE UP
BLOOD UR QL VISUAL: NEGATIVE — SIGNIFICANT CHANGE UP
BUN SERPL-MCNC: 45 MG/DL — HIGH (ref 7–23)
CALCIUM SERPL-MCNC: 8.3 MG/DL — LOW (ref 8.4–10.5)
CHLORIDE SERPL-SCNC: 91 MMOL/L — LOW (ref 98–107)
CO2 SERPL-SCNC: 25 MMOL/L — SIGNIFICANT CHANGE UP (ref 22–31)
COLOR SPEC: YELLOW — SIGNIFICANT CHANGE UP
CREAT SERPL-MCNC: 2.86 MG/DL — HIGH (ref 0.5–1.3)
EOSINOPHIL NFR FLD: 0 % — SIGNIFICANT CHANGE UP (ref 0–6)
GLUCOSE BLDC GLUCOMTR-MCNC: 149 MG/DL — HIGH (ref 70–99)
GLUCOSE BLDC GLUCOMTR-MCNC: 150 MG/DL — HIGH (ref 70–99)
GLUCOSE BLDC GLUCOMTR-MCNC: 174 MG/DL — HIGH (ref 70–99)
GLUCOSE BLDC GLUCOMTR-MCNC: 221 MG/DL — HIGH (ref 70–99)
GLUCOSE BLDC GLUCOMTR-MCNC: 239 MG/DL — HIGH (ref 70–99)
GLUCOSE SERPL-MCNC: 249 MG/DL — HIGH (ref 70–99)
GLUCOSE UR-MCNC: NEGATIVE — SIGNIFICANT CHANGE UP
GRAM STN WND: SIGNIFICANT CHANGE UP
HCT VFR BLD CALC: 29.2 % — LOW (ref 39–50)
HGB BLD-MCNC: 9.5 G/DL — LOW (ref 13–17)
HYALINE CASTS # UR AUTO: HIGH
INR BLD: 1.23 — HIGH (ref 0.88–1.17)
KETONES UR-MCNC: NEGATIVE — SIGNIFICANT CHANGE UP
LEUKOCYTE ESTERASE UR-ACNC: NEGATIVE — SIGNIFICANT CHANGE UP
LYMPHOCYTES NFR SPEC AUTO: 2.6 % — LOW (ref 13–44)
MACROCYTES BLD QL: SLIGHT — SIGNIFICANT CHANGE UP
MAGNESIUM SERPL-MCNC: 1.6 MG/DL — SIGNIFICANT CHANGE UP (ref 1.6–2.6)
MCHC RBC-ENTMCNC: 28.9 PG — SIGNIFICANT CHANGE UP (ref 27–34)
MCHC RBC-ENTMCNC: 32.5 % — SIGNIFICANT CHANGE UP (ref 32–36)
MCV RBC AUTO: 88.8 FL — SIGNIFICANT CHANGE UP (ref 80–100)
METAMYELOCYTES # FLD: 1.8 % — HIGH (ref 0–1)
MONOCYTES NFR BLD: 7 % — SIGNIFICANT CHANGE UP (ref 2–9)
MYELOCYTES NFR BLD: 1.7 % — HIGH (ref 0–0)
NEUTROPHIL AB SER-ACNC: 86 % — HIGH (ref 43–77)
NEUTS BAND # BLD: 0 % — SIGNIFICANT CHANGE UP (ref 0–6)
NITRITE UR-MCNC: NEGATIVE — SIGNIFICANT CHANGE UP
NRBC # FLD: 0 K/UL — SIGNIFICANT CHANGE UP (ref 0–0)
OTHER - HEMATOLOGY %: 0 — SIGNIFICANT CHANGE UP
PH UR: 6 — SIGNIFICANT CHANGE UP (ref 5–8)
PHOSPHATE SERPL-MCNC: 3.1 MG/DL — SIGNIFICANT CHANGE UP (ref 2.5–4.5)
PLATELET # BLD AUTO: 425 K/UL — HIGH (ref 150–400)
PLATELET COUNT - ESTIMATE: NORMAL — SIGNIFICANT CHANGE UP
PMV BLD: 9.3 FL — SIGNIFICANT CHANGE UP (ref 7–13)
POIKILOCYTOSIS BLD QL AUTO: SIGNIFICANT CHANGE UP
POLYCHROMASIA BLD QL SMEAR: SIGNIFICANT CHANGE UP
POTASSIUM SERPL-MCNC: 3.2 MMOL/L — LOW (ref 3.5–5.3)
POTASSIUM SERPL-SCNC: 3.2 MMOL/L — LOW (ref 3.5–5.3)
PROMYELOCYTES # FLD: 0 % — SIGNIFICANT CHANGE UP (ref 0–0)
PROT SERPL-MCNC: 6.3 G/DL — SIGNIFICANT CHANGE UP (ref 6–8.3)
PROT UR-MCNC: 50 — SIGNIFICANT CHANGE UP
PROTHROM AB SERPL-ACNC: 14.1 SEC — HIGH (ref 9.8–13.1)
RBC # BLD: 3.29 M/UL — LOW (ref 4.2–5.8)
RBC # FLD: 14.3 % — SIGNIFICANT CHANGE UP (ref 10.3–14.5)
RBC CASTS # UR COMP ASSIST: SIGNIFICANT CHANGE UP (ref 0–?)
RH IG SCN BLD-IMP: POSITIVE — SIGNIFICANT CHANGE UP
SODIUM SERPL-SCNC: 132 MMOL/L — LOW (ref 135–145)
SP GR SPEC: 1.02 — SIGNIFICANT CHANGE UP (ref 1–1.04)
SPECIMEN SOURCE: SIGNIFICANT CHANGE UP
SQUAMOUS # UR AUTO: SIGNIFICANT CHANGE UP
UROBILINOGEN FLD QL: NORMAL — SIGNIFICANT CHANGE UP
VARIANT LYMPHS # BLD: 0.9 % — SIGNIFICANT CHANGE UP
WBC # BLD: 13.9 K/UL — HIGH (ref 3.8–10.5)
WBC # FLD AUTO: 13.9 K/UL — HIGH (ref 3.8–10.5)
WBC UR QL: HIGH (ref 0–?)

## 2019-07-01 PROCEDURE — 71045 X-RAY EXAM CHEST 1 VIEW: CPT | Mod: 26

## 2019-07-01 PROCEDURE — 99253 IP/OBS CNSLTJ NEW/EST LOW 45: CPT

## 2019-07-01 PROCEDURE — 71045 X-RAY EXAM CHEST 1 VIEW: CPT | Mod: 26,77

## 2019-07-01 PROCEDURE — 74176 CT ABD & PELVIS W/O CONTRAST: CPT | Mod: 26

## 2019-07-01 PROCEDURE — 71250 CT THORAX DX C-: CPT | Mod: 26

## 2019-07-01 PROCEDURE — 99223 1ST HOSP IP/OBS HIGH 75: CPT | Mod: 24

## 2019-07-01 PROCEDURE — 99223 1ST HOSP IP/OBS HIGH 75: CPT | Mod: GC

## 2019-07-01 PROCEDURE — 49406 IMAGE CATH FLUID PERI/RETRO: CPT

## 2019-07-01 PROCEDURE — 99254 IP/OBS CNSLTJ NEW/EST MOD 60: CPT | Mod: GC

## 2019-07-01 RX ORDER — SODIUM CHLORIDE 9 MG/ML
1000 INJECTION, SOLUTION INTRAVENOUS ONCE
Refills: 0 | Status: COMPLETED | OUTPATIENT
Start: 2019-07-01 | End: 2019-07-01

## 2019-07-01 RX ORDER — MEROPENEM 1 G/30ML
500 INJECTION INTRAVENOUS EVERY 12 HOURS
Refills: 0 | Status: DISCONTINUED | OUTPATIENT
Start: 2019-07-01 | End: 2019-07-05

## 2019-07-01 RX ORDER — OXYCODONE HYDROCHLORIDE 5 MG/1
5 TABLET ORAL ONCE
Refills: 0 | Status: DISCONTINUED | OUTPATIENT
Start: 2019-07-01 | End: 2019-07-01

## 2019-07-01 RX ORDER — OXYCODONE HYDROCHLORIDE 5 MG/1
10 TABLET ORAL EVERY 4 HOURS
Refills: 0 | Status: DISCONTINUED | OUTPATIENT
Start: 2019-07-01 | End: 2019-07-01

## 2019-07-01 RX ORDER — LIDOCAINE 4 G/100G
5 CREAM TOPICAL ONCE
Refills: 0 | Status: COMPLETED | OUTPATIENT
Start: 2019-07-01 | End: 2019-07-01

## 2019-07-01 RX ORDER — INSULIN GLARGINE 100 [IU]/ML
10 INJECTION, SOLUTION SUBCUTANEOUS AT BEDTIME
Refills: 0 | Status: DISCONTINUED | OUTPATIENT
Start: 2019-07-01 | End: 2019-07-03

## 2019-07-01 RX ORDER — INSULIN LISPRO 100/ML
VIAL (ML) SUBCUTANEOUS EVERY 6 HOURS
Refills: 0 | Status: DISCONTINUED | OUTPATIENT
Start: 2019-07-01 | End: 2019-07-05

## 2019-07-01 RX ORDER — METOPROLOL TARTRATE 50 MG
5 TABLET ORAL EVERY 6 HOURS
Refills: 0 | Status: DISCONTINUED | OUTPATIENT
Start: 2019-07-01 | End: 2019-07-01

## 2019-07-01 RX ORDER — FENTANYL CITRATE 50 UG/ML
50 INJECTION INTRAVENOUS ONCE
Refills: 0 | Status: DISCONTINUED | OUTPATIENT
Start: 2019-07-01 | End: 2019-07-01

## 2019-07-01 RX ORDER — MEROPENEM 1 G/30ML
500 INJECTION INTRAVENOUS EVERY 12 HOURS
Refills: 0 | Status: DISCONTINUED | OUTPATIENT
Start: 2019-07-01 | End: 2019-07-01

## 2019-07-01 RX ORDER — CARVEDILOL PHOSPHATE 80 MG/1
6.25 CAPSULE, EXTENDED RELEASE ORAL EVERY 12 HOURS
Refills: 0 | Status: DISCONTINUED | OUTPATIENT
Start: 2019-07-01 | End: 2019-07-01

## 2019-07-01 RX ORDER — HEPARIN SODIUM 5000 [USP'U]/ML
5000 INJECTION INTRAVENOUS; SUBCUTANEOUS EVERY 8 HOURS
Refills: 0 | Status: DISCONTINUED | OUTPATIENT
Start: 2019-07-01 | End: 2019-07-10

## 2019-07-01 RX ORDER — ONDANSETRON 8 MG/1
4 TABLET, FILM COATED ORAL ONCE
Refills: 0 | Status: DISCONTINUED | OUTPATIENT
Start: 2019-07-01 | End: 2019-07-10

## 2019-07-01 RX ORDER — ACETAMINOPHEN 500 MG
975 TABLET ORAL EVERY 6 HOURS
Refills: 0 | Status: DISCONTINUED | OUTPATIENT
Start: 2019-07-01 | End: 2019-07-01

## 2019-07-01 RX ORDER — PANTOPRAZOLE SODIUM 20 MG/1
40 TABLET, DELAYED RELEASE ORAL DAILY
Refills: 0 | Status: DISCONTINUED | OUTPATIENT
Start: 2019-07-01 | End: 2019-07-03

## 2019-07-01 RX ORDER — HYDROMORPHONE HYDROCHLORIDE 2 MG/ML
0.5 INJECTION INTRAMUSCULAR; INTRAVENOUS; SUBCUTANEOUS EVERY 4 HOURS
Refills: 0 | Status: DISCONTINUED | OUTPATIENT
Start: 2019-07-01 | End: 2019-07-03

## 2019-07-01 RX ORDER — MAGNESIUM SULFATE 500 MG/ML
2 VIAL (ML) INJECTION ONCE
Refills: 0 | Status: COMPLETED | OUTPATIENT
Start: 2019-07-01 | End: 2019-07-01

## 2019-07-01 RX ORDER — FAMOTIDINE 10 MG/ML
20 INJECTION INTRAVENOUS DAILY
Refills: 0 | Status: DISCONTINUED | OUTPATIENT
Start: 2019-07-01 | End: 2019-07-01

## 2019-07-01 RX ORDER — OXYCODONE HYDROCHLORIDE 5 MG/1
5 TABLET ORAL EVERY 4 HOURS
Refills: 0 | Status: DISCONTINUED | OUTPATIENT
Start: 2019-07-01 | End: 2019-07-01

## 2019-07-01 RX ORDER — METOPROLOL TARTRATE 50 MG
5 TABLET ORAL EVERY 6 HOURS
Refills: 0 | Status: DISCONTINUED | OUTPATIENT
Start: 2019-07-01 | End: 2019-07-03

## 2019-07-01 RX ORDER — DOCUSATE SODIUM 100 MG
100 CAPSULE ORAL THREE TIMES A DAY
Refills: 0 | Status: DISCONTINUED | OUTPATIENT
Start: 2019-07-01 | End: 2019-07-01

## 2019-07-01 RX ORDER — SODIUM CHLORIDE 9 MG/ML
1000 INJECTION, SOLUTION INTRAVENOUS
Refills: 0 | Status: DISCONTINUED | OUTPATIENT
Start: 2019-07-01 | End: 2019-07-03

## 2019-07-01 RX ORDER — HYDROMORPHONE HYDROCHLORIDE 2 MG/ML
1 INJECTION INTRAMUSCULAR; INTRAVENOUS; SUBCUTANEOUS EVERY 4 HOURS
Refills: 0 | Status: DISCONTINUED | OUTPATIENT
Start: 2019-07-01 | End: 2019-07-01

## 2019-07-01 RX ORDER — INSULIN LISPRO 100/ML
VIAL (ML) SUBCUTANEOUS EVERY 6 HOURS
Refills: 0 | Status: DISCONTINUED | OUTPATIENT
Start: 2019-07-01 | End: 2019-07-01

## 2019-07-01 RX ADMIN — HYDROMORPHONE HYDROCHLORIDE 1 MILLIGRAM(S): 2 INJECTION INTRAMUSCULAR; INTRAVENOUS; SUBCUTANEOUS at 21:23

## 2019-07-01 RX ADMIN — Medication 400 MILLIGRAM(S): at 00:24

## 2019-07-01 RX ADMIN — HEPARIN SODIUM 5000 UNIT(S): 5000 INJECTION INTRAVENOUS; SUBCUTANEOUS at 22:05

## 2019-07-01 RX ADMIN — OXYCODONE HYDROCHLORIDE 10 MILLIGRAM(S): 5 TABLET ORAL at 11:24

## 2019-07-01 RX ADMIN — HEPARIN SODIUM 5000 UNIT(S): 5000 INJECTION INTRAVENOUS; SUBCUTANEOUS at 06:25

## 2019-07-01 RX ADMIN — OXYCODONE HYDROCHLORIDE 5 MILLIGRAM(S): 5 TABLET ORAL at 09:05

## 2019-07-01 RX ADMIN — INSULIN GLARGINE 10 UNIT(S): 100 INJECTION, SOLUTION SUBCUTANEOUS at 22:05

## 2019-07-01 RX ADMIN — MEROPENEM 100 MILLIGRAM(S): 1 INJECTION INTRAVENOUS at 23:52

## 2019-07-01 RX ADMIN — SODIUM CHLORIDE 1000 MILLILITER(S): 9 INJECTION INTRAMUSCULAR; INTRAVENOUS; SUBCUTANEOUS at 00:24

## 2019-07-01 RX ADMIN — CARVEDILOL PHOSPHATE 6.25 MILLIGRAM(S): 80 CAPSULE, EXTENDED RELEASE ORAL at 06:24

## 2019-07-01 RX ADMIN — Medication 50 GRAM(S): at 14:33

## 2019-07-01 RX ADMIN — SODIUM CHLORIDE 100 MILLILITER(S): 9 INJECTION, SOLUTION INTRAVENOUS at 19:47

## 2019-07-01 RX ADMIN — ONDANSETRON 4 MILLIGRAM(S): 8 TABLET, FILM COATED ORAL at 01:27

## 2019-07-01 RX ADMIN — OXYCODONE HYDROCHLORIDE 5 MILLIGRAM(S): 5 TABLET ORAL at 07:54

## 2019-07-01 RX ADMIN — OXYCODONE HYDROCHLORIDE 5 MILLIGRAM(S): 5 TABLET ORAL at 10:00

## 2019-07-01 RX ADMIN — OXYCODONE HYDROCHLORIDE 5 MILLIGRAM(S): 5 TABLET ORAL at 08:39

## 2019-07-01 RX ADMIN — Medication 5 MILLIGRAM(S): at 23:52

## 2019-07-01 RX ADMIN — MEROPENEM 100 MILLIGRAM(S): 1 INJECTION INTRAVENOUS at 01:27

## 2019-07-01 RX ADMIN — SODIUM CHLORIDE 100 MILLILITER(S): 9 INJECTION, SOLUTION INTRAVENOUS at 18:30

## 2019-07-01 RX ADMIN — Medication 100 MILLIGRAM(S): at 06:24

## 2019-07-01 RX ADMIN — MEROPENEM 100 MILLIGRAM(S): 1 INJECTION INTRAVENOUS at 12:58

## 2019-07-01 RX ADMIN — SODIUM CHLORIDE 100 MILLILITER(S): 9 INJECTION, SOLUTION INTRAVENOUS at 06:25

## 2019-07-01 RX ADMIN — OXYCODONE HYDROCHLORIDE 10 MILLIGRAM(S): 5 TABLET ORAL at 12:20

## 2019-07-01 RX ADMIN — Medication 2: at 19:36

## 2019-07-01 RX ADMIN — FENTANYL CITRATE 50 MICROGRAM(S): 50 INJECTION INTRAVENOUS at 02:45

## 2019-07-01 RX ADMIN — Medication 3: at 13:34

## 2019-07-01 RX ADMIN — HEPARIN SODIUM 5000 UNIT(S): 5000 INJECTION INTRAVENOUS; SUBCUTANEOUS at 13:34

## 2019-07-01 RX ADMIN — FAMOTIDINE 20 MILLIGRAM(S): 10 INJECTION INTRAVENOUS at 11:24

## 2019-07-01 RX ADMIN — HYDROMORPHONE HYDROCHLORIDE 1 MILLIGRAM(S): 2 INJECTION INTRAMUSCULAR; INTRAVENOUS; SUBCUTANEOUS at 22:11

## 2019-07-01 RX ADMIN — ONDANSETRON 4 MILLIGRAM(S): 8 TABLET, FILM COATED ORAL at 00:24

## 2019-07-01 NOTE — H&P ADULT - NSHPPHYSICALEXAM_GEN_ALL_CORE
NAD, awake and alert  No rashes  No jaundice or scleral icterus  Respirations nonlabored  CV Regular  Abdomen soft, nontender, mildly distended  No guarding or rebound tenderness  Midline incision c/d/i with staples  Former drain sites clean and dry  Extremities warm

## 2019-07-01 NOTE — CONSULT NOTE ADULT - ASSESSMENT
63yr old man,  former smoker(30PY) with PMH CAD s/p 9 stents, DMT2 on insulin, HTN, GERD, prior cholecystectomy, diverticulosis, biliary stricture s/p ERCP with sphincterotomy and stent 4/2019 c/b ascending cholangitis/septic shock from E.Coli bacteremia requiring pressor support and initiation of HD, recent Whipple surgery for cholangiocarcinoma on 6/18/19 and discharged 6/26 presents with nausea, vomiting, and fever since discharge. Found to have fevers in setting of left sided PTX and paraduodenal collection

## 2019-07-01 NOTE — CONSULT NOTE ADULT - SUBJECTIVE AND OBJECTIVE BOX
CHIEF COMPLAINT: Patient is a 63y old  Male who presents with a chief complaint of Nausea/vomiting (2019 12:02)      HPI: HPI:  63M hx of Whipple for cholangiocarcinoma  d/c  who presents with nausea, vomiting, and fever since discharge. He had been eating, passing flatus and having BMs without issue. His J tube had not been used. Since d/c he has been feeling general malaise and recently became febrile. He doesn't describe true abdominal pain, but says he feels discomfort and bloating.     Reports persistent hiccups for past 2 weeks and states nothing has helped. Denies chest pain, SOB. Has discomfort with taking a deep breath. No N/V    Vital Signs Last 24 Hrs  T(C): 36.7 (2019 02:25), Max: 38.6 (2019 22:31)  T(F): 98 (2019 02:25), Max: 101.4 (2019 22:31)  HR: 77 (2019 03:18) (77 - 86)  BP: 111/45 (2019 03:18) (111/45 - 155/82)  BP(mean): --  RR: 18 (2019 03:18) (16 - 18)  SpO2: 100% (2019 03:18) (96% - 100%) (2019 05:18)      Pain Symptoms if applicable:             	                         none	   mild         moderate         severe  	                            0	    1-3	     4-6	         7-10  Pain:  Location:	  Modifying factors:	  Associated symptoms:	    Allergies    Cipro (Rash)  Plavix (Rash)    Intolerances        HOME MEDICATIONS: [x] Reviewed    MEDICATIONS  (STANDING):  famotidine    Tablet 20 milliGRAM(s) Oral daily  heparin  Injectable 5000 Unit(s) SubCutaneous every 8 hours  insulin glargine Injectable (LANTUS) 10 Unit(s) SubCutaneous at bedtime  insulin lispro (HumaLOG) corrective regimen sliding scale   SubCutaneous every 6 hours  lactated ringers. 1000 milliLiter(s) (100 mL/Hr) IV Continuous <Continuous>  meropenem  IVPB 500 milliGRAM(s) IV Intermittent every 12 hours  metoprolol tartrate IVPB 5 milliGRAM(s) IV Intermittent every 6 hours  ondansetron Injectable 4 milliGRAM(s) IV Push once  torsemide 20 milliGRAM(s) Oral daily    MEDICATIONS  (PRN):  HYDROmorphone  Injectable 0.5 milliGRAM(s) IV Push every 4 hours PRN Moderate Pain (4 - 6)      PAST MEDICAL & SURGICAL HISTORY:  Type II diabetes mellitus  HTN (hypertension)  CAD (coronary artery disease)  S/P cholecystectomy  [ ] Reviewed     SOCIAL HISTORY:  [x] Substance abuse:   [x] Tobacco:   [x] Alcohol use:     FAMILY HISTORY:  [x] No pertinent family history in first degree relatives     REVIEW OF SYSTEMS:    [x] All other ROS negative  [  ] Unable to obtain due to poor mental status    Vital Signs Last 24 Hrs  T(C): 37.2 (2019 11:26), Max: 38.6 (2019 22:31)  T(F): 99 (2019 11:), Max: 101.4 (2019 22:31)  HR: 83 (2019 11:) (77 - 87)  BP: 148/60 (2019 11:26) (111/45 - 155/82)  BP(mean): --  RR: 16 (2019 11:26) (16 - 18)  SpO2: 99% (2019 11:26) (96% - 100%)    PHYSICAL EXAM:    GENERAL: NAD, on room air  HEENT: sclera clear, + NGT draining bilious fluid  RESPIRATORY: CTA b/l, decreased BS LLL, left sided chest tube with serosanguinous drainage  CARDIOVASCULAR: s1/s2, no murmurs appreciated  GASTROINTESTINAL: Soft, Nontender, Nondistended; Bowel sounds present  : no dawson  EXTREMITIES: WWP, no edema b/l  NERVOUS SYSTEM:  awake, alert, responds to Qs appropriately  PSYCH: calm, cooperative  SKIN: No rashes or lesions    LABS:                        9.5    13.90 )-----------( 425      ( 2019 09:25 )             29.2     07-01    132<L>  |  91<L>  |  45<H>  ----------------------------<  249<H>  3.2<L>   |  25  |  2.86<H>    Ca    8.3<L>      2019 09:25  Phos  3.1     07-  Mg     1.6     07-    TPro  6.3  /  Alb  2.5<L>  /  TBili  0.7  /  DBili  x   /  AST  17  /  ALT  11  /  AlkPhos  90  07-    PT/INR - ( 2019 06:45 )   PT: 14.1 SEC;   INR: 1.23          PTT - ( 2019 06:45 )  PTT:33.8 SEC  Urinalysis Basic - ( 2019 00:50 )    Color: YELLOW / Appearance: CLEAR / S.021 / pH: 6.0  Gluc: NEGATIVE / Ketone: NEGATIVE  / Bili: NEGATIVE / Urobili: NORMAL   Blood: NEGATIVE / Protein: 50 / Nitrite: NEGATIVE   Leuk Esterase: NEGATIVE / RBC: 3-5 / WBC 6-10   Sq Epi: OCC / Non Sq Epi: x / Bacteria: NEGATIVE      CAPILLARY BLOOD GLUCOSE      POCT Blood Glucose.: 239 mg/dL (2019 13:03)      RADIOLOGY & ADDITIONAL STUDIES:    EKG:   Personally Reviewed:  [x] YES     Imaging:   Personally Reviewed:  [x] YES               [ ] Consultant(s) Notes Reviewed  [x] Care Discussed with Consultants/Other Providers: Urology PA - discussed CHIEF COMPLAINT: Patient is a 63y old  Male who presents with a chief complaint of Nausea/vomiting (2019 12:02)      HPI: HPI:  63M hx of Whipple for cholangiocarcinoma  d/c  who presents with nausea, vomiting, and fever since discharge. He had been eating, passing flatus and having BMs without issue. His J tube had not been used. Since d/c he has been feeling general malaise and recently became febrile. He doesn't describe true abdominal pain, but says he feels discomfort and bloating.     Reports persistent hiccups for past 2 weeks and states nothing has helped. Denies chest pain, SOB. Has discomfort with taking a deep breath. No N/V    Vital Signs Last 24 Hrs  T(C): 36.7 (2019 02:25), Max: 38.6 (2019 22:31)  T(F): 98 (2019 02:25), Max: 101.4 (2019 22:31)  HR: 77 (2019 03:18) (77 - 86)  BP: 111/45 (2019 03:18) (111/45 - 155/82)  BP(mean): --  RR: 18 (2019 03:18) (16 - 18)  SpO2: 100% (2019 03:18) (96% - 100%) (2019 05:18)      Pain Symptoms if applicable:             	                         none	   mild         moderate         severe  	                            0	    1-3	     4-6	         7-10  Pain:  Location:	  Modifying factors:	  Associated symptoms:	    Allergies    Cipro (Rash)  Plavix (Rash)    Intolerances        HOME MEDICATIONS: [x] Reviewed    MEDICATIONS  (STANDING):  famotidine    Tablet 20 milliGRAM(s) Oral daily  heparin  Injectable 5000 Unit(s) SubCutaneous every 8 hours  insulin glargine Injectable (LANTUS) 10 Unit(s) SubCutaneous at bedtime  insulin lispro (HumaLOG) corrective regimen sliding scale   SubCutaneous every 6 hours  lactated ringers. 1000 milliLiter(s) (100 mL/Hr) IV Continuous <Continuous>  meropenem  IVPB 500 milliGRAM(s) IV Intermittent every 12 hours  metoprolol tartrate IVPB 5 milliGRAM(s) IV Intermittent every 6 hours  ondansetron Injectable 4 milliGRAM(s) IV Push once  torsemide 20 milliGRAM(s) Oral daily    MEDICATIONS  (PRN):  HYDROmorphone  Injectable 0.5 milliGRAM(s) IV Push every 4 hours PRN Moderate Pain (4 - 6)      PAST MEDICAL & SURGICAL HISTORY:  Type II diabetes mellitus  HTN (hypertension)  CAD (coronary artery disease)  S/P cholecystectomy  [ ] Reviewed     SOCIAL HISTORY:  [x] Substance abuse:   [x] Tobacco:   [x] Alcohol use:     FAMILY HISTORY:  [x] No pertinent family history in first degree relatives     REVIEW OF SYSTEMS:    [x] All other ROS negative  [  ] Unable to obtain due to poor mental status    Vital Signs Last 24 Hrs  T(C): 37.2 (2019 11:26), Max: 38.6 (2019 22:31)  T(F): 99 (2019 11:), Max: 101.4 (2019 22:31)  HR: 83 (2019 11:) (77 - 87)  BP: 148/60 (2019 11:26) (111/45 - 155/82)  BP(mean): --  RR: 16 (2019 11:26) (16 - 18)  SpO2: 99% (2019 11:26) (96% - 100%)    PHYSICAL EXAM:    GENERAL: NAD, on room air  HEENT: sclera clear, + NGT draining bilious fluid  RESPIRATORY: CTA b/l, decreased BS LLL, left sided chest tube with serosanguinous drainage  CARDIOVASCULAR: s1/s2, no murmurs appreciated  GASTROINTESTINAL: Soft, Nontender, Nondistended; Bowel sounds present, midline abdominal staples C/D/I, gauze and steristrips on abdomen C/D/I  : no dawson  EXTREMITIES: WWP, no edema b/l  NERVOUS SYSTEM:  awake, alert, responds to Qs appropriately  PSYCH: calm, cooperative  SKIN: No rashes or lesions    LABS:                        9.5    13.90 )-----------( 425      ( 2019 09:25 )             29.2     07-    132<L>  |  91<L>  |  45<H>  ----------------------------<  249<H>  3.2<L>   |  25  |  2.86<H>    Ca    8.3<L>      2019 09:25  Phos  3.1     -  Mg     1.6     -    TPro  6.3  /  Alb  2.5<L>  /  TBili  0.7  /  DBili  x   /  AST  17  /  ALT  11  /  AlkPhos  90      PT/INR - ( 2019 06:45 )   PT: 14.1 SEC;   INR: 1.23          PTT - ( 2019 06:45 )  PTT:33.8 SEC  Urinalysis Basic - ( 2019 00:50 )    Color: YELLOW / Appearance: CLEAR / S.021 / pH: 6.0  Gluc: NEGATIVE / Ketone: NEGATIVE  / Bili: NEGATIVE / Urobili: NORMAL   Blood: NEGATIVE / Protein: 50 / Nitrite: NEGATIVE   Leuk Esterase: NEGATIVE / RBC: 3-5 / WBC 6-10   Sq Epi: OCC / Non Sq Epi: x / Bacteria: NEGATIVE      CAPILLARY BLOOD GLUCOSE      POCT Blood Glucose.: 239 mg/dL (2019 13:03)      RADIOLOGY & ADDITIONAL STUDIES:    EKG:   Personally Reviewed:  [x] YES     Imaging:   Personally Reviewed:  [x] YES               [ ] Consultant(s) Notes Reviewed  [x] Care Discussed with Consultants/Other Providers: Urology PA - discussed

## 2019-07-01 NOTE — PATIENT PROFILE ADULT - SURGICAL SITE DESCRIPTION
Abd midline incision w/ staples GRISEL and c/d/i; S lap sites w/ steris's c/d/i. LLQ G tube dsg c/d/i. All from previous Whipple surgery on 6/18.

## 2019-07-01 NOTE — CONSULT NOTE ADULT - PROBLEM SELECTOR RECOMMENDATION 2
s/p left sided chest tube and now resolving PTX on CT chest  Nonspecific ground-glass opacities also seen which could be reexpansion edema or patchy atelectasis  Continue management as per surgery s/p left sided chest tube and now resolving PTX on CT chest  Nonspecific ground-glass opacities also seen which could be reexpansion edema or patchy atelectasis  Continue management as per surgery  Daily CXR to monitor

## 2019-07-01 NOTE — H&P ADULT - HISTORY OF PRESENT ILLNESS
63M hx of Whipple for cholangiocarcinoma 6/18 d/c 6/26 who presents with nausea, vomiting, and fever since discharge. He had been eating, passing flatus and having BMs without issue. His J tube had not been used. Since d/c he has been feeling general malaise and recently became febrile. He doesn't describe true abdominal pain, but says he feels discomfort and bloating.   Vital Signs Last 24 Hrs  T(C): 36.7 (01 Jul 2019 02:25), Max: 38.6 (30 Jun 2019 22:31)  T(F): 98 (01 Jul 2019 02:25), Max: 101.4 (30 Jun 2019 22:31)  HR: 77 (01 Jul 2019 03:18) (77 - 86)  BP: 111/45 (01 Jul 2019 03:18) (111/45 - 155/82)  BP(mean): --  RR: 18 (01 Jul 2019 03:18) (16 - 18)  SpO2: 100% (01 Jul 2019 03:18) (96% - 100%)

## 2019-07-01 NOTE — CONSULT NOTE ADULT - PROBLEM SELECTOR RECOMMENDATION 6
Obtain EKG-monitor QTC  possibly from irritation of diaphragm from paraduodenal collection and the PTX

## 2019-07-01 NOTE — ED ADULT NURSE REASSESSMENT NOTE - NS ED NURSE REASSESS COMMENT FT1
Received report from CHETAN Gomze, pt aox4 in no apparent distress VSS breaths equal and unlabored denies any pain or difficulty breathing chest tube in place xray in progress will continue to monitor

## 2019-07-01 NOTE — CONSULT NOTE ADULT - PROBLEM SELECTOR RECOMMENDATION 3
Cr downtrending from 3.25 on admission now to 2.86, hx of CKD IV  Had ATN prior admission and briefly required HD, discharged on 6/26 with Cr of 2.27. No indication for RRT at this time  Continue IVF, Home Torsemide on hold  Renal following

## 2019-07-01 NOTE — ED PROCEDURE NOTE - ATTENDING CONTRIBUTION TO CARE
DR. KIM, ATTENDING MD-  I was in the exam room and observed and supervised the resident when they were completing this procedure.

## 2019-07-01 NOTE — CONSULT NOTE ADULT - PROBLEM SELECTOR RECOMMENDATION 8
NPO with NGT to suction  BP controlled. Would hold off antihypertensives in setting of possible active infection

## 2019-07-01 NOTE — H&P ADULT - ASSESSMENT
63M POD#13 s/p Whipple w/ paraduodenal collection and PTX s/p pigtail    - admit to D team  - empiric abx  - NPO  - preop labs  - CT to wall suction  - pain control  - IV fluids    ZULMA Davis MD  D Team [id number]

## 2019-07-01 NOTE — H&P ADULT - NSHPLABSRESULTS_GEN_ALL_CORE
CBC Full  -  ( 30 Jun 2019 22:45 )  WBC Count : 16.76 K/uL  RBC Count : 3.38 M/uL  Hemoglobin : 9.8 g/dL  Hematocrit : 29.8 %  Platelet Count - Automated : 475 K/uL  Mean Cell Volume : 88.2 fL  Mean Cell Hemoglobin : 29.0 pg  Mean Cell Hemoglobin Concentration : 32.9 %  Auto Neutrophil # : 13.19 K/uL  Auto Lymphocyte # : 0.97 K/uL  Auto Monocyte # : 2.14 K/uL  Auto Eosinophil # : 0.26 K/uL  Auto Basophil # : 0.04 K/uL  Auto Neutrophil % : 78.6 %  Auto Lymphocyte % : 5.8 %  Auto Monocyte % : 12.8 %  Auto Eosinophil % : 1.6 %  Auto Basophil % : 0.2 %    06-30    128<L>  |  88<L>  |  50<H>  ----------------------------<  300<H>  3.4<L>   |  25  |  3.25<H>    Ca    8.6      30 Jun 2019 22:45    TPro  7.0  /  Alb  2.8<L>  /  TBili  0.6  /  DBili  x   /  AST  24  /  ALT  13  /  AlkPhos  105  06-30    < from: CT Abdomen and Pelvis w/ Oral Cont (07.01.19 @ 04:22) >    FINDINGS:    LOWER CHEST: Left-sided pigtail chest tube. Trace left pneumothorax along   the anterior chest wall. Subsegmental atelectasis at the lung bases (left   greater than right). Trace bilateral pleural effusions. Mild   cardiomegaly. Trace pericardial effusion. Atherosclerotic calcifications   of the coronary arteries.     LIVER: Within normal limits.  BILE DUCTS: Pneumobilia.  GALLBLADDER: Status post cholecystectomy with choledochoduodenostomy.  SPLEEN: Within normal limits.  PANCREAS: Status post post procedure with pancreatic head resection.  ADRENALS: Within normal limits.  KIDNEYS/URETERS: Within normal limits.    BLADDER: Within normal limits.  REPRODUCTIVE ORGANS: Prostate within normal limits.    BOWEL: Status post partial gastrectomy with gastrojejunostomy.   Jejunostomy tube enters the left ventral abdominal wall. No dilated loops   of bowel. Reflux of contrast into the duodenum from the jejunum.   PERITONEUM: High density amorphous collection adjacent to the duodenal   stump with loculated foci of air and fluid in the right upper quadrant.   Trace subhepatic and right paracolic gutter fluid. Infiltrative changes   of the fat in the right upper quadrant and below the midline ventral   abdominal wall, likely postsurgical. Scattered subcentimeter mesenteric   root lymph nodes.  VESSELS:  Calcific atherosclerosis of the aorta and its branches. Normal   caliber abdominal aorta.  RETROPERITONEUM: No lymphadenopathy.    ABDOMINAL WALL: Postsurgical changes. No fluid collection along the   midline ventral abdominal wall incisional scar.  BONES: Degenerative changes of the spine with prominent disc osteophyte   complex at L4-L5 resulting in moderate to severe segmental canal stenosis.    IMPRESSION:     Status post recent Whipple procedure. High density amorphous collection   adjacent to the duodenal stump with loculated foci of air and fluid in   the right upper quadrant for which considerations include postoperative   hematoma and air, leakage of contrast and air from the duodenal stump, or   developing collection/abscess.    Left-sided pigtail chest tube. Trace left pneumothorax along the anterior   chest wall.          < end of copied text >

## 2019-07-01 NOTE — CHART NOTE - NSCHARTNOTEFT_GEN_A_CORE
Pre-Interventional Radiology Procedure Note    63y    Male    Procedure: Drainage of collection    Diagnosis/Indication: collection at duodenal stump (s/p whipple 6/18)    Interventional Radiology Attending Physician: Jarocho    Ordering Attending Physician: Felice    PAST MEDICAL & SURGICAL HISTORY:  Type II diabetes mellitus  HTN (hypertension)  CAD (coronary artery disease)  S/P cholecystectomy       CBC Full  -  ( 01 Jul 2019 09:25 )  WBC Count : 13.90 K/uL  RBC Count : 3.29 M/uL  Hemoglobin : 9.5 g/dL  Hematocrit : 29.2 %  Platelet Count - Automated : 425 K/uL  Mean Cell Volume : 88.8 fL  Mean Cell Hemoglobin : 28.9 pg  Mean Cell Hemoglobin Concentration : 32.5 %  Auto Neutrophil # : x  Auto Lymphocyte # : x  Auto Monocyte # : x  Auto Eosinophil # : x  Auto Basophil # : x  Auto Neutrophil % : x  Auto Lymphocyte % : x  Auto Monocyte % : x  Auto Eosinophil % : x  Auto Basophil % : x    07-01    132<L>  |  91<L>  |  45<H>  ----------------------------<  249<H>  3.2<L>   |  25  |  2.86<H>    Ca    8.3<L>      01 Jul 2019 09:25  Phos  3.1     07-01  Mg     1.6     07-01    TPro  6.3  /  Alb  2.5<L>  /  TBili  0.7  /  DBili  x   /  AST  17  /  ALT  11  /  AlkPhos  90  07-01    PT/INR - ( 01 Jul 2019 06:45 )   PT: 14.1 SEC;   INR: 1.23          PTT - ( 01 Jul 2019 06:45 )  PTT:33.8 SEC

## 2019-07-01 NOTE — CONSULT NOTE ADULT - PROBLEM SELECTOR RECOMMENDATION 7
path report after Whipple's reveals: Infiltrating adenocarcinoma, moderately differentiated, of the common bile duct

## 2019-07-01 NOTE — CONSULT NOTE ADULT - PROBLEM SELECTOR RECOMMENDATION 9
CT Abd with paraduodenal collection, concern for abscess, met sepsis criteria on admission with fever to 101.4 and leukocytosis  Had Whipple procedure on 6/18/19  Continue Meropenem, ID consult, WBC downtrending  IR drainage of collection, F/u 6/30 Bcx, Ucx  Had recent E coli and Citrobacter bacteremia presumed 2/2 biliary source, s/p ERCP 5/19/19 notable for bile in the duodenum and additional placement of plastic biliary stent alongside prior stent s/p 9 stents  at home on ASA, coreg, crestor. Holding in setting of NPO status

## 2019-07-01 NOTE — CONSULT NOTE ADULT - SUBJECTIVE AND OBJECTIVE BOX
[facility] DIVISION OF KIDNEY DISEASES AND HYPERTENSION -- INITIAL CONSULT NOTE  --------------------------------------------------------------------------------  HPI: 63 year old male, PMH significant for recent Whipple's procedure presented to the ED after complaining of generalized malaise, nausea and vomiting. Patient was recently admitted on 6/18 and discharged from the hospital on 6/26 for Whipple procedure. Since that time patient complained of increasing malaise and nausea with one episode of vomiting on 6/30/2019. Patient is known to Nephrology service as patient required inpatient HD for oliguric SLOAN due to previous admission. Last HD was on 6/21. Patient reports good urine output since discontinuing HD, states about 900 mL per day. Patient currently complains of cough and some vague abdominal pain.    PAST HISTORY  --------------------------------------------------------------------------------  PAST MEDICAL & SURGICAL HISTORY:  Type II diabetes mellitus  HTN (hypertension)  CAD (coronary artery disease)  S/P cholecystectomy    PAST SOCIAL HISTORY: prior history of smoking     ALLERGIES & MEDICATIONS  --------------------------------------------------------------------------------  Allergies    Cipro (Rash)  Plavix (Rash)    Intolerances      Standing Inpatient Medications  carvedilol 6.25 milliGRAM(s) Oral every 12 hours  docusate sodium 100 milliGRAM(s) Oral three times a day  famotidine    Tablet 20 milliGRAM(s) Oral daily  heparin  Injectable 5000 Unit(s) SubCutaneous every 8 hours  insulin glargine Injectable (LANTUS) 10 Unit(s) SubCutaneous at bedtime  insulin lispro (HumaLOG) corrective regimen sliding scale   SubCutaneous every 6 hours  lactated ringers. 1000 milliLiter(s) IV Continuous <Continuous>  magnesium sulfate  IVPB 2 Gram(s) IV Intermittent once  meropenem  IVPB 500 milliGRAM(s) IV Intermittent every 12 hours  ondansetron Injectable 4 milliGRAM(s) IV Push once  torsemide 20 milliGRAM(s) Oral daily    PRN Inpatient Medications  acetaminophen   Tablet .. 975 milliGRAM(s) Oral every 6 hours PRN  oxyCODONE    IR 5 milliGRAM(s) Oral every 4 hours PRN  oxyCODONE    IR 10 milliGRAM(s) Oral every 4 hours PRN      REVIEW OF SYSTEMS  --------------------------------------------------------------------------------  Gen: No weight changes, fatigue, weakness  Skin: No rashes  Respiratory: (+) cough  CV: No chest pain, PND, orthopnea  GI:  (+) nausea, vomiting  : No increased frequency, dysuria, hematuria, nocturia  MSK: No joint pain/swelling; no back pain; no edema  Neuro: No dizziness/lightheadedness, weakness  Heme: No easy bruising or bleeding    All other systems were reviewed and are negative, except as noted.    VITALS/PHYSICAL EXAM  --------------------------------------------------------------------------------  T(C): 37.2 (07-01-19 @ 11:26), Max: 38.6 (06-30-19 @ 22:31)  HR: 83 (07-01-19 @ 11:26) (77 - 87)  BP: 148/60 (07-01-19 @ 11:26) (111/45 - 155/82)  RR: 16 (07-01-19 @ 11:26) (16 - 18)  SpO2: 99% (07-01-19 @ 11:26) (96% - 100%)  Wt(kg): --  Height (cm): 170.18 (07-01-19 @ 06:57)  Weight (kg): 81.6 (07-01-19 @ 06:57)  BMI (kg/m2): 28.2 (07-01-19 @ 06:57)  BSA (m2): 1.93 (07-01-19 @ 06:57)      06-30-19 @ 07:01  -  07-01-19 @ 07:00  --------------------------------------------------------  IN: 0 mL / OUT: 930 mL / NET: -930 mL    07-01-19 @ 07:01  -  07-01-19 @ 12:03  --------------------------------------------------------  IN: 0 mL / OUT: 480 mL / NET: -480 mL      Physical Exam:  	Gen: Mild painful distress  	Pulm: CTA B/L, Left chest tube in place to suction  	CV: RRR, S1S2; no rub  	Back: No spinal or CVA tenderness; no sacral edema  	Abd: +BS, soft, nontender/nondistended, healing midline surgical scar with staples present  	: No suprapubic tenderness  	LE: Warm, FROM, no clubbing, intact strength; no edema  	Skin: Warm, without rashes  	Vascular access: Right tunneled catheter    LABS/STUDIES  --------------------------------------------------------------------------------              9.5    13.90 >-----------<  425      [07-01-19 @ 09:25]              29.2     132  |  91  |  45  ----------------------------<  249      [07-01-19 @ 09:25]  3.2   |  25  |  2.86        Ca     8.3     [07-01-19 @ 09:25]      Mg     1.6     [07-01-19 @ 09:25]      Phos  3.1     [07-01-19 @ 09:25]    TPro  6.3  /  Alb  2.5  /  TBili  0.7  /  DBili  x   /  AST  17  /  ALT  11  /  AlkPhos  90  [07-01-19 @ 09:25]    PT/INR: PT 14.1 , INR 1.23       [07-01-19 @ 06:45]  PTT: 33.8       [07-01-19 @ 06:45]      Creatinine Trend:  SCr 2.86 [07-01 @ 09:25]  SCr 3.25 [06-30 @ 22:45]  SCr 2.27 [06-26 @ 05:36]  SCr 1.73 [06-25 @ 05:28]  SCr 2.13 [06-24 @ 11:00]    Urinalysis - [07-01-19 @ 00:50]      Color YELLOW / Appearance CLEAR / SG 1.021 / pH 6.0      Gluc NEGATIVE / Ketone NEGATIVE  / Bili NEGATIVE / Urobili NORMAL       Blood NEGATIVE / Protein 50 / Leuk Est NEGATIVE / Nitrite NEGATIVE      RBC 3-5 / WBC 6-10 / Hyaline 2+ / Gran  / Sq Epi OCC / Non Sq Epi  / Bacteria NEGATIVE      Iron 47, TIBC 175, %sat --      [05-31-19 @ 06:45]  Ferritin 640.4      [05-31-19 @ 06:45]  HbA1c 5.6      [06-23-19 @ 06:01]    HBsAb 3.0      [05-22-19 @ 20:00]  HBsAg NEGATIVE      [06-21-19 @ 06:55]  HBcAb Nonreactive      [05-22-19 @ 20:00]  HCV 0.20, Nonreactive Hepatitis C AB  S/CO Ratio                        Interpretation  < 1.00                                   Non-Reactive  1.00 - 4.99                         Weakly-Reactive  >= 5.00                                Reactive  Non-Reactive: Aperson with a non-reactive HCV antibody  result is considered uninfected.  No further action is  needed unless recent infection is suspected.  In these  cases, consider repeat testing later to detect  seroconversion..  Weakly-Reactive: HCV antibody test is abnormal, HCV RNA  Qualitative test will follow.  Reactive: HCV antibody test is abnormal, HCV RNA  Qualitative test will follow.  Note: HCV antibody testing is performed on the [employer]   system.      [05-22-19 @ 20:00] [facility] DIVISION OF KIDNEY DISEASES AND HYPERTENSION -- INITIAL CONSULT NOTE  --------------------------------------------------------------------------------  HPI: 63-year-old male with PMH significant for DM, HTN, CAD, SLOAN on CKD and recent Whipple's procedure presented to the ED after complaining of generalized malaise, nausea and vomiting. Patient was recently hospitalized at [facility] (6/18/19-6/26/19) for Whipple's procedure. Since his discharge, patient complained of increasing malaise and nausea with one episode of vomiting on 6/30/2019. Patient is known to our service as he required HD during previous 2 admission for oliguric SLOAN. Last HD at [facility] was on 6/24/19. Patient reports good urine output since discontinuing HD, states about 900 mL per day. Patient currently complains of cough and some vague abdominal pain.    PAST HISTORY  --------------------------------------------------------------------------------  PAST MEDICAL & SURGICAL HISTORY:  Type II diabetes mellitus  HTN (hypertension)  CAD (coronary artery disease)  S/P cholecystectomy    PAST SOCIAL HISTORY: prior history of smoking     ALLERGIES & MEDICATIONS  --------------------------------------------------------------------------------  Allergies    Cipro (Rash)  Plavix (Rash)    Intolerances    Standing Inpatient Medications  carvedilol 6.25 milliGRAM(s) Oral every 12 hours  docusate sodium 100 milliGRAM(s) Oral three times a day  famotidine    Tablet 20 milliGRAM(s) Oral daily  heparin  Injectable 5000 Unit(s) SubCutaneous every 8 hours  insulin glargine Injectable (LANTUS) 10 Unit(s) SubCutaneous at bedtime  insulin lispro (HumaLOG) corrective regimen sliding scale   SubCutaneous every 6 hours  lactated ringers. 1000 milliLiter(s) IV Continuous <Continuous>  magnesium sulfate  IVPB 2 Gram(s) IV Intermittent once  meropenem  IVPB 500 milliGRAM(s) IV Intermittent every 12 hours  ondansetron Injectable 4 milliGRAM(s) IV Push once  torsemide 20 milliGRAM(s) Oral daily    PRN Inpatient Medications  acetaminophen   Tablet .. 975 milliGRAM(s) Oral every 6 hours PRN  oxyCODONE    IR 5 milliGRAM(s) Oral every 4 hours PRN  oxyCODONE    IR 10 milliGRAM(s) Oral every 4 hours PRN    REVIEW OF SYSTEMS  --------------------------------------------------------------------------------  Gen: No weight changes, fatigue, weakness  Skin: No rashes  Respiratory: (+) cough  CV: No chest pain, PND, orthopnea  GI:  (+) nausea, vomiting  : No increased frequency, dysuria, hematuria, nocturia  MSK: No joint pain/swelling; no back pain; no edema  Neuro: No dizziness/lightheadedness, weakness  Heme: No easy bruising or bleeding    All other systems were reviewed and are negative, except as noted.    VITALS/PHYSICAL EXAM  --------------------------------------------------------------------------------  T(C): 37.2 (07-01-19 @ 11:26), Max: 38.6 (06-30-19 @ 22:31)  HR: 83 (07-01-19 @ 11:26) (77 - 87)  BP: 148/60 (07-01-19 @ 11:26) (111/45 - 155/82)  RR: 16 (07-01-19 @ 11:26) (16 - 18)  SpO2: 99% (07-01-19 @ 11:26) (96% - 100%)  Wt(kg): --  Height (cm): 170.18 (07-01-19 @ 06:57)  Weight (kg): 81.6 (07-01-19 @ 06:57)  BMI (kg/m2): 28.2 (07-01-19 @ 06:57)  BSA (m2): 1.93 (07-01-19 @ 06:57)      06-30-19 @ 07:01  -  07-01-19 @ 07:00  --------------------------------------------------------  IN: 0 mL / OUT: 930 mL / NET: -930 mL    07-01-19 @ 07:01  -  07-01-19 @ 12:03  --------------------------------------------------------  IN: 0 mL / OUT: 480 mL / NET: -480 mL    Physical Exam:  	Gen: Mild painful distress              HEENT: NGT to suction seen+  	Pulm: CTA B/L, Left chest tube seen+   	CV: RRR, S1S2; no rub  	Back: No spinal or CVA tenderness; no sacral edema  	Abd: +BS, soft, nontender/nondistended, healing midline surgical scar with staples present  	: No suprapubic tenderness  	LE: Warm, FROM, no clubbing, intact strength; no edema  	Skin: Warm, without rashes  	Vascular access: Right tunneled catheter    LABS/STUDIES  --------------------------------------------------------------------------------              9.5    13.90 >-----------<  425      [07-01-19 @ 09:25]              29.2     132  |  91  |  45  ----------------------------<  249      [07-01-19 @ 09:25]  3.2   |  25  |  2.86        Ca     8.3     [07-01-19 @ 09:25]      Mg     1.6     [07-01-19 @ 09:25]      Phos  3.1     [07-01-19 @ 09:25]    TPro  6.3  /  Alb  2.5  /  TBili  0.7  /  DBili  x   /  AST  17  /  ALT  11  /  AlkPhos  90  [07-01-19 @ 09:25]    Creatinine Trend:  SCr 2.86 [07-01 @ 09:25]  SCr 3.25 [06-30 @ 22:45]  SCr 2.27 [06-26 @ 05:36]  SCr 1.73 [06-25 @ 05:28]  SCr 2.13 [06-24 @ 11:00]    Urinalysis - [07-01-19 @ 00:50]      Color YELLOW / Appearance CLEAR / SG 1.021 / pH 6.0      Gluc NEGATIVE / Ketone NEGATIVE  / Bili NEGATIVE / Urobili NORMAL       Blood NEGATIVE / Protein 50 / Leuk Est NEGATIVE / Nitrite NEGATIVE      RBC 3-5 / WBC 6-10 / Hyaline 2+ / Gran  / Sq Epi OCC / Non Sq Epi  / Bacteria NEGATIVE

## 2019-07-01 NOTE — PROGRESS NOTE ADULT - SUBJECTIVE AND OBJECTIVE BOX
Vascular & Interventional Radiology Post-Procedure Note    Pre-Procedure Diagnosis: Paraduodenal collection s/p whipple  Post-Procedure Diagnosis: Same as pre.  Indications for Procedure: Patient febrile, lethargic. Concern for infection    : Elvia GAO  Assistant(s): Farooq GAO    Procedure Details/Findings: ~25 cc of foul smelling, murky, green colored fluid with air bubbles was aspirated and a 10 Fr drain was left in the collection  Access (if applicable): Right upper abdomen    Complications: None  Estimated Blood Loss: Minimal  Specimen: ~3-5 cc of the aspirated fluid was sent for analysis  Contrast: None  Sedation: IV sedation  Patient Condition/Disposition: IRS > Floor    Plan:     Bedrest with monitoring of vital signs in the IR suite before discharge back to the floor  Please monitor drain for output  Slightly blood tinged output is OK  Activity/diet to be advanced per primary team  Please call IR with any questions/concerns

## 2019-07-01 NOTE — CONSULT NOTE ADULT - PROBLEM SELECTOR RECOMMENDATION 9
63 year old male presented to the ED with generalized malaise and nausea with one episode of vomiting. Patient noted to have an SLOAN on CKD, likely due to pre-renal causes. Noted to have drop in Hgb since discharge and decreased PO intake due to recent surgery. Would not recommend HD at this time. Would recommend encouraging PO intake, IV fluids as well. Continue to trend SCr, monitor for signs of bleeding, and trend Hgb. 63 year old male presented to the ED with generalized malaise and nausea with one episode of vomiting. Patient noted to have an SLOAN on CKD, likely due to pre-renal causes. Noted to have drop in Hgb since discharge and decreased PO intake due to recent surgery. Would not recommend HD at this time. Would recommend encouraging PO intake, IV fluids as well. Continue to trend SCr, monitor for signs of bleeding, and trend Hgb. Avoid ACE-i/ARBs, NSAIDS, and other nephrotoxic medications. Pt. with SLOAN, likely hemodynamically mediated in setting of decreased oral intake, GI fluid loss and ? infection. Scr was 2.26 on 6/26/19, increased to 3.25 on 6/30. Scr decreased to 2.86 today with IVF. Continue IVF. Monitor labs and urine output. Avoid any potential nephrotoxins

## 2019-07-02 LAB
ALBUMIN SERPL ELPH-MCNC: 2.3 G/DL — LOW (ref 3.3–5)
ALP SERPL-CCNC: 78 U/L — SIGNIFICANT CHANGE UP (ref 40–120)
ALT FLD-CCNC: 12 U/L — SIGNIFICANT CHANGE UP (ref 4–41)
ANION GAP SERPL CALC-SCNC: 13 MMO/L — SIGNIFICANT CHANGE UP (ref 7–14)
AST SERPL-CCNC: 17 U/L — SIGNIFICANT CHANGE UP (ref 4–40)
BACTERIA UR CULT: SIGNIFICANT CHANGE UP
BILIRUB SERPL-MCNC: 0.6 MG/DL — SIGNIFICANT CHANGE UP (ref 0.2–1.2)
BUN SERPL-MCNC: 37 MG/DL — HIGH (ref 7–23)
CALCIUM SERPL-MCNC: 8.4 MG/DL — SIGNIFICANT CHANGE UP (ref 8.4–10.5)
CHLORIDE SERPL-SCNC: 98 MMOL/L — SIGNIFICANT CHANGE UP (ref 98–107)
CO2 SERPL-SCNC: 26 MMOL/L — SIGNIFICANT CHANGE UP (ref 22–31)
CREAT SERPL-MCNC: 2.41 MG/DL — HIGH (ref 0.5–1.3)
CULTURE - ACID FAST SMEAR CONCENTRATED: SIGNIFICANT CHANGE UP
GLUCOSE BLDC GLUCOMTR-MCNC: 119 MG/DL — HIGH (ref 70–99)
GLUCOSE BLDC GLUCOMTR-MCNC: 122 MG/DL — HIGH (ref 70–99)
GLUCOSE BLDC GLUCOMTR-MCNC: 125 MG/DL — HIGH (ref 70–99)
GLUCOSE BLDC GLUCOMTR-MCNC: 140 MG/DL — HIGH (ref 70–99)
GLUCOSE BLDC GLUCOMTR-MCNC: 155 MG/DL — HIGH (ref 70–99)
GLUCOSE SERPL-MCNC: 126 MG/DL — HIGH (ref 70–99)
HCT VFR BLD CALC: 27.6 % — LOW (ref 39–50)
HGB BLD-MCNC: 9.1 G/DL — LOW (ref 13–17)
MAGNESIUM SERPL-MCNC: 2 MG/DL — SIGNIFICANT CHANGE UP (ref 1.6–2.6)
MCHC RBC-ENTMCNC: 29.1 PG — SIGNIFICANT CHANGE UP (ref 27–34)
MCHC RBC-ENTMCNC: 33 % — SIGNIFICANT CHANGE UP (ref 32–36)
MCV RBC AUTO: 88.2 FL — SIGNIFICANT CHANGE UP (ref 80–100)
NRBC # FLD: 0.02 K/UL — SIGNIFICANT CHANGE UP (ref 0–0)
PHOSPHATE SERPL-MCNC: 2.7 MG/DL — SIGNIFICANT CHANGE UP (ref 2.5–4.5)
PLATELET # BLD AUTO: 436 K/UL — HIGH (ref 150–400)
PMV BLD: 9.3 FL — SIGNIFICANT CHANGE UP (ref 7–13)
POTASSIUM SERPL-MCNC: 3 MMOL/L — LOW (ref 3.5–5.3)
POTASSIUM SERPL-MCNC: 3.7 MMOL/L — SIGNIFICANT CHANGE UP (ref 3.5–5.3)
POTASSIUM SERPL-SCNC: 3 MMOL/L — LOW (ref 3.5–5.3)
POTASSIUM SERPL-SCNC: 3.7 MMOL/L — SIGNIFICANT CHANGE UP (ref 3.5–5.3)
PROT SERPL-MCNC: 5.8 G/DL — LOW (ref 6–8.3)
RBC # BLD: 3.13 M/UL — LOW (ref 4.2–5.8)
RBC # FLD: 14.5 % — SIGNIFICANT CHANGE UP (ref 10.3–14.5)
SODIUM SERPL-SCNC: 137 MMOL/L — SIGNIFICANT CHANGE UP (ref 135–145)
SPECIMEN SOURCE: SIGNIFICANT CHANGE UP
WBC # BLD: 12.48 K/UL — HIGH (ref 3.8–10.5)
WBC # FLD AUTO: 12.48 K/UL — HIGH (ref 3.8–10.5)

## 2019-07-02 PROCEDURE — 99232 SBSQ HOSP IP/OBS MODERATE 35: CPT | Mod: 24

## 2019-07-02 PROCEDURE — 99233 SBSQ HOSP IP/OBS HIGH 50: CPT

## 2019-07-02 PROCEDURE — 99232 SBSQ HOSP IP/OBS MODERATE 35: CPT | Mod: GC

## 2019-07-02 PROCEDURE — 71045 X-RAY EXAM CHEST 1 VIEW: CPT | Mod: 26

## 2019-07-02 RX ORDER — ACETAMINOPHEN 500 MG
1000 TABLET ORAL ONCE
Refills: 0 | Status: COMPLETED | OUTPATIENT
Start: 2019-07-02 | End: 2019-07-02

## 2019-07-02 RX ORDER — POTASSIUM CHLORIDE 20 MEQ
10 PACKET (EA) ORAL
Refills: 0 | Status: COMPLETED | OUTPATIENT
Start: 2019-07-02 | End: 2019-07-02

## 2019-07-02 RX ORDER — CHLORHEXIDINE GLUCONATE 213 G/1000ML
1 SOLUTION TOPICAL
Refills: 0 | Status: DISCONTINUED | OUTPATIENT
Start: 2019-07-02 | End: 2019-07-10

## 2019-07-02 RX ADMIN — SODIUM CHLORIDE 100 MILLILITER(S): 9 INJECTION, SOLUTION INTRAVENOUS at 19:49

## 2019-07-02 RX ADMIN — HYDROMORPHONE HYDROCHLORIDE 0.5 MILLIGRAM(S): 2 INJECTION INTRAMUSCULAR; INTRAVENOUS; SUBCUTANEOUS at 09:41

## 2019-07-02 RX ADMIN — Medication 100 MILLIEQUIVALENT(S): at 11:49

## 2019-07-02 RX ADMIN — HYDROMORPHONE HYDROCHLORIDE 0.5 MILLIGRAM(S): 2 INJECTION INTRAMUSCULAR; INTRAVENOUS; SUBCUTANEOUS at 20:26

## 2019-07-02 RX ADMIN — MEROPENEM 100 MILLIGRAM(S): 1 INJECTION INTRAVENOUS at 12:33

## 2019-07-02 RX ADMIN — Medication 100 MILLIEQUIVALENT(S): at 10:11

## 2019-07-02 RX ADMIN — SODIUM CHLORIDE 100 MILLILITER(S): 9 INJECTION, SOLUTION INTRAVENOUS at 11:02

## 2019-07-02 RX ADMIN — HEPARIN SODIUM 5000 UNIT(S): 5000 INJECTION INTRAVENOUS; SUBCUTANEOUS at 22:09

## 2019-07-02 RX ADMIN — Medication 1000 MILLIGRAM(S): at 23:00

## 2019-07-02 RX ADMIN — Medication 400 MILLIGRAM(S): at 22:28

## 2019-07-02 RX ADMIN — INSULIN GLARGINE 10 UNIT(S): 100 INJECTION, SOLUTION SUBCUTANEOUS at 22:09

## 2019-07-02 RX ADMIN — PANTOPRAZOLE SODIUM 40 MILLIGRAM(S): 20 TABLET, DELAYED RELEASE ORAL at 12:33

## 2019-07-02 RX ADMIN — HYDROMORPHONE HYDROCHLORIDE 0.5 MILLIGRAM(S): 2 INJECTION INTRAMUSCULAR; INTRAVENOUS; SUBCUTANEOUS at 10:10

## 2019-07-02 RX ADMIN — Medication 5 MILLIGRAM(S): at 05:13

## 2019-07-02 RX ADMIN — Medication 5 MILLIGRAM(S): at 17:05

## 2019-07-02 RX ADMIN — Medication 5 MILLIGRAM(S): at 12:33

## 2019-07-02 RX ADMIN — HEPARIN SODIUM 5000 UNIT(S): 5000 INJECTION INTRAVENOUS; SUBCUTANEOUS at 13:00

## 2019-07-02 RX ADMIN — HEPARIN SODIUM 5000 UNIT(S): 5000 INJECTION INTRAVENOUS; SUBCUTANEOUS at 05:12

## 2019-07-02 RX ADMIN — Medication 100 MILLIEQUIVALENT(S): at 11:02

## 2019-07-02 RX ADMIN — HYDROMORPHONE HYDROCHLORIDE 0.5 MILLIGRAM(S): 2 INJECTION INTRAMUSCULAR; INTRAVENOUS; SUBCUTANEOUS at 21:00

## 2019-07-02 NOTE — PROGRESS NOTE ADULT - SUBJECTIVE AND OBJECTIVE BOX
CHIEF COMPLAINT: f/u     SUBJECTIVE / OVERNIGHT EVENTS: Patient seen and examined. No acute events overnight. Has intermittent hiccups. No N/V/D. Denies chest pain, SOB    MEDICATIONS  (STANDING):  heparin  Injectable 5000 Unit(s) SubCutaneous every 8 hours  insulin glargine Injectable (LANTUS) 10 Unit(s) SubCutaneous at bedtime  insulin lispro (HumaLOG) corrective regimen sliding scale   SubCutaneous every 6 hours  lactated ringers. 1000 milliLiter(s) (100 mL/Hr) IV Continuous <Continuous>  meropenem  IVPB 500 milliGRAM(s) IV Intermittent every 12 hours  metoprolol tartrate Injectable 5 milliGRAM(s) IV Push every 6 hours  ondansetron Injectable 4 milliGRAM(s) IV Push once  pantoprazole  Injectable 40 milliGRAM(s) IV Push daily    MEDICATIONS  (PRN):  HYDROmorphone  Injectable 0.5 milliGRAM(s) IV Push every 4 hours PRN Moderate Pain (4 - 6)    VITALS:  T(F): 98.9 (19 @ 12:31), Max: 99.6 (19 @ 05:07)  HR: 68 (19 @ 12:31) (66 - 76)  BP: 129/57 (19 @ 12:31) (127/54 - 152/56)  RR: 20 (19 @ 12:31) (17 - 20)  SpO2: 97% (19 @ 12:31)  Wt(kg): --    CAPILLARY BLOOD GLUCOSE    PHYSICAL EXAM:  GENERAL: NAD, on room air  HEENT: sclera clear, + NGT clamped  RESPIRATORY: CTA b/l, left sided chest tube with serosanguinous drainage  CARDIOVASCULAR: s1/s2, no murmurs appreciated  GASTROINTESTINAL: Soft, slightly tender, Nondistended; Bowel sounds present, midline abdominal staples C/D/I, gauze and steristrips on abdomen C/D/I, + YUMIKO drain on right draining milky gray fluid  EXTREMITIES: WWP, no edema b/l  NERVOUS SYSTEM:  awake, alert, responds to Qs appropriately  SKIN: No rashes or lesions    LABS:              9.1                  137  | 26   | 37           12.48 >-----------< 436     ------------------------< 126                   27.6                 3.0  | 98   | 2.41                                         Ca 8.4   Mg 2.0   Ph 2.7         TPro  5.8  /  Alb  2.3      TBili  0.6  /  DBili  x         AST  17  /  ALT  12            AlkPhos  78      INR: 1.23<H>;    PT: 14.1 SEC<H>;    PTT: 33.8 SEC        Urinalysis Basic - ( 2019 00:50 )    Color: YELLOW / Appearance: CLEAR / S.021 / pH: 6.0  Gluc: NEGATIVE / Ketone: NEGATIVE  / Bili: NEGATIVE / Urobili: NORMAL   Blood: NEGATIVE / Protein: 50 / Nitrite: NEGATIVE   Leuk Esterase: NEGATIVE / RBC: 3-5 / WBC 6-10   Sq Epi: OCC / Non Sq Epi: x / Bacteria: NEGATIVE        VB-30 @ 22:45  7.38/46/32/25/54.8/1.9        MICROBIOLOGY:  Blood culture  NO ORGANISMS ISOLATED  NO ORGANISMS ISOLATED AT 24 [id number] @ 23:14    Blood culture  NO ORGANISMS ISOLATED  NO ORGANISMS ISOLATED AT 24 [id number] @ 23:12      Urine Culture  Culture grew 3 or more types of organisms which indicate  collection contamination; consider recollection only if  clinically indicated. @ 01:32      RADIOLOGY & ADDITIONAL TESTS:  Imaging Personally Reviewed: [ ] Yes    [ ] Consultant(s) Notes Reviewed:  [x] Care Discussed with Consultants/Other Providers: Urology PA - discussed

## 2019-07-02 NOTE — CONSULT NOTE ADULT - SUBJECTIVE AND OBJECTIVE BOX
HPI:  63 year old male with hx of Whipple for cholangiocarcinoma 2019.  He had post-op SLOAN and was on HD,  was discharged  and presents with nausea, vomiting, and fever since discharge. He also c/o SOB and GIBBS.       Vital Signs Last 24 Hrs  T(C): 36.7 (2019 02:25), Max: 38.6 (2019 22:31)  T(F): 98 (2019 02:25), Max: 101.4 (2019 22:31)  HR: 77 (2019 03:18) (77 - 86)  BP: 111/45 (2019 03:18) (111/45 - 155/82)  RR: 18 (2019 03:18) (16 - 18)  SpO2: 100% (2019 03:18) (96% - 100%) (2019 05:18)    L chest tube output: 420 for 12 h (7AM to 7 PM)    PAST MEDICAL & SURGICAL HISTORY:  Type 2 diabetes mellitus  HTN   CAD   Cholangiocarcinoma  S/P cholecystectomy  S/P Whipple      REVIEW OF SYSTEMS  Negative except as mentioned in HPI    MEDICATIONS  (STANDING):  heparin  Injectable 5000 Unit(s) SubCutaneous every 8 hours  insulin glargine Injectable (LANTUS) 10 Unit(s) SubCutaneous at bedtime  insulin lispro (HumaLOG) corrective regimen sliding scale   SubCutaneous every 6 hours  lactated ringers. 1000 milliLiter(s) (100 mL/Hr) IV Continuous <Continuous>  meropenem  IVPB 500 milliGRAM(s) IV Intermittent every 12 hours  metoprolol tartrate Injectable 5 milliGRAM(s) IV Push every 6 hours  ondansetron Injectable 4 milliGRAM(s) IV Push once  pantoprazole  Injectable 40 milliGRAM(s) IV Push daily    MEDICATIONS  (PRN):  HYDROmorphone  Injectable 0.5 milliGRAM(s) IV Push every 4 hours PRN Moderate Pain (4 - 6)      Allergies:  Cipro (Rash)  Plavix (Rash)      Vital Signs Last 24 Hrs  T(C): 36.8 (2019 23:42), Max: 37.2 (2019 11:26)  T(F): 98.2 (2019 23:42), Max: 99 (2019 11:26)  HR: 74 (2019 23:42) (71 - 87)  BP: 141/57 (2019 23:42) (111/45 - 148/60)  RR: 18 (2019 23:42) (16 - 18)  SpO2: 98% (2019 23:42) (96% - 100%)    General: WN/WD NAD  Neurology: Awake, nonfocal, BESS x 4  Eyes: Scleras clear, PERRLA/ EOMI, Gross vision intact  ENT: Gross hearing intact, grossly patent pharynx, no stridor  Neck: Neck supple, trachea midline, No JVD, R HD catheter  Respiratory: CTA B/L, No wheezing, rales, rhonchi; R chest tube in place to WS; intermittent AL  CV: RRR, S1S2, no murmurs, rubs or gallops  Abdominal: Soft, NT, ND +BS, NGT in place; IR drain to bulb suction in place  Extremities: No edema, + peripheral pulses  Skin: No Rashes, Hematoma, Ecchymosis  Lymphatic: No Neck, axilla, groin LAD  Psych: Oriented x 3, normal affect    LABS:                        9.5    13.90 )-----------( 425      ( 2019 09:25 )             29.2     07-01    132<L>  |  91<L>  |  45<H>  ----------------------------<  249<H>  3.2<L>   |  25  |  2.86<H>    Ca    8.3<L>      2019 09:25  Phos  3.1     07-  Mg     1.6     07    TPro  6.3  /  Alb  2.5<L>  /  TBili  0.7  /  DBili  x   /  AST  17  /  ALT  11  /  AlkPhos  90  07-    PT/INR - ( 2019 06:45 )   PT: 14.1 SEC;   INR: 1.23          PTT - ( 2019 06:45 )  PTT:33.8 SEC  Urinalysis Basic - ( 2019 00:50 )    Color: YELLOW / Appearance: CLEAR / S.021 / pH: 6.0  Gluc: NEGATIVE / Ketone: NEGATIVE  / Bili: NEGATIVE / Urobili: NORMAL   Blood: NEGATIVE / Protein: 50 / Nitrite: NEGATIVE   Leuk Esterase: NEGATIVE / RBC: 3-5 / WBC 6-10   Sq Epi: OCC / Non Sq Epi: x / Bacteria: NEGATIVE        RADIOLOGY & ADDITIONAL STUDIES:    CXR- near total resolution of l PTX    CT chest- No Blebs; Small residual L PTX; s/p L chest tube placement; Minimal patchy peribronchial vascular consolidation and GGO are nonspecific;  A portion of these in the left may be related to re-expansion edema and or patchy atelelectasis    ASSESSMENT:   L PTX, resolved with PTC.      PLAN:  Continue chest tube to WS  Continue to monitor drainage  Encourage use of incentive spirometer  Heimlich valve removed from chest tube tubing  Monitor drainage  Daily CXR  Thoracic surgery to follow up

## 2019-07-02 NOTE — PROGRESS NOTE ADULT - SUBJECTIVE AND OBJECTIVE BOX
[facility] DIVISION OF KIDNEY DISEASES AND HYPERTENSION -- FOLLOW UP NOTE  --------------------------------------------------------------------------------  HPI: 63-year-old male with PMH significant for DM, HTN, CAD, SLOAN on CKD and recent Whipple's procedure presented to the ED after complaining of generalized malaise, nausea and vomiting. Patient was recently hospitalized at [facility] (6/18/19-6/26/19) for Whipple's procedure. Since his discharge, patient complained of increasing malaise and nausea with one episode of vomiting on 6/30/2019. Patient is known to our service as he required HD during previous 2 admission for oliguric SLOAN. Last HD at [facility] was on 6/24/19. Patient reports good urine output since discontinuing HD, states about 900 mL per day.   Patient evaluated at bedside, in no acute distress. Reports resolution of cough but complaining of hiccups.     PAST HISTORY  --------------------------------------------------------------------------------  No significant changes to PMH, PSH, FHx, SHx, unless otherwise noted    ALLERGIES & MEDICATIONS  --------------------------------------------------------------------------------  Allergies    Cipro (Rash)  Plavix (Rash)    Intolerances      Standing Inpatient Medications  heparin  Injectable 5000 Unit(s) SubCutaneous every 8 hours  insulin glargine Injectable (LANTUS) 10 Unit(s) SubCutaneous at bedtime  insulin lispro (HumaLOG) corrective regimen sliding scale   SubCutaneous every 6 hours  lactated ringers. 1000 milliLiter(s) IV Continuous <Continuous>  meropenem  IVPB 500 milliGRAM(s) IV Intermittent every 12 hours  metoprolol tartrate Injectable 5 milliGRAM(s) IV Push every 6 hours  ondansetron Injectable 4 milliGRAM(s) IV Push once  pantoprazole  Injectable 40 milliGRAM(s) IV Push daily    PRN Inpatient Medications  HYDROmorphone  Injectable 0.5 milliGRAM(s) IV Push every 4 hours PRN      REVIEW OF SYSTEMS  --------------------------------------------------------------------------------  Gen: No weight changes, fatigue, weakness  Skin: No rashes  Respiratory: (+) cough  CV: No chest pain, PND, orthopnea  GI:  (+) hiccups  : No increased frequency, dysuria, hematuria, nocturia  MSK: No joint pain/swelling; no back pain; no edema  Neuro: No dizziness/lightheadedness, weakness  Heme: No easy bruising or bleeding    All other systems were reviewed and are negative, except as noted.    VITALS/PHYSICAL EXAM  --------------------------------------------------------------------------------  T(C): 37.6 (07-02-19 @ 05:07), Max: 37.6 (07-02-19 @ 05:07)  HR: 75 (07-02-19 @ 05:07) (71 - 83)  BP: 152/56 (07-02-19 @ 05:07) (127/54 - 152/56)  RR: 18 (07-02-19 @ 05:07) (16 - 18)  SpO2: 96% (07-02-19 @ 05:07) (96% - 99%)  Wt(kg): --  Height (cm): 170.18 (07-01-19 @ 06:57)  Weight (kg): 81.6 (07-01-19 @ 06:57)  BMI (kg/m2): 28.2 (07-01-19 @ 06:57)  BSA (m2): 1.93 (07-01-19 @ 06:57)      07-01-19 @ 07:01  -  07-02-19 @ 07:00  --------------------------------------------------------  IN: 1100 mL / OUT: 2550 mL / NET: -1450 mL      Physical Exam:  	Gen: Mild painful distress              HEENT: NGT to suction seen+  	Pulm: CTA B/L, Left chest tube seen+   	CV: RRR, S1S2; no rub  	Back: No spinal or CVA tenderness; no sacral edema  	Abd: +BS, soft, nontender/nondistended, healing midline surgical scar with staples present  	: No suprapubic tenderness  	LE: Warm, FROM, no clubbing, intact strength; no edema  	Skin: Warm, without rashes  	Vascular access: Right tunneled catheter    LABS/STUDIES  --------------------------------------------------------------------------------              9.1    12.48 >-----------<  436      [07-02-19 @ 05:32]              27.6     132  |  91  |  45  ----------------------------<  249      [07-01-19 @ 09:25]  3.2   |  25  |  2.86        Ca     8.3     [07-01-19 @ 09:25]      Mg     1.6     [07-01-19 @ 09:25]      Phos  3.1     [07-01-19 @ 09:25]    TPro  6.3  /  Alb  2.5  /  TBili  0.7  /  DBili  x   /  AST  17  /  ALT  11  /  AlkPhos  90  [07-01-19 @ 09:25]    PT/INR: PT 14.1 , INR 1.23       [07-01-19 @ 06:45]  PTT: 33.8       [07-01-19 @ 06:45]      Creatinine Trend:  SCr 2.86 [07-01 @ 09:25]  SCr 3.25 [06-30 @ 22:45]  SCr 2.27 [06-26 @ 05:36]  SCr 1.73 [06-25 @ 05:28]  SCr 2.13 [06-24 @ 11:00]    Urinalysis - [07-01-19 @ 00:50]      Color YELLOW / Appearance CLEAR / SG 1.021 / pH 6.0      Gluc NEGATIVE / Ketone NEGATIVE  / Bili NEGATIVE / Urobili NORMAL       Blood NEGATIVE / Protein 50 / Leuk Est NEGATIVE / Nitrite NEGATIVE      RBC 3-5 / WBC 6-10 / Hyaline 2+ / Gran  / Sq Epi OCC / Non Sq Epi  / Bacteria NEGATIVE      Iron 47, TIBC 175, %sat --      [05-31-19 @ 06:45]  Ferritin 640.4      [05-31-19 @ 06:45]  HbA1c 5.6      [06-23-19 @ 06:01]    HBsAg NEGATIVE      [06-21-19 @ 06:55] [facility] [place] DIVISION OF KIDNEY DISEASES AND HYPERTENSION -- FOLLOW UP NOTE  --------------------------------------------------------------------------------  HPI: 63-year-old male with PMH significant for DM, HTN, CAD, SLOAN on CKD and recent Whipple's procedure presented to the ED after complaining of generalized malaise, nausea and vomiting. Patient was recently hospitalized at [facility] (6/18/19-6/26/19) for Whipple's procedure. Since his discharge, patient complained of increasing malaise and nausea with one episode of vomiting on 6/30/2019. Patient is known to our service as he required HD during previous 2 admissions at [facility] for oliguric SLOAN. Last HD at [facility] was on 6/24/19. Patient reports good urine output since discontinuing HD, states about 900 mL per day.     Patient evaluated at bedside, in no acute distress. Reports resolution of cough but complaining of hiccups.     PAST HISTORY  --------------------------------------------------------------------------------  No significant changes to PMH, PSH, FHx, SHx, unless otherwise noted    ALLERGIES & MEDICATIONS  --------------------------------------------------------------------------------  Allergies    Cipro (Rash)  Plavix (Rash)    Intolerances    Standing Inpatient Medications  heparin  Injectable 5000 Unit(s) SubCutaneous every 8 hours  insulin glargine Injectable (LANTUS) 10 Unit(s) SubCutaneous at bedtime  insulin lispro (HumaLOG) corrective regimen sliding scale   SubCutaneous every 6 hours  lactated ringers. 1000 milliLiter(s) IV Continuous <Continuous>  meropenem  IVPB 500 milliGRAM(s) IV Intermittent every 12 hours  metoprolol tartrate Injectable 5 milliGRAM(s) IV Push every 6 hours  ondansetron Injectable 4 milliGRAM(s) IV Push once  pantoprazole  Injectable 40 milliGRAM(s) IV Push daily    PRN Inpatient Medications  HYDROmorphone  Injectable 0.5 milliGRAM(s) IV Push every 4 hours PRN    REVIEW OF SYSTEMS  --------------------------------------------------------------------------------  Gen: No weight changes, fatigue, weakness  Skin: No rashes  Respiratory: (+) cough  CV: No chest pain, PND, orthopnea  GI:  (+) hiccups  : No increased frequency, dysuria, hematuria, nocturia  MSK: No joint pain/swelling; no back pain; no edema  Neuro: No dizziness/lightheadedness, weakness  Heme: No easy bruising or bleeding    All other systems were reviewed and are negative, except as noted.    VITALS/PHYSICAL EXAM  --------------------------------------------------------------------------------  T(C): 37.6 (07-02-19 @ 05:07), Max: 37.6 (07-02-19 @ 05:07)  HR: 75 (07-02-19 @ 05:07) (71 - 83)  BP: 152/56 (07-02-19 @ 05:07) (127/54 - 152/56)  RR: 18 (07-02-19 @ 05:07) (16 - 18)  SpO2: 96% (07-02-19 @ 05:07) (96% - 99%)  Wt(kg): --  Height (cm): 170.18 (07-01-19 @ 06:57)  Weight (kg): 81.6 (07-01-19 @ 06:57)  BMI (kg/m2): 28.2 (07-01-19 @ 06:57)  BSA (m2): 1.93 (07-01-19 @ 06:57)    07-01-19 @ 07:01  -  07-02-19 @ 07:00  --------------------------------------------------------  IN: 1100 mL / OUT: 2550 mL / NET: -1450 mL    Physical Exam:  	Gen: Mild painful distress              HEENT: NGT to suction seen+  	Pulm: CTA B/L, Left chest tube seen+   	CV: RRR, S1S2; no rub  	Back: No spinal or CVA tenderness; no sacral edema  	Abd: +BS, soft, nontender/nondistended, healing midline surgical scar with staples present  	: No suprapubic tenderness  	LE: Warm, FROM, no clubbing, intact strength; no edema  	Skin: Warm, without rashes  	Vascular access: Right tunneled HD catheter site: No bleeding    LABS/STUDIES  --------------------------------------------------------------------------------              9.1    12.48 >-----------<  436      [07-02-19 @ 05:32]              27.6     132  |  91  |  45  ----------------------------<  249      [07-01-19 @ 09:25]  3.2   |  25  |  2.86        Ca     8.3     [07-01-19 @ 09:25]      Mg     1.6     [07-01-19 @ 09:25]      Phos  3.1     [07-01-19 @ 09:25]      TPro  6.3  /  Alb  2.5  /  TBili  0.7  /  DBili  x   /  AST  17  /  ALT  11  /  AlkPhos  90  [07-01-19 @ 09:25]    07-02    137  |  98  |  37<H>  ----------------------------<  126<H>  3.0<L>   |  26  |  2.41<H>    Ca    8.4      02 Jul 2019 05:32  Phos  2.7     07-02  Mg     2.0     07-02    TPro  5.8<L>  /  Alb  2.3<L>  /  TBili  0.6  /  DBili  x   /  AST  17  /  ALT  12  /  AlkPhos  78  07-02      Creatinine Trend:  SCr 2.86 [07-01 @ 09:25]  SCr 3.25 [06-30 @ 22:45]  SCr 2.27 [06-26 @ 05:36]  SCr 1.73 [06-25 @ 05:28]  SCr 2.13 [06-24 @ 11:00]    HBsAg NEGATIVE      [06-21-19 @ 06:55]

## 2019-07-02 NOTE — PROGRESS NOTE ADULT - PROBLEM SELECTOR PLAN 1
Acute kidney injury superimposed on CKD. Recommendation: Pt. with SLOAN, likely hemodynamically mediated in setting of decreased oral intake, GI fluid loss and ? infection. Scr was 2.26 on 6/26/19, increased to 3.25 on 6/30. Scr decreased to 2.86 today with IVF. Continue IVF. Monitor labs and urine output. Avoid any potential nephrotoxins Pt. with SLOAN, likely hemodynamically mediated in setting of decreased oral intake, GI fluid loss and ? infection. Scr was 2.26 on 6/26/19, increased to 3.25 on 6/30. Scr decreased to 2.41 today with IVF. Continue IVF. Monitor labs and urine output. Avoid any potential nephrotoxins. No further plan for HD. Recommend HD catheter removal by IR

## 2019-07-02 NOTE — PROGRESS NOTE ADULT - PROBLEM SELECTOR PLAN 1
CT Abd with paraduodenal collection, consistent with abscess s/p IR drainage. Abscess culture growing Ecoli  met sepsis criteria on admission with fever to 101.4 and leukocytosis  Had Whipple procedure on 6/18/19  Continue Meropenem, recommend ID consult, WBC downtrending  6/30 Bcx NGTD, Ucx >3 organisms  Had recent E coli and Citrobacter bacteremia presumed 2/2 biliary source, s/p ERCP 5/19/19 notable for bile in the duodenum and additional placement of plastic biliary stent alongside prior stent.

## 2019-07-02 NOTE — PROGRESS NOTE ADULT - SUBJECTIVE AND OBJECTIVE BOX
Surgery Progress Note     Subjective/24hour Events:   Patient seen and examined. Patient resting comfortably, denies N/V, passing flatus, pain well controlled    Vital Signs:  T(C): 37 (19 @ 09:08), Max: 37.6 (19 @ 05:07)  HR: 66 (19 @ 09:08) (66 - 76)  BP: 145/53 (19 @ 09:08) (127/54 - 152/56)  BP(mean): 74 (19 @ 09:08) (74 - 74)  ABP: --  ABP(mean): --  RR: 20 (19 @ 09:08) (17 - 20)  SpO2: 100% (19 @ 09:08) (96% - 100%)  Wt(kg): --  CVP(mm Hg): --  CI: --  CAPILLARY BLOOD GLUCOSE      POCT Blood Glucose.: 119 mg/dL (2019 05:36)  POCT Blood Glucose.: 149 mg/dL (2019 23:47)  POCT Blood Glucose.: 150 mg/dL (2019 21:40)  POCT Blood Glucose.: 174 mg/dL (2019 18:54)  POCT Blood Glucose.: 239 mg/dL (2019 13:03)   N/A      07-01 @ 07:01  -  07-02 @ 07:00  --------------------------------------------------------  IN:    lactated ringers.: 1100 mL  Total IN: 1100 mL    OUT:    Bulb: 35 mL    Chest Tube: 610 mL    Nasoenteral Tube: 400 mL    Voided: 1505 mL  Total OUT: 2550 mL  MEDICATIONS  (STANDING):  heparin  Injectable 5000 Unit(s) SubCutaneous every 8 hours  insulin glargine Injectable (LANTUS) 10 Unit(s) SubCutaneous at bedtime  insulin lispro (HumaLOG) corrective regimen sliding scale   SubCutaneous every 6 hours  lactated ringers. 1000 milliLiter(s) (100 mL/Hr) IV Continuous <Continuous>  meropenem  IVPB 500 milliGRAM(s) IV Intermittent every 12 hours  metoprolol tartrate Injectable 5 milliGRAM(s) IV Push every 6 hours  ondansetron Injectable 4 milliGRAM(s) IV Push once  pantoprazole  Injectable 40 milliGRAM(s) IV Push daily    MEDICATIONS  (PRN):  HYDROmorphone  Injectable 0.5 milliGRAM(s) IV Push every 4 hours PRN Moderate Pain (4 - 6)    Total NET: -1450 mL       @ 07:01  -   @ 11:50  --------------------------------------------------------  IN:  Total IN: 0 mL    OUT:    Bulb: 5 mL    Chest Tube: 100 mL    Nasoenteral Tube: 200 mL  Total OUT: 305 mL    Total NET: -305 mL    Physical Exam:  General Appearance: in NAD, NGT to LCWS  Respiratory: No labored breathing, chest tube intact  CV: Pulse regularly present  Abdomen: Soft, nontender, midline incision c/d/i, j-tube in place, IR drain on right with murky fluid      Labs:  CBC ( @ 05:32)                              9.1<L>                         12.48<H>  )----------------(  436<H>     --    % Neutrophils, --    % Lymphocytes, ANC: --                                  27.6<L>  CBC ( @ 09:25)                              9.5<L>                         13.90<H>  )----------------(  425<H>     --    % Neutrophils, --    % Lymphocytes, ANC: --                                  29.2<L>    BMP ( @ 05:32)             137     |  98      |  37<H> 		Ca++ --      Ca 8.4                ---------------------------------( 126<H>		Mg 2.0                3.0<L>  |  26      |  2.41<H>			Ph 2.7     BMP ( @ 09:25)             132<L>  |  91<L>   |  45<H> 		Ca++ --      Ca 8.3<L>             ---------------------------------( 249<H>		Mg 1.6                3.2<L>  |  25      |  2.86<H>			Ph 3.1       LFTs ( @ 05:32)      TPro 5.8<L> / Alb 2.3<L> / TBili 0.6 / DBili -- / AST 17 / ALT 12 / AlkPhos 78  LFTs ( @ 09:25)      TPro 6.3 / Alb 2.5<L> / TBili 0.7 / DBili -- / AST 17 / ALT 11 / AlkPhos 90    Coags ( @ 06:45)  aPTT 33.8 / INR 1.23<H> / PT 14.1<H>          Urinalysis ( @ 00:50):     Color: YELLOW / Appearance: CLEAR / S.021 / pH: 6.0 / Gluc: NEGATIVE / Ketones: NEGATIVE / Bili: NEGATIVE / Urobili: NORMAL / Protein :50 / Nitrites: NEGATIVE / Leuk.Est: NEGATIVE / RBC: 3-5 / WBC: 6-10<H> / Sq Epi: OCC / Non Sq Epi:  / Bacteria NEGATIVE       -> ABSCESS Culture ( @ 18:45)       WBC^White Blood Cells  QNTY CELLS IN GRAM STAIN: FEW (2+)  GNR^Gram Neg Rods  QUANTITY OF BACTERIA SEEN: FEW (2+)  GPR^Gram Positive Rods  QUANTITY OF BACTERIA SEEN: FEW (2+)  GPCCH^Gram Pos Cocci in Chains  QUANTITY OF BACTERIA SEEN: RARE (1+)      MODERATE  EC^Escherichia coli  NG    -> URINE MIDSTREAM Culture ( @ 01:32)     NG    NG  NG    -> BLOOD PERIPHERAL Culture ( @ 23:14)     NG    NG  NG    -> BLOOD VENOUS Culture ( @ 23:12)     NG    NG  NG    -> NOSE Culture ( @ 21:54)     NG    NG  NG      Assessment and Plan:   · Assessment		  64 y/o M w/ PMHx of CAD s/p 9 stents, DMT2 on insulin, HTN, GERD, diverticulosis, former smoker (30 PY), s/p cholecystectomy, biliary stricture s/p ERCP with sphincterotomy and stent (2019), and cholangiocarcinoma now s/p open Whipple and feeding j-tube placement on . Patient returned to the hospital with a pneumothorax and abdominal fluid collection s/p pigtail chest tube and IR drainage of abdominal collection    - DVT ppx: SQH.  - ISS, f/u FS.  - IV antibiotics meropenem  - NPO NGtube clamp trial today   - IVF.  - Continue to monitor bowel function.   - OOB  - will recheck K level after repletion, appreciate renal recs for no HD at this time  - Jejunostomy tube feeds started at 10ml/hr    D Team Surgery  [id number]

## 2019-07-02 NOTE — PROGRESS NOTE ADULT - PROBLEM SELECTOR PLAN 2
s/p left sided chest tube and now resolving PTX on CT chest, 7/2 CXR with no PTX  Nonspecific ground-glass opacities also seen which could be reexpansion edema or patchy atelectasis  Continue management as per surgery  Daily CXR to monitor.

## 2019-07-03 LAB
ALBUMIN SERPL ELPH-MCNC: 2.2 G/DL — LOW (ref 3.3–5)
ALP SERPL-CCNC: 80 U/L — SIGNIFICANT CHANGE UP (ref 40–120)
ALT FLD-CCNC: 12 U/L — SIGNIFICANT CHANGE UP (ref 4–41)
ANION GAP SERPL CALC-SCNC: 12 MMO/L — SIGNIFICANT CHANGE UP (ref 7–14)
AST SERPL-CCNC: 23 U/L — SIGNIFICANT CHANGE UP (ref 4–40)
BILIRUB SERPL-MCNC: 0.6 MG/DL — SIGNIFICANT CHANGE UP (ref 0.2–1.2)
BUN SERPL-MCNC: 29 MG/DL — HIGH (ref 7–23)
CALCIUM SERPL-MCNC: 8.5 MG/DL — SIGNIFICANT CHANGE UP (ref 8.4–10.5)
CHLORIDE SERPL-SCNC: 102 MMOL/L — SIGNIFICANT CHANGE UP (ref 98–107)
CO2 SERPL-SCNC: 25 MMOL/L — SIGNIFICANT CHANGE UP (ref 22–31)
CREAT SERPL-MCNC: 1.8 MG/DL — HIGH (ref 0.5–1.3)
GLUCOSE BLDC GLUCOMTR-MCNC: 124 MG/DL — HIGH (ref 70–99)
GLUCOSE BLDC GLUCOMTR-MCNC: 140 MG/DL — HIGH (ref 70–99)
GLUCOSE BLDC GLUCOMTR-MCNC: 141 MG/DL — HIGH (ref 70–99)
GLUCOSE BLDC GLUCOMTR-MCNC: 148 MG/DL — HIGH (ref 70–99)
GLUCOSE BLDC GLUCOMTR-MCNC: 81 MG/DL — SIGNIFICANT CHANGE UP (ref 70–99)
GLUCOSE BLDC GLUCOMTR-MCNC: 82 MG/DL — SIGNIFICANT CHANGE UP (ref 70–99)
GLUCOSE SERPL-MCNC: 81 MG/DL — SIGNIFICANT CHANGE UP (ref 70–99)
HCT VFR BLD CALC: 30.8 % — LOW (ref 39–50)
HGB BLD-MCNC: 9.9 G/DL — LOW (ref 13–17)
MAGNESIUM SERPL-MCNC: 1.9 MG/DL — SIGNIFICANT CHANGE UP (ref 1.6–2.6)
MCHC RBC-ENTMCNC: 28.9 PG — SIGNIFICANT CHANGE UP (ref 27–34)
MCHC RBC-ENTMCNC: 32.1 % — SIGNIFICANT CHANGE UP (ref 32–36)
MCV RBC AUTO: 89.8 FL — SIGNIFICANT CHANGE UP (ref 80–100)
NRBC # FLD: 0.02 K/UL — SIGNIFICANT CHANGE UP (ref 0–0)
PHOSPHATE SERPL-MCNC: 2.7 MG/DL — SIGNIFICANT CHANGE UP (ref 2.5–4.5)
PLATELET # BLD AUTO: 497 K/UL — HIGH (ref 150–400)
PMV BLD: 9.1 FL — SIGNIFICANT CHANGE UP (ref 7–13)
POTASSIUM SERPL-MCNC: 3.8 MMOL/L — SIGNIFICANT CHANGE UP (ref 3.5–5.3)
POTASSIUM SERPL-SCNC: 3.8 MMOL/L — SIGNIFICANT CHANGE UP (ref 3.5–5.3)
PROT SERPL-MCNC: 6.5 G/DL — SIGNIFICANT CHANGE UP (ref 6–8.3)
RBC # BLD: 3.43 M/UL — LOW (ref 4.2–5.8)
RBC # FLD: 14.4 % — SIGNIFICANT CHANGE UP (ref 10.3–14.5)
SODIUM SERPL-SCNC: 139 MMOL/L — SIGNIFICANT CHANGE UP (ref 135–145)
WBC # BLD: 12.38 K/UL — HIGH (ref 3.8–10.5)
WBC # FLD AUTO: 12.38 K/UL — HIGH (ref 3.8–10.5)

## 2019-07-03 PROCEDURE — 99233 SBSQ HOSP IP/OBS HIGH 50: CPT

## 2019-07-03 PROCEDURE — 71045 X-RAY EXAM CHEST 1 VIEW: CPT | Mod: 26

## 2019-07-03 PROCEDURE — 99232 SBSQ HOSP IP/OBS MODERATE 35: CPT | Mod: GC

## 2019-07-03 PROCEDURE — 36589 REMOVAL TUNNELED CV CATH: CPT

## 2019-07-03 RX ORDER — GLUCAGON INJECTION, SOLUTION 0.5 MG/.1ML
1 INJECTION, SOLUTION SUBCUTANEOUS ONCE
Refills: 0 | Status: DISCONTINUED | OUTPATIENT
Start: 2019-07-03 | End: 2019-07-10

## 2019-07-03 RX ORDER — DEXTROSE 50 % IN WATER 50 %
25 SYRINGE (ML) INTRAVENOUS ONCE
Refills: 0 | Status: DISCONTINUED | OUTPATIENT
Start: 2019-07-03 | End: 2019-07-10

## 2019-07-03 RX ORDER — PANTOPRAZOLE SODIUM 20 MG/1
40 TABLET, DELAYED RELEASE ORAL
Refills: 0 | Status: DISCONTINUED | OUTPATIENT
Start: 2019-07-03 | End: 2019-07-10

## 2019-07-03 RX ORDER — DEXTROSE 50 % IN WATER 50 %
12.5 SYRINGE (ML) INTRAVENOUS ONCE
Refills: 0 | Status: DISCONTINUED | OUTPATIENT
Start: 2019-07-03 | End: 2019-07-10

## 2019-07-03 RX ORDER — ACETAMINOPHEN 500 MG
1000 TABLET ORAL ONCE
Refills: 0 | Status: COMPLETED | OUTPATIENT
Start: 2019-07-03 | End: 2019-07-03

## 2019-07-03 RX ORDER — CARVEDILOL PHOSPHATE 80 MG/1
6.25 CAPSULE, EXTENDED RELEASE ORAL EVERY 12 HOURS
Refills: 0 | Status: DISCONTINUED | OUTPATIENT
Start: 2019-07-03 | End: 2019-07-10

## 2019-07-03 RX ORDER — DEXTROSE 50 % IN WATER 50 %
15 SYRINGE (ML) INTRAVENOUS ONCE
Refills: 0 | Status: DISCONTINUED | OUTPATIENT
Start: 2019-07-03 | End: 2019-07-10

## 2019-07-03 RX ORDER — SODIUM CHLORIDE 9 MG/ML
1000 INJECTION, SOLUTION INTRAVENOUS
Refills: 0 | Status: DISCONTINUED | OUTPATIENT
Start: 2019-07-03 | End: 2019-07-09

## 2019-07-03 RX ORDER — HYDRALAZINE HCL 50 MG
25 TABLET ORAL THREE TIMES A DAY
Refills: 0 | Status: DISCONTINUED | OUTPATIENT
Start: 2019-07-03 | End: 2019-07-04

## 2019-07-03 RX ORDER — HYDROMORPHONE HYDROCHLORIDE 2 MG/ML
0.5 INJECTION INTRAMUSCULAR; INTRAVENOUS; SUBCUTANEOUS
Refills: 0 | Status: DISCONTINUED | OUTPATIENT
Start: 2019-07-03 | End: 2019-07-09

## 2019-07-03 RX ORDER — INSULIN GLARGINE 100 [IU]/ML
5 INJECTION, SOLUTION SUBCUTANEOUS AT BEDTIME
Refills: 0 | Status: DISCONTINUED | OUTPATIENT
Start: 2019-07-03 | End: 2019-07-05

## 2019-07-03 RX ADMIN — MEROPENEM 100 MILLIGRAM(S): 1 INJECTION INTRAVENOUS at 12:27

## 2019-07-03 RX ADMIN — HYDROMORPHONE HYDROCHLORIDE 0.5 MILLIGRAM(S): 2 INJECTION INTRAMUSCULAR; INTRAVENOUS; SUBCUTANEOUS at 02:35

## 2019-07-03 RX ADMIN — HYDROMORPHONE HYDROCHLORIDE 0.5 MILLIGRAM(S): 2 INJECTION INTRAMUSCULAR; INTRAVENOUS; SUBCUTANEOUS at 18:33

## 2019-07-03 RX ADMIN — Medication 5 MILLIGRAM(S): at 06:02

## 2019-07-03 RX ADMIN — HYDROMORPHONE HYDROCHLORIDE 0.5 MILLIGRAM(S): 2 INJECTION INTRAMUSCULAR; INTRAVENOUS; SUBCUTANEOUS at 22:28

## 2019-07-03 RX ADMIN — HYDROMORPHONE HYDROCHLORIDE 0.5 MILLIGRAM(S): 2 INJECTION INTRAMUSCULAR; INTRAVENOUS; SUBCUTANEOUS at 12:34

## 2019-07-03 RX ADMIN — INSULIN GLARGINE 5 UNIT(S): 100 INJECTION, SOLUTION SUBCUTANEOUS at 22:27

## 2019-07-03 RX ADMIN — Medication 400 MILLIGRAM(S): at 12:27

## 2019-07-03 RX ADMIN — HEPARIN SODIUM 5000 UNIT(S): 5000 INJECTION INTRAVENOUS; SUBCUTANEOUS at 12:28

## 2019-07-03 RX ADMIN — HYDROMORPHONE HYDROCHLORIDE 0.5 MILLIGRAM(S): 2 INJECTION INTRAMUSCULAR; INTRAVENOUS; SUBCUTANEOUS at 03:10

## 2019-07-03 RX ADMIN — HEPARIN SODIUM 5000 UNIT(S): 5000 INJECTION INTRAVENOUS; SUBCUTANEOUS at 06:02

## 2019-07-03 RX ADMIN — PANTOPRAZOLE SODIUM 40 MILLIGRAM(S): 20 TABLET, DELAYED RELEASE ORAL at 12:28

## 2019-07-03 RX ADMIN — Medication 1000 MILLIGRAM(S): at 12:34

## 2019-07-03 RX ADMIN — MEROPENEM 100 MILLIGRAM(S): 1 INJECTION INTRAVENOUS at 00:08

## 2019-07-03 RX ADMIN — CARVEDILOL PHOSPHATE 6.25 MILLIGRAM(S): 80 CAPSULE, EXTENDED RELEASE ORAL at 18:33

## 2019-07-03 RX ADMIN — CHLORHEXIDINE GLUCONATE 1 APPLICATION(S): 213 SOLUTION TOPICAL at 12:28

## 2019-07-03 RX ADMIN — MEROPENEM 100 MILLIGRAM(S): 1 INJECTION INTRAVENOUS at 22:27

## 2019-07-03 RX ADMIN — HYDROMORPHONE HYDROCHLORIDE 0.5 MILLIGRAM(S): 2 INJECTION INTRAMUSCULAR; INTRAVENOUS; SUBCUTANEOUS at 23:00

## 2019-07-03 RX ADMIN — HYDROMORPHONE HYDROCHLORIDE 0.5 MILLIGRAM(S): 2 INJECTION INTRAMUSCULAR; INTRAVENOUS; SUBCUTANEOUS at 07:48

## 2019-07-03 RX ADMIN — HYDROMORPHONE HYDROCHLORIDE 0.5 MILLIGRAM(S): 2 INJECTION INTRAMUSCULAR; INTRAVENOUS; SUBCUTANEOUS at 12:27

## 2019-07-03 RX ADMIN — Medication 25 MILLIGRAM(S): at 12:28

## 2019-07-03 RX ADMIN — Medication 5 MILLIGRAM(S): at 00:08

## 2019-07-03 RX ADMIN — HEPARIN SODIUM 5000 UNIT(S): 5000 INJECTION INTRAVENOUS; SUBCUTANEOUS at 22:27

## 2019-07-03 RX ADMIN — Medication 25 MILLIGRAM(S): at 22:30

## 2019-07-03 NOTE — PROGRESS NOTE ADULT - PROBLEM SELECTOR PLAN 3
Cr downtrending from 3.25 on admission now to 1.8, hx of CKD IV  Had ATN prior admission and briefly required HD, discharged on 6/26 with Cr of 2.27. No indication for RRT at this time. Recommend DCing HD catheter  DC IVF and encourage PO hydration, home torsemide on hold  Renal following.

## 2019-07-03 NOTE — PROGRESS NOTE ADULT - SUBJECTIVE AND OBJECTIVE BOX
CHIEF COMPLAINT: f/u     SUBJECTIVE / OVERNIGHT EVENTS: Patient seen and examined. No acute events overnight. Reports abdominal and back pain rating 7/10 with movement and without movement around 4-6 on pain scale. Tolerated CLQ diet this morning, passing gas. No chest pain, SOB, N/V    MEDICATIONS  (STANDING):  acetaminophen  IVPB .. 1000 milliGRAM(s) IV Intermittent once  chlorhexidine 4% Liquid 1 Application(s) Topical <User Schedule>  dextrose 5%. 1000 milliLiter(s) (50 mL/Hr) IV Continuous <Continuous>  dextrose 50% Injectable 12.5 Gram(s) IV Push once  dextrose 50% Injectable 25 Gram(s) IV Push once  dextrose 50% Injectable 25 Gram(s) IV Push once  heparin  Injectable 5000 Unit(s) SubCutaneous every 8 hours  insulin glargine Injectable (LANTUS) 5 Unit(s) SubCutaneous at bedtime  insulin lispro (HumaLOG) corrective regimen sliding scale   SubCutaneous every 6 hours  meropenem  IVPB 500 milliGRAM(s) IV Intermittent every 12 hours  metoprolol tartrate Injectable 5 milliGRAM(s) IV Push every 6 hours  ondansetron Injectable 4 milliGRAM(s) IV Push once  pantoprazole  Injectable 40 milliGRAM(s) IV Push daily    MEDICATIONS  (PRN):  dextrose 40% Gel 15 Gram(s) Oral once PRN Blood Glucose LESS THAN 70 milliGRAM(s)/deciLiter  glucagon  Injectable 1 milliGRAM(s) IntraMuscular once PRN Glucose <70 milliGRAM(s)/deciLiter  HYDROmorphone  Injectable 0.5 milliGRAM(s) IV Push every 3 hours PRN Severe Pain (7 - 10)      VITALS:  T(F): 98.1 (07-03-19 @ 08:05), Max: 98.9 (07-02-19 @ 12:31)  HR: 68 (07-03-19 @ 08:05) (62 - 76)  BP: 160/62 (07-03-19 @ 08:05) (129/57 - 165/63)  RR: 18 (07-03-19 @ 08:05) (18 - 20)  SpO2: 100% (07-03-19 @ 08:05)  Wt(kg): --      CAPILLARY BLOOD GLUCOSE      PHYSICAL EXAM:  GENERAL: NAD, on room air  HEENT: sclera clear, no NGT  CHEST/RESPIRATORY: CTA b/l, left sided chest tube with serosanguinous drainage, right sided HD catheter  CARDIOVASCULAR: s1/s2, no murmurs appreciated  GASTROINTESTINAL: Soft, slightly tender, Nondistended; Bowel sounds present, midline abdominal staples C/D/I, gauze and steristrips on abdomen C/D/I, + YUMIKO drain on right draining milky gray fluid  EXTREMITIES: WWP, no edema b/l  NERVOUS SYSTEM:  awake, alert, responds to Qs appropriately  SKIN: No rashes or lesions    LABS:              9.9                  139  | 25   | 29           12.38 >-----------< 497     ------------------------< 81                    30.8                 3.8  | 102  | 1.80                                         Ca 8.5   Mg 1.9   Ph 2.7         TPro  6.5  /  Alb  2.2      TBili  0.6  /  DBili  x         AST  23  /  ALT  12            AlkPhos  80      MICROBIOLOGY:  Blood culture  NO ORGANISMS ISOLATED  NO ORGANISMS ISOLATED AT 48 HRS.06-30 @ 23:14    Blood culture  NO ORGANISMS ISOLATED  NO ORGANISMS ISOLATED AT 48 HRS.06-30 @ 23:12      Urine Culture  Culture grew 3 or more types of organisms which indicate  collection contamination; consider recollection only if  clinically indicated.07-01 @ 01:32      RADIOLOGY & ADDITIONAL TESTS:  Imaging Personally Reviewed: [ ] Yes    [ ] Consultant(s) Notes Reviewed:  [x] Care Discussed with Consultants/Other Providers: Urology PA - discussed

## 2019-07-03 NOTE — PROGRESS NOTE ADULT - PROBLEM SELECTOR PLAN 2
s/p left sided chest tube and now resolving PTX, c/w daily CXR  Nonspecific ground-glass opacities also seen which could be reexpansion edema or patchy atelectasis  Continue management as per thoracic surgery

## 2019-07-03 NOTE — PROGRESS NOTE ADULT - SUBJECTIVE AND OBJECTIVE BOX
Team D Surgery Progress Note     SUBJECTIVE / 24H EVENTS  Patient seen and examined on morning rounds. No acute events overnight.    OBJECTIVE:    VITAL SIGNS:  T(C): 36.6 (07-03-19 @ 11:22), Max: 36.7 (07-02-19 @ 20:37)  HR: 69 (07-03-19 @ 11:22) (62 - 76)  BP: 170/62 (07-03-19 @ 11:22) (133/50 - 170/62)  RR: 19 (07-03-19 @ 11:22) (18 - 19)  SpO2: 100% (07-03-19 @ 11:22) (94% - 100%)  Daily     Daily   POCT Blood Glucose.: 141 mg/dL (07-03-19 @ 09:19)  POCT Blood Glucose.: 82 mg/dL (07-03-19 @ 06:59)  POCT Blood Glucose.: 81 mg/dL (07-03-19 @ 06:42)      PHYSICAL EXAM:  General Appearance: in NAD, NGT to LCWS  Respiratory: No labored breathing, chest tube intact  CV: Pulse regularly present  Abdomen: Soft, nontender, midline incision c/d/i, j-tube in place, IR drain on right with murky fluid      07-02-19 @ 07:01  -  07-03-19 @ 07:00  --------------------------------------------------------  IN:    lactated ringers.: 1200 mL    Nepro with Carb Steady: 120 mL  Total IN: 1320 mL    OUT:    Bulb: 72.5 mL    Chest Tube: 250 mL    Nasoenteral Tube: 210 mL    Voided: 700 mL  Total OUT: 1232.5 mL    Total NET: 87.5 mL          LAB VALUES:  07-03    139  |  102  |  29<H>  ----------------------------<  81  3.8   |  25  |  1.80<H>    Ca    8.5      03 Jul 2019 06:38  Phos  2.7     07-03  Mg     1.9     07-03    TPro  6.5  /  Alb  2.2<L>  /  TBili  0.6  /  DBili  x   /  AST  23  /  ALT  12  /  AlkPhos  80  07-03                               9.9    12.38 )-----------( 497      ( 03 Jul 2019 06:38 )             30.8     LIVER FUNCTIONS - ( 03 Jul 2019 06:38 )  Alb: 2.2 g/dL / Pro: 6.5 g/dL / ALK PHOS: 80 u/L / ALT: 12 u/L / AST: 23 u/L / GGT: x                       MICROBIOLOGY:    No new microbiology data for review.     RADIOLOGY:    No new radiographic images for review.    MEDICATIONS  (STANDING):  carvedilol 6.25 milliGRAM(s) Oral every 12 hours  chlorhexidine 4% Liquid 1 Application(s) Topical <User Schedule>  dextrose 5%. 1000 milliLiter(s) (50 mL/Hr) IV Continuous <Continuous>  dextrose 50% Injectable 12.5 Gram(s) IV Push once  dextrose 50% Injectable 25 Gram(s) IV Push once  dextrose 50% Injectable 25 Gram(s) IV Push once  heparin  Injectable 5000 Unit(s) SubCutaneous every 8 hours  hydrALAZINE 25 milliGRAM(s) Oral three times a day  insulin glargine Injectable (LANTUS) 5 Unit(s) SubCutaneous at bedtime  insulin lispro (HumaLOG) corrective regimen sliding scale   SubCutaneous every 6 hours  meropenem  IVPB 500 milliGRAM(s) IV Intermittent every 12 hours  ondansetron Injectable 4 milliGRAM(s) IV Push once  pantoprazole    Tablet 40 milliGRAM(s) Oral before breakfast    MEDICATIONS  (PRN):  dextrose 40% Gel 15 Gram(s) Oral once PRN Blood Glucose LESS THAN 70 milliGRAM(s)/deciLiter  glucagon  Injectable 1 milliGRAM(s) IntraMuscular once PRN Glucose <70 milliGRAM(s)/deciLiter  HYDROmorphone  Injectable 0.5 milliGRAM(s) IV Push every 3 hours PRN Severe Pain (7 - 10)             Assessment and Plan:   · Assessment		  64 y/o M w/ PMHx of CAD s/p 9 stents, DMT2 on insulin, HTN, GERD, diverticulosis, former smoker (30 PY), s/p cholecystectomy, biliary stricture s/p ERCP with sphincterotomy and stent (4/2019), and cholangiocarcinoma now s/p open Whipple and feeding j-tube placement on 6/18. Patient returned to the hospital with a pneumothorax and abdominal fluid collection s/p pigtail chest tube and IR drainage of abdominal collection    - advance to clears  - HD catheter removal per renal  - f/u pt's nephrologist Dr. Santy Jules  - c/w jejunostomy tube feeds at 10ml/hr  - IV antibiotics meropenem  - Continue to monitor bowel function.   - f/u FS  - DVT ppx: SQH  - ISS  - OOB    D Team Surgery  [id number]

## 2019-07-03 NOTE — PROGRESS NOTE ADULT - PROBLEM SELECTOR PLAN 1
Met sepsis criteria on admission, with leukocytosis and fever s/p Whipples 6/18/19  CT Abd with paraduodenal collection, consistent with abscess s/p IR drainage. Abscess culture growing Ecoli, Efaecalis, and Klebsiella, on Neptali day 4  Recommend ID consult to determine Abx duration, WBC downtrending  6/30 Bcx NGTD, Ucx >3 organisms  Had recent E coli and Citrobacter bacteremia presumed 2/2 biliary source, s/p ERCP 5/19/19 notable for bile in the duodenum and additional placement of plastic biliary stent alongside prior stent.

## 2019-07-03 NOTE — PROGRESS NOTE ADULT - SUBJECTIVE AND OBJECTIVE BOX
Team D Surgery Progress Note     SUBJECTIVE / 24H EVENTS  Patient seen and examined on morning rounds. Patient resting comfortably, denies N/V, passing flatus, pain well controlled. No acute events overnight.     OBJECTIVE:    VITAL SIGNS:  T(C): 36.5 (07-03-19 @ 16:36), Max: 36.7 (07-02-19 @ 20:37)  HR: 64 (07-03-19 @ 16:36) (62 - 76)  BP: 163/66 (07-03-19 @ 16:36) (133/50 - 170/62)  RR: 18 (07-03-19 @ 16:36) (18 - 19)  SpO2: 100% (07-03-19 @ 16:36) (94% - 100%)  Daily     Daily   POCT Blood Glucose.: 124 mg/dL (07-03-19 @ 17:26)  POCT Blood Glucose.: 140 mg/dL (07-03-19 @ 12:38)  POCT Blood Glucose.: 141 mg/dL (07-03-19 @ 09:19)      PHYSICAL EXAM:  Gen: NAD. NGT to LCWS  Resp: Respirations unlabored. CTA b/l  CV: RRR. No m/r/g.   GI: Soft. Nontender. Nondistended. midline incision c/d/i, j-tube in place, IR drain on right with murky fluid  Ext: Warm, well perfused      07-02-19 @ 07:01  -  07-03-19 @ 07:00  --------------------------------------------------------  IN:    lactated ringers.: 1200 mL    Nepro with Carb Steady: 120 mL  Total IN: 1320 mL    OUT:    Bulb: 72.5 mL    Chest Tube: 250 mL    Nasoenteral Tube: 210 mL    Voided: 700 mL  Total OUT: 1232.5 mL    Total NET: 87.5 mL      07-03-19 @ 07:01  -  07-03-19 @ 19:38  --------------------------------------------------------  IN:    Nepro with Carb Steady: 120 mL    Oral Fluid: 580 mL  Total IN: 700 mL    OUT:    Bulb: 10 mL    Chest Tube: 130 mL    Voided: 300 mL  Total OUT: 440 mL    Total NET: 260 mL          LAB VALUES:  07-03    139  |  102  |  29<H>  ----------------------------<  81  3.8   |  25  |  1.80<H>    Ca    8.5      03 Jul 2019 06:38  Phos  2.7     07-03  Mg     1.9     07-03    TPro  6.5  /  Alb  2.2<L>  /  TBili  0.6  /  DBili  x   /  AST  23  /  ALT  12  /  AlkPhos  80  07-03                               9.9    12.38 )-----------( 497      ( 03 Jul 2019 06:38 )             30.8     LIVER FUNCTIONS - ( 03 Jul 2019 06:38 )  Alb: 2.2 g/dL / Pro: 6.5 g/dL / ALK PHOS: 80 u/L / ALT: 12 u/L / AST: 23 u/L / GGT: x                       MICROBIOLOGY:    No new microbiology data for review.     RADIOLOGY:  PACS Image: Image(s) Available (07-03-19 @ 07:29)    No new radiographic images for review.    MEDICATIONS  (STANDING):  carvedilol 6.25 milliGRAM(s) Oral every 12 hours  chlorhexidine 4% Liquid 1 Application(s) Topical <User Schedule>  dextrose 5%. 1000 milliLiter(s) (50 mL/Hr) IV Continuous <Continuous>  dextrose 50% Injectable 12.5 Gram(s) IV Push once  dextrose 50% Injectable 25 Gram(s) IV Push once  dextrose 50% Injectable 25 Gram(s) IV Push once  heparin  Injectable 5000 Unit(s) SubCutaneous every 8 hours  hydrALAZINE 25 milliGRAM(s) Oral three times a day  insulin glargine Injectable (LANTUS) 5 Unit(s) SubCutaneous at bedtime  insulin lispro (HumaLOG) corrective regimen sliding scale   SubCutaneous every 6 hours  meropenem  IVPB 500 milliGRAM(s) IV Intermittent every 12 hours  ondansetron Injectable 4 milliGRAM(s) IV Push once  pantoprazole    Tablet 40 milliGRAM(s) Oral before breakfast    MEDICATIONS  (PRN):  dextrose 40% Gel 15 Gram(s) Oral once PRN Blood Glucose LESS THAN 70 milliGRAM(s)/deciLiter  glucagon  Injectable 1 milliGRAM(s) IntraMuscular once PRN Glucose <70 milliGRAM(s)/deciLiter  HYDROmorphone  Injectable 0.5 milliGRAM(s) IV Push every 3 hours PRN Severe Pain (7 - 10)         Assessment and Plan:   · Assessment		  62 y/o M w/ PMHx of CAD s/p 9 stents, DMT2 on insulin, HTN, GERD, diverticulosis, former smoker (30 PY), s/p cholecystectomy, biliary stricture s/p ERCP with sphincterotomy and stent (4/2019), and cholangiocarcinoma now s/p open Whipple and feeding j-tube placement on 6/18. Patient returned to the hospital with a pneumothorax and abdominal fluid collection s/p pigtail chest tube and IR drainage of abdominal collection    - advance to clears  - HD catheter removal per renal  - f/u pt's nephrologist Dr. Santy Jules  - c/w jejunostomy tube feeds at 10ml/hr  - IV antibiotics meropenem  - Continue to monitor bowel function.   - f/u FS  - DVT ppx: SQH  - ISS  - OOB    D Team Surgery  [id number]

## 2019-07-03 NOTE — PROGRESS NOTE ADULT - SUBJECTIVE AND OBJECTIVE BOX
Subjective: "I feel ok" pt going for removal of HD catheter today    Vital Signs:  Vital Signs Last 24 Hrs  T(C): 36.5 (07-03-19 @ 16:36), Max: 36.7 (07-02-19 @ 20:37)  T(F): 97.7 (07-03-19 @ 16:36), Max: 98.1 (07-03-19 @ 08:05)  HR: 64 (07-03-19 @ 16:36) (62 - 76)  BP: 163/66 (07-03-19 @ 16:36) (133/50 - 170/62)  RR: 18 (07-03-19 @ 16:36) (18 - 19)  SpO2: 100% (07-03-19 @ 16:36) (94% - 100%) on (O2)    Telemetry/Alarms:  General: WN/WD NAD  Neurology: Awake, nonfocal, BESS x 4  Eyes: Scleras clear, PERRLA/ EOMI, Gross vision intact  ENT:Gross hearing intact, grossly patent pharynx, no stridor  Neck: Neck supple, trachea midline, No JVD,   Respiratory: CTA B/L, No wheezing, rales, rhonchi  CV: RRR, S1S2, no murmurs, rubs or gallops  Abdominal: Soft, NT, ND +BS,   Extremities: No edema, + peripheral pulses  Skin: No Rashes, Hematoma, Ecchymosis  Lymphatic: No Neck, axilla, groin LAD  Psych: Oriented x 3, normal affect  Incisions: c,d,i  Tubes: chest tube drained 230cc/24h  Relevant labs, radiology and Medications reviewed                        9.9    12.38 )-----------( 497      ( 03 Jul 2019 06:38 )             30.8     07-03    139  |  102  |  29<H>  ----------------------------<  81  3.8   |  25  |  1.80<H>    Ca    8.5      03 Jul 2019 06:38  Phos  2.7     07-03  Mg     1.9     07-03    TPro  6.5  /  Alb  2.2<L>  /  TBili  0.6  /  DBili  x   /  AST  23  /  ALT  12  /  AlkPhos  80  07-03      MEDICATIONS  (STANDING):  carvedilol 6.25 milliGRAM(s) Oral every 12 hours  chlorhexidine 4% Liquid 1 Application(s) Topical <User Schedule>  dextrose 5%. 1000 milliLiter(s) (50 mL/Hr) IV Continuous <Continuous>  dextrose 50% Injectable 12.5 Gram(s) IV Push once  dextrose 50% Injectable 25 Gram(s) IV Push once  dextrose 50% Injectable 25 Gram(s) IV Push once  heparin  Injectable 5000 Unit(s) SubCutaneous every 8 hours  hydrALAZINE 25 milliGRAM(s) Oral three times a day  insulin glargine Injectable (LANTUS) 5 Unit(s) SubCutaneous at bedtime  insulin lispro (HumaLOG) corrective regimen sliding scale   SubCutaneous every 6 hours  meropenem  IVPB 500 milliGRAM(s) IV Intermittent every 12 hours  ondansetron Injectable 4 milliGRAM(s) IV Push once  pantoprazole    Tablet 40 milliGRAM(s) Oral before breakfast    MEDICATIONS  (PRN):  dextrose 40% Gel 15 Gram(s) Oral once PRN Blood Glucose LESS THAN 70 milliGRAM(s)/deciLiter  glucagon  Injectable 1 milliGRAM(s) IntraMuscular once PRN Glucose <70 milliGRAM(s)/deciLiter  HYDROmorphone  Injectable 0.5 milliGRAM(s) IV Push every 3 hours PRN Severe Pain (7 - 10)    Pertinent Physical Exam  I&O's Summary    02 Jul 2019 07:01  -  03 Jul 2019 07:00  --------------------------------------------------------  IN: 1320 mL / OUT: 1232.5 mL / NET: 87.5 mL        Assessment  63M s/p open Whipple and feeding j-tube placement on 6/18 for cholangiocarcinoma, returned with pneumothorax and abdominal fluid collection, now s/p pigtail chest tube and IR drainage of abdominal collection      PLAN    Tubes: Monitor Chest tube output and consider removal after output is less than 200cc/24h  Disposition: continue care per primary surgery team  Discussed with Cardiothoracic Team at AM rounds.

## 2019-07-03 NOTE — PROGRESS NOTE ADULT - SUBJECTIVE AND OBJECTIVE BOX
[facility] DIVISION OF KIDNEY DISEASES AND HYPERTENSION -- FOLLOW UP NOTE  --------------------------------------------------------------------------------  HPI: 63-year-old male with PMH significant for DM, HTN, CAD, SLOAN on CKD and recent Whipple's procedure presented to the ED after complaining of generalized malaise, nausea and vomiting. Patient was recently hospitalized at [facility] (6/18/19-6/26/19) for Whipple's procedure. Since his discharge, patient complained of increasing malaise and nausea with one episode of vomiting on 6/30/2019. Patient is known to our service as he required HD during previous 2 admissions at [facility] for oliguric SLOAN. Last HD at [facility] was on 6/24/19. Patient reports good urine output since discontinuing HD, states about 900 mL per day.     Patient evaluated at bedside, in no distress. Sitting in chair, NG tube removed and patient reports resolution of hiccups. Still reporting some vague abdominal pain and chest pain at chest tube site.    PAST HISTORY  --------------------------------------------------------------------------------  No significant changes to PMH, PSH, FHx, SHx, unless otherwise noted    ALLERGIES & MEDICATIONS  --------------------------------------------------------------------------------  Allergies    Cipro (Rash)  Plavix (Rash)    Intolerances      Standing Inpatient Medications  chlorhexidine 4% Liquid 1 Application(s) Topical <User Schedule>  dextrose 5%. 1000 milliLiter(s) IV Continuous <Continuous>  dextrose 50% Injectable 12.5 Gram(s) IV Push once  dextrose 50% Injectable 25 Gram(s) IV Push once  dextrose 50% Injectable 25 Gram(s) IV Push once  heparin  Injectable 5000 Unit(s) SubCutaneous every 8 hours  insulin glargine Injectable (LANTUS) 10 Unit(s) SubCutaneous at bedtime  insulin lispro (HumaLOG) corrective regimen sliding scale   SubCutaneous every 6 hours  lactated ringers. 1000 milliLiter(s) IV Continuous <Continuous>  meropenem  IVPB 500 milliGRAM(s) IV Intermittent every 12 hours  metoprolol tartrate Injectable 5 milliGRAM(s) IV Push every 6 hours  ondansetron Injectable 4 milliGRAM(s) IV Push once  pantoprazole  Injectable 40 milliGRAM(s) IV Push daily    PRN Inpatient Medications  dextrose 40% Gel 15 Gram(s) Oral once PRN  glucagon  Injectable 1 milliGRAM(s) IntraMuscular once PRN  HYDROmorphone  Injectable 0.5 milliGRAM(s) IV Push every 4 hours PRN      REVIEW OF SYSTEMS  --------------------------------------------------------------------------------  Gen: No weight changes, fatigue, fevers/chills, weakness  Skin: No rashes  Respiratory: No dyspnea, cough, wheezing, hemoptysis  CV: (+) chest pain  GI: No abdominal pain, diarrhea, constipation, nausea, vomiting, melena, hematochezia  : No increased frequency, dysuria, hematuria, nocturia  MSK: No joint pain/swelling; no back pain; no edema  Neuro: No dizziness/lightheadedness, weakness, seizures, numbness, tingling  Heme: No easy bruising or bleeding  Endo: No heat/cold intolerance    All other systems were reviewed and are negative, except as noted.    VITALS/PHYSICAL EXAM  --------------------------------------------------------------------------------  T(C): 36.7 (07-03-19 @ 08:05), Max: 37.2 (07-02-19 @ 12:31)  HR: 68 (07-03-19 @ 08:05) (62 - 76)  BP: 160/62 (07-03-19 @ 08:05) (129/57 - 165/63)  RR: 18 (07-03-19 @ 08:05) (18 - 20)  SpO2: 100% (07-03-19 @ 08:05) (94% - 100%)  Wt(kg): --    07-02-19 @ 07:01  -  07-03-19 @ 07:00  --------------------------------------------------------  IN: 1320 mL / OUT: 1232.5 mL / NET: 87.5 mL    Physical Exam:  	Gen: NAD, well-appearing  	HEENT: PERRL, supple neck, clear oropharynx  	Pulm: CTA B/L, chest tube in place  	CV: RRR, S1S2; no rub  	Abd: +BS, soft, nontender/nondistended  	: No suprapubic tenderness  	UE: Warm, FROM, no clubbing, intact strength; no edema; no asterixis  	LE: Warm, FROM, no clubbing, intact strength; (+) edema  	Neuro: No focal deficits, intact gait  	Psych: Normal affect and mood  	Skin: Warm, without rashes  	Vascular access: Right tunneled catheter     LABS/STUDIES  --------------------------------------------------------------------------------              9.9    12.38 >-----------<  497      [07-03-19 @ 06:38]              30.8     139  |  102  |  29  ----------------------------<  81      [07-03-19 @ 06:38]  3.8   |  25  |  1.80        Ca     8.5     [07-03-19 @ 06:38]      Mg     1.9     [07-03-19 @ 06:38]      Phos  2.7     [07-03-19 @ 06:38]    TPro  6.5  /  Alb  2.2  /  TBili  0.6  /  DBili  x   /  AST  23  /  ALT  12  /  AlkPhos  80  [07-03-19 @ 06:38]    Creatinine Trend:  SCr 1.80 [07-03 @ 06:38]  SCr 2.41 [07-02 @ 05:32]  SCr 2.86 [07-01 @ 09:25]  SCr 3.25 [06-30 @ 22:45]  SCr 2.27 [06-26 @ 05:36]    Urinalysis - [07-01-19 @ 00:50]      Color YELLOW / Appearance CLEAR / SG 1.021 / pH 6.0      Gluc NEGATIVE / Ketone NEGATIVE  / Bili NEGATIVE / Urobili NORMAL       Blood NEGATIVE / Protein 50 / Leuk Est NEGATIVE / Nitrite NEGATIVE      RBC 3-5 / WBC 6-10 / Hyaline 2+ / Gran  / Sq Epi OCC / Non Sq Epi  / Bacteria NEGATIVE      Iron 47, TIBC 175, %sat --      [05-31-19 @ 06:45]  Ferritin 640.4      [05-31-19 @ 06:45]  HbA1c 5.6      [06-23-19 @ 06:01]    HBsAg NEGATIVE      [06-21-19 @ 06:55] [facility] DIVISION OF KIDNEY DISEASES AND HYPERTENSION -- FOLLOW UP NOTE  --------------------------------------------------------------------------------  HPI: 63-year-old male with PMH significant for DM, HTN, CAD, SLOAN on CKD and recent Whipple's procedure presented to the ED after complaining of generalized malaise, nausea and vomiting. Patient was recently hospitalized at [facility] (6/18/19-6/26/19) for Whipple's procedure. Since his discharge, patient complained of increasing malaise and nausea with one episode of vomiting on 6/30/2019. Patient is known to our service as he required HD during previous 2 admissions at [facility] for oliguric SLOAN. Last HD at [facility] was on 6/24/19. Patient reports good urine output since discontinuing HD, states about 900 mL per day.     Patient evaluated at bedside, in no distress. Sitting in chair, NG tube removed and patient reports resolution of hiccups. Still reporting some vague abdominal pain and chest pain at chest tube site.    PAST HISTORY  --------------------------------------------------------------------------------  No significant changes to PMH, PSH, FHx, SHx, unless otherwise noted    ALLERGIES & MEDICATIONS  --------------------------------------------------------------------------------  Allergies    Cipro (Rash)  Plavix (Rash)    Intolerances    Standing Inpatient Medications  chlorhexidine 4% Liquid 1 Application(s) Topical <User Schedule>  dextrose 5%. 1000 milliLiter(s) IV Continuous <Continuous>  dextrose 50% Injectable 12.5 Gram(s) IV Push once  dextrose 50% Injectable 25 Gram(s) IV Push once  dextrose 50% Injectable 25 Gram(s) IV Push once  heparin  Injectable 5000 Unit(s) SubCutaneous every 8 hours  insulin glargine Injectable (LANTUS) 10 Unit(s) SubCutaneous at bedtime  insulin lispro (HumaLOG) corrective regimen sliding scale   SubCutaneous every 6 hours  lactated ringers. 1000 milliLiter(s) IV Continuous <Continuous>  meropenem  IVPB 500 milliGRAM(s) IV Intermittent every 12 hours  metoprolol tartrate Injectable 5 milliGRAM(s) IV Push every 6 hours  ondansetron Injectable 4 milliGRAM(s) IV Push once  pantoprazole  Injectable 40 milliGRAM(s) IV Push daily    PRN Inpatient Medications  dextrose 40% Gel 15 Gram(s) Oral once PRN  glucagon  Injectable 1 milliGRAM(s) IntraMuscular once PRN  HYDROmorphone  Injectable 0.5 milliGRAM(s) IV Push every 4 hours PRN    REVIEW OF SYSTEMS  --------------------------------------------------------------------------------  Gen: No fever  Skin: No rash  Respiratory: No dyspnea  GI: See HPI  : No dysuria, hematuria  MSK: LE edema+   Neuro: No dizziness  Heme: No easy bruising or bleeding  Endo: See HPI    All other systems were reviewed and are negative, except as noted.    VITALS/PHYSICAL EXAM  --------------------------------------------------------------------------------  T(C): 36.7 (07-03-19 @ 08:05), Max: 37.2 (07-02-19 @ 12:31)  HR: 68 (07-03-19 @ 08:05) (62 - 76)  BP: 160/62 (07-03-19 @ 08:05) (129/57 - 165/63)  RR: 18 (07-03-19 @ 08:05) (18 - 20)  SpO2: 100% (07-03-19 @ 08:05) (94% - 100%)  Wt(kg): --    07-02-19 @ 07:01  -  07-03-19 @ 07:00  --------------------------------------------------------  IN: 1320 mL / OUT: 1232.5 mL / NET: 87.5 mL    Physical Exam:  	Gen: Awake, alert              HEENT: No JVD  	Pulm: Fair air entry B/L, left chest tube seen+   	CV: RRR, S1S2; no rub  	Back: No sacral edema  	Abd: +BS, soft, healing midline surgical scar with staples present  	: No suprapubic tenderness  	LE: Warm, trace B/L edema+  	Skin: Warm  	Vascular access: Right tunneled HD catheter site: No bleeding seen    LABS/STUDIES  --------------------------------------------------------------------------------              9.9    12.38 >-----------<  497      [07-03-19 @ 06:38]              30.8     139  |  102  |  29  ----------------------------<  81      [07-03-19 @ 06:38]  3.8   |  25  |  1.80        Ca     8.5     [07-03-19 @ 06:38]      Mg     1.9     [07-03-19 @ 06:38]      Phos  2.7     [07-03-19 @ 06:38]    TPro  6.5  /  Alb  2.2  /  TBili  0.6  /  DBili  x   /  AST  23  /  ALT  12  /  AlkPhos  80  [07-03-19 @ 06:38]    Creatinine Trend:  SCr 1.80 [07-03 @ 06:38]  SCr 2.41 [07-02 @ 05:32]  SCr 2.86 [07-01 @ 09:25]  SCr 3.25 [06-30 @ 22:45]  SCr 2.27 [06-26 @ 05:36]

## 2019-07-04 LAB
ANION GAP SERPL CALC-SCNC: 11 MMO/L — SIGNIFICANT CHANGE UP (ref 7–14)
BUN SERPL-MCNC: 21 MG/DL — SIGNIFICANT CHANGE UP (ref 7–23)
CALCIUM SERPL-MCNC: 8.3 MG/DL — LOW (ref 8.4–10.5)
CHLORIDE SERPL-SCNC: 101 MMOL/L — SIGNIFICANT CHANGE UP (ref 98–107)
CO2 SERPL-SCNC: 26 MMOL/L — SIGNIFICANT CHANGE UP (ref 22–31)
CREAT SERPL-MCNC: 1.69 MG/DL — HIGH (ref 0.5–1.3)
GLUCOSE BLDC GLUCOMTR-MCNC: 129 MG/DL — HIGH (ref 70–99)
GLUCOSE BLDC GLUCOMTR-MCNC: 139 MG/DL — HIGH (ref 70–99)
GLUCOSE BLDC GLUCOMTR-MCNC: 144 MG/DL — HIGH (ref 70–99)
GLUCOSE BLDC GLUCOMTR-MCNC: 155 MG/DL — HIGH (ref 70–99)
GLUCOSE BLDC GLUCOMTR-MCNC: 213 MG/DL — HIGH (ref 70–99)
GLUCOSE SERPL-MCNC: 157 MG/DL — HIGH (ref 70–99)
GRAM STN WND: SIGNIFICANT CHANGE UP
HCT VFR BLD CALC: 31 % — LOW (ref 39–50)
HGB BLD-MCNC: 9.7 G/DL — LOW (ref 13–17)
MAGNESIUM SERPL-MCNC: 1.8 MG/DL — SIGNIFICANT CHANGE UP (ref 1.6–2.6)
MCHC RBC-ENTMCNC: 28.3 PG — SIGNIFICANT CHANGE UP (ref 27–34)
MCHC RBC-ENTMCNC: 31.3 % — LOW (ref 32–36)
MCV RBC AUTO: 90.4 FL — SIGNIFICANT CHANGE UP (ref 80–100)
METHOD TYPE: SIGNIFICANT CHANGE UP
NRBC # FLD: 0 K/UL — SIGNIFICANT CHANGE UP (ref 0–0)
ORGANISM # SPEC MICROSCOPIC CNT: SIGNIFICANT CHANGE UP
PHOSPHATE SERPL-MCNC: 2.4 MG/DL — LOW (ref 2.5–4.5)
PLATELET # BLD AUTO: 407 K/UL — HIGH (ref 150–400)
PMV BLD: 8.9 FL — SIGNIFICANT CHANGE UP (ref 7–13)
POTASSIUM SERPL-MCNC: 3.4 MMOL/L — LOW (ref 3.5–5.3)
POTASSIUM SERPL-SCNC: 3.4 MMOL/L — LOW (ref 3.5–5.3)
RBC # BLD: 3.43 M/UL — LOW (ref 4.2–5.8)
RBC # FLD: 14.2 % — SIGNIFICANT CHANGE UP (ref 10.3–14.5)
SODIUM SERPL-SCNC: 138 MMOL/L — SIGNIFICANT CHANGE UP (ref 135–145)
WBC # BLD: 12.19 K/UL — HIGH (ref 3.8–10.5)
WBC # FLD AUTO: 12.19 K/UL — HIGH (ref 3.8–10.5)

## 2019-07-04 PROCEDURE — 71045 X-RAY EXAM CHEST 1 VIEW: CPT | Mod: 26

## 2019-07-04 PROCEDURE — 71045 X-RAY EXAM CHEST 1 VIEW: CPT | Mod: 26,77

## 2019-07-04 PROCEDURE — 99223 1ST HOSP IP/OBS HIGH 75: CPT | Mod: GC

## 2019-07-04 PROCEDURE — 99233 SBSQ HOSP IP/OBS HIGH 50: CPT

## 2019-07-04 RX ORDER — CYCLOBENZAPRINE HYDROCHLORIDE 10 MG/1
5 TABLET, FILM COATED ORAL THREE TIMES A DAY
Refills: 0 | Status: DISCONTINUED | OUTPATIENT
Start: 2019-07-04 | End: 2019-07-10

## 2019-07-04 RX ORDER — ASPIRIN/CALCIUM CARB/MAGNESIUM 324 MG
81 TABLET ORAL DAILY
Refills: 0 | Status: DISCONTINUED | OUTPATIENT
Start: 2019-07-04 | End: 2019-07-10

## 2019-07-04 RX ORDER — POTASSIUM CHLORIDE 20 MEQ
20 PACKET (EA) ORAL ONCE
Refills: 0 | Status: COMPLETED | OUTPATIENT
Start: 2019-07-04 | End: 2019-07-04

## 2019-07-04 RX ORDER — ATORVASTATIN CALCIUM 80 MG/1
40 TABLET, FILM COATED ORAL AT BEDTIME
Refills: 0 | Status: DISCONTINUED | OUTPATIENT
Start: 2019-07-04 | End: 2019-07-10

## 2019-07-04 RX ORDER — LANOLIN ALCOHOL/MO/W.PET/CERES
3 CREAM (GRAM) TOPICAL
Refills: 0 | Status: COMPLETED | OUTPATIENT
Start: 2019-07-04 | End: 2019-07-04

## 2019-07-04 RX ORDER — DOCUSATE SODIUM 100 MG
100 CAPSULE ORAL DAILY
Refills: 0 | Status: DISCONTINUED | OUTPATIENT
Start: 2019-07-04 | End: 2019-07-04

## 2019-07-04 RX ORDER — POTASSIUM CHLORIDE 20 MEQ
10 PACKET (EA) ORAL
Refills: 0 | Status: DISCONTINUED | OUTPATIENT
Start: 2019-07-04 | End: 2019-07-04

## 2019-07-04 RX ORDER — HYDRALAZINE HCL 50 MG
50 TABLET ORAL THREE TIMES A DAY
Refills: 0 | Status: DISCONTINUED | OUTPATIENT
Start: 2019-07-04 | End: 2019-07-10

## 2019-07-04 RX ORDER — DOCUSATE SODIUM 100 MG
100 CAPSULE ORAL DAILY
Refills: 0 | Status: DISCONTINUED | OUTPATIENT
Start: 2019-07-04 | End: 2019-07-10

## 2019-07-04 RX ADMIN — HYDROMORPHONE HYDROCHLORIDE 0.5 MILLIGRAM(S): 2 INJECTION INTRAMUSCULAR; INTRAVENOUS; SUBCUTANEOUS at 19:04

## 2019-07-04 RX ADMIN — Medication 100 MILLIGRAM(S): at 13:01

## 2019-07-04 RX ADMIN — CHLORHEXIDINE GLUCONATE 1 APPLICATION(S): 213 SOLUTION TOPICAL at 13:02

## 2019-07-04 RX ADMIN — Medication 25 MILLIGRAM(S): at 13:01

## 2019-07-04 RX ADMIN — INSULIN GLARGINE 5 UNIT(S): 100 INJECTION, SOLUTION SUBCUTANEOUS at 22:37

## 2019-07-04 RX ADMIN — Medication 100 MILLIEQUIVALENT(S): at 19:58

## 2019-07-04 RX ADMIN — HEPARIN SODIUM 5000 UNIT(S): 5000 INJECTION INTRAVENOUS; SUBCUTANEOUS at 06:28

## 2019-07-04 RX ADMIN — HEPARIN SODIUM 5000 UNIT(S): 5000 INJECTION INTRAVENOUS; SUBCUTANEOUS at 22:38

## 2019-07-04 RX ADMIN — Medication 2: at 18:03

## 2019-07-04 RX ADMIN — CARVEDILOL PHOSPHATE 6.25 MILLIGRAM(S): 80 CAPSULE, EXTENDED RELEASE ORAL at 06:28

## 2019-07-04 RX ADMIN — HYDROMORPHONE HYDROCHLORIDE 0.5 MILLIGRAM(S): 2 INJECTION INTRAMUSCULAR; INTRAVENOUS; SUBCUTANEOUS at 19:34

## 2019-07-04 RX ADMIN — HYDROMORPHONE HYDROCHLORIDE 0.5 MILLIGRAM(S): 2 INJECTION INTRAMUSCULAR; INTRAVENOUS; SUBCUTANEOUS at 11:08

## 2019-07-04 RX ADMIN — Medication 50 MILLIGRAM(S): at 22:38

## 2019-07-04 RX ADMIN — PANTOPRAZOLE SODIUM 40 MILLIGRAM(S): 20 TABLET, DELAYED RELEASE ORAL at 06:28

## 2019-07-04 RX ADMIN — HYDROMORPHONE HYDROCHLORIDE 0.5 MILLIGRAM(S): 2 INJECTION INTRAMUSCULAR; INTRAVENOUS; SUBCUTANEOUS at 12:00

## 2019-07-04 RX ADMIN — Medication 20 MILLIEQUIVALENT(S): at 22:36

## 2019-07-04 RX ADMIN — Medication 25 MILLIGRAM(S): at 06:28

## 2019-07-04 RX ADMIN — HYDROMORPHONE HYDROCHLORIDE 0.5 MILLIGRAM(S): 2 INJECTION INTRAMUSCULAR; INTRAVENOUS; SUBCUTANEOUS at 23:10

## 2019-07-04 RX ADMIN — Medication 81 MILLIGRAM(S): at 18:03

## 2019-07-04 RX ADMIN — MEROPENEM 100 MILLIGRAM(S): 1 INJECTION INTRAVENOUS at 13:01

## 2019-07-04 RX ADMIN — HEPARIN SODIUM 5000 UNIT(S): 5000 INJECTION INTRAVENOUS; SUBCUTANEOUS at 13:01

## 2019-07-04 RX ADMIN — CYCLOBENZAPRINE HYDROCHLORIDE 5 MILLIGRAM(S): 10 TABLET, FILM COATED ORAL at 22:39

## 2019-07-04 RX ADMIN — CARVEDILOL PHOSPHATE 6.25 MILLIGRAM(S): 80 CAPSULE, EXTENDED RELEASE ORAL at 18:03

## 2019-07-04 RX ADMIN — CYCLOBENZAPRINE HYDROCHLORIDE 5 MILLIGRAM(S): 10 TABLET, FILM COATED ORAL at 09:46

## 2019-07-04 RX ADMIN — HYDROMORPHONE HYDROCHLORIDE 0.5 MILLIGRAM(S): 2 INJECTION INTRAMUSCULAR; INTRAVENOUS; SUBCUTANEOUS at 01:44

## 2019-07-04 RX ADMIN — ATORVASTATIN CALCIUM 40 MILLIGRAM(S): 80 TABLET, FILM COATED ORAL at 22:38

## 2019-07-04 RX ADMIN — HYDROMORPHONE HYDROCHLORIDE 0.5 MILLIGRAM(S): 2 INJECTION INTRAMUSCULAR; INTRAVENOUS; SUBCUTANEOUS at 02:15

## 2019-07-04 RX ADMIN — MEROPENEM 100 MILLIGRAM(S): 1 INJECTION INTRAVENOUS at 22:40

## 2019-07-04 RX ADMIN — Medication 3: at 13:01

## 2019-07-04 RX ADMIN — HYDROMORPHONE HYDROCHLORIDE 0.5 MILLIGRAM(S): 2 INJECTION INTRAMUSCULAR; INTRAVENOUS; SUBCUTANEOUS at 23:15

## 2019-07-04 NOTE — PROGRESS NOTE ADULT - SUBJECTIVE AND OBJECTIVE BOX
Subjective:    Vital Signs:  Vital Signs Last 24 Hrs  T(C): 36.7 (07-04-19 @ 08:00), Max: 37.1 (07-04-19 @ 00:02)  T(F): 98 (07-04-19 @ 08:00), Max: 98.7 (07-04-19 @ 00:02)  HR: 72 (07-04-19 @ 08:00) (64 - 74)  BP: 158/61 (07-04-19 @ 08:00) (121/46 - 163/66)  RR: 18 (07-04-19 @ 08:00) (18 - 18)  SpO2: 96% (07-04-19 @ 08:00) (95% - 100%) on (O2)    Telemetry/Alarms:  General: WN/WD NAD  Neurology: Awake, nonfocal, BESS x 4  Eyes: Scleras clear, PERRLA/ EOMI, Gross vision intact  ENT:Gross hearing intact, grossly patent pharynx, no stridor  Neck: Neck supple, trachea midline, No JVD,   Respiratory: CTA B/L, No wheezing, rales, rhonchi  CV: RRR, S1S2, no murmurs, rubs or gallops  Abdominal: Soft, NT, ND +BS,   Extremities: No edema, + peripheral pulses  Skin: No Rashes, Hematoma, Ecchymosis  Lymphatic: No Neck, axilla, groin LAD  Psych: Oriented x 3, normal affect  Incisions:   Tubes:  Relevant labs, radiology and Medications reviewed                        9.9    12.38 )-----------( 497      ( 03 Jul 2019 06:38 )             30.8     07-03    139  |  102  |  29<H>  ----------------------------<  81  3.8   |  25  |  1.80<H>    Ca    8.5      03 Jul 2019 06:38  Phos  2.7     07-03  Mg     1.9     07-03    TPro  6.5  /  Alb  2.2<L>  /  TBili  0.6  /  DBili  x   /  AST  23  /  ALT  12  /  AlkPhos  80  07-03      MEDICATIONS  (STANDING):  carvedilol 6.25 milliGRAM(s) Oral every 12 hours  chlorhexidine 4% Liquid 1 Application(s) Topical <User Schedule>  dextrose 5%. 1000 milliLiter(s) (50 mL/Hr) IV Continuous <Continuous>  dextrose 50% Injectable 12.5 Gram(s) IV Push once  dextrose 50% Injectable 25 Gram(s) IV Push once  dextrose 50% Injectable 25 Gram(s) IV Push once  docusate sodium 100 milliGRAM(s) Oral daily  heparin  Injectable 5000 Unit(s) SubCutaneous every 8 hours  hydrALAZINE 25 milliGRAM(s) Oral three times a day  insulin glargine Injectable (LANTUS) 5 Unit(s) SubCutaneous at bedtime  insulin lispro (HumaLOG) corrective regimen sliding scale   SubCutaneous every 6 hours  meropenem  IVPB 500 milliGRAM(s) IV Intermittent every 12 hours  ondansetron Injectable 4 milliGRAM(s) IV Push once  pantoprazole    Tablet 40 milliGRAM(s) Oral before breakfast    MEDICATIONS  (PRN):  bisacodyl Suppository 10 milliGRAM(s) Rectal daily PRN Constipation  cyclobenzaprine 5 milliGRAM(s) Oral three times a day PRN Muscle Spasm  dextrose 40% Gel 15 Gram(s) Oral once PRN Blood Glucose LESS THAN 70 milliGRAM(s)/deciLiter  glucagon  Injectable 1 milliGRAM(s) IntraMuscular once PRN Glucose <70 milliGRAM(s)/deciLiter  HYDROmorphone  Injectable 0.5 milliGRAM(s) IV Push every 3 hours PRN Severe Pain (7 - 10)    Pertinent Physical Exam  I&O's Summary    03 Jul 2019 07:01  -  04 Jul 2019 07:00  --------------------------------------------------------  IN: 700 mL / OUT: 940 mL / NET: -240 mL    04 Jul 2019 07:01  -  04 Jul 2019 11:37  --------------------------------------------------------  IN: 0 mL / OUT: 437.5 mL / NET: -437.5 mL        Assessment  63M s/p open Whipple and feeding j-tube placement on 6/18 for cholangiocarcinoma, returned with pneumothorax and abdominal fluid collection, now s/p pigtail chest tube and IR drainage of abdominal collection      PLAN    Tubes: chest tube removed today, will follow CXR  Disposition: continue care per primary surgery team  please re-consult with concerns    Kate EVANS [id number]

## 2019-07-04 NOTE — PROGRESS NOTE ADULT - PROBLEM SELECTOR PLAN 4
Last A1C: 5.6 in 6/2019, advance PO diet as tolerated, also has + HERMANNtube  C/w Lantus 5U, SSI premeal

## 2019-07-04 NOTE — PROGRESS NOTE ADULT - PROBLEM SELECTOR PLAN 2
s/p left sided chest tube: removed 7/4, f/u with repeat CXR  Nonspecific ground-glass opacities also seen which could be reexpansion edema or patchy atelectasis  Continue management as per thoracic surgery

## 2019-07-04 NOTE — PROGRESS NOTE ADULT - PROBLEM SELECTOR PLAN 1
Met sepsis criteria on admission, with leukocytosis and fever s/p Whipples 6/18/19  CT Abd with paraduodenal collection, consistent with abscess s/p IR drainage. Abscess culture growing Ecoli, Efaecalis, and Klebsiella, on Neptali day 5  Recommend ID consult to determine Abx duration, WBC downtrending  6/30 Bcx NGTD, Ucx >3 organisms  Had recent E coli and Citrobacter bacteremia presumed 2/2 biliary source, s/p ERCP 5/19/19 notable for bile in the duodenum and additional placement of plastic biliary stent alongside prior stent.

## 2019-07-04 NOTE — CONSULT NOTE ADULT - ASSESSMENT
63M recently dx cholangiocarcinoma s/p whipple 6/18/19 p/w n/v, abd pain found to have abd abscess s/p IR drainage, stay c/b L pneumothorax now resolved s/p chest tube placement.     - c/w meropenem   - follow up all cultures  - monitor for fevers and wbc daily  - ability to switch to PO abx and duration of abx to be determined based on final culture results and clinical course

## 2019-07-04 NOTE — PROGRESS NOTE ADULT - SUBJECTIVE AND OBJECTIVE BOX
Patient is a 63y old  Male who presents with a chief complaint of Nausea/vomiting (04 Jul 2019 11:36)      SUBJECTIVE / OVERNIGHT EVENTS: No acute events overnight. Chest tube removed this AM. Denies chest pain, SOB, N/V. Tolerated CLQ diet. Requesting medication for sleep tonight    MEDICATIONS  (STANDING):  carvedilol 6.25 milliGRAM(s) Oral every 12 hours  chlorhexidine 4% Liquid 1 Application(s) Topical <User Schedule>  dextrose 5%. 1000 milliLiter(s) (50 mL/Hr) IV Continuous <Continuous>  dextrose 50% Injectable 12.5 Gram(s) IV Push once  dextrose 50% Injectable 25 Gram(s) IV Push once  dextrose 50% Injectable 25 Gram(s) IV Push once  docusate sodium 100 milliGRAM(s) Oral daily  heparin  Injectable 5000 Unit(s) SubCutaneous every 8 hours  hydrALAZINE 25 milliGRAM(s) Oral three times a day  insulin glargine Injectable (LANTUS) 5 Unit(s) SubCutaneous at bedtime  insulin lispro (HumaLOG) corrective regimen sliding scale   SubCutaneous every 6 hours  meropenem  IVPB 500 milliGRAM(s) IV Intermittent every 12 hours  ondansetron Injectable 4 milliGRAM(s) IV Push once  pantoprazole    Tablet 40 milliGRAM(s) Oral before breakfast    MEDICATIONS  (PRN):  bisacodyl Suppository 10 milliGRAM(s) Rectal daily PRN Constipation  cyclobenzaprine 5 milliGRAM(s) Oral three times a day PRN Muscle Spasm  dextrose 40% Gel 15 Gram(s) Oral once PRN Blood Glucose LESS THAN 70 milliGRAM(s)/deciLiter  glucagon  Injectable 1 milliGRAM(s) IntraMuscular once PRN Glucose <70 milliGRAM(s)/deciLiter  HYDROmorphone  Injectable 0.5 milliGRAM(s) IV Push every 3 hours PRN Severe Pain (7 - 10)      T(C): 36.8 (07-04-19 @ 12:05), Max: 37.1 (07-04-19 @ 00:02)  HR: 89 (07-04-19 @ 12:05) (64 - 89)  BP: 139/56 (07-04-19 @ 12:05) (121/46 - 163/66)  RR: 18 (07-04-19 @ 12:05) (18 - 18)  SpO2: 95% (07-04-19 @ 12:05) (95% - 100%)  CAPILLARY BLOOD GLUCOSE      POCT Blood Glucose.: 129 mg/dL (04 Jul 2019 05:28)  POCT Blood Glucose.: 139 mg/dL (04 Jul 2019 00:10)  POCT Blood Glucose.: 148 mg/dL (03 Jul 2019 21:49)  POCT Blood Glucose.: 124 mg/dL (03 Jul 2019 17:26)    I&O's Summary    03 Jul 2019 07:01  -  04 Jul 2019 07:00  --------------------------------------------------------  IN: 700 mL / OUT: 940 mL / NET: -240 mL    04 Jul 2019 07:01  -  04 Jul 2019 12:45  --------------------------------------------------------  IN: 0 mL / OUT: 437.5 mL / NET: -437.5 mL        PHYSICAL EXAM:  GENERAL: NAD, on room air  HEENT: sclera clear  CHEST/RESPIRATORY: CTA b/l, gauze of previous site of HD catheter, left sided chest tube no longer present: gauze C/D/I  CARDIOVASCULAR: s1/s2, no murmurs appreciated  GASTROINTESTINAL: Soft, slightly tender, Nondistended; Bowel sounds present, midline abdominal scar (staples no longer present), + jejunostomy tube, + YUMIKO drain on right draining milky gray fluid  EXTREMITIES: WWP, no edema b/l  NERVOUS SYSTEM:  awake, alert, responds to Qs appropriately    LABS:                        9.9    12.38 )-----------( 497      ( 03 Jul 2019 06:38 )             30.8     07-03    139  |  102  |  29<H>  ----------------------------<  81  3.8   |  25  |  1.80<H>    Ca    8.5      03 Jul 2019 06:38  Phos  2.7     07-03  Mg     1.9     07-03    TPro  6.5  /  Alb  2.2<L>  /  TBili  0.6  /  DBili  x   /  AST  23  /  ALT  12  /  AlkPhos  80  07-03              RADIOLOGY & ADDITIONAL TESTS:

## 2019-07-04 NOTE — CONSULT NOTE ADULT - SUBJECTIVE AND OBJECTIVE BOX
Vandana Lyons - [phone]    Patient is a 63y old  Male who presents with a chief complaint of Nausea/vomiting (04 Jul 2019 13:39)      HPI:  63M hx of Whipple for cholangiocarcinoma 6/18 d/c 6/26 who presents with nausea, vomiting, and fever since discharge. He had been eating, passing flatus and having BMs without issue. His J tube had not been used. Since d/c he has been feeling general malaise and recently became febrile. He doesn't describe true abdominal pain, but says he feels discomfort and bloating.   Vital Signs Last 24 Hrs  T(C): 36.7 (01 Jul 2019 02:25), Max: 38.6 (30 Jun 2019 22:31)  T(F): 98 (01 Jul 2019 02:25), Max: 101.4 (30 Jun 2019 22:31)  HR: 77 (01 Jul 2019 03:18) (77 - 86)  BP: 111/45 (01 Jul 2019 03:18) (111/45 - 155/82)  BP(mean): --  RR: 18 (01 Jul 2019 03:18) (16 - 18)  SpO2: 100% (01 Jul 2019 03:18) (96% - 100%) (01 Jul 2019 05:18)      prior hospital charts reviewed [  ]  primary team notes reviewed [  ]  other consultant notes reviewed [  ]    PAST MEDICAL & SURGICAL HISTORY:  Type II diabetes mellitus  HTN (hypertension)  CAD (coronary artery disease)  S/P cholecystectomy      Allergies  Cipro (Rash)  Plavix (Rash)        ANTIMICROBIALS (past 90 days)  MEDICATIONS  (STANDING):  meropenem  IVPB   100 mL/Hr IV Intermittent (07-01-19 @ 01:27)    meropenem  IVPB   100 mL/Hr IV Intermittent (07-04-19 @ 13:01)   100 mL/Hr IV Intermittent (07-03-19 @ 22:27)   100 mL/Hr IV Intermittent (07-03-19 @ 12:27)   100 mL/Hr IV Intermittent (07-03-19 @ 00:08)   100 mL/Hr IV Intermittent (07-02-19 @ 12:33)   100 mL/Hr IV Intermittent (07-01-19 @ 23:52)   100 mL/Hr IV Intermittent (07-01-19 @ 12:58)        ANTIMICROBIALS:    meropenem  IVPB 500 every 12 hours      OTHER MEDS: MEDICATIONS  (STANDING):  bisacodyl Suppository 10 daily PRN  carvedilol 6.25 every 12 hours  cyclobenzaprine 5 three times a day PRN  dextrose 40% Gel 15 once PRN  dextrose 50% Injectable 12.5 once  dextrose 50% Injectable 25 once  dextrose 50% Injectable 25 once  docusate sodium 100 daily  glucagon  Injectable 1 once PRN  heparin  Injectable 5000 every 8 hours  hydrALAZINE 25 three times a day  HYDROmorphone  Injectable 0.5 every 3 hours PRN  insulin glargine Injectable (LANTUS) 5 at bedtime  insulin lispro (HumaLOG) corrective regimen sliding scale  every 6 hours  melatonin 3 <User Schedule>  ondansetron Injectable 4 once  pantoprazole    Tablet 40 before breakfast      SOCIAL HISTORY:   hx smoking  non-smoker    FAMILY HISTORY:      REVIEW OF SYSTEMS  [  ] ROS unobtainable because:    [  ] All other systems negative except as noted below:	    Constitutional:  [ ] fever [ ] chills  [ ] weight loss  [ ] weakness  Skin:  [ ] rash [ ] phlebitis	  Eyes: [ ] icterus [ ] pain  [ ] discharge	  ENMT: [ ] sore throat  [ ] thrush [ ] ulcers [ ] exudates  Respiratory: [ ] dyspnea [ ] hemoptysis [ ] cough [ ] sputum	  Cardiovascular:  [ ] chest pain [ ] palpitations [ ] edema	  Gastrointestinal:  [ ] nausea [ ] vomiting [ ] diarrhea [ ] constipation [ ] pain	  Genitourinary:  [ ] dysuria [ ] frequency [ ] hematuria [ ] discharge [ ] flank pain  [ ] incontinence  Musculoskeletal:  [ ] myalgias [ ] arthralgias [ ] arthritis  [ ] back pain  Neurological:  [ ] headache [ ] seizures  [ ] confusion/altered mental status  Psychiatric:  [ ] anxiety [ ] depression	  Hematology/Lymphatics:  [ ] lymphadenopathy  Endocrine:  [ ] adrenal [ ] thyroid  Allergic/Immunologic:	 [ ] transplant [ ] seasonal    Vital Signs Last 24 Hrs  T(F): 98.3 (07-04-19 @ 12:05), Max: 101.4 (06-30-19 @ 22:31)    Vital Signs Last 24 Hrs  HR: 89 (07-04-19 @ 12:05) (64 - 89)  BP: 139/56 (07-04-19 @ 12:05) (121/46 - 163/66)  RR: 18 (07-04-19 @ 12:05)  SpO2: 95% (07-04-19 @ 12:05) (95% - 100%)  Wt(kg): --    PHYSICAL EXAM:  General: non-toxic  HEAD/EYES: anicteric, PERRL  ENT:  supple  Cardiovascular:   S1, S2  Respiratory:  clear bilaterally  GI:  soft, non-tender, normal bowel sounds  :  no CVA tenderness   Musculoskeletal:  no synovitis  Neurologic:  grossly non-focal  Skin:  no rash  Lymph: no lymphadenopathy  Psychiatric:  appropriate affect  Vascular:  no phlebitis                                9.9    12.38 )-----------( 497      ( 03 Jul 2019 06:38 )             30.8     07-03    139  |  102  |  29<H>  ----------------------------<  81  3.8   |  25  |  1.80<H>    Ca    8.5      03 Jul 2019 06:38  Phos  2.7     07-03  Mg     1.9     07-03    TPro  6.5  /  Alb  2.2<L>  /  TBili  0.6  /  DBili  x   /  AST  23  /  ALT  12  /  AlkPhos  80  07-03          MICROBIOLOGY:        Culture - Yeast and Fungus (collected 01 Jul 2019 18:45)  Source: ABSCESS  Preliminary Report (03 Jul 2019 09:09):    CULTURE NEGATIVE FOR YEASTS AND MOLDS---- PRELIMINARY    CULTURE NEGATIVE FOR YEASTS AND MOLDS AFTER 1 DAY    Culture - Surg Site Aerob/Anaer w/Gm St (collected 01 Jul 2019 18:45)  Source: ABSCESS  Preliminary Report (04 Jul 2019 13:57):    MODERATE  Organism: Escherichia coli  Klebsiella pneumoniae  Enterococcus faecalis (04 Jul 2019 13:57)  Organism: Enterococcus faecalis (04 Jul 2019 13:57)  Organism: Klebsiella pneumoniae (04 Jul 2019 13:57)      -  Amikacin: S <=8 BOB      -  Ampicillin: R >16 BOB      -  Ampicillin/Sulbactam: S 8/4 BOB      -  Aztreonam: S <=4 BOB      -  Cefazolin: S <=2 BOB      -  Cefepime: S <=2 BOB      -  Cefoxitin: S <=4 BOB      -  Ceftazidime: S <=1 BOB      -  Ceftriaxone: S <=1 BOB      -  Ciprofloxacin: S <=0.5 BOB      -  Ertapenem: S <=0.5 BOB      -  Gentamicin: S <=1 BOB      -  Imipenem: S <=1 BOB      -  Levofloxacin: S <=1 BOB      -  Meropenem: S <=1 BOB      -  Piperacillin/Tazobactam: S <=8 BOB      -  Tigecycline: S      -  Tobramycin: S <=2 BOB      -  Trimethoprim/Sulfamethoxazole: S <=0.5/9.5 BOB      Method Type: NEGATIVE BOB 43  Organism: Escherichia coli (04 Jul 2019 13:57)    Culture - Acid Fast Smear Concentrated (collected 01 Jul 2019 18:45)  Source: ABSCESS  Final Report (02 Jul 2019 14:54):    AFB SMEAR= NO ACID FAST BACILLI SEEN    Culture - Urine (collected 01 Jul 2019 01:32)  Source: URINE MIDSTREAM  Final Report (02 Jul 2019 10:04):    Culture grew 3 or more types of organisms which indicate    collection contamination; consider recollection only if    clinically indicated.    Culture - Blood (collected 30 Jun 2019 23:14)  Source: BLOOD PERIPHERAL  Preliminary Report (03 Jul 2019 23:15):    NO ORGANISMS ISOLATED    NO ORGANISMS ISOLATED AT 72 HRS.    Culture - Blood (collected 30 Jun 2019 23:12)  Source: BLOOD VENOUS  Preliminary Report (03 Jul 2019 23:13):    NO ORGANISMS ISOLATED    NO ORGANISMS ISOLATED AT 72 HRS.                          RADIOLOGY:  imaging below personally reviewed HPI: 63M hx of [place] for cholangiocarcinoma 6/18 d/c 6/26 who presents with nausea, vomiting, and fever since discharge. He had been eating, passing flatus and having BMs without issue. His J tube had not been used. Since d/c he has been feeling general malaise and recently became febrile. He doesn't describe true abdominal pain, but says he feels discomfort and bloating.   Vital Signs Last 24 Hrs  T(C): 36.7 (01 Jul 2019 02:25), Max: 38.6 (30 Jun 2019 22:31)  T(F): 98 (01 Jul 2019 02:25), Max: 101.4 (30 Jun 2019 22:31)  HR: 77 (01 Jul 2019 03:18) (77 - 86)  BP: 111/45 (01 Jul 2019 03:18) (111/45 - 155/82)  BP(mean): --  RR: 18 (01 Jul 2019 03:18) (16 - 18)  SpO2: 100% (01 Jul 2019 03:18) (96% - 100%) (01 Jul 2019 05:18)      prior hospital charts reviewed [  ]  primary team notes reviewed [  ]  other consultant notes reviewed [  ]    PAST MEDICAL & SURGICAL HISTORY:  Type II diabetes mellitus  HTN (hypertension)  CAD (coronary artery disease)  S/P cholecystectomy      Allergies  Cipro (Rash)  Plavix (Rash)        ANTIMICROBIALS (past 90 days)  MEDICATIONS  (STANDING):  meropenem  IVPB   100 mL/Hr IV Intermittent (07-01-19 @ 01:27)    meropenem  IVPB   100 mL/Hr IV Intermittent (07-04-19 @ 13:01)   100 mL/Hr IV Intermittent (07-03-19 @ 22:27)   100 mL/Hr IV Intermittent (07-03-19 @ 12:27)   100 mL/Hr IV Intermittent (07-03-19 @ 00:08)   100 mL/Hr IV Intermittent (07-02-19 @ 12:33)   100 mL/Hr IV Intermittent (07-01-19 @ 23:52)   100 mL/Hr IV Intermittent (07-01-19 @ 12:58)        ANTIMICROBIALS:    meropenem  IVPB 500 every 12 hours      OTHER MEDS: MEDICATIONS  (STANDING):  bisacodyl Suppository 10 daily PRN  carvedilol 6.25 every 12 hours  cyclobenzaprine 5 three times a day PRN  dextrose 40% Gel 15 once PRN  dextrose 50% Injectable 12.5 once  dextrose 50% Injectable 25 once  dextrose 50% Injectable 25 once  docusate sodium 100 daily  glucagon  Injectable 1 once PRN  heparin  Injectable 5000 every 8 hours  hydrALAZINE 25 three times a day  HYDROmorphone  Injectable 0.5 every 3 hours PRN  insulin glargine Injectable (LANTUS) 5 at bedtime  insulin lispro (HumaLOG) corrective regimen sliding scale  every 6 hours  melatonin 3 <User Schedule>  ondansetron Injectable 4 once  pantoprazole    Tablet 40 before breakfast      SOCIAL HISTORY:   hx smoking  non-smoker    FAMILY HISTORY:      REVIEW OF SYSTEMS  [  ] ROS unobtainable because:    [  ] All other systems negative except as noted below:	    Constitutional:  [ ] fever [ ] chills  [ ] weight loss  [ ] weakness  Skin:  [ ] rash [ ] phlebitis	  Eyes: [ ] icterus [ ] pain  [ ] discharge	  ENMT: [ ] sore throat  [ ] thrush [ ] ulcers [ ] exudates  Respiratory: [ ] dyspnea [ ] hemoptysis [ ] cough [ ] sputum	  Cardiovascular:  [ ] chest pain [ ] palpitations [ ] edema	  Gastrointestinal:  [ ] nausea [ ] vomiting [ ] diarrhea [ ] constipation [ ] pain	  Genitourinary:  [ ] dysuria [ ] frequency [ ] hematuria [ ] discharge [ ] flank pain  [ ] incontinence  Musculoskeletal:  [ ] myalgias [ ] arthralgias [ ] arthritis  [ ] back pain  Neurological:  [ ] headache [ ] seizures  [ ] confusion/altered mental status  Psychiatric:  [ ] anxiety [ ] depression	  Hematology/Lymphatics:  [ ] lymphadenopathy  Endocrine:  [ ] adrenal [ ] thyroid  Allergic/Immunologic:	 [ ] transplant [ ] seasonal    Vital Signs Last 24 Hrs  T(F): 98.3 (07-04-19 @ 12:05), Max: 101.4 (06-30-19 @ 22:31)    Vital Signs Last 24 Hrs  HR: 89 (07-04-19 @ 12:05) (64 - 89)  BP: 139/56 (07-04-19 @ 12:05) (121/46 - 163/66)  RR: 18 (07-04-19 @ 12:05)  SpO2: 95% (07-04-19 @ 12:05) (95% - 100%)  Wt(kg): --    PHYSICAL EXAM:  General: non-toxic  HEAD/EYES: anicteric, PERRL  ENT:  supple  Cardiovascular:   S1, S2  Respiratory:  clear bilaterally  GI:  soft, non-tender, normal bowel sounds  :  no CVA tenderness   Musculoskeletal:  no synovitis  Neurologic:  grossly non-focal  Skin:  no rash  Lymph: no lymphadenopathy  Psychiatric:  appropriate affect  Vascular:  no phlebitis                                9.9    12.38 )-----------( 497      ( 03 Jul 2019 06:38 )             30.8     07-03    139  |  102  |  29<H>  ----------------------------<  81  3.8   |  25  |  1.80<H>    Ca    8.5      03 Jul 2019 06:38  Phos  2.7     07-03  Mg     1.9     07-03    TPro  6.5  /  Alb  2.2<L>  /  TBili  0.6  /  DBili  x   /  AST  23  /  ALT  12  /  AlkPhos  80  07-03          MICROBIOLOGY:        Culture - Yeast and Fungus (collected 01 Jul 2019 18:45)  Source: ABSCESS  Preliminary Report (03 Jul 2019 09:09):    CULTURE NEGATIVE FOR YEASTS AND MOLDS---- PRELIMINARY    CULTURE NEGATIVE FOR YEASTS AND MOLDS AFTER 1 DAY    Culture - Surg Site Aerob/Anaer w/Gm St (collected 01 Jul 2019 18:45)  Source: ABSCESS  Preliminary Report (04 Jul 2019 13:57):    MODERATE  Organism: Escherichia coli  Klebsiella pneumoniae  Enterococcus faecalis (04 Jul 2019 13:57)  Organism: Enterococcus faecalis (04 Jul 2019 13:57)  Organism: Klebsiella pneumoniae (04 Jul 2019 13:57)      -  Amikacin: S <=8 BOB      -  Ampicillin: R >16 BOB      -  Ampicillin/Sulbactam: S 8/4 BOB      -  Aztreonam: S <=4 BOB      -  Cefazolin: S <=2 BOB      -  Cefepime: S <=2 BOB      -  Cefoxitin: S <=4 BOB      -  Ceftazidime: S <=1 BOB      -  Ceftriaxone: S <=1 BOB      -  Ciprofloxacin: S <=0.5 BOB      -  Ertapenem: S <=0.5 BOB      -  Gentamicin: S <=1 BOB      -  Imipenem: S <=1 BOB      -  Levofloxacin: S <=1 BOB      -  Meropenem: S <=1 BOB      -  Piperacillin/Tazobactam: S <=8 BOB      -  Tigecycline: S      -  Tobramycin: S <=2 BOB      -  Trimethoprim/Sulfamethoxazole: S <=0.5/9.5 BOB      Method Type: NEGATIVE BOB 43  Organism: Escherichia coli (04 Jul 2019 13:57)    Culture - Acid Fast Smear Concentrated (collected 01 Jul 2019 18:45)  Source: ABSCESS  Final Report (02 Jul 2019 14:54):    AFB SMEAR= NO ACID FAST BACILLI SEEN    Culture - Urine (collected 01 Jul 2019 01:32)  Source: URINE MIDSTREAM  Final Report (02 Jul 2019 10:04):    Culture grew 3 or more types of organisms which indicate    collection contamination; consider recollection only if    clinically indicated.    Culture - Blood (collected 30 Jun 2019 23:14)  Source: BLOOD PERIPHERAL  Preliminary Report (03 Jul 2019 23:15):    NO ORGANISMS ISOLATED    NO ORGANISMS ISOLATED AT 72 HRS.    Culture - Blood (collected 30 Jun 2019 23:12)  Source: BLOOD VENOUS  Preliminary Report (03 Jul 2019 23:13):    NO ORGANISMS ISOLATED    NO ORGANISMS ISOLATED AT 72 HRS.                          RADIOLOGY:  imaging below personally reviewed HPI: 63M recently dx cholangiocarcinoma p/w n/v, abd pain. Was in the hospital 5/17 to 6/8/19 for acute cholangitis, ecoli/citrobacter bacteremia tx w meropenem. Discharged and presented again 6/18 to 6/26 for whipples procedure. Hasn't started chemotx yet. Started having n/v/abd pain at home so came back to the hospital. In the hospital w fever 101.4F. Had ct a/p showing nicole-duodenal abscess. On 7/1 went to IR for drainage- cultures growing ecoli/klebs/e.faecalis. On 7/1 also found to have L pneumothorax- chest tube placed by ED, removed today 7/4.     Able to tolerate solid food today w no n/v, diarrhea. States J-tube to be removed if continues to tolerate diet.    prior hospital charts reviewed [ x ]  primary team notes reviewed [ x ]  other consultant notes reviewed [ x ]    PAST MEDICAL & SURGICAL HISTORY:  Type II diabetes mellitus  HTN (hypertension)  CAD (coronary artery disease)  S/P cholecystectomy      Allergies  Cipro (Rash)  Plavix (Rash)        ANTIMICROBIALS (past 90 days)  MEDICATIONS  (STANDING):  meropenem  IVPB   100 mL/Hr IV Intermittent (07-01-19 @ 01:27)    meropenem  IVPB   100 mL/Hr IV Intermittent (07-04-19 @ 13:01)   100 mL/Hr IV Intermittent (07-03-19 @ 22:27)   100 mL/Hr IV Intermittent (07-03-19 @ 12:27)   100 mL/Hr IV Intermittent (07-03-19 @ 00:08)   100 mL/Hr IV Intermittent (07-02-19 @ 12:33)   100 mL/Hr IV Intermittent (07-01-19 @ 23:52)   100 mL/Hr IV Intermittent (07-01-19 @ 12:58)        ANTIMICROBIALS:    meropenem  IVPB 500 every 12 hours      OTHER MEDS: MEDICATIONS  (STANDING):  bisacodyl Suppository 10 daily PRN  carvedilol 6.25 every 12 hours  cyclobenzaprine 5 three times a day PRN  dextrose 40% Gel 15 once PRN  dextrose 50% Injectable 12.5 once  dextrose 50% Injectable 25 once  dextrose 50% Injectable 25 once  docusate sodium 100 daily  glucagon  Injectable 1 once PRN  heparin  Injectable 5000 every 8 hours  hydrALAZINE 25 three times a day  HYDROmorphone  Injectable 0.5 every 3 hours PRN  insulin glargine Injectable (LANTUS) 5 at bedtime  insulin lispro (HumaLOG) corrective regimen sliding scale  every 6 hours  melatonin 3 <User Schedule>  ondansetron Injectable 4 once  pantoprazole    Tablet 40 before breakfast      SOCIAL HISTORY:  quit smoking 20 years ago, denies etoh/illicit drug use    FAMILY HISTORY: Both parents w HTN      REVIEW OF SYSTEMS  [ x ] All other systems negative except as noted below:	    Constitutional:  [x ] fever [ ] chills  [ ] weight loss  [ ] weakness  Skin:  [ ] rash [ ] phlebitis	  Eyes: [ ] icterus [ ] pain  [ ] discharge	  ENMT: [ ] sore throat  [ ] thrush [ ] ulcers [ ] exudates  Respiratory: [ ] dyspnea [ ] hemoptysis [ ] cough [ ] sputum	  Cardiovascular:  [ ] chest pain [ ] palpitations [ ] edema	  Gastrointestinal:  [x ] nausea [ x] vomiting [ ] diarrhea [ ] constipation [ x] pain	  Genitourinary:  [ ] dysuria [ ] frequency [ ] hematuria [ ] discharge [ ] flank pain  [ ] incontinence  Musculoskeletal:  [ ] myalgias [ ] arthralgias [ ] arthritis  [ ] back pain  Neurological:  [ ] headache [ ] seizures  [ ] confusion/altered mental status  Psychiatric:  [ ] anxiety [ ] depression	  Hematology/Lymphatics:  [ ] lymphadenopathy  Endocrine:  [ ] adrenal [ ] thyroid  Allergic/Immunologic:	 [ ] transplant [ ] seasonal    Vital Signs Last 24 Hrs  T(F): 98.3 (07-04-19 @ 12:05), Max: 101.4 (06-30-19 @ 22:31)    Vital Signs Last 24 Hrs  HR: 89 (07-04-19 @ 12:05) (64 - 89)  BP: 139/56 (07-04-19 @ 12:05) (121/46 - 163/66)  RR: 18 (07-04-19 @ 12:05)  SpO2: 95% (07-04-19 @ 12:05) (95% - 100%)  Wt(kg): --    PHYSICAL EXAM:  General: non-toxic  HEENT: anicteric, PERRL, NCAT  Cardiovascular:   S1, S2  Respiratory:  clear bilaterally  GI:  soft, non-tender, normal bowel sounds, +J-tube, +IR drain  Musculoskeletal:  no synovitis  Neurologic:  grossly non-focal  Skin:  no rash  Lymph: no lymphadenopathy  Psychiatric:  appropriate affect  Vascular:  no phlebitis                            9.9    12.38 )-----------( 497      ( 03 Jul 2019 06:38 )             30.8     07-03    139  |  102  |  29<H>  ----------------------------<  81  3.8   |  25  |  1.80<H>    Ca    8.5      03 Jul 2019 06:38  Phos  2.7     07-03  Mg     1.9     07-03    TPro  6.5  /  Alb  2.2<L>  /  TBili  0.6  /  DBili  x   /  AST  23  /  ALT  12  /  AlkPhos  80  07-03          MICROBIOLOGY:    Culture - Yeast and Fungus (collected 01 Jul 2019 18:45)  Source: ABSCESS  Preliminary Report (03 Jul 2019 09:09):    CULTURE NEGATIVE FOR YEASTS AND MOLDS---- PRELIMINARY    CULTURE NEGATIVE FOR YEASTS AND MOLDS AFTER 1 DAY    Culture - Surg Site Aerob/Anaer w/Gm St (collected 01 Jul 2019 18:45)  Source: ABSCESS  Preliminary Report (04 Jul 2019 13:57):    MODERATE  Organism: Escherichia coli  Klebsiella pneumoniae  Enterococcus faecalis (04 Jul 2019 13:57)  Organism: Enterococcus faecalis (04 Jul 2019 13:57)  Organism: Klebsiella pneumoniae (04 Jul 2019 13:57)      -  Amikacin: S <=8 BOB      -  Ampicillin: R >16 BOB      -  Ampicillin/Sulbactam: S 8/4 BOB      -  Aztreonam: S <=4 BOB      -  Cefazolin: S <=2 BOB      -  Cefepime: S <=2 BOB      -  Cefoxitin: S <=4 BOB      -  Ceftazidime: S <=1 BOB      -  Ceftriaxone: S <=1 BOB      -  Ciprofloxacin: S <=0.5 BOB      -  Ertapenem: S <=0.5 BOB      -  Gentamicin: S <=1 BOB      -  Imipenem: S <=1 BOB      -  Levofloxacin: S <=1 BOB      -  Meropenem: S <=1 BOB      -  Piperacillin/Tazobactam: S <=8 BOB      -  Tigecycline: S      -  Tobramycin: S <=2 BOB      -  Trimethoprim/Sulfamethoxazole: S <=0.5/9.5 BOB      Method Type: NEGATIVE BOB 43  Organism: Escherichia coli (04 Jul 2019 13:57)    Culture - Acid Fast Smear Concentrated (collected 01 Jul 2019 18:45)  Source: ABSCESS  Final Report (02 Jul 2019 14:54):    AFB SMEAR= NO ACID FAST BACILLI SEEN    Culture - Urine (collected 01 Jul 2019 01:32)  Source: URINE MIDSTREAM  Final Report (02 Jul 2019 10:04):    Culture grew 3 or more types of organisms which indicate    collection contamination; consider recollection only if    clinically indicated.    Culture - Blood (collected 30 Jun 2019 23:14)  Source: BLOOD PERIPHERAL  Preliminary Report (03 Jul 2019 23:15):    NO ORGANISMS ISOLATED    NO ORGANISMS ISOLATED AT 72 HRS.    Culture - Blood (collected 30 Jun 2019 23:12)  Source: BLOOD VENOUS  Preliminary Report (03 Jul 2019 23:13):    NO ORGANISMS ISOLATED    NO ORGANISMS ISOLATED AT 72 HRS.    RADIOLOGY:  imaging below personally reviewed    ct c/a/p 7/1- Status post recent Whipple procedure. High density amorphous collection   adjacent to the duodenal stump with loculated foci of air and fluid in   the right upper quadrant for which considerations include postoperative   hematoma and air, leakage of contrast and air from the duodenal stump, or   developing collection/abscess.    Left-sided pigtail chest tube. Trace left pneumothorax along the anterior   chest wall.

## 2019-07-04 NOTE — PROGRESS NOTE ADULT - SUBJECTIVE AND OBJECTIVE BOX
Team D Surgery Progress Note     SUBJECTIVE / 24H EVENTS  Patient seen and examined on morning rounds. Patient resting comfortably, denies N/V, passing flatus, had BM pain well controlled. No acute events overnight.     OBJECTIVE:    VITAL SIGNS:  Vital Signs Last 24 Hrs  T(C): 36.8 (04 Jul 2019 12:05), Max: 37.1 (04 Jul 2019 00:02)  T(F): 98.3 (04 Jul 2019 12:05), Max: 98.7 (04 Jul 2019 00:02)  HR: 89 (04 Jul 2019 12:05) (64 - 89)  BP: 139/56 (04 Jul 2019 12:05) (121/46 - 163/66)  BP(mean): --  RR: 18 (04 Jul 2019 12:05) (18 - 18)  SpO2: 95% (04 Jul 2019 12:05) (95% - 100%)  Daily     Daily   CAPILLARY BLOOD GLUCOSE      POCT Blood Glucose.: 213 mg/dL (04 Jul 2019 12:56)  POCT Blood Glucose.: 129 mg/dL (04 Jul 2019 05:28)  POCT Blood Glucose.: 139 mg/dL (04 Jul 2019 00:10)  POCT Blood Glucose.: 148 mg/dL (03 Jul 2019 21:49)  POCT Blood Glucose.: 124 mg/dL (03 Jul 2019 17:26)        PHYSICAL EXAM:  Gen: NAD. NGT to LCWS  Resp: Respirations unlabored. CTA b/l  CV: RRR. No m/r/g.   GI: Soft. Nontender. Nondistended. midline incision c/d/i, j-tube in place, IR drain on right with murky fluid  Ext: Warm, well perfused      I&O's Detail    03 Jul 2019 07:01  -  04 Jul 2019 07:00  --------------------------------------------------------  IN:    Nepro with Carb Steady: 120 mL    Oral Fluid: 580 mL  Total IN: 700 mL    OUT:    Bulb: 10 mL    Chest Tube: 130 mL    Voided: 800 mL  Total OUT: 940 mL    Total NET: -240 mL      04 Jul 2019 07:01  -  04 Jul 2019 13:44  --------------------------------------------------------  IN:  Total IN: 0 mL    OUT:    Bulb: 7.5 mL    Chest Tube: 80 mL    Voided: 350 mL  Total OUT: 437.5 mL    Total NET: -437.5 mL                  LAB VALUES:  07-03 07-03    139  |  102  |  29<H>  ----------------------------<  81  3.8   |  25  |  1.80<H>    Ca    8.5      03 Jul 2019 06:38  Phos  2.7     07-03  Mg     1.9     07-03    LIVER FUNCTIONS - ( 03 Jul 2019 06:38 )  Alb: 2.2 g/dL / Pro: 6.5 g/dL / ALK PHOS: 80 u/L / ALT: 12 u/L / AST: 23 u/L / GGT: x                                 9.9    12.38 )-----------( 497      ( 03 Jul 2019 06:38 )             30.8         MICROBIOLOGY:    No new microbiology data for review.     RADIOLOGY:  PACS Image: Image(s) Available (07-03-19 @ 07:29)    No new radiographic images for review.    MEDICATIONS  (STANDING):  carvedilol 6.25 milliGRAM(s) Oral every 12 hours  chlorhexidine 4% Liquid 1 Application(s) Topical <User Schedule>  dextrose 5%. 1000 milliLiter(s) (50 mL/Hr) IV Continuous <Continuous>  dextrose 50% Injectable 12.5 Gram(s) IV Push once  dextrose 50% Injectable 25 Gram(s) IV Push once  dextrose 50% Injectable 25 Gram(s) IV Push once  heparin  Injectable 5000 Unit(s) SubCutaneous every 8 hours  hydrALAZINE 25 milliGRAM(s) Oral three times a day  insulin glargine Injectable (LANTUS) 5 Unit(s) SubCutaneous at bedtime  insulin lispro (HumaLOG) corrective regimen sliding scale   SubCutaneous every 6 hours  meropenem  IVPB 500 milliGRAM(s) IV Intermittent every 12 hours  ondansetron Injectable 4 milliGRAM(s) IV Push once  pantoprazole    Tablet 40 milliGRAM(s) Oral before breakfast    MEDICATIONS  (PRN):  dextrose 40% Gel 15 Gram(s) Oral once PRN Blood Glucose LESS THAN 70 milliGRAM(s)/deciLiter  glucagon  Injectable 1 milliGRAM(s) IntraMuscular once PRN Glucose <70 milliGRAM(s)/deciLiter  HYDROmorphone  Injectable 0.5 milliGRAM(s) IV Push every 3 hours PRN Severe Pain (7 - 10)         Assessment and Plan:   · Assessment		  62 y/o M w/ PMHx of CAD s/p 9 stents, DMT2 on insulin, HTN, GERD, diverticulosis, former smoker (30 PY), s/p cholecystectomy, biliary stricture s/p ERCP with sphincterotomy and stent (4/2019), and cholangiocarcinoma now s/p open Whipple and feeding j-tube placement on 6/18. Patient returned to the hospital with a pneumothorax and abdominal fluid collection s/p pigtail chest tube and IR drainage of abdominal collection    - advance to reg diet  - HD catheter removal per renal  - f/u pt's nephrologist Dr. Santy Jules  - c/w jejunostomy tube feeds at 10ml/hr  - IV antibiotics meropenem  - Continue to monitor bowel function.   - f/u FS  - DVT ppx: SQH  - ISS  - OOB  - Chest tube removed today per CT    Pt Seen and examined with Dr. Tameka DOMÍNGUEZ Team Surgery  [id number]

## 2019-07-05 DIAGNOSIS — L02.91 CUTANEOUS ABSCESS, UNSPECIFIED: ICD-10-CM

## 2019-07-05 LAB
-  AMIKACIN: SIGNIFICANT CHANGE UP
-  AMIKACIN: SIGNIFICANT CHANGE UP
-  AMPICILLIN/SULBACTAM: SIGNIFICANT CHANGE UP
-  AMPICILLIN/SULBACTAM: SIGNIFICANT CHANGE UP
-  AMPICILLIN: SIGNIFICANT CHANGE UP
-  AZTREONAM: SIGNIFICANT CHANGE UP
-  AZTREONAM: SIGNIFICANT CHANGE UP
-  CEFAZOLIN: SIGNIFICANT CHANGE UP
-  CEFAZOLIN: SIGNIFICANT CHANGE UP
-  CEFEPIME: SIGNIFICANT CHANGE UP
-  CEFEPIME: SIGNIFICANT CHANGE UP
-  CEFOXITIN: SIGNIFICANT CHANGE UP
-  CEFOXITIN: SIGNIFICANT CHANGE UP
-  CEFTAZIDIME: SIGNIFICANT CHANGE UP
-  CEFTAZIDIME: SIGNIFICANT CHANGE UP
-  CEFTRIAXONE: SIGNIFICANT CHANGE UP
-  CEFTRIAXONE: SIGNIFICANT CHANGE UP
-  CIPROFLOXACIN: SIGNIFICANT CHANGE UP
-  ERTAPENEM: SIGNIFICANT CHANGE UP
-  ERTAPENEM: SIGNIFICANT CHANGE UP
-  GENTAMICIN: SIGNIFICANT CHANGE UP
-  GENTAMICIN: SIGNIFICANT CHANGE UP
-  IMIPENEM: SIGNIFICANT CHANGE UP
-  IMIPENEM: SIGNIFICANT CHANGE UP
-  LEVOFLOXACIN: SIGNIFICANT CHANGE UP
-  LEVOFLOXACIN: SIGNIFICANT CHANGE UP
-  MEROPENEM: SIGNIFICANT CHANGE UP
-  MEROPENEM: SIGNIFICANT CHANGE UP
-  PIPERACILLIN/TAZOBACTAM: SIGNIFICANT CHANGE UP
-  PIPERACILLIN/TAZOBACTAM: SIGNIFICANT CHANGE UP
-  TETRACYCLINE: SIGNIFICANT CHANGE UP
-  TIGECYCLINE: SIGNIFICANT CHANGE UP
-  TIGECYCLINE: SIGNIFICANT CHANGE UP
-  TOBRAMYCIN: SIGNIFICANT CHANGE UP
-  TOBRAMYCIN: SIGNIFICANT CHANGE UP
-  TRIMETHOPRIM/SULFAMETHOXAZOLE: SIGNIFICANT CHANGE UP
-  TRIMETHOPRIM/SULFAMETHOXAZOLE: SIGNIFICANT CHANGE UP
-  VANCOMYCIN: SIGNIFICANT CHANGE UP
ANION GAP SERPL CALC-SCNC: 13 MMO/L — SIGNIFICANT CHANGE UP (ref 7–14)
BACTERIA BLD CULT: SIGNIFICANT CHANGE UP
BACTERIA BLD CULT: SIGNIFICANT CHANGE UP
BUN SERPL-MCNC: 18 MG/DL — SIGNIFICANT CHANGE UP (ref 7–23)
CALCIUM SERPL-MCNC: 8.4 MG/DL — SIGNIFICANT CHANGE UP (ref 8.4–10.5)
CHLORIDE SERPL-SCNC: 98 MMOL/L — SIGNIFICANT CHANGE UP (ref 98–107)
CO2 SERPL-SCNC: 24 MMOL/L — SIGNIFICANT CHANGE UP (ref 22–31)
CREAT SERPL-MCNC: 1.4 MG/DL — HIGH (ref 0.5–1.3)
CULTURE - SURGICAL SITE: SIGNIFICANT CHANGE UP
GLUCOSE BLDC GLUCOMTR-MCNC: 154 MG/DL — HIGH (ref 70–99)
GLUCOSE BLDC GLUCOMTR-MCNC: 157 MG/DL — HIGH (ref 70–99)
GLUCOSE BLDC GLUCOMTR-MCNC: 169 MG/DL — HIGH (ref 70–99)
GLUCOSE BLDC GLUCOMTR-MCNC: 184 MG/DL — HIGH (ref 70–99)
GLUCOSE BLDC GLUCOMTR-MCNC: 198 MG/DL — HIGH (ref 70–99)
GLUCOSE SERPL-MCNC: 218 MG/DL — HIGH (ref 70–99)
HCT VFR BLD CALC: 31.9 % — LOW (ref 39–50)
HGB BLD-MCNC: 10.2 G/DL — LOW (ref 13–17)
MAGNESIUM SERPL-MCNC: 1.7 MG/DL — SIGNIFICANT CHANGE UP (ref 1.6–2.6)
MCHC RBC-ENTMCNC: 29.2 PG — SIGNIFICANT CHANGE UP (ref 27–34)
MCHC RBC-ENTMCNC: 32 % — SIGNIFICANT CHANGE UP (ref 32–36)
MCV RBC AUTO: 91.4 FL — SIGNIFICANT CHANGE UP (ref 80–100)
METHOD TYPE: SIGNIFICANT CHANGE UP
METHOD TYPE: SIGNIFICANT CHANGE UP
NRBC # FLD: 0 K/UL — SIGNIFICANT CHANGE UP (ref 0–0)
PHOSPHATE SERPL-MCNC: 3.1 MG/DL — SIGNIFICANT CHANGE UP (ref 2.5–4.5)
PLATELET # BLD AUTO: 425 K/UL — HIGH (ref 150–400)
PMV BLD: 9.1 FL — SIGNIFICANT CHANGE UP (ref 7–13)
POTASSIUM SERPL-MCNC: 3.5 MMOL/L — SIGNIFICANT CHANGE UP (ref 3.5–5.3)
POTASSIUM SERPL-SCNC: 3.5 MMOL/L — SIGNIFICANT CHANGE UP (ref 3.5–5.3)
RBC # BLD: 3.49 M/UL — LOW (ref 4.2–5.8)
RBC # FLD: 14.2 % — SIGNIFICANT CHANGE UP (ref 10.3–14.5)
SODIUM SERPL-SCNC: 135 MMOL/L — SIGNIFICANT CHANGE UP (ref 135–145)
WBC # BLD: 10.79 K/UL — HIGH (ref 3.8–10.5)
WBC # FLD AUTO: 10.79 K/UL — HIGH (ref 3.8–10.5)

## 2019-07-05 PROCEDURE — 99232 SBSQ HOSP IP/OBS MODERATE 35: CPT | Mod: GC

## 2019-07-05 PROCEDURE — 99233 SBSQ HOSP IP/OBS HIGH 50: CPT

## 2019-07-05 PROCEDURE — 71045 X-RAY EXAM CHEST 1 VIEW: CPT | Mod: 26

## 2019-07-05 PROCEDURE — 99232 SBSQ HOSP IP/OBS MODERATE 35: CPT

## 2019-07-05 RX ORDER — INSULIN LISPRO 100/ML
VIAL (ML) SUBCUTANEOUS
Refills: 0 | Status: DISCONTINUED | OUTPATIENT
Start: 2019-07-05 | End: 2019-07-10

## 2019-07-05 RX ORDER — PIPERACILLIN AND TAZOBACTAM 4; .5 G/20ML; G/20ML
3.38 INJECTION, POWDER, LYOPHILIZED, FOR SOLUTION INTRAVENOUS EVERY 8 HOURS
Refills: 0 | Status: DISCONTINUED | OUTPATIENT
Start: 2019-07-05 | End: 2019-07-10

## 2019-07-05 RX ORDER — POTASSIUM CHLORIDE 20 MEQ
20 PACKET (EA) ORAL ONCE
Refills: 0 | Status: DISCONTINUED | OUTPATIENT
Start: 2019-07-05 | End: 2019-07-05

## 2019-07-05 RX ORDER — LANOLIN ALCOHOL/MO/W.PET/CERES
3 CREAM (GRAM) TOPICAL
Refills: 0 | Status: DISCONTINUED | OUTPATIENT
Start: 2019-07-05 | End: 2019-07-10

## 2019-07-05 RX ORDER — POTASSIUM CHLORIDE 20 MEQ
20 PACKET (EA) ORAL ONCE
Refills: 0 | Status: COMPLETED | OUTPATIENT
Start: 2019-07-05 | End: 2019-07-05

## 2019-07-05 RX ORDER — INSULIN GLARGINE 100 [IU]/ML
8 INJECTION, SOLUTION SUBCUTANEOUS AT BEDTIME
Refills: 0 | Status: DISCONTINUED | OUTPATIENT
Start: 2019-07-05 | End: 2019-07-10

## 2019-07-05 RX ORDER — MAGNESIUM SULFATE 500 MG/ML
2 VIAL (ML) INJECTION ONCE
Refills: 0 | Status: COMPLETED | OUTPATIENT
Start: 2019-07-05 | End: 2019-07-05

## 2019-07-05 RX ADMIN — Medication 2: at 22:05

## 2019-07-05 RX ADMIN — HYDROMORPHONE HYDROCHLORIDE 0.5 MILLIGRAM(S): 2 INJECTION INTRAMUSCULAR; INTRAVENOUS; SUBCUTANEOUS at 23:00

## 2019-07-05 RX ADMIN — Medication 81 MILLIGRAM(S): at 12:45

## 2019-07-05 RX ADMIN — Medication 63.75 MILLIMOLE(S): at 00:52

## 2019-07-05 RX ADMIN — Medication 3 MILLIGRAM(S): at 20:41

## 2019-07-05 RX ADMIN — CARVEDILOL PHOSPHATE 6.25 MILLIGRAM(S): 80 CAPSULE, EXTENDED RELEASE ORAL at 19:00

## 2019-07-05 RX ADMIN — Medication 50 GRAM(S): at 08:36

## 2019-07-05 RX ADMIN — PIPERACILLIN AND TAZOBACTAM 25 GRAM(S): 4; .5 INJECTION, POWDER, LYOPHILIZED, FOR SOLUTION INTRAVENOUS at 22:08

## 2019-07-05 RX ADMIN — PANTOPRAZOLE SODIUM 40 MILLIGRAM(S): 20 TABLET, DELAYED RELEASE ORAL at 05:21

## 2019-07-05 RX ADMIN — CHLORHEXIDINE GLUCONATE 1 APPLICATION(S): 213 SOLUTION TOPICAL at 12:44

## 2019-07-05 RX ADMIN — Medication 2: at 00:52

## 2019-07-05 RX ADMIN — CARVEDILOL PHOSPHATE 6.25 MILLIGRAM(S): 80 CAPSULE, EXTENDED RELEASE ORAL at 05:18

## 2019-07-05 RX ADMIN — Medication 2: at 06:57

## 2019-07-05 RX ADMIN — CYCLOBENZAPRINE HYDROCHLORIDE 5 MILLIGRAM(S): 10 TABLET, FILM COATED ORAL at 05:20

## 2019-07-05 RX ADMIN — INSULIN GLARGINE 8 UNIT(S): 100 INJECTION, SOLUTION SUBCUTANEOUS at 22:06

## 2019-07-05 RX ADMIN — HYDROMORPHONE HYDROCHLORIDE 0.5 MILLIGRAM(S): 2 INJECTION INTRAMUSCULAR; INTRAVENOUS; SUBCUTANEOUS at 22:21

## 2019-07-05 RX ADMIN — HEPARIN SODIUM 5000 UNIT(S): 5000 INJECTION INTRAVENOUS; SUBCUTANEOUS at 22:06

## 2019-07-05 RX ADMIN — CYCLOBENZAPRINE HYDROCHLORIDE 5 MILLIGRAM(S): 10 TABLET, FILM COATED ORAL at 20:41

## 2019-07-05 RX ADMIN — Medication 50 MILLIGRAM(S): at 22:06

## 2019-07-05 RX ADMIN — Medication 50 MILLIGRAM(S): at 12:45

## 2019-07-05 RX ADMIN — HYDROMORPHONE HYDROCHLORIDE 0.5 MILLIGRAM(S): 2 INJECTION INTRAMUSCULAR; INTRAVENOUS; SUBCUTANEOUS at 09:00

## 2019-07-05 RX ADMIN — HYDROMORPHONE HYDROCHLORIDE 0.5 MILLIGRAM(S): 2 INJECTION INTRAMUSCULAR; INTRAVENOUS; SUBCUTANEOUS at 08:35

## 2019-07-05 RX ADMIN — Medication 2: at 12:46

## 2019-07-05 RX ADMIN — MEROPENEM 100 MILLIGRAM(S): 1 INJECTION INTRAVENOUS at 12:44

## 2019-07-05 RX ADMIN — Medication 20 MILLIEQUIVALENT(S): at 12:44

## 2019-07-05 RX ADMIN — Medication 50 MILLIGRAM(S): at 05:19

## 2019-07-05 RX ADMIN — Medication 100 MILLIGRAM(S): at 12:45

## 2019-07-05 RX ADMIN — HEPARIN SODIUM 5000 UNIT(S): 5000 INJECTION INTRAVENOUS; SUBCUTANEOUS at 05:18

## 2019-07-05 NOTE — PHYSICAL THERAPY INITIAL EVALUATION ADULT - PERTINENT HX OF CURRENT PROBLEM, REHAB EVAL
63 year old Male pmhx of Whipple for cholangiocarcinoma 6/18 d/c on 6/26 who presents with nausea, vomiting, and fever since discharge

## 2019-07-05 NOTE — PROGRESS NOTE ADULT - PROBLEM SELECTOR PLAN 1
Pt. with SLOAN, likely hemodynamically mediated in setting of decreased oral intake, GI fluid loss and ? infection. Scr was 2.26 on 6/26/19, increased to 3.25 on 6/30. Scr decreased to 1.8 on 7/3/19. SCr today is 1.4 (7/5/2019) Pt. on IVF. Monitor labs and urine output. Avoid any potential nephrotoxins. Pt. with SLOAN, likely hemodynamically mediated in setting of decreased oral intake, GI fluid loss and ? infection. Scr was 2.26 on 6/26/19, increased to 3.25 on 6/30. Scr decreased to 1.4 today (7/5/2019). Monitor labs and urine output. Avoid any potential nephrotoxins

## 2019-07-05 NOTE — PROGRESS NOTE ADULT - PROBLEM SELECTOR PLAN 3
Cr downtrending from 3.25 on admission, hx of CKD IV  Had ATN prior admission and briefly required HD, discharged on 6/26 with Cr of 2.27. No indication for RRT at this time. HD catheter removed on 7/3  encourage PO hydration, home torsemide on hold  Renal following.

## 2019-07-05 NOTE — PROGRESS NOTE ADULT - SUBJECTIVE AND OBJECTIVE BOX
Team D Surgery Progress Note     SUBJECTIVE / 24H EVENTS  Patient seen and examined on morning rounds. Patient resting comfortably, denies N/V, passing flatus, had BM. Complaining of right sided chest pain. No acute events overnight.     OBJECTIVE:    Vital Signs Last 24 Hrs  T(C): 36.8 (05 Jul 2019 08:40), Max: 36.9 (05 Jul 2019 00:10)  T(F): 98.2 (05 Jul 2019 08:40), Max: 98.5 (05 Jul 2019 00:10)  HR: 72 (05 Jul 2019 08:40) (67 - 89)  BP: 146/65 (05 Jul 2019 08:40) (139/56 - 168/64)  BP(mean): --  RR: 18 (05 Jul 2019 08:40) (16 - 18)  SpO2: 99% (05 Jul 2019 08:40) (95% - 99%)  CAPILLARY BLOOD GLUCOSE      CAPILLARY BLOOD GLUCOSE      POCT Blood Glucose.: 198 mg/dL (05 Jul 2019 05:56)  POCT Blood Glucose.: 157 mg/dL (04 Jul 2019 23:57)  POCT Blood Glucose.: 144 mg/dL (04 Jul 2019 21:46)  POCT Blood Glucose.: 155 mg/dL (04 Jul 2019 17:31)  POCT Blood Glucose.: 213 mg/dL (04 Jul 2019 12:56)        PHYSICAL EXAM:  Gen: NAD. NGT to LCWS  Resp: Respirations unlabored. CTA b/l  CV: RRR. No m/r/g.   GI: Soft. Nontender. Nondistended. midline incision c/d/i, j-tube in place, IR drain on right with murky fluid  Ext: Warm, well perfused    I&O's Detail    04 Jul 2019 07:01  -  05 Jul 2019 07:00  --------------------------------------------------------  IN:  Total IN: 0 mL    OUT:    Bulb: 107.5 mL    Chest Tube: 80 mL    Voided: 1050 mL  Total OUT: 1237.5 mL    Total NET: -1237.5 mL      05 Jul 2019 07:01  -  05 Jul 2019 09:50  --------------------------------------------------------  IN:  Total IN: 0 mL    OUT:    Bulb: 5 mL  Total OUT: 5 mL    Total NET: -5 mL    07-05    135  |  98  |  18  ----------------------------<  218<H>  3.5   |  24  |  1.40<H>    Ca    8.4      05 Jul 2019 06:08  Phos  3.1     07-05  Mg     1.7     07-05                          10.2   10.79 )-----------( 425      ( 05 Jul 2019 06:08 )             31.9         MICROBIOLOGY:    No new microbiology data for review.     RADIOLOGY:      No new radiographic images for review.    MEDICATIONS  (STANDING):  carvedilol 6.25 milliGRAM(s) Oral every 12 hours  chlorhexidine 4% Liquid 1 Application(s) Topical <User Schedule>  dextrose 5%. 1000 milliLiter(s) (50 mL/Hr) IV Continuous <Continuous>  dextrose 50% Injectable 12.5 Gram(s) IV Push once  dextrose 50% Injectable 25 Gram(s) IV Push once  dextrose 50% Injectable 25 Gram(s) IV Push once  heparin  Injectable 5000 Unit(s) SubCutaneous every 8 hours  hydrALAZINE 25 milliGRAM(s) Oral three times a day  insulin glargine Injectable (LANTUS) 5 Unit(s) SubCutaneous at bedtime  insulin lispro (HumaLOG) corrective regimen sliding scale   SubCutaneous every 6 hours  meropenem  IVPB 500 milliGRAM(s) IV Intermittent every 12 hours  ondansetron Injectable 4 milliGRAM(s) IV Push once  pantoprazole    Tablet 40 milliGRAM(s) Oral before breakfast    MEDICATIONS  (PRN):  dextrose 40% Gel 15 Gram(s) Oral once PRN Blood Glucose LESS THAN 70 milliGRAM(s)/deciLiter  glucagon  Injectable 1 milliGRAM(s) IntraMuscular once PRN Glucose <70 milliGRAM(s)/deciLiter  HYDROmorphone  Injectable 0.5 milliGRAM(s) IV Push every 3 hours PRN Severe Pain (7 - 10)         Assessment and Plan:   · Assessment		  62 y/o M w/ PMHx of CAD s/p 9 stents, DMT2 on insulin, HTN, GERD, diverticulosis, former smoker (30 PY), s/p cholecystectomy, biliary stricture s/p ERCP with sphincterotomy and stent (4/2019), and cholangiocarcinoma now s/p open Whipple and feeding j-tube placement on 6/18. Patient returned to the hospital with a pneumothorax and abdominal fluid collection s/p pigtail chest tube and IR drainage of abdominal collection    - Diabetic diet  - f/u pt's nephrologist Dr. Santy Jules  - IV antibiotics meropenem  - Continue to monitor bowel function.   - f/u FS  - DVT ppx: SQH  - ISS  -f/u CXR results today        D Team Surgery  [id number]

## 2019-07-05 NOTE — PROGRESS NOTE ADULT - PROBLEM SELECTOR PLAN 4
FS controlled  Last A1C: 5.6 in 6/2019, advance PO diet as tolerated, also has + Jtube  C/w Lantus 5U, SSI premeal

## 2019-07-05 NOTE — PHYSICAL THERAPY INITIAL EVALUATION ADULT - ADDITIONAL COMMENTS
Pt. reports he owns a rolling walker at home, Pt. returned supine in bed with all tubes/lines intact, call bell in reach and in NAD.

## 2019-07-05 NOTE — PROGRESS NOTE ADULT - SUBJECTIVE AND OBJECTIVE BOX
[facility] DIVISION OF KIDNEY DISEASES AND HYPERTENSION -- FOLLOW UP NOTE  --------------------------------------------------------------------------------  HPI: 63-year-old male with PMH significant for DM, HTN, CAD, SLOAN on CKD and recent Whipple's procedure presented to the ED after complaining of generalized malaise, nausea and vomiting. Patient was recently hospitalized at [facility] (6/18/19-6/26/19) for Whipple's procedure. Since his discharge, patient complained of increasing malaise and nausea with one episode of vomiting on 6/30/2019. Patient is known to our service as he required HD during previous 2 admissions at [facility] for oliguric SLOAN. Last HD at [facility] was on 6/24/19. Patient reports good urine output since discontinuing HD. Patient had HD catheter removed on 7/3/2019.     Patient evaluated at bedside, sitting comfortably in chair. Chest tube removed, HD catheter removed. Reports some right sided abdominal pain, but good appetite.    PAST HISTORY  --------------------------------------------------------------------------------  No significant changes to PMH, PSH, FHx, SHx, unless otherwise noted    ALLERGIES & MEDICATIONS  --------------------------------------------------------------------------------  Allergies    Cipro (Rash)  Plavix (Rash)    Intolerances      Standing Inpatient Medications  aspirin enteric coated 81 milliGRAM(s) Oral daily  atorvastatin 40 milliGRAM(s) Oral at bedtime  carvedilol 6.25 milliGRAM(s) Oral every 12 hours  chlorhexidine 4% Liquid 1 Application(s) Topical <User Schedule>  dextrose 5%. 1000 milliLiter(s) IV Continuous <Continuous>  dextrose 50% Injectable 12.5 Gram(s) IV Push once  dextrose 50% Injectable 25 Gram(s) IV Push once  dextrose 50% Injectable 25 Gram(s) IV Push once  docusate sodium 100 milliGRAM(s) Oral daily  heparin  Injectable 5000 Unit(s) SubCutaneous every 8 hours  hydrALAZINE 50 milliGRAM(s) Oral three times a day  insulin glargine Injectable (LANTUS) 5 Unit(s) SubCutaneous at bedtime  insulin lispro (HumaLOG) corrective regimen sliding scale   SubCutaneous every 6 hours  magnesium sulfate  IVPB 2 Gram(s) IV Intermittent once  meropenem  IVPB 500 milliGRAM(s) IV Intermittent every 12 hours  ondansetron Injectable 4 milliGRAM(s) IV Push once  pantoprazole    Tablet 40 milliGRAM(s) Oral before breakfast    REVIEW OF SYSTEMS  --------------------------------------------------------------------------------  Gen: No lethargy  Respiratory: No dyspnea  CV: No chest pain  GI: Right sided abdominal pain  MSK: No LE edema  Neuro: No dizziness  Heme: No bleeding    All other systems were reviewed and are negative, except as noted.    VITALS/PHYSICAL EXAM  --------------------------------------------------------------------------------  T(C): 36.5 (07-05-19 @ 05:14), Max: 36.9 (07-05-19 @ 00:10)  HR: 74 (07-05-19 @ 05:14) (67 - 89)  BP: 166/71 (07-05-19 @ 05:14) (139/56 - 168/64)  RR: 16 (07-05-19 @ 05:14) (16 - 18)  SpO2: 96% (07-05-19 @ 05:14) (95% - 98%)  Wt(kg): --        07-04-19 @ 07:01  -  07-05-19 @ 07:00  --------------------------------------------------------  IN: 0 mL / OUT: 1237.5 mL / NET: -1237.5 mL      Physical Exam:  	Gen: NAD, well-appearing  	Pulm: CTA B/L  	CV: RRR, S1S2; no rub  	Abd: +BS, soft, nontender/nondistended  	: No suprapubic tenderness  	UE: Warm, FROM, no clubbing, intact strength; no edema; no asterixis  	LE: Warm, FROM, no clubbing, intact strength; no edema  	Neuro: No focal deficits, intact gait  	Psych: Normal affect and mood  	Skin: Warm, without rashes  	  LABS/STUDIES  --------------------------------------------------------------------------------              10.2   10.79 >-----------<  425      [07-05-19 @ 06:08]              31.9     135  |  98  |  18  ----------------------------<  218      [07-05-19 @ 06:08]  3.5   |  24  |  1.40        Ca     8.4     [07-05-19 @ 06:08]      Mg     1.7     [07-05-19 @ 06:08]      Phos  3.1     [07-05-19 @ 06:08]    Creatinine Trend:  SCr 1.40 [07-05 @ 06:08]  SCr 1.69 [07-04 @ 16:50]  SCr 1.80 [07-03 @ 06:38]  SCr 2.41 [07-02 @ 05:32]  SCr 2.86 [07-01 @ 09:25]      HBsAg NEGATIVE      [06-21-19 @ 06:55] [facility] DIVISION OF KIDNEY DISEASES AND HYPERTENSION -- FOLLOW UP NOTE  --------------------------------------------------------------------------------  HPI: 63-year-old male with PMH significant for DM, HTN, CAD, SLOAN on CKD and recent Whipple's procedure presented to the ED after complaining of generalized malaise, nausea and vomiting. Patient was recently hospitalized at [facility] (6/18/19-6/26/19) for Whipple's procedure. Since his discharge, patient complained of increasing malaise and nausea with one episode of vomiting on 6/30/2019. Patient is known to our service as he required HD during previous 2 admissions at [facility] for oliguric SLOAN. Last HD at [facility] was on 6/24/19. Patient reports good urine output since discontinuing HD. Patient had HD catheter removed on 7/3/2019.     Patient evaluated at bedside, sitting comfortably in chair. Chest tube removed, HD catheter removed. Reports some right sided abdominal pain, but good appetite.    PAST HISTORY  --------------------------------------------------------------------------------  No significant changes to PMH, PSH, FHx, SHx, unless otherwise noted    ALLERGIES & MEDICATIONS  --------------------------------------------------------------------------------  Allergies    Cipro (Rash)  Plavix (Rash)    Intolerances      Standing Inpatient Medications  aspirin enteric coated 81 milliGRAM(s) Oral daily  atorvastatin 40 milliGRAM(s) Oral at bedtime  carvedilol 6.25 milliGRAM(s) Oral every 12 hours  chlorhexidine 4% Liquid 1 Application(s) Topical <User Schedule>  dextrose 5%. 1000 milliLiter(s) IV Continuous <Continuous>  dextrose 50% Injectable 12.5 Gram(s) IV Push once  dextrose 50% Injectable 25 Gram(s) IV Push once  dextrose 50% Injectable 25 Gram(s) IV Push once  docusate sodium 100 milliGRAM(s) Oral daily  heparin  Injectable 5000 Unit(s) SubCutaneous every 8 hours  hydrALAZINE 50 milliGRAM(s) Oral three times a day  insulin glargine Injectable (LANTUS) 5 Unit(s) SubCutaneous at bedtime  insulin lispro (HumaLOG) corrective regimen sliding scale   SubCutaneous every 6 hours  magnesium sulfate  IVPB 2 Gram(s) IV Intermittent once  meropenem  IVPB 500 milliGRAM(s) IV Intermittent every 12 hours  ondansetron Injectable 4 milliGRAM(s) IV Push once  pantoprazole    Tablet 40 milliGRAM(s) Oral before breakfast    REVIEW OF SYSTEMS  --------------------------------------------------------------------------------  Gen: No lethargy  Respiratory: No dyspnea  CV: No chest pain  GI: Right sided abdominal pain  MSK: No LE edema  Neuro: No dizziness  Heme: No bleeding    All other systems were reviewed and are negative, except as noted.    VITALS/PHYSICAL EXAM  --------------------------------------------------------------------------------  T(C): 36.5 (07-05-19 @ 05:14), Max: 36.9 (07-05-19 @ 00:10)  HR: 74 (07-05-19 @ 05:14) (67 - 89)  BP: 166/71 (07-05-19 @ 05:14) (139/56 - 168/64)  RR: 16 (07-05-19 @ 05:14) (16 - 18)  SpO2: 96% (07-05-19 @ 05:14) (95% - 98%)  Wt(kg): --        07-04-19 @ 07:01  -  07-05-19 @ 07:00  --------------------------------------------------------  IN: 0 mL / OUT: 1237.5 mL / NET: -1237.5 mL      Physical Exam:  	Gen: NAD, well-appearing  	Pulm: CTA B/L  	CV: RRR, S1S2; no rub  	Abd: +BS, soft, nontender/nondistended  	: No suprapubic tenderness  	UE: Warm, FROM, no clubbing, intact strength; no edema; no asterixis  	LE: Warm, FROM, no clubbing, intact strength; no edema  	Neuro: awake, alert  	Psych: Normal affect and mood  	Skin: Warm, without rashes  	  LABS/STUDIES  --------------------------------------------------------------------------------              10.2   10.79 >-----------<  425      [07-05-19 @ 06:08]              31.9     135  |  98  |  18  ----------------------------<  218      [07-05-19 @ 06:08]  3.5   |  24  |  1.40        Ca     8.4     [07-05-19 @ 06:08]      Mg     1.7     [07-05-19 @ 06:08]      Phos  3.1     [07-05-19 @ 06:08]    Creatinine Trend:  SCr 1.40 [07-05 @ 06:08]  SCr 1.69 [07-04 @ 16:50]  SCr 1.80 [07-03 @ 06:38]  SCr 2.41 [07-02 @ 05:32]  SCr 2.86 [07-01 @ 09:25]      HBsAg NEGATIVE      [06-21-19 @ 06:55] [facility] DIVISION OF KIDNEY DISEASES AND HYPERTENSION -- FOLLOW UP NOTE  --------------------------------------------------------------------------------  HPI: 63-year-old male with PMH significant for DM, HTN, CAD, SLOAN on CKD and recent Whipple's procedure presented to the ED after complaining of generalized malaise, nausea and vomiting. Patient was recently hospitalized at [facility] (6/18/19-6/26/19) for Whipple's procedure. Since his discharge, patient complained of increasing malaise and nausea with one episode of vomiting on 6/30/2019. Patient is known to our service as he required HD during previous 2 admissions at [facility] for oliguric SLOAN. Last HD at [facility] was on 6/24/19. Patient reports good urine output since discontinuing HD. Patient had HD catheter removed on 7/3/2019.     Patient evaluated at bedside, sitting comfortably in chair. Chest tube removed, HD catheter removed. Reports some right sided abdominal pain, but good appetite.    PAST HISTORY  --------------------------------------------------------------------------------  No significant changes to PMH, PSH, FHx, SHx, unless otherwise noted    ALLERGIES & MEDICATIONS  --------------------------------------------------------------------------------  Allergies    Cipro (Rash)  Plavix (Rash)    Intolerances      Standing Inpatient Medications  aspirin enteric coated 81 milliGRAM(s) Oral daily  atorvastatin 40 milliGRAM(s) Oral at bedtime  carvedilol 6.25 milliGRAM(s) Oral every 12 hours  chlorhexidine 4% Liquid 1 Application(s) Topical <User Schedule>  dextrose 5%. 1000 milliLiter(s) IV Continuous <Continuous>  dextrose 50% Injectable 12.5 Gram(s) IV Push once  dextrose 50% Injectable 25 Gram(s) IV Push once  dextrose 50% Injectable 25 Gram(s) IV Push once  docusate sodium 100 milliGRAM(s) Oral daily  heparin  Injectable 5000 Unit(s) SubCutaneous every 8 hours  hydrALAZINE 50 milliGRAM(s) Oral three times a day  insulin glargine Injectable (LANTUS) 5 Unit(s) SubCutaneous at bedtime  insulin lispro (HumaLOG) corrective regimen sliding scale   SubCutaneous every 6 hours  magnesium sulfate  IVPB 2 Gram(s) IV Intermittent once  meropenem  IVPB 500 milliGRAM(s) IV Intermittent every 12 hours  ondansetron Injectable 4 milliGRAM(s) IV Push once  pantoprazole    Tablet 40 milliGRAM(s) Oral before breakfast    REVIEW OF SYSTEMS  --------------------------------------------------------------------------------  Gen: No lethargy  Respiratory: No dyspnea  CV: No chest pain  GI: Right sided abdominal pain  MSK: No LE edema  Neuro: No dizziness  Heme: No bleeding    All other systems were reviewed and are negative, except as noted.    VITALS/PHYSICAL EXAM  --------------------------------------------------------------------------------  T(C): 36.5 (07-05-19 @ 05:14), Max: 36.9 (07-05-19 @ 00:10)  HR: 74 (07-05-19 @ 05:14) (67 - 89)  BP: 166/71 (07-05-19 @ 05:14) (139/56 - 168/64)  RR: 16 (07-05-19 @ 05:14) (16 - 18)  SpO2: 96% (07-05-19 @ 05:14) (95% - 98%)  Wt(kg): --        07-04-19 @ 07:01  -  07-05-19 @ 07:00  --------------------------------------------------------  IN: 0 mL / OUT: 1237.5 mL / NET: -1237.5 mL      Physical Exam:  	Gen: Awake, alert              HEENT: No JVD  	Pulm: Fair air entry B/L, left chest tube seen+   	CV: RRR, S1S2; no rub  	Back: No sacral edema  	Abd: +BS, soft, healing midline surgical scar with staples present  	: No suprapubic tenderness  	LE: Warm, trace B/L edema+  	Skin: Warm  	  LABS/STUDIES  --------------------------------------------------------------------------------              10.2   10.79 >-----------<  425      [07-05-19 @ 06:08]              31.9     135  |  98  |  18  ----------------------------<  218      [07-05-19 @ 06:08]  3.5   |  24  |  1.40        Ca     8.4     [07-05-19 @ 06:08]      Mg     1.7     [07-05-19 @ 06:08]      Phos  3.1     [07-05-19 @ 06:08]    Creatinine Trend:  SCr 1.40 [07-05 @ 06:08]  SCr 1.69 [07-04 @ 16:50]  SCr 1.80 [07-03 @ 06:38]  SCr 2.41 [07-02 @ 05:32]  SCr 2.86 [07-01 @ 09:25]      HBsAg NEGATIVE      [06-21-19 @ 06:55] [facility] DIVISION OF KIDNEY DISEASES AND HYPERTENSION -- FOLLOW UP NOTE  --------------------------------------------------------------------------------  HPI: 63-year-old male with PMH significant for DM, HTN, CAD, SLOAN on CKD and recent Whipple's procedure presented to the ED after complaining of generalized malaise, nausea and vomiting. Patient was recently hospitalized at [facility] (6/18/19-6/26/19) for Whipple's procedure. Since his discharge, patient complained of increasing malaise and nausea with one episode of vomiting on 6/30/2019. Patient is known to our service as he required HD during previous 2 admissions at [facility] for oliguric SLOAN. Last HD at [facility] was on 6/24/19. Patient reports good urine output since discontinuing HD. Patient had HD catheter removed on 7/3/2019.     Patient evaluated at bedside, sitting comfortably in chair. Chest tube removed. Reports some right sided abdominal pain, but good appetite.    PAST HISTORY  --------------------------------------------------------------------------------  No significant changes to PMH, PSH, FHx, SHx, unless otherwise noted    ALLERGIES & MEDICATIONS  --------------------------------------------------------------------------------  Allergies    Cipro (Rash)  Plavix (Rash)    Intolerances    Standing Inpatient Medications  aspirin enteric coated 81 milliGRAM(s) Oral daily  atorvastatin 40 milliGRAM(s) Oral at bedtime  carvedilol 6.25 milliGRAM(s) Oral every 12 hours  chlorhexidine 4% Liquid 1 Application(s) Topical <User Schedule>  dextrose 5%. 1000 milliLiter(s) IV Continuous <Continuous>  dextrose 50% Injectable 12.5 Gram(s) IV Push once  dextrose 50% Injectable 25 Gram(s) IV Push once  dextrose 50% Injectable 25 Gram(s) IV Push once  docusate sodium 100 milliGRAM(s) Oral daily  heparin  Injectable 5000 Unit(s) SubCutaneous every 8 hours  hydrALAZINE 50 milliGRAM(s) Oral three times a day  insulin glargine Injectable (LANTUS) 5 Unit(s) SubCutaneous at bedtime  insulin lispro (HumaLOG) corrective regimen sliding scale   SubCutaneous every 6 hours  magnesium sulfate  IVPB 2 Gram(s) IV Intermittent once  meropenem  IVPB 500 milliGRAM(s) IV Intermittent every 12 hours  ondansetron Injectable 4 milliGRAM(s) IV Push once  pantoprazole    Tablet 40 milliGRAM(s) Oral before breakfast    REVIEW OF SYSTEMS  --------------------------------------------------------------------------------  Gen: No lethargy  Respiratory: No dyspnea  CV: No chest pain  GI: Right sided abdominal pain  MSK: No LE edema  Neuro: No dizziness  Heme: No bleeding    All other systems were reviewed and are negative, except as noted.    VITALS/PHYSICAL EXAM  --------------------------------------------------------------------------------  T(C): 36.5 (07-05-19 @ 05:14), Max: 36.9 (07-05-19 @ 00:10)  HR: 74 (07-05-19 @ 05:14) (67 - 89)  BP: 166/71 (07-05-19 @ 05:14) (139/56 - 168/64)  RR: 16 (07-05-19 @ 05:14) (16 - 18)  SpO2: 96% (07-05-19 @ 05:14) (95% - 98%)  Wt(kg): --    07-04-19 @ 07:01  -  07-05-19 @ 07:00  --------------------------------------------------------  IN: 0 mL / OUT: 1237.5 mL / NET: -1237.5 mL    Physical Exam:  	Gen: Awake, alert              HEENT: No JVD  	Pulm: Fair air entry B/L, left chest tube seen+   	CV: RRR, S1S2; no rub  	Back: No sacral edema  	Abd: +BS, soft, healing midline surgical scar with staples present  	: No suprapubic tenderness  	LE: Warm, trace B/L edema+  	Skin: Warm  	  LABS/STUDIES  --------------------------------------------------------------------------------              10.2   10.79 >-----------<  425      [07-05-19 @ 06:08]              31.9     135  |  98  |  18  ----------------------------<  218      [07-05-19 @ 06:08]  3.5   |  24  |  1.40        Ca     8.4     [07-05-19 @ 06:08]      Mg     1.7     [07-05-19 @ 06:08]      Phos  3.1     [07-05-19 @ 06:08]    Creatinine Trend:  SCr 1.40 [07-05 @ 06:08]  SCr 1.69 [07-04 @ 16:50]  SCr 1.80 [07-03 @ 06:38]  SCr 2.41 [07-02 @ 05:32]  SCr 2.86 [07-01 @ 09:25]    HBsAg NEGATIVE      [06-21-19 @ 06:55]

## 2019-07-05 NOTE — PROGRESS NOTE ADULT - PROBLEM SELECTOR PLAN 9
s/p 9 stents  at home on ASA, coreg, crestor. Holding in setting of NPO status.
PO protonix, melatonin at 8pm tonight x1 to aid in sleep  DVT ppx with HSQ given hypercoagulable state in setting of cholangiocarcinoma.  PT consult
PO protonix, melatonin qhs for insomnia  DVT ppx with HSQ given hypercoagulable state in setting of cholangiocarcinoma.
Switch to PO protonix now that he is tolerating oral intake  DVT ppx with HSQ given hypercoagulable state in setting of cholangiocarcinoma.  PT consult

## 2019-07-05 NOTE — PROGRESS NOTE ADULT - PROBLEM SELECTOR PLAN 2
s/p left sided chest tube: removed 7/4, repeat CXR with no PTX  right sided costochondritis: avoid NSAIDs: warm pads to area and tylenol PRN  Nonspecific ground-glass opacities also seen which could be reexpansion edema or patchy atelectasis  Continue management as per thoracic surgery

## 2019-07-05 NOTE — PROGRESS NOTE ADULT - SUBJECTIVE AND OBJECTIVE BOX
f/u abdominal abscess    Interval History/ROS:  no fever/chills.  no n/v/d.  no abdominal pain.  no dysuria.  still draining from the YUMIKO.  Remainder of ROS otherwise negative.    PAST MEDICAL & SURGICAL HISTORY:  Type II diabetes mellitus  HTN (hypertension)  CAD (coronary artery disease)  S/P cholecystectomy    Allergies  Cipro (Rash)  Plavix (Rash)    ANTIMICROBIALS:    meropenem  IVPB 500 every 12 hours (7/1-)    MEDICATIONS  (STANDING):  aspirin enteric coated 81 daily  atorvastatin 40 at bedtime  carvedilol 6.25 every 12 hours  docusate sodium 100 daily  heparin  Injectable 5000 every 8 hours  hydrALAZINE 50 three times a day  insulin glargine Injectable (LANTUS) 5 at bedtime  insulin lispro (HumaLOG) corrective regimen sliding scale  every 6 hours  ondansetron Injectable 4 once  pantoprazole    Tablet 40 before breakfast    Vital Signs Last 24 Hrs  T(F): 98.2 (07-05-19 @ 08:40), Max: 98.5 (07-05-19 @ 00:10)  HR: 72 (07-05-19 @ 08:40)  BP: 146/65 (07-05-19 @ 08:40)  RR: 18 (07-05-19 @ 08:40)  SpO2: 99% (07-05-19 @ 08:40) (95% - 99%)    PHYSICAL EXAM:  General: non-toxic; comfortable in bed  HEENT: anicteric  Cardiovascular:   S1, S2  Respiratory:  clear bilaterally  GI:  soft, non-tender, normal bowel sounds, +J-tube, +IR drain with thick, cloudy purulent drainage  Musculoskeletal:  no synovitis  Neurologic:  grossly non-focal  Skin:  no rash  Psychiatric:  appropriate affect  Vascular:  no phlebitis L wrist IV                        10.2   10.79 )-----------( 425      ( 05 Jul 2019 06:08 )             31.9 07-05    135  |  98  |  18  ----------------------------<  218  3.5   |  24  |  1.40  Ca    8.4      05 Jul 2019 06:08Phos  3.1     07-05Mg     1.7     07-05    MICROBIOLOGY:  Culture - Yeast and Fungus (collected 01 Jul 2019 18:45)  Source: ABSCESS  Preliminary Report (03 Jul 2019 09:09):    CULTURE NEGATIVE FOR YEASTS AND MOLDS---- PRELIMINARY    CULTURE NEGATIVE FOR YEASTS AND MOLDS AFTER 1 DAY    Culture - Surg Site Aerob/Anaer w/Gm St (collected 01 Jul 2019 18:45)  Source: ABSCESS  Preliminary Report (04 Jul 2019 13:57):    MODERATE  Organism: Escherichia coli  Klebsiella pneumoniae  Enterococcus faecalis (04 Jul 2019 13:57)  Organism: Enterococcus faecalis (04 Jul 2019 13:57)  Organism: Klebsiella pneumoniae (04 Jul 2019 13:57)      -  Amikacin: S <=8 BOB      -  Ampicillin: R >16 BOB      -  Ampicillin/Sulbactam: S 8/4 BBO      -  Aztreonam: S <=4 BOB      -  Cefazolin: S <=2 BOB      -  Cefepime: S <=2 BOB      -  Cefoxitin: S <=4 BOB      -  Ceftazidime: S <=1 BOB      -  Ceftriaxone: S <=1 BOB      -  Ciprofloxacin: S <=0.5 BOB      -  Ertapenem: S <=0.5 BOB      -  Gentamicin: S <=1 BOB      -  Imipenem: S <=1 BOB      -  Levofloxacin: S <=1 BOB      -  Meropenem: S <=1 BOB      -  Piperacillin/Tazobactam: S <=8 BOB      -  Tigecycline: S      -  Tobramycin: S <=2 BOB      -  Trimethoprim/Sulfamethoxazole: S <=0.5/9.5 BOB      Method Type: NEGATIVE BOB 43  Organism: Escherichia coli (04 Jul 2019 13:57)    Culture - Acid Fast Smear Concentrated (collected 01 Jul 2019 18:45)  Source: ABSCESS  Final Report (02 Jul 2019 14:54):    AFB SMEAR= NO ACID FAST BACILLI SEEN    Culture - Urine (collected 01 Jul 2019 01:32)  Source: URINE MIDSTREAM  Final Report (02 Jul 2019 10:04):    Culture grew 3 or more types of organisms which indicate    collection contamination; consider recollection only if    clinically indicated.    Culture - Blood (collected 30 Jun 2019 23:14)  Source: BLOOD PERIPHERAL  Preliminary Report (03 Jul 2019 23:15):    NO ORGANISMS ISOLATED    NO ORGANISMS ISOLATED AT 72 HRS.    Culture - Blood (collected 30 Jun 2019 23:12)  Source: BLOOD VENOUS  Preliminary Report (03 Jul 2019 23:13):    NO ORGANISMS ISOLATED    NO ORGANISMS ISOLATED AT 72 HRS.    RADIOLOGY:  imaging below personally reviewed    CT Abdomen and Pelvis w/ Oral Cont (07.01.19 @ 04:22) >  IMPRESSION:  Status post recent Whipple procedure. High density amorphous collection adjacent to the duodenal stump with loculated foci of air and fluid in the right upper quadrant for which considerations include postoperative hematoma and air, leakage of contrast and air from the duodenal stump, or developing collection/abscess.  Left-sided pigtail chest tube. Trace left pneumothorax along the anterior chest wall.

## 2019-07-05 NOTE — PHYSICAL THERAPY INITIAL EVALUATION ADULT - REHAB POTENTIAL, PT EVAL
Pt. not placed on skilled therapy services secondary to pt. performing at their baseline performance.

## 2019-07-05 NOTE — PROGRESS NOTE ADULT - ASSESSMENT
63M recently dx cholangiocarcinoma s/p whipple 6/18/19 p/w n/v, abd pain found to have abd abscess s/p IR drainage, stay c/b L pneumothorax now resolved s/p chest tube placement.  Abscess is polymicrobial.  Fever/leukocytosis better.      Suggest:  - c/w meropenem   - follow up all cultures - final sensitivities should be available later today as per micro [phone]  - monitor for fevers and wbc daily  - likely 2-4 weeks antibiotics until collection resolved    Please call the ID service [phone] with questions or concerns over the weekend

## 2019-07-05 NOTE — PROGRESS NOTE ADULT - PROBLEM SELECTOR PLAN 1
Met sepsis criteria on admission, with leukocytosis and fever s/p Whipples 6/18/19  CT Abd with paraduodenal collection, consistent with abscess s/p IR drainage. Abscess culture growing Ecoli, Efaecalis, and Klebsiella, on Neptali day 6, sensitive to CTX. Consider switching Abx. ID following  6/30 Bcx NGTD, Ucx >3 organisms  Had recent E coli and Citrobacter bacteremia presumed 2/2 biliary source, s/p ERCP 5/19/19 notable for bile in the duodenum and additional placement of plastic biliary stent alongside prior stent.

## 2019-07-05 NOTE — PROGRESS NOTE ADULT - SUBJECTIVE AND OBJECTIVE BOX
CHIEF COMPLAINT: f/u     SUBJECTIVE / OVERNIGHT EVENTS: Patient seen and examined. No acute events overnight. Reports right sided rib pain. No chest pain, SOB, N/V/D. Tolerating diet. Was able to sleep well after receiving melatonin last night.    MEDICATIONS  (STANDING):  aspirin enteric coated 81 milliGRAM(s) Oral daily  atorvastatin 40 milliGRAM(s) Oral at bedtime  carvedilol 6.25 milliGRAM(s) Oral every 12 hours  chlorhexidine 4% Liquid 1 Application(s) Topical <User Schedule>  dextrose 5%. 1000 milliLiter(s) (50 mL/Hr) IV Continuous <Continuous>  dextrose 50% Injectable 12.5 Gram(s) IV Push once  dextrose 50% Injectable 25 Gram(s) IV Push once  dextrose 50% Injectable 25 Gram(s) IV Push once  docusate sodium 100 milliGRAM(s) Oral daily  heparin  Injectable 5000 Unit(s) SubCutaneous every 8 hours  hydrALAZINE 50 milliGRAM(s) Oral three times a day  insulin glargine Injectable (LANTUS) 5 Unit(s) SubCutaneous at bedtime  insulin lispro (HumaLOG) corrective regimen sliding scale   SubCutaneous every 6 hours  melatonin 3 milliGRAM(s) Oral <User Schedule>  meropenem  IVPB 500 milliGRAM(s) IV Intermittent every 12 hours  ondansetron Injectable 4 milliGRAM(s) IV Push once  pantoprazole    Tablet 40 milliGRAM(s) Oral before breakfast    MEDICATIONS  (PRN):  bisacodyl Suppository 10 milliGRAM(s) Rectal daily PRN Constipation  cyclobenzaprine 5 milliGRAM(s) Oral three times a day PRN Muscle Spasm  dextrose 40% Gel 15 Gram(s) Oral once PRN Blood Glucose LESS THAN 70 milliGRAM(s)/deciLiter  glucagon  Injectable 1 milliGRAM(s) IntraMuscular once PRN Glucose <70 milliGRAM(s)/deciLiter  HYDROmorphone  Injectable 0.5 milliGRAM(s) IV Push every 3 hours PRN Severe Pain (7 - 10)      VITALS:  T(F): 98 (07-05-19 @ 11:33), Max: 98.5 (07-05-19 @ 00:10)  HR: 66 (07-05-19 @ 11:33) (66 - 78)  BP: 139/57 (07-05-19 @ 11:33) (139/57 - 168/64)  RR: 17 (07-05-19 @ 11:33) (16 - 18)  SpO2: 99% (07-05-19 @ 11:33)  Wt(kg): --      CAPILLARY BLOOD GLUCOSE      PHYSICAL EXAM:  GENERAL: NAD, on room air  HEENT: sclera clear  CHEST/RESPIRATORY: CTA b/l, gauze of previous site of HD catheter, left sided chest tube no longer present: gauze C/D/I, TTP of right sided anterior ribs  CARDIOVASCULAR: s1/s2, no murmurs appreciated  GASTROINTESTINAL: Soft, slightly tender, Nondistended; Bowel sounds present, midline abdominal scar (staples no longer present), + jejunostomy tube, + YUMIKO drain on right draining milky gray fluid  EXTREMITIES: WWP, no edema b/l  NERVOUS SYSTEM:  awake, alert, responds to Qs appropriately    LABS:              10.2                 135  | 24   | 18           10.79 >-----------< 425     ------------------------< 218                   31.9                 3.5  | 98   | 1.40                                         Ca 8.4   Mg 1.7   Ph 3.1        MICROBIOLOGY:  Blood culture  NO ORGANISMS ISOLATED  NO ORGANISMS ISOLATED AT 96 [id number] @ 23:14    Blood culture  NO ORGANISMS ISOLATED  NO ORGANISMS ISOLATED AT 96 [id number] @ 23:12      Urine Culture  Culture grew 3 or more types of organisms which indicate  collection contamination; consider recollection only if  clinically indicated.07-01 @ 01:32      RADIOLOGY & ADDITIONAL TESTS:  Imaging Personally Reviewed: [ ] Yes    [ ] Consultant(s) Notes Reviewed:  [x] Care Discussed with Consultants/Other Providers: Urology PA - discussed

## 2019-07-05 NOTE — PROGRESS NOTE ADULT - PROBLEM SELECTOR PROBLEM 9
Coronary artery disease without angina pectoris, unspecified vessel or lesion type, unspecified whether native or transplanted heart
Prophylactic measure

## 2019-07-06 LAB
AMYLASE FLD-CCNC: > 7500 U/L — SIGNIFICANT CHANGE UP
ANION GAP SERPL CALC-SCNC: 13 MMO/L — SIGNIFICANT CHANGE UP (ref 7–14)
BUN SERPL-MCNC: 15 MG/DL — SIGNIFICANT CHANGE UP (ref 7–23)
CALCIUM SERPL-MCNC: 8 MG/DL — LOW (ref 8.4–10.5)
CHLORIDE SERPL-SCNC: 102 MMOL/L — SIGNIFICANT CHANGE UP (ref 98–107)
CO2 SERPL-SCNC: 20 MMOL/L — LOW (ref 22–31)
CREAT SERPL-MCNC: 1.4 MG/DL — HIGH (ref 0.5–1.3)
GLUCOSE BLDC GLUCOMTR-MCNC: 139 MG/DL — HIGH (ref 70–99)
GLUCOSE BLDC GLUCOMTR-MCNC: 168 MG/DL — HIGH (ref 70–99)
GLUCOSE BLDC GLUCOMTR-MCNC: 184 MG/DL — HIGH (ref 70–99)
GLUCOSE BLDC GLUCOMTR-MCNC: 203 MG/DL — HIGH (ref 70–99)
GLUCOSE SERPL-MCNC: 158 MG/DL — HIGH (ref 70–99)
HCT VFR BLD CALC: 28.7 % — LOW (ref 39–50)
HGB BLD-MCNC: 9.1 G/DL — LOW (ref 13–17)
MAGNESIUM SERPL-MCNC: 1.8 MG/DL — SIGNIFICANT CHANGE UP (ref 1.6–2.6)
MCHC RBC-ENTMCNC: 29.1 PG — SIGNIFICANT CHANGE UP (ref 27–34)
MCHC RBC-ENTMCNC: 31.7 % — LOW (ref 32–36)
MCV RBC AUTO: 91.7 FL — SIGNIFICANT CHANGE UP (ref 80–100)
NRBC # FLD: 0 K/UL — SIGNIFICANT CHANGE UP (ref 0–0)
PHOSPHATE SERPL-MCNC: 2.7 MG/DL — SIGNIFICANT CHANGE UP (ref 2.5–4.5)
PLATELET # BLD AUTO: 332 K/UL — SIGNIFICANT CHANGE UP (ref 150–400)
PMV BLD: 9.1 FL — SIGNIFICANT CHANGE UP (ref 7–13)
POTASSIUM SERPL-MCNC: 4 MMOL/L — SIGNIFICANT CHANGE UP (ref 3.5–5.3)
POTASSIUM SERPL-SCNC: 4 MMOL/L — SIGNIFICANT CHANGE UP (ref 3.5–5.3)
RBC # BLD: 3.13 M/UL — LOW (ref 4.2–5.8)
RBC # FLD: 14.4 % — SIGNIFICANT CHANGE UP (ref 10.3–14.5)
SODIUM SERPL-SCNC: 135 MMOL/L — SIGNIFICANT CHANGE UP (ref 135–145)
TRIGL FLD-MCNC: 690 MG/DL — SIGNIFICANT CHANGE UP
WBC # BLD: 8.4 K/UL — SIGNIFICANT CHANGE UP (ref 3.8–10.5)
WBC # FLD AUTO: 8.4 K/UL — SIGNIFICANT CHANGE UP (ref 3.8–10.5)

## 2019-07-06 PROCEDURE — 99233 SBSQ HOSP IP/OBS HIGH 50: CPT

## 2019-07-06 RX ORDER — ACETAMINOPHEN 500 MG
1000 TABLET ORAL ONCE
Refills: 0 | Status: COMPLETED | OUTPATIENT
Start: 2019-07-06 | End: 2019-07-06

## 2019-07-06 RX ADMIN — CYCLOBENZAPRINE HYDROCHLORIDE 5 MILLIGRAM(S): 10 TABLET, FILM COATED ORAL at 21:52

## 2019-07-06 RX ADMIN — CARVEDILOL PHOSPHATE 6.25 MILLIGRAM(S): 80 CAPSULE, EXTENDED RELEASE ORAL at 18:14

## 2019-07-06 RX ADMIN — Medication 81 MILLIGRAM(S): at 13:07

## 2019-07-06 RX ADMIN — ATORVASTATIN CALCIUM 40 MILLIGRAM(S): 80 TABLET, FILM COATED ORAL at 21:43

## 2019-07-06 RX ADMIN — Medication 2: at 21:42

## 2019-07-06 RX ADMIN — Medication 100 MILLIGRAM(S): at 13:07

## 2019-07-06 RX ADMIN — HEPARIN SODIUM 5000 UNIT(S): 5000 INJECTION INTRAVENOUS; SUBCUTANEOUS at 06:44

## 2019-07-06 RX ADMIN — PIPERACILLIN AND TAZOBACTAM 25 GRAM(S): 4; .5 INJECTION, POWDER, LYOPHILIZED, FOR SOLUTION INTRAVENOUS at 21:44

## 2019-07-06 RX ADMIN — Medication 2: at 18:16

## 2019-07-06 RX ADMIN — PIPERACILLIN AND TAZOBACTAM 25 GRAM(S): 4; .5 INJECTION, POWDER, LYOPHILIZED, FOR SOLUTION INTRAVENOUS at 06:44

## 2019-07-06 RX ADMIN — HYDROMORPHONE HYDROCHLORIDE 0.5 MILLIGRAM(S): 2 INJECTION INTRAMUSCULAR; INTRAVENOUS; SUBCUTANEOUS at 22:10

## 2019-07-06 RX ADMIN — Medication 50 MILLIGRAM(S): at 06:44

## 2019-07-06 RX ADMIN — CYCLOBENZAPRINE HYDROCHLORIDE 5 MILLIGRAM(S): 10 TABLET, FILM COATED ORAL at 10:29

## 2019-07-06 RX ADMIN — HYDROMORPHONE HYDROCHLORIDE 0.5 MILLIGRAM(S): 2 INJECTION INTRAMUSCULAR; INTRAVENOUS; SUBCUTANEOUS at 21:43

## 2019-07-06 RX ADMIN — INSULIN GLARGINE 8 UNIT(S): 100 INJECTION, SOLUTION SUBCUTANEOUS at 21:42

## 2019-07-06 RX ADMIN — Medication 50 MILLIGRAM(S): at 13:07

## 2019-07-06 RX ADMIN — HYDROMORPHONE HYDROCHLORIDE 0.5 MILLIGRAM(S): 2 INJECTION INTRAMUSCULAR; INTRAVENOUS; SUBCUTANEOUS at 11:00

## 2019-07-06 RX ADMIN — HEPARIN SODIUM 5000 UNIT(S): 5000 INJECTION INTRAVENOUS; SUBCUTANEOUS at 13:07

## 2019-07-06 RX ADMIN — PIPERACILLIN AND TAZOBACTAM 25 GRAM(S): 4; .5 INJECTION, POWDER, LYOPHILIZED, FOR SOLUTION INTRAVENOUS at 13:07

## 2019-07-06 RX ADMIN — HYDROMORPHONE HYDROCHLORIDE 0.5 MILLIGRAM(S): 2 INJECTION INTRAMUSCULAR; INTRAVENOUS; SUBCUTANEOUS at 10:29

## 2019-07-06 RX ADMIN — Medication 3: at 09:17

## 2019-07-06 RX ADMIN — HEPARIN SODIUM 5000 UNIT(S): 5000 INJECTION INTRAVENOUS; SUBCUTANEOUS at 21:43

## 2019-07-06 RX ADMIN — CARVEDILOL PHOSPHATE 6.25 MILLIGRAM(S): 80 CAPSULE, EXTENDED RELEASE ORAL at 06:44

## 2019-07-06 RX ADMIN — PANTOPRAZOLE SODIUM 40 MILLIGRAM(S): 20 TABLET, DELAYED RELEASE ORAL at 06:44

## 2019-07-06 RX ADMIN — Medication 3 MILLIGRAM(S): at 21:43

## 2019-07-06 RX ADMIN — Medication 400 MILLIGRAM(S): at 06:44

## 2019-07-06 RX ADMIN — Medication 50 MILLIGRAM(S): at 21:44

## 2019-07-06 NOTE — PROGRESS NOTE ADULT - SUBJECTIVE AND OBJECTIVE BOX
Team D Surgery Progress Note     SUBJECTIVE / 24H EVENTS  Patient seen and examined on morning rounds. No acute events overnight. His YUMIKO drain has been putting out some murky discharge since advancing his diet. He is still having pain on the right side of his chest but otherwise is recovering slowly.     OBJECTIVE:    VITAL SIGNS:  T(C): 36.7 (07-07-19 @ 01:02), Max: 37.1 (07-06-19 @ 17:09)  HR: 71 (07-07-19 @ 01:02) (67 - 78)  BP: 143/57 (07-07-19 @ 01:02) (131/64 - 159/74)  RR: 19 (07-07-19 @ 01:02) (16 - 19)  SpO2: 100% (07-07-19 @ 01:02) (96% - 100%)    POCT Blood Glucose.: 139 mg/dL (07-06-19 @ 12:43)      PHYSICAL EXAM:  Gen: NAD  LS: Respirations unlabored  Card: RRR  GI: Soft. Nontender. Nondistended, YUMIKO drainage murky   Ext: Warm, well perfused      07-05-19 @ 07:01  -  07-06-19 @ 07:00  --------------------------------------------------------  IN:    Enteral Tube Flush: 30 mL  Total IN: 30 mL    OUT:    Bulb: 5 mL    Voided: 250 mL  Total OUT: 255 mL    Total NET: -225 mL    LAB VALUES:  07-06    135  |  102  |  15  ----------------------------<  158<H>  4.0   |  20<L>  |  1.40<H>    Ca    8.0<L>      06 Jul 2019 05:31  Phos  2.7     07-06  Mg     1.8     07-06                                 9.1    8.40  )-----------( 332      ( 06 Jul 2019 05:31 )             28.7     MICROBIOLOGY:    Culture - Surg Site Aerob/Anaer w/Gm St (07.01.19 @ 18:45)    Gram Stain Wound:   WBC White Blood Cells  QNTY CELLS IN GRAM STAIN: FEW (2+)  GNR^Gram Neg Rods  QUANTITY OF BACTERIA SEEN: FEW (2+)  GPR^Gram Positive Rods  QUANTITY OF BACTERIA SEEN: FEW (2+)  GPCCH^Gram Pos Cocci in Chains  QUANTITY OF BACTERIA SEEN: RARE (1+)      Specimen Source: ABSCESS    Organism Identification: Escherichia coli  Klebsiella pneumoniae  Enterococcus faecalis    Organism: Escherichia coli    Organism: Klebsiella pneumoniae    Organism: Enterococcus faecalis    Method Type: NEGATIVE BOB 43    Method Type: NEGATIVE BOB 43    Method Type: POSITIVE BOB 29        RADIOLOGY:    No new radiographic images for review.    MEDICATIONS  (STANDING):  aspirin enteric coated 81 milliGRAM(s) Oral daily  atorvastatin 40 milliGRAM(s) Oral at bedtime  carvedilol 6.25 milliGRAM(s) Oral every 12 hours  chlorhexidine 4% Liquid 1 Application(s) Topical <User Schedule>  dextrose 5%. 1000 milliLiter(s) (50 mL/Hr) IV Continuous <Continuous>  dextrose 50% Injectable 12.5 Gram(s) IV Push once  dextrose 50% Injectable 25 Gram(s) IV Push once  dextrose 50% Injectable 25 Gram(s) IV Push once  docusate sodium 100 milliGRAM(s) Oral daily  heparin  Injectable 5000 Unit(s) SubCutaneous every 8 hours  hydrALAZINE 50 milliGRAM(s) Oral three times a day  insulin glargine Injectable (LANTUS) 8 Unit(s) SubCutaneous at bedtime  insulin lispro (HumaLOG) corrective regimen sliding scale   SubCutaneous Before meals and at bedtime  melatonin 3 milliGRAM(s) Oral <User Schedule>  ondansetron Injectable 4 milliGRAM(s) IV Push once  pantoprazole    Tablet 40 milliGRAM(s) Oral before breakfast  piperacillin/tazobactam IVPB.. 3.375 Gram(s) IV Intermittent every 8 hours    MEDICATIONS  (PRN):  bisacodyl Suppository 10 milliGRAM(s) Rectal daily PRN Constipation  cyclobenzaprine 5 milliGRAM(s) Oral three times a day PRN Muscle Spasm  dextrose 40% Gel 15 Gram(s) Oral once PRN Blood Glucose LESS THAN 70 milliGRAM(s)/deciLiter  glucagon  Injectable 1 milliGRAM(s) IntraMuscular once PRN Glucose <70 milliGRAM(s)/deciLiter  HYDROmorphone  Injectable 0.5 milliGRAM(s) IV Push every 3 hours PRN Severe Pain (7 - 10)

## 2019-07-06 NOTE — PROGRESS NOTE ADULT - SUBJECTIVE AND OBJECTIVE BOX
INCOMPLETE CHIEF COMPLAINT: f/u     SUBJECTIVE / OVERNIGHT EVENTS: Patient seen and examined. No acute events overnight. Slept well and right rib pain improved with dilaudid. Denies SOB, abd pain, N/V. Ambulating daily. Patient's wife requesting a note for disability indicating patient is in hospital    MEDICATIONS  (STANDING):  aspirin enteric coated 81 milliGRAM(s) Oral daily  atorvastatin 40 milliGRAM(s) Oral at bedtime  carvedilol 6.25 milliGRAM(s) Oral every 12 hours  chlorhexidine 4% Liquid 1 Application(s) Topical <User Schedule>  dextrose 5%. 1000 milliLiter(s) (50 mL/Hr) IV Continuous <Continuous>  dextrose 50% Injectable 12.5 Gram(s) IV Push once  dextrose 50% Injectable 25 Gram(s) IV Push once  dextrose 50% Injectable 25 Gram(s) IV Push once  docusate sodium 100 milliGRAM(s) Oral daily  heparin  Injectable 5000 Unit(s) SubCutaneous every 8 hours  hydrALAZINE 50 milliGRAM(s) Oral three times a day  insulin glargine Injectable (LANTUS) 8 Unit(s) SubCutaneous at bedtime  insulin lispro (HumaLOG) corrective regimen sliding scale   SubCutaneous Before meals and at bedtime  melatonin 3 milliGRAM(s) Oral <User Schedule>  ondansetron Injectable 4 milliGRAM(s) IV Push once  pantoprazole    Tablet 40 milliGRAM(s) Oral before breakfast  piperacillin/tazobactam IVPB.. 3.375 Gram(s) IV Intermittent every 8 hours    MEDICATIONS  (PRN):  bisacodyl Suppository 10 milliGRAM(s) Rectal daily PRN Constipation  cyclobenzaprine 5 milliGRAM(s) Oral three times a day PRN Muscle Spasm  dextrose 40% Gel 15 Gram(s) Oral once PRN Blood Glucose LESS THAN 70 milliGRAM(s)/deciLiter  glucagon  Injectable 1 milliGRAM(s) IntraMuscular once PRN Glucose <70 milliGRAM(s)/deciLiter  HYDROmorphone  Injectable 0.5 milliGRAM(s) IV Push every 3 hours PRN Severe Pain (7 - 10)      VITALS:  T(F): 98.3 (07-06-19 @ 09:30), Max: 98.3 (07-06-19 @ 09:30)  HR: 78 (07-06-19 @ 09:30) (64 - 78)  BP: 148/73 (07-06-19 @ 09:30) (135/57 - 167/67)  RR: 18 (07-06-19 @ 09:30) (16 - 18)  SpO2: 98% (07-06-19 @ 09:30)  Wt(kg): --      CAPILLARY BLOOD GLUCOSE      PHYSICAL EXAM:  GENERAL: NAD, on room air  HEENT: sclera clear  CHEST/RESPIRATORY: CTA b/l, gauze of previous site of HD catheter on right chest C/D/I, left sided chest tube no longer present: gauze C/D/I, TTP of right sided anterior ribs  CARDIOVASCULAR: s1/s2, no murmurs appreciated  GASTROINTESTINAL: Soft, nontender, Nondistended; BS present, midline abdominal scar (staples no longer present), + jejunostomy tube, + YUMIKO drain on right draining milky gray fluid  EXTREMITIES: WWP, no edema b/l  NERVOUS SYSTEM:  awake, alert, responds to Qs appropriately    LABS:              9.1                  135  | 20   | 15           8.40  >-----------< 332     ------------------------< 158                   28.7                 4.0  | 102  | 1.40                                         Ca 8.0   Mg 1.8   Ph 2.7                        MICROBIOLOGY:        RADIOLOGY & ADDITIONAL TESTS:  Imaging Personally Reviewed: [ ] Yes    [ ] Consultant(s) Notes Reviewed:  [x] Care Discussed with Consultants/Other Providers:

## 2019-07-06 NOTE — PROGRESS NOTE ADULT - ASSESSMENT
Assessment:  64 y/o M w/ PMHx of CAD s/p 9 stents, DMT2 on insulin, HTN, GERD, diverticulosis, former smoker (30 PY), s/p cholecystectomy, biliary stricture s/p ERCP with sphincterotomy and stent (4/2019), and cholangiocarcinoma now s/p open Whipple and feeding j-tube placement on 6/18. Patient returned to the hospital with a pneumothorax and abdominal fluid collection s/p pigtail chest tube and IR drainage of abdominal collection    Plan:    - Diabetic diet, calorie count  - f/u pt's nephrologist Dr. Santy Jules  - IV antibiotics meropenem changed to Zosyn  - Continue to monitor bowel function and YUMIKO drain output, sending trig and amylase on drain fluid  - f/u FS  - DVT ppx: SQH  - ISS  - F/U ID recommendations, possible CT A&P monday to evaluate interval change in abdominal collection     Patient seen and examined with Dr. English

## 2019-07-06 NOTE — PROGRESS NOTE ADULT - PROBLEM SELECTOR PLAN 3
Cr downtrending from 3.25 on admission, hx of CKD IV, now stable at 1.4  Had ATN prior admission and briefly required HD, discharged on 6/26 with Cr of 2.27. No indication for RRT at this time. HD catheter removed on 7/3  encourage PO hydration, home torsemide on hold  Renal following.

## 2019-07-06 NOTE — PROGRESS NOTE ADULT - PROBLEM SELECTOR PLAN 1
Met sepsis criteria on admission, with leukocytosis and fever s/p Whipples 6/18/19  CT Abd with paraduodenal collection, consistent with abscess s/p IR drainage. Abscess culture growing Ecoli, Efaecalis, and Klebsiella. Neptali switched to Zosyn per ID recs. Duration TBD by ID  6/30 Bcx NGTD, Ucx >3 organisms  Had recent E coli and Citrobacter bacteremia presumed 2/2 biliary source, s/p ERCP 5/19/19 notable for bile in the duodenum and additional placement of plastic biliary stent alongside prior stent. Met sepsis criteria on admission, with leukocytosis and fever s/p Whipples 6/18/19  CT Abd with paraduodenal collection, consistent with abscess s/p IR drainage. Abscess culture growing Ecoli, Efaecalis, and Klebsiella. Neptali switched to Zosyn per ID recs. Duration TBD by ID. Repeat CT A/P given continued biliary drain output  6/30 Bcx NGTD, Ucx >3 organisms  Had recent E coli and Citrobacter bacteremia presumed 2/2 biliary source, s/p ERCP 5/19/19 notable for bile in the duodenum and additional placement of plastic biliary stent alongside prior stent.

## 2019-07-07 LAB
ANION GAP SERPL CALC-SCNC: 10 MMO/L — SIGNIFICANT CHANGE UP (ref 7–14)
BASOPHILS # BLD AUTO: 0.05 K/UL — SIGNIFICANT CHANGE UP (ref 0–0.2)
BASOPHILS NFR BLD AUTO: 0.5 % — SIGNIFICANT CHANGE UP (ref 0–2)
BUN SERPL-MCNC: 15 MG/DL — SIGNIFICANT CHANGE UP (ref 7–23)
CALCIUM SERPL-MCNC: 7.9 MG/DL — LOW (ref 8.4–10.5)
CHLORIDE SERPL-SCNC: 101 MMOL/L — SIGNIFICANT CHANGE UP (ref 98–107)
CO2 SERPL-SCNC: 24 MMOL/L — SIGNIFICANT CHANGE UP (ref 22–31)
CREAT SERPL-MCNC: 1.65 MG/DL — HIGH (ref 0.5–1.3)
EOSINOPHIL # BLD AUTO: 0.43 K/UL — SIGNIFICANT CHANGE UP (ref 0–0.5)
EOSINOPHIL NFR BLD AUTO: 4.1 % — SIGNIFICANT CHANGE UP (ref 0–6)
GLUCOSE BLDC GLUCOMTR-MCNC: 121 MG/DL — HIGH (ref 70–99)
GLUCOSE BLDC GLUCOMTR-MCNC: 128 MG/DL — HIGH (ref 70–99)
GLUCOSE BLDC GLUCOMTR-MCNC: 152 MG/DL — HIGH (ref 70–99)
GLUCOSE BLDC GLUCOMTR-MCNC: 171 MG/DL — HIGH (ref 70–99)
GLUCOSE SERPL-MCNC: 150 MG/DL — HIGH (ref 70–99)
HCT VFR BLD CALC: 28.8 % — LOW (ref 39–50)
HGB BLD-MCNC: 9.2 G/DL — LOW (ref 13–17)
IMM GRANULOCYTES NFR BLD AUTO: 1.3 % — SIGNIFICANT CHANGE UP (ref 0–1.5)
LYMPHOCYTES # BLD AUTO: 1.43 K/UL — SIGNIFICANT CHANGE UP (ref 1–3.3)
LYMPHOCYTES # BLD AUTO: 13.6 % — SIGNIFICANT CHANGE UP (ref 13–44)
MAGNESIUM SERPL-MCNC: 1.7 MG/DL — SIGNIFICANT CHANGE UP (ref 1.6–2.6)
MCHC RBC-ENTMCNC: 29 PG — SIGNIFICANT CHANGE UP (ref 27–34)
MCHC RBC-ENTMCNC: 31.9 % — LOW (ref 32–36)
MCV RBC AUTO: 90.9 FL — SIGNIFICANT CHANGE UP (ref 80–100)
MONOCYTES # BLD AUTO: 1.02 K/UL — HIGH (ref 0–0.9)
MONOCYTES NFR BLD AUTO: 9.7 % — SIGNIFICANT CHANGE UP (ref 2–14)
NEUTROPHILS # BLD AUTO: 7.44 K/UL — HIGH (ref 1.8–7.4)
NEUTROPHILS NFR BLD AUTO: 70.8 % — SIGNIFICANT CHANGE UP (ref 43–77)
NRBC # FLD: 0 K/UL — SIGNIFICANT CHANGE UP (ref 0–0)
PHOSPHATE SERPL-MCNC: 2.7 MG/DL — SIGNIFICANT CHANGE UP (ref 2.5–4.5)
PLATELET # BLD AUTO: 327 K/UL — SIGNIFICANT CHANGE UP (ref 150–400)
PMV BLD: 9 FL — SIGNIFICANT CHANGE UP (ref 7–13)
POTASSIUM SERPL-MCNC: 3.8 MMOL/L — SIGNIFICANT CHANGE UP (ref 3.5–5.3)
POTASSIUM SERPL-SCNC: 3.8 MMOL/L — SIGNIFICANT CHANGE UP (ref 3.5–5.3)
RBC # BLD: 3.17 M/UL — LOW (ref 4.2–5.8)
RBC # FLD: 14.2 % — SIGNIFICANT CHANGE UP (ref 10.3–14.5)
SODIUM SERPL-SCNC: 135 MMOL/L — SIGNIFICANT CHANGE UP (ref 135–145)
WBC # BLD: 10.51 K/UL — HIGH (ref 3.8–10.5)
WBC # FLD AUTO: 10.51 K/UL — HIGH (ref 3.8–10.5)

## 2019-07-07 PROCEDURE — 99232 SBSQ HOSP IP/OBS MODERATE 35: CPT

## 2019-07-07 RX ORDER — MULTIVIT-MIN/FERROUS GLUCONATE 9 MG/15 ML
15 LIQUID (ML) ORAL DAILY
Refills: 0 | Status: DISCONTINUED | OUTPATIENT
Start: 2019-07-07 | End: 2019-07-07

## 2019-07-07 RX ADMIN — Medication 2: at 09:04

## 2019-07-07 RX ADMIN — INSULIN GLARGINE 8 UNIT(S): 100 INJECTION, SOLUTION SUBCUTANEOUS at 22:06

## 2019-07-07 RX ADMIN — Medication 50 MILLIGRAM(S): at 22:05

## 2019-07-07 RX ADMIN — HYDROMORPHONE HYDROCHLORIDE 0.5 MILLIGRAM(S): 2 INJECTION INTRAMUSCULAR; INTRAVENOUS; SUBCUTANEOUS at 01:29

## 2019-07-07 RX ADMIN — Medication 50 MILLIGRAM(S): at 05:24

## 2019-07-07 RX ADMIN — HYDROMORPHONE HYDROCHLORIDE 0.5 MILLIGRAM(S): 2 INJECTION INTRAMUSCULAR; INTRAVENOUS; SUBCUTANEOUS at 01:09

## 2019-07-07 RX ADMIN — Medication 2: at 17:51

## 2019-07-07 RX ADMIN — Medication 3 MILLIGRAM(S): at 22:05

## 2019-07-07 RX ADMIN — CARVEDILOL PHOSPHATE 6.25 MILLIGRAM(S): 80 CAPSULE, EXTENDED RELEASE ORAL at 05:24

## 2019-07-07 RX ADMIN — Medication 50 MILLIGRAM(S): at 13:03

## 2019-07-07 RX ADMIN — PIPERACILLIN AND TAZOBACTAM 25 GRAM(S): 4; .5 INJECTION, POWDER, LYOPHILIZED, FOR SOLUTION INTRAVENOUS at 22:05

## 2019-07-07 RX ADMIN — CYCLOBENZAPRINE HYDROCHLORIDE 5 MILLIGRAM(S): 10 TABLET, FILM COATED ORAL at 22:05

## 2019-07-07 RX ADMIN — PANTOPRAZOLE SODIUM 40 MILLIGRAM(S): 20 TABLET, DELAYED RELEASE ORAL at 05:24

## 2019-07-07 RX ADMIN — PIPERACILLIN AND TAZOBACTAM 25 GRAM(S): 4; .5 INJECTION, POWDER, LYOPHILIZED, FOR SOLUTION INTRAVENOUS at 13:03

## 2019-07-07 RX ADMIN — HYDROMORPHONE HYDROCHLORIDE 0.5 MILLIGRAM(S): 2 INJECTION INTRAMUSCULAR; INTRAVENOUS; SUBCUTANEOUS at 12:10

## 2019-07-07 RX ADMIN — CARVEDILOL PHOSPHATE 6.25 MILLIGRAM(S): 80 CAPSULE, EXTENDED RELEASE ORAL at 17:16

## 2019-07-07 RX ADMIN — ATORVASTATIN CALCIUM 40 MILLIGRAM(S): 80 TABLET, FILM COATED ORAL at 22:05

## 2019-07-07 RX ADMIN — PIPERACILLIN AND TAZOBACTAM 25 GRAM(S): 4; .5 INJECTION, POWDER, LYOPHILIZED, FOR SOLUTION INTRAVENOUS at 05:28

## 2019-07-07 RX ADMIN — Medication 100 MILLIGRAM(S): at 13:03

## 2019-07-07 RX ADMIN — HEPARIN SODIUM 5000 UNIT(S): 5000 INJECTION INTRAVENOUS; SUBCUTANEOUS at 05:24

## 2019-07-07 RX ADMIN — HYDROMORPHONE HYDROCHLORIDE 0.5 MILLIGRAM(S): 2 INJECTION INTRAMUSCULAR; INTRAVENOUS; SUBCUTANEOUS at 11:38

## 2019-07-07 RX ADMIN — Medication 81 MILLIGRAM(S): at 13:03

## 2019-07-07 RX ADMIN — CYCLOBENZAPRINE HYDROCHLORIDE 5 MILLIGRAM(S): 10 TABLET, FILM COATED ORAL at 13:03

## 2019-07-07 NOTE — PROGRESS NOTE ADULT - SUBJECTIVE AND OBJECTIVE BOX
Team D Surgery Progress Note     SUBJECTIVE / 24H EVENTS  Patient seen and examined on morning rounds. No acute events overnight. His YUMIKO drain has been putting out some chyle looking fliod. TG was 690, amylase was 7500. He is still having pain on the right side of his chest but otherwise is recovering slowly.     OBJECTIVE:    Vital Signs Last 24 Hrs  T(C): 36.9 (07 Jul 2019 16:25), Max: 37.4 (07 Jul 2019 09:00)  T(F): 98.5 (07 Jul 2019 16:25), Max: 99.3 (07 Jul 2019 09:00)  HR: 76 (07 Jul 2019 16:25) (64 - 76)  BP: 138/60 (07 Jul 2019 16:25) (128/62 - 154/70)  BP(mean): 82 (07 Jul 2019 11:41) (82 - 82)  RR: 20 (07 Jul 2019 16:25) (16 - 20)  SpO2: 98% (07 Jul 2019 16:25) (95% - 100%)      PHYSICAL EXAM:  Gen: NAD  LS: Respirations unlabored  Card: RRR  GI: Soft. Nontender. Nondistended, YUMIKO drainage with white fluid in YUMIKO  Ext: Warm, well perfused        06 Jul 2019 07:01  -  07 Jul 2019 07:00  --------------------------------------------------------  IN:    Enteral Tube Flush: 30 mL    Oral Fluid: 360 mL  Total IN: 390 mL    OUT:    Bulb: 37.5 mL    Voided: 550 mL  Total OUT: 587.5 mL    Total NET: -197.5 mL      07 Jul 2019 07:01  -  07 Jul 2019 19:10  --------------------------------------------------------  IN:  Total IN: 0 mL    OUT:    Bulb: 1 mL    Voided: 525 mL  Total OUT: 526 mL    Total NET: -526 mL        LABS:      CBC Full  -  ( 07 Jul 2019 05:20 )  WBC Count : 10.51 K/uL  RBC Count : 3.17 M/uL  Hemoglobin : 9.2 g/dL  Hematocrit : 28.8 %  Platelet Count - Automated : 327 K/uL  Mean Cell Volume : 90.9 fL  Mean Cell Hemoglobin : 29.0 pg  Mean Cell Hemoglobin Concentration : 31.9 %  Auto Neutrophil # : 7.44 K/uL  Auto Lymphocyte # : 1.43 K/uL  Auto Monocyte # : 1.02 K/uL  Auto Eosinophil # : 0.43 K/uL  Auto Basophil # : 0.05 K/uL  Auto Neutrophil % : 70.8 %  Auto Lymphocyte % : 13.6 %  Auto Monocyte % : 9.7 %  Auto Eosinophil % : 4.1 %  Auto Basophil % : 0.5 %    07-07    135  |  101  |  15  ----------------------------<  150<H>  3.8   |  24  |  1.65<H>    Ca    7.9<L>      07 Jul 2019 05:20  Phos  2.7     07-07  Mg     1.7     07-07        MICROBIOLOGY:    Culture - Surg Site Aerob/Anaer w/Gm St (07.01.19 @ 18:45)    Gram Stain Wound:   WBC White Blood Cells  QNTY CELLS IN GRAM STAIN: FEW (2+)  GNR^Gram Neg Rods  QUANTITY OF BACTERIA SEEN: FEW (2+)  GPR^Gram Positive Rods  QUANTITY OF BACTERIA SEEN: FEW (2+)  GPCCH^Gram Pos Cocci in Chains  QUANTITY OF BACTERIA SEEN: RARE (1+)      Specimen Source: ABSCESS    Organism Identification: Escherichia coli  Klebsiella pneumoniae  Enterococcus faecalis    Organism: Escherichia coli    Organism: Klebsiella pneumoniae    Organism: Enterococcus faecalis    Method Type: NEGATIVE BOB 43    Method Type: NEGATIVE BOB 43    Method Type: POSITIVE BOB 29        RADIOLOGY:    No new radiographic images for review.    MEDICATIONS  (STANDING):  aspirin enteric coated 81 milliGRAM(s) Oral daily  atorvastatin 40 milliGRAM(s) Oral at bedtime  carvedilol 6.25 milliGRAM(s) Oral every 12 hours  chlorhexidine 4% Liquid 1 Application(s) Topical <User Schedule>  dextrose 5%. 1000 milliLiter(s) (50 mL/Hr) IV Continuous <Continuous>  dextrose 50% Injectable 12.5 Gram(s) IV Push once  dextrose 50% Injectable 25 Gram(s) IV Push once  dextrose 50% Injectable 25 Gram(s) IV Push once  docusate sodium 100 milliGRAM(s) Oral daily  heparin  Injectable 5000 Unit(s) SubCutaneous every 8 hours  hydrALAZINE 50 milliGRAM(s) Oral three times a day  insulin glargine Injectable (LANTUS) 8 Unit(s) SubCutaneous at bedtime  insulin lispro (HumaLOG) corrective regimen sliding scale   SubCutaneous Before meals and at bedtime  melatonin 3 milliGRAM(s) Oral <User Schedule>  ondansetron Injectable 4 milliGRAM(s) IV Push once  pantoprazole    Tablet 40 milliGRAM(s) Oral before breakfast  piperacillin/tazobactam IVPB.. 3.375 Gram(s) IV Intermittent every 8 hours    MEDICATIONS  (PRN):  bisacodyl Suppository 10 milliGRAM(s) Rectal daily PRN Constipation  cyclobenzaprine 5 milliGRAM(s) Oral three times a day PRN Muscle Spasm  dextrose 40% Gel 15 Gram(s) Oral once PRN Blood Glucose LESS THAN 70 milliGRAM(s)/deciLiter  glucagon  Injectable 1 milliGRAM(s) IntraMuscular once PRN Glucose <70 milliGRAM(s)/deciLiter  HYDROmorphone  Injectable 0.5 milliGRAM(s) IV Push every 3 hours PRN Severe Pain (7 - 10)         Assessment and Plan:   64 y/o M w/ PMHx of CAD s/p 9 stents, DMT2 on insulin, HTN, GERD, diverticulosis, former smoker (30 PY), s/p cholecystectomy, biliary stricture s/p ERCP with sphincterotomy and stent (4/2019), and cholangiocarcinoma now s/p open Whipple and feeding j-tube placement on 6/18. Patient returned to the hospital with a pneumothorax and abdominal fluid collection s/p pigtail chest tube and IR drainage of abdominal collection    Plan:    - low fat diet diet, calorie count  - IV antibiotics meropenem changed to Zosyn as per ID  - Continue YUMIKO drain output  - DVT ppx: SQH  - ISS  - F/U ID recommendations, possible CT A&P monday to evaluate interval change in abdominal collection     Patient seen and examined with Dr. English

## 2019-07-07 NOTE — PROGRESS NOTE ADULT - ASSESSMENT
63yr old man,  former smoker(30PY) with PMH CAD s/p 9 stents, DMT2 on insulin, HTN, GERD, prior cholecystectomy, diverticulosis, biliary stricture s/p ERCP with sphincterotomy and stent 4/2019 c/b ascending cholangitis/septic shock from E.Coli bacteremia requiring pressor support and initiation of HD, recent Whipple surgery for cholangiocarcinoma on 6/18/19 and discharged 6/26 presents with nausea, vomiting, and fever since discharge. Found to have sepsis in setting of left sided PTX and paraduodenal collection/abscess

## 2019-07-07 NOTE — PROGRESS NOTE ADULT - PROBLEM SELECTOR PLAN 4
FS controlled  Last A1C: 5.6 in 6/2019, advance PO diet as tolerated, also has + Jtube  FS controlled on Lantus 8 U qhs, SSI premeal

## 2019-07-07 NOTE — PROGRESS NOTE ADULT - PROBLEM SELECTOR PLAN 1
Met sepsis criteria on admission, with leukocytosis and fever s/p Whipples 6/18/19  CT Abd with paraduodenal collection, consistent with abscess s/p IR drainage. Abscess culture growing Ecoli, Efaecalis, and Klebsiella. c/w Zosyn per ID recs. Duration TBD by ID. Repeat CT A/P given continued biliary drain output  6/30 Bcx NGTD, Ucx >3 organisms  Had recent E coli and Citrobacter bacteremia presumed 2/2 biliary source, s/p ERCP 5/19/19 notable for bile in the duodenum and additional placement of plastic biliary stent alongside prior stent. Met sepsis criteria on admission, with leukocytosis and fever s/p Whipples 6/18/19  CT Abd with paraduodenal collection, consistent with abscess s/p IR drainage. Abscess culture growing Ecoli, Efaecalis, and Klebsiella. c/w Zosyn per ID recs. Duration TBD by ID. Repeat CT A/Pto check for improvement in abd collection  6/30 Bcx NGTD, Ucx >3 organisms  Had recent E coli and Citrobacter bacteremia presumed 2/2 biliary source, s/p ERCP 5/19/19 notable for bile in the duodenum and additional placement of plastic biliary stent alongside prior stent.

## 2019-07-07 NOTE — PROGRESS NOTE ADULT - SUBJECTIVE AND OBJECTIVE BOX
INCOMPLETE CHIEF COMPLAINT: f/u     SUBJECTIVE / OVERNIGHT EVENTS: Patient seen and examined. No acute events overnight. Denies chest pain, SOB, N/V/D. Sleeping well.    MEDICATIONS  (STANDING):  aspirin enteric coated 81 milliGRAM(s) Oral daily  atorvastatin 40 milliGRAM(s) Oral at bedtime  carvedilol 6.25 milliGRAM(s) Oral every 12 hours  chlorhexidine 4% Liquid 1 Application(s) Topical <User Schedule>  dextrose 5%. 1000 milliLiter(s) (50 mL/Hr) IV Continuous <Continuous>  dextrose 50% Injectable 12.5 Gram(s) IV Push once  dextrose 50% Injectable 25 Gram(s) IV Push once  dextrose 50% Injectable 25 Gram(s) IV Push once  docusate sodium 100 milliGRAM(s) Oral daily  heparin  Injectable 5000 Unit(s) SubCutaneous every 8 hours  hydrALAZINE 50 milliGRAM(s) Oral three times a day  insulin glargine Injectable (LANTUS) 8 Unit(s) SubCutaneous at bedtime  insulin lispro (HumaLOG) corrective regimen sliding scale   SubCutaneous Before meals and at bedtime  melatonin 3 milliGRAM(s) Oral <User Schedule>  ondansetron Injectable 4 milliGRAM(s) IV Push once  pantoprazole    Tablet 40 milliGRAM(s) Oral before breakfast  piperacillin/tazobactam IVPB.. 3.375 Gram(s) IV Intermittent every 8 hours    MEDICATIONS  (PRN):  bisacodyl Suppository 10 milliGRAM(s) Rectal daily PRN Constipation  cyclobenzaprine 5 milliGRAM(s) Oral three times a day PRN Muscle Spasm  dextrose 40% Gel 15 Gram(s) Oral once PRN Blood Glucose LESS THAN 70 milliGRAM(s)/deciLiter  glucagon  Injectable 1 milliGRAM(s) IntraMuscular once PRN Glucose <70 milliGRAM(s)/deciLiter  HYDROmorphone  Injectable 0.5 milliGRAM(s) IV Push every 3 hours PRN Severe Pain (7 - 10)      VITALS:  T(F): 97.9 (07-07-19 @ 11:41), Max: 99.3 (07-07-19 @ 09:00)  HR: 64 (07-07-19 @ 11:41) (64 - 71)  BP: 146/64 (07-07-19 @ 11:41) (128/62 - 154/70)  RR: 16 (07-07-19 @ 11:41) (16 - 20)  SpO2: 100% (07-07-19 @ 11:41)  Wt(kg): --      CAPILLARY BLOOD GLUCOSE      PHYSICAL EXAM:  GENERAL: NAD, on room air  HEENT: sclera clear  CHEST/RESPIRATORY: CTA b/l, gauze of previous site of HD catheter on right chest C/D/I, left sided chest tube no longer present: gauze C/D/I  CARDIOVASCULAR: s1/s2, no murmurs appreciated  GASTROINTESTINAL: Soft, nontender, Nondistended; BS present, midline abdominal scar (staples no longer present), + jejunostomy tube, + YUMIKO drain with no fluid  EXTREMITIES: WWP, no edema b/l  NERVOUS SYSTEM:  awake, alert, responds to Qs appropriately    LABS:              9.2                  135  | 24   | 15           10.51 >-----------< 327     ------------------------< 150                   28.8                 3.8  | 101  | 1.65                                         Ca 7.9   Mg 1.7   Ph 2.7                        MICROBIOLOGY:        RADIOLOGY & ADDITIONAL TESTS:  Imaging Personally Reviewed: [ ] Yes    [ ] Consultant(s) Notes Reviewed:  [x] Care Discussed with Consultants/Other Providers:

## 2019-07-07 NOTE — PROGRESS NOTE ADULT - PROBLEM SELECTOR PLAN 3
Cr downtrending from 3.25 on admission, hx of CKD IV, Cr slightly increased to 1.65  Had ATN prior admission and briefly required HD, discharged on 6/26 with Cr of 2.27. No indication for RRT at this time. HD catheter removed on 7/3  encourage PO hydration, home torsemide on hold  Renal following.

## 2019-07-08 ENCOUNTER — APPOINTMENT (OUTPATIENT)
Dept: SURGICAL ONCOLOGY | Facility: CLINIC | Age: 64
End: 2019-07-08

## 2019-07-08 LAB
ANION GAP SERPL CALC-SCNC: 12 MMO/L — SIGNIFICANT CHANGE UP (ref 7–14)
BUN SERPL-MCNC: 13 MG/DL — SIGNIFICANT CHANGE UP (ref 7–23)
CALCIUM SERPL-MCNC: 8.2 MG/DL — LOW (ref 8.4–10.5)
CHLORIDE SERPL-SCNC: 102 MMOL/L — SIGNIFICANT CHANGE UP (ref 98–107)
CO2 SERPL-SCNC: 22 MMOL/L — SIGNIFICANT CHANGE UP (ref 22–31)
CREAT SERPL-MCNC: 1.65 MG/DL — HIGH (ref 0.5–1.3)
GLUCOSE BLDC GLUCOMTR-MCNC: 122 MG/DL — HIGH (ref 70–99)
GLUCOSE BLDC GLUCOMTR-MCNC: 123 MG/DL — HIGH (ref 70–99)
GLUCOSE BLDC GLUCOMTR-MCNC: 161 MG/DL — HIGH (ref 70–99)
GLUCOSE BLDC GLUCOMTR-MCNC: 165 MG/DL — HIGH (ref 70–99)
GLUCOSE SERPL-MCNC: 128 MG/DL — HIGH (ref 70–99)
HCT VFR BLD CALC: 28.5 % — LOW (ref 39–50)
HGB BLD-MCNC: 9.3 G/DL — LOW (ref 13–17)
MAGNESIUM SERPL-MCNC: 1.7 MG/DL — SIGNIFICANT CHANGE UP (ref 1.6–2.6)
MCHC RBC-ENTMCNC: 29.4 PG — SIGNIFICANT CHANGE UP (ref 27–34)
MCHC RBC-ENTMCNC: 32.6 % — SIGNIFICANT CHANGE UP (ref 32–36)
MCV RBC AUTO: 90.2 FL — SIGNIFICANT CHANGE UP (ref 80–100)
NRBC # FLD: 0 K/UL — SIGNIFICANT CHANGE UP (ref 0–0)
PHOSPHATE SERPL-MCNC: 2.5 MG/DL — SIGNIFICANT CHANGE UP (ref 2.5–4.5)
PLATELET # BLD AUTO: 310 K/UL — SIGNIFICANT CHANGE UP (ref 150–400)
PMV BLD: 9 FL — SIGNIFICANT CHANGE UP (ref 7–13)
POTASSIUM SERPL-MCNC: 3.9 MMOL/L — SIGNIFICANT CHANGE UP (ref 3.5–5.3)
POTASSIUM SERPL-SCNC: 3.9 MMOL/L — SIGNIFICANT CHANGE UP (ref 3.5–5.3)
RBC # BLD: 3.16 M/UL — LOW (ref 4.2–5.8)
RBC # FLD: 14.3 % — SIGNIFICANT CHANGE UP (ref 10.3–14.5)
SODIUM SERPL-SCNC: 136 MMOL/L — SIGNIFICANT CHANGE UP (ref 135–145)
SPECIMEN SOURCE: SIGNIFICANT CHANGE UP
WBC # BLD: 8.74 K/UL — SIGNIFICANT CHANGE UP (ref 3.8–10.5)
WBC # FLD AUTO: 8.74 K/UL — SIGNIFICANT CHANGE UP (ref 3.8–10.5)

## 2019-07-08 PROCEDURE — 99232 SBSQ HOSP IP/OBS MODERATE 35: CPT | Mod: GC

## 2019-07-08 PROCEDURE — 99233 SBSQ HOSP IP/OBS HIGH 50: CPT

## 2019-07-08 PROCEDURE — 99232 SBSQ HOSP IP/OBS MODERATE 35: CPT

## 2019-07-08 PROCEDURE — 74176 CT ABD & PELVIS W/O CONTRAST: CPT | Mod: 26

## 2019-07-08 RX ORDER — MAGNESIUM SULFATE 500 MG/ML
2 VIAL (ML) INJECTION ONCE
Refills: 0 | Status: COMPLETED | OUTPATIENT
Start: 2019-07-08 | End: 2019-07-08

## 2019-07-08 RX ORDER — POTASSIUM CHLORIDE 20 MEQ
40 PACKET (EA) ORAL ONCE
Refills: 0 | Status: COMPLETED | OUTPATIENT
Start: 2019-07-08 | End: 2019-07-08

## 2019-07-08 RX ADMIN — HEPARIN SODIUM 5000 UNIT(S): 5000 INJECTION INTRAVENOUS; SUBCUTANEOUS at 21:17

## 2019-07-08 RX ADMIN — HYDROMORPHONE HYDROCHLORIDE 0.5 MILLIGRAM(S): 2 INJECTION INTRAMUSCULAR; INTRAVENOUS; SUBCUTANEOUS at 18:29

## 2019-07-08 RX ADMIN — HEPARIN SODIUM 5000 UNIT(S): 5000 INJECTION INTRAVENOUS; SUBCUTANEOUS at 14:05

## 2019-07-08 RX ADMIN — INSULIN GLARGINE 8 UNIT(S): 100 INJECTION, SOLUTION SUBCUTANEOUS at 21:43

## 2019-07-08 RX ADMIN — Medication 40 MILLIEQUIVALENT(S): at 10:36

## 2019-07-08 RX ADMIN — PIPERACILLIN AND TAZOBACTAM 25 GRAM(S): 4; .5 INJECTION, POWDER, LYOPHILIZED, FOR SOLUTION INTRAVENOUS at 05:16

## 2019-07-08 RX ADMIN — Medication 50 MILLIGRAM(S): at 05:16

## 2019-07-08 RX ADMIN — Medication 81 MILLIGRAM(S): at 12:29

## 2019-07-08 RX ADMIN — Medication 50 GRAM(S): at 10:38

## 2019-07-08 RX ADMIN — ATORVASTATIN CALCIUM 40 MILLIGRAM(S): 80 TABLET, FILM COATED ORAL at 21:17

## 2019-07-08 RX ADMIN — Medication 1 TABLET(S): at 12:29

## 2019-07-08 RX ADMIN — PIPERACILLIN AND TAZOBACTAM 25 GRAM(S): 4; .5 INJECTION, POWDER, LYOPHILIZED, FOR SOLUTION INTRAVENOUS at 23:44

## 2019-07-08 RX ADMIN — PANTOPRAZOLE SODIUM 40 MILLIGRAM(S): 20 TABLET, DELAYED RELEASE ORAL at 05:16

## 2019-07-08 RX ADMIN — Medication 50 MILLIGRAM(S): at 14:05

## 2019-07-08 RX ADMIN — PIPERACILLIN AND TAZOBACTAM 25 GRAM(S): 4; .5 INJECTION, POWDER, LYOPHILIZED, FOR SOLUTION INTRAVENOUS at 16:09

## 2019-07-08 RX ADMIN — Medication 2: at 21:43

## 2019-07-08 RX ADMIN — Medication 2: at 12:28

## 2019-07-08 RX ADMIN — CARVEDILOL PHOSPHATE 6.25 MILLIGRAM(S): 80 CAPSULE, EXTENDED RELEASE ORAL at 05:16

## 2019-07-08 RX ADMIN — CARVEDILOL PHOSPHATE 6.25 MILLIGRAM(S): 80 CAPSULE, EXTENDED RELEASE ORAL at 18:23

## 2019-07-08 RX ADMIN — Medication 50 MILLIGRAM(S): at 21:17

## 2019-07-08 RX ADMIN — Medication 100 MILLIGRAM(S): at 12:30

## 2019-07-08 RX ADMIN — CYCLOBENZAPRINE HYDROCHLORIDE 5 MILLIGRAM(S): 10 TABLET, FILM COATED ORAL at 14:08

## 2019-07-08 RX ADMIN — Medication 3 MILLIGRAM(S): at 21:17

## 2019-07-08 NOTE — PROGRESS NOTE ADULT - SUBJECTIVE AND OBJECTIVE BOX
f/u abdominal abscess    Interval History/ROS:  no fever.  feels better.  no abdominal pain.  some drainage from YUMIKO.  Remainder of ROS otherwise negative.    PAST MEDICAL & SURGICAL HISTORY:  Type II diabetes mellitus  HTN (hypertension)  CAD (coronary artery disease)  S/P cholecystectomy    Allergies  Cipro (Rash)  Plavix (Rash)    ANTIMICROBIALS:    meropenem  IVPB 500 every 12 hours (7/1-5)  piperacillin/tazobactam IVPB.. 3.375 every 8 hours (7/5-)    MEDICATIONS  (STANDING):  aspirin enteric coated 81 daily  atorvastatin 40 at bedtime  carvedilol 6.25 every 12 hours  docusate sodium 100 daily  heparin  Injectable 5000 every 8 hours  hydrALAZINE 50 three times a day  insulin glargine Injectable (LANTUS) 8 at bedtime  insulin lispro (HumaLOG) corrective regimen sliding scale  Before meals and at bedtime  melatonin 3 <User Schedule>  ondansetron Injectable 4 once  pantoprazole    Tablet 40 before breakfast    Vital Signs Last 24 Hrs  T(F): 98.3 (07-08-19 @ 11:54), Max: 98.5 (07-07-19 @ 16:25)  HR: 68 (07-08-19 @ 11:54)  BP: 143/58 (07-08-19 @ 11:54)  RR: 18 (07-08-19 @ 11:54)  SpO2: 99% (07-08-19 @ 11:54) (98% - 100%)  Wt(kg): --    PHYSICAL EXAM:  General: non-toxic  HEENT: anicteric  Cardiovascular:   S1, S2  Respiratory:  clear bilaterally  GI:  soft, non-tender, normal bowel sounds, +J-tube, +IR drain with thick, cloudy purulent drainage  Musculoskeletal:  no synovitis  Neurologic:  grossly non-focal  Skin:  no rash  Psychiatric:  appropriate affect  Vascular:  no phlebitis                        9.3    8.74  )-----------( 310      ( 08 Jul 2019 07:40 )             28.5 07-08    136  |  102  |  13  ----------------------------<  128  3.9   |  22  |  1.65  Ca    8.2      08 Jul 2019 07:40Phos  2.5     07-08Mg     1.7     07-08       MICROBIOLOGY:  Culture - Yeast and Fungus (collected 01 Jul 2019 18:45)  Source: ABSCESS  Preliminary Report (03 Jul 2019 09:09):    CULTURE NEGATIVE FOR YEASTS AND MOLDS---- PRELIMINARY    CULTURE NEGATIVE FOR YEASTS AND MOLDS AFTER 1 DAY    Culture - Surg Site Aerob/Anaer w/Gm St (collected 01 Jul 2019 18:45)  Source: ABSCESS  Preliminary Report (04 Jul 2019 13:57):    MODERATE  Organism: Escherichia coli  Klebsiella pneumoniae  Enterococcus faecalis (04 Jul 2019 13:57)  Organism: Enterococcus faecalis (04 Jul 2019 13:57)  Organism: Klebsiella pneumoniae (04 Jul 2019 13:57)      -  Amikacin: S <=8 BOB      -  Ampicillin: R >16 BOB      -  Ampicillin/Sulbactam: S 8/4 BOB      -  Aztreonam: S <=4 BOB      -  Cefazolin: S <=2 BOB      -  Cefepime: S <=2 BOB      -  Cefoxitin: S <=4 BOB      -  Ceftazidime: S <=1 BOB      -  Ceftriaxone: S <=1 BOB      -  Ciprofloxacin: S <=0.5 BOB      -  Ertapenem: S <=0.5 BOB      -  Gentamicin: S <=1 BOB      -  Imipenem: S <=1 BOB      -  Levofloxacin: S <=1 BOB      -  Meropenem: S <=1 BOB      -  Piperacillin/Tazobactam: S <=8 BOB      -  Tigecycline: S      -  Tobramycin: S <=2 BOB      -  Trimethoprim/Sulfamethoxazole: S <=0.5/9.5 BOB      Method Type: NEGATIVE BOB 43  Organism: Escherichia coli (04 Jul 2019 13:57)    Culture - Acid Fast Smear Concentrated (collected 01 Jul 2019 18:45)  Source: ABSCESS  Final Report (02 Jul 2019 14:54):    AFB SMEAR= NO ACID FAST BACILLI SEEN    Culture - Urine (collected 01 Jul 2019 01:32)  Source: URINE MIDSTREAM  Final Report (02 Jul 2019 10:04):    Culture grew 3 or more types of organisms which indicate    collection contamination; consider recollection only if    clinically indicated.    Culture - Blood (collected 30 Jun 2019 23:14)  Source: BLOOD PERIPHERAL  Preliminary Report (03 Jul 2019 23:15):    NO ORGANISMS ISOLATED    NO ORGANISMS ISOLATED AT 72 HRS.    Culture - Blood (collected 30 Jun 2019 23:12)  Source: BLOOD VENOUS  Preliminary Report (03 Jul 2019 23:13):    NO ORGANISMS ISOLATED    NO ORGANISMS ISOLATED AT 72 HRS.    RADIOLOGY:  imaging below personally reviewed    CT Abdomen and Pelvis w/ Oral Cont (07.01.19 @ 04:22) >  IMPRESSION:  Status post recent Whipple procedure. High density amorphous collection adjacent to the duodenal stump with loculated foci of air and fluid in the right upper quadrant for which considerations include postoperative hematoma and air, leakage of contrast and air from the duodenal stump, or developing collection/abscess.  Left-sided pigtail chest tube. Trace left pneumothorax along the anterior chest wall.

## 2019-07-08 NOTE — PROGRESS NOTE ADULT - PROBLEM SELECTOR PLAN 1
Pt. with SLOAN, likely hemodynamically mediated in setting of decreased oral intake, GI fluid loss and ? infection. Scr was 2.26 on 6/26/19, increased to 3.25 on 6/30. Scr is 1.65 today (7/8/2019). Monitor labs and urine output. Avoid any potential nephrotoxins. Pt. with SLOAN, likely hemodynamically mediated in setting of decreased oral intake, GI fluid loss and ? infection. Scr was 2.26 on 6/26/19, increased to 3.25 on 6/30. Scr is 1.65 today (7/8/2019). Monitor labs and urine output. Avoid any potential nephrotoxins

## 2019-07-08 NOTE — PROGRESS NOTE ADULT - ASSESSMENT
63M recently dx cholangiocarcinoma s/p whipple 6/18/19 p/w n/v, abd pain found to have abd abscess s/p IR drainage, stay c/b L pneumothorax now resolved s/p chest tube placement.  Abscess is polymicrobial.  Fever/leukocytosis better.  Possible d/c on zosyn.  But to obtain f/u CT to assess for resolution of the collection    Suggest:  - c/w zosyn pending repeat CT  - f/u CT  - likely 2-4 weeks antibiotics until collection resolved    I have discussed plan of care with consulting team [id number]

## 2019-07-08 NOTE — PROGRESS NOTE ADULT - SUBJECTIVE AND OBJECTIVE BOX
CHIEF COMPLAINT: f/u     SUBJECTIVE / OVERNIGHT EVENTS: Patient seen and examined. No acute events overnight. Pain controlled. Reports biliary drain output is much improved. No chest pain, SOB, N/V/D.    MEDICATIONS  (STANDING):  aspirin enteric coated 81 milliGRAM(s) Oral daily  atorvastatin 40 milliGRAM(s) Oral at bedtime  carvedilol 6.25 milliGRAM(s) Oral every 12 hours  chlorhexidine 4% Liquid 1 Application(s) Topical <User Schedule>  dextrose 5%. 1000 milliLiter(s) (50 mL/Hr) IV Continuous <Continuous>  dextrose 50% Injectable 12.5 Gram(s) IV Push once  dextrose 50% Injectable 25 Gram(s) IV Push once  dextrose 50% Injectable 25 Gram(s) IV Push once  docusate sodium 100 milliGRAM(s) Oral daily  heparin  Injectable 5000 Unit(s) SubCutaneous every 8 hours  hydrALAZINE 50 milliGRAM(s) Oral three times a day  insulin glargine Injectable (LANTUS) 8 Unit(s) SubCutaneous at bedtime  insulin lispro (HumaLOG) corrective regimen sliding scale   SubCutaneous Before meals and at bedtime  melatonin 3 milliGRAM(s) Oral <User Schedule>  multivitamin 1 Tablet(s) Oral daily  ondansetron Injectable 4 milliGRAM(s) IV Push once  pantoprazole    Tablet 40 milliGRAM(s) Oral before breakfast  piperacillin/tazobactam IVPB.. 3.375 Gram(s) IV Intermittent every 8 hours    MEDICATIONS  (PRN):  bisacodyl Suppository 10 milliGRAM(s) Rectal daily PRN Constipation  cyclobenzaprine 5 milliGRAM(s) Oral three times a day PRN Muscle Spasm  dextrose 40% Gel 15 Gram(s) Oral once PRN Blood Glucose LESS THAN 70 milliGRAM(s)/deciLiter  glucagon  Injectable 1 milliGRAM(s) IntraMuscular once PRN Glucose <70 milliGRAM(s)/deciLiter  HYDROmorphone  Injectable 0.5 milliGRAM(s) IV Push every 3 hours PRN Severe Pain (7 - 10)      VITALS:  T(F): 98.3 (07-08-19 @ 11:54), Max: 98.5 (07-07-19 @ 16:25)  HR: 68 (07-08-19 @ 11:54) (68 - 85)  BP: 143/58 (07-08-19 @ 11:54) (136/58 - 162/64)  RR: 18 (07-08-19 @ 11:54) (17 - 20)  SpO2: 99% (07-08-19 @ 11:54)  Wt(kg): --      CAPILLARY BLOOD GLUCOSE      PHYSICAL EXAM:  GENERAL: NAD, on room air  HEENT: sclera clear  CHEST/RESPIRATORY: CTA b/l, gauze of previous site of HD catheter on right chest C/D/I, left sided chest tube no longer present: gauze C/D/I  CARDIOVASCULAR: s1/s2, no murmurs appreciated  GASTROINTESTINAL: Soft, nontender, Nondistended; BS present, midline abdominal scar (staples no longer present), + jejunostomy tube, + YUMIKO drain with no fluid  EXTREMITIES: WWP, no edema b/l  NERVOUS SYSTEM:  awake, alert, responds to Qs appropriately    LABS:              9.3                  136  | 22   | 13           8.74  >-----------< 310     ------------------------< 128                   28.5                 3.9  | 102  | 1.65                                         Ca 8.2   Mg 1.7   Ph 2.5        MICROBIOLOGY:        RADIOLOGY & ADDITIONAL TESTS:  Imaging Personally Reviewed: [ ] Yes    [ ] Consultant(s) Notes Reviewed:  [x] Care Discussed with Consultants/Other Providers:

## 2019-07-08 NOTE — PROGRESS NOTE ADULT - SUBJECTIVE AND OBJECTIVE BOX
[facility] DIVISION OF KIDNEY DISEASES AND HYPERTENSION -- FOLLOW UP NOTE  --------------------------------------------------------------------------------  HPI: 63-year-old male with PMH significant for DM, HTN, CAD, SLOAN on CKD and recent Whipple's procedure presented to the ED after complaining of generalized malaise, nausea and vomiting. Patient was recently hospitalized at [facility] (6/18/19-6/26/19) for Whipple's procedure. Since his discharge, patient complained of increasing malaise and nausea with one episode of vomiting on 6/30/2019. Patient is known to our service as he required HD during previous 2 admissions at [facility] for oliguric SLOAN. Last HD at [facility] was on 6/24/19. Patient reports good urine output since discontinuing HD. Patient had HD catheter removed on 7/3/2019.     Patient evaluated at bedside, in no acute distress. Denies any new complaints at this time. Reports lower extremity swelling has been improving.    PAST HISTORY  --------------------------------------------------------------------------------  No significant changes to PMH, PSH, FHx, SHx, unless otherwise noted    ALLERGIES & MEDICATIONS  --------------------------------------------------------------------------------  Allergies    Cipro (Rash)  Plavix (Rash)    Intolerances      Standing Inpatient Medications  aspirin enteric coated 81 milliGRAM(s) Oral daily  atorvastatin 40 milliGRAM(s) Oral at bedtime  carvedilol 6.25 milliGRAM(s) Oral every 12 hours  chlorhexidine 4% Liquid 1 Application(s) Topical <User Schedule>  dextrose 5%. 1000 milliLiter(s) IV Continuous <Continuous>  dextrose 50% Injectable 12.5 Gram(s) IV Push once  dextrose 50% Injectable 25 Gram(s) IV Push once  dextrose 50% Injectable 25 Gram(s) IV Push once  docusate sodium 100 milliGRAM(s) Oral daily  heparin  Injectable 5000 Unit(s) SubCutaneous every 8 hours  hydrALAZINE 50 milliGRAM(s) Oral three times a day  insulin glargine Injectable (LANTUS) 8 Unit(s) SubCutaneous at bedtime  insulin lispro (HumaLOG) corrective regimen sliding scale   SubCutaneous Before meals and at bedtime  melatonin 3 milliGRAM(s) Oral <User Schedule>  multivitamin 1 Tablet(s) Oral daily  ondansetron Injectable 4 milliGRAM(s) IV Push once  pantoprazole    Tablet 40 milliGRAM(s) Oral before breakfast  piperacillin/tazobactam IVPB.. 3.375 Gram(s) IV Intermittent every 8 hours    REVIEW OF SYSTEMS  --------------------------------------------------------------------------------  Gen: No lethargy  Respiratory: No dyspnea  CV: No chest pain  GI: Right sided abdominal pain  MSK: No LE edema  Neuro: No dizziness  Heme: No bleeding    All other systems were reviewed and are negative, except as noted.    VITALS/PHYSICAL EXAM  --------------------------------------------------------------------------------  T(C): 36.6 (07-08-19 @ 10:12), Max: 36.9 (07-07-19 @ 16:25)  HR: 69 (07-08-19 @ 10:12) (64 - 85)  BP: 140/63 (07-08-19 @ 10:12) (136/58 - 162/64)  RR: 18 (07-08-19 @ 10:12) (16 - 20)  SpO2: 100% (07-08-19 @ 10:12) (98% - 100%)  Wt(kg): --        07-07-19 @ 07:01  -  07-08-19 @ 07:00  --------------------------------------------------------  IN: 510 mL / OUT: 1676 mL / NET: -1166 mL    07-08-19 @ 07:01  -  07-08-19 @ 11:20  --------------------------------------------------------  IN: 300 mL / OUT: 400 mL / NET: -100 mL        Physical Exam:  	Gen: Awake, alert              HEENT: No JVD  	Pulm: Fair air entry B/L, left chest tube seen+   	CV: RRR, S1S2; no rub  	Back: No sacral edema  	Abd: +BS, soft, healing midline surgical scar with staples present  	: No suprapubic tenderness  	LE: Warm, trace B/L edema+  	Skin: Warm      LABS/STUDIES  --------------------------------------------------------------------------------              9.3    8.74  >-----------<  310      [07-08-19 @ 07:40]              28.5     136  |  102  |  13  ----------------------------<  128      [07-08-19 @ 07:40]  3.9   |  22  |  1.65        Ca     8.2     [07-08-19 @ 07:40]      Mg     1.7     [07-08-19 @ 07:40]      Phos  2.5     [07-08-19 @ 07:40]      Creatinine Trend:  SCr 1.65 [07-08 @ 07:40]  SCr 1.65 [07-07 @ 05:20]  SCr 1.40 [07-06 @ 05:31]  SCr 1.40 [07-05 @ 06:08]  SCr 1.69 [07-04 @ 16:50] [facility] DIVISION OF KIDNEY DISEASES AND HYPERTENSION -- FOLLOW UP NOTE  --------------------------------------------------------------------------------  HPI: 63-year-old male with PMH significant for DM, HTN, CAD, SLOAN on CKD and recent Whipple's procedure presented to the ED after complaining of generalized malaise, nausea and vomiting. Patient was recently hospitalized at [facility] (6/18/19-6/26/19) for Whipple's procedure. Since his discharge, patient complained of increasing malaise and nausea with one episode of vomiting on 6/30/2019. Patient is known to our service as he required HD during previous 2 admissions at [facility] for oliguric SLOAN. Last HD at [facility] was on 6/24/19. Patient reports good urine output since discontinuing HD. Patient had HD catheter removed on 7/3/2019.     Patient evaluated at bedside, in no acute distress. Denies any new complaints at this time. Reports lower extremity swelling has been improving.    PAST HISTORY  --------------------------------------------------------------------------------  No significant changes to PMH, PSH, FHx, SHx, unless otherwise noted    ALLERGIES & MEDICATIONS  --------------------------------------------------------------------------------  Allergies    Cipro (Rash)  Plavix (Rash)    Intolerances    Standing Inpatient Medications  aspirin enteric coated 81 milliGRAM(s) Oral daily  atorvastatin 40 milliGRAM(s) Oral at bedtime  carvedilol 6.25 milliGRAM(s) Oral every 12 hours  chlorhexidine 4% Liquid 1 Application(s) Topical <User Schedule>  dextrose 5%. 1000 milliLiter(s) IV Continuous <Continuous>  dextrose 50% Injectable 12.5 Gram(s) IV Push once  dextrose 50% Injectable 25 Gram(s) IV Push once  dextrose 50% Injectable 25 Gram(s) IV Push once  docusate sodium 100 milliGRAM(s) Oral daily  heparin  Injectable 5000 Unit(s) SubCutaneous every 8 hours  hydrALAZINE 50 milliGRAM(s) Oral three times a day  insulin glargine Injectable (LANTUS) 8 Unit(s) SubCutaneous at bedtime  insulin lispro (HumaLOG) corrective regimen sliding scale   SubCutaneous Before meals and at bedtime  melatonin 3 milliGRAM(s) Oral <User Schedule>  multivitamin 1 Tablet(s) Oral daily  ondansetron Injectable 4 milliGRAM(s) IV Push once  pantoprazole    Tablet 40 milliGRAM(s) Oral before breakfast  piperacillin/tazobactam IVPB.. 3.375 Gram(s) IV Intermittent every 8 hours    REVIEW OF SYSTEMS  --------------------------------------------------------------------------------  Gen: No lethargy  Respiratory: No dyspnea  CV: No chest pain  GI: Right sided abdominal pain  MSK: No LE edema  Neuro: No dizziness  Heme: No bleeding    All other systems were reviewed and are negative, except as noted.    VITALS/PHYSICAL EXAM  --------------------------------------------------------------------------------  T(C): 36.6 (07-08-19 @ 10:12), Max: 36.9 (07-07-19 @ 16:25)  HR: 69 (07-08-19 @ 10:12) (64 - 85)  BP: 140/63 (07-08-19 @ 10:12) (136/58 - 162/64)  RR: 18 (07-08-19 @ 10:12) (16 - 20)  SpO2: 100% (07-08-19 @ 10:12) (98% - 100%)  Wt(kg): --    07-07-19 @ 07:01  -  07-08-19 @ 07:00  --------------------------------------------------------  IN: 510 mL / OUT: 1676 mL / NET: -1166 mL    07-08-19 @ 07:01  -  07-08-19 @ 11:20  --------------------------------------------------------  IN: 300 mL / OUT: 400 mL / NET: -100 mL    Physical Exam:  	Gen: Awake, alert              HEENT: No JVD  	Pulm: Fair air entry B/L, left chest tube seen+   	CV: RRR, S1S2; no rub  	Back: No sacral edema  	Abd: +BS, soft, healing midline surgical scar with staples present  	: No suprapubic tenderness  	LE: Warm, trace B/L edema+  	Skin: Warm    LABS/STUDIES  --------------------------------------------------------------------------------              9.3    8.74  >-----------<  310      [07-08-19 @ 07:40]              28.5     136  |  102  |  13  ----------------------------<  128      [07-08-19 @ 07:40]  3.9   |  22  |  1.65        Ca     8.2     [07-08-19 @ 07:40]      Mg     1.7     [07-08-19 @ 07:40]      Phos  2.5     [07-08-19 @ 07:40]    Creatinine Trend:  SCr 1.65 [07-08 @ 07:40]  SCr 1.65 [07-07 @ 05:20]  SCr 1.40 [07-06 @ 05:31]  SCr 1.40 [07-05 @ 06:08]  SCr 1.69 [07-04 @ 16:50]

## 2019-07-08 NOTE — PROGRESS NOTE ADULT - SUBJECTIVE AND OBJECTIVE BOX
Team D Surgery Progress Note     SUBJECTIVE / 24H EVENTS  Patient seen and examined on morning rounds. No acute events overnight. His YUMIKO drain is continuing to put out chylous fluid. He is still having pain on the right side of his chest but otherwise is recovering slowly.     OBJECTIVE:    Vital Signs Last 24 Hrs  T(C): 36.4 (08 Jul 2019 23:45), Max: 36.8 (08 Jul 2019 00:28)  T(F): 97.5 (08 Jul 2019 23:45), Max: 98.3 (08 Jul 2019 00:28)  HR: 69 (08 Jul 2019 23:45) (68 - 91)  BP: 152/63 (08 Jul 2019 23:45) (126/44 - 169/69)  BP(mean): --  RR: 16 (08 Jul 2019 23:45) (16 - 18)  SpO2: 100% (08 Jul 2019 23:45) (98% - 100%)      PHYSICAL EXAM:  Gen: NAD  LS: Respirations unlabored  Card: RRR  GI: Soft. Nontender. Nondistended, YUMIKO drainage with white fluid in YUMIKO  Ext: Warm, well perfused      I&O's Detail    07 Jul 2019 07:01  -  08 Jul 2019 07:00  --------------------------------------------------------  IN:    Enteral Tube Flush: 30 mL    Oral Fluid: 480 mL  Total IN: 510 mL    OUT:    Bulb: 1 mL    Voided: 1675 mL  Total OUT: 1676 mL    Total NET: -1166 mL      08 Jul 2019 07:01  -  08 Jul 2019 23:47  --------------------------------------------------------  IN:    Enteral Tube Flush: 15 mL    Oral Fluid: 780 mL  Total IN: 795 mL    OUT:    Voided: 1300 mL  Total OUT: 1300 mL    Total NET: -505 mL      LABS:    07-08    136  |  102  |  13  ----------------------------<  128<H>  3.9   |  22  |  1.65<H>    Ca    8.2<L>      08 Jul 2019 07:40  Phos  2.5     07-08  Mg     1.7     07-08                            9.3    8.74  )-----------( 310      ( 08 Jul 2019 07:40 )             28.5      MICROBIOLOGY:    Culture - Surg Site Aerob/Anaer w/Gm St (07.01.19 @ 18:45)    Gram Stain Wound:   WBC White Blood Cells  QNTY CELLS IN GRAM STAIN: FEW (2+)  GNR^Gram Neg Rods  QUANTITY OF BACTERIA SEEN: FEW (2+)  GPR^Gram Positive Rods  QUANTITY OF BACTERIA SEEN: FEW (2+)  GPCCH^Gram Pos Cocci in Chains  QUANTITY OF BACTERIA SEEN: RARE (1+)      Specimen Source: ABSCESS    Organism Identification: Escherichia coli  Klebsiella pneumoniae  Enterococcus faecalis    Organism: Escherichia coli    Organism: Klebsiella pneumoniae    Organism: Enterococcus faecalis    Method Type: NEGATIVE BOB 43    Method Type: NEGATIVE BOB 43    Method Type: POSITIVE BOB 29      RADIOLOGY:    No new radiographic images for review.    MEDICATIONS  (STANDING):  aspirin enteric coated 81 milliGRAM(s) Oral daily  atorvastatin 40 milliGRAM(s) Oral at bedtime  carvedilol 6.25 milliGRAM(s) Oral every 12 hours  chlorhexidine 4% Liquid 1 Application(s) Topical <User Schedule>  dextrose 5%. 1000 milliLiter(s) (50 mL/Hr) IV Continuous <Continuous>  dextrose 50% Injectable 12.5 Gram(s) IV Push once  dextrose 50% Injectable 25 Gram(s) IV Push once  dextrose 50% Injectable 25 Gram(s) IV Push once  docusate sodium 100 milliGRAM(s) Oral daily  heparin  Injectable 5000 Unit(s) SubCutaneous every 8 hours  hydrALAZINE 50 milliGRAM(s) Oral three times a day  insulin glargine Injectable (LANTUS) 8 Unit(s) SubCutaneous at bedtime  insulin lispro (HumaLOG) corrective regimen sliding scale   SubCutaneous Before meals and at bedtime  melatonin 3 milliGRAM(s) Oral <User Schedule>  ondansetron Injectable 4 milliGRAM(s) IV Push once  pantoprazole    Tablet 40 milliGRAM(s) Oral before breakfast  piperacillin/tazobactam IVPB.. 3.375 Gram(s) IV Intermittent every 8 hours    MEDICATIONS  (PRN):  bisacodyl Suppository 10 milliGRAM(s) Rectal daily PRN Constipation  cyclobenzaprine 5 milliGRAM(s) Oral three times a day PRN Muscle Spasm  dextrose 40% Gel 15 Gram(s) Oral once PRN Blood Glucose LESS THAN 70 milliGRAM(s)/deciLiter  glucagon  Injectable 1 milliGRAM(s) IntraMuscular once PRN Glucose <70 milliGRAM(s)/deciLiter  HYDROmorphone  Injectable 0.5 milliGRAM(s) IV Push every 3 hours PRN Severe Pain (7 - 10)

## 2019-07-08 NOTE — PROGRESS NOTE ADULT - PROBLEM SELECTOR PLAN 3
Cr downtrending from 3.25 on admission, hx of CKD IV, Cr stable at 1.65  Had ATN prior admission and briefly required HD, discharged on 6/26 with Cr of 2.27. No indication for RRT at this time. HD catheter removed on 7/3  encourage PO hydration, home torsemide on hold  Renal following.

## 2019-07-08 NOTE — PROGRESS NOTE ADULT - PROBLEM SELECTOR PLAN 4
FS controlled  Last A1C: 5.6 in 6/2019, tolerating PO diet, also has + Jtube not in use  FS controlled on Lantus 8 U qhs, SSI premeal

## 2019-07-08 NOTE — PROGRESS NOTE ADULT - PROBLEM SELECTOR PLAN 1
Met sepsis criteria on admission, with leukocytosis and fever s/p Whipples 6/18/19  CT Abd with paraduodenal collection, consistent with abscess s/p IR drainage. Abscess culture growing Ecoli, Efaecalis, and Klebsiella. c/w Zosyn per ID recs. Duration TBD by ID. Repeat CT A/P to check for improvement in abd collection  6/30 Bcx NGTD, Ucx >3 organisms  Had recent E coli and Citrobacter bacteremia presumed 2/2 biliary source, s/p ERCP 5/19/19 notable for bile in the duodenum and additional placement of plastic biliary stent alongside prior stent.

## 2019-07-08 NOTE — PROGRESS NOTE ADULT - ASSESSMENT
Assessment and Plan:   62 y/o M w/ PMHx of CAD s/p 9 stents, DMT2 on insulin, HTN, GERD, diverticulosis, former smoker (30 PY), s/p cholecystectomy, biliary stricture s/p ERCP with sphincterotomy and stent (4/2019), and cholangiocarcinoma now s/p open Whipple and feeding j-tube placement on 6/18. Patient returned to the hospital with a pneumothorax and abdominal fluid collection s/p pigtail chest tube and IR drainage of abdominal collection    Plan:    - low fat diet diet, calorie count  - Zosyn as per ID  - Continue YUMIKO drain output  - DVT ppx: SQH  - ISS  - F/U ID recommendations, will obtain CT abdomen and pelvis to further evaluate collection today

## 2019-07-09 LAB
ALBUMIN SERPL ELPH-MCNC: 2.4 G/DL — LOW (ref 3.3–5)
ALP SERPL-CCNC: 74 U/L — SIGNIFICANT CHANGE UP (ref 40–120)
ALT FLD-CCNC: 11 U/L — SIGNIFICANT CHANGE UP (ref 4–41)
ANION GAP SERPL CALC-SCNC: 10 MMO/L — SIGNIFICANT CHANGE UP (ref 7–14)
APTT BLD: 28.8 SEC — SIGNIFICANT CHANGE UP (ref 27.5–36.3)
AST SERPL-CCNC: 18 U/L — SIGNIFICANT CHANGE UP (ref 4–40)
BILIRUB DIRECT SERPL-MCNC: < 0.2 MG/DL — SIGNIFICANT CHANGE UP (ref 0.1–0.2)
BILIRUB SERPL-MCNC: 0.5 MG/DL — SIGNIFICANT CHANGE UP (ref 0.2–1.2)
BUN SERPL-MCNC: 13 MG/DL — SIGNIFICANT CHANGE UP (ref 7–23)
CALCIUM SERPL-MCNC: 8.4 MG/DL — SIGNIFICANT CHANGE UP (ref 8.4–10.5)
CHLORIDE SERPL-SCNC: 101 MMOL/L — SIGNIFICANT CHANGE UP (ref 98–107)
CO2 SERPL-SCNC: 22 MMOL/L — SIGNIFICANT CHANGE UP (ref 22–31)
CREAT SERPL-MCNC: 1.87 MG/DL — HIGH (ref 0.5–1.3)
GLUCOSE BLDC GLUCOMTR-MCNC: 127 MG/DL — HIGH (ref 70–99)
GLUCOSE BLDC GLUCOMTR-MCNC: 131 MG/DL — HIGH (ref 70–99)
GLUCOSE BLDC GLUCOMTR-MCNC: 174 MG/DL — HIGH (ref 70–99)
GLUCOSE BLDC GLUCOMTR-MCNC: 197 MG/DL — HIGH (ref 70–99)
GLUCOSE SERPL-MCNC: 209 MG/DL — HIGH (ref 70–99)
HCT VFR BLD CALC: 32.4 % — LOW (ref 39–50)
HGB BLD-MCNC: 10.2 G/DL — LOW (ref 13–17)
INR BLD: 1.06 — SIGNIFICANT CHANGE UP (ref 0.88–1.17)
MAGNESIUM SERPL-MCNC: 2 MG/DL — SIGNIFICANT CHANGE UP (ref 1.6–2.6)
MCHC RBC-ENTMCNC: 28.8 PG — SIGNIFICANT CHANGE UP (ref 27–34)
MCHC RBC-ENTMCNC: 31.5 % — LOW (ref 32–36)
MCV RBC AUTO: 91.5 FL — SIGNIFICANT CHANGE UP (ref 80–100)
NRBC # FLD: 0 K/UL — SIGNIFICANT CHANGE UP (ref 0–0)
PHOSPHATE SERPL-MCNC: 3.1 MG/DL — SIGNIFICANT CHANGE UP (ref 2.5–4.5)
PLATELET # BLD AUTO: 354 K/UL — SIGNIFICANT CHANGE UP (ref 150–400)
PMV BLD: 8.9 FL — SIGNIFICANT CHANGE UP (ref 7–13)
POTASSIUM SERPL-MCNC: 4.5 MMOL/L — SIGNIFICANT CHANGE UP (ref 3.5–5.3)
POTASSIUM SERPL-SCNC: 4.5 MMOL/L — SIGNIFICANT CHANGE UP (ref 3.5–5.3)
PROT SERPL-MCNC: 6.6 G/DL — SIGNIFICANT CHANGE UP (ref 6–8.3)
PROTHROM AB SERPL-ACNC: 11.8 SEC — SIGNIFICANT CHANGE UP (ref 9.8–13.1)
RBC # BLD: 3.54 M/UL — LOW (ref 4.2–5.8)
RBC # FLD: 14.6 % — HIGH (ref 10.3–14.5)
SODIUM SERPL-SCNC: 133 MMOL/L — LOW (ref 135–145)
WBC # BLD: 9.99 K/UL — SIGNIFICANT CHANGE UP (ref 3.8–10.5)
WBC # FLD AUTO: 9.99 K/UL — SIGNIFICANT CHANGE UP (ref 3.8–10.5)

## 2019-07-09 PROCEDURE — 99232 SBSQ HOSP IP/OBS MODERATE 35: CPT

## 2019-07-09 PROCEDURE — 76080 X-RAY EXAM OF FISTULA: CPT | Mod: 26

## 2019-07-09 PROCEDURE — 49424 ASSESS CYST CONTRAST INJECT: CPT | Mod: 79

## 2019-07-09 PROCEDURE — 36573 INSJ PICC RS&I 5 YR+: CPT | Mod: 79

## 2019-07-09 RX ORDER — ERTAPENEM SODIUM 1 G/1
1 INJECTION, POWDER, LYOPHILIZED, FOR SOLUTION INTRAMUSCULAR; INTRAVENOUS
Qty: 14 | Refills: 0
Start: 2019-07-09 | End: 2019-07-22

## 2019-07-09 RX ADMIN — Medication 1 TABLET(S): at 11:19

## 2019-07-09 RX ADMIN — Medication 2: at 12:56

## 2019-07-09 RX ADMIN — HYDROMORPHONE HYDROCHLORIDE 0.5 MILLIGRAM(S): 2 INJECTION INTRAMUSCULAR; INTRAVENOUS; SUBCUTANEOUS at 19:34

## 2019-07-09 RX ADMIN — Medication 50 MILLIGRAM(S): at 13:01

## 2019-07-09 RX ADMIN — Medication 3 MILLIGRAM(S): at 22:40

## 2019-07-09 RX ADMIN — Medication 50 MILLIGRAM(S): at 22:40

## 2019-07-09 RX ADMIN — CARVEDILOL PHOSPHATE 6.25 MILLIGRAM(S): 80 CAPSULE, EXTENDED RELEASE ORAL at 17:36

## 2019-07-09 RX ADMIN — CYCLOBENZAPRINE HYDROCHLORIDE 5 MILLIGRAM(S): 10 TABLET, FILM COATED ORAL at 11:19

## 2019-07-09 RX ADMIN — PIPERACILLIN AND TAZOBACTAM 25 GRAM(S): 4; .5 INJECTION, POWDER, LYOPHILIZED, FOR SOLUTION INTRAVENOUS at 09:09

## 2019-07-09 RX ADMIN — Medication 81 MILLIGRAM(S): at 11:19

## 2019-07-09 RX ADMIN — ATORVASTATIN CALCIUM 40 MILLIGRAM(S): 80 TABLET, FILM COATED ORAL at 22:40

## 2019-07-09 RX ADMIN — CYCLOBENZAPRINE HYDROCHLORIDE 5 MILLIGRAM(S): 10 TABLET, FILM COATED ORAL at 22:44

## 2019-07-09 RX ADMIN — PIPERACILLIN AND TAZOBACTAM 25 GRAM(S): 4; .5 INJECTION, POWDER, LYOPHILIZED, FOR SOLUTION INTRAVENOUS at 23:58

## 2019-07-09 RX ADMIN — Medication 2: at 09:09

## 2019-07-09 RX ADMIN — HYDROMORPHONE HYDROCHLORIDE 0.5 MILLIGRAM(S): 2 INJECTION INTRAMUSCULAR; INTRAVENOUS; SUBCUTANEOUS at 03:40

## 2019-07-09 RX ADMIN — Medication 50 MILLIGRAM(S): at 05:19

## 2019-07-09 RX ADMIN — HEPARIN SODIUM 5000 UNIT(S): 5000 INJECTION INTRAVENOUS; SUBCUTANEOUS at 13:00

## 2019-07-09 RX ADMIN — CARVEDILOL PHOSPHATE 6.25 MILLIGRAM(S): 80 CAPSULE, EXTENDED RELEASE ORAL at 05:19

## 2019-07-09 RX ADMIN — INSULIN GLARGINE 8 UNIT(S): 100 INJECTION, SOLUTION SUBCUTANEOUS at 22:40

## 2019-07-09 RX ADMIN — HYDROMORPHONE HYDROCHLORIDE 0.5 MILLIGRAM(S): 2 INJECTION INTRAMUSCULAR; INTRAVENOUS; SUBCUTANEOUS at 19:16

## 2019-07-09 RX ADMIN — PANTOPRAZOLE SODIUM 40 MILLIGRAM(S): 20 TABLET, DELAYED RELEASE ORAL at 05:19

## 2019-07-09 RX ADMIN — PIPERACILLIN AND TAZOBACTAM 25 GRAM(S): 4; .5 INJECTION, POWDER, LYOPHILIZED, FOR SOLUTION INTRAVENOUS at 17:36

## 2019-07-09 RX ADMIN — HYDROMORPHONE HYDROCHLORIDE 0.5 MILLIGRAM(S): 2 INJECTION INTRAMUSCULAR; INTRAVENOUS; SUBCUTANEOUS at 03:23

## 2019-07-09 NOTE — PROGRESS NOTE ADULT - PROBLEM SELECTOR PLAN 2
s/p left sided chest tube: removed 7/4, repeat CXR with no PTX, small residual seen on CT A/P from 7/8  right sided costochondritis: avoid NSAIDs: warm pads to area and tylenol PRN  Nonspecific ground-glass opacities also seen which could be reexpansion edema or patchy atelectasis  Continue management as per thoracic surgery Cr downtrending from 3.25 on admission, hx of CKD IV, Cr increased to 1.8  Consider switching Zosyn to alternative Abx, concern for possible AIN if continues to uptrend. Check UA  Had ATN prior admission and briefly required HD, discharged on 6/26 with Cr of 2.27. No indication for RRT at this time. HD catheter removed on 7/3  encourage PO hydration, home torsemide on hold  Renal following.

## 2019-07-09 NOTE — CHART NOTE - NSCHARTNOTEFT_GEN_A_CORE
Pre-Interventional Radiology Procedure Note    63y    Male    Procedure: PICC    Diagnosis/Indication: RUQ collection s/p drainage. Needs long term ABX      Interventional Radiology Attending Physician:    Ordering Attending Physician: Felice    PAST MEDICAL & SURGICAL HISTORY:  Type II diabetes mellitus  HTN (hypertension)  CAD (coronary artery disease)  S/P cholecystectomy       CBC Full  -  ( 08 Jul 2019 07:40 )  WBC Count : 8.74 K/uL  RBC Count : 3.16 M/uL  Hemoglobin : 9.3 g/dL  Hematocrit : 28.5 %  Platelet Count - Automated : 310 K/uL  Mean Cell Volume : 90.2 fL  Mean Cell Hemoglobin : 29.4 pg  Mean Cell Hemoglobin Concentration : 32.6 %  Auto Neutrophil # : x  Auto Lymphocyte # : x  Auto Monocyte # : x  Auto Eosinophil # : x  Auto Basophil # : x  Auto Neutrophil % : x  Auto Lymphocyte % : x  Auto Monocyte % : x  Auto Eosinophil % : x  Auto Basophil % : x    07-08    136  |  102  |  13  ----------------------------<  128<H>  3.9   |  22  |  1.65<H>    Ca    8.2<L>      08 Jul 2019 07:40  Phos  2.5     07-08  Mg     1.7     07-08

## 2019-07-09 NOTE — PROGRESS NOTE ADULT - ASSESSMENT
Assessment and Plan:   64 y/o M w/ PMHx of CAD s/p 9 stents, DMT2 on insulin, HTN, GERD, diverticulosis, former smoker (30 PY), s/p cholecystectomy, biliary stricture s/p ERCP with sphincterotomy and stent (4/2019), and cholangiocarcinoma now s/p open Whipple and feeding j-tube placement on 6/18. Patient returned to the hospital with a pneumothorax and abdominal fluid collection s/p pigtail chest tube and IR drainage of abdominal collection    Plan:    - low fat diet diet, calorie count  - Picc line placement for antibiotics  - Zosyn as per ID  - possible dc YUMIKO drain output  - DVT ppx: SQH  - ISS  - F/U ID recommendations, will obtain CT abdomen and pelvis to further evaluate collection today

## 2019-07-09 NOTE — PROGRESS NOTE ADULT - PROBLEM SELECTOR PLAN 4
FS controlled  Last A1C: 5.6 in 6/2019, tolerating PO diet, also has + Jtube not in use  FS controlled on Lantus 8 U qhs, SSI premeal s/p left sided chest tube: removed 7/4, repeat CXR with no PTX, small residual seen on CT A/P from 7/8  right sided costochondritis: avoid NSAIDs: warm pads to area and tylenol PRN  Nonspecific ground-glass opacities also seen which could be reexpansion edema or patchy atelectasis  Continue management as per thoracic surgery

## 2019-07-09 NOTE — PROGRESS NOTE ADULT - PROBLEM SELECTOR PROBLEM 3
Acute kidney injury superimposed on CKD Type 2 diabetes mellitus with stage 4 chronic kidney disease, with long-term current use of insulin

## 2019-07-09 NOTE — PROGRESS NOTE ADULT - PROBLEM SELECTOR PROBLEM 4
Type 2 diabetes mellitus with stage 4 chronic kidney disease, with long-term current use of insulin Pneumothorax, unspecified type

## 2019-07-09 NOTE — PROGRESS NOTE ADULT - SUBJECTIVE AND OBJECTIVE BOX
f/u abdominal abscess    Interval History/ROS:  no fever.  feels better.  no abdominal pain.  less drainage from YUMIKO.  CT done- improved.  Remainder of ROS otherwise negative.    PAST MEDICAL & SURGICAL HISTORY:  Type II diabetes mellitus  HTN (hypertension)  CAD (coronary artery disease)  S/P cholecystectomy    Allergies  Cipro (Rash)  Plavix (Rash)    ANTIMICROBIALS:    meropenem  IVPB 500 every 12 hours (7/1-5)  piperacillin/tazobactam IVPB.. 3.375 every 8 hours (7/5-)    MEDICATIONS  (STANDING):  aspirin enteric coated 81 daily  atorvastatin 40 at bedtime  carvedilol 6.25 every 12 hoursonce  docusate sodium 100 daily  heparin  Injectable 5000 every 8 hours  hydrALAZINE 50 three times a day  insulin glargine Injectable (LANTUS) 8 at bedtime  insulin lispro (HumaLOG) corrective regimen sliding scale  Before meals and at bedtime  melatonin 3 <User Schedule>  ondansetron Injectable 4 once  pantoprazole    Tablet 40 before breakfast    Vital Signs Last 24 Hrs  T(F): 98.2 (07-09-19 @ 05:16), Max: 98.3 (07-08-19 @ 16:34)  HR: 69 (07-09-19 @ 11:55)  BP: 164/65 (07-09-19 @ 11:55)  RR: 17 (07-09-19 @ 11:55)  SpO2: 99% (07-09-19 @ 11:55) (98% - 100%)    PHYSICAL EXAM:  General: non-toxic  HEENT: anicteric  Cardiovascular:   S1, S2  Respiratory:  clear bilaterally  GI:  soft, non-tender, normal bowel sounds, +J-tube, +IR drain with thick, cloudy purulent drainage  Musculoskeletal:  no synovitis  Neurologic:  grossly non-focal  Skin:  no rash  Psychiatric:  appropriate affect  Vascular:  no phlebitis                                 10.2   9.99  )-----------( 354      ( 09 Jul 2019 09:30 )             32.4 07-09    133  |  101  |  13  ----------------------------<  209  4.5   |  22  |  1.87  Ca    8.4      09 Jul 2019 09:30Phos  3.1     07-09Mg     2.0     07-09  TPro  6.6  /  Alb  2.4  /  TBili  0.5  /  DBili  < 0.2  /  AST  18  /  ALT  11  /  AlkPhos  74  07-09       MICROBIOLOGY:  Culture - Yeast and Fungus (collected 01 Jul 2019 18:45)  Source: ABSCESS  Preliminary Report (03 Jul 2019 09:09):    CULTURE NEGATIVE FOR YEASTS AND MOLDS---- PRELIMINARY    CULTURE NEGATIVE FOR YEASTS AND MOLDS AFTER 1 DAY    Culture - Surg Site Aerob/Anaer w/Gm St (collected 01 Jul 2019 18:45)  Source: ABSCESS  Preliminary Report (04 Jul 2019 13:57):    MODERATE  Organism: Escherichia coli  Klebsiella pneumoniae  Enterococcus faecalis (04 Jul 2019 13:57)  Organism: Enterococcus faecalis (04 Jul 2019 13:57)  Organism: Klebsiella pneumoniae (04 Jul 2019 13:57)      -  Amikacin: S <=8 BOB      -  Ampicillin: R >16 BOB      -  Ampicillin/Sulbactam: S 8/4 BOB      -  Aztreonam: S <=4 BOB      -  Cefazolin: S <=2 BOB      -  Cefepime: S <=2 BOB      -  Cefoxitin: S <=4 BOB      -  Ceftazidime: S <=1 BOB      -  Ceftriaxone: S <=1 BOB      -  Ciprofloxacin: S <=0.5 BOB      -  Ertapenem: S <=0.5 BOB      -  Gentamicin: S <=1 BOB      -  Imipenem: S <=1 BOB      -  Levofloxacin: S <=1 BOB      -  Meropenem: S <=1 BOB      -  Piperacillin/Tazobactam: S <=8 BOB      -  Tigecycline: S      -  Tobramycin: S <=2 BOB      -  Trimethoprim/Sulfamethoxazole: S <=0.5/9.5 BOB      Method Type: NEGATIVE BOB 43  Organism: Escherichia coli (04 Jul 2019 13:57)    Culture - Acid Fast Smear Concentrated (collected 01 Jul 2019 18:45)  Source: ABSCESS  Final Report (02 Jul 2019 14:54):    AFB SMEAR= NO ACID FAST BACILLI SEEN    Culture - Urine (collected 01 Jul 2019 01:32)  Source: URINE MIDSTREAM  Final Report (02 Jul 2019 10:04):    Culture grew 3 or more types of organisms which indicate    collection contamination; consider recollection only if    clinically indicated.    Culture - Blood (collected 30 Jun 2019 23:14)  Source: BLOOD PERIPHERAL  Preliminary Report (03 Jul 2019 23:15):    NO ORGANISMS ISOLATED    NO ORGANISMS ISOLATED AT 72 HRS.    Culture - Blood (collected 30 Jun 2019 23:12)  Source: BLOOD VENOUS  Preliminary Report (03 Jul 2019 23:13):    NO ORGANISMS ISOLATED    NO ORGANISMS ISOLATED AT 72 HRS.    RADIOLOGY:  CT Abdomen and Pelvis w/ Oral Cont (07.08.19 @ 15:02) >  IMPRESSION:  Status post percutaneous drainage of a complex right upper quadrant collection, with interval decrease in size of the collection. Small residual predominantly gas containing components are noted.  Moderate volume ascites, increased since 7/1/2019.    Nonspecific mesenteric lymph nodes, unchanged.  Small bilateral pleural effusions, increased in size since 7/1/2019. Small residual left pneumothorax status post removal of previously noted left pleural catheter.    CT Abdomen and Pelvis w/ Oral Cont (07.01.19 @ 04:22) >  IMPRESSION:  Status post recent Whipple procedure. High density amorphous collection adjacent to the duodenal stump with loculated foci of air and fluid in the right upper quadrant for which considerations include postoperative hematoma and air, leakage of contrast and air from the duodenal stump, or developing collection/abscess.  Left-sided pigtail chest tube. Trace left pneumothorax along the anterior chest wall.

## 2019-07-09 NOTE — PROGRESS NOTE ADULT - SUBJECTIVE AND OBJECTIVE BOX
Team D Surgery Progress Note     SUBJECTIVE / 24H EVENTS  Patient seen and examined on morning rounds. No acute events overnight. His YUMIKO drain not putting out much fluid. Some midline incision but otherwise is recovering slowly.     OBJECTIVE:    Vital Signs Last 24 Hrs  T(C): 36.8 (09 Jul 2019 05:16), Max: 36.8 (08 Jul 2019 11:54)  T(F): 98.2 (09 Jul 2019 05:16), Max: 98.3 (08 Jul 2019 11:54)  HR: 75 (09 Jul 2019 05:16) (68 - 91)  BP: 157/50 (09 Jul 2019 05:16) (126/44 - 169/69)  BP(mean): --  RR: 16 (09 Jul 2019 05:16) (16 - 18)  SpO2: 100% (09 Jul 2019 05:16) (98% - 100%)      PHYSICAL EXAM:  Gen: NAD  LS: Respirations unlabored  Card: RRR  GI: Soft. Nontender. Nondistended, YUMIKO putting out minimal fluid.   Ext: Warm, well perfused      I&O's Detail    08 Jul 2019 07:01  -  09 Jul 2019 07:00  --------------------------------------------------------  IN:    Enteral Tube Flush: 30 mL    Oral Fluid: 1020 mL  Total IN: 1050 mL    OUT:    Voided: 1400 mL  Total OUT: 1400 mL    Total NET: -350 mL          LABS:    07-09    133<L>  |  101  |  13  ----------------------------<  209<H>  4.5   |  22  |  1.87<H>    Ca    8.4      09 Jul 2019 09:30  Phos  3.1     07-09  Mg     2.0     07-09    TPro  6.6  /  Alb  2.4<L>  /  TBili  0.5  /  DBili  < 0.2  /  AST  18  /  ALT  11  /  AlkPhos  74  07-09                                         10.2   9.99  )-----------( 354      ( 09 Jul 2019 09:30 )             32.4       MICROBIOLOGY:    Culture - Surg Site Aerob/Anaer w/Gm St (07.01.19 @ 18:45)    Gram Stain Wound:   WBC White Blood Cells  QNTY CELLS IN GRAM STAIN: FEW (2+)  GNR^Gram Neg Rods  QUANTITY OF BACTERIA SEEN: FEW (2+)  GPR^Gram Positive Rods  QUANTITY OF BACTERIA SEEN: FEW (2+)  GPCCH^Gram Pos Cocci in Chains  QUANTITY OF BACTERIA SEEN: RARE (1+)      Specimen Source: ABSCESS    Organism Identification: Escherichia coli  Klebsiella pneumoniae  Enterococcus faecalis    Organism: Escherichia coli    Organism: Klebsiella pneumoniae    Organism: Enterococcus faecalis    Method Type: NEGATIVE BOB 43    Method Type: NEGATIVE BOB 43    Method Type: POSITIVE BOB 29      RADIOLOGY:        No new radiographic images for review.       CT Abdomen and Pelvis w/ Oral Cont (07.08.19 @ 15:02) >  IMPRESSION:     Status post percutaneous drainage of a complex right upper quadrant   collection, with interval decrease in size of the collection. Small   residual predominantly gas containing components are noted.    Moderate volume ascites, increased since 7/1/2019.    Nonspecific mesenteric lymph nodes, unchanged.    Small bilateral pleural effusions, increased in size since 7/1/2019.   Small residual left pneumothorax status post removal of previously noted   left pleural catheter.    MEDICATIONS  (STANDING):  aspirin enteric coated 81 milliGRAM(s) Oral daily  atorvastatin 40 milliGRAM(s) Oral at bedtime  carvedilol 6.25 milliGRAM(s) Oral every 12 hours  chlorhexidine 4% Liquid 1 Application(s) Topical <User Schedule>  dextrose 5%. 1000 milliLiter(s) (50 mL/Hr) IV Continuous <Continuous>  dextrose 50% Injectable 12.5 Gram(s) IV Push once  dextrose 50% Injectable 25 Gram(s) IV Push once  dextrose 50% Injectable 25 Gram(s) IV Push once  docusate sodium 100 milliGRAM(s) Oral daily  heparin  Injectable 5000 Unit(s) SubCutaneous every 8 hours  hydrALAZINE 50 milliGRAM(s) Oral three times a day  insulin glargine Injectable (LANTUS) 8 Unit(s) SubCutaneous at bedtime  insulin lispro (HumaLOG) corrective regimen sliding scale   SubCutaneous Before meals and at bedtime  melatonin 3 milliGRAM(s) Oral <User Schedule>  ondansetron Injectable 4 milliGRAM(s) IV Push once  pantoprazole    Tablet 40 milliGRAM(s) Oral before breakfast  piperacillin/tazobactam IVPB.. 3.375 Gram(s) IV Intermittent every 8 hours    MEDICATIONS  (PRN):  bisacodyl Suppository 10 milliGRAM(s) Rectal daily PRN Constipation  cyclobenzaprine 5 milliGRAM(s) Oral three times a day PRN Muscle Spasm  dextrose 40% Gel 15 Gram(s) Oral once PRN Blood Glucose LESS THAN 70 milliGRAM(s)/deciLiter  glucagon  Injectable 1 milliGRAM(s) IntraMuscular once PRN Glucose <70 milliGRAM(s)/deciLiter  HYDROmorphone  Injectable 0.5 milliGRAM(s) IV Push every 3 hours PRN Severe Pain (7 - 10)

## 2019-07-09 NOTE — PROGRESS NOTE ADULT - SUBJECTIVE AND OBJECTIVE BOX
INCOMPLETE CHIEF COMPLAINT: f/u     SUBJECTIVE / OVERNIGHT EVENTS: Patient seen and examined. No acute events overnight. No chest pain, SOB, N/V/D    MEDICATIONS  (STANDING):  aspirin enteric coated 81 milliGRAM(s) Oral daily  atorvastatin 40 milliGRAM(s) Oral at bedtime  carvedilol 6.25 milliGRAM(s) Oral every 12 hours  chlorhexidine 4% Liquid 1 Application(s) Topical <User Schedule>  dextrose 50% Injectable 12.5 Gram(s) IV Push once  dextrose 50% Injectable 25 Gram(s) IV Push once  dextrose 50% Injectable 25 Gram(s) IV Push once  docusate sodium 100 milliGRAM(s) Oral daily  heparin  Injectable 5000 Unit(s) SubCutaneous every 8 hours  hydrALAZINE 50 milliGRAM(s) Oral three times a day  insulin glargine Injectable (LANTUS) 8 Unit(s) SubCutaneous at bedtime  insulin lispro (HumaLOG) corrective regimen sliding scale   SubCutaneous Before meals and at bedtime  melatonin 3 milliGRAM(s) Oral <User Schedule>  multivitamin 1 Tablet(s) Oral daily  ondansetron Injectable 4 milliGRAM(s) IV Push once  pantoprazole    Tablet 40 milliGRAM(s) Oral before breakfast  piperacillin/tazobactam IVPB.. 3.375 Gram(s) IV Intermittent every 8 hours    MEDICATIONS  (PRN):  bisacodyl Suppository 10 milliGRAM(s) Rectal daily PRN Constipation  cyclobenzaprine 5 milliGRAM(s) Oral three times a day PRN Muscle Spasm  dextrose 40% Gel 15 Gram(s) Oral once PRN Blood Glucose LESS THAN 70 milliGRAM(s)/deciLiter  glucagon  Injectable 1 milliGRAM(s) IntraMuscular once PRN Glucose <70 milliGRAM(s)/deciLiter  HYDROmorphone  Injectable 0.5 milliGRAM(s) IV Push every 3 hours PRN Severe Pain (7 - 10)      VITALS:  T(F): 98.2 (07-09-19 @ 05:16), Max: 98.3 (07-08-19 @ 11:54)  HR: 75 (07-09-19 @ 05:16) (68 - 91)  BP: 157/50 (07-09-19 @ 05:16) (126/44 - 169/69)  RR: 16 (07-09-19 @ 05:16) (16 - 18)  SpO2: 100% (07-09-19 @ 05:16)  Wt(kg): --      CAPILLARY BLOOD GLUCOSE      PHYSICAL EXAM:  GENERAL: NAD, on room air  HEENT: sclera clear  CHEST/RESPIRATORY: CTA b/l, decreased BS left lung base  CARDIOVASCULAR: s1/s2, no murmurs appreciated  GASTROINTESTINAL: Soft, nontender, Nondistended; BS present, midline abdominal scar, + jejunostomy tube, + YUMIKO drain with no fluid  EXTREMITIES: WWP, no edema b/l  NERVOUS SYSTEM:  awake, alert, responds to Qs appropriately    LABS:              10.2                 133  | 22   | 13           9.99  >-----------< 354     ------------------------< 209                   32.4                 4.5  | 101  | 1.87                                         Ca 8.4   Mg 2.0   Ph 3.1         TPro  6.6  /  Alb  2.4      TBili  0.5  /  DBili  < 0.2        AST  18  /  ALT  11            AlkPhos  74      INR: 1.06 ;    PT: 11.8 SEC;    PTT: 28.8 SEC      MICROBIOLOGY:    RADIOLOGY & ADDITIONAL TESTS:  Imaging Personally Reviewed: [ ] Yes    [ ] Consultant(s) Notes Reviewed:  [x] Care Discussed with Consultants/Other Providers:

## 2019-07-09 NOTE — CHART NOTE - NSCHARTNOTEFT_GEN_A_CORE
Pre-Interventional Radiology Procedure Note    63y    Male    Procedure: tube check    Diagnosis/Indication: s/p drain in RUQ collection      Interventional Radiology Attending Physician: DANNY    Ordering Attending Physician: Felice    PAST MEDICAL & SURGICAL HISTORY:  Type II diabetes mellitus  HTN (hypertension)  CAD (coronary artery disease)  S/P cholecystectomy       CBC Full  -  ( 09 Jul 2019 09:30 )  WBC Count : 9.99 K/uL  RBC Count : 3.54 M/uL  Hemoglobin : 10.2 g/dL  Hematocrit : 32.4 %  Platelet Count - Automated : 354 K/uL  Mean Cell Volume : 91.5 fL  Mean Cell Hemoglobin : 28.8 pg  Mean Cell Hemoglobin Concentration : 31.5 %  Auto Neutrophil # : x  Auto Lymphocyte # : x  Auto Monocyte # : x  Auto Eosinophil # : x  Auto Basophil # : x  Auto Neutrophil % : x  Auto Lymphocyte % : x  Auto Monocyte % : x  Auto Eosinophil % : x  Auto Basophil % : x    07-09    133<L>  |  101  |  13  ----------------------------<  209<H>  4.5   |  22  |  1.87<H>    Ca    8.4      09 Jul 2019 09:30  Phos  3.1     07-09  Mg     2.0     07-09    TPro  6.6  /  Alb  2.4<L>  /  TBili  0.5  /  DBili  < 0.2  /  AST  18  /  ALT  11  /  AlkPhos  74  07-09    PT/INR - ( 09 Jul 2019 09:30 )   PT: 11.8 SEC;   INR: 1.06          PTT - ( 09 Jul 2019 09:30 )  PTT:28.8 SEC

## 2019-07-09 NOTE — PROGRESS NOTE ADULT - PROBLEM SELECTOR PLAN 3
Cr downtrending from 3.25 on admission, hx of CKD IV, Cr stable at 1.65  Had ATN prior admission and briefly required HD, discharged on 6/26 with Cr of 2.27. No indication for RRT at this time. HD catheter removed on 7/3  encourage PO hydration, home torsemide on hold  Renal following. FS controlled  Last A1C: 5.6 in 6/2019, tolerating PO diet, also has + Jtube not in use  FS controlled on Lantus 8 U qhs, SSI premeal

## 2019-07-09 NOTE — PROGRESS NOTE ADULT - PROBLEM SELECTOR PLAN 1
Met sepsis criteria on admission, with leukocytosis and fever s/p Whipples 6/18/19  CT Abd with paraduodenal collection, consistent with abscess s/p IR drainage. Abscess culture growing Ecoli, Efaecalis, and Klebsiella. c/w Zosyn per ID recs. Duration TBD by ID but likely will need 2-4 weeks. IR PICC pending.   Repeat CT A/P on 7/8 reveals decrease in size of abdominal collection, mod volume ascites, small b/l pleural effusions, small residual left PTX  6/30 Bcx NGTD, Ucx >3 organisms  Had recent E coli and Citrobacter bacteremia presumed 2/2 biliary source, s/p ERCP 5/19/19 notable for bile in the duodenum and additional placement of plastic biliary stent alongside prior stent. Met sepsis criteria on admission, with leukocytosis and fever s/p Kirsty 6/18/19  CT Abd with paraduodenal collection, consistent with abscess s/p IR drainage. Abscess culture growing Ecoli, Efaecalis, and Klebsiella. on Zosyn per ID recs but may need to be switched to alternative Abx for ease of administration and giving Cr uptrending. Duration TBD by ID but likely will need 2-4 weeks. IR PICC pending.   Repeat CT A/P on 7/8 reveals decrease in size of abdominal collection, mod volume ascites, small b/l pleural effusions, small residual left PTX  6/30 Bcx NGTD, Ucx >3 organisms  Had recent E coli and Citrobacter bacteremia presumed 2/2 biliary source, s/p ERCP 5/19/19 notable for bile in the duodenum and additional placement of plastic biliary stent alongside prior stent.

## 2019-07-09 NOTE — PROGRESS NOTE ADULT - ASSESSMENT
63M recently dx cholangiocarcinoma s/p whipple 6/18/19 p/w n/v, abd pain found to have abd abscess s/p IR drainage, stay c/b L pneumothorax now resolved s/p chest tube placement.  Abscess is polymicrobial.  Fever/leukocytosis better.  Possible d/c on zosyn.  But to obtain f/u CT to assess for resolution of the collection    Suggest:  - would continue zosyn through discharge  - for IR tube check  - can transition to IV ertapenem 1g daily for another 14 days upon discharge  - PICC  - likely 2-4 weeks antibiotics until collection resolved    I have discussed plan of care with consulting team [id number] and surg att    I will be away returning 7/11/19.  ID available.  Please call [phone].

## 2019-07-10 ENCOUNTER — TRANSCRIPTION ENCOUNTER (OUTPATIENT)
Age: 64
End: 2019-07-10

## 2019-07-10 VITALS
SYSTOLIC BLOOD PRESSURE: 156 MMHG | RESPIRATION RATE: 17 BRPM | TEMPERATURE: 98 F | DIASTOLIC BLOOD PRESSURE: 62 MMHG | OXYGEN SATURATION: 97 % | HEART RATE: 70 BPM

## 2019-07-10 LAB
ALBUMIN SERPL ELPH-MCNC: 2.4 G/DL — LOW (ref 3.3–5)
ALP SERPL-CCNC: 75 U/L — SIGNIFICANT CHANGE UP (ref 40–120)
ALT FLD-CCNC: 10 U/L — SIGNIFICANT CHANGE UP (ref 4–41)
ANION GAP SERPL CALC-SCNC: 10 MMO/L — SIGNIFICANT CHANGE UP (ref 7–14)
AST SERPL-CCNC: 19 U/L — SIGNIFICANT CHANGE UP (ref 4–40)
BILIRUB SERPL-MCNC: 0.5 MG/DL — SIGNIFICANT CHANGE UP (ref 0.2–1.2)
BUN SERPL-MCNC: 12 MG/DL — SIGNIFICANT CHANGE UP (ref 7–23)
CALCIUM SERPL-MCNC: 8.7 MG/DL — SIGNIFICANT CHANGE UP (ref 8.4–10.5)
CHLORIDE SERPL-SCNC: 105 MMOL/L — SIGNIFICANT CHANGE UP (ref 98–107)
CO2 SERPL-SCNC: 21 MMOL/L — LOW (ref 22–31)
CREAT SERPL-MCNC: 1.85 MG/DL — HIGH (ref 0.5–1.3)
GLUCOSE BLDC GLUCOMTR-MCNC: 130 MG/DL — HIGH (ref 70–99)
GLUCOSE BLDC GLUCOMTR-MCNC: 141 MG/DL — HIGH (ref 70–99)
GLUCOSE SERPL-MCNC: 145 MG/DL — HIGH (ref 70–99)
HCT VFR BLD CALC: 33.6 % — LOW (ref 39–50)
HGB BLD-MCNC: 10.6 G/DL — LOW (ref 13–17)
MCHC RBC-ENTMCNC: 28.6 PG — SIGNIFICANT CHANGE UP (ref 27–34)
MCHC RBC-ENTMCNC: 31.5 % — LOW (ref 32–36)
MCV RBC AUTO: 90.6 FL — SIGNIFICANT CHANGE UP (ref 80–100)
NRBC # FLD: 0 K/UL — SIGNIFICANT CHANGE UP (ref 0–0)
PLATELET # BLD AUTO: 372 K/UL — SIGNIFICANT CHANGE UP (ref 150–400)
PMV BLD: 8.9 FL — SIGNIFICANT CHANGE UP (ref 7–13)
POTASSIUM SERPL-MCNC: 4.1 MMOL/L — SIGNIFICANT CHANGE UP (ref 3.5–5.3)
POTASSIUM SERPL-SCNC: 4.1 MMOL/L — SIGNIFICANT CHANGE UP (ref 3.5–5.3)
PROT SERPL-MCNC: 7 G/DL — SIGNIFICANT CHANGE UP (ref 6–8.3)
RBC # BLD: 3.71 M/UL — LOW (ref 4.2–5.8)
RBC # FLD: 14.6 % — HIGH (ref 10.3–14.5)
SODIUM SERPL-SCNC: 136 MMOL/L — SIGNIFICANT CHANGE UP (ref 135–145)
WBC # BLD: 10.48 K/UL — SIGNIFICANT CHANGE UP (ref 3.8–10.5)
WBC # FLD AUTO: 10.48 K/UL — SIGNIFICANT CHANGE UP (ref 3.8–10.5)

## 2019-07-10 PROCEDURE — 99232 SBSQ HOSP IP/OBS MODERATE 35: CPT | Mod: 24

## 2019-07-10 PROCEDURE — 99232 SBSQ HOSP IP/OBS MODERATE 35: CPT

## 2019-07-10 RX ORDER — OXYCODONE HYDROCHLORIDE 5 MG/1
1 TABLET ORAL
Qty: 42 | Refills: 0
Start: 2019-07-10 | End: 2019-07-16

## 2019-07-10 RX ORDER — ERTAPENEM SODIUM 1 G/1
1 INJECTION, POWDER, LYOPHILIZED, FOR SOLUTION INTRAMUSCULAR; INTRAVENOUS
Qty: 28 | Refills: 0
Start: 2019-07-10 | End: 2019-07-23

## 2019-07-10 RX ORDER — ERTAPENEM SODIUM 1 G/1
1000 INJECTION, POWDER, LYOPHILIZED, FOR SOLUTION INTRAMUSCULAR; INTRAVENOUS EVERY 24 HOURS
Refills: 0 | Status: DISCONTINUED | OUTPATIENT
Start: 2019-07-10 | End: 2019-07-10

## 2019-07-10 RX ORDER — ERTAPENEM SODIUM 1 G/1
1 INJECTION, POWDER, LYOPHILIZED, FOR SOLUTION INTRAMUSCULAR; INTRAVENOUS
Qty: 28 | Refills: 0
Start: 2019-07-10 | End: 2019-08-06

## 2019-07-10 RX ORDER — INSULIN GLARGINE 100 [IU]/ML
8 INJECTION, SOLUTION SUBCUTANEOUS
Qty: 4 | Refills: 0
Start: 2019-07-10 | End: 2019-08-08

## 2019-07-10 RX ORDER — LANOLIN ALCOHOL/MO/W.PET/CERES
1 CREAM (GRAM) TOPICAL
Qty: 0 | Refills: 0 | DISCHARGE
Start: 2019-07-10

## 2019-07-10 RX ORDER — PANTOPRAZOLE SODIUM 20 MG/1
1 TABLET, DELAYED RELEASE ORAL
Qty: 30 | Refills: 0
Start: 2019-07-10

## 2019-07-10 RX ADMIN — Medication 50 MILLIGRAM(S): at 05:46

## 2019-07-10 RX ADMIN — CARVEDILOL PHOSPHATE 6.25 MILLIGRAM(S): 80 CAPSULE, EXTENDED RELEASE ORAL at 05:46

## 2019-07-10 RX ADMIN — Medication 81 MILLIGRAM(S): at 12:14

## 2019-07-10 RX ADMIN — Medication 1 TABLET(S): at 12:14

## 2019-07-10 RX ADMIN — ERTAPENEM SODIUM 120 MILLIGRAM(S): 1 INJECTION, POWDER, LYOPHILIZED, FOR SOLUTION INTRAMUSCULAR; INTRAVENOUS at 12:36

## 2019-07-10 RX ADMIN — PANTOPRAZOLE SODIUM 40 MILLIGRAM(S): 20 TABLET, DELAYED RELEASE ORAL at 05:46

## 2019-07-10 RX ADMIN — PIPERACILLIN AND TAZOBACTAM 25 GRAM(S): 4; .5 INJECTION, POWDER, LYOPHILIZED, FOR SOLUTION INTRAVENOUS at 08:23

## 2019-07-10 RX ADMIN — CHLORHEXIDINE GLUCONATE 1 APPLICATION(S): 213 SOLUTION TOPICAL at 10:00

## 2019-07-10 RX ADMIN — Medication 50 MILLIGRAM(S): at 13:25

## 2019-07-10 NOTE — PROGRESS NOTE ADULT - PROVIDER SPECIALTY LIST ADULT
Hospitalist
Infectious Disease
Intervent Radiology
Nephrology
Nephrology
Surgery
Thoracic Surgery
Thoracic Surgery
Surgery
Nephrology
Nephrology
Hospitalist

## 2019-07-10 NOTE — DISCHARGE NOTE PROVIDER - PROVIDER TOKENS
PROVIDER:[[id number]]],PROVIDER:[TOKEN:[[id number]]] PROVIDER:[TOKEN:[[id number]]],PROVIDER:[TOKEN:[[id number]]],PROVIDER:[[id number]]]

## 2019-07-10 NOTE — PROGRESS NOTE ADULT - PROBLEM SELECTOR PROBLEM 8
Hypertension, unspecified type
Coronary artery disease without angina pectoris, unspecified vessel or lesion type, unspecified whether native or transplanted heart
Prophylactic measure

## 2019-07-10 NOTE — PROGRESS NOTE ADULT - PROBLEM SELECTOR PLAN 2
Cr downtrending from 3.25 on admission, hx of CKD IV, Cr stable now at 1.8  Consider switching Zosyn to alternative Abx, concern for possible AIN if continues to uptrend. Check UA  Had ATN prior admission and briefly required HD, discharged on 6/26 with Cr of 2.27. No indication for RRT at this time. HD catheter removed on 7/3  encourage PO hydration, home torsemide on hold  Renal following.

## 2019-07-10 NOTE — PROGRESS NOTE ADULT - SUBJECTIVE AND OBJECTIVE BOX
Team D Surgery Progress Note     SUBJECTIVE / 24H EVENTS  Patient seen and examined on morning rounds. No complaints at this time    OBJECTIVE:    Vital Signs Last 24 Hrs  T(C): 36.8 (07-10-19 @ 11:44), Max: 37.1 (07-09-19 @ 17:43)  HR: 70 (07-10-19 @ 11:44) (65 - 91)  BP: 156/62 (07-10-19 @ 11:44) (138/59 - 177/69)  RR: 17 (07-10-19 @ 11:44) (16 - 18)  SpO2: 97% (07-10-19 @ 11:44) (95% - 100%)  CAPILLARY BLOOD GLUCOSE      POCT Blood Glucose.: 130 mg/dL (10 Jul 2019 08:39)  POCT Blood Glucose.: 131 mg/dL (09 Jul 2019 21:38)  POCT Blood Glucose.: 127 mg/dL (09 Jul 2019 17:40)  POCT Blood Glucose.: 197 mg/dL (09 Jul 2019 12:29)   N/A      07-09 @ 07:01  -  07-10 @ 07:00  --------------------------------------------------------  IN:    Enteral Tube Flush: 60 mL    Oral Fluid: 720 mL  Total IN: 780 mL    OUT:    Voided: 900 mL  Total OUT: 900 mL    Total NET: -120 mL      PHYSICAL EXAM:  Gen: NAD  LS: Respirations unlabored  Card: RRR  GI: Soft. Nontender. Nondistended, YUMIKO putting out minimal fluid.   Ext: Warm, well perfused      LABS:  CBC (07-10 @ 05:56)                              10.6<L>                         10.48   )----------------(  372        --    % Neutrophils, --    % Lymphocytes, ANC: --                                  33.6<L>  CBC (07-09 @ 09:30)                              10.2<L>                         9.99    )----------------(  354        --    % Neutrophils, --    % Lymphocytes, ANC: --                                  32.4<L>    BMP (07-10 @ 05:56)             136     |  105     |  12    		Ca++ --      Ca 8.7                ---------------------------------( 145<H>		Mg --                 4.1     |  21<L>   |  1.85<H>			Ph --      BMP (07-09 @ 09:30)             133<L>  |  101     |  13    		Ca++ --      Ca 8.4                ---------------------------------( 209<H>		Mg 2.0                4.5     |  22      |  1.87<H>			Ph 3.1       LFTs (07-10 @ 05:56)      TPro 7.0 / Alb 2.4<L> / TBili 0.5 / DBili -- / AST 19 / ALT 10 / AlkPhos 75  LFTs (07-09 @ 09:30)      TPro 6.6 / Alb 2.4<L> / TBili 0.5 / DBili < 0.2 / AST 18 / ALT 11 / AlkPhos 74    Coags (07-09 @ 09:30)  aPTT 28.8 / INR 1.06 / PT 11.8            -> ABSCESS Culture (07-01 @ 18:45)       WBC White Blood Cells  QNTY CELLS IN GRAM STAIN: FEW (2+)  GNR^Gram Neg Rods  QUANTITY OF BACTERIA SEEN: FEW (2+)  GPR^Gram Positive Rods  QUANTITY OF BACTERIA SEEN: FEW (2+)  GPCCH^Gram Pos Cocci in Chains  QUANTITY OF BACTERIA SEEN: RARE (1+)      MODERATE  ***** CRITICAL RESULT *****  PERSON CALLED / READ-BACK: CHETAN MONTAGUE  DATE / TIME CALLED: 07/05/19 [id number]  CALLED BY: JENIFFER BATEMAN  NG    -> URINE MIDSTREAM Culture (07-01 @ 01:32)     NG    NG  NG    -> BLOOD PERIPHERAL Culture (06-30 @ 23:14)     NG    NG  NG    -> BLOOD VENOUS Culture (06-30 @ 23:12)     NG    NG  NG    Assessment and Plan:   · Assessment		  Assessment and Plan:   64 y/o M w/ PMHx of CAD s/p 9 stents, DMT2 on insulin, HTN, GERD, diverticulosis, former smoker (30 PY), s/p cholecystectomy, biliary stricture s/p ERCP with sphincterotomy and stent (4/2019), and cholangiocarcinoma now s/p open Whipple and feeding j-tube placement on 6/18. Patient returned to the hospital with a pneumothorax and abdominal fluid collection s/p pigtail chest tube and IR drainage of abdominal collection    Plan:    - low fat diet diet, calorie count  - Picc line placement for antibiotics  - ertapenem 2- 4 weeks  - DVT ppx: SQH  - ISS  - d/c planning with IV ertapenem and IR drain    [id number]

## 2019-07-10 NOTE — DISCHARGE NOTE NURSING/CASE MANAGEMENT/SOCIAL WORK - NSDCPEWEB_GEN_ALL_CORE
[place] website --- [email or web address] for [facility] website --- [email or web address]

## 2019-07-10 NOTE — DISCHARGE NOTE PROVIDER - NSDCFUADDAPPT_GEN_ALL_CORE_FT
Pt must make appointment with Interventional radiology regarding drain output and potential removal as well as PICC removal when   Please follow up with your primary care physician regarding your hospitalization Pt must make appointment with Interventional radiology regarding drain output and potential removal as well as PICC removal when   Please follow up with your primary care physician regarding your hospitalization  When you call to make a F/U appt with IR you will likely be requiring a f/u CT scan of ABD/PEL once report is read please CALL to make a appointment with Dr. Lyons from Infectious disease to down grade or stop ABX.   Please do not resume your home medication Torsemide until follow up visit with your primary doctor

## 2019-07-10 NOTE — PROGRESS NOTE ADULT - PROBLEM SELECTOR PLAN 6
monitor QTC  possibly from irritation of diaphragm from paraduodenal collection and the PTX.
C/w PO coreg 6.25mg PO BID, hydralazine 50mg TID with hold parameters
path report after Whipple's reveals: Infiltrating adenocarcinoma, moderately differentiated, of the common bile duct. Management per Onc after resolution of infection.  Dilaudid 0.5 q3 PRN for mod-severe pain
C/w home meds: PO coreg 6.25mg PO BID, hydralazine at 25mg (on 50mg at home) TID with hold parameters

## 2019-07-10 NOTE — PROGRESS NOTE ADULT - PROBLEM SELECTOR PLAN 8
PO protonix, melatonin qhs for insomnia  DVT ppx with HSQ given hypercoagulable state in setting of cholangiocarcinoma.  PT: no needs
NPO with NGT clamped  BP controlled. Would hold off antihypertensives in setting of possible active infection.
PO protonix, melatonin qhs for insomnia  DVT ppx with HSQ given hypercoagulable state in setting of cholangiocarcinoma.  PT: no needs
s/p 9 stents  ASA, coreg, crestor.
s/p 9 stents  ASA, coreg, crestor.
s/p 9 stents  resume ASA, coreg, crestor.

## 2019-07-10 NOTE — DISCHARGE NOTE NURSING/CASE MANAGEMENT/SOCIAL WORK - NSDCDPATPORTLINK_GEN_ALL_CORE
You can access the [facility] Patient Portal, offered by [facility], by registering with the following website: [email or web address]

## 2019-07-10 NOTE — PROGRESS NOTE ADULT - PROBLEM SELECTOR PLAN 7
path report after Whipple's reveals: Infiltrating adenocarcinoma, moderately differentiated, of the common bile duct.
BP now rising, SBP 160s this AM.   DC IV metoprolol and resume home meds: PO coreg 6.25mg PO BID, resume hydralazine at 25mg (on 50mg at home) TID with hold parameters
C/w home meds: PO coreg 6.25mg PO BID, hydralazine at 25mg (on 50mg at home) TID with hold parameters
C/w home meds: PO coreg 6.25mg PO BID, hydralazine at 25mg (on 50mg at home) TID with hold parameters
s/p 9 stents  ASA, coreg, crestor.

## 2019-07-10 NOTE — PROGRESS NOTE ADULT - SUBJECTIVE AND OBJECTIVE BOX
INCOMPLETE CHIEF COMPLAINT: f/u     SUBJECTIVE / OVERNIGHT EVENTS: Patient seen and examined. No acute events overnight. Pain well controlled and patient without any complaints. Denies chest pain, SOB, N/V/D. States he is going home today.    MEDICATIONS  (STANDING):  aspirin enteric coated 81 milliGRAM(s) Oral daily  atorvastatin 40 milliGRAM(s) Oral at bedtime  carvedilol 6.25 milliGRAM(s) Oral every 12 hours  chlorhexidine 4% Liquid 1 Application(s) Topical <User Schedule>  dextrose 50% Injectable 12.5 Gram(s) IV Push once  dextrose 50% Injectable 25 Gram(s) IV Push once  dextrose 50% Injectable 25 Gram(s) IV Push once  docusate sodium 100 milliGRAM(s) Oral daily  ertapenem  IVPB 1000 milliGRAM(s) IV Intermittent every 24 hours  heparin  Injectable 5000 Unit(s) SubCutaneous every 8 hours  hydrALAZINE 50 milliGRAM(s) Oral three times a day  insulin glargine Injectable (LANTUS) 8 Unit(s) SubCutaneous at bedtime  insulin lispro (HumaLOG) corrective regimen sliding scale   SubCutaneous Before meals and at bedtime  melatonin 3 milliGRAM(s) Oral <User Schedule>  multivitamin 1 Tablet(s) Oral daily  ondansetron Injectable 4 milliGRAM(s) IV Push once  pantoprazole    Tablet 40 milliGRAM(s) Oral before breakfast    MEDICATIONS  (PRN):  bisacodyl Suppository 10 milliGRAM(s) Rectal daily PRN Constipation  cyclobenzaprine 5 milliGRAM(s) Oral three times a day PRN Muscle Spasm  dextrose 40% Gel 15 Gram(s) Oral once PRN Blood Glucose LESS THAN 70 milliGRAM(s)/deciLiter  glucagon  Injectable 1 milliGRAM(s) IntraMuscular once PRN Glucose <70 milliGRAM(s)/deciLiter  HYDROmorphone  Injectable 0.5 milliGRAM(s) IV Push every 3 hours PRN Severe Pain (7 - 10)      VITALS:  T(F): 98.3 (07-10-19 @ 11:44), Max: 98.8 (07-09-19 @ 17:43)  HR: 70 (07-10-19 @ 11:44) (65 - 91)  BP: 156/62 (07-10-19 @ 11:44) (138/59 - 177/69)  RR: 17 (07-10-19 @ 11:44) (16 - 18)  SpO2: 97% (07-10-19 @ 11:44)  Wt(kg): --      CAPILLARY BLOOD GLUCOSE      PHYSICAL EXAM:  GENERAL: NAD, on room air  HEENT: sclera clear  CHEST/RESPIRATORY: CTA b/l, decreased BS left lung base  CARDIOVASCULAR: s1/s2, no murmurs appreciated  GASTROINTESTINAL: Soft, nontender, Nondistended; BS present, midline abdominal scar, + jejunostomy tube, + YUMIKO drain with no fluid  EXTREMITIES: WWP, no edema b/l, LUE PICC C/D/I  NERVOUS SYSTEM:  awake, alert, responds to Qs appropriately    LABS:              10.6                 136  | 21   | 12           10.48 >-----------< 372     ------------------------< 145                   33.6                 4.1  | 105  | 1.85                                         Ca 8.7   Mg x     Ph x           TPro  7.0  /  Alb  2.4      TBili  0.5  /  DBili  x         AST  19  /  ALT  10            AlkPhos  75      INR: 1.06 ;    PT: 11.8 SEC;    PTT: 28.8 SEC      MICROBIOLOGY:      RADIOLOGY & ADDITIONAL TESTS:  Imaging Personally Reviewed: [ ] Yes    [ ] Consultant(s) Notes Reviewed:  [x] Care Discussed with Consultants/Other Providers:

## 2019-07-10 NOTE — PROGRESS NOTE ADULT - PROBLEM SELECTOR PROBLEM 1
Acute kidney injury superimposed on CKD
Acute kidney injury superimposed on CKD
Abnormal CT of the abdomen
Abnormal CT of the abdomen
Abscess
Acute kidney injury superimposed on CKD
Acute kidney injury superimposed on CKD
Abnormal CT of the abdomen

## 2019-07-10 NOTE — PROGRESS NOTE ADULT - REASON FOR ADMISSION
Nausea/vomiting

## 2019-07-10 NOTE — PROGRESS NOTE ADULT - PROBLEM SELECTOR PROBLEM 6
Hiccups
Cholangiocarcinoma
Hypertension, unspecified type

## 2019-07-10 NOTE — PROGRESS NOTE ADULT - PROBLEM SELECTOR PROBLEM 7
Cholangiocarcinoma
Coronary artery disease without angina pectoris, unspecified vessel or lesion type, unspecified whether native or transplanted heart
Hypertension, unspecified type
Coronary artery disease without angina pectoris, unspecified vessel or lesion type, unspecified whether native or transplanted heart

## 2019-07-10 NOTE — DISCHARGE NOTE PROVIDER - HOSPITAL COURSE
63M hx of Whipple for cholangiocarcinoma 6/18 d/c 6/26 who presents with nausea, vomiting, and fever since discharge. He had been eating, passing flatus and having BMs without issue. His J tube had not been used. Since d/c he has been feeling general malaise and recently became febrile. He doesn't describe true abdominal pain, but says he feels discomfort and bloating.     CT ABD/PEL     Status post recent Whipple procedure. High density amorphous collection     adjacent to the duodenal stump with loculated foci of air and fluid in the     right upper quadrant for which considerations include postoperative hematoma     and air, leakage of contrast and air from the duodenal stump, or developing     collection/abscess.         Left-sided pigtail chest tube. Trace left pneumothorax along the anterior     chest wall.     Pt was readmitted for a chest tube placement and a IR drain placement for a fluid collection; cultures were sent and multi-organisms were present. Pt will need 4 weeks of IV Ertapenem. Dr. Gray had a PICC line placed for IV ABX. Plan for discharge home with PICC line and IR drain.

## 2019-07-10 NOTE — DISCHARGE NOTE NURSING/CASE MANAGEMENT/SOCIAL WORK - NSDCPEEMAIL_GEN_ALL_CORE
[facility] for Tobacco Control email [email or web address]

## 2019-07-10 NOTE — PROGRESS NOTE ADULT - PROBLEM SELECTOR PROBLEM 5
Type 2 diabetes mellitus with stage 4 chronic kidney disease, with long-term current use of insulin
Cholangiocarcinoma
Hiccups
Cholangiocarcinoma

## 2019-07-10 NOTE — DISCHARGE NOTE PROVIDER - CARE PROVIDERS DIRECT ADDRESSES
[email or web address]

## 2019-07-10 NOTE — PROGRESS NOTE ADULT - PROBLEM SELECTOR PLAN 4
s/p left sided chest tube: removed 7/4, repeat CXR with no PTX, small residual seen on CT A/P from 7/8  right sided costochondritis: avoid NSAIDs: warm pads to area and tylenol PRN  Nonspecific ground-glass opacities also seen which could be reexpansion edema or patchy atelectasis  Continue management as per thoracic surgery

## 2019-07-10 NOTE — PROGRESS NOTE ADULT - PROBLEM SELECTOR PLAN 1
Met sepsis criteria on admission, with leukocytosis and fever s/p ipples 6/18/19  CT Abd with paraduodenal collection, consistent with abscess s/p IR drainage. Abscess culture growing Ecoli, Efaecalis, and Klebsiella. on Zosyn till discharge then transition to IV ertapenem 1g daily for another 14 days per ID recs via PICC.  Repeat CT A/P on 7/8 reveals decrease in size of abdominal collection, mod volume ascites, small b/l pleural effusions, small residual left PTX  6/30 Bcx NGTD, Ucx >3 organisms  Had recent E coli and Citrobacter bacteremia presumed 2/2 biliary source, s/p ERCP 5/19/19 notable for bile in the duodenum and additional placement of plastic biliary stent alongside prior stent. Met sepsis criteria on admission, with leukocytosis and fever s/p ipples 6/18/19  CT Abd with paraduodenal collection, consistent with abscess s/p IR drainage. Abscess culture growing Ecoli, Efaecalis, and Klebsiella. on Zosyn till discharge then transition to IV ertapenem 1g daily for another 14 days per ID recs via LUE PICC.  Repeat CT A/P on 7/8 reveals decrease in size of abdominal collection, mod volume ascites, small b/l pleural effusions, small residual left PTX  6/30 Bcx NGTD, Ucx >3 organisms  Had recent E coli and Citrobacter bacteremia presumed 2/2 biliary source, s/p ERCP 5/19/19 notable for bile in the duodenum and additional placement of plastic biliary stent alongside prior stent.

## 2019-07-10 NOTE — PROGRESS NOTE ADULT - PROBLEM SELECTOR PLAN 5
Last A1C: 5.6 in 6/2019, currently NPO  Continue Lantus at 10U, SSI q6 while NPO.
intermittent hiccups  possibly from irritation of diaphragm from paraduodenal collection and the PTX.
intermittent hiccups  possibly from irritation of diaphragm from paraduodenal collection and the PTX.
path report after Whipple's reveals: Infiltrating adenocarcinoma, moderately differentiated, of the common bile duct. Management per Onc after resolution of infection.  Dilaudid 0.5 q3 PRN for mod-severe pain
resolved
path report after Whipple's reveals: Infiltrating adenocarcinoma, moderately differentiated, of the common bile duct. Management per Onc after resolution of infection.  Dilaudid 0.5 q3 PRN for mod-severe pain

## 2019-07-15 ENCOUNTER — APPOINTMENT (OUTPATIENT)
Dept: SURGICAL ONCOLOGY | Facility: CLINIC | Age: 64
End: 2019-07-15
Payer: COMMERCIAL

## 2019-07-15 VITALS
BODY MASS INDEX: 27.62 KG/M2 | DIASTOLIC BLOOD PRESSURE: 77 MMHG | WEIGHT: 176 LBS | OXYGEN SATURATION: 98 % | HEART RATE: 68 BPM | HEIGHT: 67 IN | RESPIRATION RATE: 14 BRPM | TEMPERATURE: 97.6 F | SYSTOLIC BLOOD PRESSURE: 169 MMHG

## 2019-07-15 DIAGNOSIS — Z87.19 PERSONAL HISTORY OF OTHER DISEASES OF THE DIGESTIVE SYSTEM: ICD-10-CM

## 2019-07-15 DIAGNOSIS — Z86.79 PERSONAL HISTORY OF OTHER DISEASES OF THE CIRCULATORY SYSTEM: ICD-10-CM

## 2019-07-15 DIAGNOSIS — Z87.898 PERSONAL HISTORY OF OTHER SPECIFIED CONDITIONS: ICD-10-CM

## 2019-07-15 DIAGNOSIS — Z86.39 PERSONAL HISTORY OF OTHER ENDOCRINE, NUTRITIONAL AND METABOLIC DISEASE: ICD-10-CM

## 2019-07-15 DIAGNOSIS — K31.9 DISEASE OF STOMACH AND DUODENUM, UNSPECIFIED: ICD-10-CM

## 2019-07-15 DIAGNOSIS — Z78.9 OTHER SPECIFIED HEALTH STATUS: ICD-10-CM

## 2019-07-15 DIAGNOSIS — Z87.39 PERSONAL HISTORY OF OTHER DISEASES OF THE MUSCULOSKELETAL SYSTEM AND CONNECTIVE TISSUE: ICD-10-CM

## 2019-07-15 PROCEDURE — 99024 POSTOP FOLLOW-UP VISIT: CPT

## 2019-07-15 RX ORDER — METOCLOPRAMIDE 10 MG/1
10 TABLET ORAL 4 TIMES DAILY
Qty: 120 | Refills: 2 | Status: ACTIVE | COMMUNITY
Start: 2019-07-15 | End: 1900-01-01

## 2019-07-16 PROBLEM — Z86.79 HISTORY OF HYPERTENSION: Status: RESOLVED | Noted: 2019-07-15 | Resolved: 2019-07-16

## 2019-07-16 PROBLEM — Z87.39 HISTORY OF JOINT PROBLEMS: Status: RESOLVED | Noted: 2019-07-15 | Resolved: 2019-07-16

## 2019-07-16 PROBLEM — Z86.79 HISTORY OF OTHER DISEASES OF THE CIRCULATORY SYSTEM, NOT ELSEWHERE CLASSIFIED: Status: RESOLVED | Noted: 2019-07-15 | Resolved: 2019-07-16

## 2019-07-16 PROBLEM — Z86.39 HISTORY OF DIABETES MELLITUS: Status: RESOLVED | Noted: 2019-07-15 | Resolved: 2019-07-16

## 2019-07-16 PROBLEM — K31.9 STOMACH PROBLEMS: Status: RESOLVED | Noted: 2019-07-15 | Resolved: 2019-07-16

## 2019-07-16 PROBLEM — Z87.898 HISTORY OF WEIGHT LOSS: Status: RESOLVED | Noted: 2019-07-15 | Resolved: 2019-07-16

## 2019-07-16 PROBLEM — Z87.19 HISTORY OF DIVERTICULOSIS: Status: RESOLVED | Noted: 2019-07-15 | Resolved: 2019-07-16

## 2019-07-16 PROBLEM — Z78.9 CURRENT NON-SMOKER: Status: ACTIVE | Noted: 2019-07-15

## 2019-07-16 NOTE — CONSULT LETTER
[Dear  ___] : Dear ~BLUE, [Consult Letter:] : I had the pleasure of evaluating your patient, [unfilled]. [Please see my note below.] : Please see my note below. [Consult Closing:] : Thank you very much for allowing me to participate in the care of this patient.  If you have any questions, please do not hesitate to contact me. 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  He was found to have distal biliary stricture.  He initially had an ERCP stent placed and was planning a Whipple procedure at another institution.  He developed occlusion of his biliary stent and developed acute cholangitis with SLOAN necessitating hemodialysis.  CT of the abdomen and pelvis did not note any distant disease.  After resolution of the cholangitis,  Gray underwent a Whipple procedure under my care.  His postoperative course was complicated by a low volume pancreatic leak/abcess and a left sided pneumothorax.  He had drainage catheters placed, and was since discharged home.  Interventional radiology pigtail catheter remains in the right upper quadrant. There is minimal output. He continues on daily ertapenem via his PICC line. Final pathology demonstratates infiltrating common bile duct adenocarcinoma with 14 of 21 lymph nodes being involved with regional disease, T3N2 with lymphovascular invasion. \par  Gray reports slow but improving p.o. intake. He denies any vomiting but does experience early satiety. He has not required to tube feeding.  \par \par Past medical history is notable for CAD with multiple cardiac stents, diabetes, hypertension, and cholangitis as mentioned. His past surgical history is notable for a laparoscopic cholecystectomy. He reports allergies to Cipro and Plavix.\par \par On exam, his midline incision is well healed. A right percutaneous pigtail catheter is in place. There is minimal output.\par \par We discussed the need for a repeat CT of the abdomen and pelvis, and a tube check.  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 He will follow up in approximately 3 weeks.  He will also trial of Reglan for early satiety. [FreeTextEntry3] : Jorge Viveros MD\par Surgical Oncology\par [facility]\par Office: [phone]\par Cell: [phone]\par  [DrSierra  ___] : Dr. HERRING [DrSierra ___] : Dr. HERRING

## 2019-07-16 NOTE — ASSESSMENT
[FreeTextEntry1] : We discussed the need for a repeat CT of the abdomen and pelvis, and a tube check.  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 He will follow up in approximately 3 weeks.  He will also trial of Reglan for early satiety.

## 2019-07-16 NOTE — HISTORY OF PRESENT ILLNESS
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  He was found to have distal biliary stricture.  He initially had an ERCP stent placed and was planning a Whipple procedure at another institution.  He developed occlusion of his biliary stent and developed acute cholangitis with SLOAN necessitating hemodialysis.  CT of the abdomen and pelvis did not note any distant disease.  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  His postoperative course was complicated by a low volume pancreatic leak/abcess and a left sided pneumothorax.  He had drainage catheters placed, and was since discharged home.  Interventional radiology pigtail catheter remains in the right upper quadrant. There is minimal output. He continues on daily ertapenem via his PICC line. Final pathology demonstratates infiltrating common bile duct adenocarcinoma with 14 of 21 lymph nodes being involved with regional disease, T3N2 with lymphovascular invasion. \par Dr. 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 intake. He denies any vomiting but does experience early satiety. He has not required to tube feeding.  \par \par Past medical history is notable for CAD with multiple cardiac stents, diabetes, hypertension, and cholangitis as mentioned. His past surgical history is notable for a laparoscopic cholecystectomy. He reports allergies to Cipro and Plavix.

## 2019-07-16 NOTE — PHYSICAL EXAM
[de-identified] : his midline incision is well healed. A right percutaneous pigtail catheter is in place. There is minimal output.\par

## 2019-07-17 ENCOUNTER — OUTPATIENT (OUTPATIENT)
Dept: OUTPATIENT SERVICES | Facility: HOSPITAL | Age: 64
LOS: 1 days | Discharge: HOME | End: 2019-07-17

## 2019-07-17 DIAGNOSIS — C22.1 INTRAHEPATIC BILE DUCT CARCINOMA: ICD-10-CM

## 2019-07-17 DIAGNOSIS — Z90.49 ACQUIRED ABSENCE OF OTHER SPECIFIED PARTS OF DIGESTIVE TRACT: Chronic | ICD-10-CM

## 2019-07-18 ENCOUNTER — FORM ENCOUNTER (OUTPATIENT)
Age: 64
End: 2019-07-18

## 2019-07-19 ENCOUNTER — OUTPATIENT (OUTPATIENT)
Dept: OUTPATIENT SERVICES | Facility: HOSPITAL | Age: 64
LOS: 1 days | End: 2019-07-19

## 2019-07-19 ENCOUNTER — APPOINTMENT (OUTPATIENT)
Dept: CT IMAGING | Facility: HOSPITAL | Age: 64
End: 2019-07-19
Payer: COMMERCIAL

## 2019-07-19 DIAGNOSIS — Z90.49 ACQUIRED ABSENCE OF OTHER SPECIFIED PARTS OF DIGESTIVE TRACT: Chronic | ICD-10-CM

## 2019-07-19 DIAGNOSIS — K65.1 PERITONEAL ABSCESS: ICD-10-CM

## 2019-07-19 PROCEDURE — 72192 CT PELVIS W/O DYE: CPT | Mod: 26

## 2019-07-22 ENCOUNTER — RESULT REVIEW (OUTPATIENT)
Age: 64
End: 2019-07-22

## 2019-07-22 ENCOUNTER — APPOINTMENT (OUTPATIENT)
Dept: HEMATOLOGY ONCOLOGY | Facility: CLINIC | Age: 64
End: 2019-07-22
Payer: COMMERCIAL

## 2019-07-22 ENCOUNTER — OUTPATIENT (OUTPATIENT)
Dept: OUTPATIENT SERVICES | Facility: HOSPITAL | Age: 64
LOS: 1 days | End: 2019-07-22

## 2019-07-22 VITALS
RESPIRATION RATE: 14 BRPM | TEMPERATURE: 97.5 F | OXYGEN SATURATION: 96 % | DIASTOLIC BLOOD PRESSURE: 68 MMHG | HEART RATE: 82 BPM | BODY MASS INDEX: 27.51 KG/M2 | HEIGHT: 66.93 IN | WEIGHT: 175.26 LBS | SYSTOLIC BLOOD PRESSURE: 110 MMHG

## 2019-07-22 DIAGNOSIS — N17.9 ACUTE KIDNEY FAILURE, UNSPECIFIED: ICD-10-CM

## 2019-07-22 DIAGNOSIS — Z90.49 ACQUIRED ABSENCE OF OTHER SPECIFIED PARTS OF DIGESTIVE TRACT: Chronic | ICD-10-CM

## 2019-07-22 DIAGNOSIS — Z80.42 FAMILY HISTORY OF MALIGNANT NEOPLASM OF PROSTATE: ICD-10-CM

## 2019-07-22 DIAGNOSIS — Z86.79 PERSONAL HISTORY OF OTHER DISEASES OF THE CIRCULATORY SYSTEM: ICD-10-CM

## 2019-07-22 DIAGNOSIS — Z82.3 FAMILY HISTORY OF STROKE: ICD-10-CM

## 2019-07-22 DIAGNOSIS — C22.1 INTRAHEPATIC BILE DUCT CARCINOMA: ICD-10-CM

## 2019-07-22 DIAGNOSIS — Z82.49 FAMILY HISTORY OF ISCHEMIC HEART DISEASE AND OTHER DISEASES OF THE CIRCULATORY SYSTEM: ICD-10-CM

## 2019-07-22 LAB
AMMONIA BLD-MCNC: 15.2 UMOL/L — SIGNIFICANT CHANGE UP (ref 11–55)
BASOPHILS # BLD AUTO: 0.1 K/UL — SIGNIFICANT CHANGE UP (ref 0–0.2)
BASOPHILS NFR BLD AUTO: 0.7 % — SIGNIFICANT CHANGE UP (ref 0–2)
EOSINOPHIL # BLD AUTO: 0.1 K/UL — SIGNIFICANT CHANGE UP (ref 0–0.5)
EOSINOPHIL NFR BLD AUTO: 1.6 % — SIGNIFICANT CHANGE UP (ref 0–6)
HCT VFR BLD CALC: 31.3 % — LOW (ref 39–50)
HGB BLD-MCNC: 10.2 G/DL — LOW (ref 13–17)
LYMPHOCYTES # BLD AUTO: 1.4 K/UL — SIGNIFICANT CHANGE UP (ref 1–3.3)
LYMPHOCYTES # BLD AUTO: 18.2 % — SIGNIFICANT CHANGE UP (ref 13–44)
MCHC RBC-ENTMCNC: 30.3 PG — SIGNIFICANT CHANGE UP (ref 27–34)
MCHC RBC-ENTMCNC: 32.5 G/DL — SIGNIFICANT CHANGE UP (ref 32–36)
MCV RBC AUTO: 93.2 FL — SIGNIFICANT CHANGE UP (ref 80–100)
MONOCYTES # BLD AUTO: 0.8 K/UL — SIGNIFICANT CHANGE UP (ref 0–0.9)
MONOCYTES NFR BLD AUTO: 9.6 % — SIGNIFICANT CHANGE UP (ref 2–14)
NEUTROPHILS # BLD AUTO: 5.6 K/UL — SIGNIFICANT CHANGE UP (ref 1.8–7.4)
NEUTROPHILS NFR BLD AUTO: 70 % — SIGNIFICANT CHANGE UP (ref 43–77)
PLATELET # BLD AUTO: 240 K/UL — SIGNIFICANT CHANGE UP (ref 150–400)
RBC # BLD: 3.36 M/UL — LOW (ref 4.2–5.8)
RBC # FLD: 15.7 % — HIGH (ref 10.3–14.5)
WBC # BLD: 8 K/UL — SIGNIFICANT CHANGE UP (ref 3.8–10.5)
WBC # FLD AUTO: 8 K/UL — SIGNIFICANT CHANGE UP (ref 3.8–10.5)

## 2019-07-22 PROCEDURE — 99245 OFF/OP CONSLTJ NEW/EST HI 55: CPT

## 2019-07-22 RX ORDER — CYCLOBENZAPRINE HYDROCHLORIDE 10 MG/1
10 TABLET, FILM COATED ORAL 3 TIMES DAILY
Qty: 90 | Refills: 0 | Status: ACTIVE | COMMUNITY
Start: 2019-07-22 | End: 1900-01-01

## 2019-07-22 RX ORDER — CARVEDILOL 25 MG/1
25 TABLET, FILM COATED ORAL
Refills: 0 | Status: ACTIVE | COMMUNITY

## 2019-07-25 ENCOUNTER — APPOINTMENT (OUTPATIENT)
Dept: INFECTIOUS DISEASE | Facility: CLINIC | Age: 64
End: 2019-07-25
Payer: COMMERCIAL

## 2019-07-25 VITALS
SYSTOLIC BLOOD PRESSURE: 124 MMHG | HEART RATE: 76 BPM | TEMPERATURE: 97 F | DIASTOLIC BLOOD PRESSURE: 66 MMHG | OXYGEN SATURATION: 99 %

## 2019-07-25 PROBLEM — N17.9 AKI (ACUTE KIDNEY INJURY): Status: ACTIVE | Noted: 2019-07-25

## 2019-07-25 LAB
ALBUMIN SERPL ELPH-MCNC: 3 G/DL
ALP BLD-CCNC: 90 U/L
ALT SERPL-CCNC: 16 U/L
ANION GAP SERPL CALC-SCNC: 12 MMOL/L
AST SERPL-CCNC: 24 U/L
BILIRUB SERPL-MCNC: 0.7 MG/DL
BUN SERPL-MCNC: 21 MG/DL
CALCIUM SERPL-MCNC: 9.1 MG/DL
CANCER AG19-9 SERPL-ACNC: 9774 U/ML
CEA SERPL-MCNC: 8.3 NG/ML
CHLORIDE SERPL-SCNC: 103 MMOL/L
CO2 SERPL-SCNC: 21 MMOL/L
CREAT SERPL-MCNC: 1.75 MG/DL
FERRITIN SERPL-MCNC: 459 NG/ML
GLUCOSE SERPL-MCNC: 238 MG/DL
HBV CORE IGG+IGM SER QL: NONREACTIVE
HBV SURFACE AB SER QL: NONREACTIVE
HBV SURFACE AG SER QL: NONREACTIVE
HCV AB SER QL: NONREACTIVE
HCV S/CO RATIO: 0.22 S/CO
IRON SATN MFR SERPL: 31 %
IRON SERPL-MCNC: 54 UG/DL
POTASSIUM SERPL-SCNC: 4.4 MMOL/L
PROT SERPL-MCNC: 7.3 G/DL
SODIUM SERPL-SCNC: 136 MMOL/L
TIBC SERPL-MCNC: 173 UG/DL
UIBC SERPL-MCNC: 119 UG/DL
VIT B12 SERPL-MCNC: 936 PG/ML

## 2019-07-25 PROCEDURE — 99214 OFFICE O/P EST MOD 30 MIN: CPT

## 2019-07-25 NOTE — REASON FOR VISIT
[Initial Consultation] : an initial consultation [Spouse] : spouse [FreeTextEntry2] : Cholangiocarcinoma

## 2019-07-25 NOTE — REVIEW OF SYSTEMS
[Fatigue] : fatigue [Recent Change In Weight] : ~T recent weight change [Lower Ext Edema] : lower extremity edema [Abdominal Pain] : abdominal pain [Diarrhea] : diarrhea [Joint Pain] : joint pain [Difficulty Walking] : difficulty walking [Negative] : Allergic/Immunologic [FreeTextEntry7] : + nausea

## 2019-07-25 NOTE — CONSULT LETTER
[Dear  ___] : Dear  [unfilled], [Courtesy Letter:] : I had the pleasure of seeing your patient, [unfilled], in my office today. [Please see my note below.] : Please see my note below. [Consult Closing:] : Thank you very much for allowing me to participate in the care of this patient.  If you have any questions, please do not hesitate to contact me. [Sincerely,] : Sincerely, [DrSierra  ___] : Dr. HERRING [DrSierra ___] : Dr. HERRING [FreeTextEntry3] : Mariaelena Hudson D.O. \par Attending Physician \par [facility] of Medical Oncology and Hematology\par  \par [facility] \par Tel: [phone]\par Fax: [phone]\par

## 2019-07-25 NOTE — HISTORY OF PRESENT ILLNESS
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 He was experiencing poor PO intake, 15-20 lb weight loss, and vague abdominal pain and nausea over the previous couple of months. He had an EGD on 3/29 which did not show a malignancy. In the beginning of April 2019 pt noted increased jaundice and was scheduled for an ERCP/EUS on 4/19 but due to a rapid rise in his Tbili presented to [facility] Emergency room on 4/19/19. ERCP on 4/20/19 showed dilated CBD with stricture s/p biliary stent placement. One month later on 5/17/19 pt was admitted to [facility] with septic shock. He was intubated, started on pressors, noted to be in ARF. CT A/P non contrast showed biliary dilatation without an obvious mass. He was transferred to [facility] ICU on 5/18/19 for higher level care and remained in the hospital until June 8th. During the hospitalization he required dialysis, had periods of bradycardia, developed NSTEMI, stress test showed apical infarct, no stenting was performed. Repeat EUS/ERCP performed on 6/4/19 showed a 10 mm pancreatic head hypoechoic mass s/p FNA x 3, stents removed and 1 metal stent was placed in CBD. Biopsy and cytology negative for malignant cells. Given the mass and biliary stricture a Whipple surgery was recommended. \par \par On June 18, 2019, underwent whipple surgery without significant complication and was d/c on 6/25/19. Final path T3N2 (14 LNB+) He was readmitted on 7/1/19 with acute L pneumothorax and abscess in the operative bed. Pt required a chest tube and a IR drain for abscess. D/c home on 7/10 with 4 weeks of IV Ab via PICC line. \par \par Referred to medical oncology for further management.  [de-identified] : moderately differentiated [de-identified] : Presents for initial consultation. Does not require dialysis anymore. Saw nephrologist, Dr. Peña last week, Cr 1.4. \par Very poor appetite, early satiety. + nausea, no vomiting. Lost 50 lb in 3 mos. Has a J tube but has not used it. \par Abdominal drain fell out last week, went to IR on friday for evaluation but wrong side imaged and unclear if abscess improved. Drain was not draining much last week prior to dislodging, therefore not replaced. No further fevers. \par Continues on Ertapenem, has not followed with ID yet. Unclear when last dose is. \par Has back pain and abdominal paij, b/l LQ. Takes Flexeril and Oxycodone occasionally at night. \par drinks 1-2 glucerna/day, small teaspoon of food. Feels abdomen is full, heavy. \par Loose BMs multiple times per day, on probiootics. \par Has home nurseing visiting services for wound change. Needs help with ADLs. Mostly in bed now. \par \par Dr. Peña (nephrology) - saw him last week, Cr 1.6. Has blood work every two weeks. \par Dr. Robledo (cardiology) - [facility] \par \par \par \par \par

## 2019-07-25 NOTE — PHYSICAL EXAM
[Capable of only limited self care, confined to bed or chair more than 50% of waking hours] : Status 3- Capable of only limited self care, confined to bed or chair more than 50% of waking hours [Normal] : affect appropriate [de-identified] : chronically ill appearing  [de-identified] : J tube in place  [de-identified] : t

## 2019-07-26 ENCOUNTER — APPOINTMENT (OUTPATIENT)
Dept: NUCLEAR MEDICINE | Facility: IMAGING CENTER | Age: 64
End: 2019-07-26

## 2019-07-26 ENCOUNTER — OTHER (OUTPATIENT)
Age: 64
End: 2019-07-26

## 2019-07-29 ENCOUNTER — APPOINTMENT (OUTPATIENT)
Dept: NUCLEAR MEDICINE | Facility: IMAGING CENTER | Age: 64
End: 2019-07-29

## 2019-07-29 ENCOUNTER — APPOINTMENT (OUTPATIENT)
Dept: SURGICAL ONCOLOGY | Facility: CLINIC | Age: 64
End: 2019-07-29

## 2019-07-29 ENCOUNTER — INPATIENT (INPATIENT)
Facility: HOSPITAL | Age: 64
LOS: 4 days | Discharge: HOME HEALTH | End: 2019-08-03
Attending: STUDENT IN AN ORGANIZED HEALTH CARE EDUCATION/TRAINING PROGRAM | Admitting: STUDENT IN AN ORGANIZED HEALTH CARE EDUCATION/TRAINING PROGRAM
Payer: COMMERCIAL

## 2019-07-29 VITALS
TEMPERATURE: 98 F | DIASTOLIC BLOOD PRESSURE: 68 MMHG | RESPIRATION RATE: 16 BRPM | HEART RATE: 67 BPM | OXYGEN SATURATION: 100 % | SYSTOLIC BLOOD PRESSURE: 143 MMHG

## 2019-07-29 DIAGNOSIS — E11.9 TYPE 2 DIABETES MELLITUS WITHOUT COMPLICATIONS: ICD-10-CM

## 2019-07-29 DIAGNOSIS — Z29.9 ENCOUNTER FOR PROPHYLACTIC MEASURES, UNSPECIFIED: ICD-10-CM

## 2019-07-29 DIAGNOSIS — R74.8 ABNORMAL LEVELS OF OTHER SERUM ENZYMES: ICD-10-CM

## 2019-07-29 DIAGNOSIS — R41.82 ALTERED MENTAL STATUS, UNSPECIFIED: ICD-10-CM

## 2019-07-29 DIAGNOSIS — I10 ESSENTIAL (PRIMARY) HYPERTENSION: ICD-10-CM

## 2019-07-29 DIAGNOSIS — Z90.49 ACQUIRED ABSENCE OF OTHER SPECIFIED PARTS OF DIGESTIVE TRACT: Chronic | ICD-10-CM

## 2019-07-29 DIAGNOSIS — C22.1 INTRAHEPATIC BILE DUCT CARCINOMA: ICD-10-CM

## 2019-07-29 DIAGNOSIS — N18.9 CHRONIC KIDNEY DISEASE, UNSPECIFIED: ICD-10-CM

## 2019-07-29 LAB
ALBUMIN SERPL ELPH-MCNC: 2.8 G/DL — LOW (ref 3.3–5)
ALP SERPL-CCNC: 81 U/L — SIGNIFICANT CHANGE UP (ref 40–120)
ALT FLD-CCNC: 19 U/L — SIGNIFICANT CHANGE UP (ref 4–41)
AMMONIA BLD-MCNC: 45 UMOL/L — SIGNIFICANT CHANGE UP (ref 11–55)
ANION GAP SERPL CALC-SCNC: 12 MMO/L — SIGNIFICANT CHANGE UP (ref 7–14)
APPEARANCE UR: CLEAR — SIGNIFICANT CHANGE UP
APTT BLD: 27.3 SEC — LOW (ref 27.5–36.3)
AST SERPL-CCNC: 34 U/L — SIGNIFICANT CHANGE UP (ref 4–40)
B PERT DNA SPEC QL NAA+PROBE: NOT DETECTED — SIGNIFICANT CHANGE UP
BACTERIA # UR AUTO: NEGATIVE — SIGNIFICANT CHANGE UP
BASE EXCESS BLDV CALC-SCNC: -3.5 MMOL/L — SIGNIFICANT CHANGE UP
BASOPHILS # BLD AUTO: 0.04 K/UL — SIGNIFICANT CHANGE UP (ref 0–0.2)
BASOPHILS NFR BLD AUTO: 0.5 % — SIGNIFICANT CHANGE UP (ref 0–2)
BILIRUB SERPL-MCNC: 0.8 MG/DL — SIGNIFICANT CHANGE UP (ref 0.2–1.2)
BILIRUB UR-MCNC: NEGATIVE — SIGNIFICANT CHANGE UP
BLOOD GAS VENOUS - CREATININE: 1.45 MG/DL — HIGH (ref 0.5–1.3)
BLOOD GAS VENOUS - FIO2: 21 — SIGNIFICANT CHANGE UP
BLOOD UR QL VISUAL: NEGATIVE — SIGNIFICANT CHANGE UP
BUN SERPL-MCNC: 25 MG/DL — HIGH (ref 7–23)
C PNEUM DNA SPEC QL NAA+PROBE: NOT DETECTED — SIGNIFICANT CHANGE UP
CALCIUM SERPL-MCNC: 9 MG/DL — SIGNIFICANT CHANGE UP (ref 8.4–10.5)
CHLORIDE BLDV-SCNC: 116 MMOL/L — HIGH (ref 96–108)
CHLORIDE SERPL-SCNC: 107 MMOL/L — SIGNIFICANT CHANGE UP (ref 98–107)
CO2 SERPL-SCNC: 21 MMOL/L — LOW (ref 22–31)
COLOR SPEC: YELLOW — SIGNIFICANT CHANGE UP
CREAT SERPL-MCNC: 1.46 MG/DL — HIGH (ref 0.5–1.3)
EOSINOPHIL # BLD AUTO: 0.13 K/UL — SIGNIFICANT CHANGE UP (ref 0–0.5)
EOSINOPHIL NFR BLD AUTO: 1.6 % — SIGNIFICANT CHANGE UP (ref 0–6)
FLUAV H1 2009 PAND RNA SPEC QL NAA+PROBE: NOT DETECTED — SIGNIFICANT CHANGE UP
FLUAV H1 RNA SPEC QL NAA+PROBE: NOT DETECTED — SIGNIFICANT CHANGE UP
FLUAV H3 RNA SPEC QL NAA+PROBE: NOT DETECTED — SIGNIFICANT CHANGE UP
FLUAV SUBTYP SPEC NAA+PROBE: NOT DETECTED — SIGNIFICANT CHANGE UP
FLUBV RNA SPEC QL NAA+PROBE: NOT DETECTED — SIGNIFICANT CHANGE UP
GAS PNL BLDV: 137 MMOL/L — SIGNIFICANT CHANGE UP (ref 136–146)
GLUCOSE BLDC GLUCOMTR-MCNC: 123 MG/DL — HIGH (ref 70–99)
GLUCOSE BLDC GLUCOMTR-MCNC: 150 MG/DL — HIGH (ref 70–99)
GLUCOSE BLDV-MCNC: 92 MG/DL — SIGNIFICANT CHANGE UP (ref 70–99)
GLUCOSE SERPL-MCNC: 99 MG/DL — SIGNIFICANT CHANGE UP (ref 70–99)
GLUCOSE UR-MCNC: NEGATIVE — SIGNIFICANT CHANGE UP
HADV DNA SPEC QL NAA+PROBE: NOT DETECTED — SIGNIFICANT CHANGE UP
HCO3 BLDV-SCNC: 21 MMOL/L — SIGNIFICANT CHANGE UP (ref 20–27)
HCOV PNL SPEC NAA+PROBE: SIGNIFICANT CHANGE UP
HCT VFR BLD CALC: 29.8 % — LOW (ref 39–50)
HCT VFR BLDV CALC: 29.2 % — LOW (ref 39–51)
HGB BLD-MCNC: 9.6 G/DL — LOW (ref 13–17)
HGB BLDV-MCNC: 9.4 G/DL — LOW (ref 13–17)
HMPV RNA SPEC QL NAA+PROBE: NOT DETECTED — SIGNIFICANT CHANGE UP
HPIV1 RNA SPEC QL NAA+PROBE: NOT DETECTED — SIGNIFICANT CHANGE UP
HPIV2 RNA SPEC QL NAA+PROBE: NOT DETECTED — SIGNIFICANT CHANGE UP
HPIV3 RNA SPEC QL NAA+PROBE: NOT DETECTED — SIGNIFICANT CHANGE UP
HPIV4 RNA SPEC QL NAA+PROBE: NOT DETECTED — SIGNIFICANT CHANGE UP
HYALINE CASTS # UR AUTO: NEGATIVE — SIGNIFICANT CHANGE UP
IMM GRANULOCYTES NFR BLD AUTO: 0.4 % — SIGNIFICANT CHANGE UP (ref 0–1.5)
INR BLD: 1.13 — SIGNIFICANT CHANGE UP (ref 0.88–1.17)
KETONES UR-MCNC: SIGNIFICANT CHANGE UP
LACTATE BLDV-MCNC: 2 MMOL/L — SIGNIFICANT CHANGE UP (ref 0.5–2)
LEUKOCYTE ESTERASE UR-ACNC: NEGATIVE — SIGNIFICANT CHANGE UP
LIDOCAIN IGE QN: 27.1 U/L — SIGNIFICANT CHANGE UP (ref 7–60)
LYMPHOCYTES # BLD AUTO: 1.24 K/UL — SIGNIFICANT CHANGE UP (ref 1–3.3)
LYMPHOCYTES # BLD AUTO: 15.6 % — SIGNIFICANT CHANGE UP (ref 13–44)
MCHC RBC-ENTMCNC: 29.9 PG — SIGNIFICANT CHANGE UP (ref 27–34)
MCHC RBC-ENTMCNC: 32.2 % — SIGNIFICANT CHANGE UP (ref 32–36)
MCV RBC AUTO: 92.8 FL — SIGNIFICANT CHANGE UP (ref 80–100)
MONOCYTES # BLD AUTO: 0.75 K/UL — SIGNIFICANT CHANGE UP (ref 0–0.9)
MONOCYTES NFR BLD AUTO: 9.4 % — SIGNIFICANT CHANGE UP (ref 2–14)
NEUTROPHILS # BLD AUTO: 5.78 K/UL — SIGNIFICANT CHANGE UP (ref 1.8–7.4)
NEUTROPHILS NFR BLD AUTO: 72.5 % — SIGNIFICANT CHANGE UP (ref 43–77)
NITRITE UR-MCNC: NEGATIVE — SIGNIFICANT CHANGE UP
NRBC # FLD: 0.03 K/UL — SIGNIFICANT CHANGE UP (ref 0–0)
PCO2 BLDV: 36 MMHG — LOW (ref 41–51)
PH BLDV: 7.38 PH — SIGNIFICANT CHANGE UP (ref 7.32–7.43)
PH UR: 6 — SIGNIFICANT CHANGE UP (ref 5–8)
PLATELET # BLD AUTO: 216 K/UL — SIGNIFICANT CHANGE UP (ref 150–400)
PMV BLD: 9.8 FL — SIGNIFICANT CHANGE UP (ref 7–13)
PO2 BLDV: 51 MMHG — HIGH (ref 35–40)
POTASSIUM BLDV-SCNC: 3.5 MMOL/L — SIGNIFICANT CHANGE UP (ref 3.4–4.5)
POTASSIUM SERPL-MCNC: 3.8 MMOL/L — SIGNIFICANT CHANGE UP (ref 3.5–5.3)
POTASSIUM SERPL-SCNC: 3.8 MMOL/L — SIGNIFICANT CHANGE UP (ref 3.5–5.3)
PROT SERPL-MCNC: 7.3 G/DL — SIGNIFICANT CHANGE UP (ref 6–8.3)
PROT UR-MCNC: 100 — HIGH
PROTHROM AB SERPL-ACNC: 12.6 SEC — SIGNIFICANT CHANGE UP (ref 9.8–13.1)
RBC # BLD: 3.21 M/UL — LOW (ref 4.2–5.8)
RBC # FLD: 17.9 % — HIGH (ref 10.3–14.5)
RBC CASTS # UR COMP ASSIST: SIGNIFICANT CHANGE UP (ref 0–?)
RSV RNA SPEC QL NAA+PROBE: NOT DETECTED — SIGNIFICANT CHANGE UP
RV+EV RNA SPEC QL NAA+PROBE: NOT DETECTED — SIGNIFICANT CHANGE UP
SAO2 % BLDV: 81.8 % — SIGNIFICANT CHANGE UP (ref 60–85)
SODIUM SERPL-SCNC: 140 MMOL/L — SIGNIFICANT CHANGE UP (ref 135–145)
SP GR SPEC: 1.03 — SIGNIFICANT CHANGE UP (ref 1–1.04)
SPECIMEN SOURCE: SIGNIFICANT CHANGE UP
SQUAMOUS # UR AUTO: SIGNIFICANT CHANGE UP
TROPONIN T, HIGH SENSITIVITY: 158 NG/L — CRITICAL HIGH (ref ?–14)
TROPONIN T, HIGH SENSITIVITY: 182 NG/L — CRITICAL HIGH (ref ?–14)
UROBILINOGEN FLD QL: SIGNIFICANT CHANGE UP
WBC # BLD: 7.97 K/UL — SIGNIFICANT CHANGE UP (ref 3.8–10.5)
WBC # FLD AUTO: 7.97 K/UL — SIGNIFICANT CHANGE UP (ref 3.8–10.5)
WBC UR QL: SIGNIFICANT CHANGE UP (ref 0–?)

## 2019-07-29 PROCEDURE — 99223 1ST HOSP IP/OBS HIGH 75: CPT | Mod: GC

## 2019-07-29 PROCEDURE — 99223 1ST HOSP IP/OBS HIGH 75: CPT

## 2019-07-29 PROCEDURE — 71045 X-RAY EXAM CHEST 1 VIEW: CPT | Mod: 26

## 2019-07-29 PROCEDURE — 70450 CT HEAD/BRAIN W/O DYE: CPT | Mod: 26

## 2019-07-29 PROCEDURE — 74176 CT ABD & PELVIS W/O CONTRAST: CPT | Mod: 26

## 2019-07-29 RX ORDER — PANTOPRAZOLE SODIUM 20 MG/1
40 TABLET, DELAYED RELEASE ORAL DAILY
Refills: 0 | Status: DISCONTINUED | OUTPATIENT
Start: 2019-07-29 | End: 2019-07-30

## 2019-07-29 RX ORDER — ACETAMINOPHEN 500 MG
1000 TABLET ORAL ONCE
Refills: 0 | Status: COMPLETED | OUTPATIENT
Start: 2019-07-29 | End: 2019-07-29

## 2019-07-29 RX ORDER — SODIUM CHLORIDE 9 MG/ML
1000 INJECTION, SOLUTION INTRAVENOUS
Refills: 0 | Status: DISCONTINUED | OUTPATIENT
Start: 2019-07-29 | End: 2019-07-29

## 2019-07-29 RX ORDER — ASPIRIN/CALCIUM CARB/MAGNESIUM 324 MG
325 TABLET ORAL ONCE
Refills: 0 | Status: COMPLETED | OUTPATIENT
Start: 2019-07-29 | End: 2019-07-29

## 2019-07-29 RX ORDER — DEXTROSE 50 % IN WATER 50 %
15 SYRINGE (ML) INTRAVENOUS ONCE
Refills: 0 | Status: DISCONTINUED | OUTPATIENT
Start: 2019-07-29 | End: 2019-08-03

## 2019-07-29 RX ORDER — PIPERACILLIN AND TAZOBACTAM 4; .5 G/20ML; G/20ML
3.38 INJECTION, POWDER, LYOPHILIZED, FOR SOLUTION INTRAVENOUS EVERY 8 HOURS
Refills: 0 | Status: DISCONTINUED | OUTPATIENT
Start: 2019-07-29 | End: 2019-08-02

## 2019-07-29 RX ORDER — DEXTROSE 50 % IN WATER 50 %
12.5 SYRINGE (ML) INTRAVENOUS ONCE
Refills: 0 | Status: DISCONTINUED | OUTPATIENT
Start: 2019-07-29 | End: 2019-08-01

## 2019-07-29 RX ORDER — DEXTROSE 50 % IN WATER 50 %
25 SYRINGE (ML) INTRAVENOUS ONCE
Refills: 0 | Status: DISCONTINUED | OUTPATIENT
Start: 2019-07-29 | End: 2019-08-03

## 2019-07-29 RX ORDER — ZOLPIDEM TARTRATE 10 MG/1
1 TABLET ORAL
Qty: 0 | Refills: 0 | DISCHARGE

## 2019-07-29 RX ORDER — SODIUM CHLORIDE 9 MG/ML
1000 INJECTION INTRAMUSCULAR; INTRAVENOUS; SUBCUTANEOUS ONCE
Refills: 0 | Status: COMPLETED | OUTPATIENT
Start: 2019-07-29 | End: 2019-07-29

## 2019-07-29 RX ORDER — HYDRALAZINE HCL 50 MG
50 TABLET ORAL ONCE
Refills: 0 | Status: COMPLETED | OUTPATIENT
Start: 2019-07-29 | End: 2019-07-29

## 2019-07-29 RX ORDER — SODIUM CHLORIDE 9 MG/ML
1000 INJECTION, SOLUTION INTRAVENOUS
Refills: 0 | Status: DISCONTINUED | OUTPATIENT
Start: 2019-07-29 | End: 2019-08-03

## 2019-07-29 RX ORDER — CARVEDILOL PHOSPHATE 80 MG/1
6.25 CAPSULE, EXTENDED RELEASE ORAL EVERY 12 HOURS
Refills: 0 | Status: DISCONTINUED | OUTPATIENT
Start: 2019-07-29 | End: 2019-07-29

## 2019-07-29 RX ORDER — VANCOMYCIN HCL 1 G
1000 VIAL (EA) INTRAVENOUS ONCE
Refills: 0 | Status: COMPLETED | OUTPATIENT
Start: 2019-07-29 | End: 2019-07-29

## 2019-07-29 RX ORDER — ATORVASTATIN CALCIUM 80 MG/1
40 TABLET, FILM COATED ORAL AT BEDTIME
Refills: 0 | Status: DISCONTINUED | OUTPATIENT
Start: 2019-07-29 | End: 2019-07-30

## 2019-07-29 RX ORDER — ACETAMINOPHEN 500 MG
650 TABLET ORAL EVERY 6 HOURS
Refills: 0 | Status: DISCONTINUED | OUTPATIENT
Start: 2019-07-29 | End: 2019-08-03

## 2019-07-29 RX ORDER — CARVEDILOL PHOSPHATE 80 MG/1
6.25 CAPSULE, EXTENDED RELEASE ORAL EVERY 12 HOURS
Refills: 0 | Status: DISCONTINUED | OUTPATIENT
Start: 2019-07-29 | End: 2019-08-01

## 2019-07-29 RX ORDER — GLUCAGON INJECTION, SOLUTION 0.5 MG/.1ML
1 INJECTION, SOLUTION SUBCUTANEOUS ONCE
Refills: 0 | Status: DISCONTINUED | OUTPATIENT
Start: 2019-07-29 | End: 2019-08-03

## 2019-07-29 RX ORDER — HYDRALAZINE HCL 50 MG
50 TABLET ORAL EVERY 8 HOURS
Refills: 0 | Status: DISCONTINUED | OUTPATIENT
Start: 2019-07-29 | End: 2019-08-01

## 2019-07-29 RX ORDER — PIPERACILLIN AND TAZOBACTAM 4; .5 G/20ML; G/20ML
3.38 INJECTION, POWDER, LYOPHILIZED, FOR SOLUTION INTRAVENOUS ONCE
Refills: 0 | Status: COMPLETED | OUTPATIENT
Start: 2019-07-29 | End: 2019-07-29

## 2019-07-29 RX ORDER — INSULIN LISPRO 100/ML
VIAL (ML) SUBCUTANEOUS EVERY 6 HOURS
Refills: 0 | Status: DISCONTINUED | OUTPATIENT
Start: 2019-07-29 | End: 2019-08-01

## 2019-07-29 RX ORDER — ASPIRIN/CALCIUM CARB/MAGNESIUM 324 MG
81 TABLET ORAL DAILY
Refills: 0 | Status: DISCONTINUED | OUTPATIENT
Start: 2019-07-29 | End: 2019-07-29

## 2019-07-29 RX ORDER — OLANZAPINE 15 MG/1
2.5 TABLET, FILM COATED ORAL EVERY 8 HOURS
Refills: 0 | Status: DISCONTINUED | OUTPATIENT
Start: 2019-07-29 | End: 2019-08-03

## 2019-07-29 RX ORDER — HEPARIN SODIUM 5000 [USP'U]/ML
5000 INJECTION INTRAVENOUS; SUBCUTANEOUS EVERY 8 HOURS
Refills: 0 | Status: DISCONTINUED | OUTPATIENT
Start: 2019-07-29 | End: 2019-07-30

## 2019-07-29 RX ADMIN — Medication 650 MILLIGRAM(S): at 18:26

## 2019-07-29 RX ADMIN — SODIUM CHLORIDE 1000 MILLILITER(S): 9 INJECTION INTRAMUSCULAR; INTRAVENOUS; SUBCUTANEOUS at 06:38

## 2019-07-29 RX ADMIN — Medication 1000 MILLIGRAM(S): at 09:59

## 2019-07-29 RX ADMIN — Medication 325 MILLIGRAM(S): at 10:26

## 2019-07-29 RX ADMIN — Medication 1000 MILLIGRAM(S): at 09:39

## 2019-07-29 RX ADMIN — PIPERACILLIN AND TAZOBACTAM 3.38 GRAM(S): 4; .5 INJECTION, POWDER, LYOPHILIZED, FOR SOLUTION INTRAVENOUS at 07:17

## 2019-07-29 RX ADMIN — SODIUM CHLORIDE 1000 MILLILITER(S): 9 INJECTION INTRAMUSCULAR; INTRAVENOUS; SUBCUTANEOUS at 07:23

## 2019-07-29 RX ADMIN — CARVEDILOL PHOSPHATE 6.25 MILLIGRAM(S): 80 CAPSULE, EXTENDED RELEASE ORAL at 18:26

## 2019-07-29 RX ADMIN — PIPERACILLIN AND TAZOBACTAM 25 GRAM(S): 4; .5 INJECTION, POWDER, LYOPHILIZED, FOR SOLUTION INTRAVENOUS at 16:40

## 2019-07-29 RX ADMIN — PIPERACILLIN AND TAZOBACTAM 200 GRAM(S): 4; .5 INJECTION, POWDER, LYOPHILIZED, FOR SOLUTION INTRAVENOUS at 07:14

## 2019-07-29 RX ADMIN — Medication 50 MILLIGRAM(S): at 19:56

## 2019-07-29 RX ADMIN — Medication 250 MILLIGRAM(S): at 08:01

## 2019-07-29 RX ADMIN — HEPARIN SODIUM 5000 UNIT(S): 5000 INJECTION INTRAVENOUS; SUBCUTANEOUS at 16:40

## 2019-07-29 RX ADMIN — Medication 400 MILLIGRAM(S): at 09:22

## 2019-07-29 NOTE — ED PROVIDER NOTE - CLINICAL SUMMARY MEDICAL DECISION MAKING FREE TEXT BOX
Carolina Monique MD PGY-2 pt is a 64 yo M with PMH cholangiocarcinoma (dx 8/18) s/p whipple procedure, with intrabdominal abscess and L sided chest tube and PTX p/w 1 week of progressive AMS, particularly worse over last 24 hours a/w hallucinations. Concern for sepsis 2/2 intraabdominal abscess or other infection vs uremia vs hepatic encephalopathy. Recently admitted under Dr. Viveros service. Will get labs including bcx, imaging (xray, head CT given AMS, Ct abdomen/pelvis noncontrast given recent kidney failure), IVF, empiric abx. Will admit ICU vs Surgery

## 2019-07-29 NOTE — CONSULT NOTE ADULT - SUBJECTIVE AND OBJECTIVE BOX
Patient is a 63y old  Male who presents with a chief complaint of confusion (29 Jul 2019 12:16)      HPI:  63 M, with cholangiocarcinoma s/p whipple 6/2019 that was c/b abscess requiring 4 weeks of IV Ab, also spontaneous pneumothorax s/p chest tube with resolution, was seen by oncology in clinic and plan for staging work up (repeat imaging and paracentesis) 2/2 CA 19-9 >9000, p/w AMS.     ROS: as above     PAST MEDICAL & SURGICAL HISTORY:  Cholangiocarcinoma  Type II diabetes mellitus  HTN (hypertension)  CAD (coronary artery disease)  S/P cholecystectomy      SOCIAL HISTORY:    FAMILY HISTORY:      MEDICATIONS  (STANDING):  acetaminophen    Suspension .. 650 milliGRAM(s) Oral every 6 hours  aspirin  chewable 81 milliGRAM(s) Oral daily  atorvastatin 40 milliGRAM(s) Oral at bedtime  carvedilol 6.25 milliGRAM(s) Oral every 12 hours  dextrose 5%. 1000 milliLiter(s) (50 mL/Hr) IV Continuous <Continuous>  dextrose 50% Injectable 12.5 Gram(s) IV Push once  dextrose 50% Injectable 25 Gram(s) IV Push once  dextrose 50% Injectable 25 Gram(s) IV Push once  heparin  Injectable 5000 Unit(s) SubCutaneous every 8 hours  insulin lispro (HumaLOG) corrective regimen sliding scale   SubCutaneous every 6 hours  pantoprazole   Suspension 40 milliGRAM(s) Oral daily  piperacillin/tazobactam IVPB.. 3.375 Gram(s) IV Intermittent every 8 hours    MEDICATIONS  (PRN):  dextrose 40% Gel 15 Gram(s) Oral once PRN Blood Glucose LESS THAN 70 milliGRAM(s)/deciLiter  glucagon  Injectable 1 milliGRAM(s) IntraMuscular once PRN Glucose <70 milliGRAM(s)/deciLiter      Allergies    Cipro (Rash)  Plavix (Rash)    Intolerances        Vital Signs Last 24 Hrs  T(C): 37.2 (29 Jul 2019 08:58), Max: 37.3 (29 Jul 2019 06:14)  T(F): 98.9 (29 Jul 2019 08:58), Max: 99.2 (29 Jul 2019 06:14)  HR: 75 (29 Jul 2019 12:22) (64 - 77)  BP: 175/72 (29 Jul 2019 12:22) (143/68 - 199/84)  BP(mean): --  RR: 16 (29 Jul 2019 12:22) (16 - 16)  SpO2: 98% (29 Jul 2019 12:22) (98% - 100%)    PHYSICAL EXAM      LABS:                          9.6    7.97  )-----------( 216      ( 29 Jul 2019 06:10 )             29.8         Mean Cell Volume : 92.8 fL  Mean Cell Hemoglobin : 29.9 pg  Mean Cell Hemoglobin Concentration : 32.2 %  Auto Neutrophil # : 5.78 K/uL  Auto Lymphocyte # : 1.24 K/uL  Auto Monocyte # : 0.75 K/uL  Auto Eosinophil # : 0.13 K/uL  Auto Basophil # : 0.04 K/uL  Auto Neutrophil % : 72.5 %  Auto Lymphocyte % : 15.6 %  Auto Monocyte % : 9.4 %  Auto Eosinophil % : 1.6 %  Auto Basophil % : 0.5 %      Serial CBC's  07-29 @ 06:10  Hct-29.8 / Hgb-9.6 / Plat-216 / RBC-3.21 / WBC-7.97      07-29    140  |  107  |  25<H>  ----------------------------<  99  3.8   |  21<L>  |  1.46<H>    Ca    9.0      29 Jul 2019 06:10    TPro  7.3  /  Alb  2.8<L>  /  TBili  0.8  /  DBili  x   /  AST  34  /  ALT  19  /  AlkPhos  81  07-29      PT/INR - ( 29 Jul 2019 06:10 )   PT: 12.6 SEC;   INR: 1.13          PTT - ( 29 Jul 2019 06:10 )  PTT:27.3 SEC        RADIOLOGY & ADDITIONAL STUDIES:    IMPRESSION:     Moderate partially loculated left pleural effusion, slightly increased in   size since 7/8/2019.    Peripheral right lower lobe nodule, slightly increased in size.    Status post removal of percutaneous drainage catheter from the right   upper quadrant. A residual amorphous collection containing fluid and a   small amount of gas in the right upper quadrant measures 2.4 x 2.2 cm,   slightly decreased in size since 7/8/2019.    Mesenteric lymphadenopathy, slightly increased in size since 7/8/2019.   Subtle nodularity of the omentum, raising consideration of peritoneal   carcinomatosis.    Moderate to large volume of ascites, increased. Patient is a 63y old  Male who presents with a chief complaint of confusion (29 Jul 2019 12:16)      HPI:  63 M, with cholangiocarcinoma s/p whipple 6/2019 that was c/b abscess requiring 4 weeks of IV Ab, also spontaneous pneumothorax s/p chest tube with resolution, was seen by oncology in clinic and plan for staging work up (repeat imaging and paracentesis) 2/2 CA 19-9 >9000, p/w AMS.     ROS: as above     PAST MEDICAL & SURGICAL HISTORY:  Cholangiocarcinoma  Type II diabetes mellitus  HTN (hypertension)  CAD (coronary artery disease)  S/P cholecystectomy      SOCIAL HISTORY:    FAMILY HISTORY:      MEDICATIONS  (STANDING):  acetaminophen    Suspension .. 650 milliGRAM(s) Oral every 6 hours  aspirin  chewable 81 milliGRAM(s) Oral daily  atorvastatin 40 milliGRAM(s) Oral at bedtime  carvedilol 6.25 milliGRAM(s) Oral every 12 hours  dextrose 5%. 1000 milliLiter(s) (50 mL/Hr) IV Continuous <Continuous>  dextrose 50% Injectable 12.5 Gram(s) IV Push once  dextrose 50% Injectable 25 Gram(s) IV Push once  dextrose 50% Injectable 25 Gram(s) IV Push once  heparin  Injectable 5000 Unit(s) SubCutaneous every 8 hours  insulin lispro (HumaLOG) corrective regimen sliding scale   SubCutaneous every 6 hours  pantoprazole   Suspension 40 milliGRAM(s) Oral daily  piperacillin/tazobactam IVPB.. 3.375 Gram(s) IV Intermittent every 8 hours    MEDICATIONS  (PRN):  dextrose 40% Gel 15 Gram(s) Oral once PRN Blood Glucose LESS THAN 70 milliGRAM(s)/deciLiter  glucagon  Injectable 1 milliGRAM(s) IntraMuscular once PRN Glucose <70 milliGRAM(s)/deciLiter      Allergies    Cipro (Rash)  Plavix (Rash)    Intolerances        Vital Signs Last 24 Hrs  T(C): 37.2 (29 Jul 2019 08:58), Max: 37.3 (29 Jul 2019 06:14)  T(F): 98.9 (29 Jul 2019 08:58), Max: 99.2 (29 Jul 2019 06:14)  HR: 75 (29 Jul 2019 12:22) (64 - 77)  BP: 175/72 (29 Jul 2019 12:22) (143/68 - 199/84)  BP(mean): --  RR: 16 (29 Jul 2019 12:22) (16 - 16)  SpO2: 98% (29 Jul 2019 12:22) (98% - 100%)    PHYSICAL EXAM  Awake, Altered, not oriented  sclera not icteric, conjunctiva pink  breathing comfortably  abdomen soft, + ascites  no LE edema    LABS:                          9.6    7.97  )-----------( 216      ( 29 Jul 2019 06:10 )             29.8         Mean Cell Volume : 92.8 fL  Mean Cell Hemoglobin : 29.9 pg  Mean Cell Hemoglobin Concentration : 32.2 %  Auto Neutrophil # : 5.78 K/uL  Auto Lymphocyte # : 1.24 K/uL  Auto Monocyte # : 0.75 K/uL  Auto Eosinophil # : 0.13 K/uL  Auto Basophil # : 0.04 K/uL  Auto Neutrophil % : 72.5 %  Auto Lymphocyte % : 15.6 %  Auto Monocyte % : 9.4 %  Auto Eosinophil % : 1.6 %  Auto Basophil % : 0.5 %      Serial CBC's  07-29 @ 06:10  Hct-29.8 / Hgb-9.6 / Plat-216 / RBC-3.21 / WBC-7.97      07-29    140  |  107  |  25<H>  ----------------------------<  99  3.8   |  21<L>  |  1.46<H>    Ca    9.0      29 Jul 2019 06:10    TPro  7.3  /  Alb  2.8<L>  /  TBili  0.8  /  DBili  x   /  AST  34  /  ALT  19  /  AlkPhos  81  07-29      PT/INR - ( 29 Jul 2019 06:10 )   PT: 12.6 SEC;   INR: 1.13          PTT - ( 29 Jul 2019 06:10 )  PTT:27.3 SEC        RADIOLOGY & ADDITIONAL STUDIES:    IMPRESSION:     Moderate partially loculated left pleural effusion, slightly increased in   size since 7/8/2019.    Peripheral right lower lobe nodule, slightly increased in size.    Status post removal of percutaneous drainage catheter from the right   upper quadrant. A residual amorphous collection containing fluid and a   small amount of gas in the right upper quadrant measures 2.4 x 2.2 cm,   slightly decreased in size since 7/8/2019.    Mesenteric lymphadenopathy, slightly increased in size since 7/8/2019.   Subtle nodularity of the omentum, raising consideration of peritoneal   carcinomatosis.    Moderate to large volume of ascites, increased.

## 2019-07-29 NOTE — ED ADULT NURSE REASSESSMENT NOTE - NS ED NURSE REASSESS COMMENT FT1
Pt A&OX0, responds to name. Pt more alert for brief period of time, will answer some questions somewhat appropriately. Pt noted to have mumbling speech, jerking movements, picking at the air, appears to be intermittently hallucinating, pulling at IV lines and ID bands, Charge RN made aware. Pt will need enhanced monitoring until family returns to bedside. MD Lopez at bedside to assess patient, observes patient is more alert at present time. Will continue to monitor. Pt A&OX0, responds to name. Pt more alert for brief period of time, will answer some questions somewhat appropriately. Pt noted to have mumbling speech, jerking movements, picking at the air, appears to be intermittently hallucinating, pulling at IV lines and ID bands, Charge RN made aware. PCA at bedside for enhanced supervision until family returns to bedside. MD Lopez at bedside to assess patient, observes patient is more alert at present time. Will continue to monitor.

## 2019-07-29 NOTE — ED PROVIDER NOTE - ATTENDING CONTRIBUTION TO CARE
agree with resident note  "62 yo M with PMH cholangiocarcinoma (dx 8/18) s/p whipple procedure, with intrabdominal abscess and L sided chest tube and PTX p/w 1 week of progressive AMS, particularly worse over last 24 hours a/w hallucinations. Was discharged on 7/10/19 with a PICC line for 4 weeks of ertapenem. "  Son states over last 2-3 weeks since discharge slow but gradual worsening to today where pt is severely confused.    PE: altered, can state his name at times; afebrile; neck supple; CTAB/L; s1 s2 no m/r/g abd soft/no fluid wave ext: no edema    Imp: encephalopathic; possible uremic? will check labs; pt afebrile and appears this has been a gradual worsening making bacterial meningitis less likely; will get broad spectrum abx; CT head to r/o mets; ct abd/pelvis to r/o abscess; and then reassessment

## 2019-07-29 NOTE — CONSULT NOTE ADULT - ASSESSMENT
63 M, with cholangiocarcinoma s/p whipple 6/2019 that was c/b abscess requiring 4 weeks of IV Ab, also spontaneous pneumothorax s/p chest tube with resolution, was seen by oncology in clinic and plan for staging work up (repeat imaging and paracentesis) 2/2 CA 19-9 >9000, p/w AMS.    -Neurology consult  -Utox  -diagnostic and therapeutic para to be sent for cytology and culture, cell count  -pulm eval, please consider thoracentesis and send for cytology    -Supportive care, pain control, Nutrition, PT, DVT ppx  -Outpatient oncology f/u    Will follow. Please do not hesitate to call back with questions.     Zulma Ramires MD  Medical Oncology Attending  C: [phone] 63 M, with cholangiocarcinoma s/p whipple 6/2019 that was c/b abscess requiring 4 weeks of IV Ab, also spontaneous pneumothorax s/p chest tube with resolution, was seen by oncology in clinic and plan for staging work up (repeat imaging and paracentesis) 2/2 CA 19-9 >9000, p/w AMS.     -Neurology consult  -Utox  -W/u for ACS  -diagnostic and therapeutic para to be sent for cytology and culture, cell count  -pulm eval, please consider thoracentesis and send for cytology  -Supportive care, pain control, Nutrition, PT, DVT ppx  -Outpatient oncology f/u    Will follow. Please do not hesitate to call back with questions.     Zulma Ramires MD  Medical Oncology Attending  C: [phone]

## 2019-07-29 NOTE — H&P ADULT - PROBLEM SELECTOR PLAN 4
-hypertensive urgency as BP was 199/84 in ED  -continue with home hydralazine and coreg -hypertensive urgency as BP was 199/84 in ED; in seting of missed medications  -continue with home hydralazine and coreg

## 2019-07-29 NOTE — ED ADULT TRIAGE NOTE - CHIEF COMPLAINT QUOTE
Pt comes in from home for reported confusion per family. Pt has hx of CA cholangiomas dx in June and has been very confused for the last week worse in the last 2 days. Pt family reporting pt hallucinating, delusional and experiencing erratic movements. Pt also rolled out of bed last night, family having to keep pt in WC in triage for pt trying to get out of chair. Pt arrives with single lumen left upper arm PICC and also peg tube, vs as noted

## 2019-07-29 NOTE — H&P ADULT - HISTORY OF PRESENT ILLNESS
64 y/o M with PMH of cholangiocarcinoma s/p Whipple & feeding J tube placement (on 6/18/19) c/b abdominal fluid collection s/p IR drainage and IV ertapenem x4 weeks, c/b PTX s/p pigtail chest tube now removed, CAD s/p 9 stents, DM2 on insulin, HTN, GERD, presenting with altered mental status over the past day. Pt was recently discharged from hospital under Dr. Viveros's service on 7/10 for the chest tube placement for a trace L pneumothorax and IR drain placement for the abdominal fluid collection found after his Whipple procedure. Pt is altered and unable to provide history. As per wife and son Jefferson via phone calls, pt has been weak since discharge, but according to wife, pt has been AAOx3 and mental status acutely changed over the past day with refusal to take his usual Glucerna last night. Son, on the other hand, stated that there appeared to be signs of mental changes over the past 2 weeks after discharge. 62 y/o M with PMH of cholangiocarcinoma s/p Whipple & feeding J tube placement (on 6/18/19) c/b abdominal fluid collection s/p IR drainage and IV ertapenem x4 weeks, c/b PTX s/p pigtail chest tube now removed, CAD s/p 9 stents, DM2 on insulin, HTN, GERD, presenting with altered mental status over the past day. Pt was recently discharged from hospital under Dr. Viveros's service on 7/10 for the chest tube placement for a trace L pneumothorax and IR drain placement for the abdominal fluid collection found after his Whipple procedure. Pt is altered and unable to provide history. As per wife and son Jefferson via phone calls, pt has been weak since discharge, but according to wife, pt has been AAOx3 and mental status acutely changed over the past day with refusal to take his usual Glucerna last night. Son, on the other hand, stated that there appeared to be signs of mental changes over the past 2 weeks after discharge.    In ED, pt was afebrile,. Received aspirin, tylenol, vanc/zosyn x1, and 1L NS bolus. 64 y/o M with PMH of cholangiocarcinoma s/p Whipple & feeding J tube placement (on 6/18/19) c/b abdominal fluid collection s/p IR drainage and IV ertapenem x4 weeks, c/b PTX s/p pigtail chest tube now removed, CAD s/p 9 stents, DM2 on insulin, HTN, GERD, presenting with altered mental status over the past day. Pt was recently discharged from hospital under Dr. Viveros's service on 7/10 for the chest tube placement for a trace L pneumothorax and IR drain placement for the abdominal fluid collection found after his Whipple procedure. Pt is altered and unable to provide history. As per wife and son Jefferson via phone calls, pt has been weak since discharge, but according to wife, pt has been AAOx3 and mental status acutely changed over the past day with refusal to take his usual Glucerna last night. Son, on the other hand, stated that there appeared to be signs of mental changes over the past 2 weeks after discharge. As per wife, pt takes his Glucerna by mouth and does not use j-tube, only there "just in case."     In ED, pt was afebrile, /68, RR 16, SpO2 100% on RA, no elevated WBC found on labs. Received aspirin, tylenol, vanc/zosyn x1, and 1L NS bolus. Admitted to Medicine for further management and AMS workup. 62 y/o M with PMH of cholangiocarcinoma s/p Whipple & feeding J tube placement (on 6/18/19) c/b hospital readmission on 7/1 for abdominal fluid collection s/p IR drainage and IV ertapenem via PICC line x4 weeks and for PTX s/p pigtail chest tube now removed, CAD s/p 9 stents, DM2 on insulin, HTN, GERD, presenting with altered mental status over the past day. Pt was recently discharged from hospital under Dr. Viveros's service on 7/10 for the chest tube placement for a trace L pneumothorax and IR drain placement for the abdominal fluid collection found after his Whipple procedure. Pt is altered and unable to provide history. As per wife and son Jefferson via phone calls, pt has been weak since discharge, but according to wife, pt has been AAOx3 and mental status acutely changed over the past day with refusal to take his usual Glucerna last night. Son, on the other hand, stated that there appeared to be signs of mental changes over the past 2 weeks after discharge. As per wife, pt takes his Glucerna by mouth and does not use j-tube, only there "just in case."     In ED, pt was afebrile, /68 up to 199/84, RR 16, SpO2 100% on RA. No elevated WBC found on labs. Elevated troponin of 182 and 158 on repeat, but EKG showed no ST changes. Received aspirin, tylenol, vanc/zosyn x1, and 1L NS bolus. Admitted to Medicine for further management and AMS workup.

## 2019-07-29 NOTE — CONSULT NOTE ADULT - SUBJECTIVE AND OBJECTIVE BOX
General Surgery Consult    Consulting surgical team: D  Consulting attending: Dr. Viveros      HPI: 63M s/p Whipple for cholangiocarcinoma . Initial postoperative course uncomplicated, but after discharge presented with nausea, vomiting, and fever. Pt was readmitted for placement of a pigtail chest tube for small PTX and an IR drain placement for an intra-abdominal abscess; cultures were positive and pt discharged with 4 weeks of IV Ertapenem via PICC line. Today patient presents with 1 week of progressive altered mental status, appears to be in pain. Per family his mental status has been waning throughout the week but acutely worsened last night.         PAST MEDICAL HISTORY:  Cholangiocarcinoma  Gout  Type II diabetes mellitus  HTN (hypertension)  CAD (coronary artery disease)  Cirrhosis  GERD      PAST SURGICAL HISTORY:  S/P cholecystectomy  Coronary stent x 9      MEDICATIONS:  Carvedilol  ASA  Crestor  Colace  Dulcolax      ALLERGIES:  Cipro (Rash)  Plavix (Rash)        VITALS & I/Os:  Vital Signs Last 24 Hrs  T(C): 37.2 (2019 08:58), Max: 37.3 (2019 06:14)  T(F): 98.9 (2019 08:58), Max: 99.2 (2019 06:14)  HR: 75 (2019 12:22) (64 - 77)  BP: 175/72 (2019 12:22) (143/68 - 199/84)  BP(mean): --  RR: 16 (2019 12:22) (16 - 16)  SpO2: 98% (2019 12:22) (98% - 100%)        PHYSICAL EXAM:  General: No acute distress    Neurologic: GCS 13, AAOx0, not responsive to command, confused.     Respiratory: CTABL, no wheezes, ronchi, or rales. Normal respiratory effort.     Cardiovascular: RRR, no M/R/G     Abdominal: Soft, nondistended, grimace to deep palpation of upper quadrants, but no rebound or guarding. Well healing incision s/p whipple, J tube in place, no surrounding erythema or drainage.     MSK: Moving all extremities    Skin: Warm, dry, intact, no lesions or erythema. Midline incision healing well.       LABS:                        9.6    7.97  )-----------( 216      ( 2019 06:10 )             29.8     07-29    140  |  107  |  25<H>  ----------------------------<  99  3.8   |  21<L>  |  1.46<H>    Ca    9.0      2019 06:10    TPro  7.3  /  Alb  2.8<L>  /  TBili  0.8  /  DBili  x   /  AST  34  /  ALT  19  /  AlkPhos  81      Lactate:   @ 06:10  2.0    PT/INR - ( 2019 06:10 )   PT: 12.6 SEC;   INR: 1.13          PTT - ( 2019 06:10 )  PTT:27.3 SEC          Urinalysis Basic - ( 2019 06:37 )    Color: YELLOW / Appearance: CLEAR / S.027 / pH: 6.0  Gluc: NEGATIVE / Ketone: TRACE  / Bili: NEGATIVE / Urobili: TRACE   Blood: NEGATIVE / Protein: 100 / Nitrite: NEGATIVE   Leuk Esterase: NEGATIVE / RBC: 3-5 / WBC 0-2   Sq Epi: OCC / Non Sq Epi: x / Bacteria: NEGATIVE          IMAGING:    CT Abdomen/Pelvis:    Moderate partially loculated left pleural effusion, slightly increased in   size since 2019.    Peripheral right lower lobe nodule, slightly increased in size.    Status post removal of percutaneous drainage catheter from the right   upper quadrant. A residual amorphous collection containing fluid and a   small amount of gas in the right upper quadrant measures 2.4 x 2.2 cm,   slightly decreased in size since 2019.    Mesenteric lymphadenopathy, slightly increased in size since 2019.   Subtle nodularity of the omentum, raising consideration of peritoneal   carcinomatosis.    Moderate to large volume of ascites, increased.      CT Head:   Imaging limited by patient motion.  No acute intracranial hemorrhage.  Microvascular disease.

## 2019-07-29 NOTE — ED ADULT NURSE NOTE - OBJECTIVE STATEMENT
Pt Brought in by family from home for Confusion, AMS and falls.  Pt has been in and out of hospital this month for complications for cholangioma CA, s/p Whipple sx in July, complicated by abscess, rcving IV infusions Ertapenem (last due today) via single lumen L PICC (limb precaution band in place), site CDI.  JTube noted to LLQ abd - site CDI, clear Tegaderm in place. Per family pt experiencing a progressional decline of mental status, increasing confusion, but was following commands up until last night. Pt noted to be having erratic movements, not following commands, attempting to get OOB w/o asst, requiring frequent redirection and calming by family @ bedside.  Pt asst. out of wheelchair into stretcher, changed, noted to have abrasions to back - per family fell twice in last week, most recent, rolled out of bed yesterday. Moving x 4 limbs independentely, no obvious deformity noted. Has healing superficial abrasion to mid-back, and fresh abrasion 3x3 to R Shoulder.  Pt noted to have R posterior upper arm continuous glucose monitoring device, does not administer insulin.  Hx DM2, CAD, HTN. Pt vitals stable as documented, HR NSR on CM, BGFS 102, EKG in progress, ED MD Lopez @ bedside for eval.  Pt skin feels hot to touch, will obtain rectal temp as soon as able.  IV placed to R AC #18g by Float CHETAN Raza, labs, cultures x2 drawn, sent.  Pt safety maintained, stretcher low/locked position, family instructed not to allow oob w/o asst. Handoff given to primary covering CHETAN Gomez. Pt Brought in by family from home for Confusion, AMS and falls.  Pt has been in and out of hospital this month for complications for cholangioma CA, s/p Whipple sx in July, complicated by abscess, rcving IV infusions Ertapenem (last due today) via single lumen L PICC (limb precaution band in place), site CDI.  JTube noted to LLQ abd - site CDI, clear Tegaderm in place. Per family pt experiencing a progressional decline of mental status, increasing confusion, but was following commands up until last night. Pt noted to be having erratic movements, not following commands, attempting to get OOB w/o asst, requiring frequent redirection and calming by family @ bedside.  Pt asst. out of wheelchair into stretcher, changed, noted to have abrasions to back - per family fell twice in last week, most recent, rolled out of bed yesterday. Moving x 4 limbs independentely, no obvious deformity noted. Has healing superficial abrasion to mid-back, and fresh abrasion 3x3 to R Shoulder.  Pt noted to have R posterior upper arm continuous glucose monitoring device, does not administer insulin.  Hx DM2, CAD, HTN. Pt vitals stable as documented, HR NSR on CM, BGFS 102, EKG in progress, ED MD Lopez @ bedside for eval.  Pt skin feels hot to touch, Rect Temp afebrile, noted to have healing areas of ecchymosis to L Hip and L Upper buttock, no sacral decubiti noted.  IV placed to R AC #18g by Float CHETAN Raza, labs, cultures x2 drawn, sent.  Pt safety maintained, stretcher low/locked position, family instructed not to allow oob w/o asst. Handoff given to primary covering CHETAN Gomez. Pt Brought in by family from home for Confusion, AMS and falls.  Pt has been in and out of hospital this month for complications for cholangioma CA, s/p Whipple sx in July, complicated by abscess, rcving IV infusions Ertapenem (last due today) via single lumen L PICC (limb precaution band in place), site CDI.  JTube noted to LLQ abd - site CDI, clear Tegaderm in place. Per family pt experiencing a progressional decline of mental status, increasing confusion, but was following commands up until last night. Pt noted to be having erratic movements, not following commands, attempting to get OOB w/o asst, requiring frequent redirection and calming by family @ bedside.  Pt asst. out of wheelchair into stretcher, changed, noted to have abrasions to back - per family fell twice in last week, most recent, rolled out of bed yesterday. Moving x 4 limbs independently, no obvious deformity noted. Has healing superficial abrasion to mid-back, and fresh abrasion 3x3 to R Shoulder.  Pt noted to have R posterior upper arm continuous glucose monitoring device, does not administer insulin.  Hx DM2, CAD, HTN. Pt vitals stable as documented, HR NSR on CM, BGFS 102, EKG in progress, ED MD Lopez @ bedside for eval.  Pt skin feels hot to touch, Rect Temp afebrile, noted to have healing areas of ecchymosis to R Hip and R Upper buttock, no sacral decubiti noted.  IV placed to R AC #18g by Float CHETAN Raza, labs, cultures x2 drawn, sent.  Pt safety maintained, stretcher low/locked position, family instructed not to allow oob w/o asst. Handoff given to primary covering CHETAN Gomez.

## 2019-07-29 NOTE — CONSULT NOTE ADULT - ASSESSMENT
63M recently dx cholangiocarcinoma s/p whipple 6/18/19 c/b polymicrobial abd abscess s/p IR drainage (7/1/19) and L pneumothorax s/p chest tube (resolved) - here with altered mental status.  CT did not show evidence of stroke or mets.  Ertapenem can cause confusion.  CT did show persistent collection though smaller.    Suggest:  - zosyn 3.375g q8  - hold on ertapenem  - urine tox screen  - neuro evaluation    d/w surgonc

## 2019-07-29 NOTE — H&P ADULT - PROBLEM SELECTOR PLAN 5
-NPO at this time  -hold lantus and continue with ISS -serum Cr 1.4, about baseline  -renally dose meds

## 2019-07-29 NOTE — H&P ADULT - PROBLEM SELECTOR PLAN 2
-s/p perc drainage by IR on 7/1; residual fluid collection 2.4cm x 2.2cm still present on CTAP though slightly decreased in size  -IR contacted, but stated no intervention at this time  -CTAP showed subtle nodularity of omentum concerning for peritoneal carcinomatosis -s/p perc drainage by IR on 7/1; residual fluid collection 2.4cm x 2.2cm still present on CTAP though slightly decreased in size  -IR contacted for re-evaluation of source control, but stated no intervention at this time  -CTAP showed subtle nodularity of omentum concerning for peritoneal carcinomatosis  -consider pulm eval for increased loculated L pleural effusion found on CTAP; if thoracentesis, send for cytology  -outpatient oncology f/u -s/p perc drainage by IR on 7/1; residual fluid collection 2.4cm x 2.2cm still present on CTAP though slightly decreased in size  -IR contacted for re-evaluation of source control, but stated no intervention at this time  -CTAP showed subtle nodularity of omentum concerning for peritoneal carcinomatosis  -pulm eval for increased loculated L pleural effusion found on CTAP; if thoracentesis, send for cytology  -outpatient oncology f/u

## 2019-07-29 NOTE — ED PROVIDER NOTE - PMH
CAD (coronary artery disease)    Cholangiocarcinoma    HTN (hypertension)    Type II diabetes mellitus

## 2019-07-29 NOTE — H&P ADULT - NSHPPHYSICALEXAM_GEN_ALL_CORE
PHYSICAL EXAM:  GENERAL: NAD, on room air, speech not understandable, mildly agitated  HEENT: sclera clear, PERRLA  CHEST/RESPIRATORY: CTA b/l, decreased breath sounds on left lung base  CARDIOVASCULAR: Regular Rate and Rhythm, s1/s2 heard, no murmurs, rubs, or gallops appreciated  ABDOMINAL: Soft, nontender, nondistended; BS present, midline abdominal scar, + jejunostomy tube  EXTREMITIES: 2+ pulses b/l, no edema b/l, LUE PICC C/D/I  NEURO: AAOx0-1 (able to spell out first name when asked for name), but not consistently following commands PHYSICAL EXAM:  GENERAL: NAD, on room air, speech not understandable, mildly agitated, lying in bed  HEENT: sclera clear, PERRLA  CHEST/RESPIRATORY: CTA b/l, decreased breath sounds on left lung base  CARDIOVASCULAR: Regular Rate and Rhythm, s1/s2 heard, no murmurs, rubs, or gallops appreciated  ABDOMINAL: Soft, nontender, nondistended; BS present, midline abdominal scar, + jejunostomy tube  EXTREMITIES: 2+ pulses b/l, no edema b/l, LUE PICC C/D/I  NEURO: not consistently following commands, spontaneously moving all extremities  PSYCH:  AAOx0-1 (able to spell out first name when asked for name)

## 2019-07-29 NOTE — ED ADULT NURSE NOTE - NSFALLRSKINDICTYPE_ED_ALL_ED
left wrist cockup splint applied for comfort and support;
Need for Mobility Assisted Device/Confusion/Disorientation

## 2019-07-29 NOTE — ED PROVIDER NOTE - OBJECTIVE STATEMENT
Carolina Monique MD PGY-2 pt is a 62 yo M with PMH cholangiocarcinoma (dx 8/18) s/p whipple procedure, with intrabdominal abscess and L sided chest tube and PTX p/w 1 week of progressive AMS, particularly worse over last 24 hours a/w hallucinations. Was discharged on 7/10/19 with a PICC line for 4 weeks of ertapenem. Patient accomplanied by wife and sons who are providing collateral hx. Of note, pt is an ER physician. Of note, during patients last hospital stay, he developed kidney failure requiring dialysis.

## 2019-07-29 NOTE — H&P ADULT - NSHPLABSRESULTS_GEN_ALL_CORE
9.6    7.97  )-----------( 216      ( 2019 06:10 )             29.8         140  |  107  |  25<H>  ----------------------------<  99  3.8   |  21<L>  |  1.46<H>    Ca    9.0      2019 06:10    TPro  7.3  /  Alb  2.8<L>  /  TBili  0.8  /  DBili  x   /  AST  34  /  ALT  19  /  AlkPhos  81      LIVER FUNCTIONS - ( 2019 06:10 )  Alb: 2.8 g/dL / Pro: 7.3 g/dL / ALK PHOS: 81 u/L / ALT: 19 u/L / AST: 34 u/L / GGT: x           PT/INR - ( 2019 06:10 )   PT: 12.6 SEC;   INR: 1.13     PTT - ( 2019 06:10 )  PTT:27.3 SEC    POCT Blood Glucose.: 102 mg/dL (2019 06:01)    Urinalysis Basic - ( 2019 06:37 )    Color: YELLOW / Appearance: CLEAR / S.027 / pH: 6.0  Gluc: NEGATIVE / Ketone: TRACE  / Bili: NEGATIVE / Urobili: TRACE   Blood: NEGATIVE / Protein: 100 / Nitrite: NEGATIVE   Leuk Esterase: NEGATIVE / RBC: 3-5 / WBC 0-2   Sq Epi: OCC / Non Sq Epi: x / Bacteria: NEGATIVE      Venous Blood Gas:   @ 06:10  7.38/36/51/21/81.8  VBG Lactate: 2.0        MICROBIOLOGY:     Culture - Urine (collected 2019 07:13)  Source: URINE MIDSTREAM  Preliminary Report (2019 08:54):    NO GROWTH TO DATE        RADIOLOGY & ADDITIONAL TESTS:  < from: Xray Chest 1 View-PORTABLE IMMEDIATE (19 @ 06:43) >  IMPRESSION:  Moderate left pleural effusion, increased since 2019.  < end of copied text >    < from: CT Abdomen and Pelvis No Cont (19 @ 08:42) >  IMPRESSION:   Moderate partially loculated left pleural effusion, slightly increased in   size since 2019.  Peripheral right lower lobe nodule, slightly increased in size.  Status post removal of percutaneous drainage catheter from the right   upper quadrant.A residual amorphous collection containing fluid and a   small amount of gas in the right upper quadrant measures 2.4 x 2.2 cm,   slightly decreased in size since 2019.  Mesenteric lymphadenopathy, slightly increased in size since 2019.   Subtle nodularity of the omentum, raising consideration of peritoneal   carcinomatosis.  Moderate to large volume of ascites, increased.  < end of copied text >    < from: CT Head No Cont (19 @ 08:36) >  IMPRESSION:  Imaging limited by patient motion.  No acute intracranial hemorrhage.  Microvascular disease.  < end of copied text >

## 2019-07-29 NOTE — ED PROVIDER NOTE - CARE PLAN
Principal Discharge DX:	Altered mental status  Secondary Diagnosis:	Cholangiocarcinoma  Secondary Diagnosis:	HTN (hypertension)  Secondary Diagnosis:	Type II diabetes mellitus  Secondary Diagnosis:	CAD (coronary artery disease)

## 2019-07-29 NOTE — CONSULT NOTE ADULT - ASSESSMENT
63 M s/p whipple for cholangiocarcinoma 6/18, readmitted 7/1 for placement of a pigtail chest tube for PTX and an IR drain placement for an intra-abdominal fluid collection; now presenting with progressive altered mental status.  - CXR / CT Abdomen showing decrease in size of intraabdominal collection, no PTX but an enlarged left pleural effusion.   - CT Head showing no acute hemorrhage  - Elevated troponin, 182, on repeat 158, EKG without ST changes    Plan:  - Patient admitted to medicine   - Recommend workup to rule out MI  - Continue IV Abx, pt given Zosyn/Vanc in ED, Ertapenem held (ID consult recommendation)   - Repeat cultures, f/u sensitivities  - No acute surgical intervention for intraabdominal collection  - Surgery will follow  - Plan discussed with Dr. Viveros

## 2019-07-29 NOTE — H&P ADULT - PROBLEM SELECTOR PLAN 6
DVT ppx: HSQ  Diet: Glucerna at home, will hold for now  Dispo: to be discussed with family  Advanced Directives: spoke to wife who stated she was healthy care proxy and that she wanted everything for him done -NPO at this time  -hold lantus and continue with ISS -NPO at this time, will start glucerna tube feeds  -lantus held and will continue with ISS -NPO at this time given encephalopathy, will start glucerna tube feeds  -lantus held and will continue with ISS

## 2019-07-29 NOTE — H&P ADULT - PROBLEM SELECTOR PLAN 7
DVT ppx: HSQ  Diet: Glucerna at home, will hold for now  Dispo: to be discussed with family  Advanced Directives: spoke to wife who stated she was healthy care proxy and that she wanted everything for him done DVT ppx: HSQ  Diet: Glucerna tube feeds  Dispo: to be discussed with family  Advanced Directives: spoke to wife who stated she was healthy care proxy and that she wanted everything for him done

## 2019-07-29 NOTE — H&P ADULT - PROBLEM SELECTOR PLAN 3
-elevated trop 182 on presentation, repeat trop 158; likely elevated in setting of CKD, low suspicion for MI  -EKG showed no ST changes  -Placed on telemetry for monitoring -elevated trop 182 on presentation, repeat trop 158; likely elevated in setting of CKD, low suspicion for MI  -EKG showed no ST changes  -Placed on telemetry for monitoring  -Appreciate surgery & heme/onc recs: recommend MI workup -elevated trop 182 on presentation, repeat trop 158; likely elevated in setting of CKD, low suspicion for MI  -EKG showed no ST changes  -c/w home aspirin  -Placed on telemetry for monitoring  -Appreciate surgery & heme/onc recs: recommend MI workup

## 2019-07-29 NOTE — H&P ADULT - NSICDXPASTMEDICALHX_GEN_ALL_CORE_FT
PAST MEDICAL HISTORY:  CAD (coronary artery disease)     Cholangiocarcinoma     HTN (hypertension)     Type II diabetes mellitus

## 2019-07-29 NOTE — H&P ADULT - PROBLEM SELECTOR PLAN 1
-Likely 2/2 metabolic encephalopathy, less likely 2/2 stroke  -CTH showed no intracranial bleeding  -r/o sepsis: lactate 2.0, source present from intraabdominal fluid collection, but no hypotension, no fever, no leukocytosis  -CTAP showed subtle nodularity of omentum concerning for peritoneal carcinomatosis, need to concern possible mets to brain  -Consider paracentesis to r/o SBP in the setting of moderate-large ascites  -appreciate ID recs: start zosyn 3.375g q8, hold ertapenem as it can cause confusion  -f/u urine tox  -consider neurology consult -Likely 2/2 metabolic encephalopathy, less likely 2/2 stroke  -LFTs wnl, ammonia elevated at 45  -CTH showed no intracranial bleeding  -r/o sepsis: lactate 2.0, source present from intraabdominal fluid collection, but no hypotension, no fever, no leukocytosis  -CTAP showed subtle nodularity of omentum concerning for peritoneal carcinomatosis, need to concern possible mets to brain/meninges  -r/o SBP in the setting of moderate-large ascites  -appreciate heme/onc recs: diagnostic and therapeutic paracentesis to be sent for cytology, culture, and cell count  -appreciate ID recs: start zosyn 3.375g q8, hold ertapenem as it can cause confusion  -f/u urine tox  -UA negative aside from 100 protein, UCx NGTD  -Respiratory virus panel negative  -f/u BCx  -Consider neurology consult  -olanzapine IM 2.5 mg q8 prn for agitation -Likely 2/2 metabolic encephalopathy, less likely 2/2 stroke  -LFTs wnl, ammonia elevated at 45  -CTH showed no intracranial bleeding  -r/o sepsis: lactate 2.0, source present from intraabdominal fluid collection, but no hypotension, no fever, no leukocytosis  -CTAP showed subtle nodularity of omentum concerning for peritoneal carcinomatosis, need to consider possible mets to brain/meninges  -f/u MRI w/ contrast  -r/o SBP in the setting of moderate-large ascites  -appreciate heme/onc recs: diagnostic and therapeutic paracentesis to be sent for cytology, culture, and cell count  -appreciate ID recs: start zosyn 3.375g q8, hold ertapenem as it can cause confusion  -f/u urine tox  -UA negative aside from 100 protein, UCx NGTD  -Respiratory virus panel negative  -f/u BCx  -Consider neurology consult  -olanzapine IM 2.5 mg q8 prn for agitation  -hold home ambien and cyclobenzaprine for concerns of polypharmacy-induced AMS -Likely 2/2 acute metabolic encephalopathy due to infection not yet identified vs medication effects (cyclobenzaprine, zolpidem use) vs unidentified metastases  -LFTs wnl, ammonia elevated at 45  -CTH showed no intracranial bleeding or mets  -r/o sepsis: lactate 2.0, source present from intraabdominal fluid collection, but no hypotension, no fever, no leukocytosis  -CTAP showed subtle nodularity of omentum concerning for peritoneal carcinomatosis, need to consider possible mets to brain/meninges  -f/u MRI w/ contrast  -r/o SBP in the setting of moderate-large ascites with diagnostic paracentesis  -appreciate heme/onc recs: diagnostic and therapeutic paracentesis to be sent for cytology, culture, and cell count  -appreciate ID recs: start zosyn 3.375g q8, hold ertapenem as it can cause confusion  -f/u urine tox  -UA negative aside from 100 protein, UCx NGTD  -Respiratory virus panel negative  -f/u BCx  -Consider neurology consult  -olanzapine IM 2.5 mg q8 prn for agitation  -hold home ambien and cyclobenzaprine for concerns of polypharmacy-induced encephaopathy

## 2019-07-29 NOTE — ED PROVIDER NOTE - PHYSICAL EXAMINATION
PHYSICAL EXAM:   General: appears ill  HEENT: NC/AT, PERRLA (minimally reactive), airway patent, moaning  Cardiovascular: regular rate and rhythm, + S1/S2, no murmurs, rubs, gallops appreciated  Respiratory: diminished on the L side  Abdominal: soft, +mildly distended, unable to assess tenderness given mental status. +midline well healing abdominal scar from prior surgery  Neuro: Alert and oriented x0. Moving all extremities spontaneously. Not following commands or answering questions appropriately. No asterixis. PERRLA but minimally reactive, not dilated.   -Carolina Monique PGY-2

## 2019-07-29 NOTE — ED ADULT NURSE NOTE - INTERVENTIONS DEFINITIONS
Physically safe environment: no spills, clutter or unnecessary equipment/Provide visual clues: red socks/Stretcher in lowest position, wheels locked, appropriate side rails in place/[place] to call system/Instruct patient to call for assistance/Reinforce activity limits and safety measures with patient and family/Non-slip footwear when patient is off stretcher/Review medications for side effects contributing to fall risk

## 2019-07-29 NOTE — CONSULT NOTE ADULT - SUBJECTIVE AND OBJECTIVE BOX
Vandana Lyons - [phone]    Patient is a 63y old  Male who presents with a chief complaint of confusion    HPI: 63M recent cholangitis s/p antibiotics (May 2019).  Then underwent Whipple for cholangiocarcinoma Mid-.  Rehospitalized 19 - 7/10/19 for fever 101.4F.  CT showed nicole-duodenal abscess which was drained by IR on 19 - cultures grew ecoli/klebs/e.faecalis. On  also found to have L pneumothorax- chest tube placed by ED, removed .  After meropenem - > zosyn, he was discharged on ertapenem on 7/10/19.  I saw him in the office on 19 while he was still on invanz and he was doing well.  He was supposed to complete antibiotics possibly today, however, was brought in by his family due to confusion.  Here, no fever, no leukocytosis.  CT head done, negative.  CT abdomen with smaller collection (about 2cm by 2cm).     prior hospital charts reviewed [ x ]  primary team notes reviewed [ x ]  other consultant notes reviewed [ x ]    PAST MEDICAL & SURGICAL HISTORY:  Type II diabetes mellitus  HTN (hypertension)  CAD (coronary artery disease)  S/P cholecystectomy    Allergies  Cipro (Rash)  Plavix (Rash)    ANTIMICROBIALS:  meropenem  IVPB 500 every 12 hours (-)  piperacillin/tazobactam IVPB.. 3.375 every 8 hours (-10)  erta (-)    active  piperacillin/tazobactam IVPB x1   vancomycin  IVPB x1     OTHER MEDS: MEDICATIONS  (STANDING):    SOCIAL HISTORY:  quit smoking 20 years ago, denies etoh/illicit drug use    FAMILY HISTORY: Both parents w HTN    REVIEW OF SYSTEMS  [x  ] ROS unobtainable because:  very confused  [  ] All other systems negative except as noted below:	    Constitutional:  [ ] fever [ ] chills  [ ] weight loss  [ ] weakness  Skin:  [ ] rash [ ] phlebitis	  Eyes: [ ] icterus [ ] pain  [ ] discharge	  ENMT: [ ] sore throat  [ ] thrush [ ] ulcers [ ] exudates  Respiratory: [ ] dyspnea [ ] hemoptysis [ ] cough [ ] sputum	  Cardiovascular:  [ ] chest pain [ ] palpitations [ ] edema	  Gastrointestinal:  [ ] nausea [ ] vomiting [ ] diarrhea [ ] constipation [ ] pain	  Genitourinary:  [ ] dysuria [ ] frequency [ ] hematuria [ ] discharge [ ] flank pain  [ ] incontinence  Musculoskeletal:  [ ] myalgias [ ] arthralgias [ ] arthritis  [ ] back pain  Neurological:  [ ] headache [ ] seizures  [ ] confusion/altered mental status  Psychiatric:  [ ] anxiety [ ] depression	  Hematology/Lymphatics:  [ ] lymphadenopathy  Endocrine:  [ ] adrenal [ ] thyroid  Allergic/Immunologic:	 [ ] transplant [ ] seasonal    Vital Signs Last 24 Hrs  T(F): 98.9 (19 @ 08:58), Max: 99.2 (19 @ 06:14)  Vital Signs Last 24 Hrs  HR: 77 (19 @ 08:58) (64 - 77)  BP: 165/67 (19 @ 09:29) (143/68 - 199/84)  RR: 16 (19 @ 08:58)  SpO2: 98% (19 @ 08:58) (98% - 100%)  Wt(kg): --    PHYSICAL EXAM:  General: confused  HEAD/EYES: anicteric  ENT:  supple  Cardiovascular:   S1, S2  Respiratory:  clear bilaterally  GI:  soft, non-tender, normal bowel sounds  :  dawson placed  Musculoskeletal:  no synovitis  Neurologic:  delerious  Skin:  no rash  Psychiatric:  not able to assess  Vascular:  no phlebitis                        9.6    7.97  )-----------( 216      ( 2019 06:10 )             29.8   140  |  107  |  25<H>  ----------------------------<  99  3.8   |  21<L>  |  1.46<H>    Ca    9.0      2019 06:10    TPro  7.3  /  Alb  2.8<L>  /  TBili  0.8  /  DBili  x   /  AST  34  /  ALT  19  /  AlkPhos  81      Urinalysis Basic - ( 2019 06:37 )  Color: YELLOW / Appearance: CLEAR / S.027 / pH: 6.0  Gluc: NEGATIVE / Ketone: TRACE  / Bili: NEGATIVE / Urobili: TRACE   Blood: NEGATIVE / Protein: 100 / Nitrite: NEGATIVE   Leuk Esterase: NEGATIVE / RBC: 3-5 / WBC 0-2   Sq Epi: OCC / Non Sq Epi: x / Bacteria: NEGATIVE    MICROBIOLOGY:  Culture - Urine (collected 2019 07:13)  Source: URINE MIDSTREAM  Preliminary Report (2019 08:54):    NO GROWTH TO DATE    Culture - Yeast and Fungus (collected 2019 18:45)  Source: ABSCESS  Preliminary Report (2019 09:09):    CULTURE NEGATIVE FOR YEASTS AND MOLDS---- PRELIMINARY    CULTURE NEGATIVE FOR YEASTS AND MOLDS AFTER 1 DAY    Culture - Surg Site Aerob/Anaer w/Gm St (collected 2019 18:45)  Source: ABSCESS  Preliminary Report (2019 13:57):    MODERATE  Organism: Escherichia coli  Klebsiella pneumoniae  Enterococcus faecalis (2019 13:57)  Organism: Enterococcus faecalis (2019 13:57)  Organism: Klebsiella pneumoniae (2019 13:57)      -  Amikacin: S <=8 BOB      -  Ampicillin: R >16 BOB      -  Ampicillin/Sulbactam: S 8/4 BOB      -  Aztreonam: S <=4 BOB      -  Cefazolin: S <=2 BOB      -  Cefepime: S <=2 BOB      -  Cefoxitin: S <=4 BOB      -  Ceftazidime: S <=1 BOB      -  Ceftriaxone: S <=1 BOB      -  Ciprofloxacin: S <=0.5 BOB      -  Ertapenem: S <=0.5 BOB      -  Gentamicin: S <=1 BOB      -  Imipenem: S <=1 BOB      -  Levofloxacin: S <=1 BOB      -  Meropenem: S <=1 BOB      -  Piperacillin/Tazobactam: S <=8 BOB      -  Tigecycline: S      -  Tobramycin: S <=2 BOB      -  Trimethoprim/Sulfamethoxazole: S <=0.5/9.5 BOB      Method Type: NEGATIVE BOB 43  Organism: Escherichia coli (2019 13:57)    Culture - Acid Fast Smear Concentrated (collected 2019 18:45)  Source: ABSCESS  Final Report (2019 14:54):    AFB SMEAR= NO ACID FAST BACILLI SEEN    Culture - Urine (collected 2019 01:32)  Source: URINE MIDSTREAM  Final Report (2019 10:04):    Culture grew 3 or more types of organisms which indicate    collection contamination; consider recollection only if    clinically indicated.    Culture - Blood (collected 2019 23:14)  Source: BLOOD PERIPHERAL  Preliminary Report (2019 23:15):    NO ORGANISMS ISOLATED    NO ORGANISMS ISOLATED AT 72 HRS.    Culture - Blood (collected 2019 23:12)  Source: BLOOD VENOUS  Preliminary Report (2019 23:13):    NO ORGANISMS ISOLATED    NO ORGANISMS ISOLATED AT 72 HRS.    RADIOLOGY:  imaging below personally reviewed    CT Head No Cont (19 @ 08:36) >  IMPRESSION:  Imaging limited by patient motion.  No acute intracranial hemorrhage.  Microvascular disease.    CT Abdomen and Pelvis No Cont (19 @ 08:42) >  IMPRESSION:  Moderate partially loculated left pleural effusion, slightly increased in size since 2019.  Peripheral right lower lobe nodule, slightly increased in size.  Status post removal of percutaneous drainage catheter from the right upper quadrant. A residual amorphous collection containing fluid and a small amount of gas in the right upper quadrant measures 2.4 x 2.2 cm, slightly decreased in size since 2019.  Mesenteric lymphadenopathy, slightly increased in size since 2019.  Subtle nodularity of the omentum, raising consideration of peritoneal carcinomatosis.  Moderate to large volume of ascites, increased.    CT Abdomen and Pelvis w/ Oral Cont (19 @ 15:02) >  IMPRESSION:  Status post percutaneous drainage of a complex right upper quadrant collection, with interval decrease in size of the collection. Small residual predominantly gas containing components are noted.  Moderate volume ascites, increased since 2019.  Nonspecific mesenteric lymph nodes, unchanged.  Small bilateral pleural effusions, increased in size since 2019. Small residual left pneumothorax status post removal of previously noted left pleural catheter.    CT Abdomen and Pelvis w/ Oral Cont (19 @ 04:22) >  IMPRESSION:  Status post recent Whipple procedure. High density amorphous collection adjacent to the duodenal stump with loculated foci of air and fluid in the right upper quadrant for which considerations include postoperative hematoma and air, leakage of contrast and air from the duodenal stump, or developing collection/abscess.  Left-sided pigtail chest tube. Trace left pneumothorax along the anterior chest wall.

## 2019-07-30 ENCOUNTER — RESULT REVIEW (OUTPATIENT)
Age: 64
End: 2019-07-30

## 2019-07-30 DIAGNOSIS — R11.10 VOMITING, UNSPECIFIED: ICD-10-CM

## 2019-07-30 LAB
ALBUMIN FLD-MCNC: < 1 G/DL — SIGNIFICANT CHANGE UP
ALBUMIN SERPL ELPH-MCNC: 2.8 G/DL — LOW (ref 3.3–5)
ALP SERPL-CCNC: 83 U/L — SIGNIFICANT CHANGE UP (ref 40–120)
ALT FLD-CCNC: 17 U/L — SIGNIFICANT CHANGE UP (ref 4–41)
ANION GAP SERPL CALC-SCNC: 13 MMO/L — SIGNIFICANT CHANGE UP (ref 7–14)
ANION GAP SERPL CALC-SCNC: 8 MMO/L — SIGNIFICANT CHANGE UP (ref 7–14)
APTT BLD: 28.6 SEC — SIGNIFICANT CHANGE UP (ref 27.5–36.3)
AST SERPL-CCNC: 30 U/L — SIGNIFICANT CHANGE UP (ref 4–40)
BACTERIA UR CULT: SIGNIFICANT CHANGE UP
BASOPHILS # BLD AUTO: 0.04 K/UL — SIGNIFICANT CHANGE UP (ref 0–0.2)
BASOPHILS NFR BLD AUTO: 0.5 % — SIGNIFICANT CHANGE UP (ref 0–2)
BILIRUB SERPL-MCNC: 1 MG/DL — SIGNIFICANT CHANGE UP (ref 0.2–1.2)
BLD GP AB SCN SERPL QL: NEGATIVE — SIGNIFICANT CHANGE UP
BODY FLUID TYPE: SIGNIFICANT CHANGE UP
BUN SERPL-MCNC: 24 MG/DL — HIGH (ref 7–23)
BUN SERPL-MCNC: 24 MG/DL — HIGH (ref 7–23)
CALCIUM SERPL-MCNC: 8.4 MG/DL — SIGNIFICANT CHANGE UP (ref 8.4–10.5)
CALCIUM SERPL-MCNC: 9 MG/DL — SIGNIFICANT CHANGE UP (ref 8.4–10.5)
CHLORIDE SERPL-SCNC: 105 MMOL/L — SIGNIFICANT CHANGE UP (ref 98–107)
CHLORIDE SERPL-SCNC: 109 MMOL/L — HIGH (ref 98–107)
CLARITY SPEC: SIGNIFICANT CHANGE UP
CO2 SERPL-SCNC: 20 MMOL/L — LOW (ref 22–31)
CO2 SERPL-SCNC: 20 MMOL/L — LOW (ref 22–31)
COLOR FLD: YELLOW — SIGNIFICANT CHANGE UP
CREAT SERPL-MCNC: 1.37 MG/DL — HIGH (ref 0.5–1.3)
CREAT SERPL-MCNC: 1.43 MG/DL — HIGH (ref 0.5–1.3)
CRYSTALS FLD MICRO: SIGNIFICANT CHANGE UP
EOSINOPHIL # BLD AUTO: 0.27 K/UL — SIGNIFICANT CHANGE UP (ref 0–0.5)
EOSINOPHIL NFR BLD AUTO: 3.2 % — SIGNIFICANT CHANGE UP (ref 0–6)
FUNGUS SPEC QL CULT: SIGNIFICANT CHANGE UP
GLUCOSE BLDC GLUCOMTR-MCNC: 235 MG/DL — HIGH (ref 70–99)
GLUCOSE FLD-MCNC: 251 MG/DL — SIGNIFICANT CHANGE UP
GLUCOSE SERPL-MCNC: 259 MG/DL — HIGH (ref 70–99)
GLUCOSE SERPL-MCNC: 285 MG/DL — HIGH (ref 70–99)
GRAM STN FLD: SIGNIFICANT CHANGE UP
HCT VFR BLD CALC: 29.1 % — LOW (ref 39–50)
HCT VFR BLD CALC: 30 % — LOW (ref 39–50)
HCT VFR BLD CALC: 30.7 % — LOW (ref 39–50)
HGB BLD-MCNC: 9.3 G/DL — LOW (ref 13–17)
HGB BLD-MCNC: 9.7 G/DL — LOW (ref 13–17)
HGB BLD-MCNC: 9.8 G/DL — LOW (ref 13–17)
IMM GRANULOCYTES NFR BLD AUTO: 0.5 % — SIGNIFICANT CHANGE UP (ref 0–1.5)
INR BLD: 1.08 — SIGNIFICANT CHANGE UP (ref 0.88–1.17)
LDH SERPL L TO P-CCNC: 102 U/L — SIGNIFICANT CHANGE UP
LYMPHOCYTES # BLD AUTO: 1.06 K/UL — SIGNIFICANT CHANGE UP (ref 1–3.3)
LYMPHOCYTES # BLD AUTO: 12.5 % — LOW (ref 13–44)
LYMPHOCYTES NFR FLD: 35 % — SIGNIFICANT CHANGE UP
MACROPHAGES # FLD: 41 % — SIGNIFICANT CHANGE UP
MAGNESIUM SERPL-MCNC: 1.9 MG/DL — SIGNIFICANT CHANGE UP (ref 1.6–2.6)
MAGNESIUM SERPL-MCNC: 1.9 MG/DL — SIGNIFICANT CHANGE UP (ref 1.6–2.6)
MCHC RBC-ENTMCNC: 29.3 PG — SIGNIFICANT CHANGE UP (ref 27–34)
MCHC RBC-ENTMCNC: 29.4 PG — SIGNIFICANT CHANGE UP (ref 27–34)
MCHC RBC-ENTMCNC: 30 PG — SIGNIFICANT CHANGE UP (ref 27–34)
MCHC RBC-ENTMCNC: 31.6 % — LOW (ref 32–36)
MCHC RBC-ENTMCNC: 32 % — SIGNIFICANT CHANGE UP (ref 32–36)
MCHC RBC-ENTMCNC: 32.7 % — SIGNIFICANT CHANGE UP (ref 32–36)
MCV RBC AUTO: 91.7 FL — SIGNIFICANT CHANGE UP (ref 80–100)
MCV RBC AUTO: 91.8 FL — SIGNIFICANT CHANGE UP (ref 80–100)
MCV RBC AUTO: 93 FL — SIGNIFICANT CHANGE UP (ref 80–100)
MESOTHL CELL # FLD: 8 % — SIGNIFICANT CHANGE UP
MONOCYTES # BLD AUTO: 0.81 K/UL — SIGNIFICANT CHANGE UP (ref 0–0.9)
MONOCYTES # FLD: 12 % — SIGNIFICANT CHANGE UP
MONOCYTES NFR BLD AUTO: 9.5 % — SIGNIFICANT CHANGE UP (ref 2–14)
NEUTROPHILS # BLD AUTO: 6.27 K/UL — SIGNIFICANT CHANGE UP (ref 1.8–7.4)
NEUTROPHILS NFR BLD AUTO: 73.8 % — SIGNIFICANT CHANGE UP (ref 43–77)
NEUTS SEG NFR FLD MANUAL: 4 % — SIGNIFICANT CHANGE UP
NRBC # FLD: 0 K/UL — SIGNIFICANT CHANGE UP (ref 0–0)
PHOSPHATE SERPL-MCNC: 2.5 MG/DL — SIGNIFICANT CHANGE UP (ref 2.5–4.5)
PHOSPHATE SERPL-MCNC: 2.5 MG/DL — SIGNIFICANT CHANGE UP (ref 2.5–4.5)
PLATELET # BLD AUTO: 222 K/UL — SIGNIFICANT CHANGE UP (ref 150–400)
PLATELET # BLD AUTO: 244 K/UL — SIGNIFICANT CHANGE UP (ref 150–400)
PLATELET # BLD AUTO: 254 K/UL — SIGNIFICANT CHANGE UP (ref 150–400)
PMV BLD: 9.7 FL — SIGNIFICANT CHANGE UP (ref 7–13)
PMV BLD: 9.7 FL — SIGNIFICANT CHANGE UP (ref 7–13)
PMV BLD: 9.8 FL — SIGNIFICANT CHANGE UP (ref 7–13)
POTASSIUM SERPL-MCNC: 4 MMOL/L — SIGNIFICANT CHANGE UP (ref 3.5–5.3)
POTASSIUM SERPL-MCNC: 4 MMOL/L — SIGNIFICANT CHANGE UP (ref 3.5–5.3)
POTASSIUM SERPL-SCNC: 4 MMOL/L — SIGNIFICANT CHANGE UP (ref 3.5–5.3)
POTASSIUM SERPL-SCNC: 4 MMOL/L — SIGNIFICANT CHANGE UP (ref 3.5–5.3)
PROT FLD-MCNC: 1.6 G/DL — SIGNIFICANT CHANGE UP
PROT SERPL-MCNC: 7.1 G/DL — SIGNIFICANT CHANGE UP (ref 6–8.3)
PROTHROM AB SERPL-ACNC: 12.3 SEC — SIGNIFICANT CHANGE UP (ref 9.8–13.1)
RBC # BLD: 3.17 M/UL — LOW (ref 4.2–5.8)
RBC # BLD: 3.27 M/UL — LOW (ref 4.2–5.8)
RBC # BLD: 3.3 M/UL — LOW (ref 4.2–5.8)
RBC # FLD: 18 % — HIGH (ref 10.3–14.5)
RBC # FLD: 18.1 % — HIGH (ref 10.3–14.5)
RBC # FLD: 18.1 % — HIGH (ref 10.3–14.5)
RCV VOL RI: 1000 CELL/UL — HIGH (ref 0–5)
RH IG SCN BLD-IMP: POSITIVE — SIGNIFICANT CHANGE UP
SODIUM SERPL-SCNC: 137 MMOL/L — SIGNIFICANT CHANGE UP (ref 135–145)
SODIUM SERPL-SCNC: 138 MMOL/L — SIGNIFICANT CHANGE UP (ref 135–145)
SPECIMEN SOURCE: SIGNIFICANT CHANGE UP
TOTAL CELLS COUNTED, BODY FLUID: 100 CELLS — SIGNIFICANT CHANGE UP
TOTAL NUCLEATED CELL COUNT, BODY FLUID: 275 CELL/UL — HIGH (ref 0–5)
WBC # BLD: 8.49 K/UL — SIGNIFICANT CHANGE UP (ref 3.8–10.5)
WBC # BLD: 8.92 K/UL — SIGNIFICANT CHANGE UP (ref 3.8–10.5)
WBC # BLD: 9.51 K/UL — SIGNIFICANT CHANGE UP (ref 3.8–10.5)
WBC # FLD AUTO: 8.49 K/UL — SIGNIFICANT CHANGE UP (ref 3.8–10.5)
WBC # FLD AUTO: 8.92 K/UL — SIGNIFICANT CHANGE UP (ref 3.8–10.5)
WBC # FLD AUTO: 9.51 K/UL — SIGNIFICANT CHANGE UP (ref 3.8–10.5)

## 2019-07-30 PROCEDURE — 95819 EEG AWAKE AND ASLEEP: CPT | Mod: 26

## 2019-07-30 PROCEDURE — 99223 1ST HOSP IP/OBS HIGH 75: CPT | Mod: GC

## 2019-07-30 PROCEDURE — 99233 SBSQ HOSP IP/OBS HIGH 50: CPT | Mod: GC

## 2019-07-30 PROCEDURE — 99232 SBSQ HOSP IP/OBS MODERATE 35: CPT

## 2019-07-30 PROCEDURE — 99223 1ST HOSP IP/OBS HIGH 75: CPT

## 2019-07-30 PROCEDURE — 99222 1ST HOSP IP/OBS MODERATE 55: CPT | Mod: GC

## 2019-07-30 PROCEDURE — 99232 SBSQ HOSP IP/OBS MODERATE 35: CPT | Mod: 24

## 2019-07-30 PROCEDURE — 88305 TISSUE EXAM BY PATHOLOGIST: CPT | Mod: 26

## 2019-07-30 PROCEDURE — 88112 CYTOPATH CELL ENHANCE TECH: CPT | Mod: 26

## 2019-07-30 RX ORDER — OLANZAPINE 15 MG/1
2.5 TABLET, FILM COATED ORAL ONCE
Refills: 0 | Status: DISCONTINUED | OUTPATIENT
Start: 2019-07-30 | End: 2019-08-01

## 2019-07-30 RX ORDER — CHLORHEXIDINE GLUCONATE 213 G/1000ML
1 SOLUTION TOPICAL
Refills: 0 | Status: DISCONTINUED | OUTPATIENT
Start: 2019-07-30 | End: 2019-08-03

## 2019-07-30 RX ORDER — MECLIZINE HCL 12.5 MG
12.5 TABLET ORAL
Refills: 0 | Status: DISCONTINUED | OUTPATIENT
Start: 2019-07-30 | End: 2019-07-31

## 2019-07-30 RX ORDER — PANTOPRAZOLE SODIUM 20 MG/1
40 TABLET, DELAYED RELEASE ORAL
Refills: 0 | Status: DISCONTINUED | OUTPATIENT
Start: 2019-07-30 | End: 2019-07-30

## 2019-07-30 RX ORDER — PANTOPRAZOLE SODIUM 20 MG/1
80 TABLET, DELAYED RELEASE ORAL ONCE
Refills: 0 | Status: COMPLETED | OUTPATIENT
Start: 2019-07-30 | End: 2019-07-30

## 2019-07-30 RX ORDER — ASPIRIN/CALCIUM CARB/MAGNESIUM 324 MG
81 TABLET ORAL DAILY
Refills: 0 | Status: DISCONTINUED | OUTPATIENT
Start: 2019-07-31 | End: 2019-07-31

## 2019-07-30 RX ORDER — OLANZAPINE 15 MG/1
2.5 TABLET, FILM COATED ORAL ONCE
Refills: 0 | Status: DISCONTINUED | OUTPATIENT
Start: 2019-07-30 | End: 2019-07-30

## 2019-07-30 RX ORDER — PANTOPRAZOLE SODIUM 20 MG/1
8 TABLET, DELAYED RELEASE ORAL
Qty: 80 | Refills: 0 | Status: DISCONTINUED | OUTPATIENT
Start: 2019-07-30 | End: 2019-07-31

## 2019-07-30 RX ORDER — ATORVASTATIN CALCIUM 80 MG/1
40 TABLET, FILM COATED ORAL AT BEDTIME
Refills: 0 | Status: DISCONTINUED | OUTPATIENT
Start: 2019-07-30 | End: 2019-08-03

## 2019-07-30 RX ORDER — HYDRALAZINE HCL 50 MG
5 TABLET ORAL ONCE
Refills: 0 | Status: COMPLETED | OUTPATIENT
Start: 2019-07-30 | End: 2019-07-30

## 2019-07-30 RX ORDER — HYDRALAZINE HCL 50 MG
10 TABLET ORAL EVERY 8 HOURS
Refills: 0 | Status: DISCONTINUED | OUTPATIENT
Start: 2019-07-30 | End: 2019-07-30

## 2019-07-30 RX ADMIN — Medication 5 MILLIGRAM(S): at 09:02

## 2019-07-30 RX ADMIN — Medication 650 MILLIGRAM(S): at 02:02

## 2019-07-30 RX ADMIN — Medication 50 MILLIGRAM(S): at 23:08

## 2019-07-30 RX ADMIN — PANTOPRAZOLE SODIUM 80 MILLIGRAM(S): 20 TABLET, DELAYED RELEASE ORAL at 08:42

## 2019-07-30 RX ADMIN — Medication 650 MILLIGRAM(S): at 00:44

## 2019-07-30 RX ADMIN — Medication 10 MILLIGRAM(S): at 17:40

## 2019-07-30 RX ADMIN — CHLORHEXIDINE GLUCONATE 1 APPLICATION(S): 213 SOLUTION TOPICAL at 17:42

## 2019-07-30 RX ADMIN — PIPERACILLIN AND TAZOBACTAM 25 GRAM(S): 4; .5 INJECTION, POWDER, LYOPHILIZED, FOR SOLUTION INTRAVENOUS at 14:28

## 2019-07-30 RX ADMIN — HEPARIN SODIUM 5000 UNIT(S): 5000 INJECTION INTRAVENOUS; SUBCUTANEOUS at 00:44

## 2019-07-30 RX ADMIN — Medication 2: at 05:44

## 2019-07-30 RX ADMIN — CHLORHEXIDINE GLUCONATE 1 APPLICATION(S): 213 SOLUTION TOPICAL at 05:44

## 2019-07-30 RX ADMIN — PANTOPRAZOLE SODIUM 10 MG/HR: 20 TABLET, DELAYED RELEASE ORAL at 17:42

## 2019-07-30 RX ADMIN — HEPARIN SODIUM 5000 UNIT(S): 5000 INJECTION INTRAVENOUS; SUBCUTANEOUS at 05:44

## 2019-07-30 RX ADMIN — PANTOPRAZOLE SODIUM 10 MG/HR: 20 TABLET, DELAYED RELEASE ORAL at 09:42

## 2019-07-30 RX ADMIN — Medication 1: at 17:43

## 2019-07-30 RX ADMIN — PIPERACILLIN AND TAZOBACTAM 25 GRAM(S): 4; .5 INJECTION, POWDER, LYOPHILIZED, FOR SOLUTION INTRAVENOUS at 00:44

## 2019-07-30 RX ADMIN — PIPERACILLIN AND TAZOBACTAM 25 GRAM(S): 4; .5 INJECTION, POWDER, LYOPHILIZED, FOR SOLUTION INTRAVENOUS at 05:44

## 2019-07-30 RX ADMIN — ATORVASTATIN CALCIUM 40 MILLIGRAM(S): 80 TABLET, FILM COATED ORAL at 00:44

## 2019-07-30 RX ADMIN — Medication 650 MILLIGRAM(S): at 05:44

## 2019-07-30 RX ADMIN — PANTOPRAZOLE SODIUM 10 MG/HR: 20 TABLET, DELAYED RELEASE ORAL at 21:19

## 2019-07-30 RX ADMIN — Medication 3: at 12:59

## 2019-07-30 RX ADMIN — Medication 650 MILLIGRAM(S): at 23:07

## 2019-07-30 RX ADMIN — Medication 50 MILLIGRAM(S): at 00:44

## 2019-07-30 RX ADMIN — CARVEDILOL PHOSPHATE 6.25 MILLIGRAM(S): 80 CAPSULE, EXTENDED RELEASE ORAL at 05:43

## 2019-07-30 NOTE — PROCEDURE NOTE - NSINFORMCONSENT_GEN_A_CORE
Wife Shivani/Benefits, risks, and possible complications of procedure explained to patient/caregiver who verbalized understanding and gave written consent.

## 2019-07-30 NOTE — PROGRESS NOTE ADULT - SUBJECTIVE AND OBJECTIVE BOX
Patient is a 63y old  Male who presents with a chief complaint of confusion    f/u abd abscess    Interval History/ROS:  no fever.  still confused but recognized me today.  better.  jumpy.  no abd pain.  no dysuria.  Limited ROS - patient confused.    PAST MEDICAL & SURGICAL HISTORY:  Type II diabetes mellitus  HTN (hypertension)  CAD (coronary artery disease)  S/P cholecystectomy    Allergies  Cipro (Rash)  Plavix (Rash)    ANTIMICROBIALS:  meropenem  IVPB 500 every 12 hours (-)  piperacillin/tazobactam IVPB.. 3.375 every 8 hours (-10)  erta (-)    active  vancomycin  IVPB x1   piperacillin/tazobactam IVPB.. 3.375 every 8 hours (-)    MEDICATIONS  (STANDING):  acetaminophen    Suspension .. 650 every 6 hours  atorvastatin 40 at bedtime  carvedilol 6.25 every 12 hours  hydrALAZINE 50 every 8 hours  hydrALAZINE Injectable 10 every 8 hours  insulin lispro (HumaLOG) corrective regimen sliding scale  every 6 hours  pantoprazole Infusion 8 <Continuous>    Vital Signs Last 24 Hrs  T(F): 98.6 (19 @ 05:38), Max: 98.6 (19 @ 05:38)  HR: 70 (19 @ 09:45)  BP: 144/62 (19 @ 09:45)  RR: 16 (19 @ 09:45)  SpO2: 98% (19 @ 09:45) (98% - 98%)    PHYSICAL EXAM:  General: confused but a little better today  HEAD/EYES: anicteric  ENT:  supple  Cardiovascular:   S1, S2  Respiratory:  clear bilaterally  GI:  soft, non-tender, normal bowel sounds  Musculoskeletal:  no synovitis  Neurologic:  easily startled  Skin:  no rash  Psychiatric:  difficult to assess  Vascular:  PICC still in place                            9.8    9.51  )-----------( 244      ( 2019 09:29 )             30.0 07-30    138  |  105  |  24  ----------------------------<  285  4.0   |  20  |  1.43  Ca    9.0      2019 09:29Phos  2.5     07-30Mg     1.9       TPro  7.1  /  Alb  2.8  /  TBili  1.0  /  DBili  x   /  AST  30  /  ALT  17  /  AlkPhos  83      Urinalysis Basic - ( 2019 06:37 )  Color: YELLOW / Appearance: CLEAR / S.027 / pH: 6.0  Gluc: NEGATIVE / Ketone: TRACE  / Bili: NEGATIVE / Urobili: TRACE   Blood: NEGATIVE / Protein: 100 / Nitrite: NEGATIVE   Leuk Esterase: NEGATIVE / RBC: 3-5 / WBC 0-2   Sq Epi: OCC / Non Sq Epi: x / Bacteria: NEGATIVE    MICROBIOLOGY:  Culture - Urine (collected 2019 07:13)  Source: URINE MIDSTREAM  Preliminary Report (2019 08:54):    NO GROWTH TO DATE    Culture - Yeast and Fungus (collected 2019 18:45)  Source: ABSCESS  Preliminary Report (2019 09:09):    CULTURE NEGATIVE FOR YEASTS AND MOLDS---- PRELIMINARY    CULTURE NEGATIVE FOR YEASTS AND MOLDS AFTER 1 DAY    Culture - Surg Site Aerob/Anaer w/Gm St (collected 2019 18:45)  Source: ABSCESS  Preliminary Report (2019 13:57):    MODERATE  Organism: Escherichia coli  Klebsiella pneumoniae  Enterococcus faecalis (2019 13:57)  Organism: Enterococcus faecalis (2019 13:57)  Organism: Klebsiella pneumoniae (2019 13:57)      -  Amikacin: S <=8 BOB      -  Ampicillin: R >16 BOB      -  Ampicillin/Sulbactam: S 8/4 BOB      -  Aztreonam: S <=4 BOB      -  Cefazolin: S <=2 BOB      -  Cefepime: S <=2 BOB      -  Cefoxitin: S <=4 BOB      -  Ceftazidime: S <=1 BOB      -  Ceftriaxone: S <=1 BOB      -  Ciprofloxacin: S <=0.5 BOB      -  Ertapenem: S <=0.5 BOB      -  Gentamicin: S <=1 BOB      -  Imipenem: S <=1 BOB      -  Levofloxacin: S <=1 BOB      -  Meropenem: S <=1 BOB      -  Piperacillin/Tazobactam: S <=8 BOB      -  Tigecycline: S      -  Tobramycin: S <=2 BOB      -  Trimethoprim/Sulfamethoxazole: S <=0.5/9.5 BOB      Method Type: NEGATIVE BOB 43  Organism: Escherichia coli (2019 13:57)    Culture - Acid Fast Smear Concentrated (collected 2019 18:45)  Source: ABSCESS  Final Report (2019 14:54):    AFB SMEAR= NO ACID FAST BACILLI SEEN    Culture - Urine (collected 2019 01:32)  Source: URINE MIDSTREAM  Final Report (2019 10:04):    Culture grew 3 or more types of organisms which indicate    collection contamination; consider recollection only if    clinically indicated.    Culture - Blood (collected 2019 23:14)  Source: BLOOD PERIPHERAL  Preliminary Report (2019 23:15):    NO ORGANISMS ISOLATED    NO ORGANISMS ISOLATED AT 72 HRS.    Culture - Blood (collected 2019 23:12)  Source: BLOOD VENOUS  Preliminary Report (2019 23:13):    NO ORGANISMS ISOLATED    NO ORGANISMS ISOLATED AT 72 HRS.    RADIOLOGY:  imaging below personally reviewed    CT Head No Cont (19 @ 08:36) >  IMPRESSION:  Imaging limited by patient motion.  No acute intracranial hemorrhage.  Microvascular disease.    CT Abdomen and Pelvis No Cont (19 @ 08:42) >  IMPRESSION:  Moderate partially loculated left pleural effusion, slightly increased in size since 2019.  Peripheral right lower lobe nodule, slightly increased in size.  Status post removal of percutaneous drainage catheter from the right upper quadrant. A residual amorphous collection containing fluid and a small amount of gas in the right upper quadrant measures 2.4 x 2.2 cm, slightly decreased in size since 2019.  Mesenteric lymphadenopathy, slightly increased in size since 2019.  Subtle nodularity of the omentum, raising consideration of peritoneal carcinomatosis.  Moderate to large volume of ascites, increased.    CT Abdomen and Pelvis w/ Oral Cont (19 @ 15:02) >  IMPRESSION:  Status post percutaneous drainage of a complex right upper quadrant collection, with interval decrease in size of the collection. Small residual predominantly gas containing components are noted.  Moderate volume ascites, increased since 2019.  Nonspecific mesenteric lymph nodes, unchanged.  Small bilateral pleural effusions, increased in size since 2019. Small residual left pneumothorax status post removal of previously noted left pleural catheter.    CT Abdomen and Pelvis w/ Oral Cont (19 @ 04:22) >  IMPRESSION:  Status post recent Whipple procedure. High density amorphous collection adjacent to the duodenal stump with loculated foci of air and fluid in the right upper quadrant for which considerations include postoperative hematoma and air, leakage of contrast and air from the duodenal stump, or developing collection/abscess.  Left-sided pigtail chest tube. Trace left pneumothorax along the anterior chest wall.

## 2019-07-30 NOTE — CONSULT NOTE ADULT - ASSESSMENT
62 y/o M with PMH of chalangiocarcinoma s/p Whipple & feeding J-tube placement, s/p pigtail chest tube now removed for PTX, s/p IR drainage for intra-abdominal fluid collection, s/p ~4 weeks of Ertapenem via PICC line, presenting with acute AMS of one day duration, likely 2/2 metabolic encephalopathy of unknown etiology. ID, heme/onc, surgery following. Pulm consulted for pleural effusion that has increased in size since previous scan. Able to visualize left sided pleural effusion on bedside ultrasound. Patient history is difficult to ascertain, but has been satting well on room air, and does not appear to be in respiratory distress, does not endorse SOB, did endorse mild cough.    Recs:    Continued observation, and if he beings to develop SOB and dyspnea, can consider thoracentesis and/or pleurX catheter.

## 2019-07-30 NOTE — PROGRESS NOTE ADULT - PROBLEM SELECTOR PLAN 8
-DVT ppx: HSQ held in setting of potential GI bleed, SCDs ordered  -Diet: NPO for now, will restart Glucerna tube feeds if improved  -Dispo: to be discussed with family  Advanced Directives: spoke to wife who stated she was healthy care proxy and that she wanted "everything for him done"

## 2019-07-30 NOTE — PROGRESS NOTE ADULT - PROBLEM SELECTOR PLAN 3
-elevated trop 182 on presentation, repeat trop 158; likely elevated in setting of CKD, low suspicion for MI  -EKG showed no ST changes  -c/w home aspirin  -Placed on telemetry for monitoring  -Appreciate surgery & heme/onc recs: recommend MI workup -elevated trop 182 on presentation, repeat trop 158; likely elevated in setting of CKD, low suspicion for MI  -EKG showed no ST changes  -c/w home aspirin  -on telemetry for monitoring -multiple episodes of coffee ground emesis on 7/30 morning, none since then  -Appreciate GI recs: will hold off on EGD for now, c/w IV PPI, c/w ASA, transfuse if Hgb<7, NPO for now  -monitor CBC q12 -multiple episodes of coffee ground emesis on 7/30 morning, none since then  -Appreciate GI recs: will hold off on EGD for now, c/w IV PPI, c/w ASA (recent NSTEMI), transfuse if Hgb<7, NPO for now  -monitor CBC q12

## 2019-07-30 NOTE — CONSULT NOTE ADULT - SUBJECTIVE AND OBJECTIVE BOX
HPI:  64 y/o M with acute onset of altered mental status x 1 week.  Patient has a PMH of cholangiocarcinoma s/p Whipple & Jtube placement (on 19) c/b hospital readmission on  for abdominal fluid collection s/p IR drainage and IV ertapenem via PICC line x4 weeks and for PTX s/p pigtail chest tube now removed, CAD s/p 9 stents, DM2 on insulin, HTN, GERD. He was being treated with Ertapnem outpatient for abdominal fluid collection (started on 7/10). Brought in by wife for waxing and waning mental status that has worsened over the past week. Patient has been having visual hallucinations - will pretend to hold a phone and order from a restaurant, will see someone in his room that is not physically there. 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 Per house staff, patients mental status has improved since admission. Of note, patient had 3 falls at home prior to presenting to the hospital. Details of these falls are currently unknown.  ID recommended stopping Ertapenem yesterday as it can cause confusion. On my exam, patient had several episodes of myoclonic jerks bilaterally that lasted a few seconds and resolved spontaneously. Patient was conscious during these episodes and when asked why he was doing that, he stated he felt like he was falling.       PAST MEDICAL & SURGICAL HISTORY:  Cholangiocarcinoma  Type II diabetes mellitus  HTN (hypertension)  CAD (coronary artery disease)  S/P cholecystectomy    FAMILY HISTORY:    Social Hx:  Nonsmoker, no drug or alcohol use    Home Medications:  aspirin 81 mg oral tablet: 1 tab(s) orally once a day (2019 13:46)  bisacodyl 10 mg rectal suppository: 1 suppository(ies) rectal once a day, As needed, Constipation (2019 13:46)  carvedilol 6.25 mg oral tablet: 1 tab(s) orally every 12 hours (2019 13:46)  Crestor 10 mg oral tablet: 1 tab(s) orally once a day (at bedtime) (2019 13:46)  docusate sodium 100 mg oral capsule: 1 cap(s) orally 3 times a day (2019 13:46)  dronabinol 5 mg oral capsule: 1 cap(s) orally 2 times a day (2019 17:25)    MEDICATIONS  (STANDING):  acetaminophen    Suspension .. 650 milliGRAM(s) Oral every 6 hours  atorvastatin 40 milliGRAM(s) Oral at bedtime  carvedilol 6.25 milliGRAM(s) Oral every 12 hours  chlorhexidine 4% Liquid 1 Application(s) Topical two times a day  dextrose 5%. 1000 milliLiter(s) (50 mL/Hr) IV Continuous <Continuous>  dextrose 50% Injectable 12.5 Gram(s) IV Push once  dextrose 50% Injectable 25 Gram(s) IV Push once  dextrose 50% Injectable 25 Gram(s) IV Push once  hydrALAZINE 50 milliGRAM(s) Oral every 8 hours  hydrALAZINE Injectable 10 milliGRAM(s) IV Push every 8 hours  insulin lispro (HumaLOG) corrective regimen sliding scale   SubCutaneous every 6 hours  pantoprazole Infusion 8 mG/Hr (10 mL/Hr) IV Continuous <Continuous>  piperacillin/tazobactam IVPB.. 3.375 Gram(s) IV Intermittent every 8 hours    MEDICATIONS  (PRN):  dextrose 40% Gel 15 Gram(s) Oral once PRN Blood Glucose LESS THAN 70 milliGRAM(s)/deciLiter  glucagon  Injectable 1 milliGRAM(s) IntraMuscular once PRN Glucose <70 milliGRAM(s)/deciLiter  meclizine 12.5 milliGRAM(s) Oral two times a day PRN Dizziness  OLANZapine Injectable 2.5 milliGRAM(s) IntraMuscular every 8 hours PRN severe agitation.    Allergies  Cipro (Rash)  Plavix (Rash)    ROS: Pertinent positives in HPI, all other ROS were reviewed and are negative.      Vital Signs Last 24 Hrs  T(C): 36.2 (2019 12:47), Max: 37 (2019 05:38)  T(F): 97.2 (2019 12:47), Max: 98.6 (2019 05:38)  HR: 73 (2019 12:47) (64 - 80)  BP: 149/77 (2019 12:47) (141/68 - 196/82)  RR: 18 (2019 12:47) (16 - 18)  SpO2: 97% (2019 12:47) (97% - 98%)          Neurological Exam:  Mental Status: Lethargic, orientated to self and place.  Stated it was 1919, then corrected the year to 2019. Speech is fluent but very difficult to understand.  Cranial Nerves:   PERRL, EOMI, VFF, no nystagmus.    CN V1-3 intact to light touch .  No facial asymmetry.  Hearing intact to finger rub bilaterally.  Tongue, uvula and palate midline.  Sternocleidomastoid and Trapezius intact bilaterally.    Motor:   Tone: normal.                  Strength:     [] Upper extremity                      Delt       Bicep    Tricep                                                  R                                                        L            [] Lower extremity                       HF          KE          KF        DF         PF                                               R                                                       L           Pronator drift: none                 Dysmetria: None to finger-nose-finger or heel-shin-heel  No truncal ataxia.    Tremor: No resting, postural or action tremor.  Bilateral myoclonic jerks   Sensation: intact to light touch, pinprick, vibration and proprioception    Deep Tendon Reflexes:     Biceps          Triceps      BR        Patellar        Ankle         Babinski                                         R       2+                   2+           2+            2+               2+           downgoing                                         L        2+                  2+           2+            2+               2+           downgoing    Gait: deferred due to myoclonic jerks and mental status      Labs:   cbc                      9.8    9.51  )-----------( 244      ( 2019 09:29 )             30.0     [id number]    138  |  105  |  24<H>  ----------------------------<  285<H>  4.0   |  20<L>  |  1.43<H>    Ca    9.0      2019 09:29  Phos  2.5     07-30  Mg     1.9     07-30    TPro  7.1  /  Alb  2.8<L>  /  TBili  1.0  /  DBili  x   /  AST  30  /  ALT  17  /  AlkPhos  83  07-30    CoagsPT/INR - ( 2019 09:29 )   PT: 12.3 SEC;   INR: 1.08          PTT - ( 2019 09:29 )  PTT:28.6 SEC    LFTsLIVER FUNCTIONS - ( 2019 09:29 )  Alb: 2.8 g/dL / Pro: 7.1 g/dL / ALK PHOS: 83 u/L / ALT: 17 u/L / AST: 30 u/L / GGT: x           UAUrinalysis Basic - ( 2019 06:37 )    Color: YELLOW / Appearance: CLEAR / S.027 / pH: 6.0  Gluc: NEGATIVE / Ketone: TRACE  / Bili: NEGATIVE / Urobili: TRACE   Blood: NEGATIVE / Protein: 100 / Nitrite: NEGATIVE   Leuk Esterase: NEGATIVE / RBC: 3-5 / WBC 0-2   Sq Epi: OCC / Non Sq Epi: x / Bacteria: NEGATIVE      EXAM:  CT BRAIN        PROCEDURE DATE:  2019         INTERPRETATION:  Clinical indication: Altered mental status.   Cholangiocarcinoma.    Technique: Noncontrast CT of the head was performed.    Multiple contiguous axial images were acquiredfrom the skull base to the   vertex without the administration of intravenous contrast. Coronal and   sagittal reformations were made.    COMPARISON: None.    FINDINGS:  Images are limited by patient motion.    Mild prominence of the sulci and ventricles are consistent with   age-appropriate volume loss. There are hemispheric white matter areas of   low attenuation which are nonspecific but likely related to sequelae of   microvascular disease. There are no stigmata associated with a space   occupying mass such as vasogenic edema or mass effect. There is no   intraparenchymal hematoma, mass effect or midline shift. No abnormal   extra-axial fluid collections are present. There is no evidence of acute   transcortical territorial infarction.    The calvarium is intact. The visualized intraorbital compartments,   paranasal sinuses and tympanomastoid cavities appear free of acute   disease.    IMPRESSION:  Imaging limited by patient motion.  No acute intracranial hemorrhage.  Microvascular disease.

## 2019-07-30 NOTE — CONSULT NOTE ADULT - CONSULT REQUESTED DATE/TIME
29-Jul-2019 12:16
29-Jul-2019 09:00
29-Jul-2019 14:24
30-Jul-2019 09:02
30-Jul-2019 13:09
30-Jul-2019 14:11
30-Jul-2019

## 2019-07-30 NOTE — PROGRESS NOTE ADULT - PROBLEM SELECTOR PLAN 6
-NPO at this time, will start glucerna tube feeds  -lantus held and will continue with ISS -NPO at this time, will restart glucerna tube feeds if improved  -lantus held and will continue with ISS -serum Cr 1.4, about baseline  -renally dose meds

## 2019-07-30 NOTE — PROGRESS NOTE ADULT - PROBLEM SELECTOR PLAN 2
-s/p perc drainage by IR on 7/1; residual fluid collection 2.4cm x 2.2cm still present on CTAP though slightly decreased in size  -IR contacted for re-evaluation of source control, but stated no intervention at this time  -CTAP showed subtle nodularity of omentum concerning for peritoneal carcinomatosis  -consider pulm eval for increased loculated L pleural effusion found on CTAP; if thoracentesis, send for cytology  -outpatient oncology f/u -s/p perc drainage by IR on 7/1; residual fluid collection 2.4cm x 2.2cm still present on CTAP though slightly decreased in size  -IR contacted for re-evaluation of source control, but stated no intervention at this time  -CTAP showed subtle nodularity of omentum concerning for peritoneal carcinomatosis  -outpatient oncology f/u

## 2019-07-30 NOTE — PROGRESS NOTE ADULT - PROBLEM SELECTOR PLAN 1
-Likely 2/2 metabolic encephalopathy, less likely 2/2 stroke  -LFTs wnl, ammonia elevated at 45  -CTH showed no intracranial bleeding  -r/o sepsis: lactate 2.0, source present from intraabdominal fluid collection, but no hypotension, no fever, no leukocytosis  -CTAP showed subtle nodularity of omentum concerning for peritoneal carcinomatosis, need to consider possible mets to brain/meninges  -f/u MRI w/ contrast  -r/o SBP in the setting of moderate-large ascites  -appreciate heme/onc recs: diagnostic and therapeutic paracentesis to be sent for cytology, culture, and cell count  -appreciate ID recs: start zosyn 3.375g q8, hold ertapenem as it can cause confusion  -f/u urine tox  -UA negative aside from 100 protein, UCx NGTD  -Respiratory virus panel negative  -f/u BCx  -Consider neurology consult  -olanzapine IM 2.5 mg q8 prn for agitation  -hold home ambien and cyclobenzaprine for concerns of polypharmacy-induced AMS -Likely 2/2 metabolic encephalopathy, less likely 2/2 stroke, less likely 2/2 polypharmacy  -LFTs wnl, ammonia elevated at 45  -CTH showed no intracranial bleeding  -r/o sepsis: lactate 2.0, source present from intraabdominal fluid collection, but no hypotension, no fever, no leukocytosis  -CTAP showed subtle nodularity of omentum concerning for peritoneal carcinomatosis, need to consider possible mets to brain/meninges  -f/u MRI w/ contrast  -Appreciate heme/onc recs: diagnostic and therapeutic paracentesis to be sent for cytology, culture, and cell count  -SBP ruled out via diagnostic paracentesis  -appreciate ID recs: start zosyn 3.375g q8, hold ertapenem as it can cause confusion  -f/u urine tox  -UA negative aside from 100 protein, UCx NGTD  -Respiratory virus panel negative  -BCx NGTD  -olanzapine IM 2.5 mg q8 prn for agitation  -hold home ambien and cyclobenzaprine for concerns of polypharmacy-induced AMS  -Appreciate Neuro recs: order MRI, EEG, serum paraneoplastic panel, can consider LP if imaging negative and mental status does not improve  -Appreciate Pulm recs: if pt develops SOB or dyspnea, consider thoracentesis and/or PleurX catheter for his loculated L pleural effusion on CTAP  -Appreciate GI recs: will hold off on EGD for now, c/w IV PPI, c/w ASA, transfuse if Hgb<7, NPO for now -Likely 2/2 metabolic encephalopathy, less likely 2/2 stroke, less likely 2/2 polypharmacy  -LFTs wnl, ammonia elevated at 45  -CTH showed no intracranial bleeding  -r/o sepsis: lactate 2.0, source present from intraabdominal fluid collection, but no hypotension, no fever, no leukocytosis  -CTAP showed subtle nodularity of omentum concerning for peritoneal carcinomatosis, need to consider possible mets to brain/meninges  -f/u MRI w/ contrast  -Appreciate heme/onc recs: diagnostic and therapeutic paracentesis to be sent for cytology, culture, and cell count  -SBP ruled out via diagnostic paracentesis  -appreciate ID recs: start zosyn 3.375g q8, hold ertapenem as it can cause confusion  -f/u urine tox  -UA negative aside from 100 protein, UCx NGTD  -Respiratory virus panel negative  -BCx NGTD  -olanzapine IM 2.5 mg q8 prn for agitation  -hold home ambien and cyclobenzaprine for concerns of polypharmacy-induced AMS  -Appreciate Neuro recs: order MRI, EEG, serum paraneoplastic panel, can consider LP if imaging negative and mental status does not improve  -Appreciate Pulm recs: if pt develops SOB or dyspnea, consider thoracentesis and/or PleurX catheter for his loculated L pleural effusion on CTAP -Likely 2/2 metabolic encephalopathy, concern for potential infection not yet identified, vs paraneoplastic syndrome, vs undiagnosed metastatic disease in brain; less likely 2/2 stroke, less likely 2/2 polypharmacy  -LFTs wnl, ammonia elevated at 45  -CTH showed no intracranial bleeding  -r/o sepsis: lactate 2.0, source present from intraabdominal fluid collection, but no hypotension, no fever, no leukocytosis  -CTAP showed subtle nodularity of omentum concerning for peritoneal carcinomatosis, need to consider possible mets to brain/meninges  -f/u MRI w/ contrast  -Appreciate heme/onc recs: ascites to be sent for cytology  -SBP ruled out via diagnostic paracentesis  -appreciate ID recs: start zosyn 3.375g q8, hold ertapenem as it can cause confusion  -f/u urine tox  -UA negative aside from 100 protein, UCx NGTD  -Respiratory virus panel negative  -BCx NGTD  -olanzapine IM 2.5 mg q8 prn for agitation  -hold home ambien and cyclobenzaprine for concerns of polypharmacy-induced AMS  -Appreciate Neuro recs: order MRI, EEG, serum paraneoplastic panel, can consider LP if imaging negative and mental status does not improve  -Appreciate Pulm recs: if pt develops SOB or dyspnea, consider thoracentesis and/or PleurX catheter for his loculated L pleural effusion on CTAP

## 2019-07-30 NOTE — PROGRESS NOTE ADULT - ASSESSMENT
62 y/o M with PMH of chalangiocarcinoma s/p Whipple & feeding J-tube placement, s/p pigtail chest tube now removed for PTX, s/p IR drainage for intra-abdominal fluid collection, s/p ~4 weeks of Ertapenem via PICC line, presenting with acute AMS of one day duration, likely 2/2 metabolic encephalopathy of unknown etiology. ID, heme/onc, surgery following. 62 y/o M with PMH of chalangiocarcinoma s/p Whipple & feeding J-tube placement, s/p pigtail chest tube now removed for PTX, s/p IR drainage for intra-abdominal fluid collection, s/p ~4 weeks of Ertapenem via PICC line, presenting with acute AMS of one day duration, likely 2/2 metabolic encephalopathy of unknown etiology. ID, Heme/onc, Surgery, GI, Neuro, Pulm following.

## 2019-07-30 NOTE — PROGRESS NOTE ADULT - SUBJECTIVE AND OBJECTIVE BOX
Grady Ferro, PGY-1  Team 3  Contact/Pager: [phone] / [phone]    OVERNIGHT / SUBJECTIVE EVENTS:  -no acute events overnight; pt had 3 "dark" bowel movements of muddy consistency as per pt observer overnight  -Pt was seen and examined at bedside. Pt is more responsive to questions today. Still difficult to understand patient's speech at times, but able to appreciate some words and phrases that are appropriate responses to the questions posed. At times, pt seems to be able to process thoughts but unable to communicate them correctly. AAOx2 (self and place) improved from AAOx0 yesterday. Patient endorsed no pain, but limited ROS assessment due to mental status.  -In morning after initial encounter, pt had multiple episodes of coffee ground emesis    MEDICATIONS  (STANDING):  acetaminophen    Suspension .. 650 milliGRAM(s) Oral every 6 hours  atorvastatin 40 milliGRAM(s) Oral at bedtime  carvedilol 6.25 milliGRAM(s) Oral every 12 hours  chlorhexidine 4% Liquid 1 Application(s) Topical two times a day  dextrose 5%. 1000 milliLiter(s) (50 mL/Hr) IV Continuous <Continuous>  dextrose 50% Injectable 12.5 Gram(s) IV Push once  dextrose 50% Injectable 25 Gram(s) IV Push once  dextrose 50% Injectable 25 Gram(s) IV Push once  hydrALAZINE 50 milliGRAM(s) Oral every 8 hours  hydrALAZINE Injectable 10 milliGRAM(s) IV Push every 8 hours  insulin lispro (HumaLOG) corrective regimen sliding scale   SubCutaneous every 6 hours  OLANZapine Injectable 2.5 milliGRAM(s) IntraMuscular once  OLANZapine Injectable 2.5 milliGRAM(s) IntraMuscular once  pantoprazole Infusion 8 mG/Hr (10 mL/Hr) IV Continuous <Continuous>  piperacillin/tazobactam IVPB.. 3.375 Gram(s) IV Intermittent every 8 hours    MEDICATIONS  (PRN):  dextrose 40% Gel 15 Gram(s) Oral once PRN Blood Glucose LESS THAN 70 milliGRAM(s)/deciLiter  glucagon  Injectable 1 milliGRAM(s) IntraMuscular once PRN Glucose <70 milliGRAM(s)/deciLiter  meclizine 12.5 milliGRAM(s) Oral two times a day PRN Dizziness  OLANZapine Injectable 2.5 milliGRAM(s) IntraMuscular every 8 hours PRN severe agitation.      Allergies    Cipro (Rash)  Plavix (Rash)    Intolerances        OBJECTIVE:  Vital Signs Last 24 Hrs  T(C): 36.2 (2019 12:47), Max: 37 (2019 05:38)  T(F): 97.2 (2019 12:47), Max: 98.6 (2019 05:38)  HR: 73 (2019 12:47) (64 - 80)  BP: 149/77 (2019 12:47) (141/68 - 196/82)  BP(mean): --  RR: 18 (2019 12:47) (16 - 18)  SpO2: 97% (2019 12:47) (97% - 98%)      CAPILLARY BLOOD GLUCOSE      POCT Blood Glucose.: 175 mg/dL (2019 17:00)  POCT Blood Glucose.: 265 mg/dL (2019 12:20)  POCT Blood Glucose.: 235 mg/dL (2019 05:43)  POCT Blood Glucose.: 150 mg/dL (2019 23:44)  POCT Blood Glucose.: 123 mg/dL (2019 18:28)    I&O's Summary      PHYSICAL EXAM:  GENERAL: NAD, on room air, speech not understandable, mildly agitated, lying in bed  HEENT: sclera clear, PERRLA  CHEST/RESPIRATORY: CTA b/l, decreased breath sounds on left lung base  CARDIOVASCULAR: Regular Rate and Rhythm, s1/s2 heard, no murmurs, rubs, or gallops appreciated  ABDOMINAL: Soft, nontender, nondistended; BS present, midline abdominal scar, + jejunostomy tube  EXTREMITIES: 2+ pulses b/l, no edema b/l, LUE PICC C/D/I  NEURO: not consistently following commands, spontaneously moving all extremities  PSYCH:  AAOx2 (oriented to self and place, thinks year is 2018)    LABS:                        9.8    9.51  )-----------( 244      ( 2019 09:29 )             30.0     07-30    138  |  105  |  24<H>  ----------------------------<  285<H>  4.0   |  20<L>  |  1.43<H>    Ca    9.0      2019 09:29  Phos  2.5     07-30  Mg     1.9     07-30    TPro  7.1  /  Alb  2.8<L>  /  TBili  1.0  /  DBili  x   /  AST  30  /  ALT  17  /  AlkPhos  83  07-30    LIVER FUNCTIONS - ( 2019 09:29 )  Alb: 2.8 g/dL / Pro: 7.1 g/dL / ALK PHOS: 83 u/L / ALT: 17 u/L / AST: 30 u/L / GGT: x           PT/INR - ( 2019 09:29 )   PT: 12.3 SEC;   INR: 1.08          PTT - ( 2019 09:29 )  PTT:28.6 SEC  Urinalysis Basic - ( 2019 06:37 )    Color: YELLOW / Appearance: CLEAR / S.027 / pH: 6.0  Gluc: NEGATIVE / Ketone: TRACE  / Bili: NEGATIVE / Urobili: TRACE   Blood: NEGATIVE / Protein: 100 / Nitrite: NEGATIVE   Leuk Esterase: NEGATIVE / RBC: 3-5 / WBC 0-2   Sq Epi: OCC / Non Sq Epi: x / Bacteria: NEGATIVE        Venous Blood Gas:   @ 06:10  7.38/36/51/21/81.8  VBG Lactate: 2.0    PT/INR - ( 2019 09:29 )   PT: 12.3 SEC;   INR: 1.08          PTT - ( 2019 09:29 )  PTT:28.6 SEC      Urinalysis Basic - ( 2019 06:37 )    Color: YELLOW / Appearance: CLEAR / S.027 / pH: 6.0  Gluc: NEGATIVE / Ketone: TRACE  / Bili: NEGATIVE / Urobili: TRACE   Blood: NEGATIVE / Protein: 100 / Nitrite: NEGATIVE   Leuk Esterase: NEGATIVE / RBC: 3-5 / WBC 0-2   Sq Epi: OCC / Non Sq Epi: x / Bacteria: NEGATIVE        MICROBIOLOGY:     Culture - Urine (collected 2019 07:13)  Source: URINE MIDSTREAM  Final Report (2019 08:23):    NO GROWTH AT 24 HOURS    Culture - Blood (collected 2019 06:39)  Source: BLOOD VENOUS  Preliminary Report (2019 06:39):    NO ORGANISMS ISOLATED    NO ORGANISMS ISOLATED AT 24 HOURS    Culture - Blood (collected 2019 06:39)  Source: BLOOD PERIPHERAL  Preliminary Report (2019 06:39):    NO ORGANISMS ISOLATED    NO ORGANISMS ISOLATED AT 24 HOURS      RADIOLOGY & ADDITIONAL TESTS:    Paracentesis, ascitic fluid analysis (): Yellow, Hazy, Total nucleated cell count 275, total RBC count 1000

## 2019-07-30 NOTE — CONSULT NOTE ADULT - ATTENDING COMMENTS
Admit to medicine w MS changes    GI eval    Neurology eval
64 y/o M with PMH of chalangiocarcinoma s/p Whipple & feeding J-tube placement, s/p pigtail chest tube now removed for PTX, s/p IR drainage for intra-abdominal fluid collection, s/p ~4 weeks of Ertapenem via PICC line, presenting with acute AMS of one day duration, likely 2/2 metabolic encephalopathy of unknown etiology. ID, heme/onc, surgery following. Pulm consulted for pleural effusion that has increased in size since previous scan. Able to visualize left sided pleural effusion on bedside ultrasound. Patient history is difficult to ascertain, but has been satting well on room air, and does not appear to be in respiratory distress, does not endorse SOB, did endorse mild cough.    Recs:    Continued observation, and if he beings to develop SOB and dyspnea, can consider thoracentesis and/or pleurX catheter.
I have personally seen, examined, and participated in the care of this patient on rounds 7/31/19 with the neurology team.  I have reviewed all pertinent clinical information, including history, physical examination, assessment and plan.   I agree with the above, and note in addition, instead or in particular as follows:    Exam at this time limited by the fact that the Pt received sedative medication and is profoundly lethargic.  As part of work-up for alteration in mental status I advised that we recommended to the medical team that B12, folate, MMA and homocysteine levels be checked.  Will re-eavluate Pt at another time when he not so heavily sedated.
I have seen and evaluated the patient with the GI Fellow and GI Team.  I agree with the findings, formulation and plan of care as documented in the resident / fellows note and edited where appropriate.

## 2019-07-30 NOTE — PROGRESS NOTE ADULT - PROBLEM SELECTOR PLAN 4
-hypertensive urgency as BP was 199/84 in ED  -continue with home hydralazine and coreg -elevated trop 182 on presentation, repeat trop 158; likely elevated in setting of CKD, low suspicion for MI  -EKG showed no ST changes  -c/w home aspirin  -on telemetry for monitoring

## 2019-07-30 NOTE — CONSULT NOTE ADULT - SUBJECTIVE AND OBJECTIVE BOX
CHIEF COMPLAINT: AMS    HPI: Patient confused, speech difficult to understand, so limited history. Consulted for pleural effusion. 62 y/o M with PMH of cholangiocarcinoma s/p Whipple & feeding J tube placement (on 19) c/b hospital readmission on  for abdominal fluid collection s/p IR drainage and IV ertapenem via PICC line x4 weeks and for PTX s/p pigtail chest tube now removed, CAD s/p 9 stents, DM2 on insulin, HTN, GERD, presenting with altered mental status over the past day. Pt was recently discharged from hospital under Dr. Viveros's service on 7/10 for the chest tube placement for a trace L pneumothorax and IR drain placement for the abdominal fluid collection found after his Whipple procedure. On  admission, pt presented with PTX, chest tube placed. Small L pleural effusion seen on CXR .     Pt speech difficult to understand. Did deny, SOB, chest pain, did endorse cough. Did get agitated, extend arms during interview.  This visit, moderate sized L loculated pleural effusion, increased since previous visit. Also a peripheral RLL nodule, increased in size seen on CT this visit.         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    Allergies    Cipro (Rash)  Plavix (Rash)    Intolerances        Home Medications:  aspirin 81 mg oral tablet: 1 tab(s) orally once a day (2019 13:46)  bisacodyl 10 mg rectal suppository: 1 suppository(ies) rectal once a day, As needed, Constipation (2019 13:46)  carvedilol 6.25 mg oral tablet: 1 tab(s) orally every 12 hours (2019 13:46)  Crestor 10 mg oral tablet: 1 tab(s) orally once a day (at bedtime) (2019 13:46)  docusate sodium 100 mg oral capsule: 1 cap(s) orally 3 times a day (2019 13:46)  dronabinol 5 mg oral capsule: 1 cap(s) orally 2 times a day (2019 17:25)      OBJECTIVE:  ICU Vital Signs Last 24 Hrs  T(C): 36.2 (2019 12:47), Max: 37 (2019 05:38)  T(F): 97.2 (2019 12:47), Max: 98.6 (2019 05:38)  HR: 73 (2019 12:47) (64 - 80)  BP: 149/77 (2019 12:47) (141/68 - 196/82)  BP(mean): --  ABP: --  ABP(mean): --  RR: 18 (2019 12:47) (16 - 18)  SpO2: 97% (2019 12:47) (97% - 98%)        CAPILLARY BLOOD GLUCOSE      POCT Blood Glucose.: 265 mg/dL (2019 12:20)      PHYSICAL EXAM:  General: laying in bed, speech difficult to understand, agitated intermittently    Respiratory: mild wheeze R side; decreased BS LL  Cardiovascular: RRR, no murmurs heard  Abdomen: nontender, BS +; J tube in place  Extremities: no edema  Neurological: AAO to name place    HOSPITAL MEDICATIONS:    piperacillin/tazobactam IVPB.. 3.375 Gram(s) IV Intermittent every 8 hours    carvedilol 6.25 milliGRAM(s) Oral every 12 hours  hydrALAZINE 50 milliGRAM(s) Oral every 8 hours  hydrALAZINE Injectable 10 milliGRAM(s) IV Push every 8 hours    atorvastatin 40 milliGRAM(s) Oral at bedtime  dextrose 40% Gel 15 Gram(s) Oral once PRN  dextrose 50% Injectable 12.5 Gram(s) IV Push once  dextrose 50% Injectable 25 Gram(s) IV Push once  dextrose 50% Injectable 25 Gram(s) IV Push once  glucagon  Injectable 1 milliGRAM(s) IntraMuscular once PRN  insulin lispro (HumaLOG) corrective regimen sliding scale   SubCutaneous every 6 hours      acetaminophen    Suspension .. 650 milliGRAM(s) Oral every 6 hours  meclizine 12.5 milliGRAM(s) Oral two times a day PRN  OLANZapine Injectable 2.5 milliGRAM(s) IntraMuscular every 8 hours PRN    pantoprazole Infusion 8 mG/Hr IV Continuous <Continuous>        dextrose 5%. 1000 milliLiter(s) IV Continuous <Continuous>      chlorhexidine 4% Liquid 1 Application(s) Topical two times a day        LABS:                        9.8    9.51  )-----------( 244      ( 2019 09:29 )             30.0     Hgb Trend: 9.8<--, 9.3<--, 9.6<--  30    138  |  105  |  24<H>  ----------------------------<  285<H>  4.0   |  20<L>  |  1.43<H>    Ca    9.0      2019 09:29  Phos  2.5     30  Mg     1.9     30    TPro  7.1  /  Alb  2.8<L>  /  TBili  1.0  /  DBili  x   /  AST  30  /  ALT  17  /  AlkPhos  83  30    Creatinine Trend: 1.43<--, 1.37<--, 1.46<--, 1.85<--, 1.87<--, 1.65<--  PT/INR - ( 2019 09:29 )   PT: 12.3 SEC;   INR: 1.08          PTT - ( 2019 09:29 )  PTT:28.6 SEC  Urinalysis Basic - ( 2019 06:37 )    Color: YELLOW / Appearance: CLEAR / S.027 / pH: 6.0  Gluc: NEGATIVE / Ketone: TRACE  / Bili: NEGATIVE / Urobili: TRACE   Blood: NEGATIVE / Protein: 100 / Nitrite: NEGATIVE   Leuk Esterase: NEGATIVE / RBC: 3-5 / WBC 0-2   Sq Epi: OCC / Non Sq Epi: x / Bacteria: NEGATIVE        Venous Blood Gas:   @ 06:10  7.38/36/51/21/81.8  VBG Lactate: 2.0      MICROBIOLOGY:     RADIOLOGY:  [ ] Reviewed and interpreted by me    PULMONARY FUNCTION TESTS:    EKG: CHIEF COMPLAINT: AMS    HPI: Patient confused, speech difficult to understand, so limited history. Consulted for pleural effusion. 62 y/o M with PMH of cholangiocarcinoma s/p Whipple & feeding J tube placement (on 19) c/b hospital readmission on  for abdominal fluid collection s/p IR drainage and IV ertapenem via PICC line x4 weeks and for PTX s/p pigtail chest tube now removed, CAD s/p 9 stents, DM2 on insulin, HTN, GERD, presenting with altered mental status over the past day. Pt was recently discharged from hospital under Dr. Viveros's service on 7/10 for the chest tube placement for a trace L pneumothorax and IR drain placement for the abdominal fluid collection found after his Whipple procedure. On  admission, pt presented with PTX, chest tube placed. Small L pleural effusion seen on CXR .     Pt speech difficult to understand. Did deny, SOB, chest pain, did endorse cough. Did get agitated, extend arms during interview.  This visit, moderate sized L loculated pleural effusion, increased since previous visit. Also a peripheral RLL nodule, increased in size seen on CT this visit.         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    Allergies    Cipro (Rash)  Plavix (Rash)    Intolerances        Home Medications:  aspirin 81 mg oral tablet: 1 tab(s) orally once a day (2019 13:46)  bisacodyl 10 mg rectal suppository: 1 suppository(ies) rectal once a day, As needed, Constipation (2019 13:46)  carvedilol 6.25 mg oral tablet: 1 tab(s) orally every 12 hours (2019 13:46)  Crestor 10 mg oral tablet: 1 tab(s) orally once a day (at bedtime) (2019 13:46)  docusate sodium 100 mg oral capsule: 1 cap(s) orally 3 times a day (2019 13:46)  dronabinol 5 mg oral capsule: 1 cap(s) orally 2 times a day (2019 17:25)      OBJECTIVE:  ICU Vital Signs Last 24 Hrs  T(C): 36.2 (2019 12:47), Max: 37 (2019 05:38)  T(F): 97.2 (2019 12:47), Max: 98.6 (2019 05:38)  HR: 73 (2019 12:47) (64 - 80)  BP: 149/77 (2019 12:47) (141/68 - 196/82)  BP(mean): --  ABP: --  ABP(mean): --  RR: 18 (2019 12:47) (16 - 18)  SpO2: 97% (2019 12:47) (97% - 98%)        CAPILLARY BLOOD GLUCOSE      POCT Blood Glucose.: 265 mg/dL (2019 12:20)      PHYSICAL EXAM:  General: laying in bed, speech difficult to understand, agitated intermittently    Respiratory: mild wheeze R side; decreased BS LL  Cardiovascular: RRR, no murmurs heard  Abdomen: nontender, BS +; J tube in place  Extremities: no edema  Neurological: AAO to name place    HOSPITAL MEDICATIONS:    piperacillin/tazobactam IVPB.. 3.375 Gram(s) IV Intermittent every 8 hours    carvedilol 6.25 milliGRAM(s) Oral every 12 hours  hydrALAZINE 50 milliGRAM(s) Oral every 8 hours  hydrALAZINE Injectable 10 milliGRAM(s) IV Push every 8 hours    atorvastatin 40 milliGRAM(s) Oral at bedtime  dextrose 40% Gel 15 Gram(s) Oral once PRN  dextrose 50% Injectable 12.5 Gram(s) IV Push once  dextrose 50% Injectable 25 Gram(s) IV Push once  dextrose 50% Injectable 25 Gram(s) IV Push once  glucagon  Injectable 1 milliGRAM(s) IntraMuscular once PRN  insulin lispro (HumaLOG) corrective regimen sliding scale   SubCutaneous every 6 hours      acetaminophen    Suspension .. 650 milliGRAM(s) Oral every 6 hours  meclizine 12.5 milliGRAM(s) Oral two times a day PRN  OLANZapine Injectable 2.5 milliGRAM(s) IntraMuscular every 8 hours PRN    pantoprazole Infusion 8 mG/Hr IV Continuous <Continuous>        dextrose 5%. 1000 milliLiter(s) IV Continuous <Continuous>      chlorhexidine 4% Liquid 1 Application(s) Topical two times a day        LABS:                        9.8    9.51  )-----------( 244      ( 2019 09:29 )             30.0     Hgb Trend: 9.8<--, 9.3<--, 9.6<--      138  |  105  |  24<H>  ----------------------------<  285<H>  4.0   |  20<L>  |  1.43<H>    Ca    9.0      2019 09:29  Phos  2.5       Mg     1.9         TPro  7.1  /  Alb  2.8<L>  /  TBili  1.0  /  DBili  x   /  AST  30  /  ALT  17  /  AlkPhos  83  30    Creatinine Trend: 1.43<--, 1.37<--, 1.46<--, 1.85<--, 1.87<--, 1.65<--  PT/INR - ( 2019 09:29 )   PT: 12.3 SEC;   INR: 1.08          PTT - ( 2019 09:29 )  PTT:28.6 SEC  Urinalysis Basic - ( 2019 06:37 )    Color: YELLOW / Appearance: CLEAR / S.027 / pH: 6.0  Gluc: NEGATIVE / Ketone: TRACE  / Bili: NEGATIVE / Urobili: TRACE   Blood: NEGATIVE / Protein: 100 / Nitrite: NEGATIVE   Leuk Esterase: NEGATIVE / RBC: 3-5 / WBC 0-2   Sq Epi: OCC / Non Sq Epi: x / Bacteria: NEGATIVE        Venous Blood Gas:   @ 06:10  7.38/36/51/21/81.8  VBG Lactate: 2.0        RADIOLOGY:  < from: CT Abdomen and Pelvis w/ Oral Cont (19 @ 15:02) >  EXAM:  CT ABDOMEN AND PELVIS OC        PROCEDURE DATE:  2019       < end of copied text >  < from: CT Abdomen and Pelvis w/ Oral Cont (19 @ 15:02) >  IMPRESSION:     Status post percutaneous drainage of a complex right upper quadrant   collection, with interval decrease in size of the collection. Small   residual predominantly gas containing components are noted.    Moderate volume ascites, increased since 2019.    Nonspecific mesenteric lymph nodes, unchanged.    Small bilateral pleural effusions, increased in size since 2019.   Small residual left pneumothorax status post removal of previously noted   left pleural catheter.      EXAM:  XR CHEST PORTABLE IMMED 1V        PROCEDURE DATE:  2019         INTERPRETATION:  CLINICAL INFORMATION: Altered mental status.   Cholangiocarcinoma.    TECHNIQUE: AP view of the chest.    COMPARISON: Chest radiograph 2019.    FINDINGS:     Moderate left pleural effusion, increased since 2019.    The cardiomediastinal silhouette cannot be accurately assessed on the   current projection.    Degenerative changes of the spine.    IMPRESSION:    Moderate left pleural effusion, increased since 2019.

## 2019-07-30 NOTE — PROGRESS NOTE ADULT - ASSESSMENT
63M recently dx cholangiocarcinoma s/p whipple 6/18/19 c/b polymicrobial abd abscess s/p IR drainage (7/1/19) and L pneumothorax s/p chest tube (resolved) - here with altered mental status.  CT did not show evidence of stroke or mets.  Ertapenem can cause confusion.  CT did show persistent collection though smaller.  Short course of zosyn.  awaiting neuro w/u    Suggest:  - zosyn 3.375g q8  - f/u urine tox screen  - neuro evaluation

## 2019-07-30 NOTE — PROGRESS NOTE ADULT - PROBLEM SELECTOR PLAN 7
-DVT ppx: HSQ  -Diet: Glucerna tube feeds  -Dispo: to be discussed with family  Advanced Directives: spoke to wife who stated she was healthy care proxy and that she wanted everything for him done -DVT ppx: HSQ held in setting of potential GI bleed, SCDs ordered  -Diet: NPO for now, will restart Glucerna tube feeds if improved  -Dispo: to be discussed with family  Advanced Directives: spoke to wife who stated she was healthy care proxy and that she wanted "everything for him done" -NPO at this time, will restart glucerna tube feeds if improved  -lantus held and will continue with ISS

## 2019-07-30 NOTE — CONSULT NOTE ADULT - SUBJECTIVE AND OBJECTIVE BOX
Chief Complaint:  Patient is a 63y old  Male who presents with a chief complaint of AMS (30 Jul 2019 06:46)    HPI: 63M w/ hx of cholangiocarcinoma s/p Whipple & feeding J tube placement (6/18/2019) c/b abdominal fluid collection s/p IR drainage and long-term IV ertapenem and PTX s/p chest tube (now removed). Also w/ hx of CAD s/p 9 stents, DMII on insulin, presenting w/ AMS x 24h per wife.     Course notable for neg CT head, elevated but downtrending troponins s/p ASA 325mg x 1, and started on zosyn per ID given possible confusion 2/2 ertapenem.     GI consulted for coffee ground emesis. Pt began vomiting coffee ground emesis this AM, "non-stop" for an hour per nursing. Amount was around 100cc. He was also reportedly having loose dark stools, but some reports were of dark green. VS notable for hypertension, Hb stable at baseline. PEG was lavaged and notable only for residual tube feeds, no blood.     Pt reports abd pain to me but is unable to provide more hx. Started on IV PPI and tube feeds held at 8am.    PMHX/PSHX:  Cholangiocarcinoma  Gout  Type II diabetes mellitus  HTN (hypertension)  CAD (coronary artery disease)  Cirrhosis  S/P cholecystectomy    Allergies:  Cipro (Rash)  Plavix (Rash)    Home Medications: ASA, statin, carvedilol, hydralazine, insulin, bisacodyl/docusate, PPI    Hospital Medications:  acetaminophen    Suspension .. 650 milliGRAM(s) Oral every 6 hours  atorvastatin 40 milliGRAM(s) Oral at bedtime  carvedilol 6.25 milliGRAM(s) Oral every 12 hours  chlorhexidine 4% Liquid 1 Application(s) Topical two times a day  dextrose 40% Gel 15 Gram(s) Oral once PRN  dextrose 5%. 1000 milliLiter(s) IV Continuous <Continuous>  dextrose 50% Injectable 12.5 Gram(s) IV Push once  dextrose 50% Injectable 25 Gram(s) IV Push once  dextrose 50% Injectable 25 Gram(s) IV Push once  glucagon  Injectable 1 milliGRAM(s) IntraMuscular once PRN  hydrALAZINE 50 milliGRAM(s) Oral every 8 hours  hydrALAZINE Injectable 10 milliGRAM(s) IV Push every 8 hours  insulin lispro (HumaLOG) corrective regimen sliding scale   SubCutaneous every 6 hours  OLANZapine Injectable 2.5 milliGRAM(s) IntraMuscular every 8 hours PRN  pantoprazole Infusion 8 mG/Hr IV Continuous <Continuous>  piperacillin/tazobactam IVPB.. 3.375 Gram(s) IV Intermittent every 8 hours      Social History:   Tob: Denies  EtOH: Denies  Illicit Drugs: Denies    Family history:      Denies family history of colon cancer/polyps, stomach cancer/polyps, pancreatic cancer/masses, liver cancer/disease, ovarian cancer and endometrial cancer.    ROS:     General:  No wt loss, fevers, chills, night sweats, fatigue  Eyes:  Good vision, no reported pain  ENT:  No sore throat, pain, runny nose, dysphagia  CV:  No pain, palpitations, hypo/hypertension  Pulm:  No dyspnea, cough, tachypnea, wheezing  GI:  No pain, No nausea, No vomiting, No diarrhea, No constipation, No weight loss, No fever, No pruritis, No rectal bleeding, No tarry stools, No dysphagia,  :  No pain, bleeding, incontinence, nocturia  Muscle:  No pain, weakness  Neuro:  No weakness, tingling, memory problems  Psych:  No fatigue, insomnia, mood problems, depression  Endocrine:  No polyuria, polydipsia, cold/heat intolerance  Heme:  No petechiae, ecchymosis, easy bruisability  Skin:  No rash, tattoos, scars, edema    PHYSICAL EXAM:     Vital Signs:  Vital Signs Last 24 Hrs  T(C): 37 (30 Jul 2019 05:38), Max: 37 (30 Jul 2019 05:38)  T(F): 98.6 (30 Jul 2019 05:38), Max: 98.6 (30 Jul 2019 05:38)  HR: 70 (30 Jul 2019 09:45) (64 - 80)  BP: 144/62 (30 Jul 2019 09:45) (141/68 - 196/82)  BP(mean): --  RR: 16 (30 Jul 2019 09:45) (16 - 18)  SpO2: 98% (30 Jul 2019 09:45) (98% - 98%)  Daily Height in cm: 170.18 (29 Jul 2019 21:59)    Daily     GENERAL:  Comfortable appearing, no acute distress  HEENT:  Anicteric sclera, no conjunctival pallor, OP clear  CHEST:  Clear to auscultation bilaterally, no wheezes/rales/ronchi, no accessory muscle use  HEART:  Regular rate and rhythm, no murmurs/rubs/gallops  ABDOMEN:  Soft, non-tender, non-distended, normoactive bowel sounds,  no masses, no hepato-splenomegaly, no signs of chronic liver disease  EXTREMITIES: No cyanosis, clubbing, or edema  SKIN:  No rash/erythema/ecchymoses/petechiae/wounds/abscess/warm/dry  NEURO:  Alert and oriented x 3, no asterixis    LABS: reviewed, see sunrise for full report, notable for  WBC 9  Hb baseline 9-10; 9.6 -> 9.3 -> 9.8  Plts 200s  Coags wnl  BUN/Cr 24/1.4  Albumin 2.8, other LFTs wnl    CT A/P: s/p patent gastrojejunostomy, decreased intra-abdominal fluid collection, increased ascites, increased mesenteric LAD, nodular infiltration of omentum c/f peritoneal carcinomatosis Chief Complaint:  Patient is a 63y old  Male who presents with a chief complaint of AMS (30 Jul 2019 06:46)    HPI: 63M w/ hx of cholangiocarcinoma s/p Whipple & feeding J tube placement (6/18/2019) c/b abdominal fluid collection s/p IR drainage and long-term IV ertapenem and PTX s/p chest tube (now removed). Also w/ hx of CAD s/p 9 stents, DMII on insulin, presenting w/ AMS x 24h per wife.     Course notable for neg CT head, elevated but downtrending troponins s/p ASA 325mg x 1, and started on zosyn per ID given possible confusion 2/2 ertapenem.     GI consulted for coffee ground emesis. Pt began vomiting coffee ground emesis this AM, "non-stop" for an hour per nursing. Amount was around 100cc. He was also reportedly having loose dark stools, but some reports were of dark green. VS notable for hypertension, Hb stable at baseline. PEG was lavaged and notable only for residual tube feeds, no blood.     Pt reports abd pain to me but is unable to provide more hx. Started on IV PPI and tube feeds held at 8am.    PMHX/PSHX:  Cholangiocarcinoma  Gout  Type II diabetes mellitus  HTN (hypertension)  CAD (coronary artery disease)  Cirrhosis  S/P cholecystectomy    Allergies:  Cipro (Rash)  Plavix (Rash)    Home Medications: ASA, statin, carvedilol, hydralazine, insulin, bisacodyl/docusate, PPI    Hospital Medications:  acetaminophen    Suspension .. 650 milliGRAM(s) Oral every 6 hours  atorvastatin 40 milliGRAM(s) Oral at bedtime  carvedilol 6.25 milliGRAM(s) Oral every 12 hours  chlorhexidine 4% Liquid 1 Application(s) Topical two times a day  dextrose 40% Gel 15 Gram(s) Oral once PRN  dextrose 5%. 1000 milliLiter(s) IV Continuous <Continuous>  dextrose 50% Injectable 12.5 Gram(s) IV Push once  dextrose 50% Injectable 25 Gram(s) IV Push once  dextrose 50% Injectable 25 Gram(s) IV Push once  glucagon  Injectable 1 milliGRAM(s) IntraMuscular once PRN  hydrALAZINE 50 milliGRAM(s) Oral every 8 hours  hydrALAZINE Injectable 10 milliGRAM(s) IV Push every 8 hours  insulin lispro (HumaLOG) corrective regimen sliding scale   SubCutaneous every 6 hours  OLANZapine Injectable 2.5 milliGRAM(s) IntraMuscular every 8 hours PRN  pantoprazole Infusion 8 mG/Hr IV Continuous <Continuous>  piperacillin/tazobactam IVPB.. 3.375 Gram(s) IV Intermittent every 8 hours      Social History:   Tob: Denies  EtOH: Denies  Illicit Drugs: Denies    Family history:      Denies family history of colon cancer/polyps, stomach cancer/polyps, pancreatic cancer/masses, liver cancer/disease, ovarian cancer and endometrial cancer.    ROS:     General:  No wt loss, fevers, chills, night sweats, fatigue  Eyes:  Good vision, no reported pain  ENT:  No sore throat, pain, runny nose, dysphagia  CV:  No pain, palpitations, hypo/hypertension  Pulm:  No dyspnea, cough, tachypnea, wheezing  GI:  No pain, No nausea, No vomiting, No diarrhea, No constipation, No weight loss, No fever, No pruritis, No rectal bleeding, No tarry stools, No dysphagia,  :  No pain, bleeding, incontinence, nocturia  Muscle:  No pain, weakness  Neuro:  No weakness, tingling, memory problems  Psych:  No fatigue, insomnia, mood problems, depression  Endocrine:  No polyuria, polydipsia, cold/heat intolerance  Heme:  No petechiae, ecchymosis, easy bruisability  Skin:  No rash, tattoos, scars, edema    PHYSICAL EXAM:     Vital Signs:  Vital Signs Last 24 Hrs  T(C): 37 (30 Jul 2019 05:38), Max: 37 (30 Jul 2019 05:38)  T(F): 98.6 (30 Jul 2019 05:38), Max: 98.6 (30 Jul 2019 05:38)  HR: 70 (30 Jul 2019 09:45) (64 - 80)  BP: 144/62 (30 Jul 2019 09:45) (141/68 - 196/82)  BP(mean): --  RR: 16 (30 Jul 2019 09:45) (16 - 18)  SpO2: 98% (30 Jul 2019 09:45) (98% - 98%)  Daily Height in cm: 170.18 (29 Jul 2019 21:59)    Daily     GENERAL:  Comfortable appearing, no acute distress  HEENT:  Anicteric sclera, no conjunctival pallor, OP clear  CHEST:  Clear to auscultation bilaterally, no wheezes/rales/ronchi, no accessory muscle use  HEART:  Regular rate and rhythm, no murmurs/rubs/gallops  ABDOMEN:  Soft, mildly tender throughout, non-distended, normoactive bowel sounds,  no masses, no hepato-splenomegaly, no signs of chronic liver disease  EXTREMITIES: No cyanosis, clubbing, or edema  SKIN:  No rash/erythema/ecchymoses/petechiae/wounds/abscess/warm/dry  NEURO:  Alert, speaks but mumbles, minimally intelligible but sometimes answers questions appropriately  RECTAL: brown stool    LABS: reviewed, see sunrise for full report, notable for  WBC 9  Hb baseline 9-10; 9.6 -> 9.3 -> 9.8  Plts 200s  Coags wnl  BUN/Cr 24/1.4  Albumin 2.8, other LFTs wnl    CT A/P: s/p patent gastrojejunostomy, decreased intra-abdominal fluid collection, increased ascites, increased mesenteric LAD, nodular infiltration of omentum c/f peritoneal carcinomatosis Chief Complaint:  Patient is a 63y old  Male who presents with a chief complaint of AMS (30 Jul 2019 06:46)    HPI: 63M w/ hx of cholangiocarcinoma s/p Whipple & feeding J tube placement (6/18/2019) c/b abdominal fluid collection s/p IR drainage and long-term IV ertapenem and PTX s/p chest tube (now removed). Also w/ hx of CAD s/p 9 stents, DMII on insulin, presenting w/ AMS x 24h per wife.     Course notable for neg CT head, elevated but downtrending troponins s/p ASA 325mg x 1, and started on zosyn per ID given possible confusion 2/2 ertapenem.     GI consulted for coffee ground emesis. Pt had coffee ground emesis this AM, "non-stop" for an hour per nursing. Amount was around 100cc. He was also reportedly having loose dark stools, but some reports were of dark green. VS notable for hypertension, Hb stable at baseline. PEG was lavaged and notable only for residual tube feeds, no blood.     Pt reports abd pain to me but is unable to provide more hx. Started on IV PPI and tube feeds held at 8am.    PMHX/PSHX:  Cholangiocarcinoma  Gout  Type II diabetes mellitus  HTN (hypertension)  CAD (coronary artery disease)  Cirrhosis  S/P cholecystectomy    Allergies:  Cipro (Rash)  Plavix (Rash)    Home Medications: ASA, statin, carvedilol, hydralazine, insulin, bisacodyl/docusate, PPI    Hospital Medications:  acetaminophen    Suspension .. 650 milliGRAM(s) Oral every 6 hours  atorvastatin 40 milliGRAM(s) Oral at bedtime  carvedilol 6.25 milliGRAM(s) Oral every 12 hours  chlorhexidine 4% Liquid 1 Application(s) Topical two times a day  dextrose 40% Gel 15 Gram(s) Oral once PRN  dextrose 5%. 1000 milliLiter(s) IV Continuous <Continuous>  dextrose 50% Injectable 12.5 Gram(s) IV Push once  dextrose 50% Injectable 25 Gram(s) IV Push once  dextrose 50% Injectable 25 Gram(s) IV Push once  glucagon  Injectable 1 milliGRAM(s) IntraMuscular once PRN  hydrALAZINE 50 milliGRAM(s) Oral every 8 hours  hydrALAZINE Injectable 10 milliGRAM(s) IV Push every 8 hours  insulin lispro (HumaLOG) corrective regimen sliding scale   SubCutaneous every 6 hours  OLANZapine Injectable 2.5 milliGRAM(s) IntraMuscular every 8 hours PRN  pantoprazole Infusion 8 mG/Hr IV Continuous <Continuous>  piperacillin/tazobactam IVPB.. 3.375 Gram(s) IV Intermittent every 8 hours      Social History:   Tob: Denies  EtOH: Denies  Illicit Drugs: Denies    Family history:      Denies family history of colon cancer/polyps, stomach cancer/polyps, pancreatic cancer/masses, liver cancer/disease, ovarian cancer and endometrial cancer.    ROS:     General:  No wt loss, fevers, chills, night sweats, fatigue  Eyes:  Good vision, no reported pain  ENT:  No sore throat, pain, runny nose, dysphagia  CV:  No pain, palpitations, hypo/hypertension  Pulm:  No dyspnea, cough, tachypnea, wheezing  GI:  No pain, No nausea, No vomiting, No diarrhea, No constipation, No weight loss, No fever, No pruritis, No rectal bleeding, No tarry stools, No dysphagia,  :  No pain, bleeding, incontinence, nocturia  Muscle:  No pain, weakness  Neuro:  No weakness, tingling, memory problems  Psych:  No fatigue, insomnia, mood problems, depression  Endocrine:  No polyuria, polydipsia, cold/heat intolerance  Heme:  No petechiae, ecchymosis, easy bruisability  Skin:  No rash, tattoos, scars, edema    PHYSICAL EXAM:     Vital Signs:  Vital Signs Last 24 Hrs  T(C): 37 (30 Jul 2019 05:38), Max: 37 (30 Jul 2019 05:38)  T(F): 98.6 (30 Jul 2019 05:38), Max: 98.6 (30 Jul 2019 05:38)  HR: 70 (30 Jul 2019 09:45) (64 - 80)  BP: 144/62 (30 Jul 2019 09:45) (141/68 - 196/82)  BP(mean): --  RR: 16 (30 Jul 2019 09:45) (16 - 18)  SpO2: 98% (30 Jul 2019 09:45) (98% - 98%)  Daily Height in cm: 170.18 (29 Jul 2019 21:59)    Daily     GENERAL:  Comfortable appearing, no acute distress  HEENT:  Anicteric sclera, no conjunctival pallor, OP clear  NECK: supple  CHEST:  Clear to auscultation bilaterally, no wheezes/rales/ronchi, no accessory muscle use  HEART:  Regular rate and rhythm, no murmurs/rubs/gallops  ABDOMEN:  Soft, mildly tender throughout, non-distended, normoactive bowel sounds, surgical scars, no hepato-splenomegaly, no signs of chronic liver disease  EXTREMITIES: No cyanosis, clubbing, or edema  SKIN:  No rash/erythema/ecchymoses/petechiae/wounds/abscess/warm/dry  NEURO:  Alert, speaks but mumbles, minimally intelligible but sometimes answers questions appropriately  RECTAL: brown stool    LABS: reviewed, see sunrise for full report, notable for  WBC 9  Hb baseline 9-10; 9.6 -> 9.3 -> 9.8  Plts 200s  Coags wnl  BUN/Cr 24/1.4  Albumin 2.8, other LFTs wnl    CT A/P: s/p patent gastrojejunostomy, decreased intra-abdominal fluid collection, increased ascites, increased mesenteric LAD, nodular infiltration of omentum c/f peritoneal carcinomatosis, pleural effusion

## 2019-07-30 NOTE — PROGRESS NOTE ADULT - ASSESSMENT
s/p whipple with post op abscess.      Cont ABX for abscess.  Overall improved on scan    Mental status changes. Unclear etiology.

## 2019-07-30 NOTE — PROGRESS NOTE ADULT - PROBLEM SELECTOR PLAN 5
-serum Cr 1.4, about baseline  -renally dose meds -hypertensive urgency as BP was 199/84 in ED  -continue with home hydralazine and coreg -hypertensive urgency as BP was 199/84 in ED; secondary to missed medications  -continue with home hydralazine and coreg, will administer through J tube

## 2019-07-30 NOTE — CONSULT NOTE ADULT - ASSESSMENT
63 M w/ Stage III cholangiocarcinoma s/p whipple surgery c/b abscess, PTX now re-admitted with AMS and imaging concerning for metastatic disease     - would obtain pulm consult to eval large pleural effusion, possible thoracentesis  - diagnostic/therapeutic paracentesis   - work up of change in mental status/slurred speech, consider ammonia levels, MRI brain  - PT consult   - ID consult re IV Ab  - will re-evaluate after above work up complete.   - no plans for in patient chemotherapy

## 2019-07-30 NOTE — PROVIDER CONTACT NOTE (OTHER) - ASSESSMENT
patient in the bed, a&ox2- intermittent confusion noted. coffee ground emesis present as well as melena

## 2019-07-30 NOTE — CONSULT NOTE ADULT - ASSESSMENT
63M w/ CAD, recent dx of cholangiocarcinoma s/p Whipple and J-tube placement c/b intra-abd fluid collection on IV abx, p/w AMS. GI consulted for coffee ground emesis this AM. Pt HD stable (hypertensive) w/ stable Hb. Also found to have imaging findings somewhat concerning for metastatic dz, though not confirmed. UGIB d/dx in this case includes gastritis vs PUD (exacerbated by high dose ASA), less likely dieulafoy/AVM or varices given quality of blood (dark as opposed to bright red) and no e/o liver dz or portal hypertension.     Impression:  #UGIB - d/dx as above, gastritis vs PUD  #Cholangiocarcinoma  #AMS  #Hypertension  #CAD on ASA    Recommendations:   - c/w IV PPI  - NPO for now  - continue to trend Hb, transfuse to Hb > 7  - given ASA is for secondary ppx, okay to continue from a GI perspective especially given HD stability and stable Hb  - GI will continue to follow and assess need for EGD    Андрей De Dios  Gastroenterology Fellow  Pager# [phone] or [phone]  Page #[phone] after 5pm or on weekends 63M w/ CAD, recent dx of cholangiocarcinoma s/p Whipple and J-tube placement c/b intra-abd fluid collection on IV abx, p/w AMS. GI consulted for coffee ground emesis this AM. Pt HD stable (hypertensive) w/ stable Hb. Also found to have imaging findings somewhat concerning for metastatic dz, though not confirmed. UGIB d/dx in this case includes gastritis vs PUD (exacerbated by high dose ASA), less likely dieulafoy/AVM or varices given quality of blood (dark as opposed to bright red) and no e/o liver dz or portal hypertension.     Impression:  #UGIB/ coffee ground emesis - d/dx as above, gastritis vs PUD  #Metastatic Cholangiocarcinoma  #AMS  #Hypertension  #CAD on ASA    Recommendations:   - CT reviewed  - c/w IV PPI  - NPO for now  - continue to trend Hb, transfuse to Hb > 7  - given ASA is for secondary ppx, okay to continue from a GI perspective especially given HD stability and stable Hb  - GI will continue to follow and assess need for EGD, would hold off currently given stable Hb and encephalopathy    Андрей De Dios  Gastroenterology Fellow  Pager# [phone] or [phone]  Page #[phone] after 5pm or on weekends

## 2019-07-30 NOTE — PROGRESS NOTE ADULT - SUBJECTIVE AND OBJECTIVE BOX
SURGICAL ONCOLOGY PROGRESS NOTE    Awake, but disoriented.    Vital Signs Last 24 Hrs  T(C): 36.2 (30 Jul 2019 12:47), Max: 37 (30 Jul 2019 05:38)  T(F): 97.2 (30 Jul 2019 12:47), Max: 98.6 (30 Jul 2019 05:38)  HR: 73 (30 Jul 2019 12:47) (64 - 80)  BP: 149/77 (30 Jul 2019 12:47) (141/68 - 196/82)  BP(mean): --  RR: 18 (30 Jul 2019 12:47) (16 - 18)  SpO2: 97% (30 Jul 2019 12:47) (97% - 98%)  I&O's Detail      PE:    A&A  NAD    soft, NT, ND, No peritoneal signs    inc  cdi                          9.8    9.51  )-----------( 244      ( 30 Jul 2019 09:29 )             30.0     07-30    138  |  105  |  24<H>  ----------------------------<  285<H>  4.0   |  20<L>  |  1.43<H>    Ca    9.0      30 Jul 2019 09:29  Phos  2.5     07-30  Mg     1.9     07-30    TPro  7.1  /  Alb  2.8<L>  /  TBili  1.0  /  DBili  x   /  AST  30  /  ALT  17  /  AlkPhos  83  07-30

## 2019-07-30 NOTE — CONSULT NOTE ADULT - SUBJECTIVE AND OBJECTIVE BOX
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 He was experiencing poor PO intake, 15-20 lb weight loss, and vague abdominal pain and nausea over the previous couple of months. He had an EGD on 3/29 which did not show a malignancy. In the beginning of April 2019 pt noted increased jaundice and was scheduled for an ERCP/EUS on 4/19 but due to a rapid rise in his Tbili presented to [facility] Emergency room on 4/19/19. ERCP on 4/20/19 showed dilated CBD with stricture s/p biliary stent placement. One month later on 5/17/19 pt was admitted to [facility] with septic shock. He was intubated, started on pressors, noted to be in ARF. CT A/P non contrast showed biliary dilatation without an obvious mass. He was transferred to [facility] ICU on 5/18/19 for higher level care and remained in the hospital until June 8th. During the hospitalization he required dialysis, had periods of bradycardia, developed NSTEMI, stress test showed apical infarct, no stenting was performed. Repeat EUS/ERCP performed on 6/4/19 showed a 10 mm pancreatic head hypoechoic mass s/p FNA x 3, stents removed and 1 metal stent was placed in CBD. Biopsy and cytology negative for malignant cells. Given the mass and biliary stricture a Whipple surgery was recommended.     On June 18, 2019, underwent whipple surgery without significant complication and was d/c on 6/25/19. Final path T3N2 (14 LNB+) He was readmitted on 7/1/19 with acute L pneumothorax and abscess in the operative bed. Pt required a chest tube and a IR drain for abscess. D/c home on 7/10 with 4 weeks of IV Ab via PICC line.        IR drain fell out and not replaced due to decrease in drainage noted. He was seen by me for an initial consultation on 7/22/19. His CA 19-9 >9000 and my suspicion was for occult metastatic disease. After discussing case with Dr. Viveros and Dr. Lyons, plan was to repeat imaging, perform a therapeutic/diagnostic paracentesis to eval ascites, complete course of Ab via PICC and then consider systemic therapy depending on final work up.     Siva is now admitted with change in MS, increasing confusion.  In ED, pt was afebrile, /68 up to 199/84, RR 16, SpO2 100% on RA. No elevated WBC found on labs. Elevated troponin of 182 and 158 on repeat, but EKG showed no ST changes. Received aspirin, tylenol, vanc/zosyn x1, and 1L NS bolus. CT A/P showed large ascites 2/2 ?carcinomatosis. Admitted to Medicine for further management and AMS workup.    Currently pt is evaluated with no family at bedside. He has evidence of slurred speech, words are difficult to understand. These are all new symptoms. + poor appetite.      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 650 milliGRAM(s) Oral every 6 hours  atorvastatin 40 milliGRAM(s) Oral at bedtime  carvedilol 6.25 milliGRAM(s) Oral every 12 hours  chlorhexidine 4% Liquid 1 Application(s) Topical two times a day  dextrose 5%. 1000 milliLiter(s) (50 mL/Hr) IV Continuous <Continuous>  dextrose 50% Injectable 12.5 Gram(s) IV Push once  dextrose 50% Injectable 25 Gram(s) IV Push once  dextrose 50% Injectable 25 Gram(s) IV Push once  hydrALAZINE 50 milliGRAM(s) Oral every 8 hours  insulin lispro (HumaLOG) corrective regimen sliding scale   SubCutaneous every 6 hours  OLANZapine Injectable 2.5 milliGRAM(s) IntraMuscular once  pantoprazole  Injectable 40 milliGRAM(s) IV Push two times a day  piperacillin/tazobactam IVPB.. 3.375 Gram(s) IV Intermittent every 8 hours  potassium chloride   Powder 40 milliEquivalent(s) Oral every 4 hours    MEDICATIONS  (PRN):  dextrose 40% Gel 15 Gram(s) Oral once PRN Blood Glucose LESS THAN 70 milliGRAM(s)/deciLiter  glucagon  Injectable 1 milliGRAM(s) IntraMuscular once PRN Glucose <70 milliGRAM(s)/deciLiter  meclizine 12.5 milliGRAM(s) Oral two times a day PRN Dizziness  OLANZapine Injectable 2.5 milliGRAM(s) IntraMuscular every 8 hours PRN severe agitation.      Allergies    Cipro (Rash)  Plavix (Rash)    Intolerances        Vital Signs Last 24 Hrs  T(C): 36.7 (31 Jul 2019 20:45), Max: 36.7 (31 Jul 2019 20:45)  T(F): 98.1 (31 Jul 2019 20:45), Max: 98.1 (31 Jul 2019 20:45)  HR: 77 (31 Jul 2019 20:45) (77 - 87)  BP: 114/64 (31 Jul 2019 20:45) (114/64 - 158/56)  BP(mean): --  RR: 17 (31 Jul 2019 20:45) (17 - 18)  SpO2: 100% (31 Jul 2019 20:45) (94% - 100%)    PHYSICAL EXAM  General: adult in NAD  HEENT: clear oropharynx, anicteric sclera, pink conjunctiva  Neck: supple  CV: normal S1/S2 with no murmur rubs or gallops  Lungs: positive air movement b/l ant lungs,clear to auscultation, no wheezes, no rales  Abdomen: soft non-tender non-distended, no hepatosplenomegaly  Ext: no clubbing cyanosis or edema  Skin: no rashes and no petechiae  Neuro: + slurred speech, + generalized weakness     LABS:                          9.2    7.29  )-----------( 205      ( 31 Jul 2019 18:00 )             28.9         Mean Cell Volume : 95.1 fL  Mean Cell Hemoglobin : 30.3 pg  Mean Cell Hemoglobin Concentration : 31.8 %  Auto Neutrophil # : x  Auto Lymphocyte # : x  Auto Monocyte # : x  Auto Eosinophil # : x  Auto Basophil # : x  Auto Neutrophil % : x  Auto Lymphocyte % : x  Auto Monocyte % : x  Auto Eosinophil % : x  Auto Basophil % : x      Serial CBC's  07-31 @ 18:00  Hct-28.9 / Hgb-9.2 / Plat-205 / RBC-3.04 / WBC-7.29  Serial CBC's  07-31 @ 10:05  Hct-26.3 / Hgb-8.4 / Plat-205 / RBC-2.80 / WBC-8.45  Serial CBC's  07-30 @ 18:06  Hct-30.7 / Hgb-9.7 / Plat-254 / RBC-3.30 / WBC-8.92  Serial CBC's  07-30 @ 09:29  Hct-30.0 / Hgb-9.8 / Plat-244 / RBC-3.27 / WBC-9.51  Serial CBC's  07-30 @ 06:58  Hct-29.1 / Hgb-9.3 / Plat-222 / RBC-3.17 / WBC-8.49  Serial CBC's  07-29 @ 06:10  Hct-29.8 / Hgb-9.6 / Plat-216 / RBC-3.21 / WBC-7.97      07-31    142  |  111<H>  |  21  ----------------------------<  203<H>  3.5   |  21<L>  |  1.50<H>    Ca    8.6      31 Jul 2019 10:05  Phos  2.7     07-31  Mg     1.9     07-31    TPro  7.1  /  Alb  2.8<L>  /  TBili  1.0  /  DBili  x   /  AST  30  /  ALT  17  /  AlkPhos  83  07-30      PT/INR - ( 30 Jul 2019 09:29 )   PT: 12.3 SEC;   INR: 1.08          PTT - ( 30 Jul 2019 09:29 )  PTT:28.6 SEC                RADIOLOGY & ADDITIONAL STUDIES:

## 2019-07-30 NOTE — CONSULT NOTE ADULT - ASSESSMENT
63Y M with extensive PMH p/w acute worsening of mental status x 1 week. CTH negative for acute pathology. Exam remarkable for AAOx2 (not oriented to date), and myoclonic jerks. Followed most commands but required lots of encouragement. Differential includes brain metastases vs carbapenem induced confusion vs paraneoplastic syndrome vs other toxic/metabolic etiology. Less likely to be infectious etiology as afebrile and without leukocytosis, however has persistent fluid collection on CT A/P, concerning for possible abdominal source of infection.     Plan:  - obtain MRI w/ and w/o contrast to evaluate for metastases, small infarct easily missed by CTH  - routine EEG to r/o seizure in setting of myoclonic jerks  - serum paraneoplastic panel  - avoid Carbapenems as can cause confusion (and lower seizure threshold)    - if imaging negative and mental status does not improve, can consider LP   - continue workup for toxic/metabolic cause of encephalopathy  - rest of care per primary team 63Y M with extensive PMH p/w acute worsening of mental status x 1 week. CTH negative for acute pathology. Exam remarkable for AAOx2 (not oriented to date), and myoclonic jerks. Followed most commands but required lots of encouragement. Differential includes brain metastases vs carbapenem induced confusion vs paraneoplastic syndrome vs other toxic/metabolic etiology. Less likely to be infectious etiology as afebrile and without leukocytosis, however has persistent fluid collection on CT A/P, concerning for possible abdominal source of infection.     Plan:  MRI brain showing bilateral frontal, temporal, parietal hematomas, possibly causing alteration in mentations. Mental status should improve as subdural hematoma resolves gradually. Also shows tiny R frontal infarct, likely incidental and not causing any of the symptoms  Routine EEG does not show epileptiform activity  Serum paraneoplastic panel - can take weeks to result, follow up outpatient  Avoid Carbapenems as can cause confusion (and lower seizure threshold) 63Y M with extensive PMH p/w acute worsening of mental status x 1 week. CTH negative for acute pathology. Exam remarkable for AAOx2 (not oriented to date), and myoclonic jerks. Followed most commands but required lots of encouragement. Differential includes brain metastases vs carbapenem induced confusion vs paraneoplastic syndrome vs other toxic/metabolic etiology. Less likely to be infectious etiology as afebrile and without leukocytosis, however has persistent fluid collection on CT A/P, concerning for possible abdominal source of infection.     Plan:  MRI brain showing bilateral frontal, temporal, parietal hematomas, possibly causing alteration in mentations. Mental status should improve as subdural hematoma resolves gradually. Also shows tiny R frontal infarct, likely incidental and not causing any of the symptoms  Routine EEG does not show epileptiform activity, no need to treat myoclonus  Serum paraneoplastic panel - can take weeks to result, follow up outpatient  Avoid Carbapenems as can cause confusion (and lower seizure threshold)  Check B12, folate, MMA, homocysteine

## 2019-07-31 ENCOUNTER — APPOINTMENT (OUTPATIENT)
Dept: ULTRASOUND IMAGING | Facility: IMAGING CENTER | Age: 64
End: 2019-07-31

## 2019-07-31 DIAGNOSIS — I25.10 ATHEROSCLEROTIC HEART DISEASE OF NATIVE CORONARY ARTERY WITHOUT ANGINA PECTORIS: ICD-10-CM

## 2019-07-31 LAB
ANION GAP SERPL CALC-SCNC: 10 MMO/L — SIGNIFICANT CHANGE UP (ref 7–14)
BASE EXCESS BLDV CALC-SCNC: -3 MMOL/L — SIGNIFICANT CHANGE UP
BASOPHILS # BLD AUTO: 0.04 K/UL — SIGNIFICANT CHANGE UP (ref 0–0.2)
BASOPHILS NFR BLD AUTO: 0.5 % — SIGNIFICANT CHANGE UP (ref 0–2)
BUN SERPL-MCNC: 21 MG/DL — SIGNIFICANT CHANGE UP (ref 7–23)
CALCIUM SERPL-MCNC: 8.6 MG/DL — SIGNIFICANT CHANGE UP (ref 8.4–10.5)
CHLORIDE SERPL-SCNC: 111 MMOL/L — HIGH (ref 98–107)
CO2 SERPL-SCNC: 21 MMOL/L — LOW (ref 22–31)
CREAT SERPL-MCNC: 1.5 MG/DL — HIGH (ref 0.5–1.3)
CULTURE - ACID FAST SMEAR CONCENTRATED: SIGNIFICANT CHANGE UP
EOSINOPHIL # BLD AUTO: 0.22 K/UL — SIGNIFICANT CHANGE UP (ref 0–0.5)
EOSINOPHIL NFR BLD AUTO: 2.6 % — SIGNIFICANT CHANGE UP (ref 0–6)
GAS PNL BLDV: 140 MMOL/L — SIGNIFICANT CHANGE UP (ref 136–146)
GLUCOSE BLDV-MCNC: 171 MG/DL — HIGH (ref 70–99)
GLUCOSE SERPL-MCNC: 203 MG/DL — HIGH (ref 70–99)
HCO3 BLDV-SCNC: 22 MMOL/L — SIGNIFICANT CHANGE UP (ref 20–27)
HCT VFR BLD CALC: 26.3 % — LOW (ref 39–50)
HCT VFR BLD CALC: 28.9 % — LOW (ref 39–50)
HCT VFR BLDV CALC: 27.2 % — LOW (ref 39–51)
HGB BLD-MCNC: 8.4 G/DL — LOW (ref 13–17)
HGB BLD-MCNC: 9.2 G/DL — LOW (ref 13–17)
HGB BLDV-MCNC: 8.8 G/DL — LOW (ref 13–17)
IMM GRANULOCYTES NFR BLD AUTO: 0.4 % — SIGNIFICANT CHANGE UP (ref 0–1.5)
LYMPHOCYTES # BLD AUTO: 1.02 K/UL — SIGNIFICANT CHANGE UP (ref 1–3.3)
LYMPHOCYTES # BLD AUTO: 12.1 % — LOW (ref 13–44)
MAGNESIUM SERPL-MCNC: 1.9 MG/DL — SIGNIFICANT CHANGE UP (ref 1.6–2.6)
MCHC RBC-ENTMCNC: 30 PG — SIGNIFICANT CHANGE UP (ref 27–34)
MCHC RBC-ENTMCNC: 30.3 PG — SIGNIFICANT CHANGE UP (ref 27–34)
MCHC RBC-ENTMCNC: 31.8 % — LOW (ref 32–36)
MCHC RBC-ENTMCNC: 31.9 % — LOW (ref 32–36)
MCV RBC AUTO: 93.9 FL — SIGNIFICANT CHANGE UP (ref 80–100)
MCV RBC AUTO: 95.1 FL — SIGNIFICANT CHANGE UP (ref 80–100)
MONOCYTES # BLD AUTO: 0.93 K/UL — HIGH (ref 0–0.9)
MONOCYTES NFR BLD AUTO: 11 % — SIGNIFICANT CHANGE UP (ref 2–14)
NEUTROPHILS # BLD AUTO: 6.21 K/UL — SIGNIFICANT CHANGE UP (ref 1.8–7.4)
NEUTROPHILS NFR BLD AUTO: 73.4 % — SIGNIFICANT CHANGE UP (ref 43–77)
NRBC # FLD: 0 K/UL — SIGNIFICANT CHANGE UP (ref 0–0)
NRBC # FLD: 0 K/UL — SIGNIFICANT CHANGE UP (ref 0–0)
PCO2 BLDV: 40 MMHG — LOW (ref 41–51)
PH BLDV: 7.35 PH — SIGNIFICANT CHANGE UP (ref 7.32–7.43)
PHOSPHATE SERPL-MCNC: 2.7 MG/DL — SIGNIFICANT CHANGE UP (ref 2.5–4.5)
PLATELET # BLD AUTO: 205 K/UL — SIGNIFICANT CHANGE UP (ref 150–400)
PLATELET # BLD AUTO: 205 K/UL — SIGNIFICANT CHANGE UP (ref 150–400)
PMV BLD: 9.5 FL — SIGNIFICANT CHANGE UP (ref 7–13)
PMV BLD: 9.8 FL — SIGNIFICANT CHANGE UP (ref 7–13)
PO2 BLDV: 45 MMHG — HIGH (ref 35–40)
POTASSIUM BLDV-SCNC: 3.9 MMOL/L — SIGNIFICANT CHANGE UP (ref 3.4–4.5)
POTASSIUM SERPL-MCNC: 3.5 MMOL/L — SIGNIFICANT CHANGE UP (ref 3.5–5.3)
POTASSIUM SERPL-SCNC: 3.5 MMOL/L — SIGNIFICANT CHANGE UP (ref 3.5–5.3)
RBC # BLD: 2.8 M/UL — LOW (ref 4.2–5.8)
RBC # BLD: 3.04 M/UL — LOW (ref 4.2–5.8)
RBC # FLD: 18.5 % — HIGH (ref 10.3–14.5)
RBC # FLD: 18.9 % — HIGH (ref 10.3–14.5)
SAO2 % BLDV: 74.4 % — SIGNIFICANT CHANGE UP (ref 60–85)
SODIUM SERPL-SCNC: 142 MMOL/L — SIGNIFICANT CHANGE UP (ref 135–145)
SPECIMEN SOURCE: SIGNIFICANT CHANGE UP
WBC # BLD: 7.29 K/UL — SIGNIFICANT CHANGE UP (ref 3.8–10.5)
WBC # BLD: 8.45 K/UL — SIGNIFICANT CHANGE UP (ref 3.8–10.5)
WBC # FLD AUTO: 7.29 K/UL — SIGNIFICANT CHANGE UP (ref 3.8–10.5)
WBC # FLD AUTO: 8.45 K/UL — SIGNIFICANT CHANGE UP (ref 3.8–10.5)

## 2019-07-31 PROCEDURE — 99233 SBSQ HOSP IP/OBS HIGH 50: CPT | Mod: GC

## 2019-07-31 PROCEDURE — 70552 MRI BRAIN STEM W/DYE: CPT | Mod: 26

## 2019-07-31 PROCEDURE — 99232 SBSQ HOSP IP/OBS MODERATE 35: CPT | Mod: GC

## 2019-07-31 PROCEDURE — 70450 CT HEAD/BRAIN W/O DYE: CPT | Mod: 26

## 2019-07-31 PROCEDURE — 99222 1ST HOSP IP/OBS MODERATE 55: CPT

## 2019-07-31 PROCEDURE — 99232 SBSQ HOSP IP/OBS MODERATE 35: CPT

## 2019-07-31 RX ORDER — ENOXAPARIN SODIUM 100 MG/ML
40 INJECTION SUBCUTANEOUS DAILY
Refills: 0 | Status: DISCONTINUED | OUTPATIENT
Start: 2019-07-31 | End: 2019-07-31

## 2019-07-31 RX ORDER — POTASSIUM CHLORIDE 20 MEQ
40 PACKET (EA) ORAL EVERY 4 HOURS
Refills: 0 | Status: COMPLETED | OUTPATIENT
Start: 2019-07-31 | End: 2019-07-31

## 2019-07-31 RX ORDER — POTASSIUM CHLORIDE 20 MEQ
40 PACKET (EA) ORAL EVERY 4 HOURS
Refills: 0 | Status: DISCONTINUED | OUTPATIENT
Start: 2019-07-31 | End: 2019-07-31

## 2019-07-31 RX ORDER — PANTOPRAZOLE SODIUM 20 MG/1
40 TABLET, DELAYED RELEASE ORAL
Refills: 0 | Status: DISCONTINUED | OUTPATIENT
Start: 2019-07-31 | End: 2019-08-02

## 2019-07-31 RX ORDER — HALOPERIDOL DECANOATE 100 MG/ML
2.5 INJECTION INTRAMUSCULAR ONCE
Refills: 0 | Status: COMPLETED | OUTPATIENT
Start: 2019-07-31 | End: 2019-07-31

## 2019-07-31 RX ADMIN — PANTOPRAZOLE SODIUM 10 MG/HR: 20 TABLET, DELAYED RELEASE ORAL at 04:33

## 2019-07-31 RX ADMIN — ATORVASTATIN CALCIUM 40 MILLIGRAM(S): 80 TABLET, FILM COATED ORAL at 00:46

## 2019-07-31 RX ADMIN — Medication 50 MILLIGRAM(S): at 05:17

## 2019-07-31 RX ADMIN — PIPERACILLIN AND TAZOBACTAM 25 GRAM(S): 4; .5 INJECTION, POWDER, LYOPHILIZED, FOR SOLUTION INTRAVENOUS at 16:55

## 2019-07-31 RX ADMIN — Medication 40 MILLIEQUIVALENT(S): at 17:01

## 2019-07-31 RX ADMIN — Medication 650 MILLIGRAM(S): at 11:50

## 2019-07-31 RX ADMIN — PIPERACILLIN AND TAZOBACTAM 25 GRAM(S): 4; .5 INJECTION, POWDER, LYOPHILIZED, FOR SOLUTION INTRAVENOUS at 00:46

## 2019-07-31 RX ADMIN — CHLORHEXIDINE GLUCONATE 1 APPLICATION(S): 213 SOLUTION TOPICAL at 05:18

## 2019-07-31 RX ADMIN — OLANZAPINE 2.5 MILLIGRAM(S): 15 TABLET, FILM COATED ORAL at 05:18

## 2019-07-31 RX ADMIN — CARVEDILOL PHOSPHATE 6.25 MILLIGRAM(S): 80 CAPSULE, EXTENDED RELEASE ORAL at 17:01

## 2019-07-31 RX ADMIN — Medication 1 MILLIGRAM(S): at 07:40

## 2019-07-31 RX ADMIN — Medication 1: at 11:50

## 2019-07-31 RX ADMIN — PIPERACILLIN AND TAZOBACTAM 25 GRAM(S): 4; .5 INJECTION, POWDER, LYOPHILIZED, FOR SOLUTION INTRAVENOUS at 10:48

## 2019-07-31 RX ADMIN — CARVEDILOL PHOSPHATE 6.25 MILLIGRAM(S): 80 CAPSULE, EXTENDED RELEASE ORAL at 05:17

## 2019-07-31 RX ADMIN — HALOPERIDOL DECANOATE 2.5 MILLIGRAM(S): 100 INJECTION INTRAMUSCULAR at 08:05

## 2019-07-31 RX ADMIN — CHLORHEXIDINE GLUCONATE 1 APPLICATION(S): 213 SOLUTION TOPICAL at 17:00

## 2019-07-31 RX ADMIN — Medication 81 MILLIGRAM(S): at 11:50

## 2019-07-31 RX ADMIN — Medication 650 MILLIGRAM(S): at 05:17

## 2019-07-31 RX ADMIN — PANTOPRAZOLE SODIUM 40 MILLIGRAM(S): 20 TABLET, DELAYED RELEASE ORAL at 17:01

## 2019-07-31 RX ADMIN — Medication 50 MILLIGRAM(S): at 13:00

## 2019-07-31 RX ADMIN — Medication 1: at 05:18

## 2019-07-31 RX ADMIN — Medication 650 MILLIGRAM(S): at 17:00

## 2019-07-31 NOTE — PROGRESS NOTE ADULT - PROBLEM SELECTOR PLAN 5
-hypertensive urgency as BP was 199/84 in ED; secondary to missed medications  -continue with home hydralazine and coreg, will administer through J tube -hypertensive urgency as BP was 199/84 in ED; secondary to missed medications. BP now improving.  -continue with home hydralazine and coreg, will administer through J tube

## 2019-07-31 NOTE — PROGRESS NOTE ADULT - PROBLEM SELECTOR PLAN 3
-multiple episodes of coffee ground emesis on 7/30 morning, none since then  -Appreciate GI recs: will hold off on EGD for now, c/w IV PPI, c/w ASA (recent NSTEMI), transfuse if Hgb<7, NPO for now  -monitor CBC q12 -multiple episodes of coffee ground emesis on 7/30 morning, none since then. 1 episode of dark stool today. Hgb decreased from yesterday.  -Appreciate GI recs: will hold off on EGD for now, c/w IV PPI, holding ASA,, transfuse if Hgb<7, NPO for now  -monitor CBC q12

## 2019-07-31 NOTE — PROGRESS NOTE ADULT - PROBLEM SELECTOR PLAN 2
-s/p Whipple c/b abscess s/p perc drainage by IR on 7/1; residual fluid collection 2.4cm x 2.2cm still present on CTAP though slightly decreased in size  -IR contacted for re-evaluation of source control, but stated no intervention at this time  -CTAP showed subtle nodularity of omentum concerning for peritoneal carcinomatosis  -outpatient oncology f/u

## 2019-07-31 NOTE — PROGRESS NOTE ADULT - PROBLEM SELECTOR PLAN 1
-Likely 2/2 metabolic encephalopathy, concern for potential infection not yet identified, vs paraneoplastic syndrome, vs undiagnosed metastatic disease in brain; less likely 2/2 stroke, less likely 2/2 polypharmacy  -LFTs wnl, ammonia elevated at 45  -CTH showed no intracranial bleeding  -r/o sepsis: lactate 2.0, source present from intraabdominal fluid collection, but no hypotension, no fever, no leukocytosis  -CTAP showed subtle nodularity of omentum concerning for peritoneal carcinomatosis, need to consider possible mets to brain/meninges  -MRI w/ contrast (7/31) showed tiny R frontal subacute infarct and b/l small subdural hematomas, no evidence of enhancing mass  -routine EEG (7/31) negative for seizures  -Appreciate heme/onc recs: f/u ascites fluid cytology  -SBP ruled out via diagnostic paracentesis  -Appreciate ID recs: start zosyn 3.375g q8 - hope to stop soon; hold ertapenem as it can cause confusion  -UA negative aside from 100 protein, UCx NGTD  -Respiratory virus panel negative  -BCx NGTD  -olanzapine IM 2.5 mg q8 prn for agitation  -hold home ambien and cyclobenzaprine for concerns of polypharmacy-induced AMS  -Appreciate Neuro recs: f/u serum paraneoplastic panel; can consider LP if imaging negative and mental status does not improve; order folate, B12, and homocysteine levels  -Appreciate Pulm recs: if pt develops SOB or dyspnea, consider thoracentesis and/or PleurX catheter for his loculated L pleural effusion on CTAP  -f/u carotid doppler -Likely 2/2 metabolic encephalopathy, concern for potential infection not yet identified, vs paraneoplastic syndrome, vs undiagnosed metastatic disease in brain; less likely 2/2 stroke, less likely 2/2 polypharmacy  -LFTs wnl, ammonia elevated at 45  -CTH showed no intracranial bleeding  -r/o sepsis: lactate 2.0, source present from intraabdominal fluid collection, but no hypotension, no fever, no leukocytosis  -CTAP showed subtle nodularity of omentum concerning for peritoneal carcinomatosis, need to consider possible mets to brain/meninges  -MRI w/ contrast (7/31) showed tiny R frontal subacute infarct and b/l small subdural hematomas, no evidence of enhancing mass or metastasis  -routine EEG (7/31) negative for seizures  -Appreciate heme/onc recs: f/u ascites fluid cytology  -SBP ruled out via diagnostic paracentesis  -Appreciate ID recs: start zosyn 3.375g q8 - hope to stop soon; hold ertapenem as it can cause confusion  -UA negative aside from 100 protein, UCx NGTD  -Respiratory virus panel negative  -BCx NGTD  -olanzapine IM 2.5 mg q8 prn for agitation  -hold home ambien and cyclobenzaprine for concerns of polypharmacy-induced AMS  -Appreciate Neuro recs: f/u serum paraneoplastic panel; can consider LP if imaging negative and mental status does not improve; order folate, B12, and homocysteine levels  -Appreciate Pulm recs: if pt develops SOB or dyspnea, consider thoracentesis and/or PleurX catheter for his loculated L pleural effusion on CTAP  -f/u carotid doppler

## 2019-07-31 NOTE — PROGRESS NOTE ADULT - SUBJECTIVE AND OBJECTIVE BOX
Interval Events:   He was agitated in MRI for which he received sedation including ativan   Had one BM this am which was dark brown     Allergies:  Cipro (Rash)  Plavix (Rash)      Hospital Medications:  acetaminophen    Suspension .. 650 milliGRAM(s) Oral every 6 hours  aspirin  chewable 81 milliGRAM(s) Oral daily  atorvastatin 40 milliGRAM(s) Oral at bedtime  carvedilol 6.25 milliGRAM(s) Oral every 12 hours  chlorhexidine 4% Liquid 1 Application(s) Topical two times a day  dextrose 40% Gel 15 Gram(s) Oral once PRN  dextrose 5%. 1000 milliLiter(s) IV Continuous <Continuous>  dextrose 50% Injectable 12.5 Gram(s) IV Push once  dextrose 50% Injectable 25 Gram(s) IV Push once  dextrose 50% Injectable 25 Gram(s) IV Push once  glucagon  Injectable 1 milliGRAM(s) IntraMuscular once PRN  hydrALAZINE 50 milliGRAM(s) Oral every 8 hours  insulin lispro (HumaLOG) corrective regimen sliding scale   SubCutaneous every 6 hours  meclizine 12.5 milliGRAM(s) Oral two times a day PRN  OLANZapine Injectable 2.5 milliGRAM(s) IntraMuscular once  OLANZapine Injectable 2.5 milliGRAM(s) IntraMuscular every 8 hours PRN  pantoprazole  Injectable 40 milliGRAM(s) IV Push two times a day  piperacillin/tazobactam IVPB.. 3.375 Gram(s) IV Intermittent every 8 hours      PMHX/PSHX:  Cholangiocarcinoma  Gout  Type II diabetes mellitus  HTN (hypertension)  CAD (coronary artery disease)  Cirrhosis  S/P cholecystectomy      ROS:   General:  No fevers, chills or night sweats  ENT:  No sore throat or dysphagia  CV:  No pain or palpitations  Resp:  No dyspnea, cough or  wheezing  GI:  as above  Skin:  No rash or edema    PHYSICAL EXAM:   Vital Signs:  Vital Signs Last 24 Hrs  T(C): 36.6 (31 Jul 2019 13:35), Max: 36.7 (30 Jul 2019 20:50)  T(F): 97.8 (31 Jul 2019 13:35), Max: 98 (30 Jul 2019 20:50)  HR: 83 (31 Jul 2019 13:35) (70 - 86)  BP: 137/94 (31 Jul 2019 13:35) (120/55 - 158/56)  BP(mean): --  RR: 18 (31 Jul 2019 13:35) (16 - 18)  SpO2: 94% (31 Jul 2019 13:35) (94% - 98%)  Daily     Daily     GENERAL: sedated   HEENT:  NC/AT,  conjunctivae clear and pink, sclera -anicteric  CHEST:  Normal Effort, Breath sounds clear  HEART:  RRR, S1 + S2, no murmurs  ABDOMEN:  Soft, non-tender, non-distended, normoactive bowel sounds,  + PEG tube in place  EXTEREMITIES:  no cyanosis or edema  SKIN:  Warm & Dry.No rash or erythema  NEURO:  sleeping after he received ativan and sedation     LABS:                        8.4    8.45  )-----------( 205      ( 31 Jul 2019 10:05 )             26.3     Mean Cell Volume: 93.9 fL (07-31-19 @ 10:05)    07-31    142  |  111<H>  |  21  ----------------------------<  203<H>  3.5   |  21<L>  |  1.50<H>    Ca    8.6      31 Jul 2019 10:05  Phos  2.7     07-31  Mg     1.9     07-31    TPro  7.1  /  Alb  2.8<L>  /  TBili  1.0  /  DBili  x   /  AST  30  /  ALT  17  /  AlkPhos  83  07-30    LIVER FUNCTIONS - ( 30 Jul 2019 09:29 )  Alb: 2.8 g/dL / Pro: 7.1 g/dL / ALK PHOS: 83 u/L / ALT: 17 u/L / AST: 30 u/L / GGT: x           PT/INR - ( 30 Jul 2019 09:29 )   PT: 12.3 SEC;   INR: 1.08          PTT - ( 30 Jul 2019 09:29 )  PTT:28.6 SEC                            8.4    8.45  )-----------( 205      ( 31 Jul 2019 10:05 )             26.3                         9.7    8.92  )-----------( 254      ( 30 Jul 2019 18:06 )             30.7                         9.8    9.51  )-----------( 244      ( 30 Jul 2019 09:29 )             30.0                         9.3    8.49  )-----------( 222      ( 30 Jul 2019 06:58 )             29.1                         9.6    7.97  )-----------( 216      ( 29 Jul 2019 06:10 )             29.8       Imaging: Interval Events:   He was agitated in MRI for which he received sedation including ativan   Had one BM this am which was dark brown   No further emesis per nursing    Allergies:  Cipro (Rash)  Plavix (Rash)      Hospital Medications:  acetaminophen    Suspension .. 650 milliGRAM(s) Oral every 6 hours  aspirin  chewable 81 milliGRAM(s) Oral daily  atorvastatin 40 milliGRAM(s) Oral at bedtime  carvedilol 6.25 milliGRAM(s) Oral every 12 hours  chlorhexidine 4% Liquid 1 Application(s) Topical two times a day  dextrose 40% Gel 15 Gram(s) Oral once PRN  dextrose 5%. 1000 milliLiter(s) IV Continuous <Continuous>  dextrose 50% Injectable 12.5 Gram(s) IV Push once  dextrose 50% Injectable 25 Gram(s) IV Push once  dextrose 50% Injectable 25 Gram(s) IV Push once  glucagon  Injectable 1 milliGRAM(s) IntraMuscular once PRN  hydrALAZINE 50 milliGRAM(s) Oral every 8 hours  insulin lispro (HumaLOG) corrective regimen sliding scale   SubCutaneous every 6 hours  meclizine 12.5 milliGRAM(s) Oral two times a day PRN  OLANZapine Injectable 2.5 milliGRAM(s) IntraMuscular once  OLANZapine Injectable 2.5 milliGRAM(s) IntraMuscular every 8 hours PRN  pantoprazole  Injectable 40 milliGRAM(s) IV Push two times a day  piperacillin/tazobactam IVPB.. 3.375 Gram(s) IV Intermittent every 8 hours      PMHX/PSHX:  Cholangiocarcinoma  Gout  Type II diabetes mellitus  HTN (hypertension)  CAD (coronary artery disease)  Cirrhosis  S/P cholecystectomy      ROS:   General:  No fevers, chills or night sweats  ENT:  No sore throat or dysphagia  CV:  No pain or palpitations  Resp:  No dyspnea, cough or  wheezing  GI:  as above  Skin:  No rash or edema    PHYSICAL EXAM:   Vital Signs:  Vital Signs Last 24 Hrs  T(C): 36.6 (31 Jul 2019 13:35), Max: 36.7 (30 Jul 2019 20:50)  T(F): 97.8 (31 Jul 2019 13:35), Max: 98 (30 Jul 2019 20:50)  HR: 83 (31 Jul 2019 13:35) (70 - 86)  BP: 137/94 (31 Jul 2019 13:35) (120/55 - 158/56)  BP(mean): --  RR: 18 (31 Jul 2019 13:35) (16 - 18)  SpO2: 94% (31 Jul 2019 13:35) (94% - 98%)  Daily     Daily     GENERAL: sedated   HEENT:  NC/AT,  conjunctivae clear and pink, sclera -anicteric  CHEST:  Normal Effort, Breath sounds clear  HEART:  RRR, S1 + S2, no murmurs  ABDOMEN:  Soft, non-tender, non-distended, normoactive bowel sounds,  + PEG tube in place  EXTREMITIES:  no cyanosis or edema  SKIN:  Warm & Dry. No rash or erythema  NEURO:  sleeping after he received ativan and sedation     LABS:                        8.4    8.45  )-----------( 205      ( 31 Jul 2019 10:05 )             26.3     Mean Cell Volume: 93.9 fL (07-31-19 @ 10:05)    07-31    142  |  111<H>  |  21  ----------------------------<  203<H>  3.5   |  21<L>  |  1.50<H>    Ca    8.6      31 Jul 2019 10:05  Phos  2.7     07-31  Mg     1.9     07-31    TPro  7.1  /  Alb  2.8<L>  /  TBili  1.0  /  DBili  x   /  AST  30  /  ALT  17  /  AlkPhos  83  07-30    LIVER FUNCTIONS - ( 30 Jul 2019 09:29 )  Alb: 2.8 g/dL / Pro: 7.1 g/dL / ALK PHOS: 83 u/L / ALT: 17 u/L / AST: 30 u/L / GGT: x           PT/INR - ( 30 Jul 2019 09:29 )   PT: 12.3 SEC;   INR: 1.08          PTT - ( 30 Jul 2019 09:29 )  PTT:28.6 SEC                            8.4    8.45  )-----------( 205      ( 31 Jul 2019 10:05 )             26.3                         9.7    8.92  )-----------( 254      ( 30 Jul 2019 18:06 )             30.7                         9.8    9.51  )-----------( 244      ( 30 Jul 2019 09:29 )             30.0                         9.3    8.49  )-----------( 222      ( 30 Jul 2019 06:58 )             29.1                         9.6    7.97  )-----------( 216      ( 29 Jul 2019 06:10 )             29.8       Imaging:

## 2019-07-31 NOTE — PROGRESS NOTE ADULT - ASSESSMENT
63M recently dx cholangiocarcinoma s/p whipple 6/18/19 c/b polymicrobial abd abscess s/p IR drainage (7/1/19) and L pneumothorax s/p chest tube (resolved) - here with altered mental status.  CT did not show evidence of stroke or mets.  MRI with subdural hematoma and possible subacute infarct.  Ertapenem can also cause confusion.  CT abdomen did show persistent collection though smaller.  Short course of zosyn.  awaiting neuro w/u.      Suggest:  - zosyn 3.375g q8 - hope to stop soon  - f/u neuro evaluation      above d/w medicine

## 2019-07-31 NOTE — PROGRESS NOTE ADULT - ASSESSMENT
64 y/o M with PMH of chalangiocarcinoma s/p Whipple & feeding J-tube placement, s/p pigtail chest tube now removed for PTX, s/p IR drainage for intra-abdominal fluid collection, s/p ~4 weeks of Ertapenem via PICC line, presenting with acute AMS of one day duration, likely 2/2 metabolic encephalopathy of unknown etiology. ID, Heme/onc, Surgery, GI, Neuro, Pulm following.

## 2019-07-31 NOTE — CHART NOTE - NSCHARTNOTEFT_GEN_A_CORE
RN called regarding patient being lethargic throughout today. The author went and examined the patient. Patient was lying in bed with eyes closed. When the author sternal rubbed the patient or yelled patient's name loudly, patient opened eyes briefly, with eye balls rolling backward, never maintained eye contact, immediately closed them again. . Both pupils constrict to bright light symmetrically, but both pupils < 1mm on bright light (pupils more constricted compared with yesterday). RN called regarding patient being lethargic throughout today. The author went and examined the patient. Patient was lying in bed with eyes closed. When the author sternal rubbed the patient or yelled patient's name loudly, patient opened eyes briefly, with eye balls rolling upwards, never maintained eye contact, immediately closed them again.  Both pupils constrict to bright light symmetrically, but both pupils < 1mm on bright light (pupils more constricted compared with yesterday). Patient also had more slurred speech (essentially incomprehensible mumbling). Patient last received Zyprexa 2.5mg IM at 5am, Ativan 2mg IM and Haldol 2.5mg IM at 7am, and per review of other provider's notes throughout today patient did not fully wake up throughout today. Due to concern for this altered mental status, ARACELIS Joyner was called and he examined patient at bed side. Dr. Joyner was able to perform a neurological exam and we noticed patient was following commands throughout Dr. Joyner's exam. We paged neurological consult, and the eurology resident assessed patient at bedside. She commented that patient was protecting his airway, and no significant abnormality noticed during her exam. However, as a small subdural hematoma was noted on the morning MRI, to rule out worsening of the subdural hematoma the neurology resident recommended a noncontrast head CT urgent tonight, continuous pulse oximetry. We also ordered a STAT VBG. RN called regarding patient being lethargic throughout today. The author went and examined the patient. Patient was lying in bed with eyes closed. When the author sternal rubbed the patient or yelled patient's name loudly, patient opened eyes briefly, with eye balls rolling upwards, never maintained eye contact, immediately closed them again.  Both pupils constrict to bright light symmetrically, but both pupils < 1mm on bright light (pupils more constricted compared with yesterday). Patient also had more slurred speech (essentially incomprehensible mumbling). Patient last received Zyprexa 2.5mg IM at 5am, Ativan 2mg IM and Haldol 2.5mg IM at 7am, and per review of other provider's notes throughout today patient did not fully wake up throughout today. Due to concern for this altered mental status, ARACELIS Joyner was called and he examined patient at bed side. Dr. Joyner was able to perform a neurological exam and we noticed patient was following commands throughout Dr. Joyner's exam. We paged neurological consult, and the neurology resident assessed patient at bedside. She commented that patient was protecting his airway, and no significant abnormality noticed during her exam. However, as a small subdural hematoma was noted on the morning MRI, to rule out worsening of the subdural hematoma the neurology resident recommended a noncontrast head CT urgent tonight, continuous pulse oximetry. We also ordered a STAT VBG.

## 2019-07-31 NOTE — PROGRESS NOTE ADULT - PROBLEM SELECTOR PLAN 4
-elevated trop 182 on presentation, repeat trop 158; likely elevated in setting of CKD and NSTEMI in May, low suspicion for new MI  -EKG showed no ST changes  -hold home aspirin in setting of b/l subdural hematoma   -on telemetry for monitoring

## 2019-07-31 NOTE — PROGRESS NOTE ADULT - PROBLEM SELECTOR PLAN 9
-DVT ppx: HSQ held in setting of potential GI bleed, SCDs ordered  -Diet: Will restart Glucerna tube feeds today  -Dispo: to be discussed with family  Advanced Directives: spoke to wife who stated she was healthy care proxy and that she wanted "everything for him done"

## 2019-07-31 NOTE — CHART NOTE - NSCHARTNOTEFT_GEN_A_CORE
The author of this document was called by RN to evaluate patient. During the exam, patient demonstrated approximately 3 seconds of whole body myoclonic jerks, never lost consciousness/ no urinary or bowel incontinence. Per RN report, patient has been having frequent myoclonic jerks since early this morning. RN and the author of this document did a neurological exam together, no focal neurological deficit identified. Upon further chart review, neurological consult note documented that patient had myoclonic jerks. Will continue monitoring.

## 2019-07-31 NOTE — PROGRESS NOTE ADULT - SUBJECTIVE AND OBJECTIVE BOX
Grady Ferro, PGY-1  Team 3  Contact/Pager: [phone] / [phone]    OVERNIGHT / SUBJECTIVE EVENTS:  -Overnight, pt pulled off EEG leads, demonstrated frequent myoclonic jerks as per RN, neuro exam performed with no focal deficits identified, had one "dark" BM   -Pt was seen and examined at bedside. Pt had received sedating meds for MRI and was asleep throughout encounter despite efforts to wake him up. Unable to assess ROM due to mental status.    MEDICATIONS  (STANDING):  acetaminophen    Suspension .. 650 milliGRAM(s) Oral every 6 hours  atorvastatin 40 milliGRAM(s) Oral at bedtime  carvedilol 6.25 milliGRAM(s) Oral every 12 hours  chlorhexidine 4% Liquid 1 Application(s) Topical two times a day  dextrose 5%. 1000 milliLiter(s) (50 mL/Hr) IV Continuous <Continuous>  dextrose 50% Injectable 12.5 Gram(s) IV Push once  dextrose 50% Injectable 25 Gram(s) IV Push once  dextrose 50% Injectable 25 Gram(s) IV Push once  hydrALAZINE 50 milliGRAM(s) Oral every 8 hours  insulin lispro (HumaLOG) corrective regimen sliding scale   SubCutaneous every 6 hours  OLANZapine Injectable 2.5 milliGRAM(s) IntraMuscular once  pantoprazole  Injectable 40 milliGRAM(s) IV Push two times a day  piperacillin/tazobactam IVPB.. 3.375 Gram(s) IV Intermittent every 8 hours  potassium chloride   Powder 40 milliEquivalent(s) Oral every 4 hours    MEDICATIONS  (PRN):  dextrose 40% Gel 15 Gram(s) Oral once PRN Blood Glucose LESS THAN 70 milliGRAM(s)/deciLiter  glucagon  Injectable 1 milliGRAM(s) IntraMuscular once PRN Glucose <70 milliGRAM(s)/deciLiter  meclizine 12.5 milliGRAM(s) Oral two times a day PRN Dizziness  OLANZapine Injectable 2.5 milliGRAM(s) IntraMuscular every 8 hours PRN severe agitation.      Allergies    Cipro (Rash)  Plavix (Rash)    Intolerances        OBJECTIVE:  Vital Signs Last 24 Hrs  T(C): 36.6 (31 Jul 2019 16:57), Max: 36.7 (30 Jul 2019 20:50)  T(F): 97.9 (31 Jul 2019 16:57), Max: 98 (30 Jul 2019 20:50)  HR: 87 (31 Jul 2019 16:57) (76 - 87)  BP: 143/61 (31 Jul 2019 16:57) (120/55 - 158/56)  BP(mean): --  RR: 18 (31 Jul 2019 16:57) (17 - 18)  SpO2: 98% (31 Jul 2019 16:57) (94% - 98%)      CAPILLARY BLOOD GLUCOSE      POCT Blood Glucose.: 198 mg/dL (31 Jul 2019 11:33)  POCT Blood Glucose.: 198 mg/dL (31 Jul 2019 05:12)  POCT Blood Glucose.: 136 mg/dL (30 Jul 2019 23:12)  POCT Blood Glucose.: 154 mg/dL (30 Jul 2019 20:47)    I&O's Summary      PHYSICAL EXAM:  GENERAL: NAD, on room air, comfortably asleep, snoring  HEENT: sclera clear  CHEST/RESPIRATORY: CTA b/l, decreased breath sounds on left lung base  CARDIOVASCULAR: Regular Rate and Rhythm, s1/s2 heard, no murmurs, rubs, or gallops appreciated  ABDOMINAL: Soft, nontender, nondistended; BS present, midline abdominal scar, + jejunostomy tube c/d/i  EXTREMITIES: 2+ pulses b/l, no edema b/l, LUE PICC C/D/I  NEURO: unable to assess  PSYCH: unable to assess, but AAOx2 yesterday    LABS:                        8.4    8.45  )-----------( 205      ( 31 Jul 2019 10:05 )             26.3     07-31    142  |  111<H>  |  21  ----------------------------<  203<H>  3.5   |  21<L>  |  1.50<H>    Ca    8.6      31 Jul 2019 10:05  Phos  2.7     07-31  Mg     1.9     07-31    TPro  7.1  /  Alb  2.8<L>  /  TBili  1.0  /  DBili  x   /  AST  30  /  ALT  17  /  AlkPhos  83  07-30    LIVER FUNCTIONS - ( 30 Jul 2019 09:29 )  Alb: 2.8 g/dL / Pro: 7.1 g/dL / ALK PHOS: 83 u/L / ALT: 17 u/L / AST: 30 u/L / GGT: x           PT/INR - ( 30 Jul 2019 09:29 )   PT: 12.3 SEC;   INR: 1.08          PTT - ( 30 Jul 2019 09:29 )  PTT:28.6 SEC        PT/INR - ( 30 Jul 2019 09:29 )   PT: 12.3 SEC;   INR: 1.08          PTT - ( 30 Jul 2019 09:29 )  PTT:28.6 SEC          MICROBIOLOGY:     Culture - Acid Fast - Body Fluid w/Smear (collected 31 Jul 2019 08:02)  Source: PERITONEAL FLUID  Final Report (31 Jul 2019 14:33):    AFB SMEAR= NO ACID FAST BACILLI SEEN    Culture - Body Fluid with Gram Stain (collected 30 Jul 2019 18:24)  Source: PERITONEAL FLUID  Gram Stain (30 Jul 2019 23:50):    WBC^White Blood Cells    QNTY CELLS IN GRAM STAIN: NO CELLS SEEN    NOS^No Organisms Seen    Culture - Urine (collected 29 Jul 2019 07:13)  Source: URINE MIDSTREAM  Final Report (30 Jul 2019 08:23):    NO GROWTH AT 24 HOURS    Culture - Blood (collected 29 Jul 2019 06:39)  Source: BLOOD VENOUS  Preliminary Report (31 Jul 2019 06:39):    NO ORGANISMS ISOLATED    NO ORGANISMS ISOLATED AT 48 HRS.    Culture - Blood (collected 29 Jul 2019 06:39)  Source: BLOOD PERIPHERAL  Preliminary Report (31 Jul 2019 06:39):    NO ORGANISMS ISOLATED    NO ORGANISMS ISOLATED AT 48 HRS.      RADIOLOGY & ADDITIONAL TESTS:    < from: MR Head w/ IV Cont (07.31.19 @ 09:31) >  IMPRESSION:  There is a tiny focus of diffusion restriction and FLAIR hyperintensity   in the right frontal lobe, suspicious for a tiny right frontal subacute   infarct.Clinical correlation continued close and follow-up is   recommended as a subacute infarct would be expected to change in a short   period of time.    Subdural hematomas as described above.    No evidence of intracranial enhancing mass.     < end of copied text >    EEG (7/31):  EEG in the awake state.  1.	Several habitual myoclonic jerks were captured, at times with tonic component, without abnormal EEG correlate. Differential include non-epileptic myoclonic jerks and scalp-negative focal motor seizures. Please correlate clinically.    2.	No epileptiform patterns seen. Grady Ferro, PGY-1  Team 3  Contact/Pager: [phone] / [phone]    OVERNIGHT / SUBJECTIVE EVENTS:  -Overnight, pt pulled off EEG leads, demonstrated frequent myoclonic jerks as per RN, neuro exam performed with no focal deficits identified, had one "dark" BM   -Pt was seen and examined at bedside. Pt had received sedating meds for MRI and was asleep throughout encounter despite efforts to wake him up. Unable to assess ROM due to mental status.    MEDICATIONS  (STANDING):  acetaminophen    Suspension .. 650 milliGRAM(s) Oral every 6 hours  atorvastatin 40 milliGRAM(s) Oral at bedtime  carvedilol 6.25 milliGRAM(s) Oral every 12 hours  chlorhexidine 4% Liquid 1 Application(s) Topical two times a day  dextrose 5%. 1000 milliLiter(s) (50 mL/Hr) IV Continuous <Continuous>  dextrose 50% Injectable 12.5 Gram(s) IV Push once  dextrose 50% Injectable 25 Gram(s) IV Push once  dextrose 50% Injectable 25 Gram(s) IV Push once  hydrALAZINE 50 milliGRAM(s) Oral every 8 hours  insulin lispro (HumaLOG) corrective regimen sliding scale   SubCutaneous every 6 hours  OLANZapine Injectable 2.5 milliGRAM(s) IntraMuscular once  pantoprazole  Injectable 40 milliGRAM(s) IV Push two times a day  piperacillin/tazobactam IVPB.. 3.375 Gram(s) IV Intermittent every 8 hours  potassium chloride   Powder 40 milliEquivalent(s) Oral every 4 hours    MEDICATIONS  (PRN):  dextrose 40% Gel 15 Gram(s) Oral once PRN Blood Glucose LESS THAN 70 milliGRAM(s)/deciLiter  glucagon  Injectable 1 milliGRAM(s) IntraMuscular once PRN Glucose <70 milliGRAM(s)/deciLiter  meclizine 12.5 milliGRAM(s) Oral two times a day PRN Dizziness  OLANZapine Injectable 2.5 milliGRAM(s) IntraMuscular every 8 hours PRN severe agitation.      Allergies    Cipro (Rash)  Plavix (Rash)    Intolerances        OBJECTIVE:  Vital Signs Last 24 Hrs  T(C): 36.6 (31 Jul 2019 16:57), Max: 36.7 (30 Jul 2019 20:50)  T(F): 97.9 (31 Jul 2019 16:57), Max: 98 (30 Jul 2019 20:50)  HR: 87 (31 Jul 2019 16:57) (76 - 87)  BP: 143/61 (31 Jul 2019 16:57) (120/55 - 158/56)  BP(mean): --  RR: 18 (31 Jul 2019 16:57) (17 - 18)  SpO2: 98% (31 Jul 2019 16:57) (94% - 98%)      CAPILLARY BLOOD GLUCOSE      POCT Blood Glucose.: 198 mg/dL (31 Jul 2019 11:33)  POCT Blood Glucose.: 198 mg/dL (31 Jul 2019 05:12)  POCT Blood Glucose.: 136 mg/dL (30 Jul 2019 23:12)  POCT Blood Glucose.: 154 mg/dL (30 Jul 2019 20:47)    I&O's Summary      PHYSICAL EXAM:  GENERAL: NAD, on room air, comfortably asleep, snoring  HEENT: sclera clear  CHEST/RESPIRATORY: CTA b/l, decreased breath sounds on left lung base  CARDIOVASCULAR: Regular Rate and Rhythm, s1/s2 heard, no murmurs, rubs, or gallops appreciated  ABDOMINAL: Soft, nontender, nondistended; BS present, midline abdominal scar, + jejunostomy tube c/d/i  EXTREMITIES: 2+ pulses b/l, no edema b/l, LUE PICC C/D/I  NEURO: unable to assess, patient not following commands  PSYCH: unable to assess, patient not following commands    LABS:                        8.4    8.45  )-----------( 205      ( 31 Jul 2019 10:05 )             26.3     07-31    142  |  111<H>  |  21  ----------------------------<  203<H>  3.5   |  21<L>  |  1.50<H>    Ca    8.6      31 Jul 2019 10:05  Phos  2.7     07-31  Mg     1.9     07-31    TPro  7.1  /  Alb  2.8<L>  /  TBili  1.0  /  DBili  x   /  AST  30  /  ALT  17  /  AlkPhos  83  07-30    LIVER FUNCTIONS - ( 30 Jul 2019 09:29 )  Alb: 2.8 g/dL / Pro: 7.1 g/dL / ALK PHOS: 83 u/L / ALT: 17 u/L / AST: 30 u/L / GGT: x           PT/INR - ( 30 Jul 2019 09:29 )   PT: 12.3 SEC;   INR: 1.08          PTT - ( 30 Jul 2019 09:29 )  PTT:28.6 SEC        PT/INR - ( 30 Jul 2019 09:29 )   PT: 12.3 SEC;   INR: 1.08          PTT - ( 30 Jul 2019 09:29 )  PTT:28.6 SEC          MICROBIOLOGY:     Culture - Acid Fast - Body Fluid w/Smear (collected 31 Jul 2019 08:02)  Source: PERITONEAL FLUID  Final Report (31 Jul 2019 14:33):    AFB SMEAR= NO ACID FAST BACILLI SEEN    Culture - Body Fluid with Gram Stain (collected 30 Jul 2019 18:24)  Source: PERITONEAL FLUID  Gram Stain (30 Jul 2019 23:50):    WBC^White Blood Cells    QNTY CELLS IN GRAM STAIN: NO CELLS SEEN    NOS^No Organisms Seen    Culture - Urine (collected 29 Jul 2019 07:13)  Source: URINE MIDSTREAM  Final Report (30 Jul 2019 08:23):    NO GROWTH AT 24 HOURS    Culture - Blood (collected 29 Jul 2019 06:39)  Source: BLOOD VENOUS  Preliminary Report (31 Jul 2019 06:39):    NO ORGANISMS ISOLATED    NO ORGANISMS ISOLATED AT 48 HRS.    Culture - Blood (collected 29 Jul 2019 06:39)  Source: BLOOD PERIPHERAL  Preliminary Report (31 Jul 2019 06:39):    NO ORGANISMS ISOLATED    NO ORGANISMS ISOLATED AT 48 HRS.      RADIOLOGY & ADDITIONAL TESTS:    < from: MR Head w/ IV Cont (07.31.19 @ 09:31) >  IMPRESSION:  There is a tiny focus of diffusion restriction and FLAIR hyperintensity   in the right frontal lobe, suspicious for a tiny right frontal subacute   infarct.Clinical correlation continued close and follow-up is   recommended as a subacute infarct would be expected to change in a short   period of time.    Subdural hematomas as described above.    No evidence of intracranial enhancing mass.     < end of copied text >    EEG (7/31):  EEG in the awake state.  1.	Several habitual myoclonic jerks were captured, at times with tonic component, without abnormal EEG correlate. Differential include non-epileptic myoclonic jerks and scalp-negative focal motor seizures. Please correlate clinically.    2.	No epileptiform patterns seen.

## 2019-07-31 NOTE — CHART NOTE - NSCHARTNOTEFT_GEN_A_CORE
Surgical Oncology Note (D Team)     Mr. Gray is well known to surgical oncology (D team), he underwent whipple procedure for cholangiocarcinoma 6/18, readmitted 7/1 for placement of a pigtail chest tube for PTX and an IR drain placement for an intra-abdominal fluid collection. He presented 2 days ago with progressive altered mental status, he is undergoing workup of his AMS. He is followed by ID, pulmonary, neurology, GI, medical oncology. He is awaiting MRI of head. Currently on IV antibiotics (zosyn), his wbc is WNL and he is afebrile, his LFTs are normal, his ammonia is WNL. Will continue to follow, call D team ([phone]) with any questions)

## 2019-07-31 NOTE — EEG REPORT - NS EEG TEXT BOX
[facility]   COMPREHENSIVE EPILEPSY CENTER   REPORT OF PROLONGED VIDEO EEG     [facility]: [address], [place] [address], Ph#: [phone]  [facility]: [address], [place] [address], Ph#: [phone]  Office: [address] [place] [address], [place] [address], [place] [address] Ph#: [phone]    Patient Name: KAMRAN FIGUEROA  Age and : 63y ([birth year])  MRN #: [id number]  Location: [address]  Referring Physician: Elias Hebert    Study Date: 19    _____________________________________________________________  STUDY INFORMATION    EEG Recording Technique:  The patient underwent continuous Video-EEG monitoring, using Telemetry System hardware on the XLTek Digital System. EEG and video data were stored on a computer hard drive with important events saved in digital archive files. The material was reviewed by a physician (electroencephalographer / epileptologist) on a daily basis. Camacho and seizure detection algorithms were utilized and reviewed. An EEG Technician attended to the patient, and was available throughout daytime work hours.  The epilepsy center neurologist was available in person or on call 24-hours per day.    EEG Placement and Labeling of Electrodes:  The EEG was performed utilizing 20 channel referential EEG connections (coronal over temporal over parasagittal montage) using all standard 10-20 electrode placements with EKG, with additional electrodes placed in the inferior temporal region using the modified 10-10 montage electrode placements for elective admissions, or if deemed necessary. Recording was at a sampling rate of 256 samples per second per channel. Time synchronized digital video recording was done simultaneously with EEG recording. A low light infrared camera was used for low light recording.     _____________________________________________________________  HISTORY    Patient is a 63y old  Male who presents with a chief complaint of AMS (2019 07:15)      PERTINENT MEDICATION:  NONE    _____________________________________________________________  INTERPRETATION    FINDINGS: The background was continuous, spontaneously variable and reactive. During wakefulness, the posterior dominant rhythm consisted of symmetric, well-formed 10 Hz activity, with amplitude to 30 uV, that attenuated to eye opening.  Low amplitude frontal beta was noted in wakefulness.    Background Slowing:  No generalized background slowing was present.    Focal Slowing:   None were present.    Sleep Background:  Drowsiness and stage II sleep transients were not recorded.    Other Non-Epileptiform Findings:  None were present.    Interictal Epileptiform Activity:   None were present.    Events:  Several episodes of myoclonic jerks, either of different extremities or whole body, were captured, at times with sustained tonic posturing of lower extremity. No abnormal EEG correlate noted with any of these events.     Activation Procedures:   Hyperventilation was not performed.    Photic stimulation was not performed.     Artifacts:  Intermittent myogenic and movement artifacts were noted.    ECG:  The heart rate on single channel ECG was predominantly between 60-70BPM.    EEG Summary/Classification:  EEG in the awake state.  - Several episodes of myoclonic jerks, either of different extremities or whole body, were captured, at times with sustained tonic posturing of lower extremity. No abnormal EEG correlate noted with any of these events.       EEG Impression/Clinical Correlate:  EEG in the awake state.  1.	Several habitual myoclonic jerks were captured, at times with tonic component, without abnormal EEG correlate. Differential include non-epileptic myoclonic jerks and scalp-negative focal motor seizures. Please correlate clinically.    2.	No epileptiform patterns seen.    _______________________________________    Rodrigue Bolanos DO  Epilepsy Fellow, [facility]    Florian Marie MD  Attending Physician, [facility]

## 2019-07-31 NOTE — PROGRESS NOTE ADULT - ASSESSMENT
63M w/ CAD, recent dx of cholangiocarcinoma s/p Whipple and J-tube placement c/b intra-abd fluid collection on IV abx, p/w AMS. GI consulted for coffee ground emesis yesterday.  Also found to have imaging findings somewhat concerning for metastatic dz, though not confirmed.   UGIB d/dx in this case includes gastritis vs PUD (exacerbated by high dose ASA), less likely dieulafoy/AVM or varices given quality of blood (dark as opposed to bright red).     Impression:  #UGIB/ coffee ground emesis - d/dx as above, gastritis vs PUD  #Metastatic Cholangiocarcinoma  #AMS  #Hypertension  #CAD on ASA    Recommendations:   - Supportive management by primary team-  - resume tube feeding  -  c/w IV PPI bid  - continue to trend Hb, transfuse to Hb > 7  - given ASA is for secondary ppx, okay to continue from a GI perspective especially given HD stability and stable Hb  - GI will continue to follow and assess need for EGD, would hold off currently given encephalopathy    Rosalba Powers  Gastroenterology Fellow  Pager# [phone]  Page #[phone] after 5pm or on weekends 63M w/ CAD, recent dx of cholangiocarcinoma s/p Whipple and J-tube placement c/b intra-abd fluid collection on IV abx, p/w AMS. GI consulted for coffee ground emesis yesterday.  Also found to have imaging findings somewhat concerning for metastatic dz, though not confirmed.   UGIB d/dx in this case includes gastritis vs PUD (exacerbated by high dose ASA), less likely dieulafoy/AVM or varices given quality of blood (dark as opposed to bright red).     Impression:  #UGIB/ coffee ground emesis - d/dx as above, gastritis vs PUD  #Metastatic Cholangiocarcinoma  #Encephalopathy  #Hypertension  #CAD on ASA    Recommendations:   - Supportive management by primary team-  - resume J tube feeding  -  c/w IV PPI bid  - continue to trend Hb, transfuse to Hb > 7  - given ASA is for secondary ppx, okay to continue from a GI perspective especially given HD stability and stable Hb  - GI will continue to follow and assess need for EGD, would hold off currently given encephalopathy    Rosalba Powers  Gastroenterology Fellow  Pager# [phone]  Page #[phone] after 5pm or on weekends

## 2019-07-31 NOTE — PROGRESS NOTE ADULT - SUBJECTIVE AND OBJECTIVE BOX
Patient is a 63y old  Male who presents with a chief complaint of confusion    f/u abd abscess    Interval History/ROS:  per medicine housestaff, patient was a little better this morning, however, needed to be sedated to undergo MRI.  On my exam, lethargic.  ROS unable.      PAST MEDICAL & SURGICAL HISTORY:  Type II diabetes mellitus  HTN (hypertension)  CAD (coronary artery disease)  S/P cholecystectomy    Allergies  Cipro (Rash)  Plavix (Rash)    ANTIMICROBIALS:  meropenem  IVPB 500 every 12 hours (-)  piperacillin/tazobactam IVPB.. 3.375 every 8 hours (-10)  erta (-)  vancomycin  IVPB x1     active  piperacillin/tazobactam IVPB.. 3.375 every 8 hours (-)    MEDICATIONS  (STANDING):  acetaminophen    Suspension .. 650 every 6 hours  aspirin  chewable 81 daily  atorvastatin 40 at bedtime  carvedilol 6.25 every 12 hours  hydrALAZINE 50 every 8 hours  insulin lispro (HumaLOG) corrective regimen sliding scale  every 6 hours  OLANZapine Injectable 2.5 once  pantoprazole  Injectable 40 two times a day    Vital Signs Last 24 Hrs  T(F): 97.8 (19 @ 13:35), Max: 98 (19 @ 20:50)  HR: 83 (19 @ 13:35)  BP: 137/94 (19 @ 13:35)  RR: 18 (19 @ 13:35)  SpO2: 94% (19 @ 13:35) (94% - 98%)    PHYSICAL EXAM:  General: lethargic after ativan/haldol  HEAD/EYES: anicteric  ENT:  supple  Cardiovascular:   S1, S2  Respiratory:  clear bilaterally  GI:  soft, non-tender, normal bowel sounds  Musculoskeletal:  no synovitis  Neurologic: not able to assess, lethargic  Skin:  no rash  Psychiatric:  difficult to assess  Vascular:  L PICC still in place                               8.4    8.45  )-----------( 205      ( 2019 10:05 )             26.3 -    142  |  111  |  21  ----------------------------<  203  3.5   |  21  |  1.50  Ca    8.6      2019 10:05Phos  2.7     Mg     1.9       TPro  7.1  /  Alb  2.8  /  TBili  1.0  /  DBili  x   /  AST  30  /  ALT  17  /  AlkPhos  83      Urinalysis Basic - ( 2019 06:37 )  Color: YELLOW / Appearance: CLEAR / S.027 / pH: 6.0  Gluc: NEGATIVE / Ketone: TRACE  / Bili: NEGATIVE / Urobili: TRACE   Blood: NEGATIVE / Protein: 100 / Nitrite: NEGATIVE   Leuk Esterase: NEGATIVE / RBC: 3-5 / WBC 0-2   Sq Epi: OCC / Non Sq Epi: x / Bacteria: NEGATIVE    MICROBIOLOGY:  Culture - Urine (collected 2019 07:13)  Source: URINE MIDSTREAM  Preliminary Report (2019 08:54):    NO GROWTH TO DATE    Culture - Yeast and Fungus (collected 2019 18:45)  Source: ABSCESS  Preliminary Report (2019 09:09):    CULTURE NEGATIVE FOR YEASTS AND MOLDS---- PRELIMINARY    CULTURE NEGATIVE FOR YEASTS AND MOLDS AFTER 1 DAY    Culture - Surg Site Aerob/Anaer w/Gm St (collected 2019 18:45)  Source: ABSCESS  Preliminary Report (2019 13:57):    MODERATE  Organism: Escherichia coli  Klebsiella pneumoniae  Enterococcus faecalis (2019 13:57)  Organism: Enterococcus faecalis (2019 13:57)  Organism: Klebsiella pneumoniae (2019 13:57)      -  Amikacin: S <=8 BOB      -  Ampicillin: R >16 BOB      -  Ampicillin/Sulbactam: S 8/4 BOB      -  Aztreonam: S <=4 BOB      -  Cefazolin: S <=2 BOB      -  Cefepime: S <=2 BOB      -  Cefoxitin: S <=4 BOB      -  Ceftazidime: S <=1 BOB      -  Ceftriaxone: S <=1 BOB      -  Ciprofloxacin: S <=0.5 BOB      -  Ertapenem: S <=0.5 BOB      -  Gentamicin: S <=1 BOB      -  Imipenem: S <=1 BOB      -  Levofloxacin: S <=1 BOB      -  Meropenem: S <=1 BOB      -  Piperacillin/Tazobactam: S <=8 BOB      -  Tigecycline: S      -  Tobramycin: S <=2 BOB      -  Trimethoprim/Sulfamethoxazole: S <=0.5/9.5 BOB      Method Type: NEGATIVE BOB 43  Organism: Escherichia coli (2019 13:57)    Culture - Acid Fast Smear Concentrated (collected 2019 18:45)  Source: ABSCESS  Final Report (2019 14:54):    AFB SMEAR= NO ACID FAST BACILLI SEEN    Culture - Urine (collected 2019 01:32)  Source: URINE MIDSTREAM  Final Report (2019 10:04):    Culture grew 3 or more types of organisms which indicate    collection contamination; consider recollection only if    clinically indicated.    Culture - Blood (collected 2019 23:14)  Source: BLOOD PERIPHERAL  Preliminary Report (2019 23:15):    NO ORGANISMS ISOLATED    NO ORGANISMS ISOLATED AT 72 HRS.    Culture - Blood (collected 2019 23:12)  Source: BLOOD VENOUS  Preliminary Report (2019 23:13):    NO ORGANISMS ISOLATED    NO ORGANISMS ISOLATED AT 72 HRS.    RADIOLOGY:  imaging below personally reviewed    MR Head w/ IV Cont (19 @ 09:31) >  Small subdural hematoma is seen involving the bilateral frontal, bilateral temporal and left parietal region.  IMPRESSION:  There is a tiny focus of diffusion restriction and FLAIR hyperintensity in the right frontal lobe, suspicious for a tiny right frontal subacute infarct. Clinical correlation continued close and follow-up is recommended as a subacute infarct would be expected to change in a short   period of time.  Subdural hematomas as described above.  No evidence of intracranial enhancing mass.     CT Head No Cont (19 @ 08:36) >  IMPRESSION:  Imaging limited by patient motion.  No acute intracranial hemorrhage.  Microvascular disease.    CT Abdomen and Pelvis No Cont (19 @ 08:42) >  IMPRESSION:  Moderate partially loculated left pleural effusion, slightly increased in size since 2019.  Peripheral right lower lobe nodule, slightly increased in size.  Status post removal of percutaneous drainage catheter from the right upper quadrant. A residual amorphous collection containing fluid and a small amount of gas in the right upper quadrant measures 2.4 x 2.2 cm, slightly decreased in size since 2019.  Mesenteric lymphadenopathy, slightly increased in size since 2019.  Subtle nodularity of the omentum, raising consideration of peritoneal carcinomatosis.  Moderate to large volume of ascites, increased.    CT Abdomen and Pelvis w/ Oral Cont (19 @ 15:02) >  IMPRESSION:  Status post percutaneous drainage of a complex right upper quadrant collection, with interval decrease in size of the collection. Small residual predominantly gas containing components are noted.  Moderate volume ascites, increased since 2019.  Nonspecific mesenteric lymph nodes, unchanged.  Small bilateral pleural effusions, increased in size since 2019. Small residual left pneumothorax status post removal of previously noted left pleural catheter.    CT Abdomen and Pelvis w/ Oral Cont (19 @ 04:22) >  IMPRESSION:  Status post recent Whipple procedure. High density amorphous collection adjacent to the duodenal stump with loculated foci of air and fluid in the right upper quadrant for which considerations include postoperative hematoma and air, leakage of contrast and air from the duodenal stump, or developing collection/abscess.  Left-sided pigtail chest tube. Trace left pneumothorax along the anterior chest wall.

## 2019-07-31 NOTE — PROGRESS NOTE ADULT - PROBLEM SELECTOR PLAN 7
-s/p 9 stents  -c/w statin, coreg  -hold ASA in setting of b/l subdural hematoma -s/p 9 stents  -c/w statin, coreg  -hold ASA in setting of b/l subdural hematoma, concern for GIB

## 2019-07-31 NOTE — PROGRESS NOTE ADULT - PROBLEM SELECTOR PLAN 8
-Will restart glucerna tube feeds today  -lantus held and will continue with ISS -Will restart glucerna tube feeds today  -lantus held and will continue with ISS, will monitor blood sugar and adjust insulin regimen as needed

## 2019-08-01 DIAGNOSIS — E87.0 HYPEROSMOLALITY AND HYPERNATREMIA: ICD-10-CM

## 2019-08-01 DIAGNOSIS — S06.5X9A TRAUMATIC SUBDURAL HEMORRHAGE WITH LOSS OF CONSCIOUSNESS OF UNSPECIFIED DURATION, INITIAL ENCOUNTER: ICD-10-CM

## 2019-08-01 LAB
ANION GAP SERPL CALC-SCNC: 12 MMO/L — SIGNIFICANT CHANGE UP (ref 7–14)
ANION GAP SERPL CALC-SCNC: 13 MMO/L — SIGNIFICANT CHANGE UP (ref 7–14)
BASOPHILS # BLD AUTO: 0.04 K/UL — SIGNIFICANT CHANGE UP (ref 0–0.2)
BASOPHILS NFR BLD AUTO: 0.5 % — SIGNIFICANT CHANGE UP (ref 0–2)
BUN SERPL-MCNC: 21 MG/DL — SIGNIFICANT CHANGE UP (ref 7–23)
BUN SERPL-MCNC: 22 MG/DL — SIGNIFICANT CHANGE UP (ref 7–23)
CALCIUM SERPL-MCNC: 8.6 MG/DL — SIGNIFICANT CHANGE UP (ref 8.4–10.5)
CALCIUM SERPL-MCNC: 8.8 MG/DL — SIGNIFICANT CHANGE UP (ref 8.4–10.5)
CHLORIDE SERPL-SCNC: 113 MMOL/L — HIGH (ref 98–107)
CHLORIDE SERPL-SCNC: 114 MMOL/L — HIGH (ref 98–107)
CO2 SERPL-SCNC: 18 MMOL/L — LOW (ref 22–31)
CO2 SERPL-SCNC: 18 MMOL/L — LOW (ref 22–31)
CREAT SERPL-MCNC: 1.67 MG/DL — HIGH (ref 0.5–1.3)
CREAT SERPL-MCNC: 1.79 MG/DL — HIGH (ref 0.5–1.3)
EOSINOPHIL # BLD AUTO: 0.28 K/UL — SIGNIFICANT CHANGE UP (ref 0–0.5)
EOSINOPHIL NFR BLD AUTO: 3.8 % — SIGNIFICANT CHANGE UP (ref 0–6)
FOLATE SERPL-MCNC: 8.3 NG/ML — SIGNIFICANT CHANGE UP (ref 4.7–20)
GLUCOSE SERPL-MCNC: 192 MG/DL — HIGH (ref 70–99)
GLUCOSE SERPL-MCNC: 212 MG/DL — HIGH (ref 70–99)
HCT VFR BLD CALC: 25.8 % — LOW (ref 39–50)
HCT VFR BLD CALC: 28.6 % — LOW (ref 39–50)
HCYS SERPL-MCNC: 14.3 UMOL/L — SIGNIFICANT CHANGE UP
HGB BLD-MCNC: 8.3 G/DL — LOW (ref 13–17)
HGB BLD-MCNC: 9.2 G/DL — LOW (ref 13–17)
IMM GRANULOCYTES NFR BLD AUTO: 0.4 % — SIGNIFICANT CHANGE UP (ref 0–1.5)
LYMPHOCYTES # BLD AUTO: 1.25 K/UL — SIGNIFICANT CHANGE UP (ref 1–3.3)
LYMPHOCYTES # BLD AUTO: 17.1 % — SIGNIFICANT CHANGE UP (ref 13–44)
MAGNESIUM SERPL-MCNC: 2 MG/DL — SIGNIFICANT CHANGE UP (ref 1.6–2.6)
MAGNESIUM SERPL-MCNC: 2.1 MG/DL — SIGNIFICANT CHANGE UP (ref 1.6–2.6)
MCHC RBC-ENTMCNC: 31 PG — SIGNIFICANT CHANGE UP (ref 27–34)
MCHC RBC-ENTMCNC: 31.2 PG — SIGNIFICANT CHANGE UP (ref 27–34)
MCHC RBC-ENTMCNC: 32.2 % — SIGNIFICANT CHANGE UP (ref 32–36)
MCHC RBC-ENTMCNC: 32.2 % — SIGNIFICANT CHANGE UP (ref 32–36)
MCV RBC AUTO: 96.3 FL — SIGNIFICANT CHANGE UP (ref 80–100)
MCV RBC AUTO: 97 FL — SIGNIFICANT CHANGE UP (ref 80–100)
MONOCYTES # BLD AUTO: 0.82 K/UL — SIGNIFICANT CHANGE UP (ref 0–0.9)
MONOCYTES NFR BLD AUTO: 11.2 % — SIGNIFICANT CHANGE UP (ref 2–14)
NEUTROPHILS # BLD AUTO: 4.88 K/UL — SIGNIFICANT CHANGE UP (ref 1.8–7.4)
NEUTROPHILS NFR BLD AUTO: 67 % — SIGNIFICANT CHANGE UP (ref 43–77)
NRBC # FLD: 0 K/UL — SIGNIFICANT CHANGE UP (ref 0–0)
NRBC # FLD: 0 K/UL — SIGNIFICANT CHANGE UP (ref 0–0)
PHOSPHATE SERPL-MCNC: 2.4 MG/DL — LOW (ref 2.5–4.5)
PHOSPHATE SERPL-MCNC: 2.9 MG/DL — SIGNIFICANT CHANGE UP (ref 2.5–4.5)
PLATELET # BLD AUTO: 196 K/UL — SIGNIFICANT CHANGE UP (ref 150–400)
PLATELET # BLD AUTO: 232 K/UL — SIGNIFICANT CHANGE UP (ref 150–400)
PMV BLD: 9.5 FL — SIGNIFICANT CHANGE UP (ref 7–13)
PMV BLD: 9.6 FL — SIGNIFICANT CHANGE UP (ref 7–13)
POTASSIUM SERPL-MCNC: 4.2 MMOL/L — SIGNIFICANT CHANGE UP (ref 3.5–5.3)
POTASSIUM SERPL-MCNC: 4.2 MMOL/L — SIGNIFICANT CHANGE UP (ref 3.5–5.3)
POTASSIUM SERPL-SCNC: 4.2 MMOL/L — SIGNIFICANT CHANGE UP (ref 3.5–5.3)
POTASSIUM SERPL-SCNC: 4.2 MMOL/L — SIGNIFICANT CHANGE UP (ref 3.5–5.3)
RBC # BLD: 2.66 M/UL — LOW (ref 4.2–5.8)
RBC # BLD: 2.97 M/UL — LOW (ref 4.2–5.8)
RBC # FLD: 18.9 % — HIGH (ref 10.3–14.5)
RBC # FLD: 19.1 % — HIGH (ref 10.3–14.5)
SODIUM SERPL-SCNC: 143 MMOL/L — SIGNIFICANT CHANGE UP (ref 135–145)
SODIUM SERPL-SCNC: 145 MMOL/L — SIGNIFICANT CHANGE UP (ref 135–145)
VIT B12 SERPL-MCNC: 1288 PG/ML — HIGH (ref 200–900)
WBC # BLD: 7.3 K/UL — SIGNIFICANT CHANGE UP (ref 3.8–10.5)
WBC # BLD: 8.86 K/UL — SIGNIFICANT CHANGE UP (ref 3.8–10.5)
WBC # FLD AUTO: 7.3 K/UL — SIGNIFICANT CHANGE UP (ref 3.8–10.5)
WBC # FLD AUTO: 8.86 K/UL — SIGNIFICANT CHANGE UP (ref 3.8–10.5)

## 2019-08-01 PROCEDURE — 99233 SBSQ HOSP IP/OBS HIGH 50: CPT | Mod: GC

## 2019-08-01 PROCEDURE — 93880 EXTRACRANIAL BILAT STUDY: CPT | Mod: 26

## 2019-08-01 PROCEDURE — 99232 SBSQ HOSP IP/OBS MODERATE 35: CPT

## 2019-08-01 PROCEDURE — 99232 SBSQ HOSP IP/OBS MODERATE 35: CPT | Mod: GC

## 2019-08-01 RX ORDER — HYDRALAZINE HCL 50 MG
50 TABLET ORAL EVERY 8 HOURS
Refills: 0 | Status: DISCONTINUED | OUTPATIENT
Start: 2019-08-01 | End: 2019-08-03

## 2019-08-01 RX ORDER — CARVEDILOL PHOSPHATE 80 MG/1
6.25 CAPSULE, EXTENDED RELEASE ORAL EVERY 12 HOURS
Refills: 0 | Status: DISCONTINUED | OUTPATIENT
Start: 2019-08-01 | End: 2019-08-01

## 2019-08-01 RX ORDER — INSULIN LISPRO 100/ML
VIAL (ML) SUBCUTANEOUS AT BEDTIME
Refills: 0 | Status: DISCONTINUED | OUTPATIENT
Start: 2019-08-01 | End: 2019-08-03

## 2019-08-01 RX ORDER — LANOLIN ALCOHOL/MO/W.PET/CERES
3 CREAM (GRAM) TOPICAL AT BEDTIME
Refills: 0 | Status: DISCONTINUED | OUTPATIENT
Start: 2019-08-01 | End: 2019-08-03

## 2019-08-01 RX ORDER — CARVEDILOL PHOSPHATE 80 MG/1
12.5 CAPSULE, EXTENDED RELEASE ORAL EVERY 12 HOURS
Refills: 0 | Status: DISCONTINUED | OUTPATIENT
Start: 2019-08-01 | End: 2019-08-02

## 2019-08-01 RX ORDER — INSULIN LISPRO 100/ML
VIAL (ML) SUBCUTANEOUS
Refills: 0 | Status: DISCONTINUED | OUTPATIENT
Start: 2019-08-01 | End: 2019-08-03

## 2019-08-01 RX ADMIN — Medication 1: at 05:29

## 2019-08-01 RX ADMIN — PANTOPRAZOLE SODIUM 40 MILLIGRAM(S): 20 TABLET, DELAYED RELEASE ORAL at 05:29

## 2019-08-01 RX ADMIN — Medication 50 MILLIGRAM(S): at 00:18

## 2019-08-01 RX ADMIN — PIPERACILLIN AND TAZOBACTAM 25 GRAM(S): 4; .5 INJECTION, POWDER, LYOPHILIZED, FOR SOLUTION INTRAVENOUS at 08:47

## 2019-08-01 RX ADMIN — Medication 3 MILLIGRAM(S): at 22:31

## 2019-08-01 RX ADMIN — PIPERACILLIN AND TAZOBACTAM 25 GRAM(S): 4; .5 INJECTION, POWDER, LYOPHILIZED, FOR SOLUTION INTRAVENOUS at 00:06

## 2019-08-01 RX ADMIN — Medication 50 MILLIGRAM(S): at 13:56

## 2019-08-01 RX ADMIN — Medication 2: at 11:42

## 2019-08-01 RX ADMIN — Medication 650 MILLIGRAM(S): at 00:05

## 2019-08-01 RX ADMIN — CARVEDILOL PHOSPHATE 6.25 MILLIGRAM(S): 80 CAPSULE, EXTENDED RELEASE ORAL at 05:29

## 2019-08-01 RX ADMIN — Medication 1: at 00:06

## 2019-08-01 RX ADMIN — Medication 50 MILLIGRAM(S): at 05:29

## 2019-08-01 RX ADMIN — Medication 650 MILLIGRAM(S): at 05:29

## 2019-08-01 RX ADMIN — CHLORHEXIDINE GLUCONATE 1 APPLICATION(S): 213 SOLUTION TOPICAL at 17:26

## 2019-08-01 RX ADMIN — ATORVASTATIN CALCIUM 40 MILLIGRAM(S): 80 TABLET, FILM COATED ORAL at 00:05

## 2019-08-01 RX ADMIN — PIPERACILLIN AND TAZOBACTAM 25 GRAM(S): 4; .5 INJECTION, POWDER, LYOPHILIZED, FOR SOLUTION INTRAVENOUS at 17:25

## 2019-08-01 RX ADMIN — PANTOPRAZOLE SODIUM 40 MILLIGRAM(S): 20 TABLET, DELAYED RELEASE ORAL at 17:25

## 2019-08-01 RX ADMIN — Medication 50 MILLIGRAM(S): at 22:31

## 2019-08-01 RX ADMIN — CHLORHEXIDINE GLUCONATE 1 APPLICATION(S): 213 SOLUTION TOPICAL at 05:28

## 2019-08-01 RX ADMIN — PIPERACILLIN AND TAZOBACTAM 25 GRAM(S): 4; .5 INJECTION, POWDER, LYOPHILIZED, FOR SOLUTION INTRAVENOUS at 22:32

## 2019-08-01 RX ADMIN — Medication 40 MILLIEQUIVALENT(S): at 00:06

## 2019-08-01 RX ADMIN — ATORVASTATIN CALCIUM 40 MILLIGRAM(S): 80 TABLET, FILM COATED ORAL at 22:31

## 2019-08-01 RX ADMIN — Medication 650 MILLIGRAM(S): at 11:43

## 2019-08-01 RX ADMIN — CARVEDILOL PHOSPHATE 12.5 MILLIGRAM(S): 80 CAPSULE, EXTENDED RELEASE ORAL at 17:26

## 2019-08-01 RX ADMIN — Medication 1: at 17:24

## 2019-08-01 NOTE — PROGRESS NOTE ADULT - PROBLEM SELECTOR PLAN 7
-s/p 9 stents  -c/w statin, coreg  -hold ASA in setting of b/l subdural hematoma, concern for GIB -s/p 9 stents  -c/w statin, coreg  -hold ASA in setting of b/l subdural hematoma, concern for GIB  -elevated trop 182 on presentation, repeat trop 158; likely elevated in setting of CKD and NSTEMI in May, low suspicion for new MI  -EKG showed no ST changes  -on telemetry for monitoring -serum Cr about baseline  -renally dose meds

## 2019-08-01 NOTE — PROGRESS NOTE ADULT - ASSESSMENT
63M w/ CAD, recent dx of cholangiocarcinoma s/p Whipple and J-tube placement c/b intra-abd fluid collection on IV abx, p/w AMS. GI consulted for coffee ground emesis yesterday.  Also found to have imaging findings somewhat concerning for metastatic dz, though not confirmed.   UGIB d/dx in this case includes gastritis vs PUD (exacerbated by high dose ASA), less likely dieulafoy/AVM or varices given quality of blood (dark as opposed to bright red).     Impression:  #UGIB/ coffee ground emesis -  resolved d/dx as above, gastritis vs PUD  #Metastatic Cholangiocarcinoma  #Encephalopathy  #Hypertension  #CAD on ASA    Recommendations:   - Supportive management by primary team  - resume J tube feeding  - c/w IV PPI bid  - continue to trend Hb, transfuse to Hb > 7  - given ASA is for secondary ppx, okay to continue from a GI perspective especially given HD stability and stable Hb  - GI will continue to follow and assess need for EGD, would hold off     Rosalba Powers  Gastroenterology Fellow  Pager# [phone]  Page #[phone] after 5pm or on weekends 63M w/ CAD, recent dx of cholangiocarcinoma s/p Whipple and J-tube placement c/b intra-abd fluid collection on IV abx, p/w AMS. GI consulted for coffee ground emesis yesterday.  Also found to have imaging findings somewhat concerning for metastatic dz, though not confirmed.   UGIB d/dx in this case includes gastritis vs PUD (exacerbated by high dose ASA), less likely dieulafoy/AVM or varices given quality of blood (dark as opposed to bright red).     Impression:  #UGIB/ coffee ground emesis -  resolved d/dx as above, gastritis vs PUD  #Metastatic Cholangiocarcinoma  #Encephalopathy  #Hypertension  #CAD on ASA    Recommendations:   - Supportive management by primary team  - resume J tube feeding  - c/w IV PPI bid  - continue to trend Hb, transfuse to Hb > 7  - given ASA is for secondary ppx, okay to continue from a GI perspective especially given HD stability and stable Hb      Rosalba Powers  Gastroenterology Fellow  Pager# [phone]  Page #[phone] after 5pm or on weekends

## 2019-08-01 NOTE — PROVIDER CONTACT NOTE (OTHER) - ASSESSMENT
A&OX4, diet advanced after passing dysphagia screen, VSS, Poor appetite for dinner, requesting Glucerna.

## 2019-08-01 NOTE — PROGRESS NOTE ADULT - PROBLEM SELECTOR PLAN 6
-serum Cr about baseline  -renally dose meds -hypertensive urgency as BP was 199/84 in ED; secondary to missed medications. BP now improving.  -continue with home hydralazine and coreg, will administer through J tube

## 2019-08-01 NOTE — PROGRESS NOTE ADULT - PROBLEM SELECTOR PLAN 4
-elevated trop 182 on presentation, repeat trop 158; likely elevated in setting of CKD and NSTEMI in May, low suspicion for new MI  -EKG showed no ST changes  -hold home aspirin in setting of b/l subdural hematoma   -on telemetry for monitoring -borderline high Na, 145 on AM labs  -start free water 250 mL q6h  -monitor Na

## 2019-08-01 NOTE — PROVIDER CONTACT NOTE (OTHER) - ACTION/TREATMENT ORDERED:
Will administer BP med as per provider order.
Will administer one time dose hydralazine as per provider order.
Orders to follow.
will continue to monitor

## 2019-08-01 NOTE — PROGRESS NOTE ADULT - PROBLEM SELECTOR PLAN 2
-s/p Whipple c/b abscess s/p perc drainage by IR on 7/1; residual fluid collection 2.4cm x 2.2cm still present on CTAP though slightly decreased in size  -IR contacted for re-evaluation of source control, but stated no intervention at this time  -CTAP showed subtle nodularity of omentum concerning for peritoneal carcinomatosis  -outpatient oncology f/u -s/p Whipple c/b abscess s/p perc drainage by IR on 7/1; residual fluid collection 2.4cm x 2.2cm still present on CTAP though slightly decreased in size  -IR contacted for re-evaluation of source control, but stated no intervention at this time  -CTAP showed subtle nodularity of omentum concerning for peritoneal carcinomatosis  -outpatient oncology f/u  -f/u serum paraneoplastic panel outpatient

## 2019-08-01 NOTE — PROGRESS NOTE ADULT - ATTENDING COMMENTS
I have personally seen, examined, and participated in the care of this patient on rounds 8/1/19 with the neurology team.  I have reviewed all pertinent clinical information, including history, physical examination, assessment and plan.   I agree with the above, and note in addition, instead or in particular as follows:    On examination I note:      Bilateral ptosis (varies; appears to worsen rapidly when testing sustention of upgaze)      Variably divergent gaze (both by light reflex in pupils, and by             horizontal diplopia on red glass testing (R eye image is to the L of L eye image).      Mildly dsyarthric (the stops /k/  /g/  /d/  /t/  /d/ are pronounced as fricatives.      Occasional fasciculations noted in the calves.      He has fairly frequent low amplitude flexor spasms of the LEs.          Four-limb, generalized, weakness.  Laterality/focality/topographic pattern cannot be adduced at this time.        From speaking w his wife: she has noted slurred speech worsened in recent weeks.  His speech articulation however has not been normal for an indeterminate period of time (perhaps a year or longer).  She recalls that perhps for that long she has asking her  "why are you talking like that?".        From the Brain MRI report I note:     "Small subdural hematoma is seen involving the bilateral frontal,   bilateral temporal and left parietal region. These findings measure   approximately 0.1 cm in widest diameter in both frontal region and 0.3 cm   and the left parietal region. No significant shift or herniation is seen."    It is notable that these were not seen on head CT.  Furthermore the adjacent sulci appear normal in configuration.  These collections are not of relevance to patient's level of consciousness.       ++++++++++++    From the cEEG final report I note:  "Several habitual myoclonic jerks were captured, at times with tonic component, without abnormal EEG correlate. Differential include non-epileptic myoclonic jerks and scalp-negative focal motor seizures. Please correlate clinically.    No epileptiform patterns seen."  ++++++++++++  From among lab test results I note    Hgb/Hct have been averaging about 27% and 8.5.  MCV averaging about 96 (anemia with high normal MCV).    The following tests were recommended in the initial Neurology consult response:    B12: 1288  Folate: 8.3  Homocysteine 14.3 (borderline high normal)  Methylmalonic acid - not ordered  Ammonia levels in the mid 40's.    DISCUSSION    Quadriparesis without UMN findings +  ptosis + diplopia (seems to be real, not an artifact of poor effort or sedation) + dysarthria (worsening over a year?) raise suspicion for a neuromuscular conduction disorder.  Myasthenia gravis, paraneoplastic (myasthenic syndrome), drug-induced (can persist even after withdrawal of offending agent).    High B12 level (in a patient was not given supplementation?) + borderline macrocytic anemia raises the possibility of false elevation of B12 due to pernicious anemia (intrinsic factor antibodies can produce false results in the B12 assay).

## 2019-08-01 NOTE — PROGRESS NOTE ADULT - PROBLEM SELECTOR PLAN 8
-Will restart glucerna tube feeds today  -lantus held and will continue with ISS, will monitor blood sugar and adjust insulin regimen as needed -Stopped glucerna tube feeds, on dysphagia diet  -lantus held and will continue with ISS, will monitor blood sugar and adjust insulin regimen as needed -s/p 9 stents  -c/w statin, coreg  -hold ASA in setting of b/l subdural hematoma, concern for GIB  -elevated trop 182 on presentation, repeat trop 158; likely elevated in setting of CKD and NSTEMI in May, low suspicion for new MI  -EKG showed no ST changes  -on telemetry for monitoring

## 2019-08-01 NOTE — PROGRESS NOTE ADULT - SUBJECTIVE AND OBJECTIVE BOX
Patient is a 63y old  Male who presents with a chief complaint of confusion    f/u abd abscess    Interval History/ROS:  much more awake today.  still a little confused but better. denies fever/chills.  no abdominal pain.  no hx fall.  no headache. ROS otherwise negative    PAST MEDICAL & SURGICAL HISTORY:  Type II diabetes mellitus  HTN (hypertension)  CAD (coronary artery disease)  S/P cholecystectomy    Allergies  Cipro (Rash)  Plavix (Rash)    ANTIMICROBIALS:  meropenem  IVPB 500 every 12 hours (-)  piperacillin/tazobactam IVPB.. 3.375 every 8 hours (-10)  erta (-)  vancomycin  IVPB x1     active  piperacillin/tazobactam IVPB.. 3.375 every 8 hours (-)    piperacillin/tazobactam IVPB.. 3.375 every 8 hours    MEDICATIONS  (STANDING):  acetaminophen    Suspension .. 650 every 6 hours  atorvastatin 40 at bedtime  carvedilol 12.5 every 12 hours  hydrALAZINE 50 every 8 hours  insulin lispro (HumaLOG) corrective regimen sliding scale  every 6 hours  pantoprazole  Injectable 40 two times a day    Vital Signs Last 24 Hrs  T(F): 98.1 (19 @ 11:55), Max: 98.1 (19 @ 20:45)  HR: 79 (19 @ 11:55)  BP: 149/62 (19 @ 11:55)  RR: 18 (19 @ 11:55)  SpO2: 98% (19 @ 11:55) (94% - 100%)    PHYSICAL EXAM:  General: more awake and appropriate  HEAD/EYES: anicteric  ENT:  supple  Cardiovascular:   S1, S2  Respiratory:  clear bilaterally  GI:  soft, non-tender, normal bowel sounds J-tube okay  Musculoskeletal:  no synovitis  Neurologic: still a little confused  Skin:  no rash  Psychiatric:  affect normal  Vascular:  L PICC still in place                             8.3    7.30  )-----------( 196      ( 01 Aug 2019 06:30 )             25.8 08-    145  |  114  |  22  ----------------------------<  192  4.2   |  18  |  1.79  Ca    8.6      01 Aug 2019 06:30Phos  2.9     08-Mg     2.0     08-01    Urinalysis Basic - ( 2019 06:37 )  Color: YELLOW / Appearance: CLEAR / S.027 / pH: 6.0  Gluc: NEGATIVE / Ketone: TRACE  / Bili: NEGATIVE / Urobili: TRACE   Blood: NEGATIVE / Protein: 100 / Nitrite: NEGATIVE   Leuk Esterase: NEGATIVE / RBC: 3-5 / WBC 0-2   Sq Epi: OCC / Non Sq Epi: x / Bacteria: NEGATIVE    MICROBIOLOGY:  Culture - Urine (collected 2019 07:13)  Source: URINE MIDSTREAM  Preliminary Report (2019 08:54):    NO GROWTH TO DATE    Culture - Yeast and Fungus (collected 2019 18:45)  Source: ABSCESS  Preliminary Report (2019 09:09):    CULTURE NEGATIVE FOR YEASTS AND MOLDS---- PRELIMINARY    CULTURE NEGATIVE FOR YEASTS AND MOLDS AFTER 1 DAY    Culture - Surg Site Aerob/Anaer w/Gm St (collected 2019 18:45)  Source: ABSCESS  Preliminary Report (2019 13:57):    MODERATE  Organism: Escherichia coli  Klebsiella pneumoniae  Enterococcus faecalis (2019 13:57)  Organism: Enterococcus faecalis (2019 13:57)  Organism: Klebsiella pneumoniae (2019 13:57)      -  Amikacin: S <=8 BOB      -  Ampicillin: R >16 BOB      -  Ampicillin/Sulbactam: S 8/4 BOB      -  Aztreonam: S <=4 BOB      -  Cefazolin: S <=2 BOB      -  Cefepime: S <=2 BOB      -  Cefoxitin: S <=4 BOB      -  Ceftazidime: S <=1 BOB      -  Ceftriaxone: S <=1 BOB      -  Ciprofloxacin: S <=0.5 BOB      -  Ertapenem: S <=0.5 BOB      -  Gentamicin: S <=1 BOB      -  Imipenem: S <=1 BOB      -  Levofloxacin: S <=1 BOB      -  Meropenem: S <=1 BOB      -  Piperacillin/Tazobactam: S <=8 BOB      -  Tigecycline: S      -  Tobramycin: S <=2 BOB      -  Trimethoprim/Sulfamethoxazole: S <=0.5/9.5 BOB      Method Type: NEGATIVE BOB 43  Organism: Escherichia coli (2019 13:57)    Culture - Acid Fast Smear Concentrated (collected 2019 18:45)  Source: ABSCESS  Final Report (2019 14:54):    AFB SMEAR= NO ACID FAST BACILLI SEEN    Culture - Urine (collected 2019 01:32)  Source: URINE MIDSTREAM  Final Report (2019 10:04):    Culture grew 3 or more types of organisms which indicate    collection contamination; consider recollection only if    clinically indicated.    Culture - Blood (collected 2019 23:14)  Source: BLOOD PERIPHERAL  Preliminary Report (2019 23:15):    NO ORGANISMS ISOLATED    NO ORGANISMS ISOLATED AT 72 HRS.    Culture - Blood (collected 2019 23:12)  Source: BLOOD VENOUS  Preliminary Report (2019 23:13):    NO ORGANISMS ISOLATED    NO ORGANISMS ISOLATED AT 72 HRS.    RADIOLOGY:  imaging below personally reviewed    MR Head w/ IV Cont (19 @ 09:31) >  Small subdural hematoma is seen involving the bilateral frontal, bilateral temporal and left parietal region.  IMPRESSION:  There is a tiny focus of diffusion restriction and FLAIR hyperintensity in the right frontal lobe, suspicious for a tiny right frontal subacute infarct. Clinical correlation continued close and follow-up is recommended as a subacute infarct would be expected to change in a short   period of time.  Subdural hematomas as described above.  No evidence of intracranial enhancing mass.     CT Head No Cont (19 @ 08:36) >  IMPRESSION:  Imaging limited by patient motion.  No acute intracranial hemorrhage.  Microvascular disease.    CT Abdomen and Pelvis No Cont (19 @ 08:42) >  IMPRESSION:  Moderate partially loculated left pleural effusion, slightly increased in size since 2019.  Peripheral right lower lobe nodule, slightly increased in size.  Status post removal of percutaneous drainage catheter from the right upper quadrant. A residual amorphous collection containing fluid and a small amount of gas in the right upper quadrant measures 2.4 x 2.2 cm, slightly decreased in size since 2019.  Mesenteric lymphadenopathy, slightly increased in size since 2019.  Subtle nodularity of the omentum, raising consideration of peritoneal carcinomatosis.  Moderate to large volume of ascites, increased.    CT Abdomen and Pelvis w/ Oral Cont (19 @ 15:02) >  IMPRESSION:  Status post percutaneous drainage of a complex right upper quadrant collection, with interval decrease in size of the collection. Small residual predominantly gas containing components are noted.  Moderate volume ascites, increased since 2019.  Nonspecific mesenteric lymph nodes, unchanged.  Small bilateral pleural effusions, increased in size since 2019. Small residual left pneumothorax status post removal of previously noted left pleural catheter.    CT Abdomen and Pelvis w/ Oral Cont (19 @ 04:22) >  IMPRESSION:  Status post recent Whipple procedure. High density amorphous collection adjacent to the duodenal stump with loculated foci of air and fluid in the right upper quadrant for which considerations include postoperative hematoma and air, leakage of contrast and air from the duodenal stump, or developing collection/abscess.  Left-sided pigtail chest tube. Trace left pneumothorax along the anterior chest wall.

## 2019-08-01 NOTE — PROGRESS NOTE ADULT - SUBJECTIVE AND OBJECTIVE BOX
Grady Ferro, PGY-1  Team 3  Contact/Pager: [phone] / [phone]    OVERNIGHT / SUBJECTIVE EVENTS:  -Overnight, pt was found to be lethargic, NF assessed pt and found him to be following commands on bedside neuro exam, ordered urgent CT head to rule out worsening subdural hematoma, STAT VBG, and continuous pulse ox.   -Pt was seen and examined at bedside. Pt's mental status appeared much more improved compared to presentation. Pt's speech was more understandable and pt was more able to follow commands although he was drowsy and needed to be aroused from time to time. Pt noted that he had some back pain but did not endorse any other pain. Denied f/c/n/v, CP, SOB, abdominal pain. Pt had episode of dark stools overnight as per Nursing.    MEDICATIONS  (STANDING):  acetaminophen    Suspension .. 650 milliGRAM(s) Oral every 6 hours  atorvastatin 40 milliGRAM(s) Oral at bedtime  carvedilol 12.5 milliGRAM(s) Oral every 12 hours  chlorhexidine 4% Liquid 1 Application(s) Topical two times a day  dextrose 5%. 1000 milliLiter(s) (50 mL/Hr) IV Continuous <Continuous>  dextrose 50% Injectable 25 Gram(s) IV Push once  dextrose 50% Injectable 25 Gram(s) IV Push once  hydrALAZINE 50 milliGRAM(s) Oral every 8 hours  insulin lispro (HumaLOG) corrective regimen sliding scale   SubCutaneous every 6 hours  pantoprazole  Injectable 40 milliGRAM(s) IV Push two times a day  piperacillin/tazobactam IVPB.. 3.375 Gram(s) IV Intermittent every 8 hours    MEDICATIONS  (PRN):  dextrose 40% Gel 15 Gram(s) Oral once PRN Blood Glucose LESS THAN 70 milliGRAM(s)/deciLiter  glucagon  Injectable 1 milliGRAM(s) IntraMuscular once PRN Glucose <70 milliGRAM(s)/deciLiter  OLANZapine Injectable 2.5 milliGRAM(s) IntraMuscular every 8 hours PRN severe agitation.      Allergies    Cipro (Rash)  Plavix (Rash)    Intolerances        OBJECTIVE:  Vital Signs Last 24 Hrs  T(C): 36.7 (01 Aug 2019 11:55), Max: 36.7 (31 Jul 2019 20:45)  T(F): 98.1 (01 Aug 2019 11:55), Max: 98.1 (31 Jul 2019 20:45)  HR: 76 (01 Aug 2019 13:54) (74 - 87)  BP: 144/69 (01 Aug 2019 13:54) (114/64 - 149/62)  BP(mean): --  RR: 18 (01 Aug 2019 11:55) (17 - 18)  SpO2: 98% (01 Aug 2019 11:55) (98% - 100%)      CAPILLARY BLOOD GLUCOSE      POCT Blood Glucose.: 242 mg/dL (01 Aug 2019 11:11)  POCT Blood Glucose.: 175 mg/dL (01 Aug 2019 05:22)  POCT Blood Glucose.: 181 mg/dL (31 Jul 2019 23:45)  POCT Blood Glucose.: 150 mg/dL (31 Jul 2019 19:27)    I&O's Summary      PHYSICAL EXAM:  GENERAL: NAD, on room air, lying in bed, drowsy but able to respond to questions and follow commands  HEENT: Sclera clear  CHEST/RESPIRATORY: CTA b/l, decreased breath sounds on left lung base  CARDIOVASCULAR: Regular Rate and Rhythm, s1/s2 heard, no murmurs, rubs, or gallops appreciated  ABDOMINAL: Soft, nontender, nondistended; BS present, midline abdominal scar, + jejunostomy tube  EXTREMITIES: 2+ pulses b/l, no edema b/l, LUE PICC C/D/I  NEURO: EOMI, PERRLA, following commands and spontaneously moving all extremities, but pt's drowsiness limited neuro exam  PSYCH:  AAOx2.5 (oriented to self, place, and year but not month)      LABS:                        8.3    7.30  )-----------( 196      ( 01 Aug 2019 06:30 )             25.8     08-01    145  |  114<H>  |  22  ----------------------------<  192<H>  4.2   |  18<L>  |  1.79<H>    Ca    8.6      01 Aug 2019 06:30  Phos  2.9     08-01  Mg     2.0     08-01      Venous Blood Gas:  07-31 @ 22:50  7.35/40/45/22/74.4  VBG Lactate: --      MICROBIOLOGY:     Culture - Acid Fast - Body Fluid w/Smear (collected 31 Jul 2019 08:02)  Source: PERITONEAL FLUID  Final Report (31 Jul 2019 14:33):    AFB SMEAR= NO ACID FAST BACILLI SEEN    Culture - Body Fluid with Gram Stain (collected 30 Jul 2019 18:24)  Source: PERITONEAL FLUID  Gram Stain (30 Jul 2019 23:50):    WBC^White Blood Cells    QNTY CELLS IN GRAM STAIN: NO CELLS SEEN    NOS^No Organisms Seen  Preliminary Report (01 Aug 2019 09:59):    NO ORGANISMS ISOLATED AT 24 HOURS    RADIOLOGY & ADDITIONAL TESTS:  < from: VA Duplex Carotid Arteries, Bilateral. (08.01.19 @ 09:02) >  Summary/Impressions:  Mild heterogenous plaque noted within the proximal right  and left internal carotid arteries, consistent with 16-49%  stenoses in both proximal vessels.  No hemodynamically  significant stenoses noted.    < end of copied text > Grady Ferro, PGY-1  Team 3  Contact/Pager: [phone] / [phone]    OVERNIGHT / SUBJECTIVE EVENTS:  -Overnight, pt was found to be lethargic, NF assessed pt and found him to be following commands on bedside neuro exam, ordered urgent CT head to rule out worsening subdural hematoma, STAT VBG, and continuous pulse ox.   -Pt was seen and examined at bedside. Pt's mental status appeared much more improved compared to presentation. Pt's speech was more understandable and pt was more able to follow commands although he was drowsy and needed to be aroused from time to time. Pt noted that he had some back pain but did not endorse any other pain. Denied f/c/n/v, CP, SOB, abdominal pain. Pt had episode of dark stools overnight as per Nursing.    MEDICATIONS  (STANDING):  acetaminophen    Suspension .. 650 milliGRAM(s) Oral every 6 hours  atorvastatin 40 milliGRAM(s) Oral at bedtime  carvedilol 12.5 milliGRAM(s) Oral every 12 hours  chlorhexidine 4% Liquid 1 Application(s) Topical two times a day  dextrose 5%. 1000 milliLiter(s) (50 mL/Hr) IV Continuous <Continuous>  dextrose 50% Injectable 25 Gram(s) IV Push once  dextrose 50% Injectable 25 Gram(s) IV Push once  hydrALAZINE 50 milliGRAM(s) Oral every 8 hours  insulin lispro (HumaLOG) corrective regimen sliding scale   SubCutaneous every 6 hours  pantoprazole  Injectable 40 milliGRAM(s) IV Push two times a day  piperacillin/tazobactam IVPB.. 3.375 Gram(s) IV Intermittent every 8 hours    MEDICATIONS  (PRN):  dextrose 40% Gel 15 Gram(s) Oral once PRN Blood Glucose LESS THAN 70 milliGRAM(s)/deciLiter  glucagon  Injectable 1 milliGRAM(s) IntraMuscular once PRN Glucose <70 milliGRAM(s)/deciLiter  OLANZapine Injectable 2.5 milliGRAM(s) IntraMuscular every 8 hours PRN severe agitation.      Allergies    Cipro (Rash)  Plavix (Rash)    Intolerances        OBJECTIVE:  Vital Signs Last 24 Hrs  T(C): 36.7 (01 Aug 2019 11:55), Max: 36.7 (31 Jul 2019 20:45)  T(F): 98.1 (01 Aug 2019 11:55), Max: 98.1 (31 Jul 2019 20:45)  HR: 76 (01 Aug 2019 13:54) (74 - 87)  BP: 144/69 (01 Aug 2019 13:54) (114/64 - 149/62)  BP(mean): --  RR: 18 (01 Aug 2019 11:55) (17 - 18)  SpO2: 98% (01 Aug 2019 11:55) (98% - 100%)      CAPILLARY BLOOD GLUCOSE      POCT Blood Glucose.: 242 mg/dL (01 Aug 2019 11:11)  POCT Blood Glucose.: 175 mg/dL (01 Aug 2019 05:22)  POCT Blood Glucose.: 181 mg/dL (31 Jul 2019 23:45)  POCT Blood Glucose.: 150 mg/dL (31 Jul 2019 19:27)    I&O's Summary      PHYSICAL EXAM:  GENERAL: NAD, on room air, lying in bed, drowsy but able to respond to questions and follow commands  HEENT: Sclera clear  CHEST/RESPIRATORY: CTA b/l, decreased breath sounds on left lung base  CARDIOVASCULAR: Regular Rate and Rhythm, s1/s2 heard, no murmurs, rubs, or gallops appreciated  ABDOMINAL: Soft, nontender, nondistended; BS present, midline abdominal scar, + jejunostomy tube  EXTREMITIES: 2+ pulses b/l, no edema b/l, LUE PICC C/D/I  NEURO: EOMI, PERRLA, following commands and spontaneously moving all extremities, but pt's drowsiness limited neuro exam  PSYCH:  AAOx2.5-3 (oriented to self, place, and year but not month)      LABS:                        8.3    7.30  )-----------( 196      ( 01 Aug 2019 06:30 )             25.8     08-01    145  |  114<H>  |  22  ----------------------------<  192<H>  4.2   |  18<L>  |  1.79<H>    Ca    8.6      01 Aug 2019 06:30  Phos  2.9     08-01  Mg     2.0     08-01      Venous Blood Gas:  07-31 @ 22:50  7.35/40/45/22/74.4  VBG Lactate: --      MICROBIOLOGY:     Culture - Acid Fast - Body Fluid w/Smear (collected 31 Jul 2019 08:02)  Source: PERITONEAL FLUID  Final Report (31 Jul 2019 14:33):    AFB SMEAR= NO ACID FAST BACILLI SEEN    Culture - Body Fluid with Gram Stain (collected 30 Jul 2019 18:24)  Source: PERITONEAL FLUID  Gram Stain (30 Jul 2019 23:50):    WBC^White Blood Cells    QNTY CELLS IN GRAM STAIN: NO CELLS SEEN    NOS^No Organisms Seen  Preliminary Report (01 Aug 2019 09:59):    NO ORGANISMS ISOLATED AT 24 HOURS    RADIOLOGY & ADDITIONAL TESTS:  < from: VA Duplex Carotid Arteries, Bilateral. (08.01.19 @ 09:02) >  Summary/Impressions:  Mild heterogenous plaque noted within the proximal right  and left internal carotid arteries, consistent with 16-49%  stenoses in both proximal vessels.  No hemodynamically  significant stenoses noted.    < end of copied text >

## 2019-08-01 NOTE — PROGRESS NOTE ADULT - ASSESSMENT
63Y M with extensive PMH p/w acute worsening of mental status x 1 week. CTH negative for acute pathology. Exam remarkable for AAOx2 (not oriented to date), and myoclonic jerks. Followed most commands but required lots of encouragement. Differential includes brain metastases vs carbapenem induced confusion vs paraneoplastic syndrome vs other toxic/metabolic etiology. Less likely to be infectious etiology as afebrile and without leukocytosis, however has persistent fluid collection on CT A/P, concerning for possible abdominal source of infection.   MRI brain showing bilateral frontal, temporal, parietal hematomas, possibly causing alteration in mentations. Mental status should improve as subdural hematoma resolves gradually. Also shows tiny R frontal infarct, likely incidental and not causing any of the symptoms  Routine EEG does not show epileptiform activity, no need to treat myoclonus    Plan:  Serum paraneoplastic panel - can take weeks to result, follow up outpatient  No indication for LP at this time  Avoid Carbapenems as can cause confusion (and lower seizure threshold)  Check B12, folate, MMA, homocysteine, Ach, CK, TSH, ESR, CRP, SPEP  MRI C spine for quadraparesis  EMG and nerve conduction study as outpatient 63Y M with extensive PMH p/w acute worsening of mental status x 1 week. CTH negative for acute pathology. Exam remarkable for AAOx2 (not oriented to date), and myoclonic jerks. Followed most commands but required lots of encouragement. Differential includes brain metastases vs carbapenem induced confusion vs paraneoplastic syndrome vs other toxic/metabolic etiology. Less likely to be infectious etiology as afebrile and without leukocytosis, however has persistent fluid collection on CT A/P, concerning for possible abdominal source of infection.   MRI brain showing bilateral frontal, temporal, parietal hematomas, possibly causing alteration in mentations [please see attending attestation below]. Mental status should improve as subdural hematoma resolves gradually. Also shows tiny R frontal infarct, likely incidental and not causing any of the symptoms  Routine EEG does not show epileptiform activity, no need to treat myoclonus    Plan:  Serum paraneoplastic panel - can take weeks to result, follow up outpatient  No indication for LP at this time  Avoid Carbapenems as can cause confusion (and lower seizure threshold)  Check B12, folate, MMA, homocysteine, Ach, CK, TSH, ESR, CRP, SPEP  MRI C spine for quadriparesis  EMG and nerve conduction study as outpatient  [Attending note: please order methylmalonic acid ("MMA") determination - see attestation below]

## 2019-08-01 NOTE — PROGRESS NOTE ADULT - SUBJECTIVE AND OBJECTIVE BOX
Team D Surgery Progress Note     SUBJECTIVE / 24H EVENTS  Patient was lethargic overnight. Per wife at bedside, his mental status is much improved from admission. Repeat CTH did not show any changes from prior CTH. This morning patient reports feeling well.    OBJECTIVE:    VITAL SIGNS:  T(C): 36.6 (08-01-19 @ 07:50), Max: 36.7 (07-31-19 @ 20:45)  HR: 74 (08-01-19 @ 07:50) (74 - 87)  BP: 124/68 (08-01-19 @ 07:50) (114/64 - 146/60)  RR: 17 (08-01-19 @ 07:50) (17 - 18)  SpO2: 98% (08-01-19 @ 07:50) (94% - 100%)  Daily     Daily   POCT Blood Glucose.: 242 mg/dL (08-01-19 @ 11:11)  POCT Blood Glucose.: 175 mg/dL (08-01-19 @ 05:22)  POCT Blood Glucose.: 181 mg/dL (07-31-19 @ 23:45)      PHYSICAL EXAM:  Gen: NAD.  MS: Awake, AAOx4. Able to follow 2 step commands.  Pulm: Respirations unlabored.  GI: Soft. Nontender. Nondistended.  Ext: Moves all extremities spontaneously. Strength 4/4 in all extremities.        LAB VALUES:  08-01    145  |  114<H>  |  22  ----------------------------<  192<H>  4.2   |  18<L>  |  1.79<H>    Ca    8.6      01 Aug 2019 06:30  Phos  2.9     08-01  Mg     2.0     08-01                                 8.3    7.30  )-----------( 196      ( 01 Aug 2019 06:30 )             25.8                   MICROBIOLOGY:    No new microbiology data for review.     RADIOLOGY:  PACS Image: Image(s) Available (07-31-19 @ 23:15)    No new radiographic images for review.    MEDICATIONS  (STANDING):  acetaminophen    Suspension .. 650 milliGRAM(s) Oral every 6 hours  atorvastatin 40 milliGRAM(s) Oral at bedtime  carvedilol 12.5 milliGRAM(s) Oral every 12 hours  chlorhexidine 4% Liquid 1 Application(s) Topical two times a day  dextrose 5%. 1000 milliLiter(s) (50 mL/Hr) IV Continuous <Continuous>  dextrose 50% Injectable 12.5 Gram(s) IV Push once  dextrose 50% Injectable 25 Gram(s) IV Push once  dextrose 50% Injectable 25 Gram(s) IV Push once  hydrALAZINE 50 milliGRAM(s) Oral every 8 hours  insulin lispro (HumaLOG) corrective regimen sliding scale   SubCutaneous every 6 hours  pantoprazole  Injectable 40 milliGRAM(s) IV Push two times a day  piperacillin/tazobactam IVPB.. 3.375 Gram(s) IV Intermittent every 8 hours    MEDICATIONS  (PRN):  dextrose 40% Gel 15 Gram(s) Oral once PRN Blood Glucose LESS THAN 70 milliGRAM(s)/deciLiter  glucagon  Injectable 1 milliGRAM(s) IntraMuscular once PRN Glucose <70 milliGRAM(s)/deciLiter  OLANZapine Injectable 2.5 milliGRAM(s) IntraMuscular every 8 hours PRN severe agitation.

## 2019-08-01 NOTE — PROGRESS NOTE ADULT - ASSESSMENT
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 ID, Heme/onc, Surgery, GI, Neuro, Pulm following. 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 ID, Heme/onc, Surgery, GI, Neuro, Pulm following.

## 2019-08-01 NOTE — CHART NOTE - NSCHARTNOTEFT_GEN_A_CORE
Called by primary team to bedside to evaluate patient as he has been very drowsy throughout the day. Per prior neurology consult in the afternoon on 7/30, patient was noted to be lethargic during that evaluation. Yesterday, he received Haldol, Zyprexa, and ativan due to agitation prior to MRI and has remained sedated since then. On evaluation, patient is lethargic and requires repetitive stimulation to maintain alertness but easily opens eyes and arouses to verbal stimuli. He follows commands. Exam seems unchanged from prior and he is maintaining his airway. Obtained repeat CTH as prior had shown small bilateral subdural hematomas to assess for any interval changes. Discussed repeat CTH with radiology resident and repeat scan preliminarily shows no change from prior.

## 2019-08-01 NOTE — PROGRESS NOTE ADULT - SUBJECTIVE AND OBJECTIVE BOX
SUBJECTIVE: Overnight patient was lethargic. Repeat CTH did not show changes from prior. Likely secondary to sedation for MRI.    MEDICATIONS (HOME):  Home Medications:  aspirin 81 mg oral tablet: 1 tab(s) orally once a day (29 Jul 2019 13:46)  bisacodyl 10 mg rectal suppository: 1 suppository(ies) rectal once a day, As needed, Constipation (29 Jul 2019 13:46)  carvedilol 6.25 mg oral tablet: 1 tab(s) orally every 12 hours (29 Jul 2019 13:46)  Crestor 10 mg oral tablet: 1 tab(s) orally once a day (at bedtime) (29 Jul 2019 13:46)  docusate sodium 100 mg oral capsule: 1 cap(s) orally 3 times a day (29 Jul 2019 13:46)  dronabinol 5 mg oral capsule: 1 cap(s) orally 2 times a day (29 Jul 2019 17:25)    MEDICATIONS  (STANDING):  acetaminophen    Suspension .. 650 milliGRAM(s) Oral every 6 hours  atorvastatin 40 milliGRAM(s) Oral at bedtime  carvedilol 12.5 milliGRAM(s) Oral every 12 hours  chlorhexidine 4% Liquid 1 Application(s) Topical two times a day  dextrose 5%. 1000 milliLiter(s) (50 mL/Hr) IV Continuous <Continuous>  dextrose 50% Injectable 12.5 Gram(s) IV Push once  dextrose 50% Injectable 25 Gram(s) IV Push once  dextrose 50% Injectable 25 Gram(s) IV Push once  hydrALAZINE 50 milliGRAM(s) Oral every 8 hours  insulin lispro (HumaLOG) corrective regimen sliding scale   SubCutaneous every 6 hours  pantoprazole  Injectable 40 milliGRAM(s) IV Push two times a day  piperacillin/tazobactam IVPB.. 3.375 Gram(s) IV Intermittent every 8 hours    MEDICATIONS  (PRN):  dextrose 40% Gel 15 Gram(s) Oral once PRN Blood Glucose LESS THAN 70 milliGRAM(s)/deciLiter  glucagon  Injectable 1 milliGRAM(s) IntraMuscular once PRN Glucose <70 milliGRAM(s)/deciLiter  OLANZapine Injectable 2.5 milliGRAM(s) IntraMuscular every 8 hours PRN severe agitation.    ALLERGIES/INTOLERANCES:  Cipro (Rash)  Plavix (Rash)    VITALS & EXAMINATION:  Vital Signs Last 24 Hrs  T(C): 36.7 (01 Aug 2019 04:03), Max: 36.7 (31 Jul 2019 20:45)  T(F): 98.1 (01 Aug 2019 04:03), Max: 98.1 (31 Jul 2019 20:45)  HR: 75 (01 Aug 2019 06:14) (74 - 87)  BP: 119/62 (01 Aug 2019 06:14) (114/64 - 146/60)  RR: 17 (01 Aug 2019 04:03) (17 - 18)  SpO2: 98% (01 Aug 2019 04:03) (94% - 100%)    PHYSICAL EXAMINATION  Constitutional: No apparent distress.  Head: Normocephalic & atraumatic.  Extremities: No edema, clubbing, cyanosis  Skin: No rashes    Neurological:  Mental Status: Awake, alert, oriented to person, place, situation, time. Follows simple commands.    Language: Speech is dysarthric, with trouble stating [place], [place], [place]    Cranial Nerves: PERRLA (R = 2mm, L = 2mm). VFF. decreased right eye adduction. No nystagmus, no diplopia. No facial asymmetry. Bilateral ptosis. Hearing grossly normal B/L.    Motor: Normal muscle bulk & tone. Occasional jerks in bilateral lower extremities. Occasional fasciculations. No pronator drift.              Biceps - no fatigable weakness  R	4+	  L	4+	     Reflexes: Areflexic throughout    Coordination: No dysmetria on finger to nose.    LABORATORY:  CBC                       8.3    7.30  )-----------( 196      ( 01 Aug 2019 06:30 )             25.8     Chem 08-01    145  |  114<H>  |  22  ----------------------------<  192<H>  4.2   |  18<L>  |  1.79<H>    Ca    8.6      01 Aug 2019 06:30  Phos  2.9     08-01  Mg     2.0     08-01

## 2019-08-01 NOTE — PROGRESS NOTE ADULT - PROBLEM SELECTOR PLAN 1
-Likely 2/2 metabolic encephalopathy, concern for potential infection not yet identified, vs paraneoplastic syndrome, vs undiagnosed metastatic disease in brain; less likely 2/2 stroke, less likely 2/2 polypharmacy  -LFTs wnl, ammonia elevated at 45  -CTH showed no intracranial bleeding  -r/o sepsis: lactate 2.0, source present from intraabdominal fluid collection, but no hypotension, no fever, no leukocytosis  -CTAP showed subtle nodularity of omentum concerning for peritoneal carcinomatosis, need to consider possible mets to brain/meninges  -MRI w/ contrast (7/31) showed tiny R frontal subacute infarct and b/l small subdural hematomas, no evidence of enhancing mass or metastasis  -routine EEG (7/31) negative for seizures  -Appreciate heme/onc recs: f/u ascites fluid cytology  -SBP ruled out via diagnostic paracentesis  -Appreciate ID recs: start zosyn 3.375g q8 - hope to stop soon; hold ertapenem as it can cause confusion  -UA negative aside from 100 protein, UCx NGTD  -Respiratory virus panel negative  -BCx NGTD  -olanzapine IM 2.5 mg q8 prn for agitation  -hold home ambien and cyclobenzaprine for concerns of polypharmacy-induced AMS  -Appreciate Neuro recs: f/u serum paraneoplastic panel; can consider LP if imaging negative and mental status does not improve; order folate, B12, and homocysteine levels  -Appreciate Pulm recs: if pt develops SOB or dyspnea, consider thoracentesis and/or PleurX catheter for his loculated L pleural effusion on CTAP  -f/u carotid doppler -Likely 2/2 metabolic encephalopathy vs subdural hematoma, concern for potential infection not yet identified, vs paraneoplastic syndrome, vs undiagnosed metastatic disease in brain; less likely 2/2 stroke, less likely 2/2 polypharmacy  -LFTs wnl, ammonia elevated at 45  -CTH showed no intracranial bleeding  -r/o sepsis: lactate 2.0, source present from intraabdominal fluid collection, but no hypotension, no fever, no leukocytosis  -CTAP showed subtle nodularity of omentum concerning for peritoneal carcinomatosis, need to consider possible mets to brain/meninges  -MRI w/ contrast (7/31) showed tiny R frontal subacute infarct and b/l small subdural hematomas, no evidence of enhancing mass or metastasis  -routine EEG (7/31) negative for seizures  -Appreciate heme/onc recs: f/u ascites fluid cytology  -SBP ruled out via diagnostic paracentesis  -Appreciate ID recs: start zosyn 3.375g q8 - hope to stop soon; hold ertapenem as it can cause confusion  -UA negative aside from 100 protein, UCx NGTD  -Respiratory virus panel negative  -BCx NGTD  -olanzapine IM 2.5 mg q8 prn for agitation  -hold home ambien and cyclobenzaprine for concerns of polypharmacy-induced AMS  -Appreciate Neuro recs: f/u serum paraneoplastic panel; can consider LP if imaging negative and mental status does not improve; order folate, B12, and homocysteine levels  -Appreciate Pulm recs: if pt develops SOB or dyspnea, consider thoracentesis and/or PleurX catheter for his loculated L pleural effusion on CTAP  -f/u carotid doppler -More likely 2/2 metabolic encephalopathy vs subdural hematoma vs paraneoplastic syndrome vs polypharmacy, less likely 2/2 potential infection not yet identified vs undiagnosed metastatic disease in brain vs seizure  -LFTs wnl, ammonia elevated at 45  -CTH showed no intracranial bleeding, repeat on 8/1 was unchanged  -r/o sepsis: lactate 2.0, source present from intraabdominal fluid collection, but no hypotension, no fever, no leukocytosis  -CTAP showed subtle nodularity of omentum concerning for peritoneal carcinomatosis, need to consider possible mets to brain/meninges  -MRI w/ contrast (7/31) showed tiny R frontal subacute infarct and b/l small subdural hematomas, no evidence of enhancing mass or metastasis  -routine EEG (7/31) negative for seizures  -Appreciate heme/onc recs: f/u ascites fluid cytology  -SBP ruled out via diagnostic paracentesis  -Appreciate ID recs: start zosyn 3.375g q8 - okay to stop if surgery okay; hold ertapenem as it can cause confusion  -UA negative aside from 100 protein, UCx NGTD  -Respiratory virus panel negative  -BCx NGTD  -olanzapine IM 2.5 mg q8 prn for agitation  -hold home ambien and cyclobenzaprine for concerns of polypharmacy-induced AMS  -Appreciate Neuro recs: f/u recs; serum paraneoplastic panel outpatient; no LP at this time  -Appreciate Pulm recs: if pt develops SOB or dyspnea, consider thoracentesis and/or PleurX catheter for his loculated L pleural effusion on CTAP  -Carotid doppler showed b/l internal carotid stenoses of 16-49% -More likely 2/2 metabolic encephalopathy vs subdural hematoma vs paraneoplastic syndrome vs polypharmacy, less likely 2/2 potential infection not yet identified vs undiagnosed metastatic disease in brain vs seizure  -LFTs wnl, ammonia elevated at 45  -CTH showed no intracranial bleeding, repeat on 8/1 was unchanged  -r/o sepsis: lactate 2.0, source present from intraabdominal fluid collection, but no hypotension, no fever, no leukocytosis  -CTAP showed subtle nodularity of omentum concerning for peritoneal carcinomatosis, need to consider possible mets to brain/meninges  -MRI w/ contrast (7/31) showed tiny R frontal subacute infarct and b/l small subdural hematomas, no evidence of enhancing mass or metastasis  -routine EEG (7/31) negative for seizures  -Appreciate heme/onc recs: f/u ascites fluid cytology  -SBP ruled out via diagnostic paracentesis  -Appreciate ID recs: start zosyn 3.375g q8 - okay to stop if surgery okay; hold ertapenem as it can cause confusion  -UA negative aside from 100 protein, UCx NGTD  -Respiratory virus panel negative  -BCx NGTD  -olanzapine IM 2.5 mg q8 prn for agitation  -hold home ambien and cyclobenzaprine for concerns of polypharmacy-induced AMS  -Appreciate Neuro recs: f/u recs; serum paraneoplastic panel outpatient; no LP at this time  -Appreciate Pulm recs: if pt develops SOB or dyspnea, consider thoracentesis and/or PleurX catheter for his loculated L pleural effusion on CTAP

## 2019-08-01 NOTE — PROGRESS NOTE ADULT - ASSESSMENT
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  Followed by ID, Heme/onc, Surgery, GI, Neuro, and Pulm.      Plan:  - Mental status appears to be improving  - No surgical interventions indicated at this time  - Appreciate neuro, heme/onc, ID, GI, neuro, and pulm recc's  - Primary management by internal medicine

## 2019-08-01 NOTE — PROGRESS NOTE ADULT - PROBLEM SELECTOR PLAN 3
-multiple episodes of coffee ground emesis on 7/30 morning, none since then. 1 episode of dark stool today. Hgb decreased from yesterday.  -Appreciate GI recs: will hold off on EGD for now, c/w IV PPI, holding ASA,, transfuse if Hgb<7, NPO for now  -monitor CBC q12 -multiple episodes of coffee ground emesis on 7/30 morning, none since then. Continues to have episodes of dark stool. Hgb decreased from yesterday.  -Appreciate GI recs: will hold off on EGD for now, c/w IV PPI, holding ASA, transfuse if Hgb<7, NPO for now  -monitor CBC q12 -small b/l subdural hematoma, possibly 2/2 fall prior to admission  -Carotid doppler showed b/l internal carotid stenoses of 16-49%  -on atorvastin 40mg qhs, holding ASA  -on telemetry, no evidence of Afib

## 2019-08-01 NOTE — PROGRESS NOTE ADULT - PROBLEM SELECTOR PLAN 5
-hypertensive urgency as BP was 199/84 in ED; secondary to missed medications. BP now improving.  -continue with home hydralazine and coreg, will administer through J tube -multiple episodes of coffee ground emesis on 7/30 morning, none since then. -Continues to have episodes of dark stool  -Hgb decreased from yesterday, but relatively stable ~8-9  -Appreciate GI recs: will hold off on EGD for now, c/w IV PPI, holding ASA, transfuse if Hgb<7, NPO for now  -monitor CBC q12

## 2019-08-01 NOTE — PROGRESS NOTE ADULT - SUBJECTIVE AND OBJECTIVE BOX
Interval Events:   He is more awake and alert today.   Had one BM this am which was dark brown  No abdominal pain    Allergies:  Cipro (Rash)  Plavix (Rash)      Hospital Medications:  acetaminophen    Suspension .. 650 milliGRAM(s) Oral every 6 hours  aspirin  chewable 81 milliGRAM(s) Oral daily  atorvastatin 40 milliGRAM(s) Oral at bedtime  carvedilol 6.25 milliGRAM(s) Oral every 12 hours  chlorhexidine 4% Liquid 1 Application(s) Topical two times a day  dextrose 40% Gel 15 Gram(s) Oral once PRN  dextrose 5%. 1000 milliLiter(s) IV Continuous <Continuous>  dextrose 50% Injectable 12.5 Gram(s) IV Push once  dextrose 50% Injectable 25 Gram(s) IV Push once  dextrose 50% Injectable 25 Gram(s) IV Push once  glucagon  Injectable 1 milliGRAM(s) IntraMuscular once PRN  hydrALAZINE 50 milliGRAM(s) Oral every 8 hours  insulin lispro (HumaLOG) corrective regimen sliding scale   SubCutaneous every 6 hours  meclizine 12.5 milliGRAM(s) Oral two times a day PRN  OLANZapine Injectable 2.5 milliGRAM(s) IntraMuscular once  OLANZapine Injectable 2.5 milliGRAM(s) IntraMuscular every 8 hours PRN  pantoprazole  Injectable 40 milliGRAM(s) IV Push two times a day  piperacillin/tazobactam IVPB.. 3.375 Gram(s) IV Intermittent every 8 hours      PMHX/PSHX:  Cholangiocarcinoma  Gout  Type II diabetes mellitus  HTN (hypertension)  CAD (coronary artery disease)  Cirrhosis  S/P cholecystectomy      ROS:   General:  No fevers, chills or night sweats  ENT:  No sore throat or dysphagia  CV:  No pain or palpitations  Resp:  No dyspnea, cough or  wheezing  GI:  as above  Skin:  No rash or edema    PHYSICAL EXAM:   Vital Signs:  Vital Signs Last 24 Hrs  T(C): 36.6 (31 Jul 2019 13:35), Max: 36.7 (30 Jul 2019 20:50)  T(F): 97.8 (31 Jul 2019 13:35), Max: 98 (30 Jul 2019 20:50)  HR: 83 (31 Jul 2019 13:35) (70 - 86)  BP: 137/94 (31 Jul 2019 13:35) (120/55 - 158/56)  BP(mean): --  RR: 18 (31 Jul 2019 13:35) (16 - 18)  SpO2: 94% (31 Jul 2019 13:35) (94% - 98%)  Daily     Daily     GENERAL: sedated   HEENT:  NC/AT,  conjunctivae clear and pink, sclera -anicteric  CHEST:  Normal Effort, Breath sounds clear  HEART:  RRR, S1 + S2, no murmurs  ABDOMEN:  Soft, non-tender, non-distended, normoactive bowel sounds,  + PEG tube in place  EXTREMITIES:  no cyanosis or edema  SKIN:  Warm & Dry. No rash or erythema  NEURO:  No focal deficit. moving all 4 extremities   Rectal exam: no melena or blood. Brown stool    LABS:                        8.4    8.45  )-----------( 205      ( 31 Jul 2019 10:05 )             26.3     Mean Cell Volume: 93.9 fL (07-31-19 @ 10:05)    07-31    142  |  111<H>  |  21  ----------------------------<  203<H>  3.5   |  21<L>  |  1.50<H>    Ca    8.6      31 Jul 2019 10:05  Phos  2.7     07-31  Mg     1.9     07-31    TPro  7.1  /  Alb  2.8<L>  /  TBili  1.0  /  DBili  x   /  AST  30  /  ALT  17  /  AlkPhos  83  07-30    LIVER FUNCTIONS - ( 30 Jul 2019 09:29 )  Alb: 2.8 g/dL / Pro: 7.1 g/dL / ALK PHOS: 83 u/L / ALT: 17 u/L / AST: 30 u/L / GGT: x           PT/INR - ( 30 Jul 2019 09:29 )   PT: 12.3 SEC;   INR: 1.08          PTT - ( 30 Jul 2019 09:29 )  PTT:28.6 SEC                            8.4    8.45  )-----------( 205      ( 31 Jul 2019 10:05 )             26.3                         9.7    8.92  )-----------( 254      ( 30 Jul 2019 18:06 )             30.7                         9.8    9.51  )-----------( 244      ( 30 Jul 2019 09:29 )             30.0                         9.3    8.49  )-----------( 222      ( 30 Jul 2019 06:58 )             29.1                         9.6    7.97  )-----------( 216      ( 29 Jul 2019 06:10 )             29.8       Imaging: Interval Events:   He is more awake and alert today.   Had one BM this am which was dark brown  No abdominal pain  No further emesis    Allergies:  Cipro (Rash)  Plavix (Rash)    Hospital Medications:  acetaminophen    Suspension .. 650 milliGRAM(s) Oral every 6 hours  aspirin  chewable 81 milliGRAM(s) Oral daily  atorvastatin 40 milliGRAM(s) Oral at bedtime  carvedilol 6.25 milliGRAM(s) Oral every 12 hours  chlorhexidine 4% Liquid 1 Application(s) Topical two times a day  dextrose 40% Gel 15 Gram(s) Oral once PRN  dextrose 5%. 1000 milliLiter(s) IV Continuous <Continuous>  dextrose 50% Injectable 12.5 Gram(s) IV Push once  dextrose 50% Injectable 25 Gram(s) IV Push once  dextrose 50% Injectable 25 Gram(s) IV Push once  glucagon  Injectable 1 milliGRAM(s) IntraMuscular once PRN  hydrALAZINE 50 milliGRAM(s) Oral every 8 hours  insulin lispro (HumaLOG) corrective regimen sliding scale   SubCutaneous every 6 hours  meclizine 12.5 milliGRAM(s) Oral two times a day PRN  OLANZapine Injectable 2.5 milliGRAM(s) IntraMuscular once  OLANZapine Injectable 2.5 milliGRAM(s) IntraMuscular every 8 hours PRN  pantoprazole  Injectable 40 milliGRAM(s) IV Push two times a day  piperacillin/tazobactam IVPB.. 3.375 Gram(s) IV Intermittent every 8 hours      PMHX/PSHX:  Cholangiocarcinoma  Gout  Type II diabetes mellitus  HTN (hypertension)  CAD (coronary artery disease)  Cirrhosis  S/P cholecystectomy      ROS:   General:  No fevers, chills or night sweats  ENT:  No sore throat or dysphagia  CV:  No pain or palpitations  Resp:  No dyspnea, cough or  wheezing  GI:  as above  Skin:  No rash or edema    PHYSICAL EXAM:   Vital Signs Last 24 Hrs  T(C): 37 (01 Aug 2019 17:30), Max: 37 (01 Aug 2019 17:30)  T(F): 98.6 (01 Aug 2019 17:30), Max: 98.6 (01 Aug 2019 17:30)  HR: 81 (01 Aug 2019 17:30) (74 - 81)  BP: 172/66 (01 Aug 2019 17:30) (114/64 - 172/66)  BP(mean): --  RR: 18 (01 Aug 2019 17:30) (17 - 18)  SpO2: 99% (01 Aug 2019 17:30) (98% - 100%)    GENERAL: sedated   HEENT:  NC/AT,  conjunctivae clear and pink, sclera -anicteric  CHEST:  Normal Effort, Breath sounds clear  HEART:  RRR, S1 + S2, no murmurs  ABDOMEN:  Soft, non-tender, non-distended, normoactive bowel sounds,  + PEG tube in place  EXTREMITIES:  no cyanosis or edema  SKIN:  Warm & Dry. No rash or erythema  NEURO:  No focal deficit. moving all 4 extremities   Rectal exam: no melena or blood. Brown stool    LABS:                          8.3    7.30  )-----------( 196      ( 01 Aug 2019 06:30 )             25.8                08-01    145  |  114<H>  |  22  ----------------------------<  192<H>  4.2   |  18<L>  |  1.79<H>    Ca    8.6      01 Aug 2019 06:30  Phos  2.9     08-01  Mg     2.0     08-01

## 2019-08-01 NOTE — PROGRESS NOTE ADULT - PROBLEM SELECTOR PLAN 10
-DVT ppx: HSQ held in setting of potential GI bleed, SCDs ordered  -Diet: stopped Glucerna tube feeds as pt passed bedside swallow, started on Dysphagia 2 Mechanical Soft-Thin Liquids diet  -Dispo: to be discussed with family; PT consulted  Advanced Directives: spoke to wife who stated she was healthy care proxy and that she wanted "everything for him done"

## 2019-08-01 NOTE — PROGRESS NOTE ADULT - PROBLEM SELECTOR PLAN 9
-DVT ppx: HSQ held in setting of potential GI bleed, SCDs ordered  -Diet: Will restart Glucerna tube feeds today  -Dispo: to be discussed with family  Advanced Directives: spoke to wife who stated she was healthy care proxy and that she wanted "everything for him done" -DVT ppx: HSQ held in setting of potential GI bleed, SCDs ordered  -Diet: stopped Glucerna tube feeds as pt passed bedside swallow, started on Dysphagia 2 Mechanical Soft-Thin Liquids diet  -Dispo: to be discussed with family; PT consulted  Advanced Directives: spoke to wife who stated she was healthy care proxy and that she wanted "everything for him done" -Stopped glucerna tube feeds, on dysphagia diet  -lantus held and will continue with ISS, will monitor blood sugar and adjust insulin regimen as needed

## 2019-08-01 NOTE — PHYSICAL THERAPY INITIAL EVALUATION ADULT - PLANNED THERAPY INTERVENTIONS, PT EVAL
balance training/strengthening/transfer training/gait training/stairs training/bed mobility training

## 2019-08-02 LAB
ANION GAP SERPL CALC-SCNC: 10 MMO/L — SIGNIFICANT CHANGE UP (ref 7–14)
BUN SERPL-MCNC: 20 MG/DL — SIGNIFICANT CHANGE UP (ref 7–23)
CALCIUM SERPL-MCNC: 8.5 MG/DL — SIGNIFICANT CHANGE UP (ref 8.4–10.5)
CHLORIDE SERPL-SCNC: 114 MMOL/L — HIGH (ref 98–107)
CK SERPL-CCNC: 41 U/L — SIGNIFICANT CHANGE UP (ref 30–200)
CO2 SERPL-SCNC: 18 MMOL/L — LOW (ref 22–31)
CREAT SERPL-MCNC: 1.62 MG/DL — HIGH (ref 0.5–1.3)
CRP SERPL-MCNC: 22.5 MG/L — HIGH
GLUCOSE BLDC GLUCOMTR-MCNC: 174 MG/DL — HIGH (ref 70–99)
GLUCOSE BLDC GLUCOMTR-MCNC: 208 MG/DL — HIGH (ref 70–99)
GLUCOSE SERPL-MCNC: 224 MG/DL — HIGH (ref 70–99)
HCT VFR BLD CALC: 25.1 % — LOW (ref 39–50)
HCT VFR BLD CALC: 28.6 % — LOW (ref 39–50)
HGB BLD-MCNC: 8 G/DL — LOW (ref 13–17)
HGB BLD-MCNC: 9.2 G/DL — LOW (ref 13–17)
MAGNESIUM SERPL-MCNC: 2 MG/DL — SIGNIFICANT CHANGE UP (ref 1.6–2.6)
MCHC RBC-ENTMCNC: 30 PG — SIGNIFICANT CHANGE UP (ref 27–34)
MCHC RBC-ENTMCNC: 30.4 PG — SIGNIFICANT CHANGE UP (ref 27–34)
MCHC RBC-ENTMCNC: 31.9 % — LOW (ref 32–36)
MCHC RBC-ENTMCNC: 32.2 % — SIGNIFICANT CHANGE UP (ref 32–36)
MCV RBC AUTO: 94 FL — SIGNIFICANT CHANGE UP (ref 80–100)
MCV RBC AUTO: 94.4 FL — SIGNIFICANT CHANGE UP (ref 80–100)
NRBC # FLD: 0 K/UL — SIGNIFICANT CHANGE UP (ref 0–0)
NRBC # FLD: 0.02 K/UL — SIGNIFICANT CHANGE UP (ref 0–0)
PHOSPHATE SERPL-MCNC: 2.5 MG/DL — SIGNIFICANT CHANGE UP (ref 2.5–4.5)
PLATELET # BLD AUTO: 173 K/UL — SIGNIFICANT CHANGE UP (ref 150–400)
PLATELET # BLD AUTO: 184 K/UL — SIGNIFICANT CHANGE UP (ref 150–400)
PMV BLD: 9.5 FL — SIGNIFICANT CHANGE UP (ref 7–13)
PMV BLD: 9.8 FL — SIGNIFICANT CHANGE UP (ref 7–13)
POTASSIUM SERPL-MCNC: 4.3 MMOL/L — SIGNIFICANT CHANGE UP (ref 3.5–5.3)
POTASSIUM SERPL-SCNC: 4.3 MMOL/L — SIGNIFICANT CHANGE UP (ref 3.5–5.3)
PROT SERPL-MCNC: 6 G/DL — SIGNIFICANT CHANGE UP (ref 6–8.3)
RBC # BLD: 2.67 M/UL — LOW (ref 4.2–5.8)
RBC # BLD: 3.03 M/UL — LOW (ref 4.2–5.8)
RBC # FLD: 19.2 % — HIGH (ref 10.3–14.5)
RBC # FLD: 19.3 % — HIGH (ref 10.3–14.5)
SODIUM SERPL-SCNC: 142 MMOL/L — SIGNIFICANT CHANGE UP (ref 135–145)
TSH SERPL-MCNC: 2.34 UIU/ML — SIGNIFICANT CHANGE UP (ref 0.27–4.2)
WBC # BLD: 7.62 K/UL — SIGNIFICANT CHANGE UP (ref 3.8–10.5)
WBC # BLD: 8.59 K/UL — SIGNIFICANT CHANGE UP (ref 3.8–10.5)
WBC # FLD AUTO: 7.62 K/UL — SIGNIFICANT CHANGE UP (ref 3.8–10.5)
WBC # FLD AUTO: 8.59 K/UL — SIGNIFICANT CHANGE UP (ref 3.8–10.5)

## 2019-08-02 PROCEDURE — 84165 PROTEIN E-PHORESIS SERUM: CPT | Mod: 26

## 2019-08-02 PROCEDURE — 99233 SBSQ HOSP IP/OBS HIGH 50: CPT | Mod: GC

## 2019-08-02 PROCEDURE — 99232 SBSQ HOSP IP/OBS MODERATE 35: CPT

## 2019-08-02 PROCEDURE — 99232 SBSQ HOSP IP/OBS MODERATE 35: CPT | Mod: GC

## 2019-08-02 RX ORDER — PANTOPRAZOLE SODIUM 20 MG/1
40 TABLET, DELAYED RELEASE ORAL EVERY 12 HOURS
Refills: 0 | Status: DISCONTINUED | OUTPATIENT
Start: 2019-08-02 | End: 2019-08-03

## 2019-08-02 RX ORDER — LACTOBACILLUS ACIDOPHILUS 100MM CELL
1 CAPSULE ORAL DAILY
Refills: 0 | Status: DISCONTINUED | OUTPATIENT
Start: 2019-08-02 | End: 2019-08-03

## 2019-08-02 RX ORDER — CARVEDILOL PHOSPHATE 80 MG/1
12.5 CAPSULE, EXTENDED RELEASE ORAL EVERY 12 HOURS
Refills: 0 | Status: DISCONTINUED | OUTPATIENT
Start: 2019-08-02 | End: 2019-08-03

## 2019-08-02 RX ADMIN — Medication 1: at 12:17

## 2019-08-02 RX ADMIN — CARVEDILOL PHOSPHATE 12.5 MILLIGRAM(S): 80 CAPSULE, EXTENDED RELEASE ORAL at 17:03

## 2019-08-02 RX ADMIN — CHLORHEXIDINE GLUCONATE 1 APPLICATION(S): 213 SOLUTION TOPICAL at 17:03

## 2019-08-02 RX ADMIN — CHLORHEXIDINE GLUCONATE 1 APPLICATION(S): 213 SOLUTION TOPICAL at 06:58

## 2019-08-02 RX ADMIN — Medication 50 MILLIGRAM(S): at 13:58

## 2019-08-02 RX ADMIN — Medication 2: at 08:08

## 2019-08-02 RX ADMIN — Medication 1 TABLET(S): at 12:18

## 2019-08-02 RX ADMIN — Medication 50 MILLIGRAM(S): at 21:38

## 2019-08-02 RX ADMIN — CARVEDILOL PHOSPHATE 12.5 MILLIGRAM(S): 80 CAPSULE, EXTENDED RELEASE ORAL at 06:59

## 2019-08-02 RX ADMIN — Medication 1: at 17:02

## 2019-08-02 RX ADMIN — ATORVASTATIN CALCIUM 40 MILLIGRAM(S): 80 TABLET, FILM COATED ORAL at 21:38

## 2019-08-02 RX ADMIN — Medication 3 MILLIGRAM(S): at 21:38

## 2019-08-02 RX ADMIN — PANTOPRAZOLE SODIUM 40 MILLIGRAM(S): 20 TABLET, DELAYED RELEASE ORAL at 06:58

## 2019-08-02 RX ADMIN — PANTOPRAZOLE SODIUM 40 MILLIGRAM(S): 20 TABLET, DELAYED RELEASE ORAL at 17:03

## 2019-08-02 RX ADMIN — PIPERACILLIN AND TAZOBACTAM 25 GRAM(S): 4; .5 INJECTION, POWDER, LYOPHILIZED, FOR SOLUTION INTRAVENOUS at 06:59

## 2019-08-02 RX ADMIN — Medication 50 MILLIGRAM(S): at 06:59

## 2019-08-02 NOTE — PROGRESS NOTE ADULT - SUBJECTIVE AND OBJECTIVE BOX
Interval Events:   He denies any abdominal pain, No nausea or vomiting.  Tolerating PO diet. Mental status is back to baseline   No fever, Normal WBC  stable H&H  Had 3 episodes of watery diarrhea over the last 24 hours.  He remains on IV zosyn     Allergies:  Cipro (Rash)  Plavix (Rash)      Hospital Medications:  acetaminophen    Suspension .. 650 milliGRAM(s) Oral every 6 hours  atorvastatin 40 milliGRAM(s) Oral at bedtime  carvedilol 12.5 milliGRAM(s) Oral every 12 hours  chlorhexidine 4% Liquid 1 Application(s) Topical two times a day  dextrose 40% Gel 15 Gram(s) Oral once PRN  dextrose 5%. 1000 milliLiter(s) IV Continuous <Continuous>  dextrose 50% Injectable 25 Gram(s) IV Push once  dextrose 50% Injectable 25 Gram(s) IV Push once  glucagon  Injectable 1 milliGRAM(s) IntraMuscular once PRN  hydrALAZINE 50 milliGRAM(s) Oral every 8 hours  insulin lispro (HumaLOG) corrective regimen sliding scale   SubCutaneous three times a day before meals  insulin lispro (HumaLOG) corrective regimen sliding scale   SubCutaneous at bedtime  melatonin 3 milliGRAM(s) Oral at bedtime  OLANZapine Injectable 2.5 milliGRAM(s) IntraMuscular every 8 hours PRN  pantoprazole  Injectable 40 milliGRAM(s) IV Push two times a day  piperacillin/tazobactam IVPB.. 3.375 Gram(s) IV Intermittent every 8 hours      PMHX/PSHX:  Cholangiocarcinoma  Gout  Type II diabetes mellitus  HTN (hypertension)  CAD (coronary artery disease)  Cirrhosis  S/P cholecystectomy      ROS:   General:  No fevers, chills or night sweats  ENT:  No sore throat or dysphagia  CV:  No pain or palpitations  Resp:  No dyspnea, cough or  wheezing  GI:  as above  Skin:  No rash or edema    PHYSICAL EXAM:   Vital Signs:  Vital Signs Last 24 Hrs  T(C): 36.9 (02 Aug 2019 06:45), Max: 37 (01 Aug 2019 17:30)  T(F): 98.4 (02 Aug 2019 06:45), Max: 98.6 (01 Aug 2019 17:30)  HR: 60 (02 Aug 2019 06:45) (60 - 81)  BP: 142/75 (02 Aug 2019 06:45) (142/75 - 172/66)  BP(mean): --  RR: 17 (02 Aug 2019 06:45) (16 - 18)  SpO2: 99% (02 Aug 2019 06:45) (97% - 100%)  Daily     Daily     GENERAL:  NAD, Appears stated age  HEENT:  NC/AT,  conjunctivae clear and pink, sclera -anicteric  CHEST:  Normal Effort, Breath sounds clear  HEART:  RRR, S1 + S2, no murmurs  ABDOMEN:  Soft, non-tender, non-distended, normoactive bowel sounds, J tube in place   EXTEREMITIES:  no cyanosis or edema  SKIN:  Warm & Dry.No rash or erythema  NEURO:  Alert, oriented    LABS:                        8.0    7.62  )-----------( 173      ( 02 Aug 2019 06:50 )             25.1     Mean Cell Volume: 94.0 fL (08-02-19 @ 06:50)    08-02    142  |  114<H>  |  20  ----------------------------<  224<H>  4.3   |  18<L>  |  1.62<H>    Ca    8.5      02 Aug 2019 06:50  Phos  2.5     08-02  Mg     2.0     08-02    TPro  6.0  /  Alb  x   /  TBili  x   /  DBili  x   /  AST  x   /  ALT  x   /  AlkPhos  x   08-02    LIVER FUNCTIONS - ( 02 Aug 2019 06:50 )  Alb: x     / Pro: 6.0 g/dL / ALK PHOS: x     / ALT: x     / AST: x     / GGT: x                                       8.0    7.62  )-----------( 173      ( 02 Aug 2019 06:50 )             25.1                         9.2    8.86  )-----------( 232      ( 01 Aug 2019 18:26 )             28.6                         8.3    7.30  )-----------( 196      ( 01 Aug 2019 06:30 )             25.8                         9.2    7.29  )-----------( 205      ( 31 Jul 2019 18:00 )             28.9                         8.4    8.45  )-----------( 205      ( 31 Jul 2019 10:05 )             26.3       Imaging:

## 2019-08-02 NOTE — PROGRESS NOTE ADULT - PROBLEM SELECTOR PLAN 7
-s/p 9 stents  -c/w statin, coreg  -hold ASA in setting of b/l subdural hematoma, concern for GIB  -elevated trop 182 on presentation, repeat trop 158; likely elevated in setting of CKD and NSTEMI in May, low suspicion for new MI  -EKG showed no ST changes  -on telemetry for monitoring -s/p 9 stents  -c/w statin, coreg  -hold ASA in setting of b/l subdural hematoma, concern for GIB  -elevated trop 182 on presentation, repeat trop 158; likely elevated in setting of CKD and NSTEMI in May, low suspicion for new MI  -EKG showed no ST changes  -on telemetry for monitoring, and for new frontal infarct -s/p 9 stents  -c/w statin, coreg  -hold ASA in setting of b/l subdural hematoma, concern for GIB  -elevated trop 182 on presentation, repeat trop 158; likely elevated in setting of CKD and NSTEMI in May, low suspicion for new MI  -EKG showed no ST changes  -off telemetry

## 2019-08-02 NOTE — SWALLOW BEDSIDE ASSESSMENT ADULT - SWALLOW EVAL: DIAGNOSIS
1. Functional oral phase for puree consistency, mechanical soft solids and thin liquids marked by adequate mastication for solids, adequate bolus manipulation and functional oral transit time 2. Functional pharyngeal phase for all consistencies presented marked by adequate and timely laryngeal elevation upon digital palpation and NO overt s/s of laryngeal penetration/ aspiration noted. It should be noted, pt w/ 2x belches post initial presentation of thin liquids w/ audible wet like quality 1. Functional oral phase for puree consistency, mechanical soft solids and thin liquids marked by adequate mastication for solids, adequate bolus manipulation and functional oral transit time 2. Functional pharyngeal phase for all consistencies presented marked by adequate and timely laryngeal elevation upon digital palpation and NO overt s/s of laryngeal penetration/ aspiration noted. It should be noted, pt w/ 2x belches post initial presentation of thin liquids

## 2019-08-02 NOTE — PROGRESS NOTE ADULT - ASSESSMENT
63M recently dx cholangiocarcinoma s/p whipple 6/18/19 c/b polymicrobial abd abscess s/p IR drainage (7/1/19) and L pneumothorax s/p chest tube (resolved) - here with altered mental status.   CT did not show evidence of stroke or mets.  MRI with subdural hematoma and possible subacute infarct.  Ertapenem was discontinued due to the concern of confusion as a side effect and was started on zosyn as per ID.  CT abdomen did show persistent collection though smaller.  Now he has diarrhea.  GI was called for one episode of coffee ground emesis than since then has resolved.  DDx in this case includes gastritis vs PUD (exacerbated by high dose ASA), less likely dieulafoy/AVM or varices given quality of blood (dark as opposed to bright red).     Impression:  #Diarrhea in the setting of abx- zosyn  #UGIB/ coffee ground emesis -  resolved d/dx as above, gastritis vs PUD  #Metastatic Cholangiocarcinoma  #metabolic Encephalopathy -resolved  #Hypertension  #CAD on ASA    Recommendations:   - monitor closely. If he continues to have diarrhea - Concern for C diff.    - Supportive management by primary team  - resume J tube feeding  - c/w IV PPI daily  - continue to trend Hb, transfuse to Hb > 7  - no need for endoscopic intervention giving stable h&h      Rosalba Powers  Gastroenterology Fellow  Pager# [phone]  Page #[phone] after 5pm or on weekends 63M recently dx cholangiocarcinoma s/p whipple 6/18/19 c/b polymicrobial abd abscess s/p IR drainage (7/1/19) and L pneumothorax s/p chest tube (resolved) - here with altered mental status.   CT did not show evidence of stroke or mets.  MRI with subdural hematoma and possible subacute infarct.  Ertapenem was discontinued due to the concern of confusion as a side effect and was started on zosyn as per ID.  CT abdomen did show persistent collection though smaller.  Now he has diarrhea.  GI was called for one episode of coffee ground emesis than since then has resolved.  DDx in this case includes gastritis vs PUD (exacerbated by high dose ASA), less likely dieulafoy/AVM or varices given quality of blood (dark as opposed to bright red).     Impression:  #Diarrhea in the setting of abx- zosyn  #UGIB/ coffee ground emesis -  resolved d/dx as above, gastritis vs PUD  #Metastatic Cholangiocarcinoma  #Metabolic Encephalopathy -resolved  #Hypertension  #CAD on ASA    Recommendations:   - monitor closely. If he continues to have diarrhea check stool for C.diff  - Supportive management by primary team  - resume J tube feeding  - c/w IV PPI daily  - continue to trend Hb, transfuse to Hb > 7  - no need for endoscopic intervention giving stable h&h      Rosalba Powers  Gastroenterology Fellow  Pager# [phone]  Page #[phone] after 5pm or on weekends

## 2019-08-02 NOTE — PROGRESS NOTE ADULT - SUBJECTIVE AND OBJECTIVE BOX
Grady Ferro, PGY-1  Team 3  Contact/Pager: [phone] / [phone]    OVERNIGHT / SUBJECTIVE EVENTS:  -no acute events overnight, lost peripheral IV, ISS and FS frequency changed to reflect diet  -Pt was seen and examined at bedside. Pt reported feeling better without any pain. Pt more coherent today and inquired about plan. Reports 3 episodes of "dark" diarrhea last night. Denies f/c/n/v, CP, SOB, changes in vision, abdominal pain, dysuria, constipation, falling sensation. When asked if he knew why he was here, he stated sepsis and then PTX, which were consistent with his previous admissions, but not this admission.    MEDICATIONS  (STANDING):  acetaminophen    Suspension .. 650 milliGRAM(s) Oral every 6 hours  atorvastatin 40 milliGRAM(s) Oral at bedtime  carvedilol 12.5 milliGRAM(s) Oral every 12 hours  chlorhexidine 4% Liquid 1 Application(s) Topical two times a day  dextrose 5%. 1000 milliLiter(s) (50 mL/Hr) IV Continuous <Continuous>  dextrose 50% Injectable 25 Gram(s) IV Push once  dextrose 50% Injectable 25 Gram(s) IV Push once  hydrALAZINE 50 milliGRAM(s) Oral every 8 hours  insulin lispro (HumaLOG) corrective regimen sliding scale   SubCutaneous three times a day before meals  insulin lispro (HumaLOG) corrective regimen sliding scale   SubCutaneous at bedtime  lactobacillus acidophilus 1 Tablet(s) Oral daily  melatonin 3 milliGRAM(s) Oral at bedtime  pantoprazole  Injectable 40 milliGRAM(s) IV Push two times a day    MEDICATIONS  (PRN):  dextrose 40% Gel 15 Gram(s) Oral once PRN Blood Glucose LESS THAN 70 milliGRAM(s)/deciLiter  glucagon  Injectable 1 milliGRAM(s) IntraMuscular once PRN Glucose <70 milliGRAM(s)/deciLiter  OLANZapine Injectable 2.5 milliGRAM(s) IntraMuscular every 8 hours PRN severe agitation.      Allergies    Cipro (Rash)  Plavix (Rash)    Intolerances        OBJECTIVE:  Vital Signs Last 24 Hrs  T(C): 36.9 (02 Aug 2019 13:57), Max: 37.3 (02 Aug 2019 10:21)  T(F): 98.5 (02 Aug 2019 13:57), Max: 99.1 (02 Aug 2019 10:21)  HR: 69 (02 Aug 2019 13:57) (60 - 81)  BP: 135/63 (02 Aug 2019 13:57) (135/63 - 172/66)  BP(mean): --  RR: 18 (02 Aug 2019 13:57) (16 - 18)  SpO2: 99% (02 Aug 2019 13:57) (97% - 100%)      CAPILLARY BLOOD GLUCOSE      POCT Blood Glucose.: 195 mg/dL (02 Aug 2019 12:04)  POCT Blood Glucose.: 211 mg/dL (02 Aug 2019 07:39)  POCT Blood Glucose.: 191 mg/dL (01 Aug 2019 17:01)    I&O's Summary      PHYSICAL EXAM:  GENERAL: NAD, on room air, lying in bed, more awake and alert but still has moments of drowsiness, speech more coherent than previously  HEENT: Sclera clear  CHEST/RESPIRATORY: CTA b/l, decreased breath sounds on left lung base  CARDIOVASCULAR: Regular Rate and Rhythm, s1/s2 heard, no murmurs, rubs, or gallops appreciated  ABDOMINAL: Soft, nontender, nondistended; BS present, midline abdominal scar, + jejunostomy tube c/d/i  EXTREMITIES: 2+ pulses b/l, no edema b/l, LUE PICC C/D/I  NEURO: EOMI, PERRLA, following commands and spontaneously moving all extremities, fasciculations noted on b/l legs  PSYCH:  AAOx3 (oriented to self, place, and date)    LABS:                        8.0    7.62  )-----------( 173      ( 02 Aug 2019 06:50 )             25.1     08-02    142  |  114<H>  |  20  ----------------------------<  224<H>  4.3   |  18<L>  |  1.62<H>    Ca    8.5      02 Aug 2019 06:50  Phos  2.5     08-02  Mg     2.0     08-02    TPro  6.0  /  Alb  x   /  TBili  x   /  DBili  x   /  AST  x   /  ALT  x   /  AlkPhos  x   08-02    LIVER FUNCTIONS - ( 02 Aug 2019 06:50 )  Alb: x     / Pro: 6.0 g/dL / ALK PHOS: x     / ALT: x     / AST: x     / GGT: x                 Venous Blood Gas:  07-31 @ 22:50  7.35/40/45/22/74.4  VBG Lactate: --      CARDIAC MARKERS ( 02 Aug 2019 06:50 )  x     / x     / 41 u/L / x     / x              MICROBIOLOGY:     Culture - Acid Fast - Body Fluid w/Smear (collected 31 Jul 2019 08:02)  Source: PERITONEAL FLUID  Final Report (31 Jul 2019 14:33):    AFB SMEAR= NO ACID FAST BACILLI SEEN    Culture - Body Fluid with Gram Stain (collected 30 Jul 2019 18:24)  Source: PERITONEAL FLUID  Gram Stain (30 Jul 2019 23:50):    WBC^White Blood Cells    QNTY CELLS IN GRAM STAIN: NO CELLS SEEN    NOS^No Organisms Seen  Preliminary Report (02 Aug 2019 10:40):    NO ORGANISMS ISOLATED AT 24 HOURS    NO ORGANISMS ISOLATED AT 48 HRS.    RADIOLOGY & ADDITIONAL TESTS:  no new imaging Grady Ferro, PGY-1  Team 3  Contact/Pager: [phone] / [phone]    OVERNIGHT / SUBJECTIVE EVENTS:  -no acute events overnight, lost peripheral IV, ISS and FS frequency changed to reflect diet  -Pt was seen and examined at bedside. Pt reported feeling better without any pain. Pt more coherent today and inquired about plan. Reports 3 episodes of "dark" diarrhea last night. Denies f/c/n/v, CP, SOB, changes in vision, abdominal pain, dysuria, constipation, falling sensation. When asked if he knew why he was here, he stated sepsis and then PTX, which were consistent with his previous admissions, but not this admission.    MEDICATIONS  (STANDING):  acetaminophen    Suspension .. 650 milliGRAM(s) Oral every 6 hours  atorvastatin 40 milliGRAM(s) Oral at bedtime  carvedilol 12.5 milliGRAM(s) Oral every 12 hours  chlorhexidine 4% Liquid 1 Application(s) Topical two times a day  dextrose 5%. 1000 milliLiter(s) (50 mL/Hr) IV Continuous <Continuous>  dextrose 50% Injectable 25 Gram(s) IV Push once  dextrose 50% Injectable 25 Gram(s) IV Push once  hydrALAZINE 50 milliGRAM(s) Oral every 8 hours  insulin lispro (HumaLOG) corrective regimen sliding scale   SubCutaneous three times a day before meals  insulin lispro (HumaLOG) corrective regimen sliding scale   SubCutaneous at bedtime  lactobacillus acidophilus 1 Tablet(s) Oral daily  melatonin 3 milliGRAM(s) Oral at bedtime  pantoprazole  Injectable 40 milliGRAM(s) IV Push two times a day    MEDICATIONS  (PRN):  dextrose 40% Gel 15 Gram(s) Oral once PRN Blood Glucose LESS THAN 70 milliGRAM(s)/deciLiter  glucagon  Injectable 1 milliGRAM(s) IntraMuscular once PRN Glucose <70 milliGRAM(s)/deciLiter  OLANZapine Injectable 2.5 milliGRAM(s) IntraMuscular every 8 hours PRN severe agitation.      Allergies    Cipro (Rash)  Plavix (Rash)    Intolerances        OBJECTIVE:  Vital Signs Last 24 Hrs  T(C): 36.9 (02 Aug 2019 13:57), Max: 37.3 (02 Aug 2019 10:21)  T(F): 98.5 (02 Aug 2019 13:57), Max: 99.1 (02 Aug 2019 10:21)  HR: 69 (02 Aug 2019 13:57) (60 - 81)  BP: 135/63 (02 Aug 2019 13:57) (135/63 - 172/66)  BP(mean): --  RR: 18 (02 Aug 2019 13:57) (16 - 18)  SpO2: 99% (02 Aug 2019 13:57) (97% - 100%)      CAPILLARY BLOOD GLUCOSE      POCT Blood Glucose.: 195 mg/dL (02 Aug 2019 12:04)  POCT Blood Glucose.: 211 mg/dL (02 Aug 2019 07:39)  POCT Blood Glucose.: 191 mg/dL (01 Aug 2019 17:01)    I&O's Summary      PHYSICAL EXAM:  GENERAL: NAD, on room air, lying in bed, more awake and alert but still has moments of drowsiness, speech more coherent than previously. AAOx3  HEENT: Sclera clear  CHEST/RESPIRATORY: CTA b/l, decreased breath sounds on left lung base  CARDIOVASCULAR: Regular Rate and Rhythm, s1/s2 heard, no murmurs, rubs, or gallops appreciated  ABDOMINAL: Soft, nontender, nondistended; BS present, midline abdominal scar, + jejunostomy tube c/d/i  EXTREMITIES: 2+ pulses b/l, no edema b/l, LUE PICC C/D/I  NEURO: EOMI, PERRLA, following commands and spontaneously moving all extremities, fasciculations noted on b/l legs  PSYCH:  AAOx3 (oriented to self, place, and date)    LABS:                        8.0    7.62  )-----------( 173      ( 02 Aug 2019 06:50 )             25.1     08-02    142  |  114<H>  |  20  ----------------------------<  224<H>  4.3   |  18<L>  |  1.62<H>    Ca    8.5      02 Aug 2019 06:50  Phos  2.5     08-02  Mg     2.0     08-02    TPro  6.0  /  Alb  x   /  TBili  x   /  DBili  x   /  AST  x   /  ALT  x   /  AlkPhos  x   08-02    LIVER FUNCTIONS - ( 02 Aug 2019 06:50 )  Alb: x     / Pro: 6.0 g/dL / ALK PHOS: x     / ALT: x     / AST: x     / GGT: x                 Venous Blood Gas:  07-31 @ 22:50  7.35/40/45/22/74.4  VBG Lactate: --      CARDIAC MARKERS ( 02 Aug 2019 06:50 )  x     / x     / 41 u/L / x     / x              MICROBIOLOGY:     Culture - Acid Fast - Body Fluid w/Smear (collected 31 Jul 2019 08:02)  Source: PERITONEAL FLUID  Final Report (31 Jul 2019 14:33):    AFB SMEAR= NO ACID FAST BACILLI SEEN    Culture - Body Fluid with Gram Stain (collected 30 Jul 2019 18:24)  Source: PERITONEAL FLUID  Gram Stain (30 Jul 2019 23:50):    WBC^White Blood Cells    QNTY CELLS IN GRAM STAIN: NO CELLS SEEN    NOS^No Organisms Seen  Preliminary Report (02 Aug 2019 10:40):    NO ORGANISMS ISOLATED AT 24 HOURS    NO ORGANISMS ISOLATED AT 48 HRS.    RADIOLOGY & ADDITIONAL TESTS:  no new imaging    Reviewed notes from: GI, ID  discussed care with: GI, ID, neuro   telemetry reviewed with tele tech

## 2019-08-02 NOTE — PROGRESS NOTE ADULT - SUBJECTIVE AND OBJECTIVE BOX
Patient is a 63y old  Male who presents with a chief complaint of confusion    f/u abd abscess    Interval History/ROS:  awake today. some loose BM.  no fever.  no abdominal pain.  no headache. ROS otherwise negative    PAST MEDICAL & SURGICAL HISTORY:  Type II diabetes mellitus  HTN (hypertension)  CAD (coronary artery disease)  S/P cholecystectomy    Allergies  Cipro (Rash)  Plavix (Rash)    ANTIMICROBIALS:  meropenem  IVPB 500 every 12 hours (-)  piperacillin/tazobactam IVPB.. 3.375 every 8 hours (-10)  erta (-)  vancomycin  IVPB x1     active  piperacillin/tazobactam IVPB.. 3.375 every 8 hours (-)    MEDICATIONS  (STANDING):  acetaminophen    Suspension .. 650 every 6 hours  atorvastatin 40 at bedtime  carvedilol 12.5 every 12 hours  dextrose 50% Injectable 25 once  dextrose 50% Injectable 25 once  hydrALAZINE 50 every 8 hours  insulin lispro (HumaLOG) corrective regimen sliding scale  three times a day before meals  insulin lispro (HumaLOG) corrective regimen sliding scale  at bedtime  melatonin 3 at bedtime  pantoprazole  Injectable 40 two times a day    Vital Signs Last 24 Hrs  T(F): 99.1 (19 @ 10:21), Max: 99.1 (19 @ 10:21)  HR: 62 (19 @ 10:21)  BP: 149/73 (19 @ 10:21)  RR: 18 (19 @ 10:21)  SpO2: 99% (19 @ 10:21) (97% - 100%)    PHYSICAL EXAM:  General: more awake and appropriate  HEAD/EYES: anicteric  ENT:  supple  Cardiovascular:   S1, S2  Respiratory:  clear bilaterally  GI:  soft, non-tender, normal bowel sounds J-tube okay  Musculoskeletal:  no synovitis  Neurologic: still a little confused  Skin:  no rash  Psychiatric:  affect normal  Vascular:  no phlebitis    Urinalysis Basic - ( 2019 06:37 )  Color: YELLOW / Appearance: CLEAR / S.027 / pH: 6.0  Gluc: NEGATIVE / Ketone: TRACE  / Bili: NEGATIVE / Urobili: TRACE   Blood: NEGATIVE / Protein: 100 / Nitrite: NEGATIVE   Leuk Esterase: NEGATIVE / RBC: 3-5 / WBC 0-2   Sq Epi: OCC / Non Sq Epi: x / Bacteria: NEGATIVE    MICROBIOLOGY:  Culture - Urine (collected 2019 07:13)  Source: URINE MIDSTREAM  Preliminary Report (2019 08:54):    NO GROWTH TO DATE    Culture - Yeast and Fungus (collected 2019 18:45)  Source: ABSCESS  Preliminary Report (2019 09:09):    CULTURE NEGATIVE FOR YEASTS AND MOLDS---- PRELIMINARY    CULTURE NEGATIVE FOR YEASTS AND MOLDS AFTER 1 DAY    Culture - Surg Site Aerob/Anaer w/Gm St (collected 2019 18:45)  Source: ABSCESS  Preliminary Report (2019 13:57):    MODERATE  Organism: Escherichia coli  Klebsiella pneumoniae  Enterococcus faecalis (2019 13:57)  Organism: Enterococcus faecalis (2019 13:57)  Organism: Klebsiella pneumoniae (2019 13:57)      -  Amikacin: S <=8 BOB      -  Ampicillin: R >16 BOB      -  Ampicillin/Sulbactam: S 8/4 BOB      -  Aztreonam: S <=4 BBO      -  Cefazolin: S <=2 BOB      -  Cefepime: S <=2 BOB      -  Cefoxitin: S <=4 BOB      -  Ceftazidime: S <=1 BOB      -  Ceftriaxone: S <=1 BOB      -  Ciprofloxacin: S <=0.5 BOB      -  Ertapenem: S <=0.5 BOB      -  Gentamicin: S <=1 BOB      -  Imipenem: S <=1 BOB      -  Levofloxacin: S <=1 BOB      -  Meropenem: S <=1 BOB      -  Piperacillin/Tazobactam: S <=8 BOB      -  Tigecycline: S      -  Tobramycin: S <=2 BOB      -  Trimethoprim/Sulfamethoxazole: S <=0.5/9.5 BOB      Method Type: NEGATIVE BOB 43  Organism: Escherichia coli (2019 13:57)    Culture - Acid Fast Smear Concentrated (collected 2019 18:45)  Source: ABSCESS  Final Report (2019 14:54):    AFB SMEAR= NO ACID FAST BACILLI SEEN    Culture - Urine (collected 2019 01:32)  Source: URINE MIDSTREAM  Final Report (2019 10:04):    Culture grew 3 or more types of organisms which indicate    collection contamination; consider recollection only if    clinically indicated.    Culture - Blood (collected 2019 23:14)  Source: BLOOD PERIPHERAL  Preliminary Report (2019 23:15):    NO ORGANISMS ISOLATED    NO ORGANISMS ISOLATED AT 72 HRS.    Culture - Blood (collected 2019 23:12)  Source: BLOOD VENOUS  Preliminary Report (2019 23:13):    NO ORGANISMS ISOLATED    NO ORGANISMS ISOLATED AT 72 HRS.    RADIOLOGY:  imaging below personally reviewed    MR Head w/ IV Cont (19 @ 09:31) >  Small subdural hematoma is seen involving the bilateral frontal, bilateral temporal and left parietal region.  IMPRESSION:  There is a tiny focus of diffusion restriction and FLAIR hyperintensity in the right frontal lobe, suspicious for a tiny right frontal subacute infarct. Clinical correlation continued close and follow-up is recommended as a subacute infarct would be expected to change in a short   period of time.  Subdural hematomas as described above.  No evidence of intracranial enhancing mass.     CT Head No Cont (19 @ 08:36) >  IMPRESSION:  Imaging limited by patient motion.  No acute intracranial hemorrhage.  Microvascular disease.    CT Abdomen and Pelvis No Cont (19 @ 08:42) >  IMPRESSION:  Moderate partially loculated left pleural effusion, slightly increased in size since 2019.  Peripheral right lower lobe nodule, slightly increased in size.  Status post removal of percutaneous drainage catheter from the right upper quadrant. A residual amorphous collection containing fluid and a small amount of gas in the right upper quadrant measures 2.4 x 2.2 cm, slightly decreased in size since 2019.  Mesenteric lymphadenopathy, slightly increased in size since 2019.  Subtle nodularity of the omentum, raising consideration of peritoneal carcinomatosis.  Moderate to large volume of ascites, increased.    CT Abdomen and Pelvis w/ Oral Cont (19 @ 15:02) >  IMPRESSION:  Status post percutaneous drainage of a complex right upper quadrant collection, with interval decrease in size of the collection. Small residual predominantly gas containing components are noted.  Moderate volume ascites, increased since 2019.  Nonspecific mesenteric lymph nodes, unchanged.  Small bilateral pleural effusions, increased in size since 2019. Small residual left pneumothorax status post removal of previously noted left pleural catheter.    CT Abdomen and Pelvis w/ Oral Cont (19 @ 04:22) >  IMPRESSION:  Status post recent Whipple procedure. High density amorphous collection adjacent to the duodenal stump with loculated foci of air and fluid in the right upper quadrant for which considerations include postoperative hematoma and air, leakage of contrast and air from the duodenal stump, or developing collection/abscess.  Left-sided pigtail chest tube. Trace left pneumothorax along the anterior chest wall.

## 2019-08-02 NOTE — PROGRESS NOTE ADULT - ASSESSMENT
63M recently dx cholangiocarcinoma s/p whipple 6/18/19 c/b polymicrobial abd abscess s/p IR drainage (7/1/19) and L pneumothorax s/p chest tube (resolved) - here with altered mental status.  CT did not show evidence of stroke or mets.  MRI with subdural hematoma and possible subacute infarct.  Ertapenem can also cause confusion.  CT abdomen did show persistent collection though smaller.  Short course of zosyn.  awaiting neuro w/u.  diarrhea    Suggest:  - okay from ID standpoint to stop zosyn    above d/w medicine    Please call the ID service [phone] with questions or concerns over the weekend

## 2019-08-02 NOTE — PROGRESS NOTE ADULT - ASSESSMENT
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 ID, Heme/onc, Surgery, GI, Neuro, Pulm following.

## 2019-08-02 NOTE — SWALLOW BEDSIDE ASSESSMENT ADULT - SWALLOW EVAL: RECOMMENDED FEEDING/EATING TECHNIQUES
allow for swallow between intakes/oral hygiene/small sips/bites/maintain upright posture during/after eating for 30 mins/no straws/alternate food with liquid/position upright (90 degrees)

## 2019-08-02 NOTE — PROGRESS NOTE ADULT - PROBLEM SELECTOR PLAN 9
-DVT ppx: HSQ held in setting of potential GI bleed, SCDs ordered  -Diet: stopped Glucerna tube feeds as pt passed bedside swallow, started on Dysphagia 2 Mechanical Soft-Thin Liquids diet  -Dispo: to be discussed with family; PT recommended rehab  Advanced Directives: spoke to wife who stated she was healthy care proxy and that she wanted "everything for him done" -DVT ppx: HSQ held in setting of potential GI bleed, SCDs ordered  -Diet: stopped Glucerna tube feeds as pt passed bedside swallow, started on Dysphagia 2 Mechanical Soft-Thin Liquids diet w/ Glucerna  -Dispo: to be discussed with family; PT recommended rehab  Advanced Directives: spoke to wife who stated she was healthy care proxy and that she wanted "everything for him done." Will discuss consideration of palliative care consult with wife. -DVT ppx: HSQ resumed  -Diet: stopped Glucerna tube feeds as pt passed bedside swallow, started on Dysphagia 2 Mechanical Soft-Thin Liquids diet w/ Glucerna  -Dispo: to be discussed with family; PT recommended rehab  Advanced Directives: spoke to wife who stated she was healthy care proxy and that she wanted "everything for him done." Will discuss consideration of palliative care consult with wife.

## 2019-08-02 NOTE — PROGRESS NOTE ADULT - PROBLEM SELECTOR PLAN 4
-multiple episodes of coffee ground emesis on 7/30 morning, none since then. -Continues to have episodes of dark stool  -Hgb decreased from yesterday, but relatively stable ~8-9  -Appreciate GI recs: will hold off on EGD for now, c/w IV PPI, holding ASA, transfuse if Hgb<7, NPO for now  -monitor CBC q12 -multiple episodes of coffee ground emesis on 7/30 morning, none since then. -Continues to have episodes of dark stool  -H/H stable ~8/9  -switched from IV to PO protonix  -Appreciate GI recs: will hold off on EGD for now, holding ASA, transfuse if Hgb<7, NPO for now  -monitor CBC q12

## 2019-08-02 NOTE — PROGRESS NOTE ADULT - ATTENDING COMMENTS
63M h/o T2DM, CAD s/p 9 stents,  CKD3, cholangiocarcinoma s/p Whipple & feeding J tube placement (on 6/18/19) c/b hospital readmission on 7/1 for abdominal fluid collection s/p IR drainage and IV ertapenem via PICC line x4 weeks and for L PTX s/p pigtail chest tube now removed, presented with AMS likely med induced (ertapenem?) vs small b/l frontal, parietal, temporal subdural infarcts (also with small subacute frontal infarct) seen on MR brain, MS now improved (AAOx0>>AAOx3). Also found to have new moderate sized located left pleural effusion, currently monitoring per pulm and pending possible thoracentesis if develops symptoms. Fluid collection in abdomen with interval dec in size and no intervention per IR. Had moderate amount of abdominal ascites and s/p para without e/o SBP, cytology neg for malignant cells.     Course c/b coffee ground emesis on 7/30, possible melena on 7/31. Hgb has been stable in 8-9 range.  Treated with IV protonix BID. No GI intervention planned. Will switch to PO protonix BID. Patient passed S/S and recommended mechanical diet. Has had myoclonic jerks, ptosis, and evidence of UE weakness on neuro exam. UE weakness resolved.  EEG without evidence of epileptic discharge. Neuro requesting C-spine MRI, neuromuscular d/o w/u. Myasthenia abs pending, paraneoplastic panel pending as well. Abx (zosyn) started 7/29.      Will d/c abx today and monitor off zosyn. Encephalopathy appears to have resolved. Has had falls at home and suspect SDH 2/2 falls. Fall precautions ordered. PT recs rehab. Will discuss with family PT versus home with 24/7 care. DVT ppx SCDs 2/2 concern for possilbe GIB and has subdural hematomas. If H/H remains stable will consider HSQ. No events on telemetry and likely d/c telemetry tomorrow on 8/3. I was physically present for the key portions of the evaluation and management (E/M) service provided.  I agree with the above history, physical, and plan which I have reviewed and edited where appropriate, with the following additions:    63M h/o T2DM, CAD s/p 9 stents,  CKD3, cholangiocarcinoma s/p Whipple & feeding J tube placement (on 6/18/19) c/b hospital readmission on 7/1 for abdominal fluid collection s/p IR drainage and IV ertapenem via PICC line x4 weeks and for L PTX s/p pigtail chest tube now removed, presented with AMS likely med induced (ertapenem?) vs small b/l frontal, parietal, temporal subdural infarcts (also with small subacute frontal infarct) seen on MR brain, MS now improved (AAOx0>>AAOx3). Also found to have new moderate sized located left pleural effusion, currently monitoring per pulm and pending possible thoracentesis if develops symptoms. Fluid collection in abdomen with interval dec in size and no intervention per IR. Had moderate amount of abdominal ascites and s/p para without e/o SBP, cytology neg for malignant cells.     Course c/b coffee ground emesis on 7/30, possible melena on 7/31. Hgb has been stable in 8-9 range.  Treated with IV protonix BID. No GI intervention planned. Will switch to PO protonix BID. Patient passed S/S and recommended mechanical diet. Has had myoclonic jerks, ptosis, and evidence of UE weakness on neuro exam. UE weakness resolved.  EEG without evidence of epileptic discharge. Neuro requesting C-spine MRI, neuromuscular d/o w/u. Myasthenia abs pending, paraneoplastic panel pending as well. Abx (zosyn) started 7/29.      Will d/c abx today and monitor off zosyn. Encephalopathy appears to have resolved. Has had falls at home and suspect SDH 2/2 falls. Fall precautions ordered. PT recs rehab. Will discuss with family PT versus home with 24/7 care. DVT ppx SCDs 2/2 concern for possilbe GIB and has subdural hematomas. If H/H remains stable will consider HSQ. No events on telemetry and likely d/c telemetry tomorrow on 8/3.    Plan discussed with resident.

## 2019-08-02 NOTE — PROGRESS NOTE ADULT - PROBLEM SELECTOR PLAN 5
-hypertensive urgency as BP was 199/84 in ED; secondary to missed medications. BP now improving.  -continue with home hydralazine and coreg PO -hypertensive urgency as BP was 199/84 in ED; secondary to missed medications. resolved. BP now improved  -continue with home hydralazine and coreg PO

## 2019-08-02 NOTE — SWALLOW BEDSIDE ASSESSMENT ADULT - COMMENTS
Pt is a "64 y/o M with PMH of chalangiocarcinoma s/p Whipple (6/18/19) & feeding J-tube placement, s/p pigtail chest tube now removed for PTX, s/p IR drainage for intra-abdominal fluid collection, s/p ~4 weeks of Ertapenem via PICC line, presenting with acute AMS of one day duration possibly 2/2 to infection vs. progression of malignancy". CTH completed on 7/31/19 reported "no evidence of acute hemorrhage, mass, or mass effect". As per provider handoff note on 7/31, MRI reported "small R frontal love infarct and small b/l hematoma". As per GI noted on 8/2, pt "tolerating PO diet and mental status is back to baseline". Pt previously known to this service from prior admission (5/25/19) that rec'd mechanical soft solids and nectar thick liquids (see full report for details).     Pt seen upright at bedside. Pt received w/ eyes closed, though was alert/awake when given minimal prompting. Pt able to verbally respond to simple questions and follow simple directions. Patient refused hard solid presentation given lack of dentition and reports "it's too hard". Pt is a "64 y/o M with PMH of chalangiocarcinoma s/p Whipple (6/18/19) & feeding J-tube placement, s/p pigtail chest tube now removed for PTX, s/p IR drainage for intra-abdominal fluid collection, s/p ~4 weeks of Ertapenem via PICC line, presenting with acute AMS of one day duration possibly 2/2 to infection vs. progression of malignancy". CTH completed on 7/31/19 reported "no evidence of acute hemorrhage, mass, or mass effect". CXR completed on 7/29 revealed "Moderate L pleural effusion". As per provider handoff note on 7/31, MRI reported "small R frontal love infarct and small b/l hematoma". As per GI noted on 8/2, pt "tolerating PO diet and mental status is back to baseline". Pt previously known to this service from prior admission (5/25/19) that rec'd mechanical soft solids and nectar thick liquids (see full report for details).     Pt seen upright at bedside. Pt received w/ eyes closed, though was alert/awake when given minimal prompting. Pt able to verbally respond to simple questions and follow simple directions.

## 2019-08-02 NOTE — SWALLOW BEDSIDE ASSESSMENT ADULT - ASR SWALLOW ASPIRATION MONITOR
change of breathing pattern/cough/gurgly voice/fever/pneumonia/oral hygiene/position upright (90Y)/throat clearing

## 2019-08-02 NOTE — PROGRESS NOTE ADULT - PROBLEM SELECTOR PLAN 1
-More likely 2/2 metabolic encephalopathy vs subdural hematoma vs paraneoplastic syndrome vs polypharmacy, less likely 2/2 potential infection not yet identified vs undiagnosed metastatic disease in brain vs seizure  -LFTs wnl, ammonia elevated at 45  -CTH showed no intracranial bleeding, repeat on 8/1 was unchanged  -r/o sepsis: lactate 2.0, source present from intraabdominal fluid collection, but no hypotension, no fever, no leukocytosis  -CTAP showed subtle nodularity of omentum concerning for peritoneal carcinomatosis, need to consider possible mets to brain/meninges  -MRI w/ contrast (7/31) showed tiny R frontal subacute infarct and b/l small subdural hematomas, no evidence of enhancing mass or metastasis  -routine EEG (7/31) negative for seizures  -Appreciate heme/onc recs: f/u ascites fluid cytology  -SBP ruled out via diagnostic paracentesis  -Appreciate ID recs: start zosyn 3.375g q8 - okay to stop if surgery okay; hold ertapenem as it can cause confusion  -UA negative aside from 100 protein, UCx NGTD  -Respiratory virus panel negative  -BCx NGTD  -olanzapine IM 2.5 mg q8 prn for agitation  -hold home ambien and cyclobenzaprine for concerns of polypharmacy-induced AMS  -Appreciate Neuro recs: f/u recs; serum paraneoplastic panel outpatient; no LP at this time  -Appreciate Pulm recs: if pt develops SOB or dyspnea, consider thoracentesis and/or PleurX catheter for his loculated L pleural effusion on CTAP -More likely 2/2 metabolic encephalopathy vs subdural hematoma vs paraneoplastic syndrome vs polypharmacy, less likely 2/2 potential infection not yet identified vs undiagnosed metastatic disease in brain vs seizure  -LFTs wnl, ammonia elevated at 45  -CTH showed no intracranial bleeding, repeat on 8/1 was unchanged  -r/o sepsis: lactate 2.0, source present from intraabdominal fluid collection, but no hypotension, no fever, no leukocytosis  -CTAP showed subtle nodularity of omentum concerning for peritoneal carcinomatosis, need to consider possible mets to brain/meninges  -MRI w/ contrast (7/31) showed tiny R frontal subacute infarct and b/l small subdural hematomas, no evidence of enhancing mass or metastasis  -routine EEG (7/31) negative for seizures  -Appreciate heme/onc recs: f/u ascites fluid cytology  -SBP ruled out via diagnostic paracentesis  -Appreciate ID recs: dc'ed zosyn; hold ertapenem as it can cause confusion  -UA negative aside from 100 protein, UCx NGTD  -Respiratory virus panel negative  -BCx NGTD  -olanzapine IM 2.5 mg q8 prn for agitation  -hold home ambien and cyclobenzaprine for concerns of polypharmacy-induced AMS  -Appreciate Neuro recs: f/u recs; serum paraneoplastic panel outpatient; no LP at this time  -Appreciate Pulm recs: if pt develops SOB or dyspnea, consider thoracentesis and/or PleurX catheter for his loculated L pleural effusion on CTAP  -f/u pleural effusion outpatient -More likely 2/2 metabolic encephalopathy vs subdural hematoma vs paraneoplastic syndrome vs polypharmacy, less likely 2/2 potential infection not yet identified vs undiagnosed metastatic disease in brain vs seizure  -LFTs wnl, ammonia elevated at 45  -CTH showed no intracranial bleeding, repeat on 8/1 was unchanged  -r/o sepsis: lactate 2.0, source present from intraabdominal fluid collection, but no hypotension, no fever, no leukocytosis  -CTAP showed subtle nodularity of omentum concerning for peritoneal carcinomatosis, need to consider possible mets to brain/meninges  -MRI w/ contrast (7/31) showed tiny R frontal subacute infarct and b/l small subdural hematomas, no evidence of enhancing mass or metastasis  -routine EEG (7/31) negative for seizures  -Appreciate heme/onc recs: f/u ascites fluid cytology  -SBP ruled out via diagnostic paracentesis  -Appreciate ID recs: dc'ed zosyn; hold ertapenem as it can cause confusion  -UA negative aside from 100 protein, UCx NGTD  -Respiratory virus panel negative  -BCx NGTD  -olanzapine IM 2.5 mg q8 prn for agitation  -hold home ambien and cyclobenzaprine for concerns of polypharmacy-induced AMS  -Appreciate Neuro recs: f/u recs; serum paraneoplastic panel outpatient; no LP at this time; neuromuscular workup can be outpatient  -Appreciate Pulm recs: if pt develops SOB or dyspnea, consider thoracentesis and/or PleurX catheter for his loculated L pleural effusion on CTAP  -f/u pleural effusion outpatient

## 2019-08-02 NOTE — SWALLOW BEDSIDE ASSESSMENT ADULT - ADDITIONAL RECOMMENDATIONS
1. Monitor for PO tolerance/intake  2. Monitor for any change in neurological status that may impact oral intake

## 2019-08-02 NOTE — PROGRESS NOTE ADULT - PROBLEM SELECTOR PLAN 2
-s/p Whipple c/b abscess s/p perc drainage by IR on 7/1; residual fluid collection 2.4cm x 2.2cm still present on CTAP though slightly decreased in size  -IR contacted for re-evaluation of source control, but stated no intervention at this time  -CTAP showed subtle nodularity of omentum concerning for peritoneal carcinomatosis  -outpatient oncology f/u  -f/u serum paraneoplastic panel outpatient  -Ascites fluid cytology negative for malignancy

## 2019-08-02 NOTE — PROGRESS NOTE ADULT - PROBLEM SELECTOR PLAN 8
-Stopped glucerna tube feeds, on dysphagia diet  -lantus held and will continue with ISS, will monitor blood sugar and adjust insulin regimen as needed -Stopped glucerna tube feeds, on dysphagia diet w/ glucerna  -lantus held and will continue with ISS, will monitor blood sugar and adjust insulin regimen as needed

## 2019-08-03 ENCOUNTER — TRANSCRIPTION ENCOUNTER (OUTPATIENT)
Age: 64
End: 2019-08-03

## 2019-08-03 VITALS — SYSTOLIC BLOOD PRESSURE: 142 MMHG | HEART RATE: 72 BPM | DIASTOLIC BLOOD PRESSURE: 70 MMHG

## 2019-08-03 LAB
ANION GAP SERPL CALC-SCNC: 8 MMO/L — SIGNIFICANT CHANGE UP (ref 7–14)
BACTERIA BLD CULT: SIGNIFICANT CHANGE UP
BACTERIA BLD CULT: SIGNIFICANT CHANGE UP
BLD GP AB SCN SERPL QL: NEGATIVE — SIGNIFICANT CHANGE UP
BUN SERPL-MCNC: 18 MG/DL — SIGNIFICANT CHANGE UP (ref 7–23)
CALCIUM SERPL-MCNC: 8.4 MG/DL — SIGNIFICANT CHANGE UP (ref 8.4–10.5)
CHLORIDE SERPL-SCNC: 111 MMOL/L — HIGH (ref 98–107)
CO2 SERPL-SCNC: 20 MMOL/L — LOW (ref 22–31)
CREAT SERPL-MCNC: 1.39 MG/DL — HIGH (ref 0.5–1.3)
GLUCOSE BLDC GLUCOMTR-MCNC: 189 MG/DL — HIGH (ref 70–99)
GLUCOSE BLDC GLUCOMTR-MCNC: 194 MG/DL — HIGH (ref 70–99)
GLUCOSE BLDC GLUCOMTR-MCNC: 202 MG/DL — HIGH (ref 70–99)
GLUCOSE SERPL-MCNC: 199 MG/DL — HIGH (ref 70–99)
HCT VFR BLD CALC: 28.3 % — LOW (ref 39–50)
HGB BLD-MCNC: 8.8 G/DL — LOW (ref 13–17)
MAGNESIUM SERPL-MCNC: 2 MG/DL — SIGNIFICANT CHANGE UP (ref 1.6–2.6)
MCHC RBC-ENTMCNC: 29.7 PG — SIGNIFICANT CHANGE UP (ref 27–34)
MCHC RBC-ENTMCNC: 31.1 % — LOW (ref 32–36)
MCV RBC AUTO: 95.6 FL — SIGNIFICANT CHANGE UP (ref 80–100)
NRBC # FLD: 0 K/UL — SIGNIFICANT CHANGE UP (ref 0–0)
PHOSPHATE SERPL-MCNC: 2.7 MG/DL — SIGNIFICANT CHANGE UP (ref 2.5–4.5)
PLATELET # BLD AUTO: 198 K/UL — SIGNIFICANT CHANGE UP (ref 150–400)
PMV BLD: 9.8 FL — SIGNIFICANT CHANGE UP (ref 7–13)
POTASSIUM SERPL-MCNC: 4.3 MMOL/L — SIGNIFICANT CHANGE UP (ref 3.5–5.3)
POTASSIUM SERPL-SCNC: 4.3 MMOL/L — SIGNIFICANT CHANGE UP (ref 3.5–5.3)
RBC # BLD: 2.96 M/UL — LOW (ref 4.2–5.8)
RBC # FLD: 19.3 % — HIGH (ref 10.3–14.5)
RH IG SCN BLD-IMP: POSITIVE — SIGNIFICANT CHANGE UP
SODIUM SERPL-SCNC: 139 MMOL/L — SIGNIFICANT CHANGE UP (ref 135–145)
WBC # BLD: 7.64 K/UL — SIGNIFICANT CHANGE UP (ref 3.8–10.5)
WBC # FLD AUTO: 7.64 K/UL — SIGNIFICANT CHANGE UP (ref 3.8–10.5)

## 2019-08-03 PROCEDURE — 99238 HOSP IP/OBS DSCHRG MGMT 30/<: CPT | Mod: GC

## 2019-08-03 PROCEDURE — 71250 CT THORAX DX C-: CPT | Mod: 26

## 2019-08-03 RX ORDER — PANTOPRAZOLE SODIUM 20 MG/1
40 TABLET, DELAYED RELEASE ORAL
Refills: 0 | Status: DISCONTINUED | OUTPATIENT
Start: 2019-08-03 | End: 2019-08-03

## 2019-08-03 RX ORDER — INSULIN GLARGINE 100 [IU]/ML
4 INJECTION, SOLUTION SUBCUTANEOUS
Qty: 0 | Refills: 0 | DISCHARGE
Start: 2019-08-03

## 2019-08-03 RX ORDER — ASPIRIN/CALCIUM CARB/MAGNESIUM 324 MG
81 TABLET ORAL DAILY
Refills: 0 | Status: DISCONTINUED | OUTPATIENT
Start: 2019-08-03 | End: 2019-08-03

## 2019-08-03 RX ORDER — HEPARIN SODIUM 5000 [USP'U]/ML
5000 INJECTION INTRAVENOUS; SUBCUTANEOUS EVERY 12 HOURS
Refills: 0 | Status: DISCONTINUED | OUTPATIENT
Start: 2019-08-03 | End: 2019-08-03

## 2019-08-03 RX ORDER — LANOLIN ALCOHOL/MO/W.PET/CERES
1 CREAM (GRAM) TOPICAL
Qty: 0 | Refills: 0 | DISCHARGE
Start: 2019-08-03

## 2019-08-03 RX ORDER — HEPARIN SODIUM 5000 [USP'U]/ML
5000 INJECTION INTRAVENOUS; SUBCUTANEOUS EVERY 8 HOURS
Refills: 0 | Status: DISCONTINUED | OUTPATIENT
Start: 2019-08-03 | End: 2019-08-03

## 2019-08-03 RX ORDER — CARVEDILOL PHOSPHATE 80 MG/1
1 CAPSULE, EXTENDED RELEASE ORAL
Qty: 60 | Refills: 0
Start: 2019-08-03 | End: 2019-09-01

## 2019-08-03 RX ADMIN — Medication 1: at 12:25

## 2019-08-03 RX ADMIN — Medication 81 MILLIGRAM(S): at 13:46

## 2019-08-03 RX ADMIN — PANTOPRAZOLE SODIUM 40 MILLIGRAM(S): 20 TABLET, DELAYED RELEASE ORAL at 08:21

## 2019-08-03 RX ADMIN — Medication 2: at 08:21

## 2019-08-03 RX ADMIN — HEPARIN SODIUM 5000 UNIT(S): 5000 INJECTION INTRAVENOUS; SUBCUTANEOUS at 13:46

## 2019-08-03 RX ADMIN — Medication 50 MILLIGRAM(S): at 05:53

## 2019-08-03 RX ADMIN — PANTOPRAZOLE SODIUM 40 MILLIGRAM(S): 20 TABLET, DELAYED RELEASE ORAL at 05:52

## 2019-08-03 RX ADMIN — CHLORHEXIDINE GLUCONATE 1 APPLICATION(S): 213 SOLUTION TOPICAL at 17:17

## 2019-08-03 RX ADMIN — CARVEDILOL PHOSPHATE 12.5 MILLIGRAM(S): 80 CAPSULE, EXTENDED RELEASE ORAL at 05:52

## 2019-08-03 RX ADMIN — Medication 1 TABLET(S): at 12:26

## 2019-08-03 RX ADMIN — Medication 50 MILLIGRAM(S): at 13:46

## 2019-08-03 RX ADMIN — CHLORHEXIDINE GLUCONATE 1 APPLICATION(S): 213 SOLUTION TOPICAL at 05:53

## 2019-08-03 RX ADMIN — CARVEDILOL PHOSPHATE 12.5 MILLIGRAM(S): 80 CAPSULE, EXTENDED RELEASE ORAL at 17:18

## 2019-08-03 RX ADMIN — Medication 1: at 17:17

## 2019-08-03 NOTE — PROGRESS NOTE ADULT - PROBLEM SELECTOR PLAN 1
-More likely 2/2 metabolic encephalopathy vs subdural hematoma vs paraneoplastic syndrome vs polypharmacy, less likely 2/2 potential infection not yet identified vs undiagnosed metastatic disease in brain vs seizure  -LFTs wnl, ammonia elevated at 45  -CTH showed no intracranial bleeding, repeat on 8/1 was unchanged  -r/o sepsis: lactate 2.0, source present from intraabdominal fluid collection, but no hypotension, no fever, no leukocytosis  -CTAP showed subtle nodularity of omentum concerning for peritoneal carcinomatosis, need to consider possible mets to brain/meninges  -MRI w/ contrast (7/31) showed tiny R frontal subacute infarct and b/l small subdural hematomas, no evidence of enhancing mass or metastasis  -routine EEG (7/31) negative for seizures  -Appreciate heme/onc recs  -SBP ruled out via diagnostic paracentesis  -Appreciate ID recs: dc'ed zosyn; hold ertapenem as it can cause confusion  -UA negative aside from 100 protein, UCx NGTD  -Respiratory virus panel negative  -BCx NGTD  -olanzapine IM 2.5 mg q8 prn for agitation  -hold home ambien and cyclobenzaprine for concerns of polypharmacy-induced AMS  -Appreciate Neuro recs: f/u recs; serum paraneoplastic panel outpatient; no LP at this time  -Appreciate Pulm recs: if pt develops SOB or dyspnea, consider thoracentesis and/or PleurX catheter for his loculated L pleural effusion on CTAP  -f/u CT Chest for re-eval of pleural effusion  -f/u pleural effusion outpatient -More likely 2/2 metabolic encephalopathy vs subdural hematoma vs paraneoplastic syndrome vs polypharmacy, less likely 2/2 potential infection not yet identified vs undiagnosed metastatic disease in brain vs seizure  -LFTs wnl, ammonia elevated at 45  -CTH showed no intracranial bleeding, repeat on 8/1 was unchanged  -r/o sepsis: lactate 2.0, source present from intraabdominal fluid collection, but no hypotension, no fever, no leukocytosis  -CTAP showed subtle nodularity of omentum concerning for peritoneal carcinomatosis, need to consider possible mets to brain/meninges  -MRI w/ contrast (7/31) showed tiny R frontal subacute infarct and b/l small subdural hematomas, no evidence of enhancing mass or metastasis  -routine EEG (7/31) negative for seizures  -Appreciate heme/onc recs  -SBP ruled out via diagnostic paracentesis  -Appreciate ID recs: dc'ed zosyn; hold ertapenem as it can cause confusion  -UA negative aside from 100 protein, UCx NGTD  -Respiratory virus panel negative  -BCx NGTD  -olanzapine IM 2.5 mg q8 prn for agitation  -hold home ambien and cyclobenzaprine for concerns of polypharmacy-induced AMS  -Appreciate Neuro recs: f/u recs; serum paraneoplastic panel outpatient; no LP at this time; neuromuscular w/u can be outpatient  -Appreciate Pulm recs: if pt develops SOB or dyspnea, consider thoracentesis and/or PleurX catheter for his loculated L pleural effusion on CTAP  -f/u CT Chest for re-eval of pleural effusion  -f/u pleural effusion outpatient

## 2019-08-03 NOTE — PROGRESS NOTE ADULT - PROBLEM SELECTOR PLAN 7
-s/p 9 stents  -c/w statin, coreg  -hold ASA in setting of b/l subdural hematoma, concern for GIB  -elevated trop 182 on presentation, repeat trop 158; likely elevated in setting of CKD and NSTEMI in May, low suspicion for new MI  -EKG showed no ST changes  -on telemetry for monitoring, and for new frontal infarct -s/p 9 stents  -c/w statin, coreg  -hold ASA in setting of b/l subdural hematoma, concern for GIB  -elevated trop 182 on presentation, repeat trop 158; likely elevated in setting of CKD and NSTEMI in May, low suspicion for new MI  -EKG showed no ST changes  -will dc telemetry as no significant events found since admission

## 2019-08-03 NOTE — PROGRESS NOTE ADULT - SUBJECTIVE AND OBJECTIVE BOX
Patient is a 63y old  Male who presents with a chief complaint of AMS (02 Aug 2019 11:58)      SUBJECTIVE / OVERNIGHT EVENTS:    MEDICATIONS  (STANDING):  acetaminophen    Suspension .. 650 milliGRAM(s) Oral every 6 hours  atorvastatin 40 milliGRAM(s) Oral at bedtime  carvedilol 12.5 milliGRAM(s) Oral every 12 hours  chlorhexidine 4% Liquid 1 Application(s) Topical two times a day  dextrose 5%. 1000 milliLiter(s) (50 mL/Hr) IV Continuous <Continuous>  dextrose 50% Injectable 25 Gram(s) IV Push once  dextrose 50% Injectable 25 Gram(s) IV Push once  hydrALAZINE 50 milliGRAM(s) Oral every 8 hours  insulin lispro (HumaLOG) corrective regimen sliding scale   SubCutaneous three times a day before meals  insulin lispro (HumaLOG) corrective regimen sliding scale   SubCutaneous at bedtime  lactobacillus acidophilus 1 Tablet(s) Oral daily  melatonin 3 milliGRAM(s) Oral at bedtime  pantoprazole    Tablet 40 milliGRAM(s) Oral before breakfast    MEDICATIONS  (PRN):  dextrose 40% Gel 15 Gram(s) Oral once PRN Blood Glucose LESS THAN 70 milliGRAM(s)/deciLiter  glucagon  Injectable 1 milliGRAM(s) IntraMuscular once PRN Glucose <70 milliGRAM(s)/deciLiter  OLANZapine Injectable 2.5 milliGRAM(s) IntraMuscular every 8 hours PRN severe agitation.        CAPILLARY BLOOD GLUCOSE      POCT Blood Glucose.: 208 mg/dL (02 Aug 2019 20:58)  POCT Blood Glucose.: 174 mg/dL (02 Aug 2019 16:53)  POCT Blood Glucose.: 195 mg/dL (02 Aug 2019 12:04)  POCT Blood Glucose.: 211 mg/dL (02 Aug 2019 07:39)    I&O's Summary      PHYSICAL EXAM:  GENERAL: NAD, well-developed  HEAD:  Atraumatic, Normocephalic  EYES: EOMI, PERRLA, conjunctiva and sclera clear  NECK: Supple, No JVD  CHEST/LUNG: Clear to auscultation bilaterally; No wheeze  HEART: Regular rate and rhythm; No murmurs, rubs, or gallops  ABDOMEN: Soft, Nontender, Nondistended; Bowel sounds present  EXTREMITIES:  2+ Peripheral Pulses, No clubbing, cyanosis, or edema  PSYCH: AAOx3  NEUROLOGY: non-focal  SKIN: No rashes or lesions    LABS:  (08-03 @ 06:05)                        8.8  7.64 )-----------( 198                 28.3    Neutrophils = -- (--%)  Lymphocytes = -- (--%)  Eosinophils = -- (--%)  Basophils = -- (--%)  Monocytes = -- (--%)  Bands = --%    WBC Trend: 7.64<--, 8.59<--, 7.62<--  Hb Trend: 8.8<--, 9.2<--, 8.0<--, 9.2<--, 8.3<--  Plt Trend: 198<--, 184<--, 173<--, 232<--, 196<--  08-03    139  |  111<H>  |  18  ----------------------------<  199<H>  4.3   |  20<L>  |  1.39<H>    Ca    8.4      03 Aug 2019 06:05  Phos  2.7     08-03  Mg     2.0     08-03    TPro  6.0  /  Alb  x   /  TBili  x   /  DBili  x   /  AST  x   /  ALT  x   /  AlkPhos  x   08-02    Creatinine Trend: 1.39<--, 1.62<--, 1.67<--, 1.79<--, 1.50<--, 1.43<--    CARDIAC MARKERS ( 02 Aug 2019 06:50 )  Trop x     / CK 41 u/L / CKMB x               Microbiology:  Urine Cx:  Blood Cx:    RADIOLOGY & ADDITIONAL TESTS:  X- Ray:  CT:  Ultrasound:  [ ] imaging personally reviewed and interpreted by me    Consultant(s) Notes Reviewed:      Care Discussed with Consultants/Other Providers: Grady Ferro, PGY-1  Team 3  Contact/Pager: [phone] / [phone]    OVERNIGHT / SUBJECTIVE EVENTS:  -no acute events overnight  -Pt was seen and examined at bedside. Pt had no complaints, feels better. Inquired about today's plan and expressed desire to go home with home PT rather than rehab. Reported an episode of dark loose stool last night. Otherwise denied f/c/n/v, CP, SOB, changes in vision, abdominal pain, dysuria, constipation, and diarrhea.    MEDICATIONS  (STANDING):  acetaminophen    Suspension .. 650 milliGRAM(s) Oral every 6 hours  aspirin  chewable 81 milliGRAM(s) Oral daily  atorvastatin 40 milliGRAM(s) Oral at bedtime  carvedilol 12.5 milliGRAM(s) Oral every 12 hours  chlorhexidine 4% Liquid 1 Application(s) Topical two times a day  dextrose 5%. 1000 milliLiter(s) (50 mL/Hr) IV Continuous <Continuous>  dextrose 50% Injectable 25 Gram(s) IV Push once  dextrose 50% Injectable 25 Gram(s) IV Push once  heparin  Injectable 5000 Unit(s) SubCutaneous every 8 hours  hydrALAZINE 50 milliGRAM(s) Oral every 8 hours  insulin lispro (HumaLOG) corrective regimen sliding scale   SubCutaneous three times a day before meals  insulin lispro (HumaLOG) corrective regimen sliding scale   SubCutaneous at bedtime  lactobacillus acidophilus 1 Tablet(s) Oral daily  melatonin 3 milliGRAM(s) Oral at bedtime  pantoprazole    Tablet 40 milliGRAM(s) Oral before breakfast    MEDICATIONS  (PRN):  benzonatate 100 milliGRAM(s) Oral three times a day PRN Cough  dextrose 40% Gel 15 Gram(s) Oral once PRN Blood Glucose LESS THAN 70 milliGRAM(s)/deciLiter  glucagon  Injectable 1 milliGRAM(s) IntraMuscular once PRN Glucose <70 milliGRAM(s)/deciLiter  OLANZapine Injectable 2.5 milliGRAM(s) IntraMuscular every 8 hours PRN severe agitation.      Allergies    Cipro (Rash)  ertapenem (Other)  Plavix (Rash)    Intolerances        OBJECTIVE:  Vital Signs Last 24 Hrs  T(C): 36.8 (03 Aug 2019 12:26), Max: 37.1 (02 Aug 2019 21:02)  T(F): 98.3 (03 Aug 2019 12:26), Max: 98.7 (02 Aug 2019 21:02)  HR: 66 (03 Aug 2019 12:26) (66 - 75)  BP: 123/66 (03 Aug 2019 12:26) (121/63 - 164/78)  BP(mean): --  RR: 18 (03 Aug 2019 12:26) (17 - 18)  SpO2: 99% (03 Aug 2019 12:26) (96% - 99%)      CAPILLARY BLOOD GLUCOSE      POCT Blood Glucose.: 194 mg/dL (03 Aug 2019 12:01)  POCT Blood Glucose.: 202 mg/dL (03 Aug 2019 07:56)  POCT Blood Glucose.: 208 mg/dL (02 Aug 2019 20:58)  POCT Blood Glucose.: 174 mg/dL (02 Aug 2019 16:53)    I&O's Summary    PHYSICAL EXAM:  GENERAL: NAD, on room air, lying in bed, more awake and alert but still has moments of drowsiness, speech more coherent than previously. AAOx3  HEENT: Sclera clear  CHEST/RESPIRATORY: CTA b/l, decreased breath sounds on left lung base  CARDIOVASCULAR: Regular Rate and Rhythm, s1/s2 heard, no murmurs, rubs, or gallops appreciated  ABDOMINAL: Soft, nontender, nondistended; BS present, midline abdominal scar, + jejunostomy tube c/d/i  EXTREMITIES: 2+ pulses b/l, no edema b/l, LUE PICC C/D/I  NEURO: EOMI, PERRLA, following commands and spontaneously moving all extremities, fasciculations noted on b/l legs  PSYCH:  AAOx3 (oriented to self, place, and date)    LABS:                        8.8    7.64  )-----------( 198      ( 03 Aug 2019 06:05 )             28.3     08-03    139  |  111<H>  |  18  ----------------------------<  199<H>  4.3   |  20<L>  |  1.39<H>    Ca    8.4      03 Aug 2019 06:05  Phos  2.7     08-03  Mg     2.0     08-03    TPro  6.0  /  Alb  x   /  TBili  x   /  DBili  x   /  AST  x   /  ALT  x   /  AlkPhos  x   08-02    LIVER FUNCTIONS - ( 02 Aug 2019 06:50 )  Alb: x     / Pro: 6.0 g/dL / ALK PHOS: x     / ALT: x     / AST: x     / GGT: x             CARDIAC MARKERS ( 02 Aug 2019 06:50 )  x     / x     / 41 u/L / x     / x            MICROBIOLOGY:   no new micro    RADIOLOGY & ADDITIONAL TESTS:  no new imaging    CONSULTS:  GI, ID, Neurology, Surgery, Heme/Onc, and Pulmonology consult notes reviewed and discussed with teams.

## 2019-08-03 NOTE — DISCHARGE NOTE PROVIDER - CARE PROVIDER_API CALL
Mariaelena Hudson (DO)  HematologyOncology; Internal Medicine  [address] [place] [address]  Phone: [phone]  Fax: [phone]  Follow Up Time:     Matt Acuna)  Neurology  [address] [address]  Phone: [phone]  Fax: [phone]  Follow Up Time: Mariaelena Hudson (DO)  HematologyOncology; Internal Medicine  [address] [place] [address]  Phone: [phone]  Fax: [phone]  Follow Up Time:     Matt Acuna)  [place] [address]  Phone: [phone]  Fax: [phone]  Follow Up Time:     Alex Van)  Gastroenterology; Internal Medicine  [address] [place] [address], [address]  [place] [address]  Phone: [phone]  Fax: [phone]  Follow Up Time: Mariaelena Hudson (DO)  HematologyOncology; Internal Medicine  [address] [place] [address]  Phone: [phone]  Fax: [phone]  Follow Up Time:     Matt Acuna)  Neurology  [address] [address]  Phone: [phone]  Fax: [phone]  Follow Up Time:

## 2019-08-03 NOTE — DISCHARGE NOTE PROVIDER - NSDCFUSCHEDAPPT_GEN_ALL_CORE_FT
KAMRAN FIGUEROA ; 08/30/2019 ; [facility] Hepatology [facility] KAMRAN FIGUEROA ; 08/30/2019 ; [facility] Hepatology 415 [facility] KAMRAN FIGUEROA ; 08/30/2019 ; [facility] Hepatology [facility] KAMRAN FIGUEROA ; 08/30/2019 ; [facility] Hepatology [facility] KAMRAN FIGUEROA ; 08/30/2019 ; [facility] Hepatology [facility]

## 2019-08-03 NOTE — DISCHARGE NOTE NURSING/CASE MANAGEMENT/SOCIAL WORK - NSSCNAMETXT_GEN_ALL_CORE
[facility].  Initial visit will be day after discharge home. A nurse will call prior to home visit

## 2019-08-03 NOTE — DISCHARGE NOTE PROVIDER - NSFOLLOWUPCLINICS_GEN_ALL_ED_FT
[facility] Pulmonolgy and Sleep Medicine  Pulmonology  [address], [address]  [place] [address]  Phone: [phone]  Fax:     [facility] Gastroenterology  Gastroenterology  [address] [place] [address] [address]  [place] [address]  Phone: [phone]  Fax:   Follow Up Time:

## 2019-08-03 NOTE — DISCHARGE NOTE PROVIDER - NSDCCPCAREPLAN_GEN_ALL_CORE_FT
PRINCIPAL DISCHARGE DIAGNOSIS  Diagnosis: Altered mental status  Assessment and Plan of Treatment: You were found to have a change in your mental status that may have been due to medications or your antibiotic. Please avoid any hypnotic agents in the future including Ambien or benzodiazepines.      SECONDARY DISCHARGE DIAGNOSES  Diagnosis: Weakness  Assessment and Plan of Treatment: You were found to have some weakness of your extremities. You should continue physical therapy and follow up with neurology for certain tests for neuromuscular disorders. You may need a MRI of your cervical spine to further assess these findings.    Diagnosis: Hematemesis  Assessment and Plan of Treatment: You were found to have some blood that you vomited thas has since resolved. Continue taking protonix 40 mg daily and follow up with gastroenterology for endoscopy. Notify your doctor immediately or go to the emergency department if these symptoms recur. Follow up with your primary care doctor within 1-2 weeks for repeat blood test.    Diagnosis: CAD (coronary artery disease)  Assessment and Plan of Treatment: Continue your carvediolol, aspirin and statin as prescribed.    Diagnosis: Type II diabetes mellitus  Assessment and Plan of Treatment: Your blood sugar was well controlled. When you return home, you should decrease your Lantus dose to 4 units at night until you resume your normal eating habits. Check your morning blood sugar and follow up with your primary care doctor or endocrinologist for further recommendations.    Diagnosis: HTN (hypertension)  Assessment and Plan of Treatment: Your blood pressure was well controlled during your hospital stay. Continue your carvediolol and hydralazine as prescribed and follow up with your primary care doctor.    Diagnosis: Cholangiocarcinoma  Assessment and Plan of Treatment: Your CT scan is concerning for spread of your disease in the abdomen. Your asitic fluid was negative for any malignant cells; however, you should follow up with you oncologist Dr. Hudson for further recommendations to asess disease progression. PRINCIPAL DISCHARGE DIAGNOSIS  Diagnosis: Altered mental status  Assessment and Plan of Treatment: You were found to have a change in your mental status that may have been due to medications or your antibiotic. Please avoid any hypnotic agents in the future including Ambien or benzodiazepines.      SECONDARY DISCHARGE DIAGNOSES  Diagnosis: Pleural effusion, left  Assessment and Plan of Treatment: You were found to have a collection of fluid in your left lung. If you develop fevers, difficulty breathing or any other concerns, you should seek medical attention. Please follow up with pulmonology.    Diagnosis: Weakness  Assessment and Plan of Treatment: You were found to have some weakness of your extremities. You should continue physical therapy and follow up with neurology for certain tests for neuromuscular disorders. You may need a MRI of your cervical spine to further assess these findings.    Diagnosis: Hematemesis  Assessment and Plan of Treatment: You were found to have some blood that you vomited thas has since resolved. Continue taking protonix 40 mg daily and follow up with gastroenterology for endoscopy. Notify your doctor immediately or go to the emergency department if these symptoms recur. Follow up with your primary care doctor within 1-2 weeks for repeat blood test.    Diagnosis: CAD (coronary artery disease)  Assessment and Plan of Treatment: Continue your carvediolol, aspirin and statin as prescribed.    Diagnosis: Type II diabetes mellitus  Assessment and Plan of Treatment: Your blood sugar was well controlled. When you return home, you should decrease your Lantus dose to 4 units at night until you resume your normal eating habits. Check your morning blood sugar and follow up with your primary care doctor or endocrinologist for further recommendations.    Diagnosis: HTN (hypertension)  Assessment and Plan of Treatment: Your blood pressure was well controlled during your hospital stay. Continue your carvediolol and hydralazine as prescribed and follow up with your primary care doctor.    Diagnosis: Cholangiocarcinoma  Assessment and Plan of Treatment: Your CT scan is concerning for spread of your disease in the abdomen. Your asitic fluid was negative for any malignant cells; however, you should follow up with you oncologist Dr. Hudson for further recommendations to asess disease progression. PRINCIPAL DISCHARGE DIAGNOSIS  Diagnosis: Altered mental status  Assessment and Plan of Treatment: You were found to have a change in your mental status that may have been due to medications or your antibiotic. Please avoid any hypnotic agents in the future including Ambien or benzodiazepines.      SECONDARY DISCHARGE DIAGNOSES  Diagnosis: Pleural effusion, left  Assessment and Plan of Treatment: You were found to have a collection of fluid in your left lung. If you develop fevers, difficulty breathing or any other concerns, you should seek medical attention. Please follow up with pulmonology.    Diagnosis: Hematemesis  Assessment and Plan of Treatment: You were found to have some blood that you vomited thas has since resolved. Continue taking protonix 40 mg daily and follow up with gastroenterology for endoscopy. Notify your doctor immediately or go to the emergency department if these symptoms recur. Follow up with your primary care doctor within 1-2 weeks for repeat blood test.    Diagnosis: Weakness  Assessment and Plan of Treatment: You were found to have some weakness of your extremities. You should continue physical therapy and follow up with neurology for certain tests for neuromuscular disorders. You may need a MRI of your cervical spine to further assess these findings.    Diagnosis: CAD (coronary artery disease)  Assessment and Plan of Treatment: Continue your carvediolol, aspirin and statin as prescribed.    Diagnosis: Type II diabetes mellitus  Assessment and Plan of Treatment: Your blood sugar was well controlled. When you return home, you should decrease your Lantus dose to 4 units at night until you resume your normal eating habits. Check your morning blood sugar and follow up with your primary care doctor or endocrinologist for further recommendations.    Diagnosis: HTN (hypertension)  Assessment and Plan of Treatment: Your blood pressure was well controlled during your hospital stay. Continue your carvediolol and hydralazine as prescribed and follow up with your primary care doctor.    Diagnosis: Cholangiocarcinoma  Assessment and Plan of Treatment: Your CT scan is concerning for spread of your disease in the abdomen. Your asitic fluid was negative for any malignant cells; however, you should follow up with you oncologist Dr. Hudson for further recommendations to asess disease progression.

## 2019-08-03 NOTE — DISCHARGE NOTE PROVIDER - CARE PROVIDERS DIRECT ADDRESSES
,[email or web address]_Unknown,[email or web address],DirectAddress_Unknown

## 2019-08-03 NOTE — DISCHARGE NOTE PROVIDER - PROVIDER TOKENS
PROVIDER:[TOKEN:[[id number]]],PROVIDER:[TOKEN:[[id number]]] PROVIDER:[TOKEN:[[id number]]],PROVIDER:[TOKEN:[[id number]]],PROVIDER:[TOKEN:[[id number]]] PROVIDER:[TOKEN:[[id number]]],PROVIDER:[TOKEN:[[id number]]]

## 2019-08-03 NOTE — PROGRESS NOTE ADULT - PROBLEM SELECTOR PLAN 4
-multiple episodes of coffee ground emesis on 7/30 morning, none since then. -Continues to have episodes of dark stool  -Hgb decreased from yesterday, but relatively stable ~8-9  -Appreciate GI recs: will hold off on EGD for now, c/w IV PPI, holding ASA, transfuse if Hgb<7, NPO for now  -monitor CBC q12 -multiple episodes of coffee ground emesis on 7/30 morning, none since then. -Continues to have episodes of dark stool  -H/H stable ~8/9  -switched from IV protonix to PO  -Appreciate GI recs: will hold off on EGD for now, holding ASA, transfuse if Hgb<7, NPO for now  -monitor CBC q12

## 2019-08-03 NOTE — PROGRESS NOTE ADULT - ATTENDING COMMENTS
I was physically present for the key portions of the evaluation and management (E/M) service provided.  I agree with the above history, physical, and plan which I have reviewed and edited where appropriate, with the following additions:    63M h/o T2DM, CAD s/p 9 stents,  CKD3, cholangiocarcinoma s/p Whipple & feeding J tube placement (on 6/18/19) c/b hospital readmission on 7/1 for abdominal fluid collection s/p IR drainage and IV ertapenem via PICC line x4 weeks and for L PTX s/p pigtail chest tube now removed, presented with AMS likely med induced (ertapenem?) vs small b/l frontal, parietal, temporal subdural infarcts (also with small subacute frontal infarct) seen on MR brain, MS now improved (AAOx0>>AAOx3). Also found to have new moderate sized located left pleural effusion, currently monitoring per pulm and pending possible thoracentesis if develops symptoms. Fluid collection in abdomen with interval dec in size and no intervention per IR. Had moderate amount of abdominal ascites and s/p para without e/o SBP, cytology neg for malignant cells.     Course c/b coffee ground emesis on 7/30, possible melena on 7/31. Hgb has been stable in 8-9 range.  Treated with IV protonix BID. No GI intervention planned. Now on PO protonix. Patient passed S/S and recommended mechanical diet. Has had myoclonic jerks, ptosis, and evidence of UE weakness on neuro exam. UE weakness resolved.  EEG without evidence of epileptic discharge. Pending neuromuscular d/o w/u: myasthenia abs pending, paraneoplastic panel pending.    Completed course of abx. Encephalopathy appears to have resolved. Has had falls at home and suspect SDH 2/2 falls. Fall precautions ordered. PT recs rehab. Will discuss with family PT versus home with 24/7 care today. Will d/w neuro resuming ASA. Okay to resume HSQ. Mulitple 5-6 beats of NSVT on telemetry, consistent trend over past several days. Stable with BB. D/c telemetry. Will need to remove PICC if discharged today.     Plan discussed with resident. I was physically present for the key portions of the evaluation and management (E/M) service provided.  I agree with the above history, physical, and plan which I have reviewed and edited where appropriate, with the following additions:    63M h/o T2DM, CAD s/p 9 stents,  CKD3, cholangiocarcinoma s/p Whipple & feeding J tube placement (on 6/18/19) c/b hospital readmission on 7/1 for abdominal fluid collection s/p IR drainage and IV ertapenem via PICC line x4 weeks and for L PTX s/p pigtail chest tube now removed, presented with AMS likely med induced (ertapenem?) vs small b/l frontal, parietal, temporal subdural infarcts (also with small subacute frontal infarct) seen on MR brain, MS now improved (AAOx0>>AAOx3). Also found to have new moderate sized located left pleural effusion, currently monitoring per pulm and pending possible thoracentesis if develops symptoms. Fluid collection in abdomen with interval dec in size and no intervention per IR. Had moderate amount of abdominal ascites and s/p para without e/o SBP, cytology neg for malignant cells.     Course c/b coffee ground emesis on 7/30, possible melena on 7/31, resolved. Hgb has been stable in 8-9 range.  Treated with IV protonix BID. No GI intervention planned. Now on PO protonix. Patient passed S/S and recommended mechanical diet. Has had myoclonic jerks, ptosis, and evidence of UE weakness on neuro exam. UE weakness resolved.  EEG without evidence of epileptic discharge. Pending neuromuscular d/o w/u: myasthenia abs pending, paraneoplastic panel pending.    Completed course of abx. Encephalopathy appears to have resolved. Has had falls at home and suspect SDH 2/2 falls. Fall precautions ordered. PT recs rehab. Will discuss with family PT versus home with 24/7 care today. Will d/w neuro resuming ASA. Okay to resume HSQ. Mulitple 5-6 beats of NSVT on telemetry, consistent trend over past several days. Stable with BB. D/c telemetry. Will need to remove PICC if discharged today.     Plan discussed with resident.

## 2019-08-03 NOTE — PROGRESS NOTE ADULT - PROVIDER SPECIALTY LIST ADULT
Gastroenterology
Gastroenterology
Infectious Disease
Internal Medicine
Neurology
Surgery
Surgery
Gastroenterology

## 2019-08-03 NOTE — DISCHARGE NOTE PROVIDER - HOSPITAL COURSE
64 y/o M with PMH of cholangiocarcinoma s/p Whipple & feeding J tube placement (on 6/18/19) c/b hospital readmission on 7/1 for abdominal fluid collection s/p IR drainage and IV ertapenem via PICC line x4 weeks and for PTX s/p pigtail chest tube now removed, CAD s/p 9 stents, DM2 on insulin, HTN, GERD, presenting with altered mental status over the past day. Pt was recently discharged from hospital under Dr. Viveros's service on 7/10 for the chest tube placement for a trace L pneumothorax and IR drain placement for the abdominal fluid collection found after his Whipple procedure. Pt is altered and unable to provide history. As per wife and son Jefferson via phone calls, pt has been weak since discharge, but according to wife, pt has been AAOx3 and mental status acutely changed over the past day with refusal to take his usual Glucerna last night. Son, on the other hand, stated that there appeared to be signs of mental changes over the past 2 weeks after discharge. As per wife, pt takes his Glucerna by mouth and does not use j-tube, only there "just in case."         In ED, pt was afebrile, /68 up to 199/84, RR 16, SpO2 100% on RA. No elevated WBC found on labs. Elevated troponin of 182 and 158 on repeat, but EKG showed no ST changes. Received aspirin, tylenol, vanc/zosyn x1, and 1L NS bolus. Admitted to Medicine for further management and AMS workup.        Patient was admitted to medicine service, experiencing agitated delirium. Encephalopathy was thought to be possible to infection vs. polypharmacy vs. metastatic disease. He underwent MRI for head, requiring sedation, which demonstrated small frontal infarct with trace bilateral SDH. EEG was remarkable for myoclonic activity without epileptiform discharges.         Mental status improved greatly by day three of admission. Neurology recommended further work up for neuromuscular disorders and extremity paresis. IV antibiotics were discontinued, given interval decrease in collection adjacent to jejunal stump with no fevers or leukocytosis during admission as well as negative blood cultures.         Hospital course was complicated by episodes of coffee-ground emesis and dark stool that resolved with stable H/H requiring no transfusion. Patient was weaned from IV PPI to PO Protonix.         Diagnostic paracentesis negative for SBP or malignant cells.         PT recommending [facility]NA; patient and family opting for________ 62 y/o M with PMH of cholangiocarcinoma s/p Whipple & feeding J tube placement (on 6/18/19) c/b hospital readmission on 7/1 for abdominal fluid collection s/p IR drainage and IV ertapenem via PICC line x4 weeks and for PTX s/p pigtail chest tube now removed, CAD s/p 9 stents, DM2 on insulin, HTN, GERD, presenting with altered mental status over the past day. Pt was recently discharged from hospital under Dr. Viveros's service on 7/10 for the chest tube placement for a trace L pneumothorax and IR drain placement for the abdominal fluid collection found after his Whipple procedure. Pt is altered and unable to provide history. As per wife and son Jefferson via phone calls, pt has been weak since discharge, but according to wife, pt has been AAOx3 and mental status acutely changed over the past day with refusal to take his usual Glucerna last night. Son, on the other hand, stated that there appeared to be signs of mental changes over the past 2 weeks after discharge. As per wife, pt takes his Glucerna by mouth and does not use j-tube, only there "just in case."         In ED, pt was afebrile, /68 up to 199/84, RR 16, SpO2 100% on RA. No elevated WBC found on labs. Elevated troponin of 182 and 158 on repeat, but EKG showed no ST changes. Received aspirin, tylenol, vanc/zosyn x1, and 1L NS bolus. Admitted to Medicine for further management and AMS workup.        Patient was admitted to medicine service, experiencing agitated delirium. Encephalopathy was thought to be possible to infection vs. polypharmacy vs. metastatic disease. He underwent MRI for head, requiring sedation, which demonstrated small frontal infarct with trace bilateral SDH. EEG was remarkable for myoclonic activity without epileptiform discharges.         Mental status improved greatly by day three of admission. Neurology recommended further work up for neuromuscular disorders and extremity paresis. IV antibiotics were discontinued, given interval decrease in collection adjacent to jejunal stump with no fevers or leukocytosis during admission as well as negative blood cultures.         Hospital course was complicated by episodes of coffee-ground emesis and dark stool that resolved with stable H/H requiring no transfusion. Patient was weaned from IV PPI to PO Protonix.         Diagnostic paracentesis negative for SBP or malignant cells. Patient underwent CT chest to better characterize loculated effusion demonstrating_______        PT recommending [facility]; patient and family opting for home PT, given that patient could ambulate with walker with issue. 63M h/o T2DM, CAD s/p 9 stents, CKD3, cholangiocarcinoma s/p Whipple & feeding J tube placement (on 6/18/19) c/b hospital readmission on 7/1 for abdominal fluid collection s/p IR drainage and IV ertapenem via PICC line x4 weeks and for L PTX s/p pigtail chest tube now removed, presented with AMS likely med induced (ertapenem?) vs small b/l frontal, parietal, temporal subdural infarcts (also with small subacute frontal infarct) seen on MR brain, MS now improved (AAOx0>>AAOx3). Also found to have new moderate sized loculated left pleural effusion, no hypoxia or SOB and pulm recommends to monitor. Fluid collection in abdomen with interval dec in size and no intervention per IR. Had moderate amount of abdominal ascites and s/p para without e/o SBP, cytology neg for malignant cells.         Course c/b coffee ground emesis on 7/30, possible melena on 7/31, resolved. Hgb has been stable in 8-9 range.  Treated with IV protonix BID and transitioned to PO protonix. No GI intervention planned. Patient passed S/S and recommended mechanical diet. Has had myoclonic jerks, ptosis, and evidence of UE weakness on neuro exam. UE weakness resolved.  EEG without evidence of epileptic discharge. Pending neuromuscular d/o w/u: myasthenia abs pending, paraneoplastic panel pending. Darnell f/u with outpt neuro.         Completed course of abx. Encephalopathy resolved and likely 2/2 ertapenem. Has had falls at home and suspect SDH 2/2 falls. Family agreeable to taking patient home, has assitance at home and will do outpt PT. Patient refused inpatient PT.  Obtained CT chest for better visualization of effusion seen on CT. Reviewed with pulmonology and continues to have no need for intervention (no SOB, normal sat on RA). Medically optimized for d/c home with assistance. Will need to f/u with neuro, onc, PMD.

## 2019-08-03 NOTE — PROGRESS NOTE ADULT - PROBLEM SELECTOR PLAN 9
-DVT ppx: HSQ held in setting of potential GI bleed, SCDs ordered  -Diet: stopped Glucerna tube feeds as pt passed bedside swallow, started on Dysphagia 2 Mechanical Soft-Thin Liquids diet w/ Glucerna  -Dispo: to be discussed with family; PT recommended rehab  Advanced Directives: spoke to wife who stated she was healthy care proxy and that she wanted "everything for him done." Will discuss consideration of palliative care consult with family. -DVT ppx: HSQ resumed  -Diet: stopped Glucerna tube feeds as pt passed bedside swallow, started on Dysphagia 2 Mechanical Soft-Thin Liquids diet w/ Glucerna  -Dispo: to be discussed with family; PT recommended rehab  Advanced Directives: spoke to wife who stated she was healthy care proxy and that she wanted "everything for him done." Will discuss consideration of palliative care consult with family.

## 2019-08-03 NOTE — PROGRESS NOTE ADULT - PROBLEM SELECTOR PLAN 8
-Stopped glucerna tube feeds, on dysphagia diet w/ glucerna  -lantus held and will continue with ISS, will monitor blood sugar and adjust insulin regimen as needed

## 2019-08-03 NOTE — DISCHARGE NOTE NURSING/CASE MANAGEMENT/SOCIAL WORK - NSDCDPATPORTLINK_GEN_ALL_CORE
You can access the [facility] Patient Portal, offered by [facility], by registering with the following website: [email or web address]

## 2019-08-03 NOTE — PROGRESS NOTE ADULT - PROBLEM SELECTOR PLAN 5
-hypertensive urgency as BP was 199/84 in ED; secondary to missed medications. resolved. BP now improved  -continue with home hydralazine and coreg PO

## 2019-08-03 NOTE — PROGRESS NOTE ADULT - PROBLEM SELECTOR PLAN 3
-small b/l subdural hematoma, possibly 2/2 fall prior to admission  -Carotid doppler showed b/l internal carotid stenoses of 16-49%  -on atorvastin 40mg qhs, holding ASA  -on telemetry, no evidence of Afib -small b/l subdural hematoma, possibly 2/2 fall prior to admission  -Carotid doppler showed b/l internal carotid stenoses of 16-49%  -on atorvastin 40mg qhs, holding ASA  -will dc telemetry as no significant events found since admission

## 2019-08-05 PROBLEM — C22.1 INTRAHEPATIC BILE DUCT CARCINOMA: Chronic | Status: ACTIVE | Noted: 2019-07-29

## 2019-08-05 LAB
BACTERIA FLD CULT: SIGNIFICANT CHANGE UP
GAS PNL BLDMV: SIGNIFICANT CHANGE UP
METHYLMALONATE SERPL-SCNC: 227 NMOL/L — SIGNIFICANT CHANGE UP (ref 0–378)

## 2019-08-06 LAB — IF BLOCK AB SER-ACNC: SIGNIFICANT CHANGE UP

## 2019-08-07 ENCOUNTER — FORM ENCOUNTER (OUTPATIENT)
Age: 64
End: 2019-08-07

## 2019-08-08 ENCOUNTER — OUTPATIENT (OUTPATIENT)
Dept: OUTPATIENT SERVICES | Facility: HOSPITAL | Age: 64
LOS: 1 days | End: 2019-08-08
Payer: COMMERCIAL

## 2019-08-08 ENCOUNTER — APPOINTMENT (OUTPATIENT)
Dept: NUCLEAR MEDICINE | Facility: IMAGING CENTER | Age: 64
End: 2019-08-08
Payer: COMMERCIAL

## 2019-08-08 DIAGNOSIS — C22.1 INTRAHEPATIC BILE DUCT CARCINOMA: ICD-10-CM

## 2019-08-08 DIAGNOSIS — Z90.49 ACQUIRED ABSENCE OF OTHER SPECIFIED PARTS OF DIGESTIVE TRACT: Chronic | ICD-10-CM

## 2019-08-08 PROCEDURE — A9552: CPT

## 2019-08-08 PROCEDURE — 78815 PET IMAGE W/CT SKULL-THIGH: CPT | Mod: 26,PI

## 2019-08-08 PROCEDURE — 78815 PET IMAGE W/CT SKULL-THIGH: CPT

## 2019-08-09 ENCOUNTER — APPOINTMENT (OUTPATIENT)
Dept: HEMATOLOGY ONCOLOGY | Facility: CLINIC | Age: 64
End: 2019-08-09
Payer: COMMERCIAL

## 2019-08-09 VITALS
DIASTOLIC BLOOD PRESSURE: 75 MMHG | OXYGEN SATURATION: 95 % | HEART RATE: 59 BPM | RESPIRATION RATE: 14 BRPM | TEMPERATURE: 97.5 F | WEIGHT: 171.96 LBS | BODY MASS INDEX: 26.99 KG/M2 | SYSTOLIC BLOOD PRESSURE: 153 MMHG

## 2019-08-09 DIAGNOSIS — R18.8 OTHER ASCITES: ICD-10-CM

## 2019-08-09 DIAGNOSIS — J91.0 MALIGNANT PLEURAL EFFUSION: ICD-10-CM

## 2019-08-09 LAB — PARANEOPLASTIC AB PNL SER: SIGNIFICANT CHANGE UP

## 2019-08-09 PROCEDURE — 99215 OFFICE O/P EST HI 40 MIN: CPT

## 2019-08-12 ENCOUNTER — APPOINTMENT (OUTPATIENT)
Dept: INFECTIOUS DISEASE | Facility: CLINIC | Age: 64
End: 2019-08-12

## 2019-08-12 LAB — ACID FAST STN SPEC: SIGNIFICANT CHANGE UP

## 2019-08-13 ENCOUNTER — INBOUND DOCUMENT (OUTPATIENT)
Age: 64
End: 2019-08-13

## 2019-08-13 ENCOUNTER — OTHER (OUTPATIENT)
Age: 64
End: 2019-08-13

## 2019-08-13 DIAGNOSIS — R26.2 DIFFICULTY IN WALKING, NOT ELSEWHERE CLASSIFIED: ICD-10-CM

## 2019-08-13 LAB — ACHR BIND AB SER-SCNC: 0 — SIGNIFICANT CHANGE UP

## 2019-08-14 ENCOUNTER — APPOINTMENT (OUTPATIENT)
Dept: PULMONOLOGY | Facility: CLINIC | Age: 64
End: 2019-08-14
Payer: COMMERCIAL

## 2019-08-14 VITALS
SYSTOLIC BLOOD PRESSURE: 128 MMHG | HEART RATE: 63 BPM | OXYGEN SATURATION: 98 % | WEIGHT: 172 LBS | TEMPERATURE: 97.7 F | RESPIRATION RATE: 15 BRPM | BODY MASS INDEX: 27 KG/M2 | DIASTOLIC BLOOD PRESSURE: 71 MMHG

## 2019-08-14 PROCEDURE — 94729 DIFFUSING CAPACITY: CPT

## 2019-08-14 PROCEDURE — 94060 EVALUATION OF WHEEZING: CPT

## 2019-08-14 PROCEDURE — 94726 PLETHYSMOGRAPHY LUNG VOLUMES: CPT

## 2019-08-14 PROCEDURE — ZZZZZ: CPT

## 2019-08-14 PROCEDURE — 99203 OFFICE O/P NEW LOW 30 MIN: CPT | Mod: 25

## 2019-08-14 NOTE — ASSESSMENT
[FreeTextEntry1] : I reviewed his current CAT scan and there is evidence of a moderate loculated left pleural effusion. There are a number of subcentimeter groundglass nodules in both lungs and there is hypermetabolism in the left pleura. It is highly likely that some if not all of these lesions are related to his primary cancer.\par The loculation would be best approached through a thoracoscopy which at this point seems excessive for his current condition.. There is a remote possibility that the pleural disease is related to his prior infections but this too would also have to be approached for thoracoscopy\par He will be seeing his oncologist shortly.

## 2019-08-14 NOTE — REASON FOR VISIT
[Follow-Up - From Hospitalization] : a hospitalization follow-up [Shortness of Breath] : shortness of Breath [Spouse] : spouse

## 2019-08-14 NOTE — HISTORY OF PRESENT ILLNESS
[FreeTextEntry1] : 63-year-old male with cholangiocarcinoma having undergone a Whipple procedure and now having metastases to liver, bone, lung and pleura. The patient has been noted to have a loculated pleural effusion and is becoming increasingly dyspneic. He is also is having some chest discomfort. The patient has significant coronary artery disease having had multiple stent placement.\par . The patient presented a little more than a month ago with pneumothorax. He was treated with chest tube drainage. He subsequently developed a loculated pleural effusion probably in mid to late July. Currently he is afebrile.\par

## 2019-08-14 NOTE — PHYSICAL EXAM
[Neck Appearance] : the appearance of the neck was normal [Heart Rate And Rhythm] : heart rate and rhythm were normal [Heart Sounds] : normal S1 and S2 [Abnormal Walk] : normal gait [Nail Clubbing] : no clubbing of the fingernails [Cyanosis, Localized] : no localized cyanosis [FreeTextEntry1] : decreased breath sounds and dullness left chest

## 2019-08-16 ENCOUNTER — OTHER (OUTPATIENT)
Age: 64
End: 2019-08-16

## 2019-08-18 ENCOUNTER — FORM ENCOUNTER (OUTPATIENT)
Age: 64
End: 2019-08-18

## 2019-08-19 ENCOUNTER — OUTPATIENT (OUTPATIENT)
Dept: OUTPATIENT SERVICES | Facility: HOSPITAL | Age: 64
LOS: 1 days | End: 2019-08-19
Payer: COMMERCIAL

## 2019-08-19 DIAGNOSIS — Z90.49 ACQUIRED ABSENCE OF OTHER SPECIFIED PARTS OF DIGESTIVE TRACT: Chronic | ICD-10-CM

## 2019-08-19 DIAGNOSIS — C22.1 INTRAHEPATIC BILE DUCT CARCINOMA: ICD-10-CM

## 2019-08-19 LAB — GLUCOSE BLDC GLUCOMTR-MCNC: 233 MG/DL — HIGH (ref 70–99)

## 2019-08-19 PROCEDURE — C1894: CPT

## 2019-08-19 PROCEDURE — 36561 INSERT TUNNELED CV CATH: CPT

## 2019-08-19 PROCEDURE — C1769: CPT

## 2019-08-19 PROCEDURE — 82962 GLUCOSE BLOOD TEST: CPT

## 2019-08-19 PROCEDURE — C1788: CPT

## 2019-08-19 PROCEDURE — 77001 FLUOROGUIDE FOR VEIN DEVICE: CPT

## 2019-08-19 PROCEDURE — 76937 US GUIDE VASCULAR ACCESS: CPT | Mod: 26

## 2019-08-19 PROCEDURE — 76937 US GUIDE VASCULAR ACCESS: CPT

## 2019-08-19 PROCEDURE — 77001 FLUOROGUIDE FOR VEIN DEVICE: CPT | Mod: 26

## 2019-08-20 ENCOUNTER — OUTPATIENT (OUTPATIENT)
Dept: OUTPATIENT SERVICES | Facility: HOSPITAL | Age: 64
LOS: 1 days | Discharge: HOME | End: 2019-08-20

## 2019-08-20 ENCOUNTER — FORM ENCOUNTER (OUTPATIENT)
Age: 64
End: 2019-08-20

## 2019-08-20 DIAGNOSIS — Z90.49 ACQUIRED ABSENCE OF OTHER SPECIFIED PARTS OF DIGESTIVE TRACT: Chronic | ICD-10-CM

## 2019-08-20 DIAGNOSIS — C22.1 INTRAHEPATIC BILE DUCT CARCINOMA: ICD-10-CM

## 2019-08-20 PROBLEM — K57.90 DIVERTICULOSIS OF INTESTINE, PART UNSPECIFIED, WITHOUT PERFORATION OR ABSCESS WITHOUT BLEEDING: Chronic | Status: ACTIVE | Noted: 2019-08-16

## 2019-08-20 PROBLEM — Z87.39 PERSONAL HISTORY OF OTHER DISEASES OF THE MUSCULOSKELETAL SYSTEM AND CONNECTIVE TISSUE: Chronic | Status: ACTIVE | Noted: 2019-08-16

## 2019-08-21 ENCOUNTER — APPOINTMENT (OUTPATIENT)
Dept: MRI IMAGING | Facility: CLINIC | Age: 64
End: 2019-08-21
Payer: COMMERCIAL

## 2019-08-21 ENCOUNTER — FORM ENCOUNTER (OUTPATIENT)
Age: 64
End: 2019-08-21

## 2019-08-21 ENCOUNTER — OUTPATIENT (OUTPATIENT)
Dept: OUTPATIENT SERVICES | Facility: HOSPITAL | Age: 64
LOS: 1 days | End: 2019-08-21
Payer: COMMERCIAL

## 2019-08-21 DIAGNOSIS — C22.1 INTRAHEPATIC BILE DUCT CARCINOMA: ICD-10-CM

## 2019-08-21 DIAGNOSIS — Z90.49 ACQUIRED ABSENCE OF OTHER SPECIFIED PARTS OF DIGESTIVE TRACT: Chronic | ICD-10-CM

## 2019-08-21 DIAGNOSIS — S32.10XA UNSPECIFIED FRACTURE OF SACRUM, INITIAL ENCOUNTER FOR CLOSED FRACTURE: ICD-10-CM

## 2019-08-21 PROCEDURE — 72158 MRI LUMBAR SPINE W/O & W/DYE: CPT

## 2019-08-21 PROCEDURE — 72158 MRI LUMBAR SPINE W/O & W/DYE: CPT | Mod: 26

## 2019-08-21 PROCEDURE — 72157 MRI CHEST SPINE W/O & W/DYE: CPT | Mod: 26

## 2019-08-21 PROCEDURE — A9585: CPT

## 2019-08-21 PROCEDURE — 72157 MRI CHEST SPINE W/O & W/DYE: CPT

## 2019-08-22 ENCOUNTER — APPOINTMENT (OUTPATIENT)
Age: 64
End: 2019-08-22

## 2019-08-22 ENCOUNTER — RESULT REVIEW (OUTPATIENT)
Age: 64
End: 2019-08-22

## 2019-08-22 ENCOUNTER — OUTPATIENT (OUTPATIENT)
Dept: OUTPATIENT SERVICES | Facility: HOSPITAL | Age: 64
LOS: 1 days | End: 2019-08-22
Payer: COMMERCIAL

## 2019-08-22 ENCOUNTER — LABORATORY RESULT (OUTPATIENT)
Age: 64
End: 2019-08-22

## 2019-08-22 ENCOUNTER — APPOINTMENT (OUTPATIENT)
Dept: RADIOLOGY | Facility: IMAGING CENTER | Age: 64
End: 2019-08-22
Payer: COMMERCIAL

## 2019-08-22 ENCOUNTER — APPOINTMENT (OUTPATIENT)
Age: 64
End: 2019-08-22
Payer: COMMERCIAL

## 2019-08-22 VITALS
HEART RATE: 70 BPM | TEMPERATURE: 97.5 F | DIASTOLIC BLOOD PRESSURE: 77 MMHG | OXYGEN SATURATION: 99 % | RESPIRATION RATE: 16 BRPM | SYSTOLIC BLOOD PRESSURE: 149 MMHG | WEIGHT: 174.17 LBS | BODY MASS INDEX: 27.34 KG/M2

## 2019-08-22 DIAGNOSIS — Z90.49 ACQUIRED ABSENCE OF OTHER SPECIFIED PARTS OF DIGESTIVE TRACT: Chronic | ICD-10-CM

## 2019-08-22 DIAGNOSIS — C22.1 INTRAHEPATIC BILE DUCT CARCINOMA: ICD-10-CM

## 2019-08-22 DIAGNOSIS — S32.10XA UNSPECIFIED FRACTURE OF SACRUM, INITIAL ENCOUNTER FOR CLOSED FRACTURE: ICD-10-CM

## 2019-08-22 DIAGNOSIS — R63.0 ANOREXIA: ICD-10-CM

## 2019-08-22 DIAGNOSIS — R05 COUGH: ICD-10-CM

## 2019-08-22 PROBLEM — J91.0 PLEURAL EFFUSION, MALIGNANT: Status: ACTIVE | Noted: 2019-08-14

## 2019-08-22 LAB
BASOPHILS # BLD AUTO: 0 K/UL — SIGNIFICANT CHANGE UP (ref 0–0.2)
BASOPHILS NFR BLD AUTO: 0 % — SIGNIFICANT CHANGE UP (ref 0–2)
EOSINOPHIL # BLD AUTO: 0 K/UL — SIGNIFICANT CHANGE UP (ref 0–0.5)
EOSINOPHIL NFR BLD AUTO: 0 % — SIGNIFICANT CHANGE UP (ref 0–6)
HCT VFR BLD CALC: 28.5 % — LOW (ref 39–50)
HGB BLD-MCNC: 9.6 G/DL — LOW (ref 13–17)
LYMPHOCYTES # BLD AUTO: 1.4 K/UL — SIGNIFICANT CHANGE UP (ref 1–3.3)
LYMPHOCYTES # BLD AUTO: 8.4 % — LOW (ref 13–44)
MCHC RBC-ENTMCNC: 31.6 PG — SIGNIFICANT CHANGE UP (ref 27–34)
MCHC RBC-ENTMCNC: 33.5 G/DL — SIGNIFICANT CHANGE UP (ref 32–36)
MCV RBC AUTO: 94.4 FL — SIGNIFICANT CHANGE UP (ref 80–100)
MONOCYTES # BLD AUTO: 1.4 K/UL — HIGH (ref 0–0.9)
MONOCYTES NFR BLD AUTO: 8.6 % — SIGNIFICANT CHANGE UP (ref 2–14)
NEUTROPHILS # BLD AUTO: 13.7 K/UL — HIGH (ref 1.8–7.4)
NEUTROPHILS NFR BLD AUTO: 82.9 % — HIGH (ref 43–77)
PLATELET # BLD AUTO: 129 K/UL — LOW (ref 150–400)
RBC # BLD: 3.02 M/UL — LOW (ref 4.2–5.8)
RBC # FLD: 16.3 % — HIGH (ref 10.3–14.5)
WBC # BLD: 16.5 K/UL — HIGH (ref 3.8–10.5)
WBC # FLD AUTO: 16.5 K/UL — HIGH (ref 3.8–10.5)

## 2019-08-22 PROCEDURE — 71046 X-RAY EXAM CHEST 2 VIEWS: CPT | Mod: 26

## 2019-08-22 PROCEDURE — 99214 OFFICE O/P EST MOD 30 MIN: CPT

## 2019-08-22 PROCEDURE — 71046 X-RAY EXAM CHEST 2 VIEWS: CPT

## 2019-08-22 RX ORDER — ASPIRIN/CALCIUM CARB/MAGNESIUM 324 MG
1 TABLET ORAL
Qty: 0 | Refills: 0 | DISCHARGE

## 2019-08-22 RX ORDER — DRONABINOL 2.5 MG
1 CAPSULE ORAL
Qty: 0 | Refills: 0 | DISCHARGE

## 2019-08-22 RX ORDER — PROMETHAZINE HYDROCHLORIDE AND CODEINE PHOSPHATE 6.25; 1 MG/5ML; MG/5ML
6.25-1 SOLUTION ORAL
Qty: 1 | Refills: 0 | Status: ACTIVE | COMMUNITY
Start: 2019-08-22 | End: 1900-01-01

## 2019-08-22 RX ORDER — DRONABINOL 5 MG/1
5 CAPSULE ORAL
Qty: 60 | Refills: 0 | Status: DISCONTINUED | COMMUNITY
Start: 2019-07-22 | End: 2019-08-22

## 2019-08-22 RX ORDER — ROSUVASTATIN CALCIUM 5 MG/1
1 TABLET ORAL
Qty: 0 | Refills: 0 | DISCHARGE

## 2019-08-22 NOTE — CONSULT LETTER
[Dear  ___] : Dear  [unfilled], [Courtesy Letter:] : I had the pleasure of seeing your patient, [unfilled], in my office today. [Please see my note below.] : Please see my note below. [Consult Closing:] : Thank you very much for allowing me to participate in the care of this patient.  If you have any questions, please do not hesitate to contact me. [Sincerely,] : Sincerely, [FreeTextEntry3] : Mariaelena Hudson D.O. \par Attending Physician \par [facility] On[facility] H[facility]ology\par  \par [facility] \par Tel: [phone]\par Fax: [phone]\par

## 2019-08-22 NOTE — REASON FOR VISIT
[Follow-Up Visit] : a follow-up [Spouse] : spouse [Family Member] : family member [FreeTextEntry2] : Cholangiocarcinoma

## 2019-08-22 NOTE — REVIEW OF SYSTEMS
[Fatigue] : fatigue [Recent Change In Weight] : ~T recent weight change [Joint Pain] : joint pain [Joint Stiffness] : joint stiffness [Negative] : Allergic/Immunologic

## 2019-08-22 NOTE — PHYSICAL EXAM
[Ambulatory and capable of all self care but unable to carry out any work activities] : Status 2- Ambulatory and capable of all self care but unable to carry out any work activities. Up and about more than 50% of waking hours [Normal] : affect appropriate [de-identified] : chronically ill appearing  [de-identified] : decrease BS at the bases b/l L>R [de-identified] : J tube in place

## 2019-08-22 NOTE — HISTORY OF PRESENT ILLNESS
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 He was experiencing poor PO intake, 15-20 lb weight loss, and vague abdominal pain and nausea over the previous couple of months. He had an EGD on 3/29 which did not show a malignancy. In the beginning of April 2019 pt noted increased jaundice and was scheduled for an ERCP/EUS on 4/19 but due to a rapid rise in his Tbili presented to [facility] Emergency room on 4/19/19. ERCP on 4/20/19 showed dilated CBD with stricture s/p biliary stent placement. One month later on 5/17/19 pt was admitted to [facility] with septic shock. He was intubated, started on pressors, noted to be in ARF. CT A/P non contrast showed biliary dilatation without an obvious mass. He was transferred to [facility] ICU on 5/18/19 for higher level care and remained in the hospital until June 8th. During the hospitalization he required dialysis, had periods of bradycardia, developed NSTEMI, stress test showed apical infarct, no stenting was performed. Repeat EUS/ERCP performed on 6/4/19 showed a 10 mm pancreatic head hypoechoic mass s/p FNA x 3, stents removed and 1 metal stent was placed in CBD. Biopsy and cytology negative for malignant cells. Given the mass and biliary stricture a Whipple surgery was recommended. \par \par On June 18, 2019, underwent whipple surgery without significant complication and was d/c on 6/25/19. Final path T3N2 (14 LNB+) He was readmitted on 7/1/19 with acute L pneumothorax and abscess in the operative bed. Pt required a chest tube and a IR drain for abscess. D/c home on 7/10 with 4 weeks of IV Ab via PICC line. \par \par Referred to medical oncology for further management. \par \par 7/29-8/3/19: admitted to [facility] with fever, encephalopathy 2/2 ertapenem. MRI brain with subacute small infarcts, diagnostic para neg for malignancy\par 8/8/19: PET/CT: peritoneal carcinomatosis, moderate ascites, fx of inferior sacrum, possible bone mets in TL spine, pleural mets, loculated pleural effusion, \par  [de-identified] : moderately differentiated [de-identified] : CA 19-9 9749 [de-identified] : + early satiety\par on and off napping throughout the day \par yesterday drank 2 Glucerna and ate a little soup. \par energy is low but is up during the day. Takes naps\par no SOB \par slight abdominal fullness \par moving bowels \par + mild LBP

## 2019-08-23 ENCOUNTER — OTHER (OUTPATIENT)
Age: 64
End: 2019-08-23

## 2019-08-23 ENCOUNTER — EMERGENCY (EMERGENCY)
Facility: HOSPITAL | Age: 64
LOS: 0 days | End: 2019-08-23
Attending: EMERGENCY MEDICINE
Payer: COMMERCIAL

## 2019-08-23 VITALS — TEMPERATURE: 98 F

## 2019-08-23 DIAGNOSIS — E11.22 TYPE 2 DIABETES MELLITUS WITH DIABETIC CHRONIC KIDNEY DISEASE: ICD-10-CM

## 2019-08-23 DIAGNOSIS — R11.2 NAUSEA WITH VOMITING, UNSPECIFIED: ICD-10-CM

## 2019-08-23 DIAGNOSIS — N18.3 CHRONIC KIDNEY DISEASE, STAGE 3 (MODERATE): ICD-10-CM

## 2019-08-23 DIAGNOSIS — I46.9 CARDIAC ARREST, CAUSE UNSPECIFIED: ICD-10-CM

## 2019-08-23 DIAGNOSIS — Z88.8 ALLERGY STATUS TO OTHER DRUGS, MEDICAMENTS AND BIOLOGICAL SUBSTANCES STATUS: ICD-10-CM

## 2019-08-23 DIAGNOSIS — Z95.5 PRESENCE OF CORONARY ANGIOPLASTY IMPLANT AND GRAFT: ICD-10-CM

## 2019-08-23 DIAGNOSIS — R53.83 OTHER FATIGUE: ICD-10-CM

## 2019-08-23 DIAGNOSIS — I25.10 ATHEROSCLEROTIC HEART DISEASE OF NATIVE CORONARY ARTERY WITHOUT ANGINA PECTORIS: ICD-10-CM

## 2019-08-23 DIAGNOSIS — Z90.49 ACQUIRED ABSENCE OF OTHER SPECIFIED PARTS OF DIGESTIVE TRACT: Chronic | ICD-10-CM

## 2019-08-23 DIAGNOSIS — Z51.11 ENCOUNTER FOR ANTINEOPLASTIC CHEMOTHERAPY: ICD-10-CM

## 2019-08-23 LAB — GLUCOSE BLDC GLUCOMTR-MCNC: 266 MG/DL — HIGH (ref 70–99)

## 2019-08-23 PROCEDURE — 92950 HEART/LUNG RESUSCITATION CPR: CPT

## 2019-08-23 PROCEDURE — 99285 EMERGENCY DEPT VISIT HI MDM: CPT | Mod: 25

## 2019-08-23 NOTE — ED PROVIDER NOTE - CLINICAL SUMMARY MEDICAL DECISION MAKING FREE TEXT BOX
Pt arrived in cardiac arrest, no Pt arrived in cardiac arrest, intubated in field, ETT balloon at cords, moved down visualized w glide-scope.  ACLS continued, pt given mult epi, calcium, given one bicarb, no ROSC.  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  No ROSC, unknown down time, pt pronounced at 19:22.  ME called, did not take case.

## 2019-08-23 NOTE — ED PROVIDER NOTE - OBJECTIVE STATEMENT
Pertinent PMH/PSH/FHx/SHx and Review of Systems contained within:    62yo M w PMH of  T2DM, CAD s/p 9 stents, CKD3, cholangiocarcinoma s/p Whipple & feeding J tube placement (on 6/18/19) c/b hospital readmission on 7/1 for abdominal fluid collection s/p IR drainage and IV ertapenem via PICC line x4 weeks and for L PTX s/p pigtail chest tube now removed, presented with AMS likely Pertinent PMH/PSH/FHx/SHx and Review of Systems contained within:    62yo M w PMH of DM2, CAD s/p 9 stents, CKD3, cholangiocarcinoma s/p Liliane, recently started on Gemzar, presents to ED in cardiac arrest.  Family & EMS report pt was feeling tired earlier in the day, went to take a nap this afternoon.  Unknown down time, EMS report on arrival pt in asystole, given epi x4, one bicarb, intubated in field & brought to ED CPR in progress.

## 2019-08-23 NOTE — ED PROVIDER NOTE - PHYSICAL EXAMINATION
Gen: unresponsive, CPR in progress  Head: NC, AT, pupils dilated, unreactive  ENT: pt intubated, balloon of ETT at vocal cords, moved down under visualization from glide scope  Neck: supple, Trachea midline  Pulm: Bilateral BS with bag ventilation  CV: pulseless  Abd: soft, dressings over port R anterior chest, dressings over peritoneal catheter C/D/I  Mskel: no edema/erythema, IO in RLE  Skin: no rash  Neuro: unable to assess

## 2019-08-26 ENCOUNTER — APPOINTMENT (OUTPATIENT)
Dept: PULMONOLOGY | Facility: HOSPITAL | Age: 64
End: 2019-08-26

## 2019-08-26 ENCOUNTER — APPOINTMENT (OUTPATIENT)
Dept: SURGICAL ONCOLOGY | Facility: CLINIC | Age: 64
End: 2019-08-26

## 2019-08-26 DIAGNOSIS — C22.1 INTRAHEPATIC BILE DUCT CARCINOMA: ICD-10-CM

## 2019-08-26 DIAGNOSIS — Z45.2 ENCOUNTER FOR ADJUSTMENT AND MANAGEMENT OF VASCULAR ACCESS DEVICE: ICD-10-CM

## 2019-08-28 ENCOUNTER — APPOINTMENT (OUTPATIENT)
Dept: HEMATOLOGY ONCOLOGY | Facility: CLINIC | Age: 64
End: 2019-08-28

## 2019-08-28 ENCOUNTER — APPOINTMENT (OUTPATIENT)
Dept: ULTRASOUND IMAGING | Facility: IMAGING CENTER | Age: 64
End: 2019-08-28

## 2019-08-30 ENCOUNTER — APPOINTMENT (OUTPATIENT)
Dept: HEPATOLOGY | Facility: CLINIC | Age: 64
End: 2019-08-30

## 2019-08-31 PROBLEM — C22.1 CHOLANGIOCARCINOMA: Status: ACTIVE | Noted: 2019-06-28

## 2019-08-31 PROBLEM — R05 COUGH: Status: ACTIVE | Noted: 2019-08-22

## 2019-08-31 PROBLEM — S32.10XA SACRAL FRACTURE: Status: ACTIVE | Noted: 2019-08-16

## 2019-08-31 PROBLEM — R63.0 ANOREXIA: Status: ACTIVE | Noted: 2019-07-22

## 2019-08-31 NOTE — CONSULT LETTER
[Dear  ___] : Dear  [unfilled], [Courtesy Letter:] : I had the pleasure of seeing your patient, [unfilled], in my office today. [Please see my note below.] : Please see my note below. [Consult Closing:] : Thank you very much for allowing me to participate in the care of this patient.  If you have any questions, please do not hesitate to contact me. [Sincerely,] : Sincerely, [FreeTextEntry3] : Mariaelena Hudson D.O. \par Attending Physician \par [facility] Oncology [facility] Hematology\par  \par [facility] \par Tel: [phone]\par Fax: [phone]\par

## 2019-08-31 NOTE — REVIEW OF SYSTEMS
[Fatigue] : fatigue [Cough] : cough [SOB on Exertion] : shortness of breath during exertion [Joint Pain] : joint pain [Joint Stiffness] : joint stiffness [Difficulty Walking] : difficulty walking [Negative] : Allergic/Immunologic

## 2019-08-31 NOTE — HISTORY OF PRESENT ILLNESS
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 He was experiencing poor PO intake, 15-20 lb weight loss, and vague abdominal pain and nausea over the previous couple of months. He had an EGD on 3/29 which did not show a malignancy. In the beginning of April 2019 pt noted increased jaundice and was scheduled for an ERCP/EUS on 4/19 but due to a rapid rise in his Tbili presented to [facility] Emergency room on 4/19/19. ERCP on 4/20/19 showed dilated CBD with stricture s/p biliary stent placement. One month later on 5/17/19 pt was admitted to [facility] with septic shock. He was intubated, started on pressors, noted to be in ARF. CT A/P non contrast showed biliary dilatation without an obvious mass. He was transferred to [facility] ICU on 5/18/19 for higher level care and remained in the hospital until June 8th. During the hospitalization he required dialysis, had periods of bradycardia, developed NSTEMI, stress test showed apical infarct, no stenting was performed. Repeat EUS/ERCP performed on 6/4/19 showed a 10 mm pancreatic head hypoechoic mass s/p FNA x 3, stents removed and 1 metal stent was placed in CBD. Biopsy and cytology negative for malignant cells. Given the mass and biliary stricture a Whipple surgery was recommended. \par \par On June 18, 2019, underwent whipple surgery without significant complication and was d/c on 6/25/19. Final path T3N2 (14 LNB+) He was readmitted on 7/1/19 with acute L pneumothorax and abscess in the operative bed. Pt required a chest tube and a IR drain for abscess. D/c home on 7/10 with 4 weeks of IV Ab via PICC line. \par \par Referred to medical oncology for further management. \par \par 7/29-8/3/19: admitted to [facility] with fever, encephalopathy 2/2 ertapenem. MRI brain with subacute small infarcts, diagnostic para neg for malignancy\par 8/8/19: PET/CT: peritoneal carcinomatosis, moderate ascites, fx of inferior sacrum, possible bone mets in TL spine, pleural mets, loculated pleural effusion, \par 8/21/19: MRI T/L/sacrum: multiple osseous mets involving multiple vertebral level, sacral fracture S5\par 8/22/19: C1 Gemzar 800 mg/m2\par  [de-identified] : moderately differentiated [de-identified] : CA 19-9 9709 [de-identified] : cont to remain weak. Needs some assistance with ADLs, but able to do most things by himself. He is alone for several hours during the day while wife is at work. \par + back pain, does not take oxycodone. \par Eating little, poor appetite. \par + increasing SOB and new dry cough. Afebrile. \par + nausea, no vomiting. Moving bowels.

## 2019-08-31 NOTE — PHYSICAL EXAM
[Ambulatory and capable of all self care but unable to carry out any work activities] : Status 2- Ambulatory and capable of all self care but unable to carry out any work activities. Up and about more than 50% of waking hours [Normal] : affect appropriate [de-identified] : chronically ill appearing  [de-identified] : wil BROWN R >L

## 2019-09-03 ENCOUNTER — APPOINTMENT (OUTPATIENT)
Dept: RADIATION ONCOLOGY | Facility: CLINIC | Age: 64
End: 2019-09-03

## 2019-09-04 ENCOUNTER — APPOINTMENT (OUTPATIENT)
Dept: HEMATOLOGY ONCOLOGY | Facility: CLINIC | Age: 64
End: 2019-09-04

## 2019-09-10 LAB — ACID FAST STN FLD: SIGNIFICANT CHANGE UP

## 2020-03-25 NOTE — CONSULT NOTE ADULT - PROBLEM SELECTOR PROBLEM 2
nicoderm 30 and albuterol  with 1 refill on 3.24.  I just aded more albuterol  :  HEART [address], [address] [place] [address] [phone]
HTN (hypertension)
Biliary stricture

## 2020-04-02 PROBLEM — R18.8 ASCITES: Status: ACTIVE | Noted: 2019-07-25

## 2020-04-25 NOTE — ED ADULT TRIAGE NOTE - CHIEF COMPLAINT QUOTE
Pt brought in as notification via EMS for cardiac arrest.  Arrived at 1908hrs, intubated, with CPR in progress. Unwitnessed from home, unk down time. Pt received 4 epi and 1 bicarb via IO from EMS. 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 4/20 BM Bx consistent with aplastic anemia  s/p ATG Intradermal test dose 4/22 - no reaction  start h-ATG, Cyclosporine, Prednisone, Eltrombopag 4/23  Horse ATG 40mg/kg/day = 3880 mg daily x 4 days  Cyclosporine 10mg/kg/day divided BID = 485mg BID  Eltrombopag 150mg daily  Strict I&Os, daily weights, mouth care  HLA typing sent out 4/20  CMV (-)  EBV,   Parvovirus (-)  HIV/Hepatitis panel negative  EMILIA negative  s/p reaction from ATG on 4/23 (hives)  4/24 will leave solumedrol 100mg IV q8 for the 4 days with ATG. Will d/c prednisione and start prednisone on day 5-10  (4/27-5/2) and then order taper. 4/20 BM Bx consistent with aplastic anemia  s/p ATG Intradermal test dose 4/22 - no reaction  start h-ATG, Cyclosporine, Prednisone, Eltrombopag 4/23  Horse ATG 40mg/kg/day = 3880 mg daily x 4 days  Cyclosporine 10mg/kg/day divided BID = 485mg BID  Eltrombopag 150mg daily  Strict I&Os, daily weights, mouth care  HLA typing sent out 4/20  CMV (-)  EBV,   Parvovirus (-)  HIV/Hepatitis panel negative  EMILIA negative  s/p reaction from ATG on 4/23 (hives)  4/24 will leave solumedrol 100mg IV q8 for the 4 days with ATG. d/c'd  prednisone. Start prednisone on day 5-10  (4/27-5/2) and then order taper.  Anemia-replace PRBC  Thrombocytopenia-replace plt

## 2020-05-01 NOTE — PROGRESS NOTE ADULT - PROBLEM SELECTOR PROBLEM 2
Addended by: JANELLE MO on: 5/1/2020 09:45 AM     Modules accepted: Orders    
Addended by: KASSI RODRÍGUEZ on: 5/1/2020 02:30 PM     Modules accepted: Orders    
SLOAN (acute kidney injury)

## 2020-08-07 NOTE — CONSULT NOTE ADULT - PROVIDER SPECIALTY LIST ADULT
Patient  Ate well and no nausea and vomiting and surgery did see and felt stable and patient released to  Home and to see  Next week SICU

## 2021-03-22 NOTE — DISCHARGE NOTE PROVIDER - NSDCCPCAREPLAN_GEN_ALL_CORE_FT
Following with Vascular, follow-up screening in 1 year  PRINCIPAL DISCHARGE DIAGNOSIS  Diagnosis: Post-operative complication  Assessment and Plan of Treatment:       SECONDARY DISCHARGE DIAGNOSES  Diagnosis: Pneumothorax  Assessment and Plan of Treatment:     Diagnosis: Abdominal pain  Assessment and Plan of Treatment:

## 2021-07-21 NOTE — PROGRESS NOTE ADULT - PROBLEM SELECTOR PLAN 3
RECEIVED CRITICAL LAB VALUE FOR LACTIC ACID 38. LAB VALUE REPORTED BY DERREK FROM LABORATORY. 
READ BACK PROVIDED. WILL ENDORSE TO MORNING NURSE. -small b/l subdural hematoma, possibly 2/2 fall prior to admission  -Carotid doppler showed b/l internal carotid stenoses of 16-49%  -on atorvastin 40mg qhs, holding ASA  -on telemetry, no evidence of Afib

## 2021-09-16 NOTE — PROGRESS NOTE ADULT - PROBLEM SELECTOR PLAN 1
130/Mod   + accels  - decels     cat 1 Pt. with SLOAN, likely hemodynamically mediated in setting of decreased oral intake, GI fluid loss and ? infection. Scr was 2.26 on 6/26/19, increased to 3.25 on 6/30. Scr decreased to 2.41 on 7/2 with IVF. Today SCr is 1.8 on 7/3. Continue IVF. Monitor labs and urine output. Avoid any potential nephrotoxins. No further plan for HD. Recommend HD catheter removal by IR. Pt. with SLOAN, likely hemodynamically mediated in setting of decreased oral intake, GI fluid loss and ? infection. Scr was 2.26 on 6/26/19, increased to 3.25 on 6/30. Scr decreased to 1.8 today (7/3/19). Pt. on IVF. Monitor labs and urine output. Avoid any potential nephrotoxins. No further plan for HD. Recommend HD catheter removal by IR today

## 2021-10-01 NOTE — PROGRESS NOTE ADULT - PROBLEM SELECTOR PLAN 6
2021  Foot and Ankle Surgery - New Patient   Provider: Dr. Dustin Meredith DPM  Location: [facility]    Subjective:  Viet Worley is a 72 y.o. male.     Chief Complaint   Patient presents with   • Left Foot - Nail Problem   • Right Foot - Nail Problem   • Establish Care     last pcp 9/15/2021       HPI: Patient is a 72-year-old male that presents with his daughter for evaluation of long and thickened nails that he is unable to manage.  Patient denies any significant pain or signs of infection.  Patient states that he does not have the dexterity to trim the nails and his daughter is worried about hurting him.  Patient denies any other issues with his feet.    No Known Allergies    Past Medical History:   Diagnosis Date   • Anxiety associated with depression 2021   • Atrial fibrillation (HCC)    • Benign essential HTN    • COPD (chronic obstructive pulmonary disease) (HCC)    • COVID-19 virus detected 2021   • Heart attack (HCC)    • Hyperlipidemia, mixed        History reviewed. No pertinent surgical history.    Family History   Problem Relation Age of Onset   • No Known Problems Mother    • No Known Problems Father        Social History     Socioeconomic History   • Marital status:      Spouse name: Not on file   • Number of children: Not on file   • Years of education: Not on file   • Highest education level: Not on file   Tobacco Use   • Smoking status: Former Smoker     Quit date: 2019     Years since quittin.2   • Smokeless tobacco: Former User   Vaping Use   • Vaping Use: Never used   Substance and Sexual Activity   • Alcohol use: Not Currently   • Drug use: Not Currently     Types: Marijuana   • Sexual activity: Defer        Current Outpatient Medications on File Prior to Visit   Medication Sig Dispense Refill   • aspirin 81 MG chewable tablet Chew 81 mg Daily.     • atorvastatin (LIPITOR) 20 MG tablet 1 tablet Daily.     • magnesium oxide (MAG-OX) 400 MG tablet Take 1  "tablet by mouth Daily.     • metoprolol succinate XL (TOPROL-XL) 50 MG 24 hr tablet Take 1 tablet by mouth Daily.     • sertraline (ZOLOFT) 100 MG tablet 1 tablet Daily.     • thiamine (VITAMIN B-1) 100 MG tablet Take 100 mg by mouth Daily.     • TRELEGY ELLIPTA 100-62.5-25 MCG/INH aerosol powder  1 puff Daily.     • VENTOLIN  (90 Base) MCG/ACT inhaler Take As Directed.       No current facility-administered medications on file prior to visit.       Review of Systems:  General: Denies fever, chills, fatigue, and weakness.  Eyes: Denies vision loss, blurry vision, and excessive redness.  ENT: Denies hearing issues and difficulty swallowing.  Cardiovascular: Denies palpitations, chest pain, or syncopal episodes.  Respiratory: Denies shortness of breath, wheezing, and coughing.  GI: Denies abdominal pain, nausea, and vomiting.   : Denies frequency, hematuria, and urgency.  Musculoskeletal: Denies muscle cramps, joint pains, and stiffness.  Derm: + Thickened and elongated nails  Neuro: Denies headaches, numbness, loss of coordination, and tremors.  Psych: Denies anxiety and depression.  Endocrine: Denies temperature intolerance and changes in appetite.  Heme: Denies bleeding disorders or abnormal bruising.     Objective   /64   Pulse 66   Ht 188 cm (74\")   Wt 77.1 kg (170 lb)   BMI 21.83 kg/m²     Foot/Ankle Exam:       General:   Appearance: appears stated age and healthy    Orientation: AAOx3    Affect: appropriate    Gait: unimpaired      VASCULAR      Right Foot Vascularity   Normal vascular exam    Dorsalis pedis:  2+  Posterior tibial:  2+  Skin Temperature: warm    Edema Grading:  None  CFT:  < 3 seconds  Pedal Hair Growth:  Absent     Left Foot Vascularity   Normal vascular exam    Dorsalis pedis:  2+  Posterior tibial:  2+  Skin Temperature: warm    Edema Grading:  None  CFT:  < 3 seconds  Pedal Hair Growth:  Absent      NEUROLOGIC     Right Foot Neurologic   Light touch sensation:  " Normal  Hot/Cold sensation: normal    Achilles reflex:  2+     Left Foot Neurologic   Light touch sensation:  Normal  Hot/cold sensation: normal    Achilles reflex:  2+     MUSCULOSKELETAL      Right Foot Musculoskeletal   Ecchymosis:  None  Tenderness: none    Arch:  Normal     Left Foot Musculoskeletal   Ecchymosis:  None  Tenderness: none    Arch:  Normal     MUSCLE STRENGTH     Right Foot Muscle Strength   Normal strength    Foot dorsiflexion:  5  Foot plantar flexion:  5  Foot inversion:  5  Foot eversion:  5     Left Foot Muscle Strength   Normal strength    Foot dorsiflexion:  5  Foot plantar flexion:  5  Foot inversion:  5  Foot eversion:  5     DERMATOLOGIC     Right Foot Dermatologic   Skin: skin intact    Nails: onychomycosis, abnormally thick and dystrophic nails       Left Foot Dermatologic   Skin: skin intact    Nails: onychomycosis, abnormally thick and dystrophic nails        Right Foot Additional Comments No significant pain or limitation.  No gross deformity or instability      Assessment/Plan   Diagnoses and all orders for this visit:    1. Onychodystrophy (Primary)      Patient presents for routine nail care.  Patient is unable to trim his nails but denies any prominent pain or limitation.  On evaluation, he does have thickness and dystrophy involving the nails which could be consistent with fungus.  I did review the diagnoses and treatment options.  Nails were debrided today without issue.  I did discuss the importance of routine nail care and have given him the consideration to obtain a routine pedicure from the nail salon.  Patient is welcome to see me on an as-needed basis if desired.      Nail debridement: Both feet x10    Consent and time out was performed before proceeding with the procedure. Nails were debrided with a nail nipper without complication.  No anesthesia was required.  Indications for procedure were thickened, dystrophic, and fungal appearing nails which are difficult to trim.   Proper self-care and technique was discussed with patient.  Patient was stable after procedure.      No orders of the defined types were placed in this encounter.       Note is dictated utilizing voice recognition software. Unfortunately this leads to occasional typographical errors. I apologize in advance if the situation occurs. If questions occur please do not hesitate to call our office.     - Distributive shock in setting of E coli/ Citerobacter bacteremia   - Likely had type II NSTEMI, trops peaked ~2000s, downtrended  -EP and cards following -no interventions warranted at this time   - echo with severe global LV dysfunction, cards following  - pharm nuclear stress test fixed deficits, will need cath later on

## 2021-12-29 NOTE — PHYSICAL THERAPY INITIAL EVALUATION ADULT - NS ASR RISK AREAS PT EVAL
Plan: No lesion present on exam.\\nDenies history of neck pain, depression. Reports history of anxiety
Detail Level: Zone
Initiate Treatment: Sarna Lotion 3 times daily
fall

## 2022-03-22 NOTE — ED PROCEDURE NOTE - CPROC ED CHEST TUBE DETAIL1
An incision into the lateral chest was made (see location above). A thoracostomy tube was placed into the pleural space (see size above), and connected to a closed drainage canister and water suction. The tube was secured with 00 nylon suture, and the area dressed with petrolatum impregnated gauze bandage. A post procedure chest x-ray was ordered, and proper placement was confirmed.
143

## 2022-09-21 NOTE — PROGRESS NOTE ADULT - ASSESSMENT
CNM Prenatal Office Note  Subjective:  Hamilton Hurt is here for a return obstetrical visit. Today she is 39w1d weeks EGA. She is doing well, taking her PNV as directed and c/o right earache x 2 days. She does not have vaginal bleeding, n/v, syncope, or headaches. Pt does feel fetal movement regularly. Objective: Mother's Prenatal Vitals  BP: 100/70  Weight: 172 lb (78 kg)  Patient Position: Sitting  Prenatal Fetal Information  Fetal HR: 153  Movement: Present  Pt is A&Ox3, in no acute distress. Normocephalic, atraumatic. PERRL. Resp even and non-labored. Skin pink, warm & dry. Gravid abdomen. BASHIR's well. Gait steady. Right TM with scarring and cloudy TM. Assessment:    IUP at 39w1d wks      Diagnosis Orders   1. 39 weeks gestation of pregnancy        2. Encounter for supervision of normal first pregnancy in third trimester        3. Positive GBS test          Plan:  Pt counseled on balanced nutrition, adequate fluid intake, taking PNV daily, and exercise along with labor precautions, GHTN precautions, and Kick count  Continue with routine prenatal care.  surveillance not indicated  RTC in  5 days for prenatal visit  Anticipate IOL @ 40 weeks    MEDICATIONS:  No orders of the defined types were placed in this encounter. ORDERS:  No orders of the defined types were placed in this encounter. More than 50% of this 20 min visit was education and counseling. 63yr old man,  former smoker(30PY) with PMH CAD s/p 9 stents, DMT2 on insulin, HTN, GERD, prior cholecystectomy, diverticulosis, biliary stricture s/p ERCP with sphincterotomy and stent 4/2019 c/b ascending cholangitis/septic shock from E.Coli bacteremia requiring pressor support and initiation of HD, recent Whipple surgery for cholangiocarcinoma on 6/18/19 and discharged 6/26 presents with nausea, vomiting, and fever since discharge. Found to have fevers in setting of left sided PTX and paraduodenal collection

## 2023-02-02 NOTE — DIETITIAN INITIAL EVALUATION ADULT. - SIGNS/SYMPTOMS
evidenced by estimated nutrition intake via current diet order excessive compared to the need mother

## 2023-03-20 NOTE — PHYSICAL THERAPY INITIAL EVALUATION ADULT - PERTINENT HX OF CURRENT PROBLEM, REHAB EVAL
Not  sure if any of the APNs saw this patient in hospital. We have not seen in clinic yet. Can someone refill these Patient is 63 year old male admitted with history of cholangiocarcinoma, whipple surgery, presents with altered mental status.

## 2023-04-04 NOTE — PROCEDURE NOTE - NSTOLERANCE_GEN_A_CORE
unclear control, continue current plan pending work up below. May need to consider insulin management vs other oral medication if GLP-1 not covered or if A1c too high. Heavy lifestyle recommended and diabetic educator. Look for signs of Type 1 or pancreatic enzyme clues given sudden onset and weight loss.
Patient tolerated procedure well.
Patient tolerated procedure well.

## 2023-10-12 NOTE — DISCHARGE NOTE NURSING/CASE MANAGEMENT/SOCIAL WORK - NSPROEXTENSIONSOFSELF_GEN_A_NUR
----- Message from Milton Levine MD sent at 10/11/2023  3:56 PM CDT -----  Please inform Alia Diaz  about labs results :   Positive for diabetes , let me know if in agreement to start med or wants to work on diet and exercise ?  Thanks,  Dr Levine     Pt with automatic glucose check patch to the left upper arm./none

## 2023-11-20 NOTE — ED PROCEDURE NOTE - CPROC ED COMPLICATIONS1
----- Message from Oly White MD sent at 11/20/2023  1:44 PM CST -----  2nd part-  UTI is sensitive to Ciprofloxacin so I just sent RX for 10 days to pharmacy .  Dr. Oly White     no

## 2024-03-31 NOTE — ED ADULT NURSE NOTE - CHIEF COMPLAINT QUOTE
Informed xray tech the need for repeat shoulder xray STAT   Pt brought in as notification via EMS for cardiac arrest.  Arrived at 1908hrs, intubated, with CPR in progress. Unwitnessed from home, unk down time. Pt received 4 epi and 1 bicarb via IO from EMS. 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

## 2024-10-30 NOTE — PROGRESS NOTE ADULT - ATTENDING COMMENTS
Benefits, risks, and possible complications of procedure explained to patient/caregiver who verbalized understanding and gave written consent. spoke with outpatient nephrologist Dr. Partida today.  Patient apparently had a rise in serum creatinine as an outpatient following PPI which improved to his baseline about 2.2 once PPI stopped.  Would discuss need for PPI with GI.

## 2025-02-10 NOTE — PATIENT PROFILE ADULT - HAS THE PATIENT USED TOBACCO IN THE PAST 30 DAYS?
Pt ambulatory to the ED with c/o right sided elbow pain/injury. Pt seen in [facility] last week. Pt reports he fell 2.5 weeks ago. Pt endorses "there's a lot of liquid coming out of my elbow". Allergies in chart. Denies fevers.
no
Unable to assess due to patient's cognitive impairment
